# Patient Record
Sex: FEMALE | Race: WHITE | Employment: OTHER | URBAN - METROPOLITAN AREA
[De-identification: names, ages, dates, MRNs, and addresses within clinical notes are randomized per-mention and may not be internally consistent; named-entity substitution may affect disease eponyms.]

---

## 2017-01-09 ENCOUNTER — OFFICE VISIT (OUTPATIENT)
Dept: FAMILY MEDICINE | Facility: CLINIC | Age: 82
End: 2017-01-09
Payer: COMMERCIAL

## 2017-01-09 VITALS
BODY MASS INDEX: 20.32 KG/M2 | RESPIRATION RATE: 12 BRPM | SYSTOLIC BLOOD PRESSURE: 130 MMHG | DIASTOLIC BLOOD PRESSURE: 90 MMHG | TEMPERATURE: 98.2 F | HEIGHT: 60 IN | WEIGHT: 103.5 LBS | HEART RATE: 64 BPM

## 2017-01-09 DIAGNOSIS — R13.19 ESOPHAGEAL DYSPHAGIA: Primary | ICD-10-CM

## 2017-01-09 DIAGNOSIS — Z13.220 LIPID SCREENING: ICD-10-CM

## 2017-01-09 DIAGNOSIS — M77.42 METATARSALGIA OF LEFT FOOT: ICD-10-CM

## 2017-01-09 DIAGNOSIS — Z00.00 ENCOUNTER FOR MEDICARE ANNUAL WELLNESS EXAM: ICD-10-CM

## 2017-01-09 PROCEDURE — 80053 COMPREHEN METABOLIC PANEL: CPT | Performed by: FAMILY MEDICINE

## 2017-01-09 PROCEDURE — 36415 COLL VENOUS BLD VENIPUNCTURE: CPT | Performed by: FAMILY MEDICINE

## 2017-01-09 PROCEDURE — 80061 LIPID PANEL: CPT | Performed by: FAMILY MEDICINE

## 2017-01-09 PROCEDURE — 99397 PER PM REEVAL EST PAT 65+ YR: CPT | Performed by: FAMILY MEDICINE

## 2017-01-09 PROCEDURE — 84443 ASSAY THYROID STIM HORMONE: CPT | Performed by: FAMILY MEDICINE

## 2017-01-09 NOTE — PROGRESS NOTES
SUBJECTIVE:                                                            Batsheva Barkley is a 83 year old female who presents for Preventive Visit.  click delete button to remove this line now  click delete button to remove this line now  Are you in the first 12 months of your Medicare Part B coverage?  Yes,  Visual Acuity:  Right Eye:    Left Eye:   Both Eyes:     METATARSAL LEFT FOOT PAIN, HIGH ARCH, GOOD SHOES, MAY BENEFIT FROM ORTHOTICS :see Dr. Marcus     Healthy Habits:    Do you get at least three servings of calcium containing foods daily (dairy, green leafy vegetables, etc.)? yes    Amount of exercise or daily activities, outside of work: 4 day(s) per week    Problems taking medications regularly No    Medication side effects: No    Have you had an eye exam in the past two years? yes    Do you see a dentist twice per year? yes    Do you have sleep apnea, excessive snoring or daytime drowsiness?no    COGNITIVE SCREEN  1) Repeat 3 items (Banana, Sunrise, Chair)      Mini-CogTM Copyright TERRI Anderson. Licensed by the author for use in [facility]; reprinted with permission (janneth@[facility]). All rights reserved.    normal            All Histories reviewed and updated in "[employer]" as appropriate.  Social History   Substance Use Topics     Smoking status: Former Smoker     Smokeless tobacco: Never Used      Comment: only smoked for 3 years.     Alcohol Use: No       The patient does not drink >3 drinks per day nor >7 drinks per week.    Today's PHQ-2 Score:   PHQ-2 ( 1999 Pfizer) 7/1/2016 9/18/2015   Q1: Little interest or pleasure in doing things 0 0   Q2: Feeling down, depressed or hopeless 0 0   PHQ-2 Score 0 0       Do you feel safe in your environment - Yes    Do you have a Health Care Directive?: No: Advance care planning was reviewed with patient; patient declined at this time.    Current providers sharing in care for this patient include:   Patient Care Team:  Lenin Perez MD as PCP - General  "(Family Practice)      Hearing impairment: No    Ability to successfully perform activities of daily living: Yes, no assistance needed     Fall risk:  Fallen 2 or more times in the past year?: Yes  Any fall with injury in the past year?: No    Home safety:  none identified  click delete button to remove this line now    The following health maintenance items are reviewed in Epic and correct as of today:  Health Maintenance   Topic Date Due     INFLUENZA VACCINE (SYSTEM ASSIGNED)  09/01/2016     FALL RISK ASSESSMENT  09/18/2016     BMP Q6 MOS (NO INBASKET)  03/24/2017     LIPID MONITORING Q1 YEAR( NO INBASKET)  04/11/2017     ADVANCE DIRECTIVE PLANNING Q5 YRS (NO INBASKET)  05/07/2017     ALT Q1 YR (NO INBASKET)  09/24/2017     CBC Q1 YR (NO INBASKET)  09/24/2017     HF ACTION PLAN Q3 YR (NO INBASKET MSG)  07/30/2018     TETANUS IMMUNIZATION (SYSTEM ASSIGNED)  09/18/2025     DEXA SCAN SCREENING (SYSTEM ASSIGNED)  Completed     PNEUMOCOCCAL  Completed         Pneumonia Vaccine:Adults age 65+ who received Pneumovax (PPSV23) at 65 years or older: Should be given PCV13 > 1 year after their most recent PPSV23     ROS:  Constitutional, HEENT, cardiovascular, pulmonary, GI, , musculoskeletal, neuro, skin, endocrine and psych systems are negative, except as otherwise noted.    BP Readings from Last 3 Encounters:   01/09/17 130/90   10/07/16 126/82   10/06/16 140/65    Wt Readings from Last 3 Encounters:   01/09/17 103 lb 8 oz (46.947 kg)   10/07/16 98 lb (44.453 kg)   10/06/16 98 lb (44.453 kg)                  OBJECTIVE:                                                            There were no vitals taken for this visit. Estimated body mass index is 19.78 kg/(m^2) as calculated from the following:    Height as of 10/7/16: 4' 11\" (1.499 m).    Weight as of 10/7/16: 98 lb (44.453 kg).  EXAM:   GENERAL APPEARANCE: healthy, alert and no distress  EYES: Eyes grossly normal to inspection, PERRL and conjunctivae and sclerae " "normal  HENT: ear canals and TM's normal, nose and mouth without ulcers or lesions, oropharynx clear and oral mucous membranes moist  NECK: no adenopathy, no asymmetry, masses, or scars and thyroid normal to palpation  RESP: lungs clear to auscultation - no rales, rhonchi or wheezes  BREAST: normal without masses, tenderness or nipple discharge and no palpable axillary masses or adenopathy  CV: regular rate and rhythm, normal S1 S2, no S3 or S4, no murmur, click or rub, no peripheral edema and peripheral pulses strong  ABDOMEN: soft, nontender, no hepatosplenomegaly, no masses and bowel sounds normal  MS: no musculoskeletal defects are noted and gait is age appropriate without ataxia  SKIN: no suspicious lesions or rashes  NEURO: Normal strength and tone, sensory exam grossly normal, mentation intact and speech normal  PSYCH: mentation appears normal and affect normal/bright    ASSESSMENT / PLAN:                                                            1. Encounter for Medicare annual wellness exam  Doing well, swallowing worse without her PPI    2. Esophageal dysphagia    - omeprazole (PRILOSEC) 20 MG CR capsule; Take 1 capsule (20 mg) by mouth 2 times daily  Dispense: 60 capsule; Refill: 11    3. Metatarsalgia of left foot    - PODIATRY/FOOT & ANKLE SURGERY REFERRAL    4. Lipid screening    - Lipid panel reflex to direct LDL  - Comprehensive metabolic panel  - TSH with free T4 reflex    End of Life Planning:  Patient currently has an advanced directive:     COUNSELING:  Reviewed preventive health counseling, as reflected in patient instructions       Healthy diet/nutrition       Vision screening       Hearing screening        Estimated body mass index is 19.78 kg/(m^2) as calculated from the following:    Height as of 10/7/16: 4' 11\" (1.499 m).    Weight as of 10/7/16: 98 lb (44.453 kg).  Weight management plan: Discussed healthy diet and exercise guidelines and patient will follow up in 6 months in clinic to " re-evaluate.   reports that she has quit smoking. She has never used smokeless tobacco.      Appropriate preventive services were discussed with this patient, including applicable screening as appropriate for cardiovascular disease, diabetes, osteopenia/osteoporosis, and glaucoma.  As appropriate for age/gender, discussed screening for colorectal cancer, prostate cancer, breast cancer, and cervical cancer. Checklist reviewing preventive services available has been given to the patient.    Reviewed patients plan of care and provided an AVS. The Basic Care Plan (routine screening as documented in Health Maintenance) for Batsheva meets the Care Plan requirement. This Care Plan has been established and reviewed with the Patient and son.    Counseling Resources:  ATP IV Guidelines  Pooled Cohorts Equation Calculator  Breast Cancer Risk Calculator  FRAX Risk Assessment  ICSI Preventive Guidelines  Dietary Guidelines for Americans, 2010  USDA's MyPlate  ASA Prophylaxis  Lung CA Screening    Lenin Perez MD  [facility]

## 2017-01-09 NOTE — Clinical Note
[facility]  [address] [place], [address]  [phone]        January 11, 2017    Batsheva Barkley                                                                                                                                                       [address] [address]            Dear Batsheva,     Your blood looks generally good, I'd like to check you again in the spring . You won't have to fast.      Lenin Perez MD    Results for orders placed or performed in visit on 01/09/17   Lipid panel reflex to direct LDL   Result Value Ref Range    Cholesterol 178 <200 mg/dL    Triglycerides 74 <150 mg/dL    HDL Cholesterol 93 >49 mg/dL    LDL Cholesterol Calculated 70 <100 mg/dL    Non HDL Cholesterol 85 <130 mg/dL   Comprehensive metabolic panel   Result Value Ref Range    Sodium 132 (L) 133 - 144 mmol/L    Potassium 4.0 3.4 - 5.3 mmol/L    Chloride 98 94 - 109 mmol/L    Carbon Dioxide 25 20 - 32 mmol/L    Anion Gap 9 3 - 14 mmol/L    Glucose 87 70 - 99 mg/dL    Urea Nitrogen 18 7 - 30 mg/dL    Creatinine 0.72 0.52 - 1.04 mg/dL    GFR Estimate 77 >60 mL/min/1.7m2    GFR Estimate If Black >90   GFR Calc   >60 mL/min/1.7m2    Calcium 8.9 8.5 - 10.1 mg/dL    Bilirubin Total 0.5 0.2 - 1.3 mg/dL    Albumin 3.7 3.4 - 5.0 g/dL    Protein Total 6.6 (L) 6.8 - 8.8 g/dL    Alkaline Phosphatase 87 40 - 150 U/L    ALT 21 0 - 50 U/L    AST 21 0 - 45 U/L   TSH with free T4 reflex   Result Value Ref Range    TSH 1.87 0.40 - 4.00 mU/L

## 2017-01-09 NOTE — PATIENT INSTRUCTIONS

## 2017-01-09 NOTE — MR AVS SNAPSHOT
After Visit Summary   1/9/2017    Batsheva Barkley    MRN: [id number]           Patient Information     Date Of Birth          11/6/1933        Visit Information        Provider Department      1/9/2017 2:30 PM Lenin Perez MD [facility]        Today's Diagnoses     Esophageal dysphagia    -  1     Encounter for Medicare annual wellness exam         Metatarsalgia of left foot         Lipid screening           Care Instructions      Preventive Health Recommendations  Female Ages 65 +    Yearly exam:     See your health care provider every year in order to  o Review health changes.   o Discuss preventive care.    o Review your medicines if your doctor has prescribed any.      You no longer need a yearly Pap test unless you've had an abnormal Pap test in the past 10 years. If you have vaginal symptoms, such as bleeding or discharge, be sure to talk with your provider about a Pap test.      Every 1 to 2 years, have a mammogram.  If you are over 69, talk with your health care provider about whether or not you want to continue having screening mammograms.      Every 10 years, have a colonoscopy. Or, have a yearly FIT test (stool test). These exams will check for colon cancer.       Have a cholesterol test every 5 years, or more often if your doctor advises it.       Have a diabetes test (fasting glucose) every three years. If you are at risk for diabetes, you should have this test more often.       At age 65, have a bone density scan (DEXA) to check for osteoporosis (brittle bone disease).    Shots:    Get a flu shot each year.    Get a tetanus shot every 10 years.    Talk to your doctor about your pneumonia vaccines. There are now two you should receive - Pneumovax (PPSV 23) and Prevnar (PCV 13).    Talk to your doctor about the shingles vaccine.    Talk to your doctor about the hepatitis B vaccine.    Nutrition:     Eat at least 5 servings of fruits and vegetables each  day.      Eat whole-grain bread, whole-wheat pasta and brown rice instead of white grains and rice.      Talk to your provider about Calcium and Vitamin D.     Lifestyle    Exercise at least 150 minutes a week (30 minutes a day, 5 days a week). This will help you control your weight and prevent disease.      Limit alcohol to one drink per day.      No smoking.       Wear sunscreen to prevent skin cancer.       See your dentist twice a year for an exam and cleaning.      See your eye doctor every 1 to 2 years to screen for conditions such as glaucoma, macular degeneration, cataracts, etc         Follow-ups after your visit        Additional Services     PODIATRY/FOOT & ANKLE SURGERY REFERRAL       Your provider has referred you to: [facility]: [facility] [phone]   [email or web address]    Please be aware that coverage of these services is subject to the terms and limitations of your health insurance plan.  Call member services at your health plan with any benefit or coverage questions.      Please bring the following to your appointment:  >>   Any x-rays, CTs or MRIs which have been performed.  Contact the facility where they were done to arrange for  prior to your scheduled appointment.    >>   List of current medications   >>   This referral request   >>   Any documents/labs given to you for this referral                  Who to contact     If you have questions or need follow up information about today's clinic visit or your schedule please contact [facility] directly at [phone].  Normal or non-critical lab and imaging results will be communicated to you by MyChart, letter or phone within 4 business days after the clinic has received the results. If you do not hear from us within 7 days, please contact the clinic through MyChart or phone. If you have a critical or abnormal lab result, we will notify you by phone as soon as  "possible.  Submit refill requests through [employer] or call your pharmacy and they will forward the refill request to us. Please allow 3 business days for your refill to be completed.          Additional Information About Your Visit        [employer] Information     [employer] lets you send messages to your doctor, view your test results, renew your prescriptions, schedule appointments and more. To sign up, go to [email or web address] . Click on \"Log in\" on the left side of the screen, which will take you to the Welcome page. Then click on \"Sign up Now\" on the right side of the page.     You will be asked to enter the access code listed below, as well as some personal information. Please follow the directions to create your username and password.     Your access code is: [email or web address]  Expires: 2017  3:16 PM     Your access code will  in 90 days. If you need help or a new code, please call your [place] clinic or [phone].        Care EveryWhere ID     This is your Care EveryWhere ID. This could be used by other organizations to access your [place] medical records  [id number]        Your Vitals Were     Pulse Temperature Respirations Height BMI (Body Mass Index)       64 98.2  F (36.8  C) (Oral) 12 5' (1.524 m) 20.21 kg/m2        Blood Pressure from Last 3 Encounters:   17 130/90   10/07/16 126/82   10/06/16 140/65    Weight from Last 3 Encounters:   17 103 lb 8 oz (46.947 kg)   10/07/16 98 lb (44.453 kg)   10/06/16 98 lb (44.453 kg)              We Performed the Following     Comprehensive metabolic panel     Lipid panel reflex to direct LDL     PODIATRY/FOOT & ANKLE SURGERY REFERRAL     TSH with free T4 reflex          Today's Medication Changes          These changes are accurate as of: 17  3:16 PM.  If you have any questions, ask your nurse or doctor.               These medicines have changed or have updated prescriptions.        Dose/Directions    omeprazole 20 MG CR capsule "   Commonly known as:  priLOSEC   This may have changed:  when to take this   Used for:  Esophageal dysphagia   Changed by:  Lenin Perez MD        Dose:  20 mg   Take 1 capsule (20 mg) by mouth 2 times daily   Quantity:  60 capsule   Refills:  11            Where to get your medicines      These medications were sent to [facility] [id number] [place] [address]  [address] [address]     Phone:  [phone]    - omeprazole 20 MG CR capsule             Primary Care Provider Office Phone # Fax #    Lenin Perez [phone]       [facility] [address] [place] [address]        Thank you!     Thank you for choosing [facility]  for your care. Our goal is always to provide you with excellent care. Hearing back from our patients is one way we can continue to improve our services. Please take a few minutes to complete the written survey that you may receive in the mail after your visit with us. Thank you!             Your Updated Medication List - Protect others around you: Learn how to safely use, store and throw away your medicines at www.disposemymeds.org.          This list is accurate as of: 1/9/17  3:16 PM.  Always use your most recent med list.                   Brand Name Dispense Instructions for use    acetaminophen 325 MG tablet    TYLENOL    100 tablet    Take 2 tablets (650 mg) by mouth every 4 hours as needed for mild pain       aspirin 81 MG tablet     100    1 tab po QD (Once per day)       atorvastatin 40 MG tablet    LIPITOR    90 tablet    Take 1 tablet (40 mg) by mouth daily       CALCIUM 600 + D 600-200 MG-UNIT Tabs   Generic drug:  Calcium Carb-Cholecalciferol      Take 600 mg by mouth daily       CENTRUM SILVER per tablet     3 MONTHS    ONE DAILY       lisinopril 10 MG tablet    PRINIVIL/ZESTRIL    90 tablet    Take 1 tablet (10 mg) by mouth daily       metoprolol 50 MG  tablet    LOPRESSOR    180 tablet    Take 1 tablet (50 mg) by mouth 2 times daily       mirtazapine 15 MG tablet    REMERON     Take 15 mg by mouth At Bedtime       olopatadine HCl 0.2 % Soln    PATADAY    2.5 mL    Place 1 drop into both eyes daily       omeprazole 20 MG CR capsule    priLOSEC    60 capsule    Take 1 capsule (20 mg) by mouth 2 times daily       order for DME     1 Device    Equipment being ordered: lumbar brace elastic reinforced       vitamin D 1000 UNITS capsule      Take 2 capsules by mouth daily.

## 2017-01-09 NOTE — NURSING NOTE
Chief Complaint   Patient presents with     Physical       Initial /90 mmHg  Pulse 64  Temp(Src) 98.2  F (36.8  C) (Oral)  Resp 12  Ht 5' (1.524 m)  Wt 103 lb 8 oz (46.947 kg)  BMI 20.21 kg/m2 Estimated body mass index is 20.21 kg/(m^2) as calculated from the following:    Height as of this encounter: 5' (1.524 m).    Weight as of this encounter: 103 lb 8 oz (46.947 kg).  BP completed using cuff size: maryjane Ramachandran CMA

## 2017-01-10 LAB
ALBUMIN SERPL-MCNC: 3.7 G/DL (ref 3.4–5)
ALP SERPL-CCNC: 87 U/L (ref 40–150)
ALT SERPL W P-5'-P-CCNC: 21 U/L (ref 0–50)
ANION GAP SERPL CALCULATED.3IONS-SCNC: 9 MMOL/L (ref 3–14)
AST SERPL W P-5'-P-CCNC: 21 U/L (ref 0–45)
BILIRUB SERPL-MCNC: 0.5 MG/DL (ref 0.2–1.3)
BUN SERPL-MCNC: 18 MG/DL (ref 7–30)
CALCIUM SERPL-MCNC: 8.9 MG/DL (ref 8.5–10.1)
CHLORIDE SERPL-SCNC: 98 MMOL/L (ref 94–109)
CHOLEST SERPL-MCNC: 178 MG/DL
CO2 SERPL-SCNC: 25 MMOL/L (ref 20–32)
CREAT SERPL-MCNC: 0.72 MG/DL (ref 0.52–1.04)
GFR SERPL CREATININE-BSD FRML MDRD: 77 ML/MIN/1.7M2
GLUCOSE SERPL-MCNC: 87 MG/DL (ref 70–99)
HDLC SERPL-MCNC: 93 MG/DL
LDLC SERPL CALC-MCNC: 70 MG/DL
NONHDLC SERPL-MCNC: 85 MG/DL
POTASSIUM SERPL-SCNC: 4 MMOL/L (ref 3.4–5.3)
PROT SERPL-MCNC: 6.6 G/DL (ref 6.8–8.8)
SODIUM SERPL-SCNC: 132 MMOL/L (ref 133–144)
TRIGL SERPL-MCNC: 74 MG/DL
TSH SERPL DL<=0.005 MIU/L-ACNC: 1.87 MU/L (ref 0.4–4)

## 2017-02-07 ENCOUNTER — RADIANT APPOINTMENT (OUTPATIENT)
Dept: GENERAL RADIOLOGY | Facility: CLINIC | Age: 82
End: 2017-02-07
Attending: PODIATRIST
Payer: COMMERCIAL

## 2017-02-07 ENCOUNTER — OFFICE VISIT (OUTPATIENT)
Dept: PODIATRY | Facility: CLINIC | Age: 82
End: 2017-02-07
Payer: COMMERCIAL

## 2017-02-07 VITALS
HEIGHT: 60 IN | SYSTOLIC BLOOD PRESSURE: 136 MMHG | WEIGHT: 103 LBS | BODY MASS INDEX: 20.22 KG/M2 | DIASTOLIC BLOOD PRESSURE: 84 MMHG

## 2017-02-07 DIAGNOSIS — L97.522 PLANTAR ULCER OF LEFT FOOT WITH FAT LAYER EXPOSED (H): ICD-10-CM

## 2017-02-07 DIAGNOSIS — M79.672 LEFT FOOT PAIN: Primary | ICD-10-CM

## 2017-02-07 DIAGNOSIS — M79.672 LEFT FOOT PAIN: ICD-10-CM

## 2017-02-07 DIAGNOSIS — L84 PRE-ULCERATIVE CALLUSES: ICD-10-CM

## 2017-02-07 DIAGNOSIS — L90.9 FAT PAD ATROPHY OF FOOT: ICD-10-CM

## 2017-02-07 DIAGNOSIS — M20.41 HAMMER TOES OF BOTH FEET: ICD-10-CM

## 2017-02-07 DIAGNOSIS — M79.671 RIGHT FOOT PAIN: ICD-10-CM

## 2017-02-07 DIAGNOSIS — M20.42 HAMMER TOES OF BOTH FEET: ICD-10-CM

## 2017-02-07 PROCEDURE — 11042 DBRDMT SUBQ TIS 1ST 20SQCM/<: CPT | Performed by: PODIATRIST

## 2017-02-07 PROCEDURE — 99203 OFFICE O/P NEW LOW 30 MIN: CPT | Mod: 25 | Performed by: PODIATRIST

## 2017-02-07 PROCEDURE — 73630 X-RAY EXAM OF FOOT: CPT | Mod: LT

## 2017-02-07 RX ORDER — UREA 200 MG/G
CREAM TOPICAL DAILY
Qty: 85 G | Refills: 1 | Status: SHIPPED | OUTPATIENT
Start: 2017-02-07 | End: 2017-03-07

## 2017-02-07 RX ORDER — SILVER SULFADIAZINE 10 MG/G
CREAM TOPICAL DAILY
Qty: 25 G | Refills: 0 | Status: SHIPPED | OUTPATIENT
Start: 2017-02-07 | End: 2017-02-21

## 2017-02-07 NOTE — NURSING NOTE
Chief Complaint   Patient presents with     Foot Pain     left foot hurts on the ball of foot [id number], callus on ball of foot right foot        Initial /84 mmHg  Ht 5' (1.524 m)  Wt 103 lb (46.72 kg)  BMI 20.12 kg/m2 Estimated body mass index is 20.12 kg/(m^2) as calculated from the following:    Height as of this encounter: 5' (1.524 m).    Weight as of this encounter: 103 lb (46.72 kg).  Medication Reconciliation: complete   Leo Mclaughlin MA

## 2017-02-07 NOTE — PROGRESS NOTES
PATIENT HISTORY:  Batsheva Barkley is a 83 year old female who presents to clinic for pain to bottoms of both feet. Left is worse than right. Here with daughter. Notes she gets a callus on the right foot but the left foot pain is new. Pain is 8/10. Very sore to walk on. Has had for 6 months on the left side and 2 years on the right. Previous bunion surgery they note to left great toe. Would like to know what is causing the pain and what can be done for it.     Review of Systems:  Patient denies fever, chills, rash, wound, stiffness,heart burn, blood in stool, chest pain with activity, calf pain when walking, shortness of breath with activity, chronic cough, easy bleeding/bruising, excessive thirst, fatigue, depression, anxiety.  Patient admits to limping, weakness, swelling. .     PAST MEDICAL HISTORY:   Past Medical History   Diagnosis Date     Generalized osteoarthrosis      knees ;; L total kne 10/09     CAD (coronary artery disease) 6/20/05     [place], transient CHF     Osteoporosis      Mixed hyperlipidemia      Cardiomyopathy (H)      HTN (hypertension)      Mitral regurgitation      mod     Systolic CHF (H) 1/21/2015     Coronary atherosclerosis 6/20/2005     circ vessel was stented;  had a 50% LAD lesion which was not treated Problem list name updated by automated process. Provider to review        PAST SURGICAL HISTORY:   Past Surgical History   Procedure Laterality Date     L bunion + hammer toes  1/04, 4/04     Ovarian cyst surgery       Colonoscopy  3/05     L total knee replacement  10/2009      Ent surgery       Eye surgery       Heart cath, angioplasty  2005     RONI to left circ     Esophagoscopy, gastroscopy, duodenoscopy (egd), combined N/A 4/14/2016     Procedure: COMBINED ESOPHAGOSCOPY, GASTROSCOPY, DUODENOSCOPY (EGD), BIOPSY SINGLE OR MULTIPLE;  Surgeon: Jonathan Gramajo MD;  Location:  GI        MEDICATIONS:   Current outpatient prescriptions:      omeprazole (PRILOSEC) 20  MG CR capsule, Take 1 capsule (20 mg) by mouth 2 times daily, Disp: 60 capsule, Rfl: 11     metoprolol (LOPRESSOR) 50 MG tablet, Take 1 tablet (50 mg) by mouth 2 times daily, Disp: 180 tablet, Rfl: 3     olopatadine HCl (PATADAY) 0.2 % SOLN, Place 1 drop into both eyes daily, Disp: 2.5 mL, Rfl: 3     atorvastatin (LIPITOR) 40 MG tablet, Take 1 tablet (40 mg) by mouth daily, Disp: 90 tablet, Rfl: 3     lisinopril (PRINIVIL,ZESTRIL) 10 MG tablet, Take 1 tablet (10 mg) by mouth daily, Disp: 90 tablet, Rfl: 3     order for DME, Equipment being ordered: lumbar brace elastic reinforced, Disp: 1 Device, Rfl: 0     mirtazapine (REMERON) 15 MG tablet, Take 15 mg by mouth At Bedtime, Disp: , Rfl:      Calcium Carb-Cholecalciferol (CALCIUM 600 + D) 600-200 MG-UNIT TABS, Take 600 mg by mouth daily, Disp: , Rfl:      acetaminophen (TYLENOL) 325 MG tablet, Take 2 tablets (650 mg) by mouth every 4 hours as needed for mild pain, Disp: 100 tablet, Rfl: 0     Cholecalciferol (VITAMIN D) 1000 UNITS capsule, Take 2 capsules by mouth daily., Disp: , Rfl:      CENTRUM SILVER OR TABS, ONE DAILY, Disp: 3 MONTHS, Rfl: 1 YEAR     ASPIRIN 81 MG OR TABS, 1 tab po QD (Once per day), Disp: 100, Rfl: 3     ALLERGIES:    Allergies   Allergen Reactions     Codeine      nausea, HA     Lisinopril Cough        SOCIAL HISTORY:   Social History     Social History     Marital Status:      Spouse Name: Gene     Number of Children: 6     Years of Education: N/A     Occupational History     Not on file.     Social History Main Topics     Smoking status: Former Smoker     Smokeless tobacco: Never Used      Comment: only smoked for 3 years.     Alcohol Use: No     Drug Use: No     Sexual Activity:     Partners: Male     Other Topics Concern     Caffeine Concern No     decaf coffee and tea      Special Diet No     Exercise Yes     walk everyday      Social History Narrative        FAMILY HISTORY:   Family History   Problem Relation Age of Onset      CEREBROVASCULAR DISEASE Mother      Breast Cancer Sister      Respiratory Father      heart     HEART DISEASE Sister         EXAM:Vitals: /84 mmHg  Ht 5' (1.524 m)  Wt 103 lb (46.72 kg)  BMI 20.12 kg/m2  BMI= Body mass index is 20.12 kg/(m^2).    General appearance: Patient is alert and fully cooperative with history & exam.  No sign of distress is noted during the visit.     Psychiatric: Affect is pleasant & appropriate.  Patient appears motivated to improve health.     Respiratory: Breathing is regular & unlabored while sitting.     HEENT: Hearing is intact to spoken word.  Speech is clear.  No gross evidence of visual impairment that would impact ambulation.     Dermatologic: localized hyperkeratotic pre ulcerative lesions noted to plantar right 3rd metatarsal head and plantar left 1st metatarsal head. Small 0.2cm x 0.2cm x 0.2 cm ulceration noted to plantar left 1st metatarsal head. No redness, drainage or signs of infection noted. .     Vascular: DP & PT pulses are intact & regular bilaterally. Minimal edema and varicosities noted.  CFT and skin temperature is normal to both lower extremities.     Neurologic: Lower extremity sensation is diminished to feet. .     Musculoskeletal: Patient is ambulatory without assistive device or brace.  Rigid contracture toes 2-5 right foot and 3-5 left foot. No range of motion of left 1st metatarsal phalangeal joint. Pain on palpation of the right 3rd metatarsal head and left 1st metatarsal phalangeal joint.     Radiographs:  Diffuse arthritis through out foot. Contracture of toes 3-5. No fractures noted. Hardware and previous fusion left 1st metatarsal phalangeal joint     ASSESSMENT:    Left foot pain  Right foot pain  Pre-ulcerative calluses  Hammer toes of both feet  Fat pad atrophy of foot  Plantar ulcer of left foot with fat layer exposed (H)       PLAN:  Reviewed patient's chart in epic. Discussed causes of keratomas.  They are due to areas of increase  friction.  Hammertoes can create these as they put more pressure to the metatarsal head.  Discussed treatments such as using foot file, pumice stone, metatarsal pads, orthotics, and not walking barefoot.     At this time, recommend silvadene cream and lidocaine cream to left foot ulcer for next 2 weeks. Recommend slippers in the house, foam inserts for cushion and urea cream to both feet. Was given information on callus care and places that do that.     Procedure. After verbal consent, excisional debridement was performed on ulcer.  #15 blade was used to debride ulcer down to and including subcutaneous tissue. Bleeding controlled with light pressure.   No drainage noted.  No anesthesia was used due to neuropathy. Dry dressing applied to foot.  Patient tolerated procedure well.  .        Rosy Espino, ALEXANDERM, Pod

## 2017-02-07 NOTE — PATIENT INSTRUCTIONS
Dr. Espino's Clinic Schedule     [facility]  [address] [place] [address]  Ph: [phone]  Fax: [phone] [facility]  [address] [place] [address]  Ph: [phone]  Fax: [phone] [facility]  [address] [place] [address]  Ph: [phone]  Fax: [phone]   Monday PM &  AM Friday PM   Surgery Scheduling Line:  [phone] [facility] [place]  [address]  [place] [address]  Ph: [phone] [facility]  [address]  [place] [address]  Ph: [phone]  Fax: [phone]   Appointment [phone] General After Hours:  [phone] Patient [phone]         A List of Nail/Callus Care Alternatives  [facility]    [phone]    $40.00 per visit   [facility]   [phone]  [address] [place] [address]     [facility]  [phone]  Services the [facility]  $32 per visit   [facility]   Foot Care Clinic    [phone]    At your home or at [address] [address]      [facility]    [phone] [address], [place] [address] [phone] [address], [place] [address]  [facility]    [phone]    Routine foot care for older and diabetic feet by a licensed nurse in your home.        Calluses, Corns, IPKs, Porokeratosis    When there is excessive friction or pressure on the skin, the body responds by making the skin thicker to protect the deeper structures from becoming exposed. While this works well to protect the deeper structures, the thickened skin can increase pressure and pain.    Flat, diffuse thickening are simple calluses and they are usually caused by friction.  Often these are the result of rubbing on a shoe or going  barefoot.    Calluses with a central core between the toes are called corns. These result from prominent joints on adjacent toes rubbing together. Theses are a symptom of bone malalignment and will always recur unless the underlying bones are addressed surgically.    Calluses with a central core on the ball of the foot are usually IPKs (intractable plantar keratosis). These are caused by excessive pressure from the metatarsals, the bones that make up the ball of the foot. Often one of these bones is too long or too prominent.  Again, these will always recur unless the underlying bone issue is addressed. There is no cure for these. They will either go away by themselves, recur, or more could develop.    Regardless of what the diagnosis, most of these lesions can be kept comfortable with routine maintenance.   1. File them down with a pumice stone or callus file a couple times a week.   2. An electric callus removing device. [employer] Pedi Perfect Electronic Pedicure Foot File and Callus Remover can be a good option.   3. Lotion can be applied to soften the callus. A urea based cream such as Kersal or Vanicream or thicker cream with shea butter are good options.  4. Toe spacers or toe covers can be used for corns, gel pads can be used for other lesions on the bottom of the foot.   If there is a surgical pathology noted, such as a prominent bone, often this needs to be addressed surgically to minimize recurrence. However, sometimes the lesion simply migrates to another spot after surgery, so it is not a guaranteed cure.

## 2017-02-07 NOTE — MR AVS SNAPSHOT
After Visit Summary   2017    Batsheva Barkley    MRN: [id number]           Patient Information     Date Of Birth          1933        Visit Information        Provider Department      2017 9:00 AM Rosy Espino DPM, Pod [facility]        Today's Diagnoses     Left foot pain    -  1       Care Instructions            Dr. Espino's Clinic Schedule     [facility]  [address] [place] [address]  Ph: [phone]  Fax: [phone] [facility]  [address] [place] [address]  Ph: [phone]  Fax: [phone] [facility]  [address] [place] [address]  Ph: [phone]  Fax: [phone]   Monday PM &  PM   Surgery Scheduling Line:  [phone] [facility] [place]  [address]  [place] [address]  Ph: [phone] [facility]  [address]  [place] [address]  Ph: [phone]  Fax: [phone]   Appointment [phone] General After Hours:  [phone] Patient [phone]         A List of Nail/Callus Care Alternatives  [facility]    [phone]    $40.00 per visit   [facility]   [phone]  [address] [place] [address]     [facility]  [phone]  Services the [facility] area  $32 per visit   [facility]   Foot Care Clinic    [phone]    At your home or at [address] [address]      [facility]    [phone]  [address], [place] [address] [phone] [address], [place], [place] [address] [place] [address] Foot Clinic    [phone]    Routine foot care for older and diabetic feet by a licensed nurse in your home.        Calluses, Corns, IPKs, Porokeratosis    When there is excessive friction or pressure on the skin, the body  responds by making the skin thicker to protect the deeper structures from becoming exposed. While this works well to protect the deeper structures, the thickened skin can increase pressure and pain.    Flat, diffuse thickening are simple calluses and they are usually caused by friction.  Often these are the result of rubbing on a shoe or going barefoot.    Calluses with a central core between the toes are called corns. These result from prominent joints on adjacent toes rubbing together. Theses are a symptom of bone malalignment and will always recur unless the underlying bones are addressed surgically.    Calluses with a central core on the ball of the foot are usually IPKs (intractable plantar keratosis). These are caused by excessive pressure from the metatarsals, the bones that make up the ball of the foot. Often one of these bones is too long or too prominent.  Again, these will always recur unless the underlying bone issue is addressed. There is no cure for these. They will either go away by themselves, recur, or more could develop.    Regardless of what the diagnosis, most of these lesions can be kept comfortable with routine maintenance.   1. File them down with a pumice stone or callus file a couple times a week.   2. An electric callus removing device. [employer] Pedi Perfect Electronic Pedicure Foot File and Callus Remover can be a good option.   3. Lotion can be applied to soften the callus. A urea based cream such as Kersal or Vanicream or thicker cream with shea butter are good options.  4. Toe spacers or toe covers can be used for corns, gel pads can be used for other lesions on the bottom of the foot.   If there is a surgical pathology noted, such as a prominent bone, often this needs to be addressed surgically to minimize recurrence. However, sometimes the lesion simply migrates to another spot after surgery, so it is not a guaranteed cure.                 Follow-ups after your visit        Your next 10  appointments already scheduled     Mar 29, 2017 11:00 AM   NM MPI WITH LEXISCAN with RHNM1   [facility] Medicine ([facility])    [address]  [facility] [id number] [phone]           For a ONE day exam: Allow 3-4 hours for test. For a TWO day exam: Allow 2 hours PER day for test.  You may need to stop some medicines before the test. Follow your doctor s orders. - If you take a beta blocker: Follow your doctor s specific instructions on taking it prior to and on the day of your exam. - If you take Aggrenox or dipyridamole (Persantine, Permole), stop taking it 48 hours before your test. - If you take Viagra, Cialis or Levitra, stop taking it 48 hours before your test. - If you take theophylline or aminophylline, stop taking it 12 hours before your test.  For patients with diabetes: - If you take insulin, call your diabetes care team. Ask if you should take a 1/2 dose the morning of your test. - If you take diabetes medicine by mouth, don t take it on the morning of your test. Bring it with you to take after the test. (If you have questions, call your diabetes care team.)  Do not take nitrates on the day of your test. Do not wear your Nitro-Patch.  Stop all caffeine 12 hours before the test. This includes coffee, tea, soda pop, chocolate and certain medicines (such as Anacin, Excedrin and NoDoz). Also avoid decaf coffee and tea, as these contain small amounts of caffeine.  No alcohol, smoking or other tobacco for 12 hours before the test.  Stop eating 3 hours before the test. You may drink water.  Please wear a loose two-piece outfit. If you will have an exercise test, bring rubber-soled walking shoes.  When you arrive, please tell us if you: - Have diabetes - Are breastfeeding - May be pregnant - Have a pacemaker of ICD (implantable defibrillator).  Please call your Imaging Department at your exam site with any questions.            Mar 29, 2017 12:30 PM   Ekg Stress Nm  "Lexiscan with RESRM3   [facility] Electrocardiol[facility] ([facility])    [address]  [facility] [id number] [phone]            2017  9:45 AM   Return Visit with Nando Tang MD   [facility] AT [place] ([facility])    [address]  [facility] [address] [phone]              Who to contact     If you have questions or need follow up information about today's clinic visit or your schedule please contact [facility] directly at [phone].  Normal or non-critical lab and imaging results will be communicated to you by MyChart, letter or phone within 4 business days after the clinic has received the results. If you do not hear from us within 7 days, please contact the clinic through MyChart or phone. If you have a critical or abnormal lab result, we will notify you by phone as soon as possible.  Submit refill requests through [employer] or call your pharmacy and they will forward the refill request to us. Please allow 3 business days for your refill to be completed.          Additional Information About Your Visit        MyChart Information     [employer] lets you send messages to your doctor, view your test results, renew your prescriptions, schedule appointments and more. To sign up, go to [email or web address] . Click on \"Log in\" on the left side of the screen, which will take you to the Welcome page. Then click on \"Sign up Now\" on the right side of the page.     You will be asked to enter the access code listed below, as well as some personal information. Please follow the directions to create your username and password.     Your access code is: [email or web address]  Expires: 2017  3:16 PM     Your access code will  in 90 days. If you need help or a new code, please call your [facility] or [phone].        Care EveryWhere ID     This is your Care EveryWhere ID. This could be used by " other organizations to access your [facility] medical records  [id number]        Your Vitals Were     Height BMI (Body Mass Index)                5' (1.524 m) 20.12 kg/m2           Blood Pressure from Last 3 Encounters:   02/07/17 136/84   01/09/17 130/90   10/07/16 126/82    Weight from Last 3 Encounters:   02/07/17 103 lb (46.72 kg)   01/09/17 103 lb 8 oz (46.947 kg)   10/07/16 98 lb (44.453 kg)               Primary Care Provider Office Phone # Fax #    Lenin Douglas Perez [phone]       [facility] [address] [place] [address]        Thank you!     Thank you for choosing [facility]  for your care. Our goal is always to provide you with excellent care. Hearing back from our patients is one way we can continue to improve our services. Please take a few minutes to complete the written survey that you may receive in the mail after your visit with us. Thank you!             Your Updated Medication List - Protect others around you: Learn how to safely use, store and throw away your medicines at www.disposemymeds.org.          This list is accurate as of: 2/7/17  9:32 AM.  Always use your most recent med list.                   Brand Name Dispense Instructions for use    acetaminophen 325 MG tablet    TYLENOL    100 tablet    Take 2 tablets (650 mg) by mouth every 4 hours as needed for mild pain       aspirin 81 MG tablet     100    1 tab po QD (Once per day)       atorvastatin 40 MG tablet    LIPITOR    90 tablet    Take 1 tablet (40 mg) by mouth daily       CALCIUM 600 + D 600-200 MG-UNIT Tabs   Generic drug:  Calcium Carb-Cholecalciferol      Take 600 mg by mouth daily       CENTRUM SILVER per tablet     3 MONTHS    ONE DAILY       lisinopril 10 MG tablet    PRINIVIL/ZESTRIL    90 tablet    Take 1 tablet (10 mg) by mouth daily       metoprolol 50 MG tablet    LOPRESSOR    180 tablet    Take 1 tablet (50 mg) by mouth 2 times daily       mirtazapine  15 MG tablet    REMERON     Take 15 mg by mouth At Bedtime       olopatadine HCl 0.2 % Soln    PATADAY    2.5 mL    Place 1 drop into both eyes daily       omeprazole 20 MG CR capsule    priLOSEC    60 capsule    Take 1 capsule (20 mg) by mouth 2 times daily       order for DME     1 Device    Equipment being ordered: lumbar brace elastic reinforced       vitamin D 1000 UNITS capsule      Take 2 capsules by mouth daily.

## 2017-02-14 ENCOUNTER — OFFICE VISIT (OUTPATIENT)
Dept: FAMILY MEDICINE | Facility: CLINIC | Age: 82
End: 2017-02-14
Payer: COMMERCIAL

## 2017-02-14 ENCOUNTER — RADIANT APPOINTMENT (OUTPATIENT)
Dept: GENERAL RADIOLOGY | Facility: CLINIC | Age: 82
End: 2017-02-14
Attending: FAMILY MEDICINE
Payer: COMMERCIAL

## 2017-02-14 VITALS
HEIGHT: 61 IN | WEIGHT: 102 LBS | SYSTOLIC BLOOD PRESSURE: 102 MMHG | DIASTOLIC BLOOD PRESSURE: 68 MMHG | HEART RATE: 68 BPM | RESPIRATION RATE: 14 BRPM | OXYGEN SATURATION: 99 % | BODY MASS INDEX: 19.26 KG/M2 | TEMPERATURE: 98 F

## 2017-02-14 DIAGNOSIS — S29.8XXA BLUNT TRAUMA OF RIB, INITIAL ENCOUNTER: Primary | ICD-10-CM

## 2017-02-14 PROCEDURE — 99213 OFFICE O/P EST LOW 20 MIN: CPT | Performed by: FAMILY MEDICINE

## 2017-02-14 PROCEDURE — 71101 X-RAY EXAM UNILAT RIBS/CHEST: CPT | Mod: LT

## 2017-02-14 NOTE — MR AVS SNAPSHOT
After Visit Summary   2/14/2017    Batsheva Barkley    MRN: [id number]           Patient Information     Date Of Birth          11/6/1933        Visit Information        Provider Department      2/14/2017 10:30 AM Leinn Perez MD [facility]        Today's Diagnoses     Blunt trauma of rib, initial encounter    -  1       Follow-ups after your visit        Follow-up notes from your care team     Return in about 3 months (around 5/14/2017).      Your next 10 appointments already scheduled     Mar 29, 2017 11:00 AM CDT   NM MPI WITH LEXISCAN with RHNM1   [facility] ([facility])    [address] [facility] [address]   [phone]           For a ONE day exam: Allow 3-4 hours for test. For a TWO day exam: Allow 2 hours PER day for test.  You may need to stop some medicines before the test. Follow your doctor s orders. - If you take a beta blocker: Follow your doctor s specific instructions on taking it prior to and on the day of your exam. - If you take Aggrenox or dipyridamole (Persantine, Permole), stop taking it 48 hours before your test. - If you take Viagra, Cialis or Levitra, stop taking it 48 hours before your test. - If you take theophylline or aminophylline, stop taking it 12 hours before your test.  For patients with diabetes: - If you take insulin, call your diabetes care team. Ask if you should take a 1/2 dose the morning of your test. - If you take diabetes medicine by mouth, don t take it on the morning of your test. Bring it with you to take after the test. (If you have questions, call your diabetes care team.)  Do not take nitrates on the day of your test. Do not wear your Nitro-Patch.  Stop all caffeine 12 hours before the test. This includes coffee, tea, soda pop, chocolate and certain medicines (such as Anacin, Excedrin and NoDoz). Also avoid decaf coffee and tea, as these contain small amounts of caffeine.  No  "alcohol, smoking or other tobacco for 12 hours before the test.  Stop eating 3 hours before the test. You may drink water.  Please wear a loose two-piece outfit. If you will have an exercise test, bring rubber-soled walking shoes.  When you arrive, please tell us if you: - Have diabetes - Are breastfeeding - May be pregnant - Have a pacemaker of ICD (implantable defibrillator).  Please call your Imaging Department at your exam site with any questions.            Mar 29, 2017 12:30 PM CDT   Ekg Stress Nm Lexiscan with RESRM3   [facility] ([facility])    [address]  [facility] [id number] [phone]            Apr 07, 2017  9:45 AM CDT   Return Visit with Nando Tang MD   [facility] [place] ([facility])    [address]  [facility] [id number] [phone]              Who to contact     If you have questions or need follow up information about today's clinic visit or your schedule please contact [facility] directly at [phone].  Normal or non-critical lab and imaging results will be communicated to you by MyChart, letter or phone within 4 business days after the clinic has received the results. If you do not hear from us within 7 days, please contact the clinic through MyChart or phone. If you have a critical or abnormal lab result, we will notify you by phone as soon as possible.  Submit refill requests through [employer] or call your pharmacy and they will forward the refill request to us. Please allow 3 business days for your refill to be completed.          Additional Information About Your Visit        [employer] Information     [employer] lets you send messages to your doctor, view your test results, renew your prescriptions, schedule appointments and more. To sign up, go to [email or web address] . Click on \"Log in\" on the left side of the screen, which will take you to the " "Welcome page. Then click on \"Sign up Now\" on the right side of the page.     You will be asked to enter the access code listed below, as well as some personal information. Please follow the directions to create your username and password.     Your access code is: [email or web address]  Expires: 2017  3:16 PM     Your access code will  in 90 days. If you need help or a new code, please call your [place] clinic or [phone].        Care EveryWhere ID     This is your Care EveryWhere ID. This could be used by other organizations to access your [place] medical records  [id number]        Your Vitals Were     Pulse Temperature Respirations Height Pulse Oximetry BMI (Body Mass Index)    68 98  F (36.7  C) 14 5' 1\" (1.549 m) 99% 19.27 kg/m2       Blood Pressure from Last 3 Encounters:   17 102/68   17 136/84   17 130/90    Weight from Last 3 Encounters:   17 102 lb (46.3 kg)   17 103 lb (46.7 kg)   17 103 lb 8 oz (46.9 kg)              We Performed the Following     XR Ribs & Chest Left G/E 3 Views        Primary Care Provider Office Phone # Fax #    Lenin Perez [phone]       [address] [address]        Thank you!     Thank you for choosing [facility]  for your care. Our goal is always to provide you with excellent care. Hearing back from our patients is one way we can continue to improve our services. Please take a few minutes to complete the written survey that you may receive in the mail after your visit with us. Thank you!             Your Updated Medication List - Protect others around you: Learn how to safely use, store and throw away your medicines at www.disposemymeds.org.          This list is accurate as of: 17  2:45 PM.  Always use your most recent med list.                   Brand Name Dispense Instructions for use    acetaminophen 325 MG tablet    TYLENOL    100 " tablet    Take 2 tablets (650 mg) by mouth every 4 hours as needed for mild pain       aspirin 81 MG tablet     100    1 tab po QD (Once per day)       atorvastatin 40 MG tablet    LIPITOR    90 tablet    Take 1 tablet (40 mg) by mouth daily       CALCIUM 600 + D 600-200 MG-UNIT Tabs   Generic drug:  Calcium Carb-Cholecalciferol      Take 600 mg by mouth daily       CENTRUM SILVER per tablet     3 MONTHS    ONE DAILY       lidocaine 2 % topical gel    XYLOCAINE    30 mL    Apply topically as needed for moderate pain Apply to left foot ulcer as needed for pain       lisinopril 10 MG tablet    PRINIVIL/ZESTRIL    90 tablet    Take 1 tablet (10 mg) by mouth daily       metoprolol 50 MG tablet    LOPRESSOR    180 tablet    Take 1 tablet (50 mg) by mouth 2 times daily       mirtazapine 15 MG tablet    REMERON     Take 15 mg by mouth At Bedtime       olopatadine HCl 0.2 % Soln    PATADAY    2.5 mL    Place 1 drop into both eyes daily       omeprazole 20 MG CR capsule    priLOSEC    60 capsule    Take 1 capsule (20 mg) by mouth 2 times daily       order for DME     1 Device    Equipment being ordered: lumbar brace elastic reinforced       silver sulfADIAZINE 1 % cream    SILVADENE    25 g    Apply topically daily for 14 days Apply to left foot ulcer daily       Urea 20 % Crea     85 g    Externally apply topically daily Apply to both feet daily       vitamin D 1000 UNITS capsule      Take 2 capsules by mouth daily.

## 2017-02-14 NOTE — NURSING NOTE
"Chief Complaint   Patient presents with     Musculoskeletal Problem     left rib pain from cough      Initial /68 (BP Location: Right arm, Patient Position: Chair, Cuff Size: Adult Regular)  Pulse 68  Temp 98  F (36.7  C)  Resp 14  Ht 5' 1\" (1.549 m)  Wt 102 lb (46.3 kg)  SpO2 99%  BMI 19.27 kg/m2 Estimated body mass index is 19.27 kg/(m^2) as calculated from the following:    Height as of this encounter: 5' 1\" (1.549 m).    Weight as of this encounter: 102 lb (46.3 kg).  BP completed using cuff size regular Right Arm    Health Maintenance reviewed - Yes: (yes) pt had flu shot in November   Tobacco Verified: Yes  Family History Updated: Yes  MyChart Offered: Yes  Immunizations Up to Date:  Yes    Bri Fletcher MA     "

## 2017-02-25 ENCOUNTER — TELEPHONE (OUTPATIENT)
Dept: FAMILY MEDICINE | Facility: CLINIC | Age: 82
End: 2017-02-25

## 2017-02-25 NOTE — TELEPHONE ENCOUNTER
Daughter calling for patient stating th generic lipitor is going to got $90 and the patient doesn't have drug coverage.  They are wondering if there is a cheaper alternative.  Advised to check with pharmacy on pricing and let us know and we will send message to PCP to advise on.    Please advise on a change in cholesterol medication    Isaias Villarreal RN, BSN

## 2017-02-27 NOTE — TELEPHONE ENCOUNTER
When we've had a heart attack we need the atorvastatin or rosuvastatin and the atorvastatin has been the cheaper of the two.. Other cholesterol pills doen't offer the same track record in mom's situation.

## 2017-03-07 DIAGNOSIS — L97.522 PLANTAR ULCER OF LEFT FOOT WITH FAT LAYER EXPOSED (H): ICD-10-CM

## 2017-03-07 DIAGNOSIS — M20.42 HAMMER TOES OF BOTH FEET: ICD-10-CM

## 2017-03-07 DIAGNOSIS — M79.671 RIGHT FOOT PAIN: ICD-10-CM

## 2017-03-07 DIAGNOSIS — L90.9 FAT PAD ATROPHY OF FOOT: ICD-10-CM

## 2017-03-07 DIAGNOSIS — M20.41 HAMMER TOES OF BOTH FEET: ICD-10-CM

## 2017-03-07 DIAGNOSIS — L84 PRE-ULCERATIVE CALLUSES: ICD-10-CM

## 2017-03-07 DIAGNOSIS — M79.672 LEFT FOOT PAIN: ICD-10-CM

## 2017-03-07 RX ORDER — UREA 200 MG/G
CREAM TOPICAL DAILY
Qty: 85 G | Refills: 1 | Status: SHIPPED | OUTPATIENT
Start: 2017-03-07 | End: 2017-04-07

## 2017-03-07 NOTE — TELEPHONE ENCOUNTER
Lidocaine 2% topical gel  Last Written Prescription Date: 2/7/2017  Last Fill Quantity: 30ml,  # refills: 0   Last Office Visit with [facility] or  Health prescribing provider: 2/14/2017                                         Next 5 appointments (look out 90 days)     Apr 07, 2017  9:45 AM CDT   Return Visit with Nando Tang MD   [facility] ([facility])    [address]  [facility] [address]   [phone]                    Urea 20% Cream      Last Written Prescription Date: 2/7/2017  Last Fill Quantity: 85g, # refills: 1  Last Office Visit with [facility] or  Health prescribing provider: 2/14/2017  Next 5 appointments (look out 90 days)     Apr 07, 2017  9:45 AM CDT   Return Visit with Nando Tang MD   [facility] ([facility])    [address] [place] [address] [address]  [facility] [address]   [phone]                   Creatinine   Date Value Ref Range Status   01/09/2017 0.72 0.52 - 1.04 mg/dL Final     Prescription approved per [facility] Refill Protocol.    Marline Ventura, RN, BSN, PHN

## 2017-03-29 ENCOUNTER — HOSPITAL ENCOUNTER (OUTPATIENT)
Dept: NUCLEAR MEDICINE | Facility: CLINIC | Age: 82
Setting detail: NUCLEAR MEDICINE
End: 2017-03-29
Attending: INTERNAL MEDICINE
Payer: MEDICARE

## 2017-03-29 ENCOUNTER — HOSPITAL ENCOUNTER (OUTPATIENT)
Dept: CARDIOLOGY | Facility: CLINIC | Age: 82
Discharge: HOME | End: 2017-03-29
Attending: INTERNAL MEDICINE | Admitting: INTERNAL MEDICINE
Payer: MEDICARE

## 2017-03-29 DIAGNOSIS — I25.10 CORONARY ARTERY DISEASE INVOLVING NATIVE CORONARY ARTERY OF NATIVE HEART WITHOUT ANGINA PECTORIS: ICD-10-CM

## 2017-03-29 PROCEDURE — 34300033 ZZH RX 343: Performed by: INTERNAL MEDICINE

## 2017-03-29 PROCEDURE — 78452 HT MUSCLE IMAGE SPECT MULT: CPT

## 2017-03-29 PROCEDURE — 93017 CV STRESS TEST TRACING ONLY: CPT

## 2017-03-29 PROCEDURE — 93018 CV STRESS TEST I&R ONLY: CPT | Performed by: INTERNAL MEDICINE

## 2017-03-29 PROCEDURE — 25000128 H RX IP 250 OP 636

## 2017-03-29 PROCEDURE — A9502 TC99M TETROFOSMIN: HCPCS | Performed by: INTERNAL MEDICINE

## 2017-03-29 PROCEDURE — 93016 CV STRESS TEST SUPVJ ONLY: CPT | Performed by: INTERNAL MEDICINE

## 2017-03-29 PROCEDURE — 78452 HT MUSCLE IMAGE SPECT MULT: CPT | Mod: 26 | Performed by: INTERNAL MEDICINE

## 2017-03-29 RX ORDER — REGADENOSON 0.08 MG/ML
INJECTION, SOLUTION INTRAVENOUS
Status: COMPLETED
Start: 2017-03-29 | End: 2017-03-29

## 2017-03-29 RX ADMIN — Medication 10.8 MCI.: at 10:59

## 2017-03-29 RX ADMIN — Medication 30.1 MCI.: at 12:41

## 2017-03-29 RX ADMIN — REGADENOSON 0.4 MG: 0.08 INJECTION, SOLUTION INTRAVENOUS at 12:52

## 2017-03-29 NOTE — PROGRESS NOTES
Pre-procedure:    Initial vital signs: /71, HR 58, RR 16  Allergies: reviewed   Rhythm: Sinus  Medications taken within 48 hours of procedure: NA   Last Caffeine: morning  Lung sounds: CTA, no wheezing, crackles or rtx  Health History (COPD, Asthma, etc): NA    Procedure: Lexiscan  Reaction/symptoms after receiving Leona injection: Shortness of breath  Vital Signs:/62, HR 80, RR 16  Reversal agent: N/A    Post:   Resolution of symptoms?: YES  Vital signs: /68, HR 83, RR 16  Walk: NO  Return to Radiology

## 2017-03-31 ENCOUNTER — TELEPHONE (OUTPATIENT)
Dept: CARDIOLOGY | Facility: CLINIC | Age: 82
End: 2017-03-31

## 2017-03-31 ENCOUNTER — PRE VISIT (OUTPATIENT)
Dept: CARDIOLOGY | Facility: CLINIC | Age: 82
End: 2017-03-31

## 2017-03-31 NOTE — TELEPHONE ENCOUNTER
Notes Recorded by Nando Tang MD on 3/30/2017 at 10:28 PM  Stable old MI.  No significant ischemia.  LVEF stable.  Suggest continuing with current medications.  Nando Tang MD, [facility]  March 30, 2017 10:28 PM    Writer called pt with Dr. Tang above result. Pt verbalized understanding and stated that she will be at appt with Dr. Tang on 4/7/17 at 0945. Pt has no further questions or concerns at this time.

## 2017-04-07 ENCOUNTER — OFFICE VISIT (OUTPATIENT)
Dept: CARDIOLOGY | Facility: CLINIC | Age: 82
End: 2017-04-07
Attending: INTERNAL MEDICINE
Payer: COMMERCIAL

## 2017-04-07 VITALS
BODY MASS INDEX: 19.52 KG/M2 | SYSTOLIC BLOOD PRESSURE: 155 MMHG | WEIGHT: 103.4 LBS | HEART RATE: 60 BPM | DIASTOLIC BLOOD PRESSURE: 70 MMHG | HEIGHT: 61 IN

## 2017-04-07 DIAGNOSIS — I42.9 CARDIOMYOPATHY (H): ICD-10-CM

## 2017-04-07 DIAGNOSIS — I25.10 CORONARY ARTERY DISEASE INVOLVING NATIVE CORONARY ARTERY OF NATIVE HEART WITHOUT ANGINA PECTORIS: Primary | ICD-10-CM

## 2017-04-07 DIAGNOSIS — Z95.5 STENTED CORONARY ARTERY: ICD-10-CM

## 2017-04-07 DIAGNOSIS — E78.5 HYPERLIPIDEMIA LDL GOAL <100: ICD-10-CM

## 2017-04-07 DIAGNOSIS — I10 ESSENTIAL HYPERTENSION: ICD-10-CM

## 2017-04-07 PROCEDURE — 99214 OFFICE O/P EST MOD 30 MIN: CPT | Performed by: INTERNAL MEDICINE

## 2017-04-07 RX ORDER — LOSARTAN POTASSIUM 50 MG/1
50 TABLET ORAL DAILY
Qty: 30 TABLET | Refills: 3 | Status: SHIPPED | OUTPATIENT
Start: 2017-04-07 | End: 2017-06-19

## 2017-04-07 NOTE — PROGRESS NOTES
HISTORY OF PRESENT ILLNESS:  I again had the pleasure of seeing your patient, Batsheva Barkley, at [facility] for evaluation of coronary artery disease, hypertension, hyperlipidemia and cardiomyopathy.  The patient has a history of an acute inferolateral wall myocardial infarction in 06/2005 while playing golf.  She collapsed and coronary angiography demonstrated an occluded dominant circumflex artery which was then stented.  She denies recurrent angina.  Previous echocardiography has shown moderate mitral regurgitation and ejection fraction of 45%-50%.  Her nuclear stress test on 03/29/2017 demonstrated a moderately large lateral, inferolateral and inferior basal infarct extending from through the lateral mid ventricle without significant associated ischemia.  There was a small apical segment that probably represented trivial kaylin-infarction ischemia or breast artifact.  Ejection fraction was estimated at 54% at rest.  Of note, on that test, her resting blood pressure was 149/71.  Her blood pressure in 06/2016 was 160/70.  The patient's daughter notes that the patient has had significant coughing over the last year.  She denies angina or significant shortness of breath.  She continues to be troubled by low back pain.  Her weight has been reasonably stable.  She continues to have mild dysphagia but notes that this is better.  Previous upper GI has shown reflux esophagitis and a hiatal hernia.  She was seen in the emergency room last September for chest pain and cough.  Upper respiratory infection was diagnosed and EKG was stable.  The patient continues to walk on a daily basis for up to 1 hour free of symptoms.      PHYSICAL EXAMINATION:  Current blood pressure is 155/70, pulse is 60 and regular, weight is 103 pounds, BMI is 20.  Chest is clear to auscultation.  Cardiac exam:  Regular rate and rhythm, normal S1 and S2 with an S4 gallop but gallop but no S3 or murmur.  Abdomen is benign.  Extremities  demonstrate arthritic changes in her hands but no cyanosis, clubbing or peripheral edema.  The patient remains quite thin and fragile.      ASSESSMENT:   1.  Batsheva Higgins is a delightful 83-year-old female status post lateral and inferolateral myocardial infarction in 06/2005 treated with angioplasty and stenting of the dominant circumflex artery.  Her ejection fraction is stable at 54% without evidence of congestive heart failure or ongoing ischemia.  Her Lexiscan nuclear stress test was unchanged from 2012.  She appears stable at this time.   2.  Hypertension.  Her blood pressure was elevated a year ago on her nuclear stress test in March and now today.  We are going to change her lisinopril to losartan 50 mg daily.  We will ask her to return in 1 month for a blood pressure check.  We will see if her cough has improved off the lisinopril.  If her blood pressure remains elevated, I would increase her losartan to 100 mg per day and recheck.  If it is still elevated, we can consider moving to olmesartan instead.  Additionally, we will obtain a BMP at that time to assess her potassium and renal function.   3.  Hyperlipidemia, currently well controlled and followed through Dr. Perez's office.  The patient was last tested on 01/09/2017 and reviewed today.      It is my pleasure to assist in the care of Batsheva Higgins.  I will see her again in 1 year but we will carefully manage her blood pressure over the next month or two.  We will see if her cough improves off the lisinopril.  All the patient's questions were answered to her satisfaction today.  I did spend 30 minutes with her, greater than 50% of that time was counseling on the above issues.      Roseline Maki MD      cc:   Lenin Perez MD   [facility]   [address] [place] [id number]         ROSELINE MAKI MD, [facility]             D: 04/07/2017 10:45   T: 04/07/2017 17:04   MT: FAYE      Name:     BATSHEVA HIGGINS   MRN:      [id number]         Account:      [id number]   :      1933           Service Date: 2017      Document: [id number]

## 2017-04-07 NOTE — PROGRESS NOTES
HPI and Plan:   See dictation:[id number]    Orders Placed This Encounter   Procedures     Basic metabolic panel     Follow-Up with Cardiac Advanced Practice Provider     Follow-Up with Cardiologist       Orders Placed This Encounter   Medications     losartan (COZAAR) 50 MG tablet     Sig: Take 1 tablet (50 mg) by mouth daily     Dispense:  30 tablet     Refill:  3       Medications Discontinued During This Encounter   Medication Reason     Urea 20 % CREA Stopped by Patient     lidocaine (XYLOCAINE) 2 % topical gel Stopped by Patient     lisinopril (PRINIVIL,ZESTRIL) 10 MG tablet          Encounter Diagnoses   Name Primary?     Coronary artery disease involving native coronary artery of native heart without angina pectoris      Cardiomyopathy (H)      Essential hypertension        CURRENT MEDICATIONS:  Current Outpatient Prescriptions   Medication Sig Dispense Refill     losartan (COZAAR) 50 MG tablet Take 1 tablet (50 mg) by mouth daily 30 tablet 3     omeprazole (PRILOSEC) 20 MG CR capsule Take 1 capsule (20 mg) by mouth 2 times daily 60 capsule 11     metoprolol (LOPRESSOR) 50 MG tablet Take 1 tablet (50 mg) by mouth 2 times daily 180 tablet 3     olopatadine HCl (PATADAY) 0.2 % SOLN Place 1 drop into both eyes daily 2.5 mL 3     atorvastatin (LIPITOR) 40 MG tablet Take 1 tablet (40 mg) by mouth daily 90 tablet 3     order for DME Equipment being ordered: lumbar brace elastic reinforced 1 Device 0     mirtazapine (REMERON) 15 MG tablet Take 15 mg by mouth At Bedtime       Calcium Carb-Cholecalciferol (CALCIUM 600 + D) 600-200 MG-UNIT TABS Take 600 mg by mouth daily       acetaminophen (TYLENOL) 325 MG tablet Take 2 tablets (650 mg) by mouth every 4 hours as needed for mild pain 100 tablet 0     Cholecalciferol (VITAMIN D) 1000 UNITS capsule Take 2 capsules by mouth daily.       CENTRUM SILVER OR TABS ONE DAILY 3 MONTHS 1 YEAR     ASPIRIN 81 MG OR TABS 1 tab po QD (Once per day) 100 3       ALLERGIES     Allergies    Allergen Reactions     Codeine      nausea, HA     Lisinopril Cough       PAST MEDICAL HISTORY:  Past Medical History:   Diagnosis Date     CAD (coronary artery disease) 6/20/05    inf MI w arrest [place] golf [place], transient CHF     Cardiomyopathy (H)      Coronary atherosclerosis 6/20/2005    circ vessel was stented;  had a 50% LAD lesion which was not treated Problem list name updated by automated process. Provider to review     Generalized osteoarthrosis     knees ;; L total kne 10/09     HTN (hypertension)      Mitral regurgitation     mod     Mixed hyperlipidemia      Osteoporosis      Systolic CHF (H) 1/21/2015       PAST SURGICAL HISTORY:  Past Surgical History:   Procedure Laterality Date     COLONOSCOPY  3/05     ENT SURGERY       ESOPHAGOSCOPY, GASTROSCOPY, DUODENOSCOPY (EGD), COMBINED N/A 4/14/2016    Procedure: COMBINED ESOPHAGOSCOPY, GASTROSCOPY, DUODENOSCOPY (EGD), BIOPSY SINGLE OR MULTIPLE;  Surgeon: Jonathan Gramajo MD;  Location:  GI     EYE SURGERY       HEART CATH, ANGIOPLASTY  2005    RONI to left circ     L bunion + hammer toes  1/04, 4/04     L total knee replacement  10/2009      ovarian cyst surgery         FAMILY HISTORY:  Family History   Problem Relation Age of Onset     CEREBROVASCULAR DISEASE Mother      Respiratory Father      heart     Breast Cancer Sister      HEART DISEASE Sister        SOCIAL HISTORY:  Social History     Social History     Marital status:      Spouse name: Gene     Number of children: 6     Years of education: N/A     Social History Main Topics     Smoking status: Former Smoker     Smokeless tobacco: Never Used      Comment: only smoked for 3 years.     Alcohol use No     Drug use: No     Sexual activity: Yes     Partners: Male     Other Topics Concern     Caffeine Concern No     decaf coffee and tea      Special Diet No     Exercise Yes     walk everyday      Social History Narrative       Review of Systems:  Skin:  Negative       Eyes:  Positive for  "glasses    ENT:  Positive for hearing loss pain when swallowing \"once in a while\"  Respiratory:  Positive for cough     Cardiovascular:    Positive for;fatigue    Gastroenterology: Positive for heartburn seldom  Genitourinary:  Positive for urinary frequency;nocturia    Musculoskeletal:  Positive for back pain;arthritis;joint pain lower back  Neurologic:  Negative      Psychiatric:  Positive for sleep disturbances once in a while can not fall asleep   Heme/Lymph/Imm:  Negative      Endocrine:  Negative        Physical Exam:  Vitals: /70 (BP Location: Right arm, Cuff Size: Adult Large)  Pulse 60  Ht 1.549 m (5' 1\")  Wt 46.9 kg (103 lb 6.4 oz)  BMI 19.54 kg/m2    Constitutional:  cooperative;alert and oriented;in no acute distress thin;frail      Skin:  warm and dry to the touch, no apparent skin lesions or masses noted        Head:  normocephalic, no masses or lesions        Eyes:  pupils equal and round, conjunctivae and lids unremarkable, sclera white, no xanthalasma, EOMS intact, no nystagmus        ENT:  no pallor or cyanosis, dentition good        Neck:  carotid pulses are full and equal bilaterally, JVP normal, no carotid bruit, no thyromegaly        Chest:  normal breath sounds, clear to auscultation, normal A-P diameter, normal symmetry, normal respiratory excursion, no use of accessory muscles          Cardiac: regular rhythm;normal S1 and S2;apical impulse not displaced   S4 no presence of murmur            Abdomen:  abdomen soft, non-tender, BS normoactive, no mass, no HSM, no bruits        Vascular: pulses full and equal, no bruits auscultated                                        Extremities and Back:  no edema arthritic deformities of UE            Neurological:  affect appropriate, oriented to time, person and place;no gross motor deficits              CC  Nando Tang MD   [facility]  [address]  [place] [address]              "

## 2017-04-07 NOTE — MR AVS SNAPSHOT
After Visit Summary   4/7/2017    Batsheva Barkley    MRN: [id number]           Patient Information     Date Of Birth          11/6/1933        Visit Information        Provider Department      4/7/2017 9:45 AM Nando Tang MD [facility]        Today's Diagnoses     Coronary artery disease involving native coronary artery of native heart without angina pectoris        Cardiomyopathy (H)        Essential hypertension           Follow-ups after your visit        Additional Services     Follow-Up with Cardiac Advanced Practice Provider           Follow-Up with Cardiologist                 Your next 10 appointments already scheduled     May 09, 2017  1:00 PM CDT   LAB with RU LAB   [facility] ([facility])    [address]  [place] [id number] [phone]           Patient must bring picture ID.  Patient should be prepared to give a urine specimen  Please do not eat 10-12 hours before your appointment if you are coming in fasting for labs on lipids, cholesterol, or glucose (sugar).  Pregnant women should follow their Care Team instructions. Water with medications is okay. Do not drink coffee or other fluids.   If you have concerns about taking  your medications, please ask at office or if scheduling via [employer], send a message by clicking on Secure Messaging, Message Your Care Team.            May 09, 2017  1:50 PM CDT   Return Visit with NICKOLAS Peacock CNP   [facility] ([facility])    [address]  [place] [id number] [phone]              Future tests that were ordered for you today     Open Future Orders        Priority Expected Expires Ordered    Follow-Up with Cardiologist Routine 4/7/2018 8/20/2018 4/7/2017    Basic metabolic panel Routine 5/7/2017 4/7/2018 4/7/2017    Follow-Up with Cardiac Advanced Practice  "Provider Routine 2017            Who to contact     If you have questions or need follow up information about today's clinic visit or your schedule please contact [facility] AT [place] directly at [phone].  Normal or non-critical lab and imaging results will be communicated to you by MyChart, letter or phone within 4 business days after the clinic has received the results. If you do not hear from us within 7 days, please contact the clinic through MyChart or phone. If you have a critical or abnormal lab result, we will notify you by phone as soon as possible.  Submit refill requests through [employer] or call your pharmacy and they will forward the refill request to us. Please allow 3 business days for your refill to be completed.          Additional Information About Your Visit        MyChart Information     [employer] lets you send messages to your doctor, view your test results, renew your prescriptions, schedule appointments and more. To sign up, go to [email or web address] . Click on \"Log in\" on the left side of the screen, which will take you to the Welcome page. Then click on \"Sign up Now\" on the right side of the page.     You will be asked to enter the access code listed below, as well as some personal information. Please follow the directions to create your username and password.     Your access code is: [email or web address]  Expires: 2017  4:16 PM     Your access code will  in 90 days. If you need help or a new code, please call your [place] clinic or [phone].        Care EveryWhere ID     This is your Care EveryWhere ID. This could be used by other organizations to access your [place] medical records  [id number]        Your Vitals Were     Pulse Height BMI (Body Mass Index)             60 1.549 m (5' 1\") 19.54 kg/m2          Blood Pressure from Last 3 Encounters:   17 155/70   17 102/68   17 136/84    Weight from Last 3 " Encounters:   04/07/17 46.9 kg (103 lb 6.4 oz)   02/14/17 46.3 kg (102 lb)   02/07/17 46.7 kg (103 lb)              We Performed the Following     Follow-Up with Cardiologist          Today's Medication Changes          These changes are accurate as of: 4/7/17 10:38 AM.  If you have any questions, ask your nurse or doctor.               Start taking these medicines.        Dose/Directions    losartan 50 MG tablet   Commonly known as:  COZAAR   Used for:  Essential hypertension   Started by:  Nando Tang MD        Dose:  50 mg   Take 1 tablet (50 mg) by mouth daily   Quantity:  30 tablet   Refills:  3         Stop taking these medicines if you haven't already. Please contact your care team if you have questions.     lisinopril 10 MG tablet   Commonly known as:  PRINIVIL/ZESTRIL   Stopped by:  Nando Tang MD                Where to get your medicines      These medications were sent to [facility] [address] [place] [address]     Phone:  [phone]     losartan 50 MG tablet                Primary Care Provider Office Phone # Fax #    Lenin Douglas Perez [phone]       [address] [address]        Thank you!     Thank you for choosing [facility] AT [place]  for your care. Our goal is always to provide you with excellent care. Hearing back from our patients is one way we can continue to improve our services. Please take a few minutes to complete the written survey that you may receive in the mail after your visit with us. Thank you!             Your Updated Medication List - Protect others around you: Learn how to safely use, store and throw away your medicines at www.disposemymeds.org.          This list is accurate as of: 4/7/17 10:38 AM.  Always use your most recent med list.                   Brand Name Dispense Instructions for use     acetaminophen 325 MG tablet    TYLENOL    100 tablet    Take 2 tablets (650 mg) by mouth every 4 hours as needed for mild pain       aspirin 81 MG tablet     100    1 tab po QD (Once per day)       atorvastatin 40 MG tablet    LIPITOR    90 tablet    Take 1 tablet (40 mg) by mouth daily       CALCIUM 600 + D 600-200 MG-UNIT Tabs   Generic drug:  Calcium Carb-Cholecalciferol      Take 600 mg by mouth daily       CENTRUM SILVER per tablet     3 MONTHS    ONE DAILY       losartan 50 MG tablet    COZAAR    30 tablet    Take 1 tablet (50 mg) by mouth daily       metoprolol 50 MG tablet    LOPRESSOR    180 tablet    Take 1 tablet (50 mg) by mouth 2 times daily       mirtazapine 15 MG tablet    REMERON     Take 15 mg by mouth At Bedtime       olopatadine HCl 0.2 % Soln    PATADAY    2.5 mL    Place 1 drop into both eyes daily       omeprazole 20 MG CR capsule    priLOSEC    60 capsule    Take 1 capsule (20 mg) by mouth 2 times daily       order for DME     1 Device    Equipment being ordered: lumbar brace elastic reinforced       vitamin D 1000 UNITS capsule      Take 2 capsules by mouth daily.

## 2017-04-07 NOTE — LETTER
4/7/2017    Lenin Perez MD  [facility]   [address] [address] [address]    RE: Batsheva Barkley       Dear Colleague,    I again had the pleasure of seeing your patient, Batsheva Barkley, at [facility] for evaluation of coronary artery disease, hypertension, hyperlipidemia and cardiomyopathy.  The patient has a history of an acute inferolateral wall myocardial infarction in 06/2005 while playing golf.  She collapsed and coronary angiography demonstrated an occluded dominant circumflex artery which was then stented.  She denies recurrent angina.  Previous echocardiography has shown moderate mitral regurgitation and ejection fraction of 45%-50%.  Her nuclear stress test on 03/29/2017 demonstrated a moderately large lateral, inferolateral and inferior basal infarct extending from through the lateral mid ventricle without significant associated ischemia.  There was a small apical segment that probably represented trivial kaylin-infarction ischemia or breast artifact.  Ejection fraction was estimated at 54% at rest.  Of note, on that test, her resting blood pressure was 149/71.  Her blood pressure in 06/2016 was 160/70.  The patient's daughter notes that the patient has had significant coughing over the last year.  She denies angina or significant shortness of breath.  She continues to be troubled by low back pain.  Her weight has been reasonably stable.  She continues to have mild dysphagia but notes that this is better.  Previous upper GI has shown reflux esophagitis and a hiatal hernia.  She was seen in the emergency room last September for chest pain and cough.  Upper respiratory infection was diagnosed and EKG was stable.  The patient continues to walk on a daily basis for up to 1 hour free of symptoms.      PHYSICAL EXAMINATION:  Current blood pressure is 155/70, pulse is 60 and regular, weight is 103 pounds, BMI is 20.  Chest is clear to auscultation.  Cardiac exam:   Regular rate and rhythm, normal S1 and S2 with an S4 gallop but gallop but no S3 or murmur.  Abdomen is benign.  Extremities demonstrate arthritic changes in her hands but no cyanosis, clubbing or peripheral edema.  The patient remains quite thin and fragile.      Outpatient Encounter Prescriptions as of 4/7/2017   Medication Sig Dispense Refill     losartan (COZAAR) 50 MG tablet Take 1 tablet (50 mg) by mouth daily 30 tablet 3     omeprazole (PRILOSEC) 20 MG CR capsule Take 1 capsule (20 mg) by mouth 2 times daily 60 capsule 11     metoprolol (LOPRESSOR) 50 MG tablet Take 1 tablet (50 mg) by mouth 2 times daily 180 tablet 3     olopatadine HCl (PATADAY) 0.2 % SOLN Place 1 drop into both eyes daily 2.5 mL 3     atorvastatin (LIPITOR) 40 MG tablet Take 1 tablet (40 mg) by mouth daily 90 tablet 3     order for DME Equipment being ordered: lumbar brace elastic reinforced 1 Device 0     mirtazapine (REMERON) 15 MG tablet Take 15 mg by mouth At Bedtime       Calcium Carb-Cholecalciferol (CALCIUM 600 + D) 600-200 MG-UNIT TABS Take 600 mg by mouth daily       acetaminophen (TYLENOL) 325 MG tablet Take 2 tablets (650 mg) by mouth every 4 hours as needed for mild pain 100 tablet 0     Cholecalciferol (VITAMIN D) 1000 UNITS capsule Take 2 capsules by mouth daily.       CENTRUM SILVER OR TABS ONE DAILY 3 MONTHS 1 YEAR     ASPIRIN 81 MG OR TABS 1 tab po QD (Once per day) 100 3     [DISCONTINUED] Urea 20 % CREA Externally apply topically daily Apply to both feet daily 85 g 1     [DISCONTINUED] lidocaine (XYLOCAINE) 2 % topical gel Apply topically as needed for moderate pain Apply to left foot ulcer as needed for pain 30 mL 0     [DISCONTINUED] lisinopril (PRINIVIL,ZESTRIL) 10 MG tablet Take 1 tablet (10 mg) by mouth daily 90 tablet 3     No facility-administered encounter medications on file as of 4/7/2017.      ASSESSMENT:   1.  Batsheva Barkley is a delightful 83-year-old female status post lateral and inferolateral myocardial  infarction in 06/2005 treated with angioplasty and stenting of the dominant circumflex artery.  Her ejection fraction is stable at 54% without evidence of congestive heart failure or ongoing ischemia.  Her Lexiscan nuclear stress test was unchanged from 2012.  She appears stable at this time.   2.  Hypertension.  Her blood pressure was elevated a year ago on her nuclear stress test in March and now today.  We are going to change her lisinopril to losartan 50 mg daily.  We will ask her to return in 1 month for a blood pressure check.  We will see if her cough has improved off the lisinopril.  If her blood pressure remains elevated, I would increase her losartan to 100 mg per day and recheck.  If it is still elevated, we can consider moving to olmesartan instead.  Additionally, we will obtain a BMP at that time to assess her potassium and renal function.   3.  Hyperlipidemia, currently well controlled and followed through Dr. Perez's office.  The patient was last tested on 01/09/2017 and reviewed today.      It is my pleasure to assist in the care of Batsheva Barkley.  I will see her again in 1 year but we will carefully manage her blood pressure over the next month or two.  We will see if her cough improves off the lisinopril.  All the patient's questions were answered to her satisfaction today.  I did spend 30 minutes with her, greater than 50% of that time was counseling on the above issues.            Again, thank you for allowing me to participate in the care of your patient.      Sincerely,    Nando Tang MD     [facility]

## 2017-04-11 ENCOUNTER — RADIANT APPOINTMENT (OUTPATIENT)
Dept: GENERAL RADIOLOGY | Facility: CLINIC | Age: 82
End: 2017-04-11
Attending: FAMILY MEDICINE
Payer: COMMERCIAL

## 2017-04-11 ENCOUNTER — OFFICE VISIT (OUTPATIENT)
Dept: FAMILY MEDICINE | Facility: CLINIC | Age: 82
End: 2017-04-11
Payer: COMMERCIAL

## 2017-04-11 VITALS
BODY MASS INDEX: 19.75 KG/M2 | TEMPERATURE: 95 F | DIASTOLIC BLOOD PRESSURE: 61 MMHG | SYSTOLIC BLOOD PRESSURE: 138 MMHG | WEIGHT: 104.6 LBS | RESPIRATION RATE: 12 BRPM | HEART RATE: 61 BPM | HEIGHT: 61 IN | OXYGEN SATURATION: 99 %

## 2017-04-11 DIAGNOSIS — M25.561 ACUTE PAIN OF RIGHT KNEE: ICD-10-CM

## 2017-04-11 DIAGNOSIS — G89.29 CHRONIC MIDLINE THORACIC BACK PAIN: Primary | ICD-10-CM

## 2017-04-11 DIAGNOSIS — M54.6 CHRONIC MIDLINE THORACIC BACK PAIN: Primary | ICD-10-CM

## 2017-04-11 PROCEDURE — 73560 X-RAY EXAM OF KNEE 1 OR 2: CPT | Mod: RT

## 2017-04-11 PROCEDURE — 99214 OFFICE O/P EST MOD 30 MIN: CPT | Performed by: FAMILY MEDICINE

## 2017-04-11 NOTE — MR AVS SNAPSHOT
After Visit Summary   4/11/2017    Batsheva Barkley    MRN: [id number]           Patient Information     Date Of Birth          11/6/1933        Visit Information        Provider Department      4/11/2017 10:15 AM Lenin Perez MD [facility]        Today's Diagnoses     Chronic midline thoracic back pain    -  1    Acute pain of right knee           Follow-ups after your visit        Additional Services     PHYSICAL THERAPY REFERRAL       *This therapy referral will be filtered to a centralized scheduling office at [facility] and the patient will receive a call to schedule an appointment at a [place] location most convenient for them. *     [facility] provides Physical Therapy evaluation and treatment and many specialty services across the [place] system.  If requesting a specialty program, please choose from the list below.    If you have not heard from the scheduling office within 2 business days, please call [phone] for all locations, with the exception of [place], please call [phone].  Treatment: Evaluation & Treatment  Special Instructions/Modalities:   Special Programs: knee sprain and gait safety evaluation and treatment     Please be aware that coverage of these services is subject to the terms and limitations of your health insurance plan.  Call member services at your health plan with any benefit or coverage questions.      **Note to Provider:  If you are referring outside of [place] for the therapy appointment, please list the name of the location in the  special instructions  above, print the referral and give to the patient to schedule the appointment.                  Your next 10 appointments already scheduled     May 09, 2017  1:00 PM CDT   LAB with FAUSTO LAB   [facility] [place] ([facility])    [address] [address] [address] [phone]         "   Patient must bring picture ID.  Patient should be prepared to give a urine specimen  Please do not eat 10-12 hours before your appointment if you are coming in fasting for labs on lipids, cholesterol, or glucose (sugar).  Pregnant women should follow their Care Team instructions. Water with medications is okay. Do not drink coffee or other fluids.   If you have concerns about taking  your medications, please ask at office or if scheduling via [employer], send a message by clicking on Secure Messaging, Message Your Care Team.            May 09, 2017  1:50 PM CDT   Return Visit with NICKOLAS Peacock CNP   [facility] [place] ([facility])    [address]  [place] [address] [phone]              Who to contact     If you have questions or need follow up information about today's clinic visit or your schedule please contact [facility] directly at [phone].  Normal or non-critical lab and imaging results will be communicated to you by MyChart, letter or phone within 4 business days after the clinic has received the results. If you do not hear from us within 7 days, please contact the clinic through [employer] or phone. If you have a critical or abnormal lab result, we will notify you by phone as soon as possible.  Submit refill requests through [employer] or call your pharmacy and they will forward the refill request to us. Please allow 3 business days for your refill to be completed.          Additional Information About Your Visit        [employer] Information     [employer] lets you send messages to your doctor, view your test results, renew your prescriptions, schedule appointments and more. To sign up, go to [email or web address] . Click on \"Log in\" on the left side of the screen, which will take you to the Welcome page. Then click on \"Sign up Now\" on the right side of the page.     You will be asked to enter the access code listed " "below, as well as some personal information. Please follow the directions to create your username and password.     Your access code is: [email or web address]  Expires: 7/10/2017 11:32 AM     Your access code will  in 90 days. If you need help or a new code, please call your [facility] or [phone].        Care EveryWhere ID     This is your Care EveryWhere ID. This could be used by other organizations to access your [place] medical records  [id number]        Your Vitals Were     Pulse Temperature Respirations Height Pulse Oximetry BMI (Body Mass Index)    61 95  F (35  C) (Tympanic) 12 5' 1\" (1.549 m) 99% 19.76 kg/m2       Blood Pressure from Last 3 Encounters:   17 138/61   17 155/70   17 102/68    Weight from Last 3 Encounters:   17 104 lb 9.6 oz (47.4 kg)   17 103 lb 6.4 oz (46.9 kg)   17 102 lb (46.3 kg)              We Performed the Following     PHYSICAL THERAPY REFERRAL     XR Knee Right 1/2 Views        Primary Care Provider Office Phone # Fax #    Lenin Perez [phone]       [address] [address]        Thank you!     Thank you for choosing [facility]  for your care. Our goal is always to provide you with excellent care. Hearing back from our patients is one way we can continue to improve our services. Please take a few minutes to complete the written survey that you may receive in the mail after your visit with us. Thank you!             Your Updated Medication List - Protect others around you: Learn how to safely use, store and throw away your medicines at www.disposemymeds.org.          This list is accurate as of: 17 11:32 AM.  Always use your most recent med list.                   Brand Name Dispense Instructions for use    acetaminophen 325 MG tablet    TYLENOL    100 tablet    Take 2 tablets (650 mg) by mouth every 4 hours as needed for mild pain       " aspirin 81 MG tablet     100    1 tab po QD (Once per day)       atorvastatin 40 MG tablet    LIPITOR    90 tablet    Take 1 tablet (40 mg) by mouth daily       CALCIUM 600 + D 600-200 MG-UNIT Tabs   Generic drug:  Calcium Carb-Cholecalciferol      Take 600 mg by mouth daily       CENTRUM SILVER per tablet     3 MONTHS    ONE DAILY       losartan 50 MG tablet    COZAAR    30 tablet    Take 1 tablet (50 mg) by mouth daily       metoprolol 50 MG tablet    LOPRESSOR    180 tablet    Take 1 tablet (50 mg) by mouth 2 times daily       mirtazapine 15 MG tablet    REMERON     Take 15 mg by mouth At Bedtime       vitamin D 1000 UNITS capsule      Take 2 capsules by mouth daily.

## 2017-04-11 NOTE — NURSING NOTE
"Chief Complaint   Patient presents with     Fall       Initial /61 (BP Location: Left arm, Patient Position: Chair, Cuff Size: Adult Regular)  Pulse 61  Temp 95  F (35  C) (Tympanic)  Resp 12  Ht 5' 1\" (1.549 m)  Wt 104 lb 9.6 oz (47.4 kg)  SpO2 99%  BMI 19.76 kg/m2 Estimated body mass index is 19.76 kg/(m^2) as calculated from the following:    Height as of this encounter: 5' 1\" (1.549 m).    Weight as of this encounter: 104 lb 9.6 oz (47.4 kg).  Medication Reconciliation: complete   William Ramachandran CMA       "

## 2017-04-11 NOTE — PROGRESS NOTES
"  SUBJECTIVE:                                                    Batsheva Barkley is a 83 year old female who presents to clinic today for the following health issues:      Concern - Leg pain from fall     Onset: 3 days    Description:   Sharp pain in the right knee from a fall    Intensity: 10/10    Progression of Symptoms:  constant    Accompanying Signs & Symptoms:  No       Previous history of similar problem:   No    Precipitating factors:   Worsened by: Walking and turning in bed    Alleviating factors:  Improved by: No       Therapies Tried and outcome: Ice and heat did not help      Fell three days ago and banged her medial right knee with full range of motion and moderate mcl tenderness but good stability, she has hph osteopenia and djd of both knees        Reviewed and updated as needed this visit by Provider         ROS:  Constitutional, HEENT, cardiovascular, pulmonary, gi and gu systems are negative, except as otherwise noted.    OBJECTIVE:                                                    /61 (BP Location: Left arm, Patient Position: Chair, Cuff Size: Adult Regular)  Pulse 61  Temp 95  F (35  C) (Tympanic)  Resp 12  Ht 5' 1\" (1.549 m)  Wt 104 lb 9.6 oz (47.4 kg)  SpO2 99%  BMI 19.76 kg/m2  Body mass index is 19.76 kg/(m^2).  GENERAL: healthy, alert and no distress  EYES: Eyes grossly normal to inspection, PERRL and conjunctivae and sclerae normal  HENT: ear canals and TM's normal, nose and mouth without ulcers or lesions  NECK: no adenopathy, no asymmetry, masses, or scars and thyroid normal to palpation  RESP: lungs clear to auscultation - no rales, rhonchi or wheezes  CV: regular rate and rhythm, normal S1 S2, no S3 or S4, no murmur, click or rub, no peripheral edema and peripheral pulses strong  ABDOMEN: soft, nontender, no hepatosplenomegaly, no masses and bowel sounds normal  MS: no edema, peripheral pulses normal and tenderness to palpation medial tibia proximal  (M54.6,  G89.29) Chronic " midline thoracic back pain  (primary encounter diagnosis)  Comment:   Plan: PHYSICAL THERAPY REFERRAL        In the future for gait and back and as her knee progresses    (M25.561) Acute pain of right knee  Comment:   Plan: XR Knee Right 1/2 Views, PHYSICAL THERAPY         REFERRAL        Contusion: / sprain       SKIN: no suspicious lesions or rashes and no eccymosis  NEURO: Normal strength and tone, mentation intact and speech normal  PSYCH: mentation appears normal, affect normal/bright         ASSESSMENT/PLAN:                                                      Xray negative     Knee contusion, tylenol for pain, ice twice daily 30 minutes     Two week recheck auedlia Perez MD  [facility]

## 2017-04-28 ENCOUNTER — THERAPY VISIT (OUTPATIENT)
Dept: PHYSICAL THERAPY | Facility: CLINIC | Age: 82
End: 2017-04-28
Payer: COMMERCIAL

## 2017-04-28 DIAGNOSIS — M25.561 ACUTE PAIN OF RIGHT KNEE: Primary | ICD-10-CM

## 2017-04-28 PROCEDURE — 97161 PT EVAL LOW COMPLEX 20 MIN: CPT | Mod: GP | Performed by: PHYSICAL THERAPIST

## 2017-04-28 PROCEDURE — 97110 THERAPEUTIC EXERCISES: CPT | Mod: GP | Performed by: PHYSICAL THERAPIST

## 2017-04-28 ASSESSMENT — ACTIVITIES OF DAILY LIVING (ADL)
HOW_WOULD_YOU_RATE_THE_CURRENT_FUNCTION_OF_YOUR_KNEE_DURING_YOUR_USUAL_DAILY_ACTIVITIES_ON_A_SCALE_FROM_0_TO_100_WITH_100_BEING_YOUR_LEVEL_OF_KNEE_FUNCTION_PRIOR_TO_YOUR_INJURY_AND_0_BEING_THE_INABILITY_TO_PERFORM_ANY_OF_YOUR_USUAL_DAILY_ACTIVITIES?: 50
SQUAT: I AM UNABLE TO DO THE ACTIVITY
WEAKNESS: THE SYMPTOM PREVENTS ME FROM ALL DAILY ACTIVITIES
GO DOWN STAIRS: I AM UNABLE TO DO THE ACTIVITY
STAND: ACTIVITY IS VERY DIFFICULT
KNEE_ACTIVITY_OF_DAILY_LIVING_SUM: 10
RISE FROM A CHAIR: I AM UNABLE TO DO THE ACTIVITY
LIMPING: I DO NOT HAVE THE SYMPTOM
KNEE_ACTIVITY_OF_DAILY_LIVING_SCORE: 14.29
WALK: ACTIVITY IS VERY DIFFICULT
SIT WITH YOUR KNEE BENT: ACTIVITY IS FAIRLY DIFFICULT
SWELLING: THE SYMPTOM AFFECTS MY ACTIVITY SEVERELY
GIVING WAY, BUCKLING OR SHIFTING OF KNEE: THE SYMPTOM PREVENTS ME FROM ALL DAILY ACTIVITIES
HOW_WOULD_YOU_RATE_THE_OVERALL_FUNCTION_OF_YOUR_KNEE_DURING_YOUR_USUAL_DAILY_ACTIVITIES?: SEVERELY ABNORMAL
RAW_SCORE: 10
PAIN: THE SYMPTOM PREVENTS ME FROM ALL DAILY ACTIVITIES
AS_A_RESULT_OF_YOUR_KNEE_INJURY,_HOW_WOULD_YOU_RATE_YOUR_CURRENT_LEVEL_OF_DAILY_ACTIVITY?: SEVERELY ABNORMAL
KNEEL ON THE FRONT OF YOUR KNEE: I AM UNABLE TO DO THE ACTIVITY
STIFFNESS: THE SYMPTOM PREVENTS ME FROM ALL DAILY ACTIVITIES
GO UP STAIRS: I AM UNABLE TO DO THE ACTIVITY

## 2017-04-28 NOTE — MR AVS SNAPSHOT
After Visit Summary   4/28/2017    Batsheva Barklye    MRN: [id number]           Patient Information     Date Of Birth          11/6/1933        Visit Information        Provider Department      4/28/2017 2:10 PM Lenin Webster, [facility]        Today's Diagnoses     Acute pain of right knee    -  1       Follow-ups after your visit        Your next 10 appointments already scheduled     May 09, 2017  1:00 PM CDT   LAB with RU LAB   [facility] ([facility])    [address]  [facility] [id number] [phone]           Patient must bring picture ID.  Patient should be prepared to give a urine specimen  Please do not eat 10-12 hours before your appointment if you are coming in fasting for labs on lipids, cholesterol, or glucose (sugar).  Pregnant women should follow their Care Team instructions. Water with medications is okay. Do not drink coffee or other fluids.   If you have concerns about taking  your medications, please ask at office or if scheduling via [employer], send a message by clicking on Secure Messaging, Message Your Care Team.            May 09, 2017  1:50 PM CDT   Return Visit with NICKOLAS Peacock CNP   [facility] AT [place] ([facility])    [address]  [facility] [id number] [phone]            May 12, 2017 12:50 PM CDT   HARESH Extremity with Lenin Webster PT   [facility]    [address]  [facility] [id number] [phone]              Who to contact     If you have questions or need follow up information about today's clinic visit or your schedule please contact [facility]AP[facility] directly at [phone].  Normal or non-critical lab and imaging results will be  "communicated to you by MyChart, letter or phone within 4 business days after the clinic has received the results. If you do not hear from us within 7 days, please contact the clinic through WhoisEDIt or phone. If you have a critical or abnormal lab result, we will notify you by phone as soon as possible.  Submit refill requests through Bitstamp or call your pharmacy and they will forward the refill request to us. Please allow 3 business days for your refill to be completed.          Additional Information About Your Visit        Bitstamp Information     Bitstamp lets you send messages to your doctor, view your test results, renew your prescriptions, schedule appointments and more. To sign up, go to [email or web address] . Click on \"Log in\" on the left side of the screen, which will take you to the Welcome page. Then click on \"Sign up Now\" on the right side of the page.     You will be asked to enter the access code listed below, as well as some personal information. Please follow the directions to create your username and password.     Your access code is: [email or web address]  Expires: 7/10/2017 11:32 AM     Your access code will  in 90 days. If you need help or a new code, please call your [place] clinic or [phone].        Care EveryWhere ID     This is your Care EveryWhere ID. This could be used by other organizations to access your [place] medical records  [id number]         Blood Pressure from Last 3 Encounters:   17 138/61   17 155/70   17 102/68    Weight from Last 3 Encounters:   17 47.4 kg (104 lb 9.6 oz)   17 46.9 kg (103 lb 6.4 oz)   17 46.3 kg (102 lb)              We Performed the Following     HC PT EVAL, LOW COMPLEXITY     HARESH INITIAL EVAL REPORT     THERAPEUTIC EXERCISES        Primary Care Provider Office Phone # Fax #    Lenin Perez [phone]       [address] [address]        Thank " you!     Thank you for choosing [facility]AP[facility]  for your care. Our goal is always to provide you with excellent care. Hearing back from our patients is one way we can continue to improve our services. Please take a few minutes to complete the written survey that you may receive in the mail after your visit with us. Thank you!             Your Updated Medication List - Protect others around you: Learn how to safely use, store and throw away your medicines at www.disposemymeds.org.          This list is accurate as of: 4/28/17 11:59 PM.  Always use your most recent med list.                   Brand Name Dispense Instructions for use    acetaminophen 325 MG tablet    TYLENOL    100 tablet    Take 2 tablets (650 mg) by mouth every 4 hours as needed for mild pain       aspirin 81 MG tablet     100    1 tab po QD (Once per day)       atorvastatin 40 MG tablet    LIPITOR    90 tablet    Take 1 tablet (40 mg) by mouth daily       CALCIUM 600 + D 600-200 MG-UNIT Tabs   Generic drug:  Calcium Carb-Cholecalciferol      Take 600 mg by mouth daily       CENTRUM SILVER per tablet     3 MONTHS    ONE DAILY       losartan 50 MG tablet    COZAAR    30 tablet    Take 1 tablet (50 mg) by mouth daily       metoprolol 50 MG tablet    LOPRESSOR    180 tablet    Take 1 tablet (50 mg) by mouth 2 times daily       mirtazapine 15 MG tablet    REMERON     Take 15 mg by mouth At Bedtime       vitamin D 1000 UNITS capsule      Take 2 capsules by mouth daily.

## 2017-05-02 PROBLEM — M25.561 ACUTE PAIN OF RIGHT KNEE: Status: ACTIVE | Noted: 2017-05-02

## 2017-05-02 NOTE — PROGRESS NOTES
Subjective:    Patient is a 83 year old female presenting with rehab left knee hpi.   Batsheva Barkley is a 83 year old female with a right knee condition.  Condition occurred with:  A fall/slip.  Condition occurred: at home.  This is a new and chronic condition  Pt reports falling at home last week and having right knee pain since.  X- ray's negative.  She was briefly accompanied to the office by her daughter.  Daughter reported Batsheva has substantial dementia.  MD appt on 4/11/17..    Patient reports pain:  Anterior and in the joint.  Radiates to:  Knee.  Pain is described as aching and is intermittent and reported as 7/10.  Associated symptoms:  Loss of strength and loss of motion/stiffness. Pain is worse during the day.  Symptoms are exacerbated by weight bearing, standing and walking and relieved by rest and analgesics.  Since onset symptoms are unchanged.  Special tests:  X-ray.      General health as reported by patient is fair.  Pertinent medical history includes:  Osteoarthritis, high blood pressure and heart problems (dementia).  Medical allergies: yes (codeine).  Other surgeries include:  Orthopedic surgery and heart surgery (left TKA, stent in heart).  Current medications:  High blood pressure medication and meds to increase bone density.  Current occupation is Retired  .        Barriers include:  None as reported by the patient.    Red flags:  None as reported by the patient.                        Objective:      Gait:    Gait Type:  Antalgic   Assistive Devices:  None                                                        Knee Evaluation:  ROM:  AROM: normal        Pain: ERP with active ext and flexion on right knee localized to jt space    Strength:     Extension:  Left: 4/5    Pain:-      Right: 3+/5    Pain:+  Flexion:  Left: 4/5    Pain:-      Right: 3+/5    Pain:+    Quad Set Left: Fair    Pain:   Quad Set Right: Fair    Pain:  Ligament Testing:  Normal                  Palpation:      Left knee  tenderness not present at:  Medial Joint Line; Lateral Joint Line; Patellar Tendon; Popliteal; Patellar Medial; Patellar Lateral; Patellar Superior and Patellar Inferior  Right knee tenderness present at:  Medial Joint Line and Lateral Joint Line  Right knee tenderness not present at:  Patellar Tendon; Popliteal; Patellar Medial; Patellar Lateral; Patellar Superior and Patellar Inferior  Edema:  Normal    Mobility Testing:  Normal                  General     ROS    Assessment/Plan:      Patient is a 83 year old female with right side knee complaints.    Patient has the following significant findings with corresponding treatment plan.                Diagnosis 1:  R knee pain  Pain -  self management, education, directional preference exercise and home program  Decreased strength - therapeutic exercise and therapeutic activities  Impaired muscle performance - neuro re-education  Decreased function - therapeutic activities    Therapy Evaluation Codes:   1) History comprised of:   Personal factors that impact the plan of care:      Age and Cognition.    Comorbidity factors that impact the plan of care are:      Heart problems, High blood pressure, Mental illness, Osteoarthritis and Weakness.     Medications impacting care: Bone density and High blood pressure.  2) Examination of Body Systems comprised of:   Body structures and functions that impact the plan of care:      Knee.   Activity limitations that impact the plan of care are:      Bending, Dressing, Sitting and Walking.  3) Clinical presentation characteristics are:   Stable/Uncomplicated.  4) Decision-Making    Low complexity using standardized patient assessment instrument and/or measureable assessment of functional outcome.  Cumulative Therapy Evaluation is: Low complexity.    Previous and current functional limitations:  (See Goal Flow Sheet for this information)    Short term and Long term goals: (See Goal Flow Sheet for this information)     Communication  ability:  Patient appears to be able to clearly communicate and understand verbal and written communication and follow directions correctly.  Treatment Explanation - The following has been discussed with the patient:   RX ordered/plan of care  Anticipated outcomes  Possible risks and side effects  This patient would benefit from PT intervention to resume normal activities.   Rehab potential is fair.    Frequency:  1 X week, once daily  Duration:  for 6 weeks  Discharge Plan:  Achieve all LTG.  Independent in home treatment program.  Reach maximal therapeutic benefit.    Please refer to the daily flowsheet for treatment today, total treatment time and time spent performing 1:1 timed codes.

## 2017-05-19 PROBLEM — M25.561 ACUTE PAIN OF RIGHT KNEE: Status: RESOLVED | Noted: 2017-05-02 | Resolved: 2017-05-19

## 2017-06-06 DIAGNOSIS — E78.5 HYPERLIPIDEMIA LDL GOAL <100: ICD-10-CM

## 2017-06-06 NOTE — TELEPHONE ENCOUNTER
Atorvastatin 40 mg tab      Last Written Prescription Date: 05/27*/16  Last Fill Quantity: 90, # refills: 3  Last Office Visit with [facility], [facility] or [facility] prescribing provider: 04/11/17 Dr. Perez  Next 5 appointments (look out 90 days)     Jun 19, 2017  1:50 PM CDT   Return Visit with NICKOLAS Peacock CNP   [facility] [place] ([facility])    [address] [address] [address] [phone]                   Lab Results   Component Value Date    CHOL 178 01/09/2017     Lab Results   Component Value Date    HDL 93 01/09/2017     Lab Results   Component Value Date    LDL 70 01/09/2017     Lab Results   Component Value Date    TRIG 74 01/09/2017     Lab Results   Component Value Date    CHOLHDLRATIO 2.3 09/18/2015

## 2017-06-09 RX ORDER — ATORVASTATIN CALCIUM 40 MG/1
TABLET, FILM COATED ORAL
Qty: 90 TABLET | Refills: 1 | Status: SHIPPED | OUTPATIENT
Start: 2017-06-09 | End: 2017-12-29

## 2017-06-19 ENCOUNTER — OFFICE VISIT (OUTPATIENT)
Dept: CARDIOLOGY | Facility: CLINIC | Age: 82
End: 2017-06-19
Attending: INTERNAL MEDICINE
Payer: COMMERCIAL

## 2017-06-19 VITALS
DIASTOLIC BLOOD PRESSURE: 80 MMHG | BODY MASS INDEX: 19.3 KG/M2 | HEART RATE: 61 BPM | SYSTOLIC BLOOD PRESSURE: 178 MMHG | HEIGHT: 61 IN | WEIGHT: 102.2 LBS

## 2017-06-19 DIAGNOSIS — I10 ESSENTIAL HYPERTENSION: ICD-10-CM

## 2017-06-19 LAB
ANION GAP SERPL CALCULATED.3IONS-SCNC: 3 MMOL/L (ref 3–14)
BUN SERPL-MCNC: 24 MG/DL (ref 7–30)
CALCIUM SERPL-MCNC: 8.9 MG/DL (ref 8.5–10.1)
CHLORIDE SERPL-SCNC: 103 MMOL/L (ref 94–109)
CO2 SERPL-SCNC: 29 MMOL/L (ref 20–32)
CREAT SERPL-MCNC: 0.71 MG/DL (ref 0.52–1.04)
GFR SERPL CREATININE-BSD FRML MDRD: 78 ML/MIN/1.7M2
GLUCOSE SERPL-MCNC: 102 MG/DL (ref 70–99)
POTASSIUM SERPL-SCNC: 4.3 MMOL/L (ref 3.4–5.3)
SODIUM SERPL-SCNC: 135 MMOL/L (ref 133–144)

## 2017-06-19 PROCEDURE — 36415 COLL VENOUS BLD VENIPUNCTURE: CPT | Performed by: INTERNAL MEDICINE

## 2017-06-19 PROCEDURE — 80048 BASIC METABOLIC PNL TOTAL CA: CPT | Performed by: INTERNAL MEDICINE

## 2017-06-19 PROCEDURE — 99214 OFFICE O/P EST MOD 30 MIN: CPT | Performed by: NURSE PRACTITIONER

## 2017-06-19 RX ORDER — LOSARTAN POTASSIUM 50 MG/1
50 TABLET ORAL 2 TIMES DAILY
Qty: 180 TABLET | Refills: 3 | Status: SHIPPED | OUTPATIENT
Start: 2017-06-19

## 2017-06-19 NOTE — PROGRESS NOTES
HPI and Plan: #[id number]  See dictation    Orders Placed This Encounter   Procedures     Basic metabolic panel     Follow-Up with Nurse       Orders Placed This Encounter   Medications     losartan (COZAAR) 50 MG tablet     Sig: Take 1 tablet (50 mg) by mouth 2 times daily     Dispense:  180 tablet     Refill:  3       Medications Discontinued During This Encounter   Medication Reason     losartan (COZAAR) 50 MG tablet Reorder         Encounter Diagnosis   Name Primary?     Essential hypertension        CURRENT MEDICATIONS:  Current Outpatient Prescriptions   Medication Sig Dispense Refill     losartan (COZAAR) 50 MG tablet Take 1 tablet (50 mg) by mouth 2 times daily 180 tablet 3     atorvastatin (LIPITOR) 40 MG tablet TAKE ONE TABLET (40 MG) BY MOUTH ONCE DAILY 90 tablet 1     metoprolol (LOPRESSOR) 50 MG tablet Take 1 tablet (50 mg) by mouth 2 times daily 180 tablet 3     mirtazapine (REMERON) 15 MG tablet Take 15 mg by mouth At Bedtime       Calcium Carb-Cholecalciferol (CALCIUM 600 + D) 600-200 MG-UNIT TABS Take 600 mg by mouth daily       acetaminophen (TYLENOL) 325 MG tablet Take 2 tablets (650 mg) by mouth every 4 hours as needed for mild pain 100 tablet 0     Cholecalciferol (VITAMIN D) 1000 UNITS capsule Take 2 capsules by mouth daily.       CENTRUM SILVER OR TABS ONE DAILY 3 MONTHS 1 YEAR     ASPIRIN 81 MG OR TABS 1 tab po QD (Once per day) 100 3     [DISCONTINUED] losartan (COZAAR) 50 MG tablet Take 1 tablet (50 mg) by mouth daily 30 tablet 3       ALLERGIES     Allergies   Allergen Reactions     Codeine      nausea, HA     Lisinopril Cough       PAST MEDICAL HISTORY:  Past Medical History:   Diagnosis Date     CAD (coronary artery disease) 6/20/05    [place], transient CHF     Cardiomyopathy (H)      Coronary atherosclerosis 6/20/2005    circ vessel was stented;  had a 50% LAD lesion which was not treated Problem list name updated by automated process. Provider to review      Generalized osteoarthrosis     knees ;; L total kne 10/09     HTN (hypertension)      Mitral regurgitation     mod     Mixed hyperlipidemia      Osteoporosis      Systolic CHF (H) 1/21/2015       PAST SURGICAL HISTORY:  Past Surgical History:   Procedure Laterality Date     COLONOSCOPY  3/05     ENT SURGERY       ESOPHAGOSCOPY, GASTROSCOPY, DUODENOSCOPY (EGD), COMBINED N/A 4/14/2016    Procedure: COMBINED ESOPHAGOSCOPY, GASTROSCOPY, DUODENOSCOPY (EGD), BIOPSY SINGLE OR MULTIPLE;  Surgeon: Jonathan Gramajo MD;  Location:  GI     EYE SURGERY       HEART CATH, ANGIOPLASTY  2005    RONI to left circ     L bunion + hammer toes  1/04, 4/04     L total knee replacement  10/2009      ovarian cyst surgery         FAMILY HISTORY:  Family History   Problem Relation Age of Onset     CEREBROVASCULAR DISEASE Mother      Respiratory Father      heart     Breast Cancer Sister      HEART DISEASE Sister        SOCIAL HISTORY:  Social History     Social History     Marital status:      Spouse name: Gene     Number of children: 6     Years of education: N/A     Social History Main Topics     Smoking status: Former Smoker     Smokeless tobacco: Never Used      Comment: only smoked for 3 years.     Alcohol use No     Drug use: No     Sexual activity: Yes     Partners: Male     Other Topics Concern     Caffeine Concern No     decaf coffee and tea      Special Diet No     Exercise Yes     walk everyday      Social History Narrative       Review of Systems:  Skin:  Negative       Eyes:  Positive for glasses right eye watering, follows with eye doctor  ENT:  Positive for hearing loss    Respiratory:  Negative       Cardiovascular:  Negative for;palpitations;chest pain;lightheadedness;dizziness Positive for;edema;fatigue    Gastroenterology: Negative      Genitourinary:  Positive for urinary frequency;nocturia    Musculoskeletal:  Positive for back pain;arthritis;joint pain lower back  Neurologic:  Negative      Psychiatric:   "Negative      Heme/Lymph/Imm:  Negative      Endocrine:  Negative        Physical Exam:  Vitals: /80 (BP Location: Right arm, Patient Position: Chair, Cuff Size: Adult Small)  Pulse 61  Ht 1.549 m (5' 1\")  Wt 46.4 kg (102 lb 3.2 oz)  BMI 19.31 kg/m2    Constitutional:  cooperative;alert and oriented;in no acute distress thin;frail      Skin:  warm and dry to the touch, no apparent skin lesions or masses noted        Head:  normocephalic, no masses or lesions        Eyes:  pupils equal and round, conjunctivae and lids unremarkable, sclera white, no xanthalasma, EOMS intact, no nystagmus        ENT:  no pallor or cyanosis, dentition good        Neck:  carotid pulses are full and equal bilaterally, JVP normal, no carotid bruit, no thyromegaly        Chest:  normal breath sounds, clear to auscultation, normal A-P diameter, normal symmetry, normal respiratory excursion, no use of accessory muscles          Cardiac: regular rhythm;normal S1 and S2;apical impulse not displaced   S4 no presence of murmur            Abdomen:  abdomen soft, non-tender, BS normoactive, no mass, no HSM, no bruits        Vascular: pulses full and equal, no bruits auscultated                                        Extremities and Back:  no edema arthritic deformities of UE            Neurological:  affect appropriate, oriented to time, person and place;no gross motor deficits              CC  Nando Tang MD   [facility]  [address]  [place] [address]                  "

## 2017-06-19 NOTE — LETTER
6/19/2017    Lenin Perez MD  [address] [facility] [address] [facility] [address]    RE: Batsheva Barkley       Dear Colleague,    I had the pleasure of seeing Batsheva Barkley in the [facility].    This is an 83-year-old female who presents to [facility] today for a followup visit.  She is a patient of Dr. Tang seen in our clinic for coronary artery disease, hypertension, hyperlipidemia, and cardiomyopathy.      Batsheva has a history of coronary artery disease.  She suffered an inferior lateral myocardial infarction in 2005.  At that time she underwent stenting to an occluded circumflex vessel.  Over the years she has been free from any angina symptoms.  In March of this year she underwent a nuclear stress test.  This demonstrated inferolateral, lateral and inferolateral basal infarction without evidence of ischemia.  Left ejection fraction around 54%.      Echocardiograms have demonstrated left ejection fraction of 45%-50%.  There has been no recent evidence of heart failure.  However, her blood pressure recently has been elevated and Dr. Tang elected to change her medications.  Due to a cough, he changed her lisinopril to losartan.  She returns today for BMP and reassessment.      Batsheva overall tells me she is doing well.  She does suffer from back pain and uses a walker.  She has no chest pain with activity or at rest.  She denies orthopnea, peripheral edema, any sensations of palpitations, lightheadedness or dizziness.      PHYSICAL EXAMINATION:  Her blood pressure today is 178/80, heart rate of 61 beats per minute and is regular.  Her lungs are clear.  There is no peripheral edema.  Weight is stable at 104 pounds.     Outpatient Encounter Prescriptions as of 6/19/2017   Medication Sig Dispense Refill     losartan (COZAAR) 50 MG tablet Take 1 tablet (50 mg) by mouth 2 times daily 180 tablet 3     atorvastatin (LIPITOR) 40 MG tablet TAKE ONE TABLET (40  MG) BY MOUTH ONCE DAILY 90 tablet 1     metoprolol (LOPRESSOR) 50 MG tablet Take 1 tablet (50 mg) by mouth 2 times daily 180 tablet 3     mirtazapine (REMERON) 15 MG tablet Take 15 mg by mouth At Bedtime       Calcium Carb-Cholecalciferol (CALCIUM 600 + D) 600-200 MG-UNIT TABS Take 600 mg by mouth daily       acetaminophen (TYLENOL) 325 MG tablet Take 2 tablets (650 mg) by mouth every 4 hours as needed for mild pain 100 tablet 0     Cholecalciferol (VITAMIN D) 1000 UNITS capsule Take 2 capsules by mouth daily.       CENTRUM SILVER OR TABS ONE DAILY 3 MONTHS 1 YEAR     ASPIRIN 81 MG OR TABS 1 tab po QD (Once per day) 100 3     [DISCONTINUED] losartan (COZAAR) 50 MG tablet Take 1 tablet (50 mg) by mouth daily 30 tablet 3     No facility-administered encounter medications on file as of 6/19/2017.       IMPRESSION AND PLAN:   1.  Hypertension.  Blood pressure elevated today.  Her cough has resolved now on losartan.  At this time, she has agreed to increase her losartan to 50 mg twice daily when she takes her metoprolol.  I will have her undergo a BMP prior to leaving and have her return in about a couple weeks for a blood pressure check and a BMP.  We could consider changing her losartan to olmesartan if her blood pressure remains elevated.   2.  Coronary artery disease.  History of inferior lateral myocardial infarction and circumflex stenting in 2005.  She is free from any angina symptoms.   3.  Low normal left ventricular function.  There are no signs and symptoms of heart failure.   4.  Chronic back pain.      Thank you for allowing me to participate in this patient's care.     Sincerely,    NICKOLAS Nails [facility]

## 2017-06-19 NOTE — MR AVS SNAPSHOT
After Visit Summary   6/19/2017    Batsheva Barkley    MRN: [id number]           Patient Information     Date Of Birth          11/6/1933        Visit Information        Provider Department      6/19/2017 1:50 PM Iona Huang APRN CNP [facility]        Today's Diagnoses     Essential hypertension          Care Instructions    Increase your Losartan to 50mg Twice a day  Labs today before you leave  Return in 2 wks with labs and BP check with nurse          Follow-ups after your visit        Additional Services     Follow-Up with Nurse                 Your next 10 appointments already scheduled     Jul 06, 2017 11:30 AM CDT   Nurse Only with RU UMP HRT NURSE   [facility] ([facility])    [address]  [place] [facility] [address]   [phone]            Jul 06, 2017 12:00 PM CDT   LAB with RU LAB   [facility] ([facility])    [address]  [place] [facility] [address]   [phone]           Patient must bring picture ID.  Patient should be prepared to give a urine specimen  Please do not eat 10-12 hours before your appointment if you are coming in fasting for labs on lipids, cholesterol, or glucose (sugar).  Pregnant women should follow their Care Team instructions. Water with medications is okay. Do not drink coffee or other fluids.   If you have concerns about taking  your medications, please ask at office or if scheduling via MentorDOTMet, send a message by clicking on Secure Messaging, Message Your Care Team.              Future tests that were ordered for you today     Open Future Orders        Priority Expected Expires Ordered    Follow-Up with Nurse Routine 7/5/2017 6/19/2018 6/19/2017    Basic metabolic panel Routine 7/5/2017 6/19/2018 6/19/2017            Who to contact     If you have questions or need follow up information about today's  "clinic visit or your schedule please contact [facility] [place] directly at [phone].  Normal or non-critical lab and imaging results will be communicated to you by MyChart, letter or phone within 4 business days after the clinic has received the results. If you do not hear from us within 7 days, please contact the clinic through SOL ELIXIRShart or phone. If you have a critical or abnormal lab result, we will notify you by phone as soon as possible.  Submit refill requests through CashEdge or call your pharmacy and they will forward the refill request to us. Please allow 3 business days for your refill to be completed.          Additional Information About Your Visit        MyChart Information     CashEdge lets you send messages to your doctor, view your test results, renew your prescriptions, schedule appointments and more. To sign up, go to [email or web address] . Click on \"Log in\" on the left side of the screen, which will take you to the Welcome page. Then click on \"Sign up Now\" on the right side of the page.     You will be asked to enter the access code listed below, as well as some personal information. Please follow the directions to create your username and password.     Your access code is: [email or web address]  Expires: 7/10/2017 11:32 AM     Your access code will  in 90 days. If you need help or a new code, please call your [place] clinic or [phone].        Care EveryWhere ID     This is your Care EveryWhere ID. This could be used by other organizations to access your [place] medical records  [id number]        Your Vitals Were     Pulse Height BMI (Body Mass Index)             61 1.549 m (5' 1\") 19.31 kg/m2          Blood Pressure from Last 3 Encounters:   17 178/80   17 138/61   17 155/70    Weight from Last 3 Encounters:   17 46.4 kg (102 lb 3.2 oz)   17 47.4 kg (104 lb 9.6 oz)   17 46.9 kg (103 lb 6.4 oz)              We " Performed the Following     Follow-Up with Cardiac Advanced Practice Provider          Today's Medication Changes          These changes are accurate as of: 6/19/17  2:22 PM.  If you have any questions, ask your nurse or doctor.               These medicines have changed or have updated prescriptions.        Dose/Directions    losartan 50 MG tablet   Commonly known as:  COZAAR   This may have changed:  when to take this   Used for:  Essential hypertension   Changed by:  Iona Huang APRN CNP        Dose:  50 mg   Take 1 tablet (50 mg) by mouth 2 times daily   Quantity:  180 tablet   Refills:  3            Where to get your medicines      These medications were sent to [facility] [address]  [address] [place] [address]     Phone:  [phone]     losartan 50 MG tablet                Primary Care Provider Office Phone # Fax #    Lenin Perez [phone]       [facility] [address] [place] [address]        Thank you!     Thank you for choosing [facility] [place]  for your care. Our goal is always to provide you with excellent care. Hearing back from our patients is one way we can continue to improve our services. Please take a few minutes to complete the written survey that you may receive in the mail after your visit with us. Thank you!             Your Updated Medication List - Protect others around you: Learn how to safely use, store and throw away your medicines at www.disposemymeds.org.          This list is accurate as of: 6/19/17  2:22 PM.  Always use your most recent med list.                   Brand Name Dispense Instructions for use    acetaminophen 325 MG tablet    TYLENOL    100 tablet    Take 2 tablets (650 mg) by mouth every 4 hours as needed for mild pain       aspirin 81 MG tablet     100    1 tab po QD (Once per day)       atorvastatin 40 MG tablet     LIPITOR    90 tablet    TAKE ONE TABLET (40 MG) BY MOUTH ONCE DAILY       CALCIUM 600 + D 600-200 MG-UNIT Tabs   Generic drug:  Calcium Carb-Cholecalciferol      Take 600 mg by mouth daily       CENTRUM SILVER per tablet     3 MONTHS    ONE DAILY       losartan 50 MG tablet    COZAAR    180 tablet    Take 1 tablet (50 mg) by mouth 2 times daily       metoprolol 50 MG tablet    LOPRESSOR    180 tablet    Take 1 tablet (50 mg) by mouth 2 times daily       mirtazapine 15 MG tablet    REMERON     Take 15 mg by mouth At Bedtime       vitamin D 1000 UNITS capsule      Take 2 capsules by mouth daily.

## 2017-06-19 NOTE — PATIENT INSTRUCTIONS
Increase your Losartan to 50mg Twice a day  Labs today before you leave  Return in 2 wks with labs and BP check with nurse

## 2017-06-20 NOTE — PROGRESS NOTES
HISTORY OF PRESENT ILLNESS:  This is an 83-year-old female who presents to [facility] today for a followup visit.  She is a patient of Dr. Tang seen in our clinic for coronary artery disease, hypertension, hyperlipidemia, and cardiomyopathy.      Batsheva has a history of coronary artery disease.  She suffered an inferior lateral myocardial infarction in 2005.  At that time she underwent stenting to an occluded circumflex vessel.  Over the years she has been free from any angina symptoms.  In March of this year she underwent a nuclear stress test.  This demonstrated inferolateral, lateral and inferolateral basal infarction without evidence of ischemia.  Left ejection fraction around 54%.      Echocardiograms have demonstrated left ejection fraction of 45%-50%.  There has been no recent evidence of heart failure.  However, her blood pressure recently has been elevated and Dr. Tagn elected to change her medications.  Due to a cough, he changed her lisinopril to losartan.  She returns today for BMP and reassessment.      Batsheva overall tells me she is doing well.  She does suffer from back pain and uses a walker.  She has no chest pain with activity or at rest.  She denies orthopnea, peripheral edema, any sensations of palpitations, lightheadedness or dizziness.      PHYSICAL EXAMINATION:  Her blood pressure today is 178/80, heart rate of 61 beats per minute and is regular.  Her lungs are clear.  There is no peripheral edema.  Weight is stable at 104 pounds.      IMPRESSION AND PLAN:   1.  Hypertension.  Blood pressure elevated today.  Her cough has resolved now on losartan.  At this time, she has agreed to increase her losartan to 50 mg twice daily when she takes her metoprolol.  I will have her undergo a BMP prior to leaving and have her return in about a couple weeks for a blood pressure check and a BMP.  We could consider changing her losartan to olmesartan if her blood  pressure remains elevated.   2.  Coronary artery disease.  History of inferior lateral myocardial infarction and circumflex stenting in .  She is free from any angina symptoms.   3.  Low normal left ventricular function.  There are no signs and symptoms of heart failure.   4.  Chronic back pain.      Thank you for allowing me to participate in this patient's care.         NICKOLAS HALL, CNP             D: 2017 14:25   T: 2017 10:05   MT: al      Name:     RENEE HIGGINS   MRN:      [id number]        Account:      [id number]   :      1933           Service Date: 2017      Document: [id number]

## 2017-07-06 ENCOUNTER — TELEPHONE (OUTPATIENT)
Dept: CARDIOLOGY | Facility: CLINIC | Age: 82
End: 2017-07-06

## 2017-07-06 ENCOUNTER — ALLIED HEALTH/NURSE VISIT (OUTPATIENT)
Dept: CARDIOLOGY | Facility: CLINIC | Age: 82
End: 2017-07-06
Attending: NURSE PRACTITIONER
Payer: COMMERCIAL

## 2017-07-06 VITALS — SYSTOLIC BLOOD PRESSURE: 109 MMHG | DIASTOLIC BLOOD PRESSURE: 68 MMHG | HEART RATE: 60 BPM

## 2017-07-06 DIAGNOSIS — I10 ESSENTIAL HYPERTENSION: ICD-10-CM

## 2017-07-06 LAB
ANION GAP SERPL CALCULATED.3IONS-SCNC: 6 MMOL/L (ref 3–14)
BUN SERPL-MCNC: 23 MG/DL (ref 7–30)
CALCIUM SERPL-MCNC: 8.6 MG/DL (ref 8.5–10.1)
CHLORIDE SERPL-SCNC: 103 MMOL/L (ref 94–109)
CO2 SERPL-SCNC: 27 MMOL/L (ref 20–32)
CREAT SERPL-MCNC: 0.88 MG/DL (ref 0.52–1.04)
GFR SERPL CREATININE-BSD FRML MDRD: 61 ML/MIN/1.7M2
GLUCOSE SERPL-MCNC: 90 MG/DL (ref 70–99)
POTASSIUM SERPL-SCNC: 4.3 MMOL/L (ref 3.4–5.3)
SODIUM SERPL-SCNC: 136 MMOL/L (ref 133–144)

## 2017-07-06 PROCEDURE — 99207 ZZC NO CHARGE NURSE ONLY: CPT

## 2017-07-06 PROCEDURE — 36415 COLL VENOUS BLD VENIPUNCTURE: CPT | Performed by: NURSE PRACTITIONER

## 2017-07-06 PROCEDURE — 80048 BASIC METABOLIC PNL TOTAL CA: CPT | Performed by: NURSE PRACTITIONER

## 2017-07-06 NOTE — MR AVS SNAPSHOT
"              After Visit Summary   2017    Batsheva Barkley    MRN: [id number]           Patient Information     Date Of Birth          1933        Visit Information        Provider Department      2017 11:30 AM FAUSTO [facility] HRT NURSE [facility] [place]        Today's Diagnoses     Essential hypertension           Follow-ups after your visit        Who to contact     If you have questions or need follow up information about today's clinic visit or your schedule please contact [facility] directly at [phone].  Normal or non-critical lab and imaging results will be communicated to you by Ardelyxhart, letter or phone within 4 business days after the clinic has received the results. If you do not hear from us within 7 days, please contact the clinic through Ardelyxhart or phone. If you have a critical or abnormal lab result, we will notify you by phone as soon as possible.  Submit refill requests through [employer] or call your pharmacy and they will forward the refill request to us. Please allow 3 business days for your refill to be completed.          Additional Information About Your Visit        MyChart Information     [employer] lets you send messages to your doctor, view your test results, renew your prescriptions, schedule appointments and more. To sign up, go to [email or web address] . Click on \"Log in\" on the left side of the screen, which will take you to the Welcome page. Then click on \"Sign up Now\" on the right side of the page.     You will be asked to enter the access code listed below, as well as some personal information. Please follow the directions to create your username and password.     Your access code is: [email or web address]  Expires: 7/10/2017 11:32 AM     Your access code will  in 90 days. If you need help or a new code, please call your [place] clinic or [phone].        Care EveryWhere ID     This is your Care " EveryWhere ID. This could be used by other organizations to access your [place] medical records  [id number]        Your Vitals Were     Pulse                   60            Blood Pressure from Last 3 Encounters:   07/06/17 109/68   06/19/17 178/80   04/11/17 138/61    Weight from Last 3 Encounters:   06/19/17 46.4 kg (102 lb 3.2 oz)   04/11/17 47.4 kg (104 lb 9.6 oz)   04/07/17 46.9 kg (103 lb 6.4 oz)              We Performed the Following     Follow-Up with Nurse        Primary Care Provider Office Phone # Fax #    Lenin Perez [phone]       [address] [address]        Equal Access to Services     BRIDGET BROOKE : Hadii aad ku hadasho Soomaali, waaxda luqadaha, qaybta kaalmada adeegyada, jessica lawson hayjas hanna . So [facility] [phone].    ATENCIÓN: Si habla español, tiene a soria disposición servicios gratuitos de asistencia lingüística. Llame al [phone].    We comply with applicable federal civil rights laws and [place] laws. We do not discriminate on the basis of race, color, national origin, age, disability sex, sexual orientation or gender identity.            Thank you!     Thank you for choosing [facility] [place]  for your care. Our goal is always to provide you with excellent care. Hearing back from our patients is one way we can continue to improve our services. Please take a few minutes to complete the written survey that you may receive in the mail after your visit with us. Thank you!             Your Updated Medication List - Protect others around you: Learn how to safely use, store and throw away your medicines at www.disposemymeds.org.          This list is accurate as of: 7/6/17 12:03 PM.  Always use your most recent med list.                   Brand Name Dispense Instructions for use Diagnosis    acetaminophen 325 MG tablet    TYLENOL    100 tablet    Take 2 tablets  (650 mg) by mouth every 4 hours as needed for mild pain    Pelvic fracture, closed, initial encounter       aspirin 81 MG tablet     100    1 tab po QD (Once per day)    Coronary atherosclerosis of unspecified type of vessel, native or graft, Mixed hyperlipidemia       atorvastatin 40 MG tablet    LIPITOR    90 tablet    TAKE ONE TABLET (40 MG) BY MOUTH ONCE DAILY    Hyperlipidemia LDL goal <100       CALCIUM 600 + D 600-200 MG-UNIT Tabs   Generic drug:  Calcium Carb-Cholecalciferol      Take 600 mg by mouth daily        CENTRUM SILVER per tablet     3 MONTHS    ONE DAILY        losartan 50 MG tablet    COZAAR    180 tablet    Take 1 tablet (50 mg) by mouth 2 times daily    Essential hypertension       metoprolol 50 MG tablet    LOPRESSOR    180 tablet    Take 1 tablet (50 mg) by mouth 2 times daily    Hypertension goal BP (blood pressure) < 140/90       mirtazapine 15 MG tablet    REMERON     Take 15 mg by mouth At Bedtime        vitamin D 1000 UNITS capsule      Take 2 capsules by mouth daily.

## 2017-07-06 NOTE — NURSING NOTE
"Pt in clinic for nurse only BP check per Iona Huang CNP. Last OV 6/19/17 noted \"Blood pressure elevated today.  Her cough has resolved now on losartan.  At this time, she has agreed to increase her losartan to 50 mg twice daily when she takes her metoprolol.  I will have her undergo a BMP prior to leaving and have her return in about a couple weeks for a blood pressure check and a BMP.  We could consider changing her losartan to olmesartan if her blood pressure remains elevated\".     Today pt's BP is 109/68 HR 60. Pt states she has been having \"a lot of dry mouth\" since starting increased dose of losartan and wonders if it is related. Pt denies dizziness, lightheadedness, headache, chest pain, palpitations, shortness of breath, cough. Pt has been taking her medications as prescribed including losartan 50mg BID; has taken her morning medications today. Pt will be having BMP drawn after nurse visit. Will forward results to Iona Huang CNP when available.   Ordering provider: Iona Huang CNP / In-clinic provider: Dr. Ronaldo Bui RN    "

## 2017-07-13 NOTE — TELEPHONE ENCOUNTER
Patient returned my call. BMP results were reviewed with patient. I recommended she speak with her PMD regarding her dry mouth, that her Remeron might be the cause.     Patient's  got on the phone and wanted to know if the patient can take one med and take it once a day. He does not have prescription coverage. I recommended he speak to the pharmacist at [facility], Metoprolol Tartrate is under the $4 dollar program.    Patient's  stated that patient started drinking apple juice and that's when patient's cough started. His daughter told him to give patient grape juice instead and her cough went away. This was the same time Lisinopril was stopped.     I will review this with Iona, patient's  is concern about the cost of meds. I did tell him that patient's blood pressure is good now with her current medications and if she only takes one pill once a day, her blood pressures may not be controlled. He stated he would like to get on the conversation next Monday. I told him that was fine.      TGarbers RN  [facility]

## 2017-07-13 NOTE — TELEPHONE ENCOUNTER
BP and BMP reviewed. I reviewed UpToDate and Micromedex, dry mouth is not stated as a adverse reaction for Losartan. However, Remeron can cause dry mouth.  I called patient to review. Patient was not available. I left patient a message to return my call.     I will review this with Iona on Monday 07-13-17.     TGarbers RN  [facility]

## 2017-07-22 NOTE — TELEPHONE ENCOUNTER
My experience at the patients age, they do better with BID dosing.  Also cost is cheaper, However, if they would like to change to Metoprolol XR, she could give it a try  Thanks, JS

## 2017-07-24 NOTE — TELEPHONE ENCOUNTER
I called patient, patient was not available. I spoke with Gene. Patient will stay on her current meds. No changes were made.     Raquel DAVILA  [facility]

## 2017-09-05 ENCOUNTER — ALLIED HEALTH/NURSE VISIT (OUTPATIENT)
Dept: FAMILY MEDICINE | Facility: CLINIC | Age: 82
End: 2017-09-05
Payer: COMMERCIAL

## 2017-09-05 VITALS — DIASTOLIC BLOOD PRESSURE: 60 MMHG | SYSTOLIC BLOOD PRESSURE: 110 MMHG

## 2017-09-05 DIAGNOSIS — I10 HTN (HYPERTENSION): Primary | ICD-10-CM

## 2017-09-05 PROCEDURE — 99207 ZZC NO CHARGE NURSE ONLY: CPT | Performed by: FAMILY MEDICINE

## 2017-09-05 NOTE — PROGRESS NOTES
Batsheva Barkley is enrolled/participating in the retail pharmacy Blood Pressure Goals Achievement Program (BPGAP).  Batsheva Barkley was evaluated at [facility] on September 5, 2017 at which time her blood pressure was:    BP Readings from Last 3 Encounters:   09/05/17 110/60   07/06/17 109/68   06/19/17 178/80     Reviewed lifestyle modifications for blood pressure control and reduction: including making healthy food choices, managing weight, getting regular exercise, smoking cessation, reducing alcohol consumption, monitoring blood pressure regularly.     Batsheva Barkley is not experiencing symptoms.    Follow-Up: BP is at goal of < 150/90 mmHg (patient 60+ years of age without diabetes).  Recommended follow-up at pharmacy in 6 months.     Recommendation to Provider: None    Batsheva Barkley was evaluated for enrollment into the PGEN study today.    Patient eligible for enrollment:  No  Patient interested in enrollment:  Unknown    Completed by: Carri Sevilla, Pharm IV student

## 2017-09-05 NOTE — MR AVS SNAPSHOT
"              After Visit Summary   2017    Batsheva Barkley    MRN: [id number]           Patient Information     Date Of Birth          1933        Visit Information        Provider Department      2017 3:12 PM Lenin Perez MD [facility]        Today's Diagnoses     HTN (hypertension)    -  1       Follow-ups after your visit        Who to contact     If you have questions or need follow up information about today's clinic visit or your schedule please contact [facility] directly at [phone].  Normal or non-critical lab and imaging results will be communicated to you by aSmallWorldhart, letter or phone within 4 business days after the clinic has received the results. If you do not hear from us within 7 days, please contact the clinic through aSmallWorldhart or phone. If you have a critical or abnormal lab result, we will notify you by phone as soon as possible.  Submit refill requests through Coveroo or call your pharmacy and they will forward the refill request to us. Please allow 3 business days for your refill to be completed.          Additional Information About Your Visit        MyChart Information     Coveroo lets you send messages to your doctor, view your test results, renew your prescriptions, schedule appointments and more. To sign up, go to [email or web address] . Click on \"Log in\" on the left side of the screen, which will take you to the Welcome page. Then click on \"Sign up Now\" on the right side of the page.     You will be asked to enter the access code listed below, as well as some personal information. Please follow the directions to create your username and password.     Your access code is: [email or web address]  Expires: 2017  3:54 PM     Your access code will  in 90 days. If you need help or a new code, please call your [facility] or [phone].        Care EveryWhere ID     This is your Care EveryWhere ID. This could be used by other " organizations to access your [place] medical records  [id number]         Blood Pressure from Last 3 Encounters:   09/05/17 110/60   07/06/17 109/68   06/19/17 178/80    Weight from Last 3 Encounters:   06/19/17 102 lb 3.2 oz (46.4 kg)   04/11/17 104 lb 9.6 oz (47.4 kg)   04/07/17 103 lb 6.4 oz (46.9 kg)              Today, you had the following     No orders found for display       Primary Care Provider Office Phone # Fax #    Lenin Douglas Perez [phone] [address] [facility] [address]        Equal Access to Services     BRIDGET BROOKE : Hadii lizeth wellso Soemily, waaxda delqadaha, qaybta kaalmada ademichelyamichael, jessica perez. So [facility] [phone].    ATENCIÓN: Si habla español, tiene a soria disposición servicios gratuitos de asistencia lingüística. Llame al [phone].    We comply with applicable federal civil rights laws and [place] laws. We do not discriminate on the basis of race, color, national origin, age, disability sex, sexual orientation or gender identity.            Thank you!     Thank you for choosing [facility]  for your care. Our goal is always to provide you with excellent care. Hearing back from our patients is one way we can continue to improve our services. Please take a few minutes to complete the written survey that you may receive in the mail after your visit with us. Thank you!             Your Updated Medication List - Protect others around you: Learn how to safely use, store and throw away your medicines at www.disposemymeds.org.          This list is accurate as of: 9/5/17 11:59 PM.  Always use your most recent med list.                   Brand Name Dispense Instructions for use Diagnosis    acetaminophen 325 MG tablet    TYLENOL    100 tablet    Take 2 tablets (650 mg) by mouth every 4 hours as needed for mild pain    Pelvic fracture, closed, initial encounter       aspirin 81 MG tablet     100    1 tab  po QD (Once per day)    Coronary atherosclerosis of unspecified type of vessel, native or graft, Mixed hyperlipidemia       atorvastatin 40 MG tablet    LIPITOR    90 tablet    TAKE ONE TABLET (40 MG) BY MOUTH ONCE DAILY    Hyperlipidemia LDL goal <100       CALCIUM 600 + D 600-200 MG-UNIT Tabs   Generic drug:  Calcium Carb-Cholecalciferol      Take 600 mg by mouth daily        CENTRUM SILVER per tablet     3 MONTHS    ONE DAILY        losartan 50 MG tablet    COZAAR    180 tablet    Take 1 tablet (50 mg) by mouth 2 times daily    Essential hypertension       metoprolol 50 MG tablet    LOPRESSOR    180 tablet    Take 1 tablet (50 mg) by mouth 2 times daily    Hypertension goal BP (blood pressure) < 140/90       mirtazapine 15 MG tablet    REMERON     Take 15 mg by mouth At Bedtime        vitamin D 1000 UNITS capsule      Take 2 capsules by mouth daily.

## 2017-09-19 ENCOUNTER — OFFICE VISIT (OUTPATIENT)
Dept: FAMILY MEDICINE | Facility: CLINIC | Age: 82
End: 2017-09-19
Payer: COMMERCIAL

## 2017-09-19 VITALS
BODY MASS INDEX: 19.07 KG/M2 | DIASTOLIC BLOOD PRESSURE: 92 MMHG | SYSTOLIC BLOOD PRESSURE: 180 MMHG | RESPIRATION RATE: 12 BRPM | WEIGHT: 101 LBS | HEIGHT: 61 IN | HEART RATE: 68 BPM | TEMPERATURE: 97.6 F

## 2017-09-19 DIAGNOSIS — H61.23 BILATERAL IMPACTED CERUMEN: ICD-10-CM

## 2017-09-19 DIAGNOSIS — I10 HYPERTENSION GOAL BP (BLOOD PRESSURE) < 140/90: ICD-10-CM

## 2017-09-19 DIAGNOSIS — R60.0 BILATERAL LEG EDEMA: Primary | ICD-10-CM

## 2017-09-19 DIAGNOSIS — R19.7 DIARRHEA, UNSPECIFIED TYPE: ICD-10-CM

## 2017-09-19 PROCEDURE — 99214 OFFICE O/P EST MOD 30 MIN: CPT | Mod: 25 | Performed by: FAMILY MEDICINE

## 2017-09-19 PROCEDURE — 69210 REMOVE IMPACTED EAR WAX UNI: CPT | Mod: 50 | Performed by: FAMILY MEDICINE

## 2017-09-19 NOTE — MR AVS SNAPSHOT
After Visit Summary   9/19/2017    Batsheva Barkley    MRN: [id number]           Patient Information     Date Of Birth          11/6/1933        Visit Information        Provider Department      9/19/2017 9:15 AM Regla Yepez MD [facility]        Today's Diagnoses     Bilateral leg edema    -  1    Bilateral impacted cerumen        Diarrhea, unspecified type        Hypertension goal BP (blood pressure) < 140/90          Care Instructions        Follow up as needed  Leg Swelling in Both Legs    Swelling of the feet, ankles, and legs is called edema. It is caused by excess fluid that has collected in the tissues. Extra fluid in the body settles in the lowest part because of gravity. This is why the legs and feet are most affected.  Some of the causes for edema include:    Disease of the heart like congestive heart failure    Standing or sitting for long periods of time    Infection of the feet or legs    Blood pooling in the veins of your legs (venous insufficiency)    Dilated veins in your lower leg (varicose veins)    Garters or other clothing that is tight on your legs. This will cause blood to pool in your legs because the clothing limits blood flow.    Some medicines such as hormones like birth control pills, some blood pressure medicines like calcium channel blockers (amlodipine) and steroids, some antidepressants like MAO inhibitors and tricyclics    Menstrual periods that cause you to retain fluids    Many types of renal disease    Liver failure or cirrhosis    Pregnancy, some swelling is normal, but a sudden increase in leg swelling or weight gain can be a sign of a dangerous complication of pregnancy    Poor nutrition    Thyroid disease  Medical treatment will depend on what is causing the swelling in your legs. Your healthcare provider may prescribe water pills (diuretics) to get rid of the extra fluid.  Home care  Follow these guidelines when caring for yourself  at home:    Don't wear clothing like garters that is tight on your legs.    Keep your legs up while lying or sitting.    If infection, injury, or recent surgery is causing the swelling, stay off your legs as much as possible until symptoms get better.    If your healthcare provider says that your leg swelling is caused by venous insufficiency or varicose veins, don't sit or  one place for long periods of time. Take breaks and walk about every few hours. Brisk walking is a good exercise. It helps circulate the blood that has collected in your leg. Talk with your provider about using support stockings to stop daytime leg swelling.    If your provider says that heart disease is causing your leg swelling, follow a low-salt diet to stop extra fluid from staying in your body. You may also need medicine.  Follow-up care  Follow up with your healthcare provider, or as advised.  When to seek medical advice  Call your healthcare provider right away if any of these occur:    New shortness of breath or chest pain    Shortness of breath or chest pain that gets worse    Swelling in both legs or ankles that gets worse    Swelling of the abdomen    Redness, warmth, or swelling in one leg    Fever of 100.4 F (38 C) or higher, or as directed by your healthcare provider    Yellow color to your skin or eyes    Rapid, unexplained weight gain    Having to sleep upright or use an increased number of pillows  Date Last Reviewed: 3/31/2016    9039-8270 [employer]. [address] [place] [address]. All rights reserved. This information is not intended as a substitute for professional medical care. Always follow your healthcare professional's instructions.        Treating Diarrhea    Diarrhea happens when you have loose, watery, or frequent bowel movements. It is a common problem with many causes. Most cases of diarrhea clear up on their own. But certain cases may need treatment. Be sure to see your healthcare  provider if your symptoms do not improve within a few days.  Getting relief  Treatment of diarrhea depends on its cause. Diarrhea caused by bacterial or parasite infection is often treated with antibiotics. Diarrhea caused by other factors, such as a stomach virus, often improves with simple home treatment. The tips below may also help relieve your symptoms.    Drink plenty of fluids. This helps prevent too much fluid loss (dehydration). Water, clear soups, and electrolyte solutions are good choices. Avoid alcohol, coffee, tea, and milk. These can irritate your intestines and make symptoms worse.    Suck on ice chips if drinking makes you queasy.    Return to your normal diet slowly. You may want to eat bland foods at first, such as rice and toast. Also, you may need to avoid certain foods for a while, such as dairy products. These can make symptoms worse. Ask your healthcare provider if there are any other foods you should avoid.    If you were prescribed antibiotics, take them as directed.    Do not take anti-diarrhea medicines without asking your healthcare provider first.  Call your healthcare provider   Call your healthcare provider if you have any of the following:     A fever of 100.4 F (38.0 C) or higher, or as directed by your healthcare provider    Severe pain    Worsening diarrhea or diarrhea for more than 2 days    Bloody vomit or stool    Signs of dehydration (dizziness, dry mouth and tongue, rapid pulse, dark urine)  Date Last Reviewed: 7/1/2016 2000-2017 [employer]. [address] [place] [address]. All rights reserved. This information is not intended as a substitute for professional medical care. Always follow your healthcare professional's instructions.                Follow-ups after your visit        Your next 10 appointments already scheduled     Sep 20, 2017 10:00 AM CDT   Team Long with Shruti Castillo PA-C   [facility]  "[facility]    [address] [facility] [address] [phone]              Who to contact     If you have questions or need follow up information about today's clinic visit or your schedule please contact [facility] directly at [phone].  Normal or non-critical lab and imaging results will be communicated to you by MyChart, letter or phone within 4 business days after the clinic has received the results. If you do not hear from us within 7 days, please contact the clinic through MyChart or phone. If you have a critical or abnormal lab result, we will notify you by phone as soon as possible.  Submit refill requests through [employer] or call your pharmacy and they will forward the refill request to us. Please allow 3 business days for your refill to be completed.          Additional Information About Your Visit        MyChart Information     [employer] lets you send messages to your doctor, view your test results, renew your prescriptions, schedule appointments and more. To sign up, go to [email or web address] . Click on \"Log in\" on the left side of the screen, which will take you to the Welcome page. Then click on \"Sign up Now\" on the right side of the page.     You will be asked to enter the access code listed below, as well as some personal information. Please follow the directions to create your username and password.     Your access code is: [email or web address]  Expires: 2017  3:54 PM     Your access code will  in 90 days. If you need help or a new code, please call your [place] clinic or [phone].        Care EveryWhere ID     This is your Care EveryWhere ID. This could be used by other organizations to access your [place] medical records  [id number]        Your Vitals Were     Pulse Temperature Respirations Height Breastfeeding? BMI (Body Mass Index)    68 97.6  F (36.4  C) (Oral) 12 5' 1\" (1.549 m) No 19.08 kg/m2       Blood Pressure from Last 3 " Encounters:   09/19/17 (!) 180/92   09/05/17 110/60   07/06/17 109/68    Weight from Last 3 Encounters:   09/19/17 101 lb (45.8 kg)   06/19/17 102 lb 3.2 oz (46.4 kg)   04/11/17 104 lb 9.6 oz (47.4 kg)              We Performed the Following     REMOVE IMPACTED CERUMEN          Today's Medication Changes          These changes are accurate as of: 9/19/17 10:21 AM.  If you have any questions, ask your nurse or doctor.               Start taking these medicines.        Dose/Directions    order for DME   Used for:  Bilateral leg edema   Started by:  Regla Yepez MD TED stockings- below knee.   Quantity:  1 Package   Refills:  0            Where to get your medicines      Some of these will need a paper prescription and others can be bought over the counter.  Ask your nurse if you have questions.     Bring a paper prescription for each of these medications     order for DME                Primary Care Provider Office Phone # Fax #    Lenin Douglas Perez [phone] [address] [facility] [address]        Equal Access to Services     [facility]: Hadii lizeth ku hadasho Soomaali, waaxda luqadaha, qaybta kaalmada adeegyada, jessica hanan . So [facility] [phone].    ATENCIÓN: Si habla español, tiene a soria disposición servicios gratuitos de asistencia lingüística. Llame al [phone].    We comply with applicable federal civil rights laws and [place] laws. We do not discriminate on the basis of race, color, national origin, age, disability sex, sexual orientation or gender identity.            Thank you!     Thank you for choosing [facility]  for your care. Our goal is always to provide you with excellent care. Hearing back from our patients is one way we can continue to improve our services. Please take a few minutes to complete the written survey that you may receive in the mail after your visit with us. Thank you!              Your Updated Medication List - Protect others around you: Learn how to safely use, store and throw away your medicines at www.disposemymeds.org.          This list is accurate as of: 9/19/17 10:21 AM.  Always use your most recent med list.                   Brand Name Dispense Instructions for use Diagnosis    acetaminophen 325 MG tablet    TYLENOL    100 tablet    Take 2 tablets (650 mg) by mouth every 4 hours as needed for mild pain    Pelvic fracture, closed, initial encounter       aspirin 81 MG tablet     100    1 tab po QD (Once per day)    Coronary atherosclerosis of unspecified type of vessel, native or graft, Mixed hyperlipidemia       atorvastatin 40 MG tablet    LIPITOR    90 tablet    TAKE ONE TABLET (40 MG) BY MOUTH ONCE DAILY    Hyperlipidemia LDL goal <100       CALCIUM 600 + D 600-200 MG-UNIT Tabs   Generic drug:  Calcium Carb-Cholecalciferol      Take 600 mg by mouth daily        CENTRUM SILVER per tablet     3 MONTHS    ONE DAILY        losartan 50 MG tablet    COZAAR    180 tablet    Take 1 tablet (50 mg) by mouth 2 times daily    Essential hypertension       metoprolol 50 MG tablet    LOPRESSOR    180 tablet    Take 1 tablet (50 mg) by mouth 2 times daily    Hypertension goal BP (blood pressure) < 140/90       mirtazapine 15 MG tablet    REMERON     Take 15 mg by mouth At Bedtime        order for DME     1 Package    ANTOINE stockings- below knee.    Bilateral leg edema       vitamin D 1000 UNITS capsule      Take 2 capsules by mouth daily.

## 2017-09-19 NOTE — PATIENT INSTRUCTIONS
Follow up as needed  Leg Swelling in Both Legs    Swelling of the feet, ankles, and legs is called edema. It is caused by excess fluid that has collected in the tissues. Extra fluid in the body settles in the lowest part because of gravity. This is why the legs and feet are most affected.  Some of the causes for edema include:    Disease of the heart like congestive heart failure    Standing or sitting for long periods of time    Infection of the feet or legs    Blood pooling in the veins of your legs (venous insufficiency)    Dilated veins in your lower leg (varicose veins)    Garters or other clothing that is tight on your legs. This will cause blood to pool in your legs because the clothing limits blood flow.    Some medicines such as hormones like birth control pills, some blood pressure medicines like calcium channel blockers (amlodipine) and steroids, some antidepressants like MAO inhibitors and tricyclics    Menstrual periods that cause you to retain fluids    Many types of renal disease    Liver failure or cirrhosis    Pregnancy, some swelling is normal, but a sudden increase in leg swelling or weight gain can be a sign of a dangerous complication of pregnancy    Poor nutrition    Thyroid disease  Medical treatment will depend on what is causing the swelling in your legs. Your healthcare provider may prescribe water pills (diuretics) to get rid of the extra fluid.  Home care  Follow these guidelines when caring for yourself at home:    Don't wear clothing like garters that is tight on your legs.    Keep your legs up while lying or sitting.    If infection, injury, or recent surgery is causing the swelling, stay off your legs as much as possible until symptoms get better.    If your healthcare provider says that your leg swelling is caused by venous insufficiency or varicose veins, don't sit or  one place for long periods of time. Take breaks and walk about every few hours. Brisk walking is a good  exercise. It helps circulate the blood that has collected in your leg. Talk with your provider about using support stockings to stop daytime leg swelling.    If your provider says that heart disease is causing your leg swelling, follow a low-salt diet to stop extra fluid from staying in your body. You may also need medicine.  Follow-up care  Follow up with your healthcare provider, or as advised.  When to seek medical advice  Call your healthcare provider right away if any of these occur:    New shortness of breath or chest pain    Shortness of breath or chest pain that gets worse    Swelling in both legs or ankles that gets worse    Swelling of the abdomen    Redness, warmth, or swelling in one leg    Fever of 100.4 F (38 C) or higher, or as directed by your healthcare provider    Yellow color to your skin or eyes    Rapid, unexplained weight gain    Having to sleep upright or use an increased number of pillows  Date Last Reviewed: 3/31/2016    3694-5048 [employer]. [address] [place] [address]. All rights reserved. This information is not intended as a substitute for professional medical care. Always follow your healthcare professional's instructions.        Treating Diarrhea    Diarrhea happens when you have loose, watery, or frequent bowel movements. It is a common problem with many causes. Most cases of diarrhea clear up on their own. But certain cases may need treatment. Be sure to see your healthcare provider if your symptoms do not improve within a few days.  Getting relief  Treatment of diarrhea depends on its cause. Diarrhea caused by bacterial or parasite infection is often treated with antibiotics. Diarrhea caused by other factors, such as a stomach virus, often improves with simple home treatment. The tips below may also help relieve your symptoms.    Drink plenty of fluids. This helps prevent too much fluid loss (dehydration). Water, clear soups, and electrolyte solutions  are good choices. Avoid alcohol, coffee, tea, and milk. These can irritate your intestines and make symptoms worse.    Suck on ice chips if drinking makes you queasy.    Return to your normal diet slowly. You may want to eat bland foods at first, such as rice and toast. Also, you may need to avoid certain foods for a while, such as dairy products. These can make symptoms worse. Ask your healthcare provider if there are any other foods you should avoid.    If you were prescribed antibiotics, take them as directed.    Do not take anti-diarrhea medicines without asking your healthcare provider first.  Call your healthcare provider   Call your healthcare provider if you have any of the following:     A fever of 100.4 F (38.0 C) or higher, or as directed by your healthcare provider    Severe pain    Worsening diarrhea or diarrhea for more than 2 days    Bloody vomit or stool    Signs of dehydration (dizziness, dry mouth and tongue, rapid pulse, dark urine)  Date Last Reviewed: 7/1/2016 2000-2017 [employer]`. [address] [place] [address], [place] [address]. All rights reserved. This information is not intended as a substitute for professional medical care. Always follow your healthcare professional's instructions.

## 2017-09-19 NOTE — PROGRESS NOTES
SUBJECTIVE:   Batsheva Barkley is a 83 year old female who presents to clinic today for the following health issues:    Leg swelling x 2 weeks   States drinks a lot of water a day and one cup of tea daily.  Daughter who is in office today is so worried she needs she needs to be in the ER.   BP elevated today- did not take losartan yet. 141/69  Was seen by cardiology a few months ago and found to be stable at that time.   She denies orthopenea, peripheral edema, palpitations, dizziness, or ligthheadness.       Diarrhea-   2 weeks  States it happened after having flu shot.   Mostly at night - about 2 episodes and always blood.   No blood in stool.       Problem list and histories reviewed & adjusted, as indicated.  Additional history: as documented    Patient Active Problem List   Diagnosis     Hallux rigidus     Coronary atherosclerosis     Osteoporosis     HYPERLIPIDEMIA LDL GOAL <100     Advanced directives, counseling/discussion     Concussion     Impacted cerumen     Pelvic fracture: Left( 1/17/15)     Fracture of rib of left side     Pain in joint of left pelvic region or thigh     Fall from slipping on ice     Constipation     Physical deconditioning     Systolic CHF (H)     Hypertension goal BP (blood pressure) < 140/90     Cough     Edema     Cardiomyopathy (H)     CAD (coronary artery disease)     Stented coronary artery     Other dysphagia     HTN (hypertension)     Past Surgical History:   Procedure Laterality Date     COLONOSCOPY  3/05     ENT SURGERY       ESOPHAGOSCOPY, GASTROSCOPY, DUODENOSCOPY (EGD), COMBINED N/A 4/14/2016    Procedure: COMBINED ESOPHAGOSCOPY, GASTROSCOPY, DUODENOSCOPY (EGD), BIOPSY SINGLE OR MULTIPLE;  Surgeon: Jonathan Gramajo MD;  Location:  GI     EYE SURGERY       HEART CATH, ANGIOPLASTY  2005    RONI to left circ     L bunion + hammer toes  1/04, 4/04     L total knee replacement  10/2009      ovarian cyst surgery         Social History   Substance Use Topics     Smoking status:  "Former Smoker     Smokeless tobacco: Never Used      Comment: only smoked for 3 years.     Alcohol use No     Family History   Problem Relation Age of Onset     CEREBROVASCULAR DISEASE Mother      Respiratory Father      heart     Breast Cancer Sister      HEART DISEASE Sister              Reviewed and updated as needed this visit by clinical staff       Reviewed and updated as needed this visit by Provider         ROS:  Constitutional, HEENT, cardiovascular, pulmonary, gi, msk  systems are negative, except as otherwise noted.      OBJECTIVE:   BP (!) 180/92 (BP Location: Right arm, Patient Position: Chair, Cuff Size: Adult Small)  Pulse 68  Temp 97.6  F (36.4  C) (Oral)  Resp 12  Ht 5' 1\" (1.549 m)  Wt 101 lb (45.8 kg)  Breastfeeding? No  BMI 19.08 kg/m2  Body mass index is 19.08 kg/(m^2).  GENERAL: healthy, alert and no distress  RESP: lungs clear to auscultation - no rales, rhonchi or wheezes  CV: regular rate and rhythm, normal S1 S2, no S3 or S4, no murmur, click or rub, no peripheral edema and peripheral pulses strong  ABDOMEN: soft, nontender, no hepatosplenomegaly, no masses and bowel sounds normal  MS: trace B/L edema     Diagnostic Test Results:  none     ASSESSMENT/PLAN:         1. Bilateral leg edema  - no significant edema at this time that warrants ER visit or diuresing. Could be due to multiple reasons including possible CHF as per problem list- recent stress test has shown stable EF.   Daughter is very scared about CHF despite reassurance.   - order for DME; ANTOINE stockings- below knee.  Dispense: 1 Package; Refill: 0    2. Bilateral impacted cerumen  -    Bilateral impacted cerumen.  Verbal consent obtained. Cerumen disimpacted using a combination of forced water irrigation, bionix lighted curette. Otoscopic Examination at conclusion of procedure confirms clean ear Canal and normal tympanic membrane.  - REMOVE IMPACTED CERUMEN    3. Diarrhea, unspecified type  - improving. Supportive care " discussed    4. Hypertension goal BP (blood pressure) < 140/90  - BP improved after recheck but still elevated. Has yet to start on losartan. Encouraged to start as soon as possible.       See Patient Instructions    Regla Yepez MD  [facility]

## 2017-09-19 NOTE — NURSING NOTE
"Chief Complaint   Patient presents with     Leg Swelling     x 2 weeks       Initial BP (!) 180/92 (BP Location: Right arm, Patient Position: Chair, Cuff Size: Adult Small)  Pulse 68  Temp 97.6  F (36.4  C) (Oral)  Resp 12  Ht 5' 1\" (1.549 m)  Wt 101 lb (45.8 kg)  Breastfeeding? No  BMI 19.08 kg/m2 Estimated body mass index is 19.08 kg/(m^2) as calculated from the following:    Height as of this encounter: 5' 1\" (1.549 m).    Weight as of this encounter: 101 lb (45.8 kg).  Medication Reconciliation: complete rt arm Yvette Mirza MA      "

## 2017-09-20 ENCOUNTER — TELEPHONE (OUTPATIENT)
Dept: CARDIOLOGY | Facility: CLINIC | Age: 82
End: 2017-09-20

## 2017-09-20 NOTE — TELEPHONE ENCOUNTER
Patients daughter called with concerns of ankle edema. She stated the patient saw her PMD yesterday and was told it could be signs on heart failure.  Patient and patient's daughter are requesting to be seen and evaluated ASAP.  Patient scheduled to see Herbert in [place] tomorrow. Kimmy DAVILA notified.

## 2017-09-22 ENCOUNTER — OFFICE VISIT (OUTPATIENT)
Dept: FAMILY MEDICINE | Facility: CLINIC | Age: 82
End: 2017-09-22
Payer: COMMERCIAL

## 2017-09-22 VITALS
OXYGEN SATURATION: 99 % | HEART RATE: 59 BPM | SYSTOLIC BLOOD PRESSURE: 155 MMHG | HEIGHT: 61 IN | BODY MASS INDEX: 19.07 KG/M2 | WEIGHT: 101 LBS | RESPIRATION RATE: 12 BRPM | DIASTOLIC BLOOD PRESSURE: 76 MMHG | TEMPERATURE: 98.2 F

## 2017-09-22 DIAGNOSIS — R19.7 DIARRHEA OF PRESUMED INFECTIOUS ORIGIN: Primary | ICD-10-CM

## 2017-09-22 DIAGNOSIS — R27.9 LACK OF COORDINATION: ICD-10-CM

## 2017-09-22 DIAGNOSIS — R29.898 WEAKNESS OF BOTH LOWER EXTREMITIES: ICD-10-CM

## 2017-09-22 LAB
BASOPHILS # BLD AUTO: 0 10E9/L (ref 0–0.2)
BASOPHILS NFR BLD AUTO: 0.4 %
DIFFERENTIAL METHOD BLD: NORMAL
EOSINOPHIL # BLD AUTO: 0 10E9/L (ref 0–0.7)
EOSINOPHIL NFR BLD AUTO: 0.8 %
ERYTHROCYTE [DISTWIDTH] IN BLOOD BY AUTOMATED COUNT: 13.6 % (ref 10–15)
HCT VFR BLD AUTO: 38.6 % (ref 35–47)
HGB BLD-MCNC: 12.6 G/DL (ref 11.7–15.7)
LYMPHOCYTES # BLD AUTO: 1.3 10E9/L (ref 0.8–5.3)
LYMPHOCYTES NFR BLD AUTO: 25.3 %
MCH RBC QN AUTO: 29.9 PG (ref 26.5–33)
MCHC RBC AUTO-ENTMCNC: 32.6 G/DL (ref 31.5–36.5)
MCV RBC AUTO: 92 FL (ref 78–100)
MONOCYTES # BLD AUTO: 1 10E9/L (ref 0–1.3)
MONOCYTES NFR BLD AUTO: 19.4 %
NEUTROPHILS # BLD AUTO: 2.7 10E9/L (ref 1.6–8.3)
NEUTROPHILS NFR BLD AUTO: 54.1 %
PLATELET # BLD AUTO: 246 10E9/L (ref 150–450)
RBC # BLD AUTO: 4.22 10E12/L (ref 3.8–5.2)
WBC # BLD AUTO: 5.1 10E9/L (ref 4–11)

## 2017-09-22 PROCEDURE — 36415 COLL VENOUS BLD VENIPUNCTURE: CPT | Performed by: FAMILY MEDICINE

## 2017-09-22 PROCEDURE — 87506 IADNA-DNA/RNA PROBE TQ 6-11: CPT | Performed by: FAMILY MEDICINE

## 2017-09-22 PROCEDURE — 85025 COMPLETE CBC W/AUTO DIFF WBC: CPT | Performed by: FAMILY MEDICINE

## 2017-09-22 PROCEDURE — 80048 BASIC METABOLIC PNL TOTAL CA: CPT | Performed by: FAMILY MEDICINE

## 2017-09-22 PROCEDURE — 99214 OFFICE O/P EST MOD 30 MIN: CPT | Performed by: FAMILY MEDICINE

## 2017-09-22 NOTE — LETTER
September 25, 2017      Batsheva Barkley  [address] [address]        Dear ,    We are writing to inform you of your test results.    Eating a banana a day helps get extra potassium into our diet. (not green, well ripened)     Resulted Orders   CBC with platelets and differential   Result Value Ref Range    WBC 5.1 4.0 - 11.0 10e9/L    RBC Count 4.22 3.8 - 5.2 10e12/L    Hemoglobin 12.6 11.7 - 15.7 g/dL    Hematocrit 38.6 35.0 - 47.0 %    MCV 92 78 - 100 fl    MCH 29.9 26.5 - 33.0 pg    MCHC 32.6 31.5 - 36.5 g/dL    RDW 13.6 10.0 - 15.0 %    Platelet Count 246 150 - 450 10e9/L    Diff Method Automated Method     % Neutrophils 54.1 %    % Lymphocytes 25.3 %    % Monocytes 19.4 %    % Eosinophils 0.8 %    % Basophils 0.4 %    Absolute Neutrophil 2.7 1.6 - 8.3 10e9/L    Absolute Lymphocytes 1.3 0.8 - 5.3 10e9/L    Absolute Monocytes 1.0 0.0 - 1.3 10e9/L    Absolute Eosinophils 0.0 0.0 - 0.7 10e9/L    Absolute Basophils 0.0 0.0 - 0.2 10e9/L   Basic metabolic panel  (Ca, Cl, CO2, Creat, Gluc, K, Na, BUN)   Result Value Ref Range    Sodium 142 133 - 144 mmol/L    Potassium 3.2 (L) 3.4 - 5.3 mmol/L    Chloride 109 94 - 109 mmol/L    Carbon Dioxide 27 20 - 32 mmol/L    Anion Gap 6 3 - 14 mmol/L    Glucose 104 (H) 70 - 99 mg/dL    Urea Nitrogen 18 7 - 30 mg/dL    Creatinine 0.99 0.52 - 1.04 mg/dL    GFR Estimate 53 (L) >60 mL/min/1.7m2      Comment:      Non  GFR Calc    GFR Estimate If Black 64 >60 mL/min/1.7m2      Comment:       GFR Calc    Calcium 8.9 8.5 - 10.1 mg/dL       If you have any questions or concerns, please call the clinic at the number listed above.       Sincerely,        Lenin Perez MD

## 2017-09-22 NOTE — PROGRESS NOTES
SUBJECTIVE:   Batsheva Barkley is a 83 year old female who presents to clinic today for the following health issues:    Grand daughter attends with her     Diarrhea      Duration: 3 weeks    Description:       Consistency of stool: watery       Blood in stool: no        Number of loose stools past 24 hours: 3    Intensity:  mild    Accompanying signs and symptoms:       Fever: no        Nausea/vomitting: no        Abdominal pain: YES- cramps       Weight loss: no     History (recent antibiotics or travel/ill contacts/med changes/testing done): none    Precipitating or alleviating factors: None    Therapies tried and outcome: Imodium AD    Past Medical History:   Diagnosis Date     CAD (coronary artery disease) 6/20/05    [place], transient CHF     Cardiomyopathy (H)      Coronary atherosclerosis 6/20/2005    circ vessel was stented;  had a 50% LAD lesion which was not treated Problem list name updated by automated process. Provider to review     Generalized osteoarthrosis     knees ;; L total kne 10/09     HTN (hypertension)      Mitral regurgitation     mod     Mixed hyperlipidemia      Osteoporosis      Systolic CHF (H) 1/21/2015       Past Surgical History:   Procedure Laterality Date     COLONOSCOPY  3/05     ENT SURGERY       ESOPHAGOSCOPY, GASTROSCOPY, DUODENOSCOPY (EGD), COMBINED N/A 4/14/2016    Procedure: COMBINED ESOPHAGOSCOPY, GASTROSCOPY, DUODENOSCOPY (EGD), BIOPSY SINGLE OR MULTIPLE;  Surgeon: Jonathan Gramajo MD;  Location:  GI     EYE SURGERY       HEART CATH, ANGIOPLASTY  2005    RONI to left circ     L bunion + hammer toes  1/04, 4/04     L total knee replacement  10/2009      ovarian cyst surgery         Family History   Problem Relation Age of Onset     CEREBROVASCULAR DISEASE Mother      Respiratory Father      heart     Breast Cancer Sister      HEART DISEASE Sister        Social History   Substance Use Topics     Smoking status: Former Smoker     Smokeless tobacco: Never  Used      Comment: only smoked for 3 years.     Alcohol use No        REVIEW OF SYSTEMS    Generally has been noticing loose bm on and off for two weeks, feeling well until this episode. No problems with vision, hearing, dental or neck pain.Has hph airborne or ingestion allergy  No chest pain, palpitations, dyspnea, change in bowel habits, blood  in stool or dyspepsia.  No rashes, changing moles, weakness, lassitude or back problems.  No chronic issues . No dysuria  Patient not  a smoker. No problems with significant headaches.  On exam the vital signs are stable  Weight is Body mass index is 19.08 kg/(m^2).   Eyes show loyd   No neck masses or thyromegaly.Ear nose and throat shows normal   No bruits, murmers, rubs or extrasounds. No cardiomegaly or chest wall tenderness. Lungs clear, no abdominal masses or organomegaly. No CVA tenderness.  Skin eval no rash  No hernias, good range of motion neck, back and extremities. No abnormal skin lesions. Normal genitalia. Good peripheral pulses. No adenopathy.  Normal gait and stance. Neck is supple.  Back exam shows dorsal kyphosis    (A09) Diarrhea of presumed infectious origin  (primary encounter diagnosis)  Comment:   Plan: CBC with platelets and differential, Basic         metabolic panel  (Ca, Cl, CO2, Creat, Gluc, K,         Na, BUN), Enteric Bacteria and Virus Panel by         MATTI Stool, order for DME            (R27.9) Lack of coordination  Comment:   Plan: order for DME        She has chronic ataxia, needs gait precautions     (R29.898) Weakness of both lower extremities  Comment:   Plan: order for DME        Lightweight wheelchair-she can self propel, she has orientation sufficient to operate it., her  is likely to push her

## 2017-09-22 NOTE — NURSING NOTE
"Chief Complaint   Patient presents with     Diarrhea       Initial /76 (BP Location: Left arm, Patient Position: Chair, Cuff Size: Adult Small)  Pulse 59  Temp 98.2  F (36.8  C) (Oral)  Resp 12  Ht 5' 1\" (1.549 m)  Wt 101 lb (45.8 kg)  SpO2 99%  BMI 19.08 kg/m2 Estimated body mass index is 19.08 kg/(m^2) as calculated from the following:    Height as of this encounter: 5' 1\" (1.549 m).    Weight as of this encounter: 101 lb (45.8 kg).  Medication Reconciliation: complete   William Ramachandran CMA       "

## 2017-09-22 NOTE — MR AVS SNAPSHOT
"              After Visit Summary   9/22/2017    Batsheva Barkley    MRN: [id number]           Patient Information     Date Of Birth          11/6/1933        Visit Information        Provider Department      9/22/2017 1:30 PM Lenin Perez MD [facility]        Today's Diagnoses     Diarrhea of presumed infectious origin    -  1    Lack of coordination        Weakness of both lower extremities           Follow-ups after your visit        Future tests that were ordered for you today     Open Future Orders        Priority Expected Expires Ordered    Enteric Bacteria and Virus Panel by MATTI Stool Routine  9/22/2018 9/22/2017            Who to contact     If you have questions or need follow up information about today's clinic visit or your schedule please contact [facility] directly at [phone].  Normal or non-critical lab and imaging results will be communicated to you by MyChart, letter or phone within 4 business days after the clinic has received the results. If you do not hear from us within 7 days, please contact the clinic through MyChart or phone. If you have a critical or abnormal lab result, we will notify you by phone as soon as possible.  Submit refill requests through [email or web address] or call your pharmacy and they will forward the refill request to us. Please allow 3 business days for your refill to be completed.          Additional Information About Your Visit        MyChart Information     [email or web address] lets you send messages to your doctor, view your test results, renew your prescriptions, schedule appointments and more. To sign up, go to [email or web address] . Click on \"Log in\" on the left side of the screen, which will take you to the Welcome page. Then click on \"Sign up Now\" on the right side of the page.     You will be asked to enter the access code listed below, as well as some personal information. Please follow the directions to create your username and " "password.     Your access code is: [email or web address]  Expires: 2017  3:54 PM     Your access code will  in 90 days. If you need help or a new code, please call your [facility] or [phone].        Care EveryWhere ID     This is your Care EveryWhere ID. This could be used by other organizations to access your [facility] medical records  [id number]        Your Vitals Were     Pulse Temperature Respirations Height Pulse Oximetry BMI (Body Mass Index)    59 98.2  F (36.8  C) (Oral) 12 5' 1\" (1.549 m) 99% 19.08 kg/m2       Blood Pressure from Last 3 Encounters:   17 155/76   17 (!) 180/92   17 110/60    Weight from Last 3 Encounters:   17 101 lb (45.8 kg)   17 101 lb (45.8 kg)   17 102 lb 3.2 oz (46.4 kg)              We Performed the Following     Basic metabolic panel  (Ca, Cl, CO2, Creat, Gluc, K, Na, BUN)     CBC with platelets and differential          Today's Medication Changes          These changes are accurate as of: 17  2:18 PM.  If you have any questions, ask your nurse or doctor.               These medicines have changed or have updated prescriptions.        Dose/Directions    * order for DME   This may have changed:  Another medication with the same name was added. Make sure you understand how and when to take each.   Used for:  Bilateral leg edema   Changed by:  Regla Yepez MD        ANTOINE stockings- below knee.   Quantity:  1 Package   Refills:  0       * order for DME   This may have changed:  You were already taking a medication with the same name, and this prescription was added. Make sure you understand how and when to take each.   Used for:  Diarrhea of presumed infectious origin, Lack of coordination, Weakness of both lower extremities   Changed by:  Lenin Perez MD        Light weight wheelchair Body mass index is 19.08 kg/(m^2).   Quantity:  1 Device   Refills:  0       * Notice:  This list has 2 medication(s) that are " the same as other medications prescribed for you. Read the directions carefully, and ask your doctor or other care provider to review them with you.         Where to get your medicines      Some of these will need a paper prescription and others can be bought over the counter.  Ask your nurse if you have questions.     Bring a paper prescription for each of these medications     order for DME                Primary Care Provider Office Phone # Fax #    Lenin Douglas Perez [phone] [address] [facility] [address]        Equal Access to Services     [place] : Hadii aad ku hadasho Soomaali, waaxda luqadaha, qaybta kaalmada adeegyada, waxay idiin hayjas hanna . So [facility] [phone].    ATENCIÓN: Si habla español, tiene a soria disposición servicios gratuitos de asistencia lingüística. [facility] [phone].    We comply with applicable federal civil rights laws and [place] laws. We do not discriminate on the basis of race, color, national origin, age, disability sex, sexual orientation or gender identity.            Thank you!     Thank you for choosing [facility]  for your care. Our goal is always to provide you with excellent care. Hearing back from our patients is one way we can continue to improve our services. Please take a few minutes to complete the written survey that you may receive in the mail after your visit with us. Thank you!             Your Updated Medication List - Protect others around you: Learn how to safely use, store and throw away your medicines at www.disposemymeds.org.          This list is accurate as of: 9/22/17  2:18 PM.  Always use your most recent med list.                   Brand Name Dispense Instructions for use Diagnosis    acetaminophen 325 MG tablet    TYLENOL    100 tablet    Take 2 tablets (650 mg) by mouth every 4 hours as needed for mild pain    Pelvic fracture, closed, initial encounter       aspirin 81 MG  tablet     100    1 tab po QD (Once per day)    Coronary atherosclerosis of unspecified type of vessel, native or graft, Mixed hyperlipidemia       atorvastatin 40 MG tablet    LIPITOR    90 tablet    TAKE ONE TABLET (40 MG) BY MOUTH ONCE DAILY    Hyperlipidemia LDL goal <100       CALCIUM 600 + D 600-200 MG-UNIT Tabs   Generic drug:  Calcium Carb-Cholecalciferol      Take 600 mg by mouth daily        CENTRUM SILVER per tablet     3 MONTHS    ONE DAILY        losartan 50 MG tablet    COZAAR    180 tablet    Take 1 tablet (50 mg) by mouth 2 times daily    Essential hypertension       metoprolol 50 MG tablet    LOPRESSOR    180 tablet    Take 1 tablet (50 mg) by mouth 2 times daily    Hypertension goal BP (blood pressure) < 140/90       mirtazapine 15 MG tablet    REMERON     Take 15 mg by mouth At Bedtime        * order for DME     1 Package    ANTOINE stockings- below knee.    Bilateral leg edema       * order for DME     1 Device    Light weight wheelchair Body mass index is 19.08 kg/(m^2).    Diarrhea of presumed infectious origin, Lack of coordination, Weakness of both lower extremities       vitamin D 1000 UNITS capsule      Take 2 capsules by mouth daily.        * Notice:  This list has 2 medication(s) that are the same as other medications prescribed for you. Read the directions carefully, and ask your doctor or other care provider to review them with you.

## 2017-09-23 DIAGNOSIS — R19.7 DIARRHEA OF PRESUMED INFECTIOUS ORIGIN: ICD-10-CM

## 2017-09-23 LAB
ANION GAP SERPL CALCULATED.3IONS-SCNC: 6 MMOL/L (ref 3–14)
BUN SERPL-MCNC: 18 MG/DL (ref 7–30)
C COLI+JEJUNI+LARI FUSA STL QL NAA+PROBE: NOT DETECTED
CALCIUM SERPL-MCNC: 8.9 MG/DL (ref 8.5–10.1)
CHLORIDE SERPL-SCNC: 109 MMOL/L (ref 94–109)
CO2 SERPL-SCNC: 27 MMOL/L (ref 20–32)
CREAT SERPL-MCNC: 0.99 MG/DL (ref 0.52–1.04)
EC STX1 GENE STL QL NAA+PROBE: NOT DETECTED
EC STX2 GENE STL QL NAA+PROBE: NOT DETECTED
ENTERIC PATHOGEN COMMENT: NORMAL
GFR SERPL CREATININE-BSD FRML MDRD: 53 ML/MIN/1.7M2
GLUCOSE SERPL-MCNC: 104 MG/DL (ref 70–99)
NOROV GI+II ORF1-ORF2 JNC STL QL NAA+PR: NOT DETECTED
POTASSIUM SERPL-SCNC: 3.2 MMOL/L (ref 3.4–5.3)
RVA NSP5 STL QL NAA+PROBE: NOT DETECTED
SALMONELLA SP RPOD STL QL NAA+PROBE: NOT DETECTED
SHIGELLA SP+EIEC IPAH STL QL NAA+PROBE: NOT DETECTED
SODIUM SERPL-SCNC: 142 MMOL/L (ref 133–144)
V CHOL+PARA RFBL+TRKH+TNAA STL QL NAA+PR: NOT DETECTED
Y ENTERO RECN STL QL NAA+PROBE: NOT DETECTED

## 2017-09-26 ENCOUNTER — TELEPHONE (OUTPATIENT)
Dept: FAMILY MEDICINE | Facility: CLINIC | Age: 82
End: 2017-09-26

## 2017-09-26 NOTE — TELEPHONE ENCOUNTER
Pt and family obtaining wheelchair from [facility], need order and notes sent to them  Fax # [phone]    CB to daughter [phone] Linda daughter    Kait Lowery RN, BS  Clinical Nurse Triage.

## 2017-10-06 ENCOUNTER — OFFICE VISIT (OUTPATIENT)
Dept: FAMILY MEDICINE | Facility: CLINIC | Age: 82
End: 2017-10-06
Payer: COMMERCIAL

## 2017-10-06 VITALS
BODY MASS INDEX: 19.24 KG/M2 | SYSTOLIC BLOOD PRESSURE: 155 MMHG | OXYGEN SATURATION: 98 % | RESPIRATION RATE: 10 BRPM | WEIGHT: 101.9 LBS | DIASTOLIC BLOOD PRESSURE: 78 MMHG | HEIGHT: 61 IN | HEART RATE: 68 BPM | TEMPERATURE: 97.9 F

## 2017-10-06 DIAGNOSIS — I10 HYPERTENSION GOAL BP (BLOOD PRESSURE) < 140/90: ICD-10-CM

## 2017-10-06 DIAGNOSIS — M51.369 DDD (DEGENERATIVE DISC DISEASE), LUMBAR: Primary | ICD-10-CM

## 2017-10-06 DIAGNOSIS — B33.24 VIRAL CARDIOMYOPATHY (H): ICD-10-CM

## 2017-10-06 DIAGNOSIS — Z95.5 STENTED CORONARY ARTERY: ICD-10-CM

## 2017-10-06 DIAGNOSIS — R53.81 PHYSICAL DECONDITIONING: ICD-10-CM

## 2017-10-06 DIAGNOSIS — E78.5 HYPERLIPIDEMIA LDL GOAL <100: ICD-10-CM

## 2017-10-06 DIAGNOSIS — R60.0 LOCALIZED EDEMA: ICD-10-CM

## 2017-10-06 DIAGNOSIS — I50.21 ACUTE SYSTOLIC HEART FAILURE (H): ICD-10-CM

## 2017-10-06 PROCEDURE — G0439 PPPS, SUBSEQ VISIT: HCPCS | Performed by: FAMILY MEDICINE

## 2017-10-06 PROCEDURE — 80061 LIPID PANEL: CPT | Performed by: FAMILY MEDICINE

## 2017-10-06 PROCEDURE — 36415 COLL VENOUS BLD VENIPUNCTURE: CPT | Performed by: FAMILY MEDICINE

## 2017-10-06 PROCEDURE — 80053 COMPREHEN METABOLIC PANEL: CPT | Performed by: FAMILY MEDICINE

## 2017-10-06 PROCEDURE — 84443 ASSAY THYROID STIM HORMONE: CPT | Performed by: FAMILY MEDICINE

## 2017-10-06 NOTE — LETTER
"[facility]  [address] [place], [address]  [phone]    October 9, 2017       Batsheva SUHA Barkley                                                                                                                                          [address]    [facility] [address]      Dear Batsheva:    Blood looks pretty good. Nice to see you.  The results of your latest lipid tests as attached.    LDL is the \"bad\" cholesterol linked to heart disease and stroke.   HDL is the \"good\" cholesterol and when it is high, it decreases the risk for above problems.    Results for orders placed or performed in visit on 10/06/17   Lipid Profile (Chol, Trig, HDL, LDL calc)   Result Value Ref Range    Cholesterol 171 <200 mg/dL    Triglycerides 81 <150 mg/dL    HDL Cholesterol 80 >49 mg/dL    LDL Cholesterol Calculated 75 <100 mg/dL    Non HDL Cholesterol 91 <130 mg/dL   Comprehensive metabolic panel   Result Value Ref Range    Sodium 138 133 - 144 mmol/L    Potassium 3.6 3.4 - 5.3 mmol/L    Chloride 101 94 - 109 mmol/L    Carbon Dioxide 27 20 - 32 mmol/L    Anion Gap 10 3 - 14 mmol/L    Glucose 83 70 - 99 mg/dL    Urea Nitrogen 17 7 - 30 mg/dL    Creatinine 0.80 0.52 - 1.04 mg/dL    GFR Estimate 68 >60 mL/min/1.7m2    GFR Estimate If Black 83 >60 mL/min/1.7m2    Calcium 8.6 8.5 - 10.1 mg/dL    Bilirubin Total 0.4 0.2 - 1.3 mg/dL    Albumin 3.2 (L) 3.4 - 5.0 g/dL    Protein Total 6.5 (L) 6.8 - 8.8 g/dL    Alkaline Phosphatase 72 40 - 150 U/L    ALT 23 0 - 50 U/L    AST 27 0 - 45 U/L   TSH with free T4 reflex   Result Value Ref Range    TSH 1.48 0.40 - 4.00 mU/L         Follow a low-fat, low-cholesterol diet and get regular exercise.  Please feel free to call the clinic at [phone] if you have any questions.    Sincerely,    Lenin Perez MD    "

## 2017-10-06 NOTE — NURSING NOTE
"Chief Complaint   Patient presents with     Physical       Initial /88 (BP Location: Left arm, Patient Position: Chair, Cuff Size: Adult Small)  Pulse 68  Temp 97.9  F (36.6  C) (Oral)  Resp 10  Ht 5' 1\" (1.549 m)  Wt 101 lb 14.4 oz (46.2 kg)  SpO2 98%  BMI 19.25 kg/m2 Estimated body mass index is 19.25 kg/(m^2) as calculated from the following:    Height as of this encounter: 5' 1\" (1.549 m).    Weight as of this encounter: 101 lb 14.4 oz (46.2 kg).  Medication Reconciliation: complete   William Ramachandran CMA       "

## 2017-10-06 NOTE — MR AVS SNAPSHOT
After Visit Summary   10/6/2017    Batsheva Barkley    MRN: [id number]           Patient Information     Date Of Birth          11/6/1933        Visit Information        Provider Department      10/6/2017 11:15 AM Lenin Perez MD [facility]        Today's Diagnoses     DDD (degenerative disc disease), lumbar    -  1    Physical deconditioning        Hyperlipidemia LDL goal <100        Hypertension goal BP (blood pressure) < 140/90        Localized edema        Stented coronary artery        Viral cardiomyopathy (H)        Acute systolic heart failure (H)           Care Instructions      Preventive Health Recommendations  Female Ages 65 +    Yearly exam:     See your health care provider every year in order to  o Review health changes.   o Discuss preventive care.    o Review your medicines if your doctor has prescribed any.      You no longer need a yearly Pap test unless you've had an abnormal Pap test in the past 10 years. If you have vaginal symptoms, such as bleeding or discharge, be sure to talk with your provider about a Pap test.      Every 1 to 2 years, have a mammogram.  If you are over 69, talk with your health care provider about whether or not you want to continue having screening mammograms.      Every 10 years, have a colonoscopy. Or, have a yearly FIT test (stool test). These exams will check for colon cancer.       Have a cholesterol test every 5 years, or more often if your doctor advises it.       Have a diabetes test (fasting glucose) every three years. If you are at risk for diabetes, you should have this test more often.       At age 65, have a bone density scan (DEXA) to check for osteoporosis (brittle bone disease).    Shots:    Get a flu shot each year.    Get a tetanus shot every 10 years.    Talk to your doctor about your pneumonia vaccines. There are now two you should receive - Pneumovax (PPSV 23) and Prevnar (PCV 13).    Talk to your doctor about  "the shingles vaccine.    Talk to your doctor about the hepatitis B vaccine.    Nutrition:     Eat at least 5 servings of fruits and vegetables each day.      Eat whole-grain bread, whole-wheat pasta and brown rice instead of white grains and rice.      Talk to your provider about Calcium and Vitamin D.     Lifestyle    Exercise at least 150 minutes a week (30 minutes a day, 5 days a week). This will help you control your weight and prevent disease.      Limit alcohol to one drink per day.      No smoking.       Wear sunscreen to prevent skin cancer.       See your dentist twice a year for an exam and cleaning.      See your eye doctor every 1 to 2 years to screen for conditions such as glaucoma, macular degeneration, cataracts, etc           Follow-ups after your visit        Who to contact     If you have questions or need follow up information about today's clinic visit or your schedule please contact [facility] directly at [phone].  Normal or non-critical lab and imaging results will be communicated to you by MyChart, letter or phone within 4 business days after the clinic has received the results. If you do not hear from us within 7 days, please contact the clinic through Anglehart or phone. If you have a critical or abnormal lab result, we will notify you by phone as soon as possible.  Submit refill requests through QuoVadis or call your pharmacy and they will forward the refill request to us. Please allow 3 business days for your refill to be completed.          Additional Information About Your Visit        QuoVadis Information     QuoVadis lets you send messages to your doctor, view your test results, renew your prescriptions, schedule appointments and more. To sign up, go to [email or web address] . Click on \"Log in\" on the left side of the screen, which will take you to the Welcome page. Then click on \"Sign up Now\" on the right side of the page.     You will be asked to enter the " "access code listed below, as well as some personal information. Please follow the directions to create your username and password.     Your access code is: [email or web address]  Expires: 3/19/2018 12:19 PM     Your access code will  in 90 days. If you need help or a new code, please call your [place] clinic or [phone].        Care EveryWhere ID     This is your Care EveryWhere ID. This could be used by other organizations to access your [facility] medical records  [id number]        Your Vitals Were     Pulse Temperature Respirations Height Pulse Oximetry BMI (Body Mass Index)    68 97.9  F (36.6  C) (Oral) 10 5' 1\" (1.549 m) 98% 19.25 kg/m2       Blood Pressure from Last 3 Encounters:   17 120/78   10/26/17 138/68   10/06/17 155/78    Weight from Last 3 Encounters:   17 100 lb (45.4 kg)   10/26/17 104 lb 4.8 oz (47.3 kg)   10/06/17 101 lb 14.4 oz (46.2 kg)              We Performed the Following     Comprehensive metabolic panel     Lipid Profile (Chol, Trig, HDL, LDL calc)     TSH with free T4 reflex        Primary Care Provider Office Phone # Fax #    Lenin Douglas Perez [phone] [address] [facility] [address]        Equal Access to Services     [place] [facility] AH: Hadii aad ku hadasho Socatrinaali, waaxda luqadaha, qaybta kaalmada adeegyada, jessica lawson hayjas hanna . So [facility] [phone].    ATENCIÓN: Si habla español, tiene a soria disposición servicios gratuitos de asistencia lingüística. Llame al [phone].    We comply with applicable federal civil rights laws and [place] laws. We do not discriminate on the basis of race, color, national origin, age, disability, sex, sexual orientation, or gender identity.            Thank you!     Thank you for choosing [facility]  for your care. Our goal is always to provide you with excellent care. Hearing back from our patients is one way we can continue to improve our services. Please take a " few minutes to complete the written survey that you may receive in the mail after your visit with us. Thank you!             Your Updated Medication List - Protect others around you: Learn how to safely use, store and throw away your medicines at www.disposemymeds.org.          This list is accurate as of: 10/6/17 11:59 PM.  Always use your most recent med list.                   Brand Name Dispense Instructions for use Diagnosis    acetaminophen 325 MG tablet    TYLENOL    100 tablet    Take 2 tablets (650 mg) by mouth every 4 hours as needed for mild pain    Pelvic fracture, closed, initial encounter       aspirin 81 MG tablet     100    1 tab po QD (Once per day)    Coronary atherosclerosis of unspecified type of vessel, native or graft, Mixed hyperlipidemia       atorvastatin 40 MG tablet    LIPITOR    90 tablet    TAKE ONE TABLET (40 MG) BY MOUTH ONCE DAILY    Hyperlipidemia LDL goal <100       CALCIUM 600 + D 600-200 MG-UNIT Tabs   Generic drug:  Calcium Carb-Cholecalciferol      Take 600 mg by mouth daily        CENTRUM SILVER per tablet     3 MONTHS    ONE DAILY        losartan 50 MG tablet    COZAAR    180 tablet    Take 1 tablet (50 mg) by mouth 2 times daily    Essential hypertension       mirtazapine 15 MG tablet    REMERON     Take 15 mg by mouth At Bedtime        * order for DME     1 Package    ANTOINE stockings- below knee.    Bilateral leg edema       * order for DME     1 Device    Light weight wheelchair Body mass index is 19.08 kg/(m^2).    Diarrhea of presumed infectious origin, Lack of coordination, Weakness of both lower extremities       vitamin D 1000 UNITS capsule      Take 2 capsules by mouth daily.        * Notice:  This list has 2 medication(s) that are the same as other medications prescribed for you. Read the directions carefully, and ask your doctor or other care provider to review them with you.

## 2017-10-06 NOTE — PROGRESS NOTES
SUBJECTIVE:   Batsheva Barkley is a 83 year old female who presents for follow up of multiple problems  Pain in back, legs,   Current Outpatient Prescriptions   Medication     order for DME     order for DME     losartan (COZAAR) 50 MG tablet     atorvastatin (LIPITOR) 40 MG tablet     metoprolol (LOPRESSOR) 50 MG tablet     mirtazapine (REMERON) 15 MG tablet     Calcium Carb-Cholecalciferol (CALCIUM 600 + D) 600-200 MG-UNIT TABS     acetaminophen (TYLENOL) 325 MG tablet     Cholecalciferol (VITAMIN D) 1000 UNITS capsule     CENTRUM SILVER OR TABS     ASPIRIN 81 MG OR TABS     furosemide (LASIX) 20 MG tablet     potassium chloride SA (K-DUR/KLOR-CON M) 10 MEQ CR tablet     No current facility-administered medications for this visit.               Past Medical History:   Diagnosis Date     CAD (coronary artery disease) 6/20/05    [place], transient CHF     Cardiomyopathy (H)      Coronary atherosclerosis 6/20/2005    circ vessel was stented;  had a 50% LAD lesion which was not treated Problem list name updated by automated process. Provider to review     Generalized osteoarthrosis     knees ;; L total kne 10/09     HTN (hypertension)      Mitral regurgitation     mod     Mixed hyperlipidemia      Osteoporosis      Systolic CHF (H) 1/21/2015       Past Surgical History:   Procedure Laterality Date     COLONOSCOPY  3/05     ENT SURGERY       ESOPHAGOSCOPY, GASTROSCOPY, DUODENOSCOPY (EGD), COMBINED N/A 4/14/2016    Procedure: COMBINED ESOPHAGOSCOPY, GASTROSCOPY, DUODENOSCOPY (EGD), BIOPSY SINGLE OR MULTIPLE;  Surgeon: Jonathan Gramajo MD;  Location:  GI     EYE SURGERY       HEART CATH, ANGIOPLASTY  2005    RONI to left circ     L bunion + hammer toes  1/04, 4/04     L total knee replacement  10/2009      ovarian cyst surgery         Family History   Problem Relation Age of Onset     CEREBROVASCULAR DISEASE Mother      Respiratory Father      heart     Breast Cancer Sister      HEART DISEASE  Sister        Social History   Substance Use Topics     Smoking status: Former Smoker     Smokeless tobacco: Never Used      Comment: only smoked for 3 years.     Alcohol use No           Social History   Substance Use Topics     Smoking status: Former Smoker     Smokeless tobacco: Never Used      Comment: only smoked for 3 years.     Alcohol use No       no alcohol    Today's PHQ-2 Score:   PHQ-2 ( 1999 [employer]) 9/22/2017 9/19/2017   Q1: Little interest or pleasure in doing things 0 0   Q2: Feeling down, depressed or hopeless 0 0   PHQ-2 Score 0 0       Do you feel safe in your environment - Yes    Do you have a Health Care Directive?: No: Advance care planning was reviewed with patient; patient declined at this time.      Current providers sharing in care for this patient include: Patient Care Team:  Lenin Perez MD as PCP - General (Family Practice)      Hearing impairment: Yes, Feel that people are mumbling or not speaking clearly.    Ability to successfully perform activities of daily living: Yes, no assistance needed     Fall risk:  Uses her walker faithfully,     Home safety:  none identified      The following health maintenance items are reviewed in Epic and correct as of today:  Health Maintenance   Topic Date Due     ADVANCE DIRECTIVE PLANNING Q5 YRS  05/07/2017     ALT Q1 YR  01/09/2018     FALL RISK ASSESSMENT  01/09/2018     LIPID MONITORING Q1 YEAR  01/09/2018     BMP Q6 MOS  03/22/2018     HF ACTION PLAN Q3 YR  07/30/2018     CBC Q1 YR  09/22/2018     TETANUS IMMUNIZATION (SYSTEM ASSIGNED)  09/18/2025     INFLUENZA VACCINE (SYSTEM ASSIGNED)  Completed     DEXA SCAN SCREENING (SYSTEM ASSIGNED)  Completed     PNEUMOCOCCAL  Completed     BP Readings from Last 3 Encounters:   10/06/17 195/88   09/22/17 155/76   09/19/17 (!) 180/92    Wt Readings from Last 3 Encounters:   10/06/17 101 lb 14.4 oz (46.2 kg)   09/22/17 101 lb (45.8 kg)   09/19/17 101 lb (45.8 kg)               Pneumonia  "Vaccine:Adults age 65+ who received Pneumovax (PPSV23) at 65 years or older: Should be given PCV13 > 1 year after their most recent PPSV23    ROS:  Constitutional, HEENT, cardiovascular, pulmonary, GI, , musculoskeletal, neuro, skin, endocrine and psych systems are negative, except as otherwise noted.      OBJECTIVE:   There were no vitals taken for this visit. Estimated body mass index is 19.08 kg/(m^2) as calculated from the following:    Height as of 9/22/17: 5' 1\" (1.549 m).    Weight as of 9/22/17: 101 lb (45.8 kg).  EXAM:   GENERAL: healthy, alert and no distress  EYES: Eyes grossly normal to inspection, PERRL and conjunctivae and sclerae normal  HENT: ear canals and TM's normal, nose and mouth without ulcers or lesions  NECK: no adenopathy, no asymmetry, masses, or scars and thyroid normal to palpation  RESP: lungs clear to auscultation - no rales, rhonchi or wheezes  BREAST: normal without masses, tenderness or nipple discharge and no palpable axillary masses or adenopathy  CV: regular rate and rhythm, normal S1 S2, no S3 or S4, no murmur, click or rub, no peripheral edema and peripheral pulses strong  ABDOMEN: soft, nontender, no hepatosplenomegaly, no masses and bowel sounds normal  MS: no gross musculoskeletal defects noted, no edema  SKIN: no suspicious lesions or rashes  NEURO: Normal strength and tone, mentation intact and speech normal  PSYCH: mentation appears normal, affect normal/bright    ASSESSMENT / PLAN:   Extreme difficulty transferring and ambulating at home: discussed getting home evaluation for aids like lift chair, overhead lift for bed, bedrails, adjustable bed height , her need for incontinence pads due to total incontinence,       She's unable to get up out of a chair independently, she is able to ambulate with her can and walker mobility aids.       (Z00.00) Encounter for wellness examination  (primary encounter diagnosis)  Comment:   Plan:     (M51.36) DDD (degenerative disc " "disease), lumbar  Comment: low back pain and leg weakness preclude independently getting up from a chair,  Plan: seat lift     (R53.81) Physical deconditioning  Comment: Pt, home care, strength building   Plan: able to ambulate once she's up     (E78.5) Hyperlipidemia LDL goal <100  Comment:   Plan:     (I10) Hypertension goal BP (blood pressure) < 140/90  Comment:   Plan: Lipid Profile (Chol, Trig, HDL, LDL calc),         Comprehensive metabolic panel            (R60.0) Localized edema  Comment:   Plan: ankles,     (Z95.5) Stented coronary artery  Comment:   Plan:     (B33.24) Viral cardiomyopathy (H)  Comment:   Plan: TSH with free T4 reflex            (I50.21) Acute systolic heart failure (H)   Comment:   Plan: TSH with free T4 reflex        Stabilized, limits her endurance but allows her to walk once up from a chair.or bed         End of Life Planning:  Patient currently has an advanced directive: Yes.  Practitioner is supportive of decision.    COUNSELING:  Reviewed preventive health counseling, as reflected in patient instructions       Regular exercise       Healthy diet/nutrition       Vision screening        Estimated body mass index is 19.08 kg/(m^2) as calculated from the following:    Height as of 9/22/17: 5' 1\" (1.549 m).    Weight as of 9/22/17: 101 lb (45.8 kg).     reports that she has quit smoking. She has never used smokeless tobacco.    ibs symptoms are much better, emphasize pace and fall prevention  Appropriate preventive services were discussed with this patient, including applicable screening as appropriate for cardiovascular disease, diabetes, osteopenia/osteoporosis, and glaucoma.  As appropriate for age/gender, discussed screening for colorectal cancer, prostate cancer, breast cancer, and cervical cancer. Checklist reviewing preventive services available has been given to the patient.    Reviewed patients plan of care and provided an AVS. The Basic Care Plan (routine screening as documented " in Health Maintenance) for Batsheva meets the Care Plan requirement. This Care Plan has been established and reviewed with the Patient.    Counseling Resources:  ATP IV Guidelines  Pooled Cohorts Equation Calculator  Breast Cancer Risk Calculator  FRAX Risk Assessment  ICSI Preventive Guidelines  Dietary Guidelines for Americans, 2010  USDA's MyPlate  ASA Prophylaxis  Lung CA Screening    Lenin Perez MD  [facility]

## 2017-10-07 LAB
ALBUMIN SERPL-MCNC: 3.2 G/DL (ref 3.4–5)
ALP SERPL-CCNC: 72 U/L (ref 40–150)
ALT SERPL W P-5'-P-CCNC: 23 U/L (ref 0–50)
ANION GAP SERPL CALCULATED.3IONS-SCNC: 10 MMOL/L (ref 3–14)
AST SERPL W P-5'-P-CCNC: 27 U/L (ref 0–45)
BILIRUB SERPL-MCNC: 0.4 MG/DL (ref 0.2–1.3)
BUN SERPL-MCNC: 17 MG/DL (ref 7–30)
CALCIUM SERPL-MCNC: 8.6 MG/DL (ref 8.5–10.1)
CHLORIDE SERPL-SCNC: 101 MMOL/L (ref 94–109)
CHOLEST SERPL-MCNC: 171 MG/DL
CO2 SERPL-SCNC: 27 MMOL/L (ref 20–32)
CREAT SERPL-MCNC: 0.8 MG/DL (ref 0.52–1.04)
GFR SERPL CREATININE-BSD FRML MDRD: 68 ML/MIN/1.7M2
GLUCOSE SERPL-MCNC: 83 MG/DL (ref 70–99)
HDLC SERPL-MCNC: 80 MG/DL
LDLC SERPL CALC-MCNC: 75 MG/DL
NONHDLC SERPL-MCNC: 91 MG/DL
POTASSIUM SERPL-SCNC: 3.6 MMOL/L (ref 3.4–5.3)
PROT SERPL-MCNC: 6.5 G/DL (ref 6.8–8.8)
SODIUM SERPL-SCNC: 138 MMOL/L (ref 133–144)
TRIGL SERPL-MCNC: 81 MG/DL
TSH SERPL DL<=0.005 MIU/L-ACNC: 1.48 MU/L (ref 0.4–4)

## 2017-10-23 ENCOUNTER — CARE COORDINATION (OUTPATIENT)
Dept: CARDIOLOGY | Facility: CLINIC | Age: 82
End: 2017-10-23

## 2017-10-26 ENCOUNTER — OFFICE VISIT (OUTPATIENT)
Dept: CARDIOLOGY | Facility: CLINIC | Age: 82
End: 2017-10-26
Payer: COMMERCIAL

## 2017-10-26 ENCOUNTER — TELEPHONE (OUTPATIENT)
Dept: CARDIOLOGY | Facility: CLINIC | Age: 82
End: 2017-10-26

## 2017-10-26 VITALS
OXYGEN SATURATION: 99 % | SYSTOLIC BLOOD PRESSURE: 138 MMHG | HEIGHT: 61 IN | WEIGHT: 104.3 LBS | HEART RATE: 72 BPM | BODY MASS INDEX: 19.69 KG/M2 | DIASTOLIC BLOOD PRESSURE: 68 MMHG

## 2017-10-26 DIAGNOSIS — I50.22 CHRONIC SYSTOLIC CONGESTIVE HEART FAILURE (H): ICD-10-CM

## 2017-10-26 DIAGNOSIS — R60.0 LOWER EXTREMITY EDEMA: Primary | ICD-10-CM

## 2017-10-26 PROCEDURE — 99214 OFFICE O/P EST MOD 30 MIN: CPT | Performed by: PHYSICIAN ASSISTANT

## 2017-10-26 RX ORDER — FUROSEMIDE 20 MG
10 TABLET ORAL DAILY
Qty: 30 TABLET | Refills: 0 | Status: SHIPPED | OUTPATIENT
Start: 2017-10-26 | End: 2017-11-09

## 2017-10-26 RX ORDER — POTASSIUM CHLORIDE 750 MG/1
10 TABLET, EXTENDED RELEASE ORAL DAILY
Qty: 30 TABLET | Refills: 0 | Status: SHIPPED | OUTPATIENT
Start: 2017-10-26 | End: 2018-04-04

## 2017-10-26 NOTE — PATIENT INSTRUCTIONS
1.  Due to the swelling in the feet, we will trial 3 days of a diuretic. Your potassium level tends to run lower, so we will have you take potassium when you take the Lasix.     2.  We will get an echocardiogram in the next week.     3.  Continue wear the compression socks, elevate the legs when you can. Also work on a low sodium diet - aim for 2,000mg of sodium in a day.     4.  Follow up with myself or Iona in 2 weeks with a BMP.

## 2017-10-26 NOTE — MR AVS SNAPSHOT
After Visit Summary   10/26/2017    Batsheva Barkley    MRN: [id number]           Patient Information     Date Of Birth          11/6/1933        Visit Information        Provider Department      10/26/2017 3:00 PM Herbert Benitez PA-C [facility] [place]        Today's Diagnoses     Lower extremity edema    -  1    Chronic systolic congestive heart failure (H)          Care Instructions    1.  Due to the swelling in the feet, we will trial 3 days of a diuretic. Your potassium level tends to run lower, so we will have you take potassium when you take the Lasix.     2.  We will get an echocardiogram in the next week.     3.  Continue wear the compression socks, elevate the legs when you can. Also work on a low sodium diet - aim for 2,000mg of sodium in a day.     4.  Follow up with myself or Iona in 2 weeks with a BMP.           Follow-ups after your visit        Additional Services     Follow-Up with Cardiac Advanced Practice Provider                 Your next 10 appointments already scheduled     Oct 27, 2017  1:00 PM CDT   Ech Complete with [address] ([facility])    [address]  [facility] [id number] [phone]           1. Please bring or wear a comfortable two-piece outfit. 2. You may eat, drink and take your normal medicines. 3. For any questions that cannot be answered, please contact the ordering physician ***Please check-in at the [place] Registration Office located in [address] in the [facility] building. When you are finished registering, please go to [address] and have a seat. The technician will call your name for the test.            Nov 09, 2017  1:30 AM CST   LAB with RU LAB   [facility] [place] ([facility])    [address]  [facility] [id number] [phone]           Patient must bring picture ID.  Patient should be prepared to give a urine specimen  Please do not eat 10-12 hours before your appointment if you are coming in fasting for labs on lipids, cholesterol, or glucose (sugar). Pregnant women should follow their Care Team instructions. Water with medications is okay. Do not drink coffee or other fluids. If you have concerns about taking  your medications, please ask at office or if scheduling via You.i, send a message by clicking on Secure Messaging, Message Your Care Team.            Nov 09, 2017  2:30 PM CST   Return Visit with Herbert Benitez PA-C   [facility] [place] ([facility])    [address]  [facility] [address] [phone]              Future tests that were ordered for you today     Open Future Orders        Priority Expected Expires Ordered    Follow-Up with Cardiac Advanced Practice Provider Routine 11/8/2017 10/26/2018 10/26/2017    Basic metabolic panel Routine 11/8/2017 10/26/2018 10/26/2017    Echocardiogram Complete Routine 10/26/2017 10/26/2018 10/26/2017            Who to contact     If you have questions or need follow up information about today's clinic visit or your schedule please contact [facility] [place] directly at [phone].  Normal or non-critical lab and imaging results will be communicated to you by [employer], letter or phone within 4 business days after the clinic has received the results. If you do not hear from us within 7 days, please contact the clinic through [employer] or phone. If you have a critical or abnormal lab result, we will notify you by phone as soon as possible.  Submit refill requests through You.i or call your pharmacy and they will forward the refill request to us. Please allow 3 business days for your refill to be completed.          Additional Information About Your Visit        You.i Information     You.i lets you send messages to your doctor,  "view your test results, renew your prescriptions, schedule appointments and more. To sign up, go to [email or web address] . Click on \"Log in\" on the left side of the screen, which will take you to the Welcome page. Then click on \"Sign up Now\" on the right side of the page.     You will be asked to enter the access code listed below, as well as some personal information. Please follow the directions to create your username and password.     Your access code is: [email or web address]  Expires: 2017  3:54 PM     Your access code will  in 90 days. If you need help or a new code, please call your [place] clinic or [phone].        Care EveryWhere ID     This is your Care EveryWhere ID. This could be used by other organizations to access your [place] medical records  [id number]        Your Vitals Were     Pulse Height Pulse Oximetry BMI (Body Mass Index)          72 1.549 m (5' 1\") 99% 19.71 kg/m2         Blood Pressure from Last 3 Encounters:   10/26/17 138/68   10/06/17 155/78   17 155/76    Weight from Last 3 Encounters:   10/26/17 47.3 kg (104 lb 4.8 oz)   10/06/17 46.2 kg (101 lb 14.4 oz)   17 45.8 kg (101 lb)                 Today's Medication Changes          These changes are accurate as of: 10/26/17  4:03 PM.  If you have any questions, ask your nurse or doctor.               Start taking these medicines.        Dose/Directions    furosemide 20 MG tablet   Commonly known as:  LASIX   Used for:  Lower extremity edema   Started by:  Herbert Benitez PA-C        Dose:  10 mg   Take 0.5 tablets (10 mg) by mouth daily Take for 3 days   Quantity:  30 tablet   Refills:  0       potassium chloride SA 10 MEQ CR tablet   Commonly known as:  K-DUR/KLOR-CON M   Used for:  Lower extremity edema   Started by:  Herbert Benitez PA-C        Dose:  10 mEq   Take 1 tablet (10 mEq) by mouth daily Take for 3 days   Quantity:  30 tablet   Refills:  0            Where to get your medicines      These " medications were sent to [facility] [address] [place] [address] [address]  [address] [facility] [address]     Phone:  [phone]     furosemide 20 MG tablet    potassium chloride SA 10 MEQ CR tablet                Primary Care Provider Office Phone # Fax #    Lenin Perez [phone] [address]  [facility] [address]        Equal Access to Services     BRIDGET BROOKE : Hadii aad ku hadasho Soomaali, waaxda luqadaha, qaybta kaalmada adeegyada, waxay idiin hayaan adeeg kharash la'jas . So [facility] [phone].    ATENCIÓN: Si krysta espestiven, tiene a soria disposición servicios gratuitos de asistencia lingüística. [facility] [phone].    We comply with applicable federal civil rights laws and [place] laws. We do not discriminate on the basis of race, color, national origin, age, disability, sex, sexual orientation, or gender identity.            Thank you!     Thank you for choosing [facility] [place]  for your care. Our goal is always to provide you with excellent care. Hearing back from our patients is one way we can continue to improve our services. Please take a few minutes to complete the written survey that you may receive in the mail after your visit with us. Thank you!             Your Updated Medication List - Protect others around you: Learn how to safely use, store and throw away your medicines at www.disposemymeds.org.          This list is accurate as of: 10/26/17  4:03 PM.  Always use your most recent med list.                   Brand Name Dispense Instructions for use Diagnosis    acetaminophen 325 MG tablet    TYLENOL    100 tablet    Take 2 tablets (650 mg) by mouth every 4 hours as needed for mild pain    Pelvic fracture, closed, initial encounter       aspirin 81 MG tablet     100    1 tab po QD (Once per day)    Coronary atherosclerosis of unspecified type of vessel, native or graft, Mixed hyperlipidemia        atorvastatin 40 MG tablet    LIPITOR    90 tablet    TAKE ONE TABLET (40 MG) BY MOUTH ONCE DAILY    Hyperlipidemia LDL goal <100       CALCIUM 600 + D 600-200 MG-UNIT Tabs   Generic drug:  Calcium Carb-Cholecalciferol      Take 600 mg by mouth daily        CENTRUM SILVER per tablet     3 MONTHS    ONE DAILY        furosemide 20 MG tablet    LASIX    30 tablet    Take 0.5 tablets (10 mg) by mouth daily Take for 3 days    Lower extremity edema       losartan 50 MG tablet    COZAAR    180 tablet    Take 1 tablet (50 mg) by mouth 2 times daily    Essential hypertension       metoprolol 50 MG tablet    LOPRESSOR    180 tablet    Take 1 tablet (50 mg) by mouth 2 times daily    Hypertension goal BP (blood pressure) < 140/90       mirtazapine 15 MG tablet    REMERON     Take 15 mg by mouth At Bedtime        * order for DME     1 Package    ANTOINE stockings- below knee.    Bilateral leg edema       * order for DME     1 Device    Light weight wheelchair Body mass index is 19.08 kg/(m^2).    Diarrhea of presumed infectious origin, Lack of coordination, Weakness of both lower extremities       potassium chloride SA 10 MEQ CR tablet    K-DUR/KLOR-CON M    30 tablet    Take 1 tablet (10 mEq) by mouth daily Take for 3 days    Lower extremity edema       vitamin D 1000 UNITS capsule      Take 2 capsules by mouth daily.        * Notice:  This list has 2 medication(s) that are the same as other medications prescribed for you. Read the directions carefully, and ask your doctor or other care provider to review them with you.

## 2017-10-26 NOTE — LETTER
10/26/2017    Lenin Perez MD  [id number] [place] [address]    RE: Batsheva Barkley       Dear Colleague,    I had the pleasure of seeing Batsheva Barkley in the [facility].    CARDIOLOGY CLINIC PROGRESS NOTE    DOS: 10/26/2017    Batsheva Barkley  : 1933, 83 year old  MRN: [id number]      History:   I had the pleasure of meeting Batsheva Barkley along with her daughter, Melody, today in the cardiology clinic. Batsheva is a long-time patient of Dr. Tang.     Batsheva is an 83-year-old woman with history of coronary artery disease, hypertension, hyperlipidemia, and cardiomyopathy. She suffered an inferior lateral myocardial infarction in .  At that time she underwent stenting to an occluded circumflex vessel.  Over the years she has been free from any angina symptoms. Echocardiogram in  showed LVEF 35-40%.  Nuclear assessment of LVEF over the years have demonstrated left ejection fraction of 45%-50%.  There has been no recent evidence of heart failure.     In 2017 she underwent a nuclear stress test.  This demonstrated inferolateral, lateral and inferolateral basal infarction without evidence of ischemia.  L V ejection fraction around 54%. This was stable. Also, more recently, her blood pressure was elevated and Dr. Tang elected to change her medications.  Due to a cough, he changed her lisinopril to losartan.      She saw her PCP 17 due to some noted lower extremity edema. Per notes, it was pretty mld, and it was recommended she wear compressions socks. They called our clinic requesting to be seen. She has been wearing compressions socks daily since then and she reports it has improved. But she continues to have some edema. It does not completely resolve by morning. She has no chest pain with activity or at rest.  She denies orthopnea, PND, or really any SOB. No lightheadedness or dizziness.         ROS:  Skin:  Positive for     Eyes:  Positive for  glasses  ENT:  Positive for hearing loss  Respiratory:  Negative    Cardiovascular:    Positive for;fatigue  Gastroenterology: Negative    Genitourinary:  Positive for urinary frequency;nocturia  Musculoskeletal:  Positive for back pain;arthritis;joint pain  Neurologic:  Negative    Psychiatric:  Negative    Heme/Lymph/Imm:  Negative    Endocrine:  Negative      PAST MEDICAL HISTORY:  Past Medical History:   Diagnosis Date     CAD (coronary artery disease) 6/20/05    [place], transient CHF     Cardiomyopathy (H)      Coronary atherosclerosis 6/20/2005    circ vessel was stented;  had a 50% LAD lesion which was not treated Problem list name updated by automated process. Provider to review     Generalized osteoarthrosis     knees ;; L total kne 10/09     HTN (hypertension)      Mitral regurgitation     mod     Mixed hyperlipidemia      Osteoporosis      Systolic CHF (H) 1/21/2015       PAST SURGICAL HISTORY:  Past Surgical History:   Procedure Laterality Date     COLONOSCOPY  3/05     ENT SURGERY       ESOPHAGOSCOPY, GASTROSCOPY, DUODENOSCOPY (EGD), COMBINED N/A 4/14/2016    Procedure: COMBINED ESOPHAGOSCOPY, GASTROSCOPY, DUODENOSCOPY (EGD), BIOPSY SINGLE OR MULTIPLE;  Surgeon: Jonathan Gramajo MD;  Location:  GI     EYE SURGERY       HEART CATH, ANGIOPLASTY  2005    RONI to left circ     L bunion + hammer toes  1/04, 4/04     L total knee replacement  10/2009      ovarian cyst surgery         SOCIAL HISTORY:  Social History     Social History     Marital status:      Spouse name: Gene     Number of children: 6     Years of education: N/A     Social History Main Topics     Smoking status: Former Smoker     Smokeless tobacco: Never Used      Comment: only smoked for 3 years.     Alcohol use No     Drug use: No     Sexual activity: Yes     Partners: Male     Other Topics Concern     Caffeine Concern No     decaf coffee and tea      Special Diet No     Exercise Yes     walk everyday   "    Social History Narrative       FAMILY HISTORY:  Family History   Problem Relation Age of Onset     CEREBROVASCULAR DISEASE Mother      Respiratory Father      heart     Breast Cancer Sister      HEART DISEASE Sister        MEDS:   Current Outpatient Prescriptions on File Prior to Visit:  order for DME Light weight wheelchair Body mass index is 19.08 kg/(m^2).   order for DME ANTOINE stockings- below knee.   losartan (COZAAR) 50 MG tablet Take 1 tablet (50 mg) by mouth 2 times daily   atorvastatin (LIPITOR) 40 MG tablet TAKE ONE TABLET (40 MG) BY MOUTH ONCE DAILY   metoprolol (LOPRESSOR) 50 MG tablet Take 1 tablet (50 mg) by mouth 2 times daily   mirtazapine (REMERON) 15 MG tablet Take 15 mg by mouth At Bedtime   Calcium Carb-Cholecalciferol (CALCIUM 600 + D) 600-200 MG-UNIT TABS Take 600 mg by mouth daily   acetaminophen (TYLENOL) 325 MG tablet Take 2 tablets (650 mg) by mouth every 4 hours as needed for mild pain   Cholecalciferol (VITAMIN D) 1000 UNITS capsule Take 2 capsules by mouth daily.   CENTRUM SILVER OR TABS ONE DAILY   ASPIRIN 81 MG OR TABS 1 tab po QD (Once per day)     No current facility-administered medications on file prior to visit.     ALLERGIES:   Allergies   Allergen Reactions     Codeine      nausea, HA     Lisinopril Cough       PHYSICAL EXAM:  Vitals: /68 (BP Location: Right arm, Patient Position: Chair, Cuff Size: Adult Regular)  Pulse 72  Ht 1.549 m (5' 1\")  Wt 47.3 kg (104 lb 4.8 oz)  SpO2 99%  BMI 19.71 kg/m2  Constitutional:  cooperative;alert and oriented;in no acute distress thin;frail      Skin:  warm and dry to the touch, no apparent skin lesions or masses noted        Head:  normocephalic, no masses or lesions        Eyes:  pupils equal and round;conjunctivae and lids unremarkable;sclera white;no xanthalasma        ENT:  no pallor or cyanosis, dentition good        Neck:  carotid pulses are full and equal bilaterally, JVP normal, no carotid bruit, no thyromegaly    "     Chest:  normal breath sounds, clear to auscultation, normal A-P diameter, normal symmetry, normal respiratory excursion, no use of accessory muscles        Cardiac: regular rhythm;normal S1 and S2;apical impulse not displaced   S4 no presence of murmur            Abdomen:  abdomen soft;BS normoactive;non-tender        Vascular:                                   radial pulses 2+    Extremities and Back:    arthritic deformities of UE      Neurological:  affect appropriate, oriented to time, person and place;no gross motor deficits          LABS/DATA:  I reviewed the following:  Component      Latest Ref Rng & Units 10/6/2017   Sodium      133 - 144 mmol/L 138   Potassium      3.4 - 5.3 mmol/L 3.6   Chloride      94 - 109 mmol/L 101   Carbon Dioxide      20 - 32 mmol/L 27   Anion Gap      3 - 14 mmol/L 10   Glucose      70 - 99 mg/dL 83   Urea Nitrogen      7 - 30 mg/dL 17   Creatinine      0.52 - 1.04 mg/dL 0.80   GFR Estimate      >60 mL/min/1.7m2 68   GFR Estimate If Black      >60 mL/min/1.7m2 83   Calcium      8.5 - 10.1 mg/dL 8.6   Bilirubin Total      0.2 - 1.3 mg/dL 0.4   Albumin      3.4 - 5.0 g/dL 3.2 (L)   Protein Total      6.8 - 8.8 g/dL 6.5 (L)   Alkaline Phosphatase      40 - 150 U/L 72   ALT      0 - 50 U/L 23   AST      0 - 45 U/L 27   Cholesterol      <200 mg/dL 171   Triglycerides      <150 mg/dL 81   HDL Cholesterol      >49 mg/dL 80   LDL Cholesterol Calculated      <100 mg/dL 75   Non HDL Cholesterol      <130 mg/dL 91   TSH      0.40 - 4.00 mU/L 1.48         ASSESSMENT/PLAN:  Lower extremity edema with history of mild cardiomyopathy.   - No SOB or CP.   - LE edema did improve, but did not resolve with compression socks.   - Will trial 3 days low dose Lasix 10mg once daily. Her K tends to run low normal, so will give her K along with the Lasix  - Will get a complete echo.  - Follow up in 2 weeks with BMP. She may require more diuresis pending her response and the echo findings.   - Also  discussed that she should continue compression socks, elevate the legs when she can, and limit sodium in her diet.     Hypertension.    - Blood pressure just mildly elevated today on losartan 50mg BID and metoprolol 50mg BID.  - Adding low dose Lasix 10mg x 3 days.     Coronary artery disease.    - History of inferior lateral myocardial infarction and circumflex stenting in 2005.    - She is free from any angina symptoms. Nuc 3/2017 negative for ischemia.   - Continue ASA and statin.     Thank you for allowing me to participate in the care of your patient.    Sincerely,     Herbert Benitez PA-C     [facility]

## 2017-10-26 NOTE — TELEPHONE ENCOUNTER
"Pharmacy called because they didn't understand the directions from JONATHAN Perry.  Told them to give patient 30 tablets of both furosemide and potassium chloride   (For furosemide it says:    Take 0.5 tablets (10 mg) by mouth daily Take for 3 days     For KCL, it says:     Take 1 tablet (10 mEq) by mouth daily Take for 3 days     She ordered 30 tablets, so I told pharmacy staff to give them 30 tablets.  On her note to patient, she wrote:  \"Due to the swelling in the feet, we will trial 3 days of a diuretic. Your potassium level tends to run lower, so we will have you take potassium when you take the Lasix. \"  "

## 2017-10-26 NOTE — PROGRESS NOTES
CARDIOLOGY CLINIC PROGRESS NOTE    DOS: 10/26/2017    Batsheva Barkley  : 1933, 83 year old  MRN: [id number]      History:   I had the pleasure of meeting Batsheva Barkley along with her daughter, Melody, today in the cardiology clinic. Batsheva is a long-time patient of Dr. Tang.     Batsheva is an 83-year-old woman with history of coronary artery disease, hypertension, hyperlipidemia, and cardiomyopathy. She suffered an inferior lateral myocardial infarction in .  At that time she underwent stenting to an occluded circumflex vessel.  Over the years she has been free from any angina symptoms. Echocardiogram in  showed LVEF 35-40%.  Nuclear assessment of LVEF over the years have demonstrated left ejection fraction of 45%-50%.  There has been no recent evidence of heart failure.     In 2017 she underwent a nuclear stress test.  This demonstrated inferolateral, lateral and inferolateral basal infarction without evidence of ischemia.  LV ejection fraction around 54%. This was stable. Also, more recently, her blood pressure was elevated and Dr. Tang elected to change her medications.  Due to a cough, he changed her lisinopril to losartan.      She saw her PCP 17 due to some noted lower extremity edema. Per notes, it was pretty mld, and it was recommended she wear compressions socks. They called our clinic requesting to be seen. She has been wearing compressions socks daily since then and she reports it has improved. But she continues to have some edema. It does not completely resolve by morning. She has no chest pain with activity or at rest.  She denies orthopnea, PND, or really any SOB. No lightheadedness or dizziness.         ROS:  Skin:  Positive for     Eyes:  Positive for glasses  ENT:  Positive for hearing loss  Respiratory:  Negative    Cardiovascular:    Positive for;fatigue  Gastroenterology: Negative    Genitourinary:  Positive for urinary frequency;nocturia  Musculoskeletal:  Positive  for back pain;arthritis;joint pain  Neurologic:  Negative    Psychiatric:  Negative    Heme/Lymph/Imm:  Negative    Endocrine:  Negative      PAST MEDICAL HISTORY:  Past Medical History:   Diagnosis Date     CAD (coronary artery disease) 6/20/05    [place], transient CHF     Cardiomyopathy (H)      Coronary atherosclerosis 6/20/2005    circ vessel was stented;  had a 50% LAD lesion which was not treated Problem list name updated by automated process. Provider to review     Generalized osteoarthrosis     knees ;; L total kne 10/09     HTN (hypertension)      Mitral regurgitation     mod     Mixed hyperlipidemia      Osteoporosis      Systolic CHF (H) 1/21/2015       PAST SURGICAL HISTORY:  Past Surgical History:   Procedure Laterality Date     COLONOSCOPY  3/05     ENT SURGERY       ESOPHAGOSCOPY, GASTROSCOPY, DUODENOSCOPY (EGD), COMBINED N/A 4/14/2016    Procedure: COMBINED ESOPHAGOSCOPY, GASTROSCOPY, DUODENOSCOPY (EGD), BIOPSY SINGLE OR MULTIPLE;  Surgeon: Jonathan Gramajo MD;  Location:  GI     EYE SURGERY       HEART CATH, ANGIOPLASTY  2005    RONI to left circ     L bunion + hammer toes  1/04, 4/04     L total knee replacement  10/2009      ovarian cyst surgery         SOCIAL HISTORY:  Social History     Social History     Marital status:      Spouse name: Gene     Number of children: 6     Years of education: N/A     Social History Main Topics     Smoking status: Former Smoker     Smokeless tobacco: Never Used      Comment: only smoked for 3 years.     Alcohol use No     Drug use: No     Sexual activity: Yes     Partners: Male     Other Topics Concern     Caffeine Concern No     decaf coffee and tea      Special Diet No     Exercise Yes     walk everyday      Social History Narrative       FAMILY HISTORY:  Family History   Problem Relation Age of Onset     CEREBROVASCULAR DISEASE Mother      Respiratory Father      heart     Breast Cancer Sister      HEART DISEASE Sister   "      MEDS:   Current Outpatient Prescriptions on File Prior to Visit:  order for DME Light weight wheelchair Body mass index is 19.08 kg/(m^2).   order for DME ANTOINE stockings- below knee.   losartan (COZAAR) 50 MG tablet Take 1 tablet (50 mg) by mouth 2 times daily   atorvastatin (LIPITOR) 40 MG tablet TAKE ONE TABLET (40 MG) BY MOUTH ONCE DAILY   metoprolol (LOPRESSOR) 50 MG tablet Take 1 tablet (50 mg) by mouth 2 times daily   mirtazapine (REMERON) 15 MG tablet Take 15 mg by mouth At Bedtime   Calcium Carb-Cholecalciferol (CALCIUM 600 + D) 600-200 MG-UNIT TABS Take 600 mg by mouth daily   acetaminophen (TYLENOL) 325 MG tablet Take 2 tablets (650 mg) by mouth every 4 hours as needed for mild pain   Cholecalciferol (VITAMIN D) 1000 UNITS capsule Take 2 capsules by mouth daily.   CENTRUM SILVER OR TABS ONE DAILY   ASPIRIN 81 MG OR TABS 1 tab po QD (Once per day)     No current facility-administered medications on file prior to visit.     ALLERGIES:   Allergies   Allergen Reactions     Codeine      nausea, HA     Lisinopril Cough       PHYSICAL EXAM:  Vitals: /68 (BP Location: Right arm, Patient Position: Chair, Cuff Size: Adult Regular)  Pulse 72  Ht 1.549 m (5' 1\")  Wt 47.3 kg (104 lb 4.8 oz)  SpO2 99%  BMI 19.71 kg/m2  Constitutional:  cooperative;alert and oriented;in no acute distress thin;frail      Skin:  warm and dry to the touch, no apparent skin lesions or masses noted        Head:  normocephalic, no masses or lesions        Eyes:  pupils equal and round;conjunctivae and lids unremarkable;sclera white;no xanthalasma        ENT:  no pallor or cyanosis, dentition good        Neck:  carotid pulses are full and equal bilaterally, JVP normal, no carotid bruit, no thyromegaly        Chest:  normal breath sounds, clear to auscultation, normal A-P diameter, normal symmetry, normal respiratory excursion, no use of accessory muscles        Cardiac: regular rhythm;normal S1 and S2;apical impulse not " displaced   S4 no presence of murmur            Abdomen:  abdomen soft;BS normoactive;non-tender        Vascular:                                   radial pulses 2+    Extremities and Back:    arthritic deformities of UE      Neurological:  affect appropriate, oriented to time, person and place;no gross motor deficits          LABS/DATA:  I reviewed the following:  Component      Latest Ref Rng & Units 10/6/2017   Sodium      133 - 144 mmol/L 138   Potassium      3.4 - 5.3 mmol/L 3.6   Chloride      94 - 109 mmol/L 101   Carbon Dioxide      20 - 32 mmol/L 27   Anion Gap      3 - 14 mmol/L 10   Glucose      70 - 99 mg/dL 83   Urea Nitrogen      7 - 30 mg/dL 17   Creatinine      0.52 - 1.04 mg/dL 0.80   GFR Estimate      >60 mL/min/1.7m2 68   GFR Estimate If Black      >60 mL/min/1.7m2 83   Calcium      8.5 - 10.1 mg/dL 8.6   Bilirubin Total      0.2 - 1.3 mg/dL 0.4   Albumin      3.4 - 5.0 g/dL 3.2 (L)   Protein Total      6.8 - 8.8 g/dL 6.5 (L)   Alkaline Phosphatase      40 - 150 U/L 72   ALT      0 - 50 U/L 23   AST      0 - 45 U/L 27   Cholesterol      <200 mg/dL 171   Triglycerides      <150 mg/dL 81   HDL Cholesterol      >49 mg/dL 80   LDL Cholesterol Calculated      <100 mg/dL 75   Non HDL Cholesterol      <130 mg/dL 91   TSH      0.40 - 4.00 mU/L 1.48         ASSESSMENT/PLAN:  Lower extremity edema with history of mild cardiomyopathy.   - No SOB or CP.   - LE edema did improve, but did not resolve with compression socks.   - Will trial 3 days low dose Lasix 10mg once daily. Her K tends to run low normal, so will give her K along with the Lasix  - Will get a complete echo.  - Follow up in 2 weeks with BMP. She may require more diuresis pending her response and the echo findings.   - Also discussed that she should continue compression socks, elevate the legs when she can, and limit sodium in her diet.     Hypertension.    - Blood pressure just mildly elevated today on losartan 50mg BID and metoprolol 50mg  BID.  - Adding low dose Lasix 10mg x 3 days.     Coronary artery disease.    - History of inferior lateral myocardial infarction and circumflex stenting in 2005.    - She is free from any angina symptoms. Nuc 3/2017 negative for ischemia.   - Continue ASA and statin.       JAM FergusonC

## 2017-11-02 ENCOUNTER — HOSPITAL ENCOUNTER (OUTPATIENT)
Dept: CARDIOLOGY | Facility: CLINIC | Age: 82
Discharge: HOME | End: 2017-11-02
Attending: PHYSICIAN ASSISTANT | Admitting: PHYSICIAN ASSISTANT
Payer: MEDICARE

## 2017-11-02 DIAGNOSIS — I50.22 CHRONIC SYSTOLIC CONGESTIVE HEART FAILURE (H): ICD-10-CM

## 2017-11-02 PROCEDURE — 93306 TTE W/DOPPLER COMPLETE: CPT | Mod: 26 | Performed by: INTERNAL MEDICINE

## 2017-11-02 PROCEDURE — 93306 TTE W/DOPPLER COMPLETE: CPT

## 2017-11-07 DIAGNOSIS — I10 HYPERTENSION GOAL BP (BLOOD PRESSURE) < 140/90: ICD-10-CM

## 2017-11-07 NOTE — TELEPHONE ENCOUNTER
Last Office Visit with FMSANTANA, BYRON or [facility] prescribing provider: 10/6/2017    Next 5 appointments (look out 90 days)     Nov 09, 2017  2:30 PM CST   Return Visit with Herbert Benitez PA-C   [facility] ([facility])    [id number] [place] [id number] [phone]

## 2017-11-09 ENCOUNTER — OFFICE VISIT (OUTPATIENT)
Dept: CARDIOLOGY | Facility: CLINIC | Age: 82
End: 2017-11-09
Attending: PHYSICIAN ASSISTANT
Payer: COMMERCIAL

## 2017-11-09 VITALS
OXYGEN SATURATION: 97 % | WEIGHT: 100 LBS | SYSTOLIC BLOOD PRESSURE: 120 MMHG | HEART RATE: 59 BPM | DIASTOLIC BLOOD PRESSURE: 78 MMHG | BODY MASS INDEX: 18.88 KG/M2 | HEIGHT: 61 IN

## 2017-11-09 DIAGNOSIS — I50.22 CHRONIC SYSTOLIC CONGESTIVE HEART FAILURE (H): ICD-10-CM

## 2017-11-09 DIAGNOSIS — R60.0 LOWER EXTREMITY EDEMA: ICD-10-CM

## 2017-11-09 LAB
ANION GAP SERPL CALCULATED.3IONS-SCNC: 6 MMOL/L (ref 3–14)
BUN SERPL-MCNC: 28 MG/DL (ref 7–30)
CALCIUM SERPL-MCNC: 8.3 MG/DL (ref 8.5–10.1)
CHLORIDE SERPL-SCNC: 105 MMOL/L (ref 94–109)
CO2 SERPL-SCNC: 28 MMOL/L (ref 20–32)
CREAT SERPL-MCNC: 0.89 MG/DL (ref 0.52–1.04)
GFR SERPL CREATININE-BSD FRML MDRD: 60 ML/MIN/1.7M2
GLUCOSE SERPL-MCNC: 94 MG/DL (ref 70–99)
POTASSIUM SERPL-SCNC: 4.2 MMOL/L (ref 3.4–5.3)
SODIUM SERPL-SCNC: 139 MMOL/L (ref 133–144)

## 2017-11-09 PROCEDURE — 99214 OFFICE O/P EST MOD 30 MIN: CPT | Performed by: PHYSICIAN ASSISTANT

## 2017-11-09 PROCEDURE — 36415 COLL VENOUS BLD VENIPUNCTURE: CPT | Performed by: PHYSICIAN ASSISTANT

## 2017-11-09 PROCEDURE — 80048 BASIC METABOLIC PNL TOTAL CA: CPT | Performed by: PHYSICIAN ASSISTANT

## 2017-11-09 RX ORDER — METOPROLOL TARTRATE 50 MG
TABLET ORAL
Qty: 180 TABLET | Refills: 3 | Status: SHIPPED | OUTPATIENT
Start: 2017-11-09

## 2017-11-09 NOTE — LETTER
2017    Lenin Perez MD  [id number] [facility] [address]    RE: Batsheva Barkley       Dear Colleague,    I had the pleasure of seeing Batsheva Barkley in the [facility].    CARDIOLOGY CLINIC PROGRESS NOTE    DOS: 2017    Batsheva Barkley  : 1933, 84 year old  MRN: [id number]      History:  I had the pleasure of meeting Batsheva Barkley along with her daughter, Melody, today in the cardiology clinic. Batsheva is a long-time patient of Dr. Tang.      Batsheva is an 84-year-old woman with history of coronary artery disease, hypertension, hyperlipidemia, and cardiomyopathy. She suffered an inferior lateral myocardial infarction in .  At that time she underwent stenting to an occluded circumflex vessel.  Over the years she has been free from any angina symptoms. Echocardiogram in  showed LVEF 35-40%.  Nuclear assessment of LVEF over the years have demonstrated left ejection fraction of 45%-50%.  There has been no recent evidence of heart failure.      In 2017 she underwent a nuclear stress test.  This demonstrated inferolateral, lateral and inferolateral basal infarction without evidence of ischemia.  LV ejection fraction around 54%. This was stable. Also, more recently, her blood pressure was elevated and Dr. Tang elected to change her medications.  Due to a cough, he changed her lisinopril to losartan.       She saw her PCP 17 due to some noted lower extremity edema. Per notes, it was pretty mild, and it was recommended she wear compressions socks. They called our clinic requesting to be seen.   When I saw Batsheva, it was much improved but still would not go away overnight.    We elected to trial Lasix 10mg x 3 days along with KCl. An echocardiogram was ordered to assess her LVEF.     The echo was done 17. It showed LVEF 40%, known WMA, mild-moderate MR, normal IVC.      She presents today for follow up. With the Lasix, her edema is resolved. She  is down 4 lbs. She has no chest pain with activity or at rest.  She denies orthopnea, PND, or really any SOB. No lightheadedness or dizziness. The BMP shows stable renal function and electrolytes.     ROS:  Skin:  Positive for     Eyes:  Positive for glasses  ENT:  Positive for hearing loss  Respiratory:  Negative    Cardiovascular:    edema;Positive for  Gastroenterology: Negative    Genitourinary:  Negative    Musculoskeletal:  Positive for arthritis;back pain  Neurologic:  Negative    Psychiatric:  Negative    Heme/Lymph/Imm:  Positive for easy bruising  Endocrine:  Negative      PAST MEDICAL HISTORY:  Past Medical History:   Diagnosis Date     CAD (coronary artery disease) 6/20/05    [place], transient CHF     Cardiomyopathy (H)      Coronary atherosclerosis 6/20/2005    circ vessel was stented;  had a 50% LAD lesion which was not treated Problem list name updated by automated process. Provider to review     Generalized osteoarthrosis     knees ;; L total kne 10/09     HTN (hypertension)      Mitral regurgitation     mod     Mixed hyperlipidemia      Osteoporosis      Systolic CHF (H) 1/21/2015       PAST SURGICAL HISTORY:  Past Surgical History:   Procedure Laterality Date     COLONOSCOPY  3/05     ENT SURGERY       ESOPHAGOSCOPY, GASTROSCOPY, DUODENOSCOPY (EGD), COMBINED N/A 4/14/2016    Procedure: COMBINED ESOPHAGOSCOPY, GASTROSCOPY, DUODENOSCOPY (EGD), BIOPSY SINGLE OR MULTIPLE;  Surgeon: Jonathan Gramajo MD;  Location:  GI     EYE SURGERY       HEART CATH, ANGIOPLASTY  2005    RONI to left circ     L bunion + hammer toes  1/04, 4/04     L total knee replacement  10/2009      ovarian cyst surgery         SOCIAL HISTORY:  Social History     Social History     Marital status:      Spouse name: Gene     Number of children: 6     Years of education: N/A     Social History Main Topics     Smoking status: Former Smoker     Smokeless tobacco: Never Used      Comment: only smoked for  "3 years.     Alcohol use No     Drug use: No     Sexual activity: Yes     Partners: Male     Other Topics Concern     Caffeine Concern No     decaf coffee and tea      Special Diet No     Exercise Yes     walk everyday      Social History Narrative       FAMILY HISTORY:  Family History   Problem Relation Age of Onset     CEREBROVASCULAR DISEASE Mother      Respiratory Father      heart     Breast Cancer Sister      HEART DISEASE Sister        MEDS:   Current Outpatient Prescriptions on File Prior to Visit:  metoprolol (LOPRESSOR) 50 MG tablet TAKE ONE TABLET BY MOUTH TWICE DAILY   potassium chloride SA (K-DUR/KLOR-CON M) 10 MEQ CR tablet Take 1 tablet (10 mEq) by mouth daily Take for 3 days   order for DME Light weight wheelchair Body mass index is 19.08 kg/(m^2).   order for DME ANTOINE stockings- below knee.   losartan (COZAAR) 50 MG tablet Take 1 tablet (50 mg) by mouth 2 times daily   atorvastatin (LIPITOR) 40 MG tablet TAKE ONE TABLET (40 MG) BY MOUTH ONCE DAILY   mirtazapine (REMERON) 15 MG tablet Take 15 mg by mouth At Bedtime   acetaminophen (TYLENOL) 325 MG tablet Take 2 tablets (650 mg) by mouth every 4 hours as needed for mild pain   Cholecalciferol (VITAMIN D) 1000 UNITS capsule Take 2 capsules by mouth daily.   CENTRUM SILVER OR TABS ONE DAILY   ASPIRIN 81 MG OR TABS 1 tab po QD (Once per day)   Calcium Carb-Cholecalciferol (CALCIUM 600 + D) 600-200 MG-UNIT TABS Take 600 mg by mouth daily     No current facility-administered medications on file prior to visit.     ALLERGIES:   Allergies   Allergen Reactions     Codeine      nausea, HA     Lisinopril Cough       PHYSICAL EXAM:  Vitals: /78 (BP Location: Right arm, Patient Position: Sitting, Cuff Size: Adult Regular)  Pulse 59  Ht 1.549 m (5' 1\")  Wt 45.4 kg (100 lb)  SpO2 97%  BMI 18.89 kg/m2  Constitutional:    thin;frail      Skin:  warm and dry to the touch, no apparent skin lesions or masses noted        Head:  normocephalic, no masses or " lesions        Eyes:  pupils equal and round;conjunctivae and lids unremarkable;sclera white;no xanthalasma        ENT:  no pallor or cyanosis, dentition good        Neck:  JVP normal        Chest:  normal breath sounds, clear to auscultation, normal A-P diameter, normal symmetry, normal respiratory excursion, no use of accessory muscles        Cardiac: regular rhythm;normal S1 and S2;apical impulse not displaced     no presence of murmur            Abdomen:  abdomen soft;BS normoactive;non-tender        Vascular:                                   radial pulses 2+    Extremities and Back:  no edema;no deformities, clubbing, cyanosis, erythema observed arthritic deformities of UE      Neurological:  no gross motor deficits;affect appropriate          LABS/DATA:  I reviewed the following:  Component      Latest Ref Rng & Units 11/9/2017   Sodium      133 - 144 mmol/L 139   Potassium      3.4 - 5.3 mmol/L 4.2   Chloride      94 - 109 mmol/L 105   Carbon Dioxide      20 - 32 mmol/L 28   Anion Gap      3 - 14 mmol/L 6   Glucose      70 - 99 mg/dL 94   Urea Nitrogen      7 - 30 mg/dL 28   Creatinine      0.52 - 1.04 mg/dL 0.89   GFR Estimate      >60 mL/min/1.7m2 60 (L)   GFR Estimate If Black      >60 mL/min/1.7m2 73   Calcium      8.5 - 10.1 mg/dL 8.3 (L)       Echo 11/2/17:  Interpretation Summary     Severe inferolateral hypokinesis  Mild inferior and inferoseptal hypokinesis.  Left ventricular systolic function is moderately reduced.  The visual ejection fraction is estimated at 40%.  There is mild to moderate (1-2+) mitral regurgitation.  There is mild (1+) aortic regurgitation.  There is no comparison study available.    Echo 6/20/2005:  FINDINGS   1. There is no significant pericardial effusion.   2.  The lateral wall appears severely hypokinetic.    3.  Inferior wall of left ventricle appears close to akinetic.    4.  Overall left ventricular systolic function is moderately   depressed with visually estimated  LVEF of 35-40%.    5.  Right ventricle appears normal in size and function.    6.  The left atrium appears mildly enlarged.   7.  Right atrial size is probably normal.    8.  Mitral valve appears mildly thickened. Mobility of the posterior   mitral valve leaflets appears reduced.    9.  There is at least moderate mitral regurgitation in the form of   turbulent jet directed posteriorly.   10. The aortic and pulmonic valves were not imaged in this study.          ASSESSMENT:  Lower extremity edema with mild cardiomyopathy   - Echo 2005: LVEF 35-40% with WMAs as noted above   - More recent nuc assessment of EF have been higher, 3/2017 LVEF by nuc 54%  - Echo 11/2/17: LVEF 40% with known RWMAs  - No SOB or CP.   - LE edema resolved with few days low dose Lasix 10 mg  - I discussed with her and her daughter, using Lasix prn weight gain > 2-3 lbs in a day and/or edema. She would take KCl 10mEq along with the Lasix 10mg. The daughter understands  - Continue compression socks, elevate the legs when she can, and limit sodium in her diet.      Hypertension.    - Blood pressure controlled on losartan 50mg BID and metoprolol 50mg BID.     Coronary artery disease.    - History of inferior lateral myocardial infarction and circumflex stenting in 2005.    - She is free from any angina symptoms. Nuc 3/2017 negative for ischemia.   - Echo 11/2/17 LVEF 40% with WMA (no new)  - Continue BP control  - Continue ASA and statin     She will be due to see Dr. Tang in April 2018 for routine follow up    Thank you for allowing me to participate in the care of your patient.    Sincerely,     Herbert Benitez PA-C     [facility]

## 2017-11-09 NOTE — PATIENT INSTRUCTIONS
Weigh daily, the same time every morning.  If your weight is up > 2-3 lbs in 1 day, and you have swelling in your feet/ankles, you can take Lasix 10 mg (1/2 tab) and potassium 10 meq (1 tab) for one dose.     If the weight and swelling do now come down the next day, please call us [phone]    Low sodium diet. Aim for < 2,000 mg per day.     See Dr. Tang in April 2018.

## 2017-11-09 NOTE — TELEPHONE ENCOUNTER
Pt has appt today at 2:30 with cardiology.      Please advise on refill    Isaias Villarreal RN, BSN

## 2017-11-09 NOTE — MR AVS SNAPSHOT
After Visit Summary   11/9/2017    Batsheva Barkley    MRN: [id number]           Patient Information     Date Of Birth          11/6/1933        Visit Information        Provider Department      11/9/2017 2:30 PM Herbert Benitez PA-C [facility]        Today's Diagnoses     Lower extremity edema        Chronic systolic congestive heart failure (H)          Care Instructions    Weigh daily, the same time every morning.  If your weight is up > 2-3 lbs in 1 day, and you have swelling in your feet/ankles, you can take Lasix 10 mg (1/2 tab) and potassium 10 meq (1 tab) for one dose.     If the weight and swelling do now come down the next day, please call us [phone]    Low sodium diet. Aim for < 2,000 mg per day.     See Dr. Tang in April 2018.             Follow-ups after your visit        Future tests that were ordered for you today     Open Future Orders        Priority Expected Expires Ordered    Basic metabolic panel Routine 4/7/2018 11/9/2018 11/9/2017            Who to contact     If you have questions or need follow up information about today's clinic visit or your schedule please contact [facility] directly at [phone].  Normal or non-critical lab and imaging results will be communicated to you by MyChart, letter or phone within 4 business days after the clinic has received the results. If you do not hear from us within 7 days, please contact the clinic through [facility] or phone. If you have a critical or abnormal lab result, we will notify you by phone as soon as possible.  Submit refill requests through [employer] or call your pharmacy and they will forward the refill request to us. Please allow 3 business days for your refill to be completed.          Additional Information About Your Visit        [facility] Information     [employer] lets you send messages to your doctor, view your test results, renew your  "prescriptions, schedule appointments and more. To sign up, go to [email or web address] . Click on \"Log in\" on the left side of the screen, which will take you to the Welcome page. Then click on \"Sign up Now\" on the right side of the page.     You will be asked to enter the access code listed below, as well as some personal information. Please follow the directions to create your username and password.     Your access code is: [email or web address]  Expires: 2017  2:54 PM     Your access code will  in 90 days. If you need help or a new code, please call your [facility] clinic or [phone].        Care EveryWhere ID     This is your Care EveryWhere ID. This could be used by other organizations to access your [facility] medical records  [id number]        Your Vitals Were     Pulse Height Pulse Oximetry BMI (Body Mass Index)          59 1.549 m (5' 1\") 97% 18.89 kg/m2         Blood Pressure from Last 3 Encounters:   17 120/78   10/26/17 138/68   10/06/17 155/78    Weight from Last 3 Encounters:   17 45.4 kg (100 lb)   10/26/17 47.3 kg (104 lb 4.8 oz)   10/06/17 46.2 kg (101 lb 14.4 oz)              We Performed the Following     Follow-Up with Cardiac Advanced Practice Provider        Primary Care Provider Office Phone # Fax #    Lenin Douglas Perez [phone] [address] [facility] [address]        Equal Access to Services     [facility]: Hadii aad ku hadasho Soomaali, waaxda luqadaha, qaybta kaalmada adeaugusta, jessica hanna . So [facility] [phone].    ATENCIÓN: Si habla español, tiene a soria disposición servicios gratuitos de asistencia lingüística. Llame al [phone].    We comply with applicable federal civil rights laws and [place] laws. We do not discriminate on the basis of race, color, national origin, age, disability, sex, sexual orientation, or gender identity.            Thank you!     Thank you for choosing [facility]  for your care. Our goal is always to provide you with excellent care. Hearing back from our patients is one way we can continue to improve our services. Please take a few minutes to complete the written survey that you may receive in the mail after your visit with us. Thank you!             Your Updated Medication List - Protect others around you: Learn how to safely use, store and throw away your medicines at www.disposemymeds.org.          This list is accurate as of: 11/9/17  4:42 PM.  Always use your most recent med list.                   Brand Name Dispense Instructions for use Diagnosis    acetaminophen 325 MG tablet    TYLENOL    100 tablet    Take 2 tablets (650 mg) by mouth every 4 hours as needed for mild pain    Pelvic fracture, closed, initial encounter       aspirin 81 MG tablet     100    1 tab po QD (Once per day)    Coronary atherosclerosis of unspecified type of vessel, native or graft, Mixed hyperlipidemia       atorvastatin 40 MG tablet    LIPITOR    90 tablet    TAKE ONE TABLET (40 MG) BY MOUTH ONCE DAILY    Hyperlipidemia LDL goal <100       CALCIUM 600 + D 600-200 MG-UNIT Tabs   Generic drug:  Calcium Carb-Cholecalciferol      Take 600 mg by mouth daily        CENTRUM SILVER per tablet     3 MONTHS    ONE DAILY        losartan 50 MG tablet    COZAAR    180 tablet    Take 1 tablet (50 mg) by mouth 2 times daily    Essential hypertension       metoprolol 50 MG tablet    LOPRESSOR    180 tablet    TAKE ONE TABLET BY MOUTH TWICE DAILY    Hypertension goal BP (blood pressure) < 140/90       mirtazapine 15 MG tablet    REMERON     Take 15 mg by mouth At Bedtime        * order for DME     1 Package    ANTOINE stockings- below knee.    Bilateral leg edema       * order for DME     1 Device    Light weight wheelchair Body mass index is 19.08 kg/(m^2).    Diarrhea of presumed infectious origin, Lack of coordination, Weakness of both lower extremities       potassium  chloride SA 10 MEQ CR tablet    K-DUR/KLOR-CON M    30 tablet    Take 1 tablet (10 mEq) by mouth daily Take for 3 days    Lower extremity edema       vitamin D 1000 UNITS capsule      Take 2 capsules by mouth daily.        * Notice:  This list has 2 medication(s) that are the same as other medications prescribed for you. Read the directions carefully, and ask your doctor or other care provider to review them with you.

## 2017-11-09 NOTE — PROGRESS NOTES
CARDIOLOGY CLINIC PROGRESS NOTE    DOS: 2017    Batsheva Barkley  : 1933, 84 year old  MRN: [id number]      History:  I had the pleasure of meeting Batsheva Barkley along with her daughter, Melody, today in the cardiology clinic. Batsheva is a long-time patient of Dr. Tang.      Batsheva is an 84-year-old woman with history of coronary artery disease, hypertension, hyperlipidemia, and cardiomyopathy. She suffered an inferior lateral myocardial infarction in .  At that time she underwent stenting to an occluded circumflex vessel.  Over the years she has been free from any angina symptoms. Echocardiogram in  showed LVEF 35-40%.  Nuclear assessment of LVEF over the years have demonstrated left ejection fraction of 45%-50%.  There has been no recent evidence of heart failure.      In 2017 she underwent a nuclear stress test.  This demonstrated inferolateral, lateral and inferolateral basal infarction without evidence of ischemia.  LV ejection fraction around 54%. This was stable. Also, more recently, her blood pressure was elevated and Dr. Tang elected to change her medications.  Due to a cough, he changed her lisinopril to losartan.       She saw her PCP 17 due to some noted lower extremity edema. Per notes, it was pretty mild, and it was recommended she wear compressions socks. They called our clinic requesting to be seen.   When I saw Batsheva, it was much improved but still would not go away overnight.    We elected to trial Lasix 10mg x 3 days along with KCl. An echocardiogram was ordered to assess her LVEF.     The echo was done 17. It showed LVEF 40%, known WMA, mild-moderate MR, normal IVC.      She presents today for follow up. With the Lasix, her edema is resolved. She is down 4 lbs. She has no chest pain with activity or at rest.  She denies orthopnea, PND, or really any SOB. No lightheadedness or dizziness. The BMP shows stable renal function and electrolytes.     ROS:  Skin:   Positive for     Eyes:  Positive for glasses  ENT:  Positive for hearing loss  Respiratory:  Negative    Cardiovascular:    edema;Positive for  Gastroenterology: Negative    Genitourinary:  Negative    Musculoskeletal:  Positive for arthritis;back pain  Neurologic:  Negative    Psychiatric:  Negative    Heme/Lymph/Imm:  Positive for easy bruising  Endocrine:  Negative      PAST MEDICAL HISTORY:  Past Medical History:   Diagnosis Date     CAD (coronary artery disease) 6/20/05    inf [place], transient CHF     Cardiomyopathy (H)      Coronary atherosclerosis 6/20/2005    circ vessel was stented;  had a 50% LAD lesion which was not treated Problem list name updated by automated process. Provider to review     Generalized osteoarthrosis     knees ;; L total kne 10/09     HTN (hypertension)      Mitral regurgitation     mod     Mixed hyperlipidemia      Osteoporosis      Systolic CHF (H) 1/21/2015       PAST SURGICAL HISTORY:  Past Surgical History:   Procedure Laterality Date     COLONOSCOPY  3/05     ENT SURGERY       ESOPHAGOSCOPY, GASTROSCOPY, DUODENOSCOPY (EGD), COMBINED N/A 4/14/2016    Procedure: COMBINED ESOPHAGOSCOPY, GASTROSCOPY, DUODENOSCOPY (EGD), BIOPSY SINGLE OR MULTIPLE;  Surgeon: Jonathan Gramajo MD;  Location:  GI     EYE SURGERY       HEART CATH, ANGIOPLASTY  2005    RONI to left circ     L bunion + hammer toes  1/04, 4/04     L total knee replacement  10/2009      ovarian cyst surgery         SOCIAL HISTORY:  Social History     Social History     Marital status:      Spouse name: Gene     Number of children: 6     Years of education: N/A     Social History Main Topics     Smoking status: Former Smoker     Smokeless tobacco: Never Used      Comment: only smoked for 3 years.     Alcohol use No     Drug use: No     Sexual activity: Yes     Partners: Male     Other Topics Concern     Caffeine Concern No     decaf coffee and tea      Special Diet No     Exercise Yes     walk  "everyday      Social History Narrative       FAMILY HISTORY:  Family History   Problem Relation Age of Onset     CEREBROVASCULAR DISEASE Mother      Respiratory Father      heart     Breast Cancer Sister      HEART DISEASE Sister        MEDS:   Current Outpatient Prescriptions on File Prior to Visit:  metoprolol (LOPRESSOR) 50 MG tablet TAKE ONE TABLET BY MOUTH TWICE DAILY   potassium chloride SA (K-DUR/KLOR-CON M) 10 MEQ CR tablet Take 1 tablet (10 mEq) by mouth daily Take for 3 days   order for DME Light weight wheelchair Body mass index is 19.08 kg/(m^2).   order for DME ANTOINE stockings- below knee.   losartan (COZAAR) 50 MG tablet Take 1 tablet (50 mg) by mouth 2 times daily   atorvastatin (LIPITOR) 40 MG tablet TAKE ONE TABLET (40 MG) BY MOUTH ONCE DAILY   mirtazapine (REMERON) 15 MG tablet Take 15 mg by mouth At Bedtime   acetaminophen (TYLENOL) 325 MG tablet Take 2 tablets (650 mg) by mouth every 4 hours as needed for mild pain   Cholecalciferol (VITAMIN D) 1000 UNITS capsule Take 2 capsules by mouth daily.   CENTRUM SILVER OR TABS ONE DAILY   ASPIRIN 81 MG OR TABS 1 tab po QD (Once per day)   Calcium Carb-Cholecalciferol (CALCIUM 600 + D) 600-200 MG-UNIT TABS Take 600 mg by mouth daily     No current facility-administered medications on file prior to visit.     ALLERGIES:   Allergies   Allergen Reactions     Codeine      nausea, HA     Lisinopril Cough       PHYSICAL EXAM:  Vitals: /78 (BP Location: Right arm, Patient Position: Sitting, Cuff Size: Adult Regular)  Pulse 59  Ht 1.549 m (5' 1\")  Wt 45.4 kg (100 lb)  SpO2 97%  BMI 18.89 kg/m2  Constitutional:    thin;frail      Skin:  warm and dry to the touch, no apparent skin lesions or masses noted        Head:  normocephalic, no masses or lesions        Eyes:  pupils equal and round;conjunctivae and lids unremarkable;sclera white;no xanthalasma        ENT:  no pallor or cyanosis, dentition good        Neck:  JVP normal        Chest:  normal breath " sounds, clear to auscultation, normal A-P diameter, normal symmetry, normal respiratory excursion, no use of accessory muscles        Cardiac: regular rhythm;normal S1 and S2;apical impulse not displaced     no presence of murmur            Abdomen:  abdomen soft;BS normoactive;non-tender        Vascular:                                   radial pulses 2+    Extremities and Back:  no edema;no deformities, clubbing, cyanosis, erythema observed arthritic deformities of UE      Neurological:  no gross motor deficits;affect appropriate          LABS/DATA:  I reviewed the following:  Component      Latest Ref Rng & Units 11/9/2017   Sodium      133 - 144 mmol/L 139   Potassium      3.4 - 5.3 mmol/L 4.2   Chloride      94 - 109 mmol/L 105   Carbon Dioxide      20 - 32 mmol/L 28   Anion Gap      3 - 14 mmol/L 6   Glucose      70 - 99 mg/dL 94   Urea Nitrogen      7 - 30 mg/dL 28   Creatinine      0.52 - 1.04 mg/dL 0.89   GFR Estimate      >60 mL/min/1.7m2 60 (L)   GFR Estimate If Black      >60 mL/min/1.7m2 73   Calcium      8.5 - 10.1 mg/dL 8.3 (L)       Echo 11/2/17:  Interpretation Summary     Severe inferolateral hypokinesis  Mild inferior and inferoseptal hypokinesis.  Left ventricular systolic function is moderately reduced.  The visual ejection fraction is estimated at 40%.  There is mild to moderate (1-2+) mitral regurgitation.  There is mild (1+) aortic regurgitation.  There is no comparison study available.    Echo 6/20/2005:  FINDINGS   1. There is no significant pericardial effusion.   2.  The lateral wall appears severely hypokinetic.    3.  Inferior wall of left ventricle appears close to akinetic.    4.  Overall left ventricular systolic function is moderately   depressed with visually estimated LVEF of 35-40%.    5.  Right ventricle appears normal in size and function.    6.  The left atrium appears mildly enlarged.   7.  Right atrial size is probably normal.    8.  Mitral valve appears mildly thickened.  Mobility of the posterior   mitral valve leaflets appears reduced.    9.  There is at least moderate mitral regurgitation in the form of   turbulent jet directed posteriorly.   10. The aortic and pulmonic valves were not imaged in this study.          ASSESSMENT:  Lower extremity edema with mild cardiomyopathy   - Echo 2005: LVEF 35-40% with WMAs as noted above   - More recent nuc assessment of EF have been higher, 3/2017 LVEF by nuc 54%  - Echo 11/2/17: LVEF 40% with known RWMAs  - No SOB or CP.   - LE edema resolved with few days low dose Lasix 10 mg  - I discussed with her and her daughter, using Lasix prn weight gain > 2-3 lbs in a day and/or edema. She would take KCl 10mEq along with the Lasix 10mg. The daughter understands  - Continue compression socks, elevate the legs when she can, and limit sodium in her diet.      Hypertension.    - Blood pressure controlled on losartan 50mg BID and metoprolol 50mg BID.     Coronary artery disease.    - History of inferior lateral myocardial infarction and circumflex stenting in 2005.    - She is free from any angina symptoms. Nuc 3/2017 negative for ischemia.   - Echo 11/2/17 LVEF 40% with WMA (no new)  - Continue BP control  - Continue ASA and statin     She will be due to see Dr. Tang in April 2018 for routine follow up    Herbert Benitez PA-C

## 2017-12-13 ENCOUNTER — TELEPHONE (OUTPATIENT)
Dept: FAMILY MEDICINE | Facility: CLINIC | Age: 82
End: 2017-12-13

## 2017-12-13 NOTE — TELEPHONE ENCOUNTER
Ileana Winn SW with [facility] calling  Pt requesting supplies to assist her at home  Requesting lift chair, bath bench, incontinence products  SW researching vendors to supply products    Will be getting requests from a variety of suppliers for these products  Will you need office visit in order to approve these requests?    Kait Lowery RN, BS  Clinical Nurse Triage.

## 2017-12-18 ENCOUNTER — TELEPHONE (OUTPATIENT)
Dept: FAMILY MEDICINE | Facility: CLINIC | Age: 82
End: 2017-12-18

## 2017-12-27 ENCOUNTER — TELEPHONE (OUTPATIENT)
Dept: FAMILY MEDICINE | Facility: CLINIC | Age: 82
End: 2017-12-27

## 2017-12-27 PROBLEM — N39.498 TOTAL URINARY INCONTINENCE: Status: ACTIVE | Noted: 2017-12-27

## 2017-12-27 NOTE — TELEPHONE ENCOUNTER
2 page fax received from [employer] for Dr Perez's signature.  Forms are at [facility] for review/signature.  Please fax forms back to [phone].    Jeri Valencia/

## 2017-12-29 DIAGNOSIS — E78.5 HYPERLIPIDEMIA LDL GOAL <100: ICD-10-CM

## 2017-12-30 NOTE — TELEPHONE ENCOUNTER
Requested Prescriptions   Pending Prescriptions Disp Refills     atorvastatin (LIPITOR) 40 MG tablet [Pharmacy Med Name: ATORVASTATIN 40MG   TAB]    Last Written Prescription Date:  6/9/17  Last Fill Quantity: 90,  # refills: 1   Last Office Visit with [facility], [facility] or [facility] prescribing provider: Ana 10/6/17   Future Office Visit:    90 tablet 1     Sig: TAKE ONE TABLET BY MOUTH ONCE DAILY    Statins Protocol Passed    12/29/2017  9:42 AM       Passed - LDL on file in past 12 months    Recent Labs   Lab Test  10/06/17   1157   LDL  75            Passed - No abnormal creatine kinase in past 12 months    No lab results found.         Passed - Recent or future visit with authorizing provider    Patient had office visit in the last year or has a visit in the next 30 days with authorizing provider.  See chart review.              Passed - Patient is age 18 or older       Passed - No active pregnancy on record       Passed - No positive pregnancy test in past 12 months

## 2018-01-03 RX ORDER — ATORVASTATIN CALCIUM 40 MG/1
TABLET, FILM COATED ORAL
Qty: 90 TABLET | Refills: 2 | Status: SHIPPED | OUTPATIENT
Start: 2018-01-03 | End: 2018-05-23 | Stop reason: DRUGHIGH

## 2018-01-03 NOTE — TELEPHONE ENCOUNTER
Prescription approved per [facility] Refill Protocol.    Romy Parkinson RN -- [facility]

## 2018-01-18 ENCOUNTER — OFFICE VISIT (OUTPATIENT)
Dept: FAMILY MEDICINE | Facility: CLINIC | Age: 83
End: 2018-01-18
Payer: COMMERCIAL

## 2018-01-18 VITALS
HEIGHT: 61 IN | HEART RATE: 58 BPM | BODY MASS INDEX: 18.48 KG/M2 | RESPIRATION RATE: 14 BRPM | SYSTOLIC BLOOD PRESSURE: 128 MMHG | WEIGHT: 97.9 LBS | TEMPERATURE: 97.3 F | DIASTOLIC BLOOD PRESSURE: 32 MMHG | OXYGEN SATURATION: 100 %

## 2018-01-18 DIAGNOSIS — R19.7 DIARRHEA IN ADULT PATIENT: Primary | ICD-10-CM

## 2018-01-18 LAB
BASOPHILS # BLD AUTO: 0 10E9/L (ref 0–0.2)
BASOPHILS NFR BLD AUTO: 0.4 %
DIFFERENTIAL METHOD BLD: NORMAL
EOSINOPHIL # BLD AUTO: 0 10E9/L (ref 0–0.7)
EOSINOPHIL NFR BLD AUTO: 0.6 %
ERYTHROCYTE [DISTWIDTH] IN BLOOD BY AUTOMATED COUNT: 13.5 % (ref 10–15)
HCT VFR BLD AUTO: 40.1 % (ref 35–47)
HGB BLD-MCNC: 13.2 G/DL (ref 11.7–15.7)
LYMPHOCYTES # BLD AUTO: 1.1 10E9/L (ref 0.8–5.3)
LYMPHOCYTES NFR BLD AUTO: 23.3 %
MCH RBC QN AUTO: 30.3 PG (ref 26.5–33)
MCHC RBC AUTO-ENTMCNC: 32.9 G/DL (ref 31.5–36.5)
MCV RBC AUTO: 92 FL (ref 78–100)
MONOCYTES # BLD AUTO: 0.7 10E9/L (ref 0–1.3)
MONOCYTES NFR BLD AUTO: 15.1 %
NEUTROPHILS # BLD AUTO: 3 10E9/L (ref 1.6–8.3)
NEUTROPHILS NFR BLD AUTO: 60.6 %
PLATELET # BLD AUTO: 220 10E9/L (ref 150–450)
RBC # BLD AUTO: 4.35 10E12/L (ref 3.8–5.2)
WBC # BLD AUTO: 4.9 10E9/L (ref 4–11)

## 2018-01-18 PROCEDURE — 85025 COMPLETE CBC W/AUTO DIFF WBC: CPT | Performed by: FAMILY MEDICINE

## 2018-01-18 PROCEDURE — 80053 COMPREHEN METABOLIC PANEL: CPT | Performed by: FAMILY MEDICINE

## 2018-01-18 PROCEDURE — 99214 OFFICE O/P EST MOD 30 MIN: CPT | Performed by: FAMILY MEDICINE

## 2018-01-18 PROCEDURE — 87506 IADNA-DNA/RNA PROBE TQ 6-11: CPT | Performed by: FAMILY MEDICINE

## 2018-01-18 PROCEDURE — 87493 C DIFF AMPLIFIED PROBE: CPT | Mod: 59 | Performed by: FAMILY MEDICINE

## 2018-01-18 PROCEDURE — 36415 COLL VENOUS BLD VENIPUNCTURE: CPT | Performed by: FAMILY MEDICINE

## 2018-01-18 NOTE — PROGRESS NOTES
SUBJECTIVE:   Batsheva Barkley is a 84 year old female who presents to clinic today for the following health issues:      Diarrhea  AND  DME CONSULT RE: DIFFICULT TRANSFERS AND AMBULATION    Duration: 2 weeks with a break in between    Description:       Consistency of stool: watery       Blood in stool: no        Number of loose stools past 24 hours: 3    Intensity:  none    Accompanying signs and symptoms:       Fever: no        Nausea/vomitting: no        Abdominal pain: YES- cramp       Weight loss: YES- a little    History (recent antibiotics or travel/ill contacts/med changes/testing done): none    Precipitating or alleviating factors: food    Therapies tried and outcome: none    Past Medical History:   Diagnosis Date     CAD (coronary artery disease) 6/20/05    [place], transient CHF     Cardiomyopathy (H)      Coronary atherosclerosis 6/20/2005    circ vessel was stented;  had a 50% LAD lesion which was not treated Problem list name updated by automated process. Provider to review     Generalized osteoarthrosis     knees ;; L total kne 10/09     HTN (hypertension)      Mitral regurgitation     mod     Mixed hyperlipidemia      Osteoporosis      Systolic CHF (H) 1/21/2015       Past Surgical History:   Procedure Laterality Date     COLONOSCOPY  3/05     ENT SURGERY       ESOPHAGOSCOPY, GASTROSCOPY, DUODENOSCOPY (EGD), COMBINED N/A 4/14/2016    Procedure: COMBINED ESOPHAGOSCOPY, GASTROSCOPY, DUODENOSCOPY (EGD), BIOPSY SINGLE OR MULTIPLE;  Surgeon: Jonathan Gramajo MD;  Location:  GI     EYE SURGERY       HEART CATH, ANGIOPLASTY  2005    RONI to left circ     L bunion + hammer toes  1/04, 4/04     L total knee replacement  10/2009      ovarian cyst surgery         Family History   Problem Relation Age of Onset     CEREBROVASCULAR DISEASE Mother      Respiratory Father      heart     Breast Cancer Sister      HEART DISEASE Sister    Face to face encounter for DME    Social History    Substance Use Topics     Smoking status: Former Smoker     Smokeless tobacco: Never Used      Comment: only smoked for 3 years.     Alcohol use No   on and off for months, prior viral screen was negative, she may need colorectal eval        REVIEW OF SYSTEMS  Issues with leg weakness preventing transfers and getting up out of a chair.  Total incontinence makes quick position changes necessary with one assist   Generally has been otherwise  feeling well until this episode. Seems that every week or so she goes backward.  She eats high fruit and fiver that may not help . No meds No problems with vision, hearing, dental or neck pain.Has hph airborne or ingestion allergy  No chest pain, palpitations, dyspnea, change in bowel habits, blood  in stool or dyspepsia.  No rashes, changing moles, weakness, lassitude or back problems.  No chronic issues . No dysuria  Patient not  a smoker. No problems with significant headaches.    On exam the vital signs are stable  Weight is Body mass index is 18.5 kg/(m^2).   Eyes show loyd   No neck masses or thyromegaly.Ear nose and throat shows normal   No bruits, murmers, rubs or extrasounds. No cardiomegaly or chest wall tenderness. Lungs clear, no abdominal masses or organomegaly. No CVA tenderness.  Skin eval no rash   No hernias, good range of motion neck, back and extremities. No abnormal skin lesions. Normal genitalia. Good peripheral pulses. No adenopathy.  Mildly ataxic  gait and stance. Neck is supple.  Back exam shows kyphosis    (R19.7) Diarrhea in adult patient  (primary encounter diagnosis)  Comment: Plan: Clostridium difficile Toxin B PCR, Enteric         Bacteria and Virus Panel by MATTI Stool, CBC with        platelets and differential, Comprehensive         metabolic panel        Consider consultation and diet restrictions if no pathogen    See dme order

## 2018-01-18 NOTE — MR AVS SNAPSHOT
"              After Visit Summary   1/18/2018    Batsheva Barkley    MRN: [id number]           Patient Information     Date Of Birth          11/6/1933        Visit Information        Provider Department      1/18/2018 11:00 AM Lenin Perez MD [facility]        Today's Diagnoses     Diarrhea in adult patient    -  1      Care Instructions    immodium ad or generic 2 mg pills are ok to take           Follow-ups after your visit        Future tests that were ordered for you today     Open Future Orders        Priority Expected Expires Ordered    Clostridium difficile Toxin B PCR Routine  2/17/2018 1/18/2018    Enteric Bacteria and Virus Panel by MATTI Stool Routine  1/18/2019 1/18/2018            Who to contact     If you have questions or need follow up information about today's clinic visit or your schedule please contact [facility] directly at [phone].  Normal or non-critical lab and imaging results will be communicated to you by [employer], letter or phone within 4 business days after the clinic has received the results. If you do not hear from us within 7 days, please contact the clinic through [employer] or phone. If you have a critical or abnormal lab result, we will notify you by phone as soon as possible.  Submit refill requests through [employer] or call your pharmacy and they will forward the refill request to us. Please allow 3 business days for your refill to be completed.          Additional Information About Your Visit        MyChart Information     [employer] lets you send messages to your doctor, view your test results, renew your prescriptions, schedule appointments and more. To sign up, go to [email or web address] . Click on \"Log in\" on the left side of the screen, which will take you to the Welcome page. Then click on \"Sign up Now\" on the right side of the page.     You will be asked to enter the access code listed below, as well as some personal information. " "Please follow the directions to create your username and password.     Your access code is: [email or web address]  Expires: 3/19/2018 12:19 PM     Your access code will  in 90 days. If you need help or a new code, please call your [facility] clinic or [phone].        Care EveryWhere ID     This is your Care EveryWhere ID. This could be used by other organizations to access your [facility] medical records  [id number]        Your Vitals Were     Pulse Temperature Respirations Height Pulse Oximetry BMI (Body Mass Index)    58 97.3  F (36.3  C) (Oral) 14 5' 1\" (1.549 m) 100% 18.5 kg/m2       Blood Pressure from Last 3 Encounters:   18 (!) 128/32   17 120/78   10/26/17 138/68    Weight from Last 3 Encounters:   18 97 lb 14.4 oz (44.4 kg)   17 100 lb (45.4 kg)   10/26/17 104 lb 4.8 oz (47.3 kg)              We Performed the Following     CBC with platelets and differential     Comprehensive metabolic panel        Primary Care Provider Office Phone # Fax #    Lenin Douglas Perez [phone] [facility] [id number]        Equal Access to Services     BRIDGET BROOKE : Hadii aad ku hadasho Soomaali, waaxda luqadaha, qaybta kaalmada adeegyada, waxay idiin hayaan vic hanna . So [facility] [phone].    ATENCIÓN: Si habla español, tiene a soria disposición servicios gratuitos de asistencia lingüística. Llame al [phone].    We comply with applicable federal civil rights laws and [place] laws. We do not discriminate on the basis of race, color, national origin, age, disability, sex, sexual orientation, or gender identity.            Thank you!     Thank you for choosing [facility]  for your care. Our goal is always to provide you with excellent care. Hearing back from our patients is one way we can continue to improve our services. Please take a few minutes to complete the written survey that you may receive in the mail after your visit " with us. Thank you!             Your Updated Medication List - Protect others around you: Learn how to safely use, store and throw away your medicines at www.disposemymeds.org.          This list is accurate as of: 1/18/18 11:31 AM.  Always use your most recent med list.                   Brand Name Dispense Instructions for use Diagnosis    acetaminophen 325 MG tablet    TYLENOL    100 tablet    Take 2 tablets (650 mg) by mouth every 4 hours as needed for mild pain    Pelvic fracture, closed, initial encounter       aspirin 81 MG tablet     100    1 tab po QD (Once per day)    Coronary atherosclerosis of unspecified type of vessel, native or graft, Mixed hyperlipidemia       atorvastatin 40 MG tablet    LIPITOR    90 tablet    TAKE ONE TABLET BY MOUTH ONCE DAILY    Hyperlipidemia LDL goal <100       CALCIUM 600 + D 600-200 MG-UNIT Tabs   Generic drug:  Calcium Carb-Cholecalciferol      Take 600 mg by mouth daily        CENTRUM SILVER per tablet     3 MONTHS    ONE DAILY        losartan 50 MG tablet    COZAAR    180 tablet    Take 1 tablet (50 mg) by mouth 2 times daily    Essential hypertension       metoprolol tartrate 50 MG tablet    LOPRESSOR    180 tablet    TAKE ONE TABLET BY MOUTH TWICE DAILY    Hypertension goal BP (blood pressure) < 140/90       mirtazapine 15 MG tablet    REMERON     Take 15 mg by mouth At Bedtime        * order for DME     1 Package    ANTOINE stockings- below knee.    Bilateral leg edema       * order for DME     1 Device    Light weight wheelchair Body mass index is 19.08 kg/(m^2).    Diarrhea of presumed infectious origin, Lack of coordination, Weakness of both lower extremities       potassium chloride SA 10 MEQ CR tablet    K-DUR/KLOR-CON M    30 tablet    Take 1 tablet (10 mEq) by mouth daily Take for 3 days    Lower extremity edema       vitamin D 1000 UNITS capsule      Take 2 capsules by mouth daily.        * Notice:  This list has 2 medication(s) that are the same as other  medications prescribed for you. Read the directions carefully, and ask your doctor or other care provider to review them with you.

## 2018-01-19 LAB
ALBUMIN SERPL-MCNC: 3.8 G/DL (ref 3.4–5)
ALP SERPL-CCNC: 74 U/L (ref 40–150)
ALT SERPL W P-5'-P-CCNC: 19 U/L (ref 0–50)
ANION GAP SERPL CALCULATED.3IONS-SCNC: 7 MMOL/L (ref 3–14)
AST SERPL W P-5'-P-CCNC: 24 U/L (ref 0–45)
BILIRUB SERPL-MCNC: 0.5 MG/DL (ref 0.2–1.3)
BUN SERPL-MCNC: 19 MG/DL (ref 7–30)
C DIFF TOX B STL QL: NEGATIVE
CALCIUM SERPL-MCNC: 8.8 MG/DL (ref 8.5–10.1)
CHLORIDE SERPL-SCNC: 105 MMOL/L (ref 94–109)
CO2 SERPL-SCNC: 25 MMOL/L (ref 20–32)
CREAT SERPL-MCNC: 0.75 MG/DL (ref 0.52–1.04)
GFR SERPL CREATININE-BSD FRML MDRD: 74 ML/MIN/1.7M2
GLUCOSE SERPL-MCNC: 83 MG/DL (ref 70–99)
POTASSIUM SERPL-SCNC: 3.6 MMOL/L (ref 3.4–5.3)
PROT SERPL-MCNC: 6.8 G/DL (ref 6.8–8.8)
SODIUM SERPL-SCNC: 137 MMOL/L (ref 133–144)
SPECIMEN SOURCE: NORMAL

## 2018-01-25 ENCOUNTER — TELEPHONE (OUTPATIENT)
Dept: CARDIOLOGY | Facility: CLINIC | Age: 83
End: 2018-01-25

## 2018-01-25 ENCOUNTER — TELEPHONE (OUTPATIENT)
Dept: FAMILY MEDICINE | Facility: CLINIC | Age: 83
End: 2018-01-25

## 2018-01-25 DIAGNOSIS — H90.3 SENSORINEURAL HEARING LOSS, ASYMMETRICAL: Primary | ICD-10-CM

## 2018-01-25 DIAGNOSIS — H90.3 ASYMMETRICAL SENSORINEURAL HEARING LOSS: Primary | ICD-10-CM

## 2018-01-25 PROBLEM — I50.22: Status: ACTIVE | Noted: 2018-01-25

## 2018-01-25 NOTE — TELEPHONE ENCOUNTER
Caller daughter Lokesh Windy Robles said audiology referral is not sufficient.  We called Windy Robles [phone] - no answer and no option to hold so left message for someone to call us back.   What does the order need to specify that is not on today's order?  See Audiology Adult Referral.    Mitchel Holland, RN

## 2018-01-25 NOTE — TELEPHONE ENCOUNTER
Pt called wondering if Dr. Tang was ok with her traveling to [place] to visit her daughter.       OV 11/9/17 with Herbert CASTILLO.   ASSESSMENT:  Lower extremity edema with mild cardiomyopathy   - Echo 2005: LVEF 35-40% with WMAs as noted above   - More recent nuc assessment of EF have been higher, 3/2017 LVEF by nuc 54%  - Echo 11/2/17: LVEF 40% with known RWMAs  - No SOB or CP.   - LE edema resolved with few days low dose Lasix 10 mg  - I discussed with her and her daughter, using Lasix prn weight gain > 2-3 lbs in a day and/or edema. She would take KCl 10mEq along with the Lasix 10mg. The daughter understands  - Continue compression socks, elevate the legs when she can, and limit sodium in her diet.       Hypertension.    - Blood pressure controlled on losartan 50mg BID and metoprolol 50mg BID.      Coronary artery disease.    - History of inferior lateral myocardial infarction and circumflex stenting in 2005.    - She is free from any angina symptoms. Nuc 3/2017 negative for ischemia.   - Echo 11/2/17 LVEF 40% with WMA (no new)  - Continue BP control  - Continue ASA and statin    Will route to Dr. Tang to review.

## 2018-01-25 NOTE — TELEPHONE ENCOUNTER
Windy Robles called back. Insurance will reject covering as ordered. Suggested order:  Hearing Evaluation.   Fax order to [phone]   Mitchel Holland RN

## 2018-01-25 NOTE — TELEPHONE ENCOUNTER
Pt calling and requesting a referral to [facility].    Fax# [phone]    Jodee-Referrals

## 2018-01-25 NOTE — TELEPHONE ENCOUNTER
Corrected audiology referral at [facility] for MD signature.   Staff, please call daughter when order is faxed.  Thanks.   Mitchel Holland RN

## 2018-01-25 NOTE — TELEPHONE ENCOUNTER
Pt has been informed and referral has been faxed to [facility] [phone].    Jodee-Referrals

## 2018-01-26 NOTE — TELEPHONE ENCOUNTER
OK to travel.  Avoid salt in diet and keep legs elevated when possible.  Nando Tang MD, FACC  January 25, 2018 11:22 PM

## 2018-01-26 NOTE — TELEPHONE ENCOUNTER
Called patient with Dr. Tang's response. She did not answer. Left a message to call team 4 with the direct number.

## 2018-02-13 ENCOUNTER — TELEPHONE (OUTPATIENT)
Dept: FAMILY MEDICINE | Facility: CLINIC | Age: 83
End: 2018-02-13

## 2018-02-13 PROBLEM — R29.898 LEG WEAKNESS, BILATERAL: Status: ACTIVE | Noted: 2018-02-13

## 2018-02-27 ENCOUNTER — TELEPHONE (OUTPATIENT)
Dept: FAMILY MEDICINE | Facility: CLINIC | Age: 83
End: 2018-02-27

## 2018-02-27 DIAGNOSIS — M19.041 PRIMARY OSTEOARTHRITIS OF BOTH HANDS: Primary | ICD-10-CM

## 2018-02-27 DIAGNOSIS — M19.042 PRIMARY OSTEOARTHRITIS OF BOTH HANDS: Primary | ICD-10-CM

## 2018-02-27 NOTE — TELEPHONE ENCOUNTER
Vasiliy calling from  Hand [facility].  States patient there now for hand therapy.  Self referred.  Wondering if you would enter orders?  Don't see you saw for hand issue?  Arthritis of hands is complaint.  She is there now.  Please advise.  Call [phone] opt [phone].  Estelita Bojorquez RN

## 2018-02-27 NOTE — TELEPHONE ENCOUNTER
Called # below, informed, Lenin Perez MD ordered referral  Brittny Faulkner RN, BSN  Message handled by Nurse Triage.

## 2018-03-07 ENCOUNTER — TELEPHONE (OUTPATIENT)
Dept: FAMILY MEDICINE | Facility: CLINIC | Age: 83
End: 2018-03-07

## 2018-03-07 DIAGNOSIS — M47.817 LUMBOSACRAL SPONDYLOSIS WITHOUT MYELOPATHY: Primary | ICD-10-CM

## 2018-03-07 DIAGNOSIS — R53.81 PHYSICAL DECONDITIONING: ICD-10-CM
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
## 2018-03-07 DIAGNOSIS — R29.898 LEG WEAKNESS, BILATERAL: ICD-10-CM

## 2018-03-07 NOTE — TELEPHONE ENCOUNTER
" calling and wants PT referral for \"whole body\".  Has appointment set-up for hand therapy and wanting additional therapy.  Talked to Batsheva and states it is for her back.  Wants for same location as referral for hand therapy.  Very hard to get information from them.  T'd up.  Please advise.  Estelita Bojorquez RN        "

## 2018-03-26 ENCOUNTER — OFFICE VISIT (OUTPATIENT)
Dept: FAMILY MEDICINE | Facility: CLINIC | Age: 83
End: 2018-03-26
Payer: COMMERCIAL

## 2018-03-26 VITALS
WEIGHT: 99 LBS | HEIGHT: 61 IN | DIASTOLIC BLOOD PRESSURE: 72 MMHG | BODY MASS INDEX: 18.69 KG/M2 | RESPIRATION RATE: 14 BRPM | HEART RATE: 68 BPM | SYSTOLIC BLOOD PRESSURE: 102 MMHG | OXYGEN SATURATION: 98 % | TEMPERATURE: 97.8 F

## 2018-03-26 DIAGNOSIS — I25.10 ATHEROSCLEROSIS OF NATIVE CORONARY ARTERY OF NATIVE HEART, ANGINA PRESENCE UNSPECIFIED: Primary | ICD-10-CM

## 2018-03-26 DIAGNOSIS — N39.498 TOTAL URINARY INCONTINENCE: ICD-10-CM

## 2018-03-26 DIAGNOSIS — R29.898 LEG WEAKNESS, BILATERAL: ICD-10-CM

## 2018-03-26 DIAGNOSIS — M80.00XA AGE-RELATED OSTEOPOROSIS WITH CURRENT PATHOLOGICAL FRACTURE, INITIAL ENCOUNTER: ICD-10-CM

## 2018-03-26 DIAGNOSIS — I10 HYPERTENSION GOAL BP (BLOOD PRESSURE) < 140/90: ICD-10-CM

## 2018-03-26 PROCEDURE — 99214 OFFICE O/P EST MOD 30 MIN: CPT | Performed by: FAMILY MEDICINE

## 2018-03-26 NOTE — MR AVS SNAPSHOT
After Visit Summary   3/26/2018    Batsheva Barkley    MRN: [id number]           Patient Information     Date Of Birth          11/6/1933        Visit Information        Provider Department      3/26/2018 3:30 PM Lenin Perez MD [facility]        Today's Diagnoses     Atherosclerosis of native coronary artery of native heart, angina presence unspecified    -  1    Age-related osteoporosis with current pathological fracture, initial encounter        Hypertension goal BP (blood pressure) < 140/90        Total urinary incontinence        Leg weakness, bilateral           Follow-ups after your visit        Your next 10 appointments already scheduled     Mar 29, 2018  9:15 AM CDT   LAB with RU LAB   [facility] [place] ([facility])    [address]  [place] [address] [phone]           Please do not eat 10-12 hours before your appointment if you are coming in fasting for labs on lipids, cholesterol, or glucose (sugar). This does not apply to pregnant women. Water, hot tea and black coffee (with nothing added) are okay. Do not drink other fluids, diet soda or chew gum.            Mar 29, 2018 10:15 AM CDT   Return Visit with Nando Tang MD   [facility] ([facility])    [address]  [place] [facility] [address] [phone]              Who to contact     If you have questions or need follow up information about today's clinic visit or your schedule please contact [facility] directly at [phone].  Normal or non-critical lab and imaging results will be communicated to you by MyChart, letter or phone within 4 business days after the clinic has received the results. If you do not hear from us within 7 days, please contact the clinic through MyChart or phone. If you have a critical or abnormal lab result, we will notify you by  "phone as soon as possible.  Submit refill requests through [employer] or call your pharmacy and they will forward the refill request to us. Please allow 3 business days for your refill to be completed.          Additional Information About Your Visit        [facility] Information     [facility] lets you send messages to your doctor, view your test results, renew your prescriptions, schedule appointments and more. To sign up, go to [email or web address] . Click on \"Log in\" on the left side of the screen, which will take you to the Welcome page. Then click on \"Sign up Now\" on the right side of the page.     You will be asked to enter the access code listed below, as well as some personal information. Please follow the directions to create your username and password.     Your access code is: [email or web address]  Expires: 2018 12:36 PM     Your access code will  in 90 days. If you need help or a new code, please call your [place] clinic or [phone].        Care EveryWhere ID     This is your Care EveryWhere ID. This could be used by other organizations to access your [place] medical records  [id number]        Your Vitals Were     Pulse Temperature Respirations Height Pulse Oximetry BMI (Body Mass Index)    68 97.8  F (36.6  C) (Oral) 14 5' 1\" (1.549 m) 98% 18.71 kg/m2       Blood Pressure from Last 3 Encounters:   18 102/72   18 (!) 128/32   17 120/78    Weight from Last 3 Encounters:   18 99 lb (44.9 kg)   18 97 lb 14.4 oz (44.4 kg)   17 100 lb (45.4 kg)              Today, you had the following     No orders found for display       Primary Care Provider Office Phone # Fax #    Lenin Perez [phone] [address] [address] [address]        Equal Access to Services     BRIDGET BROOKE AH: Jayda Edmondson, lalit moss, jessica heard'aan ah. [facility] [phone].    ATENCIÓN: Si " krysta dubose, tiene a soria disposición servicios gratuitos de asistencia lingüística. Satish hannon [phone].    We comply with applicable federal civil rights laws and [place] laws. We do not discriminate on the basis of race, color, national origin, age, disability, sex, sexual orientation, or gender identity.            Thank you!     Thank you for choosing [facility]  for your care. Our goal is always to provide you with excellent care. Hearing back from our patients is one way we can continue to improve our services. Please take a few minutes to complete the written survey that you may receive in the mail after your visit with us. Thank you!             Your Updated Medication List - Protect others around you: Learn how to safely use, store and throw away your medicines at www.disposemymeds.org.          This list is accurate as of 3/26/18  7:07 PM.  Always use your most recent med list.                   Brand Name Dispense Instructions for use Diagnosis    acetaminophen 325 MG tablet    TYLENOL    100 tablet    Take 2 tablets (650 mg) by mouth every 4 hours as needed for mild pain    Pelvic fracture, closed, initial encounter       aspirin 81 MG tablet     100    1 tab po QD (Once per day)    Coronary atherosclerosis of unspecified type of vessel, native or graft, Mixed hyperlipidemia       atorvastatin 40 MG tablet    LIPITOR    90 tablet    TAKE ONE TABLET BY MOUTH ONCE DAILY    Hyperlipidemia LDL goal <100       CALCIUM 600 + D 600-200 MG-UNIT Tabs   Generic drug:  Calcium Carb-Cholecalciferol      Take 600 mg by mouth daily        CENTRUM SILVER per tablet     3 MONTHS    ONE DAILY        losartan 50 MG tablet    COZAAR    180 tablet    Take 1 tablet (50 mg) by mouth 2 times daily    Essential hypertension       metoprolol tartrate 50 MG tablet    LOPRESSOR    180 tablet    TAKE ONE TABLET BY MOUTH TWICE DAILY    Hypertension goal BP (blood pressure) < 140/90       mirtazapine 15 MG  tablet    REMERON     Take 15 mg by mouth At Bedtime        * order for DME     1 Package    ANTOINE stockings- below knee.    Bilateral leg edema       * order for DME     1 Device    Light weight wheelchair Body mass index is 19.08 kg/(m^2).    Diarrhea of presumed infectious origin, Lack of coordination, Weakness of both lower extremities       potassium chloride SA 10 MEQ CR tablet    K-DUR/KLOR-CON M    30 tablet    Take 1 tablet (10 mEq) by mouth daily Take for 3 days    Lower extremity edema       vitamin D 1000 UNITS capsule      Take 2 capsules by mouth daily.        * Notice:  This list has 2 medication(s) that are the same as other medications prescribed for you. Read the directions carefully, and ask your doctor or other care provider to review them with you.

## 2018-03-26 NOTE — PROGRESS NOTES
SUBJECTIVE:    CC: Batsheva Barkley is a 84 year old female who presents for follow up of weakness and ambulation issues     HPI: here with daughter as she readies to move to [facility]        PROBLEM LIST:                   Patient Active Problem List   Diagnosis     Hallux rigidus     Coronary atherosclerosis     Osteoporosis     HYPERLIPIDEMIA LDL GOAL <100     Advanced directives, counseling/discussion     Concussion     Impacted cerumen     Pelvic fracture: Left( 1/17/15)     Fracture of rib of left side     Pain in joint of left pelvic region or thigh     Fall from slipping on ice     Constipation     Physical deconditioning     Systolic CHF (H)     Hypertension goal BP (blood pressure) < 140/90     Cough     Edema     Cardiomyopathy (H)     CAD (coronary artery disease)     Stented coronary artery     Other dysphagia     HTN (hypertension)     Total urinary incontinence     CHF NYHA class III, chronic, systolic (H)     Leg weakness, bilateral       PAST MEDICAL HISTORY:                  Past Medical History:   Diagnosis Date     CAD (coronary artery disease) 6/20/05    inf [place], transient CHF     Cardiomyopathy (H)      Coronary atherosclerosis 6/20/2005    circ vessel was stented;  had a 50% LAD lesion which was not treated Problem list name updated by automated process. Provider to review     Generalized osteoarthrosis     knees ;; L total kne 10/09     HTN (hypertension)      Mitral regurgitation     mod     Mixed hyperlipidemia      Osteoporosis      Systolic CHF (H) 1/21/2015       PAST SURGICAL HISTORY:                  Past Surgical History:   Procedure Laterality Date     COLONOSCOPY  3/05     ENT SURGERY       ESOPHAGOSCOPY, GASTROSCOPY, DUODENOSCOPY (EGD), COMBINED N/A 4/14/2016    Procedure: COMBINED ESOPHAGOSCOPY, GASTROSCOPY, DUODENOSCOPY (EGD), BIOPSY SINGLE OR MULTIPLE;  Surgeon: Jonathan Gramajo MD;  Location:  GI     EYE SURGERY       HEART CATH, ANGIOPLASTY  2005    RNOI  to left circ     L bunion + hammer toes  1/04, 4/04     L total knee replacement  10/2009      ovarian cyst surgery         CURRENT MEDICATIONS:                  Current Outpatient Prescriptions   Medication Sig Dispense Refill     atorvastatin (LIPITOR) 40 MG tablet TAKE ONE TABLET BY MOUTH ONCE DAILY 90 tablet 2     metoprolol (LOPRESSOR) 50 MG tablet TAKE ONE TABLET BY MOUTH TWICE DAILY 180 tablet 3     potassium chloride SA (K-DUR/KLOR-CON M) 10 MEQ CR tablet Take 1 tablet (10 mEq) by mouth daily Take for 3 days 30 tablet 0     order for DME Light weight wheelchair Body mass index is 19.08 kg/(m^2). 1 Device 0     order for DME ANTOINE stockings- below knee. 1 Package 0     losartan (COZAAR) 50 MG tablet Take 1 tablet (50 mg) by mouth 2 times daily 180 tablet 3     mirtazapine (REMERON) 15 MG tablet Take 15 mg by mouth At Bedtime       Calcium Carb-Cholecalciferol (CALCIUM 600 + D) 600-200 MG-UNIT TABS Take 600 mg by mouth daily       acetaminophen (TYLENOL) 325 MG tablet Take 2 tablets (650 mg) by mouth every 4 hours as needed for mild pain 100 tablet 0     Cholecalciferol (VITAMIN D) 1000 UNITS capsule Take 2 capsules by mouth daily.       CENTRUM SILVER OR TABS ONE DAILY 3 MONTHS 1 YEAR     ASPIRIN 81 MG OR TABS 1 tab po QD (Once per day) 100 3             FAMILY HISTORY:                   Family History   Problem Relation Age of Onset     CEREBROVASCULAR DISEASE Mother      Respiratory Father      heart     Breast Cancer Sister      HEART DISEASE Sister        HEALTH MAINTENANCE:                    REVIEW OF OUTSIDE RECORDS: NO    REVIEW OF SYSTEMS:     REVIEW OF SYSTEMS    Generally has been ibs challenged, not  feeling well until this episode. No problems with vision, hearing, dental or neck pain.Has hph airborne or ingestion allergy  No chest pain, palpitations, dyspnea, change in bowel habits, blood  in stool or dyspepsia.  No rashes, changing moles, weakness, lassitude or back problems.  No chronic issues .  "No dysuria  Patient not  a smoker. No problems with significant headaches.    NVS:no headache , has some  balance issues and ataxia  INTEG:no moles or new rashes  LYMPH:no nodes or night sweats    EXAM:    /72 (BP Location: Right arm, Patient Position: Chair, Cuff Size: Adult Regular)  Pulse 68  Temp 97.8  F (36.6  C) (Oral)  Resp 14  Ht 5' 1\" (1.549 m)  Wt 99 lb (44.9 kg)  SpO2 98%  BMI 18.71 kg/m2  GENERAL APPEARANCE: healthy, alert and no distress   EXAM:  GENERAL APPEARANCE: low body weight   EYES: EOMI,  PERRL  HENT: ear canals and TM's normal and nose and mouth without ulcers or lesions  NECK: no adenopathy, no asymmetry, masses, or scars and thyroid normal to palpation  RESP: lungs clear to auscultation - no rales, rhonchi or wheezes  CV: regular rates and rhythm, normal S1 S2, no S3 or S4 and no murmur, click or rub -  NEURO: gait abnormal uses walker  BACK:no tenderness or pain on straight let raise           ASSESSMENT/PLAN  (I25.10) Atherosclerosis of native coronary artery of native heart, angina presence unspecified  (primary encounter diagnosis)  Comment:   Plan: no current chest apin    (M80.00XA) Age-related osteoporosis with current pathological fracture, initial encounter  Comment:   Plan: has kyphosis    (I10) Hypertension goal BP (blood pressure) < 140/90  Comment:   Plan: /72 (BP Location: Right arm, Patient Position: Chair, Cuff Size: Adult Regular)  Pulse 68  Temp 97.8  F (36.6  C) (Oral)  Resp 14  Ht 5' 1\" (1.549 m)  Wt 99 lb (44.9 kg)  SpO2 98%  BMI 18.71 kg/m2       (N39.498) Total urinary incontinence  Comment: meds and uti challenges  Plan:     (R29.898) Leg weakness, bilateral  Comment:   Plan: needs mobility aides, face to face encounter   [facility]                           I have discussed with patient the risks, benefits, medications, treatment options and modalities.   I have instructed the patient to call or schedule a follow-up " appointment if any problems or failure to improve.

## 2018-03-26 NOTE — NURSING NOTE
"Chief Complaint   Patient presents with     Forms     for assistant living        Initial /72 (BP Location: Right arm, Patient Position: Chair, Cuff Size: Adult Regular)  Pulse 68  Temp 97.8  F (36.6  C) (Oral)  Resp 16  Ht 5' 1\" (1.549 m)  Wt 99 lb (44.9 kg)  SpO2 98%  BMI 18.71 kg/m2 Estimated body mass index is 18.71 kg/(m^2) as calculated from the following:    Height as of this encounter: 5' 1\" (1.549 m).    Weight as of this encounter: 99 lb (44.9 kg).  Medication Reconciliation: complete   "

## 2018-03-29 ENCOUNTER — OFFICE VISIT (OUTPATIENT)
Dept: CARDIOLOGY | Facility: CLINIC | Age: 83
End: 2018-03-29
Attending: INTERNAL MEDICINE
Payer: COMMERCIAL

## 2018-03-29 VITALS
DIASTOLIC BLOOD PRESSURE: 64 MMHG | BODY MASS INDEX: 18.69 KG/M2 | HEART RATE: 60 BPM | SYSTOLIC BLOOD PRESSURE: 132 MMHG | WEIGHT: 99 LBS | HEIGHT: 61 IN

## 2018-03-29 DIAGNOSIS — I50.22 CHRONIC SYSTOLIC CONGESTIVE HEART FAILURE (H): ICD-10-CM

## 2018-03-29 DIAGNOSIS — R60.0 LOWER EXTREMITY EDEMA: ICD-10-CM

## 2018-03-29 DIAGNOSIS — I25.10 CORONARY ARTERY DISEASE INVOLVING NATIVE CORONARY ARTERY OF NATIVE HEART WITHOUT ANGINA PECTORIS: Primary | ICD-10-CM

## 2018-03-29 DIAGNOSIS — Z95.5 STENTED CORONARY ARTERY: ICD-10-CM

## 2018-03-29 DIAGNOSIS — I10 ESSENTIAL HYPERTENSION: ICD-10-CM

## 2018-03-29 DIAGNOSIS — I25.5 ISCHEMIC CARDIOMYOPATHY: ICD-10-CM

## 2018-03-29 LAB
ANION GAP SERPL CALCULATED.3IONS-SCNC: 4 MMOL/L (ref 3–14)
BUN SERPL-MCNC: 23 MG/DL (ref 7–30)
CALCIUM SERPL-MCNC: 8.7 MG/DL (ref 8.5–10.1)
CHLORIDE SERPL-SCNC: 105 MMOL/L (ref 94–109)
CO2 SERPL-SCNC: 31 MMOL/L (ref 20–32)
CREAT SERPL-MCNC: 0.84 MG/DL (ref 0.52–1.04)
GFR SERPL CREATININE-BSD FRML MDRD: 64 ML/MIN/1.7M2
GLUCOSE SERPL-MCNC: 93 MG/DL (ref 70–99)
POTASSIUM SERPL-SCNC: 4.4 MMOL/L (ref 3.4–5.3)
SODIUM SERPL-SCNC: 140 MMOL/L (ref 133–144)

## 2018-03-29 PROCEDURE — 80048 BASIC METABOLIC PNL TOTAL CA: CPT | Performed by: PHYSICIAN ASSISTANT

## 2018-03-29 PROCEDURE — 99214 OFFICE O/P EST MOD 30 MIN: CPT | Performed by: INTERNAL MEDICINE

## 2018-03-29 PROCEDURE — 36415 COLL VENOUS BLD VENIPUNCTURE: CPT | Performed by: PHYSICIAN ASSISTANT

## 2018-03-29 NOTE — PROGRESS NOTES
Service Date: 03/29/2018      PRIMARY CARE PHYSICIAN:  Dr. Lenin Perez.      HISTORY OF PRESENT ILLNESS:  I again had the pleasure of seeing your patient, Batsheva Barkley, at [facility] for evaluation of coronary artery disease, ischemic cardiomyopathy, hypertension and hyperlipidemia.  The patient has a history of an acute inferolateral wall myocardial infarction in 06/2005 while playing golf.  She collapsed and coronary angiography demonstrated an occluded dominant circumflex artery which was then stented.  She denies recurrent angina.  Echocardiography has shown moderate mitral regurgitation and an ejection fraction of 45%-50%.  Because of peripheral edema an echocardiogram was performed on 11/02/2017 indicating a severe inferolateral hypokinesis and mild inferior and inferoseptal hypokinesis.  Ejection fraction was 40%.  There was mild to moderate mitral regurgitation, mild aortic insufficiency.  Her last nuclear stress test from 03/29/2017 had shown a moderately large lateral, inferolateral and inferobasal infarct without significant associated ischemia.  Her ejection fraction at rest was 54%.  The patient was treated with 3 days of Lasix in November with marked improvement.  She is now wearing compression stockings.  She had been doing well but has developed mild edema today.  She denies shortness of breath or cough.  She is troubled by both low back pain and peripheral arthritis.  Previous upper GI has shown reflux esophagitis and hiatal hernia.  She is now also having some diarrhea for which she is taking Imodium.  She tries to walk each day.  She is on a low-salt diet.      PHYSICAL EXAMINATION:  As listed below.  Note her blood pressure is markedly improved at 132/64.      ASSESSMENT:   1.  Batsheva Barkley is a delightful 84-year-old female status post lateral and inferolateral myocardial infarction in 06/2005 treated with angioplasty and stenting of the dominant circumflex artery.  She is  left with an ischemic cardiomyopathy with ejection fraction of probably 40%.  There is no evidence of congestive heart failure.  Her Lexiscan nuclear stress test from 2017 demonstrated no ongoing ischemia.  We will continue to treat medically with losartan and metoprolol.   2.  Hypertension currently better controlled on her current medications.  Her BMP today includes sodium 140, potassium 4.4, BUN 23, creatinine 0.84 and glucose of 93.   3.  Hyperlipidemia.  This is followed through Dr. Perez's office and is well controlled as of 10/06/2017.      It is my pleasure to assist in the care of Batsheva Higgins.  I will see her again in 1 year or earlier on a p.r.n. basis.  All the patient's questions were answered to her satisfaction.  Her daughter, Linda, was in attendance today.      Roseline Maki MD       cc:   [place] [address]         ROSELINE MAKI MD, Merged with [facility]             D: 2018   T: 2018   MT: [id number]      Name:     BATSHEVA HIGGINS   MRN:      [id number]        Account:      [id number]   :      1933           Service Date: 2018      Document: [id number]

## 2018-03-29 NOTE — PROGRESS NOTES
HPI and Plan:   See dictation:[id number]    Orders Placed This Encounter   Procedures     Follow-Up with Cardiologist       Orders Placed This Encounter   Medications     Loperamide HCl (IMODIUM A-D PO)     Sig: Take by mouth 2 times daily       There are no discontinued medications.      Encounter Diagnoses   Name Primary?     Coronary artery disease involving native coronary artery of native heart without angina pectoris      Ischemic cardiomyopathy      Essential hypertension        CURRENT MEDICATIONS:  Current Outpatient Prescriptions   Medication Sig Dispense Refill     Loperamide HCl (IMODIUM A-D PO) Take by mouth 2 times daily       atorvastatin (LIPITOR) 40 MG tablet TAKE ONE TABLET BY MOUTH ONCE DAILY 90 tablet 2     metoprolol (LOPRESSOR) 50 MG tablet TAKE ONE TABLET BY MOUTH TWICE DAILY 180 tablet 3     potassium chloride SA (K-DUR/KLOR-CON M) 10 MEQ CR tablet Take 1 tablet (10 mEq) by mouth daily Take for 3 days 30 tablet 0     order for DME Light weight wheelchair Body mass index is 19.08 kg/(m^2). 1 Device 0     order for DME ANTOINE stockings- below knee. 1 Package 0     losartan (COZAAR) 50 MG tablet Take 1 tablet (50 mg) by mouth 2 times daily 180 tablet 3     mirtazapine (REMERON) 15 MG tablet Take 15 mg by mouth At Bedtime       Calcium Carb-Cholecalciferol (CALCIUM 600 + D) 600-200 MG-UNIT TABS Take 600 mg by mouth daily       acetaminophen (TYLENOL) 325 MG tablet Take 2 tablets (650 mg) by mouth every 4 hours as needed for mild pain 100 tablet 0     Cholecalciferol (VITAMIN D) 1000 UNITS capsule Take 2 capsules by mouth daily.       CENTRUM SILVER OR TABS ONE DAILY 3 MONTHS 1 YEAR     ASPIRIN 81 MG OR TABS 1 tab po QD (Once per day) 100 3       ALLERGIES     Allergies   Allergen Reactions     Codeine      nausea, HA     Lisinopril Cough       PAST MEDICAL HISTORY:  Past Medical History:   Diagnosis Date     CAD (coronary artery disease) 6/20/05    inf MI w arrest [place], transient CHF      Cardiomyopathy (H)      CHF NYHA class III, chronic, systolic (H) 1/25/2018     Coronary atherosclerosis 6/20/2005    circ vessel was stented;  had a 50% LAD lesion which was not treated Problem list name updated by automated process. Provider to review     Edema 1/26/2015     Generalized osteoarthrosis     knees ;; L total kne 10/09     Hypertension goal BP (blood pressure) < 140/90 1/21/2015     Leg weakness, bilateral 2/13/2018     Mitral regurgitation     mod     Mixed hyperlipidemia      Osteoporosis      Physical deconditioning 1/21/2015     Total urinary incontinence 12/27/2017       PAST SURGICAL HISTORY:  Past Surgical History:   Procedure Laterality Date     COLONOSCOPY  3/05     ENT SURGERY       ESOPHAGOSCOPY, GASTROSCOPY, DUODENOSCOPY (EGD), COMBINED N/A 4/14/2016    Procedure: COMBINED ESOPHAGOSCOPY, GASTROSCOPY, DUODENOSCOPY (EGD), BIOPSY SINGLE OR MULTIPLE;  Surgeon: Jonathan Gramajo MD;  Location:  GI     EYE SURGERY       HEART CATH, ANGIOPLASTY  2005    RONI to left circ     L bunion + hammer toes  1/04, 4/04     L total knee replacement  10/2009      ovarian cyst surgery         FAMILY HISTORY:  Family History   Problem Relation Age of Onset     CEREBROVASCULAR DISEASE Mother      Respiratory Father      heart     Breast Cancer Sister      HEART DISEASE Sister      Breast Cancer Sister        SOCIAL HISTORY:  Social History     Social History     Marital status:      Spouse name: Gene     Number of children: 6     Years of education: N/A     Social History Main Topics     Smoking status: Former Smoker     Smokeless tobacco: Never Used      Comment: only smoked for 3 years.     Alcohol use No     Drug use: No     Sexual activity: Yes     Partners: Male     Other Topics Concern     Caffeine Concern No     decaf coffee and tea      Special Diet No     Exercise Yes     walk everyday      Social History Narrative       Review of Systems:  Skin:  Positive for   hx basal cell  - no issues   "  Eyes:         ENT:  Positive for hearing loss  has new hearing aids  - not in today   Respiratory:  Negative       Cardiovascular:    edema;Positive for;fatigue has been tired - fatigued   Gastroenterology: Positive for diarrhea none for 5 days -  taking Imodium   Genitourinary:  Negative      Musculoskeletal:  Positive for arthritis;back pain;nocturnal cramping;joint pain;joint stiffness lower back ,  RA in hands ,  occas cassius horse at pm's ,  right knee pain   Neurologic:  Negative      Psychiatric:  Negative      Heme/Lymph/Imm:  Positive for easy bruising;allergies RX allergies   Endocrine:  Negative        Physical Exam:  Vitals: /64 (BP Location: Right arm, Patient Position: Sitting, Cuff Size: Adult Regular)  Pulse 60  Ht 1.549 m (5' 1\")  Wt 44.9 kg (99 lb)  BMI 18.71 kg/m2    Constitutional:    thin;frail      Skin:  warm and dry to the touch, no apparent skin lesions or masses noted          Head:  normocephalic, no masses or lesions        Eyes:  pupils equal and round;conjunctivae and lids unremarkable;sclera white;no xanthalasma        Lymph:      ENT:  no pallor or cyanosis, dentition good        Neck:  carotid pulses are full and equal bilaterally, JVP normal, no carotid bruit        Respiratory:  normal breath sounds, clear to auscultation, normal A-P diameter, normal symmetry, normal respiratory excursion, no use of accessory muscles         Cardiac: regular rhythm;normal S1 and S2;apical impulse not displaced   S4 no presence of murmur          pulses full and equal, no bruits auscultated                                        GI:  abdomen soft, non-tender, BS normoactive, no mass, no HSM, no bruits        Extremities and Muscular Skeletal:    arthritic deformities of UE trace;bilateral LE edema;pitting     bilateral knee high compression stockings    Neurological:  no gross motor deficits;affect appropriate        Psych:  Alert and Oriented x 3        CC  Nando Tang MD  [id number]  [place] [id number]

## 2018-03-29 NOTE — LETTER
3/29/2018    Lenin Perez MD  [address]  [facility] [address]    RE: Batsheva Barkley       Dear Colleague,    I had the pleasure of seeing Batsheva Barkley in the [facility].    HPI and Plan:   See dictation:[id number]    Orders Placed This Encounter   Procedures     Follow-Up with Cardiologist       Orders Placed This Encounter   Medications     Loperamide HCl (IMODIUM A-D PO)     Sig: Take by mouth 2 times daily       There are no discontinued medications.      Encounter Diagnoses   Name Primary?     Coronary artery disease involving native coronary artery of native heart without angina pectoris      Ischemic cardiomyopathy      Essential hypertension        CURRENT MEDICATIONS:  Current Outpatient Prescriptions   Medication Sig Dispense Refill     Loperamide HCl (IMODIUM A-D PO) Take by mouth 2 times daily       atorvastatin (LIPITOR) 40 MG tablet TAKE ONE TABLET BY MOUTH ONCE DAILY 90 tablet 2     metoprolol (LOPRESSOR) 50 MG tablet TAKE ONE TABLET BY MOUTH TWICE DAILY 180 tablet 3     potassium chloride SA (K-DUR/KLOR-CON M) 10 MEQ CR tablet Take 1 tablet (10 mEq) by mouth daily Take for 3 days 30 tablet 0     order for DME Light weight wheelchair Body mass index is 19.08 kg/(m^2). 1 Device 0     order for DME ANTOINE stockings- below knee. 1 Package 0     losartan (COZAAR) 50 MG tablet Take 1 tablet (50 mg) by mouth 2 times daily 180 tablet 3     mirtazapine (REMERON) 15 MG tablet Take 15 mg by mouth At Bedtime       Calcium Carb-Cholecalciferol (CALCIUM 600 + D) 600-200 MG-UNIT TABS Take 600 mg by mouth daily       acetaminophen (TYLENOL) 325 MG tablet Take 2 tablets (650 mg) by mouth every 4 hours as needed for mild pain 100 tablet 0     Cholecalciferol (VITAMIN D) 1000 UNITS capsule Take 2 capsules by mouth daily.       CENTRUM SILVER OR TABS ONE DAILY 3 MONTHS 1 YEAR     ASPIRIN 81 MG OR TABS 1 tab po QD (Once per day) 100 3       ALLERGIES     Allergies   Allergen Reactions      Codeine      nausea, HA     Lisinopril Cough       PAST MEDICAL HISTORY:  Past Medical History:   Diagnosis Date     CAD (coronary artery disease) 6/20/05    inf MI [place], transient CHF     Cardiomyopathy (H)      CHF NYHA class III, chronic, systolic (H) 1/25/2018     Coronary atherosclerosis 6/20/2005    circ vessel was stented;  had a 50% LAD lesion which was not treated Problem list name updated by automated process. Provider to review     Edema 1/26/2015     Generalized osteoarthrosis     knees ;; L total kne 10/09     Hypertension goal BP (blood pressure) < 140/90 1/21/2015     Leg weakness, bilateral 2/13/2018     Mitral regurgitation     mod     Mixed hyperlipidemia      Osteoporosis      Physical deconditioning 1/21/2015     Total urinary incontinence 12/27/2017       PAST SURGICAL HISTORY:  Past Surgical History:   Procedure Laterality Date     COLONOSCOPY  3/05     ENT SURGERY       ESOPHAGOSCOPY, GASTROSCOPY, DUODENOSCOPY (EGD), COMBINED N/A 4/14/2016    Procedure: COMBINED ESOPHAGOSCOPY, GASTROSCOPY, DUODENOSCOPY (EGD), BIOPSY SINGLE OR MULTIPLE;  Surgeon: Jonathan Gramajo MD;  Location:  GI     EYE SURGERY       HEART CATH, ANGIOPLASTY  2005    RONI to left circ     L bunion + hammer toes  1/04, 4/04     L total knee replacement  10/2009      ovarian cyst surgery         FAMILY HISTORY:  Family History   Problem Relation Age of Onset     CEREBROVASCULAR DISEASE Mother      Respiratory Father      heart     Breast Cancer Sister      HEART DISEASE Sister      Breast Cancer Sister        SOCIAL HISTORY:  Social History     Social History     Marital status:      Spouse name: Gene     Number of children: 6     Years of education: N/A     Social History Main Topics     Smoking status: Former Smoker     Smokeless tobacco: Never Used      Comment: only smoked for 3 years.     Alcohol use No     Drug use: No     Sexual activity: Yes     Partners: Male     Other Topics Concern  "    Caffeine Concern No     decaf coffee and tea      Special Diet No     Exercise Yes     walk everyday      Social History Narrative       Review of Systems:  Skin:  Positive for   hx basal cell  - no issues    Eyes:         ENT:  Positive for hearing loss  has new hearing aids  - not in today   Respiratory:  Negative       Cardiovascular:    edema;Positive for;fatigue has been tired - fatigued   Gastroenterology: Positive for diarrhea none for 5 days -  taking Imodium   Genitourinary:  Negative      Musculoskeletal:  Positive for arthritis;back pain;nocturnal cramping;joint pain;joint stiffness lower back ,  RA in hands ,  occas cassius horse at pm's ,  right knee pain   Neurologic:  Negative      Psychiatric:  Negative      Heme/Lymph/Imm:  Positive for easy bruising;allergies RX allergies   Endocrine:  Negative        Physical Exam:  Vitals: /64 (BP Location: Right arm, Patient Position: Sitting, Cuff Size: Adult Regular)  Pulse 60  Ht 1.549 m (5' 1\")  Wt 44.9 kg (99 lb)  BMI 18.71 kg/m2    Constitutional:    thin;frail      Skin:  warm and dry to the touch, no apparent skin lesions or masses noted          Head:  normocephalic, no masses or lesions        Eyes:  pupils equal and round;conjunctivae and lids unremarkable;sclera white;no xanthalasma        Lymph:      ENT:  no pallor or cyanosis, dentition good        Neck:  carotid pulses are full and equal bilaterally, JVP normal, no carotid bruit        Respiratory:  normal breath sounds, clear to auscultation, normal A-P diameter, normal symmetry, normal respiratory excursion, no use of accessory muscles         Cardiac: regular rhythm;normal S1 and S2;apical impulse not displaced   S4 no presence of murmur          pulses full and equal, no bruits auscultated                                        GI:  abdomen soft, non-tender, BS normoactive, no mass, no HSM, no bruits        Extremities and Muscular Skeletal:    arthritic deformities of UE " trace;bilateral LE edema;pitting     bilateral knee high compression stockings    Neurological:  no gross motor deficits;affect appropriate        Psych:  Alert and Oriented x 3        CC  Nando Tang MD  [address]  [place] [id number]                Thank you for allowing me to participate in the care of your patient.      Sincerely,     Nando Tang MD     [facility]    cc:   Nando Tang MD  [address]  [place] [phone]

## 2018-03-29 NOTE — MR AVS SNAPSHOT
"              After Visit Summary   3/29/2018    Batsheva Barkley    MRN: [id number]           Patient Information     Date Of Birth          11/6/1933        Visit Information        Provider Department      3/29/2018 10:15 AM Nando Tang MD [facility]        Today's Diagnoses     Coronary artery disease involving native coronary artery of native heart without angina pectoris        Ischemic cardiomyopathy        Essential hypertension           Follow-ups after your visit        Additional Services     Follow-Up with Cardiologist                 Future tests that were ordered for you today     Open Future Orders        Priority Expected Expires Ordered    Follow-Up with Cardiologist Routine 3/29/2019 3/30/2019 3/29/2018            Who to contact     If you have questions or need follow up information about today's clinic visit or your schedule please contact [facility] directly at [phone].  Normal or non-critical lab and imaging results will be communicated to you by [employer] ([email or web address])hart, letter or phone within 4 business days after the clinic has received the results. If you do not hear from us within 7 days, please contact the clinic through [employer] ([email or web address] or phone. If you have a critical or abnormal lab result, we will notify you by phone as soon as possible.  Submit refill requests through [employer] or call your pharmacy and they will forward the refill request to us. Please allow 3 business days for your refill to be completed.          Additional Information About Your Visit        RollCall ([email or web address])hart Information     [employer] lets you send messages to your doctor, view your test results, renew your prescriptions, schedule appointments and more. To sign up, go to [email or web address] . Click on \"Log in\" on the left side of the screen, which will take you to the Welcome page. Then click on \"Sign up Now\" on the right side of the page.     You will " "be asked to enter the access code listed below, as well as some personal information. Please follow the directions to create your username and password.     Your access code is: [email or web address]  Expires: 2018 12:36 PM     Your access code will  in 90 days. If you need help or a new code, please call your [place] clinic or [phone].        Care EveryWhere ID     This is your Care EveryWhere ID. This could be used by other organizations to access your [place] medical records  [id number]        Your Vitals Were     Pulse Height BMI (Body Mass Index)             60 1.549 m (5' 1\") 18.71 kg/m2          Blood Pressure from Last 3 Encounters:   18 132/64   18 102/72   18 (!) 128/32    Weight from Last 3 Encounters:   18 44.9 kg (99 lb)   18 44.9 kg (99 lb)   18 44.4 kg (97 lb 14.4 oz)              We Performed the Following     Follow-Up with Cardiologist        Primary Care Provider Office Phone # Fax #    Lnein Perez [phone] [address] [phone]        Equal Access to Services     BRIDGET BROOKE : Hadii lizeth ku hadasho Soomaali, waaxda luqadaha, qaybta kaalmada adeegyada, waxay renny hayjas perez. So [facility] [phone].    ATENCIÓN: Si habla español, tiene a soria disposición servicios gratuitos de asistencia lingüística. Llame al [phone].    We comply with applicable federal civil rights laws and [place] laws. We do not discriminate on the basis of race, color, national origin, age, disability, sex, sexual orientation, or gender identity.            Thank you!     Thank you for choosing [facility]  for your care. Our goal is always to provide you with excellent care. Hearing back from our patients is one way we can continue to improve our services. Please take a few minutes to complete the written survey that you may receive in the mail after your visit " with us. Thank you!             Your Updated Medication List - Protect others around you: Learn how to safely use, store and throw away your medicines at www.disposemymeds.org.          This list is accurate as of 3/29/18 10:49 AM.  Always use your most recent med list.                   Brand Name Dispense Instructions for use Diagnosis    acetaminophen 325 MG tablet    TYLENOL    100 tablet    Take 2 tablets (650 mg) by mouth every 4 hours as needed for mild pain    Pelvic fracture, closed, initial encounter       aspirin 81 MG tablet     100    1 tab po QD (Once per day)    Coronary atherosclerosis of unspecified type of vessel, native or graft, Mixed hyperlipidemia       atorvastatin 40 MG tablet    LIPITOR    90 tablet    TAKE ONE TABLET BY MOUTH ONCE DAILY    Hyperlipidemia LDL goal <100       CALCIUM 600 + D 600-200 MG-UNIT Tabs   Generic drug:  Calcium Carb-Cholecalciferol      Take 600 mg by mouth daily        CENTRUM SILVER per tablet     3 MONTHS    ONE DAILY        IMODIUM A-D PO      Take by mouth 2 times daily        losartan 50 MG tablet    COZAAR    180 tablet    Take 1 tablet (50 mg) by mouth 2 times daily    Essential hypertension       metoprolol tartrate 50 MG tablet    LOPRESSOR    180 tablet    TAKE ONE TABLET BY MOUTH TWICE DAILY    Hypertension goal BP (blood pressure) < 140/90       mirtazapine 15 MG tablet    REMERON     Take 15 mg by mouth At Bedtime        * order for DME     1 Package    ANTOINE stockings- below knee.    Bilateral leg edema       * order for DME     1 Device    Light weight wheelchair Body mass index is 19.08 kg/(m^2).    Diarrhea of presumed infectious origin, Lack of coordination, Weakness of both lower extremities       potassium chloride SA 10 MEQ CR tablet    K-DUR/KLOR-CON M    30 tablet    Take 1 tablet (10 mEq) by mouth daily Take for 3 days    Lower extremity edema       vitamin D 1000 UNITS capsule      Take 2 capsules by mouth daily.        * Notice:  This list  has 2 medication(s) that are the same as other medications prescribed for you. Read the directions carefully, and ask your doctor or other care provider to review them with you.

## 2018-03-29 NOTE — LETTER
3/29/2018      Lenin Perez MD  [address] [facility] [address]      RE: Batsheva Barkley       Dear Colleague,    I had the pleasure of seeing Batsheva Barkley in the [facility].    Service Date: 03/29/2018      PRIMARY CARE PHYSICIAN:  Dr. Lenin Perez.      HISTORY OF PRESENT ILLNESS:  I again had the pleasure of seeing your patient, Batsheva Barkley, at [facility] for evaluation of coronary artery disease, ischemic cardiomyopathy, hypertension and hyperlipidemia.  The patient has a history of an acute inferolateral wall myocardial infarction in 06/2005 while playing golf.  She collapsed and coronary angiography demonstrated an occluded dominant circumflex artery which was then stented.  She denies recurrent angina.  Echocardiography has shown moderate mitral regurgitation and an ejection fraction of 45%-50%.  Because of peripheral edema an echocardiogram was performed on 11/02/2017 indicating a severe inferolateral hypokinesis and mild inferior and inferoseptal hypokinesis.  Ejection fraction was 40%.  There was mild to moderate mitral regurgitation, mild aortic insufficiency.  Her last nuclear stress test from 03/29/2017 had shown a moderately large lateral, inferolateral and inferobasal infarct without significant associated ischemia.  Her ejection fraction at rest was 54%.  The patient was treated with 3 days of Lasix in November with marked improvement.  She is now wearing compression stockings.  She had been doing well but has developed mild edema today.  She denies shortness of breath or cough.  She is troubled by both low back pain and peripheral arthritis.  Previous upper GI has shown reflux esophagitis and hiatal hernia.  She is now also having some diarrhea for which she is taking Imodium.  She tries to walk each day.  She is on a low-salt diet.      PHYSICAL EXAMINATION:  As listed below.  Note her blood pressure is markedly improved at 132/64.       ASSESSMENT:   1.  Batsheva Higgins is a delightful 84-year-old female status post lateral and inferolateral myocardial infarction in 2005 treated with angioplasty and stenting of the dominant circumflex artery.  She is left with an ischemic cardiomyopathy with ejection fraction of probably 40%.  There is no evidence of congestive heart failure.  Her Lexiscan nuclear stress test from  demonstrated no ongoing ischemia.  We will continue to treat medically with losartan and metoprolol.   2.  Hypertension currently better controlled on her current medications.  Her BMP today includes sodium 140, potassium 4.4, BUN 23, creatinine 0.84 and glucose of 93.   3.  Hyperlipidemia.  This is followed through Dr. Perez's office and is well controlled as of 10/06/2017.      It is my pleasure to assist in the care of Batsheva Higgins.  I will see her again in 1 year or earlier on a p.r.n. basis.  All the patient's questions were answered to her satisfaction.  Her daughter, Linda, was in attendance today.      Roseline Maki MD       cc:   [place] [address]         ROSELINE MAKI MD, [facility]             D: 2018   T: 2018   MT: [id number]      Name:     BATSHEVA HIGGINS   MRN:      [id number]        Account:      [id number]   :      1933           Service Date: 2018      Document: [id number]         Outpatient Encounter Prescriptions as of 3/29/2018   Medication Sig Dispense Refill     Loperamide HCl (IMODIUM A-D PO) Take by mouth 2 times daily       atorvastatin (LIPITOR) 40 MG tablet TAKE ONE TABLET BY MOUTH ONCE DAILY 90 tablet 2     metoprolol (LOPRESSOR) 50 MG tablet TAKE ONE TABLET BY MOUTH TWICE DAILY 180 tablet 3     potassium chloride SA (K-DUR/KLOR-CON M) 10 MEQ CR tablet Take 1 tablet (10 mEq) by mouth daily Take for 3 days 30 tablet 0     order for DME Light weight wheelchair Body mass index is 19.08 kg/(m^2). 1 Device 0      order for DME ANTOINE stockings- below knee. 1 Package 0     losartan (COZAAR) 50 MG tablet Take 1 tablet (50 mg) by mouth 2 times daily 180 tablet 3     mirtazapine (REMERON) 15 MG tablet Take 15 mg by mouth At Bedtime       Calcium Carb-Cholecalciferol (CALCIUM 600 + D) 600-200 MG-UNIT TABS Take 600 mg by mouth daily       acetaminophen (TYLENOL) 325 MG tablet Take 2 tablets (650 mg) by mouth every 4 hours as needed for mild pain 100 tablet 0     Cholecalciferol (VITAMIN D) 1000 UNITS capsule Take 2 capsules by mouth daily.       CENTRUM SILVER OR TABS ONE DAILY 3 MONTHS 1 YEAR     ASPIRIN 81 MG OR TABS 1 tab po QD (Once per day) 100 3     No facility-administered encounter medications on file as of 3/29/2018.        Again, thank you for allowing me to participate in the care of your patient.      Sincerely,    Nando Tang MD     [facility]

## 2018-04-02 ENCOUNTER — TELEPHONE (OUTPATIENT)
Dept: FAMILY MEDICINE | Facility: CLINIC | Age: 83
End: 2018-04-02

## 2018-04-02 NOTE — TELEPHONE ENCOUNTER
Routed to referrals, see below, ok for pt to use [facility]?,  Brittny Faulkner RN, BSN  Message handled by Nurse Triage.

## 2018-04-02 NOTE — TELEPHONE ENCOUNTER
With pt's insurance pt can go out of [facility].  Pt should check with insurance to make sure this will be covered.    Jodee-Referrals

## 2018-04-02 NOTE — TELEPHONE ENCOUNTER
LM for pt to call, see referrals note below  Brittny Faulkner RN, BSN  Message handled by Nurse Triage.

## 2018-04-02 NOTE — TELEPHONE ENCOUNTER
On March 03/07/18 we put in a referral to [facility], patient is going to [facility] instead and would like the order fax to them at [phone].  Patient's daughter Linda can be reach [phone], she states this would be easier for patient to go [facility] since she see other providers there.

## 2018-04-04 ENCOUNTER — APPOINTMENT (OUTPATIENT)
Dept: GENERAL RADIOLOGY | Facility: CLINIC | Age: 83
End: 2018-04-04
Attending: PHYSICIAN ASSISTANT
Payer: COMMERCIAL

## 2018-04-04 ENCOUNTER — HOSPITAL ENCOUNTER (OUTPATIENT)
Facility: CLINIC | Age: 83
Setting detail: OBSERVATION
Discharge: NURSING, REHAB OR LONG-TERM CARE | End: 2018-04-05
Attending: EMERGENCY MEDICINE | Admitting: HOSPITALIST
Payer: COMMERCIAL

## 2018-04-04 DIAGNOSIS — T14.8XXA HEMATOMA: ICD-10-CM

## 2018-04-04 DIAGNOSIS — S80.12XA HEMATOMA OF LEFT LOWER EXTREMITY, INITIAL ENCOUNTER: Primary | ICD-10-CM

## 2018-04-04 LAB
ANION GAP SERPL CALCULATED.3IONS-SCNC: 7 MMOL/L (ref 3–14)
BASOPHILS # BLD AUTO: 0 10E9/L (ref 0–0.2)
BASOPHILS NFR BLD AUTO: 0.5 %
BUN SERPL-MCNC: 25 MG/DL (ref 7–30)
CALCIUM SERPL-MCNC: 8.3 MG/DL (ref 8.5–10.1)
CHLORIDE SERPL-SCNC: 106 MMOL/L (ref 94–109)
CK SERPL-CCNC: 78 U/L (ref 30–225)
CO2 SERPL-SCNC: 26 MMOL/L (ref 20–32)
CREAT SERPL-MCNC: 0.88 MG/DL (ref 0.52–1.04)
DIFFERENTIAL METHOD BLD: ABNORMAL
EOSINOPHIL # BLD AUTO: 0.1 10E9/L (ref 0–0.7)
EOSINOPHIL NFR BLD AUTO: 1.3 %
ERYTHROCYTE [DISTWIDTH] IN BLOOD BY AUTOMATED COUNT: 14 % (ref 10–15)
GFR SERPL CREATININE-BSD FRML MDRD: 61 ML/MIN/1.7M2
GLUCOSE SERPL-MCNC: 88 MG/DL (ref 70–99)
HCT VFR BLD AUTO: 35 % (ref 35–47)
HGB BLD-MCNC: 10.8 G/DL (ref 11.7–15.7)
HGB BLD-MCNC: 9.9 G/DL (ref 11.7–15.7)
IMM GRANULOCYTES # BLD: 0 10E9/L (ref 0–0.4)
IMM GRANULOCYTES NFR BLD: 0.2 %
INR PPP: 1.07 (ref 0.86–1.14)
LYMPHOCYTES # BLD AUTO: 1.3 10E9/L (ref 0.8–5.3)
LYMPHOCYTES NFR BLD AUTO: 23.9 %
MCH RBC QN AUTO: 28.6 PG (ref 26.5–33)
MCHC RBC AUTO-ENTMCNC: 30.9 G/DL (ref 31.5–36.5)
MCV RBC AUTO: 93 FL (ref 78–100)
MONOCYTES # BLD AUTO: 0.7 10E9/L (ref 0–1.3)
MONOCYTES NFR BLD AUTO: 13.3 %
NEUTROPHILS # BLD AUTO: 3.3 10E9/L (ref 1.6–8.3)
NEUTROPHILS NFR BLD AUTO: 60.8 %
NRBC # BLD AUTO: 0 10*3/UL
NRBC BLD AUTO-RTO: 0 /100
PLATELET # BLD AUTO: 246 10E9/L (ref 150–450)
POTASSIUM SERPL-SCNC: 4.2 MMOL/L (ref 3.4–5.3)
RBC # BLD AUTO: 3.77 10E12/L (ref 3.8–5.2)
SODIUM SERPL-SCNC: 139 MMOL/L (ref 133–144)
WBC # BLD AUTO: 5.5 10E9/L (ref 4–11)

## 2018-04-04 PROCEDURE — 73590 X-RAY EXAM OF LOWER LEG: CPT | Mod: LT

## 2018-04-04 PROCEDURE — 85610 PROTHROMBIN TIME: CPT | Performed by: EMERGENCY MEDICINE

## 2018-04-04 PROCEDURE — 99285 EMERGENCY DEPT VISIT HI MDM: CPT | Mod: 25

## 2018-04-04 PROCEDURE — G0378 HOSPITAL OBSERVATION PER HR: HCPCS

## 2018-04-04 PROCEDURE — 25000132 ZZH RX MED GY IP 250 OP 250 PS 637: Performed by: PHYSICIAN ASSISTANT

## 2018-04-04 PROCEDURE — 25000128 H RX IP 250 OP 636: Performed by: EMERGENCY MEDICINE

## 2018-04-04 PROCEDURE — 85018 HEMOGLOBIN: CPT | Mod: 91 | Performed by: PHYSICIAN ASSISTANT

## 2018-04-04 PROCEDURE — 96361 HYDRATE IV INFUSION ADD-ON: CPT

## 2018-04-04 PROCEDURE — 99220 ZZC INITIAL OBSERVATION CARE,LEVL III: CPT | Performed by: PHYSICIAN ASSISTANT

## 2018-04-04 PROCEDURE — 96376 TX/PRO/DX INJ SAME DRUG ADON: CPT

## 2018-04-04 PROCEDURE — 25000128 H RX IP 250 OP 636: Performed by: PHYSICIAN ASSISTANT

## 2018-04-04 PROCEDURE — 82550 ASSAY OF CK (CPK): CPT | Performed by: EMERGENCY MEDICINE

## 2018-04-04 PROCEDURE — 96374 THER/PROPH/DIAG INJ IV PUSH: CPT

## 2018-04-04 PROCEDURE — 36415 COLL VENOUS BLD VENIPUNCTURE: CPT | Performed by: PHYSICIAN ASSISTANT

## 2018-04-04 PROCEDURE — 85025 COMPLETE CBC W/AUTO DIFF WBC: CPT | Performed by: EMERGENCY MEDICINE

## 2018-04-04 PROCEDURE — 80048 BASIC METABOLIC PNL TOTAL CA: CPT | Performed by: EMERGENCY MEDICINE

## 2018-04-04 RX ORDER — SODIUM CHLORIDE 9 MG/ML
INJECTION, SOLUTION INTRAVENOUS CONTINUOUS
Status: DISCONTINUED | OUTPATIENT
Start: 2018-04-04 | End: 2018-04-05

## 2018-04-04 RX ORDER — HYDROXYZINE HYDROCHLORIDE 25 MG/1
25 TABLET, FILM COATED ORAL EVERY 6 HOURS PRN
Status: DISCONTINUED | OUTPATIENT
Start: 2018-04-04 | End: 2018-04-05

## 2018-04-04 RX ORDER — PROCHLORPERAZINE MALEATE 5 MG
5 TABLET ORAL EVERY 6 HOURS PRN
Status: DISCONTINUED | OUTPATIENT
Start: 2018-04-04 | End: 2018-04-05 | Stop reason: HOSPADM

## 2018-04-04 RX ORDER — CYCLOBENZAPRINE HCL 10 MG
10 TABLET ORAL 3 TIMES DAILY PRN
Status: DISCONTINUED | OUTPATIENT
Start: 2018-04-04 | End: 2018-04-05

## 2018-04-04 RX ORDER — NALOXONE HYDROCHLORIDE 0.4 MG/ML
.1-.4 INJECTION, SOLUTION INTRAMUSCULAR; INTRAVENOUS; SUBCUTANEOUS
Status: DISCONTINUED | OUTPATIENT
Start: 2018-04-04 | End: 2018-04-05 | Stop reason: HOSPADM

## 2018-04-04 RX ORDER — HYDROMORPHONE HCL/0.9% NACL/PF 0.2MG/0.2
0.2 SYRINGE (ML) INTRAVENOUS
Status: DISCONTINUED | OUTPATIENT
Start: 2018-04-04 | End: 2018-04-05

## 2018-04-04 RX ORDER — PROCHLORPERAZINE 25 MG
12.5 SUPPOSITORY, RECTAL RECTAL EVERY 12 HOURS PRN
Status: DISCONTINUED | OUTPATIENT
Start: 2018-04-04 | End: 2018-04-05 | Stop reason: HOSPADM

## 2018-04-04 RX ORDER — LIDOCAINE HYDROCHLORIDE 10 MG/ML
INJECTION, SOLUTION INFILTRATION; PERINEURAL
Status: DISCONTINUED
Start: 2018-04-04 | End: 2018-04-04 | Stop reason: HOSPADM

## 2018-04-04 RX ORDER — POLYETHYLENE GLYCOL 3350 17 G/17G
17 POWDER, FOR SOLUTION ORAL DAILY PRN
Status: DISCONTINUED | OUTPATIENT
Start: 2018-04-04 | End: 2018-04-05 | Stop reason: HOSPADM

## 2018-04-04 RX ORDER — ACETAMINOPHEN 500 MG
1000 TABLET ORAL EVERY 6 HOURS SCHEDULED
Status: DISCONTINUED | OUTPATIENT
Start: 2018-04-04 | End: 2018-04-05

## 2018-04-04 RX ORDER — ONDANSETRON 2 MG/ML
4 INJECTION INTRAMUSCULAR; INTRAVENOUS EVERY 6 HOURS PRN
Status: DISCONTINUED | OUTPATIENT
Start: 2018-04-04 | End: 2018-04-05 | Stop reason: HOSPADM

## 2018-04-04 RX ORDER — HYDROMORPHONE HYDROCHLORIDE 1 MG/ML
0.2 INJECTION, SOLUTION INTRAMUSCULAR; INTRAVENOUS; SUBCUTANEOUS
Status: DISCONTINUED | OUTPATIENT
Start: 2018-04-04 | End: 2018-04-05

## 2018-04-04 RX ORDER — AMOXICILLIN 250 MG
1 CAPSULE ORAL 2 TIMES DAILY PRN
Status: DISCONTINUED | OUTPATIENT
Start: 2018-04-04 | End: 2018-04-05 | Stop reason: HOSPADM

## 2018-04-04 RX ORDER — AMOXICILLIN 250 MG
2 CAPSULE ORAL 2 TIMES DAILY PRN
Status: DISCONTINUED | OUTPATIENT
Start: 2018-04-04 | End: 2018-04-05 | Stop reason: HOSPADM

## 2018-04-04 RX ORDER — ONDANSETRON 4 MG/1
4 TABLET, ORALLY DISINTEGRATING ORAL EVERY 6 HOURS PRN
Status: DISCONTINUED | OUTPATIENT
Start: 2018-04-04 | End: 2018-04-05 | Stop reason: HOSPADM

## 2018-04-04 RX ORDER — OXYCODONE HYDROCHLORIDE 5 MG/1
5-10 TABLET ORAL
Status: DISCONTINUED | OUTPATIENT
Start: 2018-04-04 | End: 2018-04-05

## 2018-04-04 RX ORDER — ACETAMINOPHEN 650 MG/1
650 SUPPOSITORY RECTAL EVERY 4 HOURS PRN
Status: DISCONTINUED | OUTPATIENT
Start: 2018-04-04 | End: 2018-04-04

## 2018-04-04 RX ADMIN — HYDROMORPHONE HYDROCHLORIDE 0.2 MG: 1 INJECTION, SOLUTION INTRAMUSCULAR; INTRAVENOUS; SUBCUTANEOUS at 20:49

## 2018-04-04 RX ADMIN — SODIUM CHLORIDE: 9 INJECTION, SOLUTION INTRAVENOUS at 17:26

## 2018-04-04 RX ADMIN — ACETAMINOPHEN 1000 MG: 500 TABLET, FILM COATED ORAL at 18:21

## 2018-04-04 RX ADMIN — HYDROXYZINE HYDROCHLORIDE 25 MG: 25 TABLET ORAL at 17:00

## 2018-04-04 RX ADMIN — OXYCODONE HYDROCHLORIDE 10 MG: 5 TABLET ORAL at 22:28

## 2018-04-04 RX ADMIN — HYDROMORPHONE HYDROCHLORIDE 0.2 MG: 1 INJECTION, SOLUTION INTRAMUSCULAR; INTRAVENOUS; SUBCUTANEOUS at 16:59

## 2018-04-04 RX ADMIN — Medication 0.2 MG: at 13:31

## 2018-04-04 ASSESSMENT — ENCOUNTER SYMPTOMS: MYALGIAS: 1

## 2018-04-04 NOTE — CONSULTS
Consult Date:  04/04/2018      DATE OF SERVICE: 04/04/2018        CHIEF COMPLAINT:  Left leg pain.      REFERRING PHYSICIAN:  Dr. Kaylyn Myrick from [facility] Emergency Department.      HISTORY OF PRESENT ILLNESS:  The patient is an 84-year-old female who was walking with a walker and had a fall today.  She fell to the ground in her walker and hit the medial aspect of her left calf region.  She then presented to urgent care first and then the Emergency Department with increasing pain.  I was asked to see the patient urgently for evaluation for compartment syndrome.  I saw the patient earlier today.  She states that all the pain is localized directly over the hematoma.  She is not on any current blood thinners.  She has not had any other trauma to that area.  From the urgent care notes, there is apparently no fracture identified involving her left tibia.  I do not have access to those x-rays currently.  We will get those reviewed.      PAST MEDICAL HISTORY:   1.  CAD.   2.  Cardiomyopathy.   3.  Congestive heart failure.   4.  Edema.   5.  Osteoarthritis.   6.  Hyperlipidemia.   7.  Hypertension.      PAST SURGICAL HISTORY:   1.  ENT surgery.   2.  EGD.   3.  Angioplasty.   4.  Left total knee.      MEDICATIONS:   1.  Loperamide.   2.  Atorvastatin.   3.  Metoprolol.   4.  Potassium.   5.  Losartan.   6.  Mirtazapine.   7.  Calcium.   8.  Tylenol.   9.  Vitamin D.   10.  Aspirin.      SOCIAL AND FAMILY HISTORY:  The patient is .  Does not smoke.      ALLERGIES:  CODEINE, LISINOPRIL.      REVIEW OF SYSTEMS:  A 10-point review of systems was reviewed and is negative except for musculoskeletal.  Please see HPI.      PHYSICAL EXAMINATION:  Examination shows an 84-year-old female in mild distress.    She is awake, alert and oriented x3.  Focused examination of her left lower extremity shows a large hematoma involving the medial aspect of her calf.  This is a very focal area of swelling in this region  consistent with a hematoma.  The remaining portion of her posterior compartment is soft.  Her anterior and lateral compartments are soft as well.  She has exquisite tenderness right directly over the hematoma.  She is able to actively dorsiflex and plantarflex her ankle.  She has no pain with passive stretch of her toes.  She has good capillary refill.  She does have a very difficult, but palpable pulse, but ultrasound shows good pulses.      IMPRESSION:   Left calf hematoma.      RECOMMENDATIONS:  At this point, I would watch this closely for development of compartment syndrome.  She does not currently have signs of an acute compartment syndrome.  I discussed this with the family that we will monitor this closely with serial checks.  I will have my team check her in approximately 2 hours and then again 2 hours later for any development of compartment syndrome.  The nursing staff will contact us immediately if her exam changes or if the pain significantly worsens.  She will ice and elevate her left lower extremity.  She will be admitted for pain control and observation.  We will continue to monitor her closely for any development of a compartment syndrome.  We will also track down the x-rays for review as well.         KIMBERLEY VIGIL MD             D: 2018   T: 2018   MT: [id number]      Name:     RENEE HIGGINS   MRN:      [id number]        Account:       [id number]   :      1933           Consult Date:  2018      Document: [id number]       cc: Lenin Perez MD

## 2018-04-04 NOTE — ED NOTES
[facility]  ED Nurse Handoff Report    Batsheva Barkley is a 84 year old female   ED Chief complaint: No chief complaint on file.  . ED Diagnosis:   Final diagnoses:   None     Allergies:   Allergies   Allergen Reactions     Codeine      nausea, HA     Lisinopril Cough       Code Status: Full Code  Activity level - Baseline/Home:  Independent. Activity Level - Current:   Total Care. Lift room needed: No. Bariatric: No   Needed: No   Isolation: No. Infection: Not Applicable.     Vital Signs:   Vitals:    04/04/18 1445 04/04/18 1500 04/04/18 1515 04/04/18 1533   BP:    159/90   Pulse:       Resp:       Temp:       TempSrc:       SpO2: 98% 98% 100% 100%       Cardiac Rhythm:  ,      Pain level:    Patient confused: No. Patient Falls Risk: Yes.   Elimination Status: Has voided   Patient Report - Initial Complaint: left leg pain/injury. Focused Assessment:  Musculoskeletal - Side: Left Pain Body Location: calf (swelling, bruising and severe pain.) CMS Intact: Other (Comment) (unable to palpate)  Tests Performed: lab. Abnormal Results:   Labs Ordered and Resulted from Time of ED Arrival Up to the Time of Departure from the ED   CBC WITH PLATELETS DIFFERENTIAL - Abnormal; Notable for the following:        Result Value    RBC Count 3.77 (*)     Hemoglobin 10.8 (*)     MCHC 30.9 (*)     All other components within normal limits   BASIC METABOLIC PANEL - Abnormal; Notable for the following:     Calcium 8.3 (*)     All other components within normal limits   INR   CK TOTAL          Treatments provided: see MAR, ice and elevation  Family Comments: present and supportive.  OBS brochure/video discussed/provided to patient:  Yes  ED Medications:   Medications   HYDROmorphone (DILAUDID) injection 0.2 mg (0.2 mg Intravenous Given 4/4/18 7371)   lidocaine 1 % injection (not administered)     Drips infusing:  No  For the majority of the shift, the patient's behavior Green. Interventions performed were .     Severe  Sepsis OR Septic Shock Diagnosis Present: No      ED Nurse Name/Phone Number: Nicky Avalos,   3:36 PM

## 2018-04-04 NOTE — PHARMACY-ADMISSION MEDICATION HISTORY
Admission medication history interview status for this patient is complete. See [facility] admission navigator for allergy information, prior to admission medications and immunization status.     Medication history interview source(s):Patient  Medication history resources (including written lists, pill bottles, clinic record):None  Primary pharmacy: [facility]    Changes made to PTA medication list:  Added:  None  Deleted:  Mirtazapine, Vit.D, Loperamide & Potassium  Changed:  None    Actions taken by pharmacist (provider contacted, etc): [facility] pharmacy was contacted, meds verified.    Additional medication history information:None    Medication reconciliation/reorder completed by provider prior to medication history? No    Do you take OTC medications (eg tylenol, ibuprofen, fish oil, eye/ear drops, etc)? Yes, as listed.    For patients on insulin therapy:  No     Prior to Admission medications    Medication Sig Last Dose Taking? Auth Provider   atorvastatin (LIPITOR) 40 MG tablet TAKE ONE TABLET BY MOUTH ONCE DAILY 4/4/2018 at Unknown time Yes Lenin Perez MD   metoprolol (LOPRESSOR) 50 MG tablet TAKE ONE TABLET BY MOUTH TWICE DAILY 4/4/2018 at Unknown time Yes Lenin Perez MD   losartan (COZAAR) 50 MG tablet Take 1 tablet (50 mg) by mouth 2 times daily 4/4/2018 at Unknown time Yes Iona Huang APRN CNP   Calcium Carb-Cholecalciferol (CALCIUM 600 + D) 600-200 MG-UNIT TABS Take 600 mg by mouth daily 4/4/2018 at Unknown time Yes Reported, Patient   acetaminophen (TYLENOL) 325 MG tablet Take 2 tablets (650 mg) by mouth every 4 hours as needed for mild pain 4/4/2018 at am Yes Karolina Reis MD   CENTRUM SILVER OR TABS ONE DAILY 4/4/2018 at Unknown time Yes Christ Carver MD   ASPIRIN 81 MG OR TABS 1 tab po QD (Once per day) 4/4/2018 at Unknown time Yes Christ Carver MD   order for DME Light weight wheelchair Body mass index is 19.08 kg/(m^2).   Lenin Perez  MD   order for REBECCA SCHULTZ stockings- below knee.   Regla Yepez MD

## 2018-04-04 NOTE — ED NOTES
[facility]  ED Nurse Handoff Report    Batsheva Barkley is a 84 year old female   ED Chief complaint: No chief complaint on file.  . ED Diagnosis:   Final diagnoses:   None     Allergies:   Allergies   Allergen Reactions     Codeine      nausea, HA     Lisinopril Cough       Code Status: Full Code  Activity level - Baseline/Home:  Independent. Activity Level - Current:   Stand with Assist. Lift room needed: No. Bariatric: No   Needed: No   Isolation: none. Infection: Not Applicable.     Vital Signs:   Vitals:    04/04/18 1445 04/04/18 1500 04/04/18 1515 04/04/18 1533   BP:    159/90   Pulse:       Resp:       Temp:       TempSrc:       SpO2: 98% 98% 100% 100%       Cardiac Rhythm:  ,      Pain level:    Patient confused: No. Patient Falls Risk: Yes.   Elimination Status: Has voided   Patient Report - Initial Complaint: leg pain. Focused Assessment:  84 year old female with history of bilateral leg weakness and congestive heart failure who presents to the emergency department today for evaluation of leg pain. The patient reports she was being helped out of a car and being handed her walker when she suddenly fell to the ground. From the fall the patient injured her left leg. Prior to arrival the patient was seen at urgent care at which time x-rays were obtained that reportedly did not show any fractures, however she was referred to the emergency department for further evaluation. At urgent care she was not given any medication for her pain. She now endorses significant pain to her lower left leg. The patient and her son who was with her when she fell deny any head injury or loss of consciousness from the patient. Her son state the patient fell slowly to the ground, and notes her walker fell onto her left leg during the fall also.she denies any other injury from the fall. Patient denies any current blood thinner use.      Allergies:  Codeine  Lisinopril       Medications:    Loperamide HCl (IMODIUM  A-D PO)  atorvastatin (LIPITOR) 40 MG tablet  metoprolol (LOPRESSOR) 50 MG tablet  potassium chloride SA (K-DUR/KLOR-CON M) 10 MEQ CR tablet  losartan (COZAAR) 50 MG tablet  mirtazapine (REMERON) 15 MG tablet  Calcium Carb-Cholecalciferol (CALCIUM 600 + D) 600-200 MG-UNIT TABS  acetaminophen (TYLENOL) 325 MG tablet  Cholecalciferol (VITAMIN D) 1000 UNITS capsule  CENTRUM SILVER OR TABS  ASPIRIN 81 MG OR TABS     Past Medical History:    CAD  Cardiomyopathy   CHF class three systolic   Edema   Osteoarthritis   Hyperlipidemia   Hypertension       Past Surgical History:    Colonoscopy   ENT surgery   EGD combined   Angioplasty   Left total knee replacement   Ovarian cyst removal      Family History:    History reviewed. No pertinent family history.       Social History:  The patient was accompanied to the ED by her son and .  Smoking Status: Former smoker  Smokeless Tobacco: No  Alcohol Use: No    Marital Status:         Review of Systems   Musculoskeletal: Positive for myalgias (left lower leg).   Neurological: Negative for syncope.   All other systems reviewed and are negative.  Tests Performed: labs. Abnormal Results:   Labs Ordered and Resulted from Time of ED Arrival Up to the Time of Departure from the ED   CBC WITH PLATELETS DIFFERENTIAL - Abnormal; Notable for the following:        Result Value    RBC Count 3.77 (*)     Hemoglobin 10.8 (*)     MCHC 30.9 (*)     All other components within normal limits   BASIC METABOLIC PANEL - Abnormal; Notable for the following:     Calcium 8.3 (*)     All other components within normal limits   INR   CK TOTAL     No orders to display     .   Treatments provided: left leg elevated  Family Comments: at bedside  OBS brochure/video discussed/provided to patient:  Yes  ED Medications:   Medications   HYDROmorphone (DILAUDID) injection 0.2 mg (0.2 mg Intravenous Given 4/4/18 1947)   lidocaine 1 % injection (not administered)     Drips infusing:  No  For the majority  of the shift, the patient's behavior Green. Interventions performed were na.     Severe Sepsis OR Septic Shock Diagnosis Present: No      ED Nurse Name/Phone Number: Gemini Wirt,   3:39 PM    RECEIVING UNIT ED HANDOFF REVIEW    Above ED Nurse Handoff Report was reviewed: Yes  Reviewed by: Tiffani Acevedo on April 4, 2018 at 3:58 PM

## 2018-04-04 NOTE — PROGRESS NOTES
Daughter would like patient to go to [facility] for rehab if necessary. Family does not feel that her  will be able to take of her.

## 2018-04-04 NOTE — ED PROVIDER NOTES
History     Chief Complaint:  Leg Pain     HPI   Batsheva Barkley is a 84 year old female with history of bilateral leg weakness and congestive heart failure who presents to the emergency department today for evaluation of leg pain. The patient reports she was being helped out of a car and being handed her walker when she suddenly fell to the ground. From the fall the patient injured her left leg. Prior to arrival the patient was seen at urgent care at which time x-rays were obtained that reportedly did not show any fractures, however she was referred to the emergency department for further evaluation. At urgent care she was not given any medication for her pain. She now endorses significant pain to her lower left leg. The patient and her son who was with her when she fell deny any head injury or loss of consciousness from the patient. Her son state the patient fell slowly to the ground, and notes her walker fell onto her left leg during the fall also.she denies any other injury from the fall. Patient denies any current blood thinner use.     Allergies:  Codeine  Lisinopril      Medications:    Loperamide HCl (IMODIUM A-D PO)  atorvastatin (LIPITOR) 40 MG tablet  metoprolol (LOPRESSOR) 50 MG tablet  potassium chloride SA (K-DUR/KLOR-CON M) 10 MEQ CR tablet  losartan (COZAAR) 50 MG tablet  mirtazapine (REMERON) 15 MG tablet  Calcium Carb-Cholecalciferol (CALCIUM 600 + D) 600-200 MG-UNIT TABS  acetaminophen (TYLENOL) 325 MG tablet  Cholecalciferol (VITAMIN D) 1000 UNITS capsule  CENTRUM SILVER OR TABS  ASPIRIN 81 MG OR TABS    Past Medical History:    CAD  Cardiomyopathy   CHF class three systolic   Edema   Osteoarthritis   Hyperlipidemia   Hypertension      Past Surgical History:    Colonoscopy   ENT surgery   EGD combined   Angioplasty   Left total knee replacement   Ovarian cyst removal     Family History:    History reviewed. No pertinent family history.      Social History:  The patient was accompanied to the ED by  her son and .  Smoking Status: Former smoker  Smokeless Tobacco: No  Alcohol Use: No    Marital Status:        Review of Systems   Musculoskeletal: Positive for myalgias (left lower leg).   Neurological: Negative for syncope.   All other systems reviewed and are negative.    Physical Exam     Patient Vitals for the past 24 hrs:   BP Temp Temp src Pulse Heart Rate Resp SpO2   04/04/18 1400 - - - - - - 98 %   04/04/18 1345 - - - - - - 98 %   04/04/18 1330 - - - - - - 100 %   04/04/18 1315 - - - - - - 100 %   04/04/18 1300 - - - - - - 100 %   04/04/18 1249 156/87 98  F (36.7  C) Temporal 61 61 14 100 %      Physical Exam  General: Patient is alert and interactive when I enter the room  Head:  The scalp, face, and head appear normal  Eyes:  Conjunctivae are normal  ENT:    The nose is normal    Pinnae are normal    External acoustic canals are normal  Neck:  Trachea midline  CV:  Pulses are normal  Resp:  No respiratory distress   Abdomen:      Soft, non-distended  Musc:  Normal muscular tone    No major joint effusions    Left leg hematoma with tense medial compartment, mild tenderness with flexion of leg, unable to palpate pulses but able to doppler on US, sensation intact distally   Skin:  No rash or lesions noted  Neuro:  Speech is normal and fluent. Face is symmetric.     Moving all extremities well.   Psych: Awake. Alert.  Normal affect.  Appropriate interactions.    Emergency Department Course     Laboratory:  Laboratory findings were communicated with the patient who voiced understanding of the findings.    CBC: WBC 5.5, HGB 10.8 (L),   BMP: Calcium 8.3 (L), o/w WNL (Creatinine 0.88)   INR: 1.07     Interventions:  1331 Dilaudid 0.2mg IV      Emergency Department Course:  Nursing notes and vitals reviewed.  1251 I performed an exam of the patient as documented above.   1254 I performed bedside ultrasound on the patient's left lower extremity to evaluate for a pulse.   IV was inserted and  blood was drawn for laboratory testing, results above.   1305 I spoke with Sanchez Acevedo NP of the Orthopedics service regarding patient's presentation, findings, and plan of care.   1345 I spoke with Sanchez Acevedo NP of the Orthopedics service after I evaluated the patient's lower left extremity with doppler ultrasound.   1354 I spoke with Dr. Kuo of the Orthopedics service regarding patient's presentation, findings, and plan of care.   1420 I spoke with Dr. Kuo after he evaluated the patient and he states the patient should be admitted for observation.  1441 I spoke with Elli Gong Pa-C of the Hospitalist service regarding patient's presentation, findings, and plan of care.   I discussed the treatment plan with the patient. They expressed understanding of this plan and consented to admission. I discussed the patient with Elli Gong PA-C, who will admit the patient to a monitored bed for further evaluation and treatment. I personally reviewed the laboratory results with the Patient and answered all related questions prior to admission.   Impression & Plan      Medical Decision Making:  Batsheva Barkley is a 84 year old female with history of heart failure who presents with left leg pain. The patient was sent here from urgent care for worsening pain despite no fracture seen on x-ray. Exam here revealed a very large medial left hematoma concerning for compartment syndrome. On exam she was quite tender to the touch. It was difficult to palpate pulses in her distal extremities however after ultrasound we were able to do this. The patient was given dilaudid with improvement of her pain. I did touch base with ortho and fortunately the orthopedic surgeon Dr. Kuo was able to come and evaluate her. He thought she was more consistent with a very large painful hematoma but not necessarily a compartment like syndrome. He would recommend admission to hospital for reevaluation at the least 6-8 hours and to keep her  NPO. Patient admitted to the hospitalist for observation. The rest of her blood work was unremarkable. Her pain was controlled with IV pain medications.     Diagnosis:    ICD-10-CM    1. Hematoma T14.8XXA        Disposition:  Admitted under the supervision of Elli Gong Pa-C      Scribe Disclosure:  Fede SANON, am serving as a scribe at 12:37 PM on 4/4/2018 to document services personally performed by Suzie Myrick MD based on my observations and the provider's statements to me.    4/4/2018   [facility] EMERGENCY DEPARTMENT       Suzie Myrick MD  04/04/18 4410

## 2018-04-04 NOTE — IP AVS SNAPSHOT
Batsheva Higgins #[id number] (CSN: [id number])  (84 year old F)  (Adm: 18)     [id number]               [facility] OBSERVATION DEPARTMENT: [phone]            Patient Demographics     Patient Name Sex          Age SSN Address Phone    Batsheva Higgins Female 1933 (84 year old) [id number] [facility]   [facility] [address] [phone] (Home) *Preferred*  [phone] (Mobile)      Emergency Contact(s)     Name Relation Home Work Mobile    EZEQUIEL MENDEZ Daughter [phone]    Shakila Higgins Daughter [phone] none [phone]    ANDER HIGGINS Spouse [phone]        Admission Information     Attending Provider Admitting Provider Admission Type Admission Date/Time    Mitchel Montenegro MD Young, Mitchel Britt MD Emergency 18  1235    Discharge Date Hospital Service Auth/Cert Status Service Area     Observation Incomplete [facility]    Unit Room/Bed Admission Status        OBSERVATION DEPT  Admission (Confirmed)       Admission     Complaint    Hematoma of left lower extremity      Hospital Account     Name Acct ID Class Status Primary Coverage    Batsheva Higgins [id number] Observation Open MEDICA - [employer] PARTNERS            Guarantor Account (for Hospital Account #[id number])     Name Relation to Pt Service Area Active? Acct Type    MahendraBatsheva briceño  FCS Yes Personal/Family    Address Phone          [address] [address] [phone](H)              Coverage Information (for Hospital Account #[id number])     F/O Payor/Plan Precert #    MEDICA/[employer] PARTNERS     Subscriber Subscriber #    Batsheva Higgins [id number]    Address Phone    [address] [address]  [place] [address] [phone]                                                INTERAGENCY TRANSFER FORM - PHYSICIAN ORDERS   2018                       [facility] OBSERVATION  "DEPARTMENT: [phone]            Attending Provider: Mitchel Montenegro MD     Allergies:  Codeine, Lisinopril    Infection:  None   Service:  Observation    Ht:  1.549 m (5' 1\")   Wt:  46.3 kg (102 lb 1.6 oz)   Admission Wt:  46.3 kg (102 lb 1.6 oz)    BMI:  19.29 kg/m 2   BSA:  1.41 m 2            ED Clinical Impression     Diagnosis Description Comment Added By Time Added    Hematoma [T14.8XXA] Hematoma [T14.8XXA]  Suzie Myrick MD 4/4/2018  6:49 PM      Hospital Problems as of 4/5/2018              Priority Class Noted POA    Hematoma of left lower extremity Medium  4/4/2018 Yes      Non-Hospital Problems as of 4/5/2018              Priority Class Noted    Hallux rigidus Medium  2/11/2005    Coronary atherosclerosis Medium  6/20/2005    Osteoporosis Medium  3/6/2007    HYPERLIPIDEMIA LDL GOAL <100 Medium  10/31/2010    Advanced directives, counseling/discussion Medium  5/7/2012    Concussion Medium  2/27/2014    Impacted cerumen Medium  10/6/2014    Pelvic fracture: Left( 1/17/15) Medium  1/18/2015    Fracture of rib of left side Medium  1/21/2015    Pain in joint of left pelvic region or thigh Medium  1/21/2015    Fall from slipping on ice Medium  1/21/2015    Constipation Medium  1/21/2015    Physical deconditioning Medium  1/21/2015    Systolic CHF (H) Medium  1/21/2015    Hypertension goal BP (blood pressure) < 140/90 Medium  1/21/2015    Cough Medium  1/26/2015    Edema Medium  1/26/2015    Cardiomyopathy (H) Medium  Unknown    CAD (coronary artery disease) Medium  6/11/2015    Stented coronary artery Medium  9/18/2015    Other dysphagia Medium  1/16/2016    HTN (hypertension) Medium  10/6/2016    Total urinary incontinence Medium  12/27/2017    CHF NYHA class III, chronic, systolic (H) Medium  1/25/2018    Leg weakness, bilateral Medium  2/13/2018    Lower extremity edema Medium  3/29/2018      Code Status History     Date Active Date Inactive Code Status Order ID Comments User Context    4/5/2018 " 12:33 PM  Full Code [id number]  Naye Amaya PA Outpatient    4/4/2018  4:31 PM 4/5/2018 12:33 PM Full Code [id number]  Radha Metcalf PA-C Inpatient    1/20/2015  7:32 AM 4/4/2018  4:31 PM Full Code [id number]  Karolina Reis MD Outpatient    1/18/2015 12:14 AM 1/20/2015  7:32 AM Full Code [id number]  Mahendra Mtz MD Inpatient      Current Code Status     Date Active Code Status Order ID Comments User Context       Prior      Summary of Visit     Reason for your hospital stay       Admitted to observation unit after fall resulting in left leg hematoma.                Medication Review      START taking        Dose / Directions Comments    oxyCODONE IR 5 MG tablet   Commonly known as:  ROXICODONE        Dose:  2.5-5 mg   Take 0.5-1 tablets (2.5-5 mg) by mouth every 6 hours as needed for moderate to severe pain   Quantity:  12 tablet   Refills:  0        polyethylene glycol Packet   Commonly known as:  MIRALAX/GLYCOLAX        Dose:  17 g   Take 17 g by mouth daily as needed for constipation   Quantity:  7 packet   Refills:  0        senna-docusate 8.6-50 MG per tablet   Commonly known as:  SENOKOT-S;PERICOLACE        Dose:  1 tablet   Take 1 tablet by mouth 2 times daily as needed for constipation   Quantity:  100 tablet   Refills:  0          CONTINUE these medications which may have CHANGED, or have new prescriptions. If we are uncertain of the size of tablets/capsules you have at home, strength may be listed as something that might have changed.        Dose / Directions Comments    acetaminophen 500 MG tablet   Commonly known as:  TYLENOL   This may have changed:    - medication strength  - how much to take  - when to take this  - reasons to take this  - additional instructions        Dose:  1000 mg   Take 2 tablets (1,000 mg) by mouth 3 times daily Change to PRN when no longer needed for pain related to leg hematoma   Refills:  0          CONTINUE these medications which have NOT CHANGED         Dose / Directions Comments    aspirin 81 MG tablet   Used for:  Coronary atherosclerosis of unspecified type of vessel, native or graft, Mixed hyperlipidemia        1 tab po QD (Once per day)   Quantity:  100   Refills:  3        atorvastatin 40 MG tablet   Commonly known as:  LIPITOR   Used for:  Hyperlipidemia LDL goal <100        TAKE ONE TABLET BY MOUTH ONCE DAILY   Quantity:  90 tablet   Refills:  2        CALCIUM 600 + D 600-200 MG-UNIT Tabs   Generic drug:  Calcium Carb-Cholecalciferol        Dose:  600 mg   Take 600 mg by mouth daily   Refills:  0        CENTRUM SILVER per tablet        ONE DAILY   Quantity:  3 MONTHS   Refills:  1 YEAR        losartan 50 MG tablet   Commonly known as:  COZAAR   Used for:  Essential hypertension        Dose:  50 mg   Take 1 tablet (50 mg) by mouth 2 times daily   Quantity:  180 tablet   Refills:  3        metoprolol tartrate 50 MG tablet   Commonly known as:  LOPRESSOR   Used for:  Hypertension goal BP (blood pressure) < 140/90        TAKE ONE TABLET BY MOUTH TWICE DAILY   Quantity:  180 tablet   Refills:  3        * order for DME   Used for:  Bilateral leg edema        ANTOINE stockings- below knee.   Quantity:  1 Package   Refills:  0        * order for DME   Used for:  Diarrhea of presumed infectious origin, Lack of coordination, Weakness of both lower extremities        Light weight wheelchair Body mass index is 19.08 kg/(m^2).   Quantity:  1 Device   Refills:  0        * Notice:  This list has 2 medication(s) that are the same as other medications prescribed for you. Read the directions carefully, and ask your doctor or other care provider to review them with you.            After Care     Activity - Up with nursing assistance           Additional Discharge Instructions       Ice and elevate left lower extremity. Can apply gentle ACE wrap compression. Monitor pulse, skin, pain, sensation, and ability to dorsi/plantar flex the ankle every shift. Gentle ankle and calf  "ROM.       Advance Diet as Tolerated       Follow this diet upon discharge: Regular       General info for SNF       Length of Stay Estimate: Short Term Care: Estimated # of Days <30  Condition at Discharge: Stable  Level of care:skilled   Rehabilitation Potential: Good  Admission H&P remains valid and up-to-date: Yes  Recent Chemotherapy: N/A  Use Nursing Home Standing Orders: Yes       Mantoux instructions       Give two-step Mantoux (PPD) Per Facility Policy Yes       Weight bearing status       As tolerated             Referrals     Occupational Therapy Adult Consult       Evaluate and treat as clinically indicated.    Reason:  Fall, LLE hematoma       Physical Therapy Adult Consult       Evaluate and treat as clinically indicated.    Reason:  Fall, LLE hematoma             Follow-Up Appointment Instructions     Follow Up and recommended labs and tests       See PCP or NH physician next 5-7 days for recheck of hematoma. Should have repeat hemoglobin next 1-2 days.                                                 INTERAGENCY TRANSFER FORM - NURSING   4/4/2018                       [facility] OBSERVATION DEPARTMENT: [phone]            Attending Provider: Mitchel Montenegro MD     Allergies:  Codeine, Lisinopril    Infection:  None   Service:  Observation    Ht:  1.549 m (5' 1\")   Wt:  46.3 kg (102 lb 1.6 oz)   Admission Wt:  46.3 kg (102 lb 1.6 oz)    BMI:  19.29 kg/m 2   BSA:  1.41 m 2            Advance Directives        Scanned docmt in ACP Activity?           Yes, scanned ACP docmt        Immunizations     Name Date      Influenza (High Dose) 3 valent vaccine 09/05/17     Influenza (High Dose) 3 valent vaccine 11/01/16     Influenza (High Dose) 3 valent vaccine 09/18/15     Influenza (High Dose) 3 valent vaccine 09/24/14     Influenza (High Dose) 3 valent vaccine 10/01/13     Influenza (IIV3) PF 09/24/14     Influenza (IIV3) PF 10/21/08     Influenza (IIV3) PF 11/01/06     Influenza (IIV3) PF " "10/11/05     Influenza (IIV3) PF 11/01/04     Pneumo Conj 13-V (2010&after) 09/18/15     Pneumococcal 23 valent 01/01/03     TD (ADULT, 7+) 12/19/05     TDAP Vaccine (Adacel) 09/18/15       ASSESSMENT     Discharge Profile Flowsheet     DISCHARGE NEEDS ASSESSMENT     PAS Number  [id number] 04/05/18 1331    Equipment Currently Used at Home  walker, rolling 04/05/18 1204   SKIN      Transportation Available  agency transportation 04/05/18 1204   Inspection of bony prominences  Full 04/05/18 0815    Equipment Used at Home  cane, straight 01/18/15 1509   Full except areas not inspected   -- 04/04/18 2244    GASTROINTESTINAL (ADULT,PEDIATRIC,OB)     Skin WDL  ex;characteristics 04/05/18 0815    GI WDL  WDL 04/05/18 0813   Skin Color/Characteristics  bruised (ecchymotic) 04/05/18 0815    Last Bowel Movement  04/02/18 04/05/18 0815   Skin Temperature  warm 04/05/18 0815    Passing flatus  no 04/04/18 2044   Skin Integrity  bruise(s) 04/05/18 0815    COMMUNICATION ASSESSMENT     SAFETY      Patient's communication style  spoken language (English or Bilingual) 04/04/18 2040   Safety WDL  WDL 04/05/18 1313    FINAL RESOURCES     All Alarms  alarm(s) activated and audible 04/05/18 1313                 Assessment WDL (Within Defined Limits) Definitions           Safety WDL     Effective: 09/28/15    Row Information: <b>WDL Definition:</b> Bed in low position, wheels locked; call light in reach; upper side rails up x 2; ID band on<br> <font color=\"gray\"><i>Item=AS safety wdl>>List=AS safety wdl>>Version=F14</i></font>      Skin WDL     Effective: 09/28/15    Row Information: <b>WDL Definition:</b> Warm; dry; intact; elastic; without discoloration; pressure points without redness<br> <font color=\"gray\"><i>Item=AS skin wdl>>List=AS skin wdl>>Version=F14</i></font>      Vitals     Vital Signs Flowsheet     VITAL SIGNS     Pain Intervention(s)  Medication (See eMAR) 04/05/18 1331    Temp  98.2  F (36.8  C) 04/05/18 1313   Response to " "Interventions  Decrease in pain 04/05/18 1331    Temp src  Oral 04/05/18 1313   ANALGESIA SIDE EFFECTS MONITORING      Resp  18 04/05/18 1313   Side Effects Monitoring: Respiratory Quality  R 04/05/18 1331    Pulse  61 04/04/18 1249   Side Effects Monitoring: Respiratory Depth  N 04/05/18 1331    Heart Rate  83 04/05/18 1313   Side Effects Monitoring: Sedation Level  1 04/05/18 1331    Pulse/Heart Rate Source  Monitor 04/05/18 1313   AMADOR COMA SCALE      BP  105/63 04/05/18 1313   Best Eye Response  4-->(E4) spontaneous 04/04/18 2101    BP Location  Left arm 04/05/18 1313   Best Motor Response  6-->(M6) obeys commands 04/04/18 2101    POSITIONING     Best Verbal Response  5-->(V5) oriented 04/04/18 2101    Body Position  supine, head elevated 04/05/18 1313   Amador Coma Scale Score  15 04/04/18 2101    Head of Bed (HOB)  HOB at 30 degrees 04/05/18 1313   HEIGHT AND WEIGHT      Positioning/Transfer Devices  pillows 04/05/18 1315   Height  1.549 m (5' 1\") 04/04/18 1635    OXYGEN THERAPY     Height Method  Stated 04/04/18 1635    SpO2  98 % 04/05/18 1313   Weight  46.3 kg (102 lb 1.6 oz) 04/04/18 1635    O2 Device  None (Room air) 04/05/18 1313   Weight Method  Bed scale 04/04/18 1635    PAIN/COMFORT     BSA (Calculated - sq m)  1.41 04/04/18 1635    Patient Currently in Pain  yes 04/05/18 1331   BMI (Calculated)  19.33 04/04/18 1635    Preferred Pain Scale  number (Numeric Rating Pain Scale) 04/05/18 1331   DAILY CARE      Patient's Stated Pain Goal  3 04/05/18 0856   Activity Management  activity adjusted per tolerance 04/05/18 1313    0-10 Pain Scale  8 04/05/18 1331   Activity Assistance Provided  assistance, 2 people 04/05/18 1313    Pain Location  Leg 04/05/18 1331   Assistive Device Utilized  gait belt;walker 04/05/18 1313    Pain Orientation  Left;Lower 04/05/18 1331   Additional Documentation  Activity Device Assistance (Row) 04/04/18 2215    Pain Descriptors  Aching;Sore 04/05/18 1331   Symptoms Noted " During/After Activity  dizziness 04/05/18 0705            Patient Lines/Drains/Airways Status    Active LINES/DRAINS/AIRWAYS     Name: Placement date: Placement time: Site: Days: Last dressing change:    Peripheral IV 04/04/18 Right 04/04/18   1332      1     Peripheral IV 04/05/18 Left Lower forearm 04/05/18   0214   Lower forearm   less than 1             Patient Lines/Drains/Airways Status    Active PICC/CVC     None            Intake/Output Detail Report     Date Intake     Output Net    Shift P.O. I.V. IV Piggyback Total Urine Total       Noc 04/03/18 2300 - 04/04/18 0659 -- -- -- -- -- -- 0    Day 04/04/18 0700 - 04/04/18 1459 -- -- -- -- -- -- 0    Sharri 04/04/18 1500 - 04/04/18 2259 -- -- -- -- -- -- 0    Noc 04/04/18 2300 - 04/05/18 0659 -- -- -- -- 350 350 -350    Day 04/05/18 0700 - 04/05/18 4910 716 7659 -- 1405 -- -- 1405      Last Void/BM       Most Recent Value    Urine Occurrence 1 at 04/05/2018 1313    Stool Occurrence       Case Management/Discharge Planning     Case Management/Discharge Planning Flowsheet     REFERRAL INFORMATION     Marital Status   04/05/18 1151    Did the Initial Social Work Assessment result in a Social Work Case?  Yes 04/05/18 1151   Who is your support system?  ;Children 04/05/18 1151    Admission Type  observation 04/05/18 1151   Description of Support System  Supportive;Involved 04/05/18 1151    Arrived From  home or self-care 04/05/18 1151   Support Assessment  Adequate family and caregiver support;Adequate social supports;Lacks necessary supervision and assistance 04/05/18 1151    Referral Source  physician 04/05/18 1151   Quality of Family Relationships  supportive;involved;evident 04/05/18 1151    New Steerage to  Clinics?  No 04/05/18 1151   COPING/STRESS      Reason For Consult  discharge planning 04/05/18 1151   Major Change/Loss/Stressor  death (lost two sisters summer of 2014) 01/18/15 0055    Record Reviewed  clinical discipline  documentation;history and physical;medical record 04/05/18 1151   DISCHARGE PLANNING      CTS Assigned to Case  Raisa 04/05/18 1151   Transportation Available  agency transportation 04/05/18 1204    [facility] Name  [facility] 04/05/18 1151   Equipment Used at Home  cane, straight 01/18/15 1509    Primary Care MD Name  Sammy Perez 04/05/18 1151   FINAL RESOURCES      LIVING ENVIRONMENT     Equipment Currently Used at Home  walker, rolling 04/05/18 1204    Lives With  spouse 04/05/18 1151   PAS Number  [id number] 04/05/18 1331    Living Arrangements  apartment 04/05/18 1151   ABUSE RISK SCREEN      Provides Primary Care For  no one, unable/limited ability to care for self 04/05/18 1151   QUESTION TO PATIENT:  Has a member of your family or a partner(now or in the past) intimidated, hurt, manipulated, or controlled you in any way?  no 04/04/18 1241    Primary Care Provided By  spouse/significant other;child(raquel) (specify) 04/05/18 1151   QUESTION TO PATIENT: Do you feel safe going back to the place where you are living?  yes 04/04/18 1241    Quality Of Family Relationships  supportive;helpful;involved 04/05/18 1151   OBSERVATION: Is there reason to believe there has been maltreatment of a vulnerable adult (ie. Physical/Sexual/Emotional abuse, self neglect, lack of adequate food, shelter, medical care, or financial exploitation)?  no 04/04/18 2040    Able to Return to Prior Living Arrangements  no 04/05/18 1151   OTHER      HOME SAFETY     Are you depressed or being treated for depression?  No 04/04/18 2032    Patient Feels Safe Living in Home?  yes 04/05/18 1151   HOMICIDE RISK      ASSESSMENT OF FAMILY/SOCIAL SUPPORT     Feels Like Hurting Others  no 04/04/18 1241                  [facility] OBSERVATION DEPARTMENT: [phone]            Medication Administration Report for Batsheva Barkley as of 04/05/18 1343   Legend:    Given Hold Not Given Due Canceled Entry Other Actions    Time Time (Time) Time   Time-Action       Inactive    Active    Linked        Medications 03/30/18 03/31/18 04/01/18 04/02/18 04/03/18 04/04/18 04/05/18    acetaminophen (TYLENOL) tablet 1,000 mg  Dose: 1,000 mg  Freq: 3 TIMES DAILY Route: PO  Start: 04/05/18 0800   Admin Instructions: Alternate ibuprofen (if ordered) with acetaminophen.  Maximum acetaminophen dose from all sources = 75 mg/kg/day not to exceed 4 gram           0855 (1,000 mg)-Given       1330 (1,000 mg)-Given       [ ] 2000           atorvastatin (LIPITOR) tablet 40 mg  Dose: 40 mg  Freq: DAILY Route: PO  Start: 04/05/18 0800          0854 (40 mg)-Given           melatonin tablet 1 mg  Dose: 1 mg  Freq: AT BEDTIME PRN Route: PO  PRN Reason: sleep  Start: 04/04/18 1631   Admin Instructions: Do not give unless at least 6 hours of uninterrupted sleep is expected.               metoprolol tartrate (LOPRESSOR) tablet 50 mg  Dose: 50 mg  Freq: 2 TIMES DAILY Route: PO  Start: 04/05/18 0800          0854 (50 mg)-Given       [ ] 2000           naloxone (NARCAN) injection 0.1-0.4 mg  Dose: 0.1-0.4 mg  Freq: EVERY 2 MIN PRN Route: IV  PRN Reason: opioid reversal  Start: 04/04/18 1631   Admin Instructions: For respiratory rate LESS than or EQUAL to 8.  Partial reversal dose:  0.1 mg titrated q 2 minutes for Analgesia Side Effects Monitoring Sedation Level of 3 (frequently drowsy, arousable, drifts to sleep during conversation).Full reversal dose:  0.4 mg bolus for Analgesia Side Effects Monitoring Sedation Level of 4 (somnolent, minimal or no response to stimulation).  For ordered doses up to 2mg give IVP. Give each 0.4mg over 15 seconds in emergency situations. For non-emergent situations further dilute in 9mL of NS to facilitate titration of response.               ondansetron (ZOFRAN-ODT) ODT tab 4 mg  Dose: 4 mg  Freq: EVERY 6 HOURS PRN Route: PO  PRN Reasons: nausea,vomiting  Start: 04/04/18 1631   Admin Instructions: This is Step 1 of nausea and vomiting management.  If nausea  not resolved in 15 minutes, go to Step 2 prochlorperazine (COMPAZINE). Do not push through foil backing. Peel back foil and gently remove. Place on tongue immediately. Administration with liquid unnecessary  With dry hands, peel back foil backing and gently remove tablet; do not push oral disintegrating tablet through foil backing; administer immediately on tongue and oral disintegrating tablet dissolves in seconds; then swallow with saliva; liquid not required.           0021 (4 mg)-Given          Or  ondansetron (ZOFRAN) injection 4 mg  Dose: 4 mg  Freq: EVERY 6 HOURS PRN Route: IV  PRN Reasons: nausea,vomiting  Start: 04/04/18 1631   Admin Instructions: This is Step 1 of nausea and vomiting management.  If nausea not resolved in 15 minutes, go to Step 2 prochlorperazine (COMPAZINE).  Irritant. For ordered doses up to 4 mg, give IV Push undiluted over 2-5 minutes.                      oxyCODONE IR (ROXICODONE) tablet 5 mg  Dose: 5 mg  Freq: EVERY 4 HOURS PRN Route: PO  PRN Reason: other  PRN Comment: pain control or improvement in physical function.  Start: 04/05/18 0842   Admin Instructions: Hold dose for analgesic side effects.  Notify provider to assess for uncontrolled pain or analgesic side effects.           1335 (5 mg)-Given           polyethylene glycol (MIRALAX/GLYCOLAX) Packet 17 g  Dose: 17 g  Freq: DAILY PRN Route: PO  PRN Reason: constipation  Start: 04/04/18 1631   Admin Instructions: Give in 8oz of  water, juice, or soda. Hold for loose stools.  This is the second step of a three step constipation treatment.  1 Packet = 17 grams. Mixed prescribed dose in 8 ounces of water. Follow with 8 oz. of water.               prochlorperazine (COMPAZINE) injection 5 mg  Dose: 5 mg  Freq: EVERY 6 HOURS PRN Route: IV  PRN Reasons: nausea,vomiting  Start: 04/04/18 1631   Admin Instructions: This is Step 2 of nausea and vomiting management. Give if nausea not resolved 15 minutes after giving ondansetron (ZOFRAN).  If nausea not resolved in 15 minutes, go to Step 3 metoclopramide (REGLAN), if ordered.  For ordered doses up to 10 mg, give IV Push undiluted. Each 5mg over 1 minute.              Or  prochlorperazine (COMPAZINE) tablet 5 mg  Dose: 5 mg  Freq: EVERY 6 HOURS PRN Route: PO  PRN Reason: vomiting  Start: 04/04/18 1631   Admin Instructions: This is Step 2 of nausea and vomiting management. Give if nausea not resolved 15 minutes after giving ondansetron (ZOFRAN). If nausea not resolved in 15 minutes, go to Step 3 metoclopramide (REGLAN), if ordered.              Or  prochlorperazine (COMPAZINE) Suppository 12.5 mg  Dose: 12.5 mg  Freq: EVERY 12 HOURS PRN Route: RE  PRN Reasons: nausea,vomiting  Start: 04/04/18 1631   Admin Instructions: This is Step 2 of nausea and vomiting management. Give if nausea not resolved 15 minutes after giving ondansetron (ZOFRAN). If nausea not resolved in 15 minutes, go to Step 3 metoclopramide (REGLAN), if ordered.               senna-docusate (SENOKOT-S;PERICOLACE) 8.6-50 MG per tablet 1 tablet  Dose: 1 tablet  Freq: 2 TIMES DAILY PRN Route: PO  PRN Reason: constipation  Start: 04/04/18 1631   Admin Instructions: If no bowel movement in 24 hours, increase to 2 tablets PO.  Hold for loose stools.  This is the first step of a three step constipation treatment.              Or  senna-docusate (SENOKOT-S;PERICOLACE) 8.6-50 MG per tablet 2 tablet  Dose: 2 tablet  Freq: 2 TIMES DAILY PRN Route: PO  PRN Reason: constipation  Start: 04/04/18 1631   Admin Instructions: Hold for loose stools.  This is the first step of a three step constipation treatment.              Future Medications  Medications 03/30/18 03/31/18 04/01/18 04/02/18 04/03/18 04/04/18 04/05/18       aspirin chewable tablet 81 mg  Dose: 81 mg  Freq: DAILY Route: PO  Start: 04/06/18 0800              losartan (COZAAR) tablet 50 mg  Dose: 50 mg  Freq: 2 TIMES DAILY Route: PO  Start: 04/05/18 2000   Admin Instructions: Hold for SBP <  110           [ ] 2000          Discontinued Medications  Medications 03/30/18 03/31/18 04/01/18 04/02/18 04/03/18 04/04/18 04/05/18         Dose: 650 mg  Freq: EVERY 4 HOURS PRN Route: RE  PRN Reason: mild pain  Start: 04/04/18 1631   End: 04/04/18 1633   Admin Instructions: Alternate ibuprofen (if ordered) with acetaminophen.  Maximum acetaminophen dose from all sources = 75 mg/kg/day not to exceed 4 grams/day.          1633-Med Discontinued          Dose: 1,000 mg  Freq: EVERY 6 HOURS SCHEDULED Route: PO  Start: 04/04/18 1800   End: 04/05/18 0746   Admin Instructions: Alternate ibuprofen (if ordered) with acetaminophen.  Maximum acetaminophen dose from all sources = 75 mg/kg/day not to exceed 4 gram          1821 (1,000 mg)-Given        0021 (1,000 mg)-Given              0746-Med Discontinued         Dose: 10 mg  Freq: 3 TIMES DAILY PRN Route: PO  PRN Reason: muscle spasms  Start: 04/04/18 1631   End: 04/05/18 0745          0745-Med Discontinued         Dose: 0.2 mg  Freq: EVERY 15 MIN PRN Route: IV  PRN Reasons: moderate to severe pain,severe pain  Start: 04/04/18 1257   End: 04/05/18 0745         1331 (0.2 mg)-Given        0745-Med Discontinued         Dose: 0.2 mg  Freq: EVERY 2 HOURS PRN Route: IV  PRN Reason: moderate to severe pain  PRN Comment: IF unable to take PO. Pain control or improvement in physical function.  Start: 04/04/18 1631   End: 04/05/18 0747   Admin Instructions: Hold dose for analgesic side effects.  If needing IV pain meds for 2 or more doses call provider to have oral medications adjusted to get pain controlled on oral regimen prior to discharge.  For ordered doses up to 4 mg give IV Push undiluted. Administer each 2mg over 2-5 minutes.          1659 (0.2 mg)-Given       2049 (0.2 mg)-Given        0747-Med Discontinued         Dose: 25 mg  Freq: EVERY 6 HOURS PRN Route: PO  PRN Reason: other  PRN Comment: adjuvant pain  Start: 04/04/18 1631   End: 04/05/18 0842         1700 (25  mg)-Given        0842-Med Discontinued         Start: 04/04/18 1411   End: 04/04/18 1605   Admin Instructions: Suzie Myrick : cabinet override                 1605-Med Discontinued          Dose: 5-10 mg  Freq: EVERY 4 HOURS PRN Route: PO  PRN Reason: other  PRN Comment: pain control or improvement in physical function.  Start: 04/05/18 0745   End: 04/05/18 0842   Admin Instructions: Start with the lowest dose.  May adjust dose by 5 mg every 3 hours as needed for pain control or improvement in physical function.  Hold dose for analgesic side effects.  Notify provider to assess for uncontrolled pain or analgesic side effects.           0842-Med Discontinued         Dose: 5-10 mg  Freq: EVERY 3 HOURS PRN Route: PO  PRN Reason: other  PRN Comment: pain control or improvement in physical function.  Start: 04/04/18 1631   End: 04/05/18 0745   Admin Instructions: Start with the lowest dose.  May adjust dose by 5 mg every 3 hours as needed for pain control or improvement in physical function.  Hold dose for analgesic side effects.  Notify provider to assess for uncontrolled pain or analgesic side effects.          2228 (10 mg)-Given        0745-Med Discontinued         Rate: 100 mL/hr   Freq: CONTINUOUS Route: IV  Last Dose: Stopped (04/05/18 0801)  Start: 04/04/18 1702   End: 04/05/18 0747         1726 ( )-New Bag        0549 ( )-New Bag       0747-Med Discontinued  0801-Stopped        Medications 03/30/18 03/31/18 04/01/18 04/02/18 04/03/18 04/04/18 04/05/18               INTERAGENCY TRANSFER FORM - NOTES (H&P, Discharge Summary, Consults, Procedures, Therapies)   4/4/2018                       [facility] OBSERVATION DEPARTMENT: [phone]               History & Physicals      H&P by Radha Metcalf PA-C at 4/4/2018  2:48 PM     Author:  Radha Metcalf PA-C Service:  Hospitalist Author Type:  Physician Assistant - VARSHA    Filed:  4/4/2018  5:58 PM Date of Service:  4/4/2018  2:48 PM Creation  Time:  4/4/2018  2:43 PM    Status:  Cosign Needed :  Radha Metcalf PA-C (Physician Assistant - C)    Cosign Required:  Yes             [facility]    Observation Unit   Hospitalist History and Physical    Name: Batsheva Barkley    MRN: [id number]  YOB: 1933    Age: 84 year old  Date of Admission:  4/4/2018  Date of Service (when I saw the patient):[KB1.1] 04/04/18[KB1.2]    Assessment & Plan[KB1.3]   Batsheva Barkley is a 84 year old nonanticoagulated female with PMH significant for CAD, ischemic cardiomyopathy, HTN, mixed HLD, moderate mitral regurgitation, urinary incontinence, and bilateral LE weakness who presented to the ED for evaluation of leg pain after a fall earlier today. Initially seen at Urgent Care where x-ray of ankle showed no acute abnormality and x-ray of tibia/fibula showed total knee arthroplasty and soft tissue swelling medially in the mid calf without sign of fracture. ED workup reveals VSS, BMP unremarkable, Hgb of 10.8, and INR of 1.07.[KB1.1] Patient was evaluated by Dr. Kuo of orthopedic surgery who recommended continuing monitoring for compartment syndrome.[KB1.4]    1. Left calf hematoma[KB1.1] with concern for compartment syndrome[KB1.4]:[KB1.1] injury sustained to left calf when patient was getting out a car earlier today and slipped which resulted in her walker falling onto her left leg vs hitting the side of the car. Not on anticoagulation and INR WNL. Hgb stable at 10.8. Left medial calf with hematoma present over a third of her calf that is significantly tender to touch[KB1.4] but compartments soft[KB1.5]. Neurovascularly intact on exam and able to dorsi and plantar flex her ankle but with increased pain. Orthopedics is aware of the patient since she was in the ED and will plan to continue serial exams to monitor for compartment syndrome.[KB1.4]  - Pain control with[KB1.1] scheduled Tylenol, PRN Flexeril, low dose Atarax, Oxycodone, and IV Dilaudid  available for severe pain[KB1.4]   - Elevate left leg and PRN ice to help with swelling  - CMS checks every shift   - Serial hemoglobins, recheck this evening and again in AM  - Orthopedics consulted and following, plan to recheck patient this afternoon and again this evening for compartment syndrome  - NPO for now in case of[KB1.1] surgical[KB1.4] intervention needed[KB1.1]  - PT consult in AM for mobility assessment  - SW consult for assistance with discharge planning[KB1.4]     2. S/p fall:[KB1.1] sound mechanical with patient denying any prodromal symptoms. Patient did not hit her head or have LOC. Initial x-rays of left ankle and tibia/fibular were negative for fracture but did show soft tissue swelling over the medial calf, refer to above. No other areas of pain and no additional imaging needed at this time.[KB1.4]      3. 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   - Continue PTA Losartan and Metoprolol with parameters   - Resume Atorvastatin  - Hold ASA     4.[KB1.1] Bilateral LE weakness: appears to be 2/2 deconditioning and patient uses a walker at baseline.[KB1.4]     Pain Assessment: # Pain Assessment:[KB1.1]  Current Pain Score 4/4/2018   Patient currently in pain? yes   Pain score (0-10) 9   Pain location Leg   Pain descriptors Aching;Constant[KB1.3]   - Batsheva is experiencing pain due to left calf hematoma. Pain management was discussed with Batsheva and her daughters and the plan was created in a collaborative fashion.  Batsheva's response to the current recommendations: engaged  - Please see the plan for pain management as documented above[KB1.4]  DVT Prophylaxis:[KB1.1] Ambulate every shift[KB1.4]  Code Status:[KB1.1] Full Code, discussed with patient and family at bedside[KB1.4]   Disposition: Expected discharge within 24-48 hours[KB1.1]     Primary Care Physician   Lenin Perez    Chief Complaint[KB1.3]    Left leg pain     History obtained from discussion with ED provider, chart review, and interview with patient[KB1.1] and daughter at bedside[KB1.4]      History of Present Illness[KB1.3]   Batsheva Barkley is a 84 year old female who presents[KB1.1] for evaluation of leg pain after falling earlier today. The patient states that around 11:00 AM today she was being assisted out of the car by her son to go back into her apartment. The patient's walker was in front of her and she is unsure the exact sequence of events that resulted in her falling. Per the patient's daughter who talked with the patient's son, the patient likely slipped on some ice and the patient's son grabbed the top of the patient's coat in order to help lower the patient to the ground. The patient's walker fell onto her left leg and there is question if the patient's left calf also hit part of the car during the fall. The patient only hit her leg in the fall and denies hitting her arm, head, back, or hip. Denies any associated lightheadedness, dizziness, chest pain, shortness of breath, or palpitations prior to the fall. The patient denies LOC. Denies dysuria, urinary urgency or frequency, or recent illness. Her pain since the fall is located over her left calf and is extremely tender to touch. She describes her pain as constant, throbbing, and rates it as a 10. Denies numbness or tingling in her left leg. The patient is not on anticoagulation except for a 81 mg aspirin daily. The patient reports issues with acid reflux of recent but none today. The patient reports recent diarrhea that she was seen for by her PCP but none currently and states that she has actually been constipated the last few days.[KB1.4]      Past Medical History    Past Medical History:   Diagnosis Date     CAD (coronary artery disease) 6/20/05    [place], transient CHF     Cardiomyopathy (H)      CHF NYHA class III, chronic, systolic (H) 1/25/2018     Coronary  atherosclerosis 2005    circ vessel was stented;  had a 50% LAD lesion which was not treated Problem list name updated by automated process. Provider to review     Edema 2015     Generalized osteoarthrosis     knees ;; L total kne 10/09     Hypertension goal BP (blood pressure) < 140/90 2015     Leg weakness, bilateral 2018     Mitral regurgitation     mod     Mixed hyperlipidemia      Osteoporosis      Physical deconditioning 2015     Total urinary incontinence 2017     Past Surgical History   Past Surgical History:   Procedure Laterality Date     COLONOSCOPY  3/05     ENT SURGERY       ESOPHAGOSCOPY, GASTROSCOPY, DUODENOSCOPY (EGD), COMBINED N/A 2016    Procedure: COMBINED ESOPHAGOSCOPY, GASTROSCOPY, DUODENOSCOPY (EGD), BIOPSY SINGLE OR MULTIPLE;  Surgeon: Jonathan Gramajo MD;  Location:  GI     EYE SURGERY       HEART CATH, ANGIOPLASTY      RONI to left circ     L bunion + hammer toes  ,      L total knee replacement  10/2009      ovarian cyst surgery       Prior to Admission Medications   Prior to Admission Medications   Prescriptions Last Dose Informant Patient Reported? Taking?   ASPIRIN 81 MG OR TABS 2018 at Unknown time  Yes Yes   Si tab po QD (Once per day)   CENTRUM SILVER OR TABS 2018 at Unknown time  Yes Yes   Sig: ONE DAILY   Calcium Carb-Cholecalciferol (CALCIUM 600 + D) 600-200 MG-UNIT TABS 2018 at Unknown time  Yes Yes   Sig: Take 600 mg by mouth daily   acetaminophen (TYLENOL) 325 MG tablet 2018 at am  No Yes   Sig: Take 2 tablets (650 mg) by mouth every 4 hours as needed for mild pain   atorvastatin (LIPITOR) 40 MG tablet 2018 at Unknown time  No Yes   Sig: TAKE ONE TABLET BY MOUTH ONCE DAILY   losartan (COZAAR) 50 MG tablet 2018 at Unknown time  No Yes   Sig: Take 1 tablet (50 mg) by mouth 2 times daily   metoprolol (LOPRESSOR) 50 MG tablet 2018 at Unknown time  No Yes   Sig: TAKE ONE TABLET BY MOUTH TWICE DAILY    order for DME   No No   Sig: ANTOINE stockings- below knee.   order for DME   No No   Sig: Light weight wheelchair Body mass index is 19.08 kg/(m^2).      Facility-Administered Medications: None     Allergies   Allergies   Allergen Reactions     Codeine      nausea, HA     Lisinopril Cough     Social History   Social History   Substance Use Topics     Smoking status: Former Smoker     Smokeless tobacco: Never Used      Comment: only smoked for 3 years.     Alcohol use No     Social History     Social History Narrative     Family History[KB1.3]   Family history reviewed with patient and is noncontributory.[KB1.4]    Review of Systems[KB1.3]   A Comprehensive greater than 10 system review of systems was carried out.  Pertinent positives and negatives are noted above.  Otherwise negative for contributory information.[KB1.1]    Physical Exam   Temp: 99.2  F (37.3  C) Temp src: Oral BP: 162/64 Pulse: 61 Heart Rate: 58 Resp: 22 SpO2: 99 % O2 Device: None (Room air)[KB1.3]    Vital Signs with Ranges[KB1.1]  Temp:  [98  F (36.7  C)-99.2  F (37.3  C)] 99.2  F (37.3  C)  Pulse:  [61] 61  Heart Rate:  [58-61] 58  Resp:  [14-22] 22  BP: (142-162)/(64-90) 162/64  SpO2:  [98 %-100 %] 99 %  102 lbs 1.6 oz[KB1.3]    GEN:  Alert, oriented x 3, appears comfortable, no overt distress  HEENT:  Normocephalic/atraumatic, no scleral icterus, no nasal discharge, mouth moist.  CV:  Regular rate and rhythm, no murmur or JVD.  S1 + S2 noted, no S3 or S4.  LUNGS:  Clear to auscultation bilaterally without rales/rhonchi/wheezing/retractions. Symmetric chest rise on inhalation noted.  ABD:  Active bowel sounds, soft, non-tender/non-distended.  No rebound/guarding/rigidity.  EXT:[KB1.1] severe ecchymosis over medial distal third of left calf with associated swelling and hematoma present, bilateral calves are soft but tender to touch over hematoma, NVI in bilateral LE, able to dorsi and plantar flex at bilateral ankles, 1-2+ dorsalis pedis pulses  bilaterally.[KB1.4]    SKIN:  Dry to touch, no exanthems noted in the visualized areas.  NEURO:  Symmetric muscle strength, sensation to touch grossly intact. Coordination symmetric on general exam[KB1.1].[KB1.4] No new focal deficits appreciated.[KB1.1]    Data[KB1.3]   Data reviewed today:  I personally reviewed all labs and imaging from Urgent Care and this visit.[KB1.1]     Results for orders placed or performed during the hospital encounter of 04/04/18   CBC with platelets differential   Result Value Ref Range    WBC 5.5 4.0 - 11.0 10e9/L    RBC Count 3.77 (L) 3.8 - 5.2 10e12/L    Hemoglobin 10.8 (L) 11.7 - 15.7 g/dL    Hematocrit 35.0 35.0 - 47.0 %    MCV 93 78 - 100 fl    MCH 28.6 26.5 - 33.0 pg    MCHC 30.9 (L) 31.5 - 36.5 g/dL    RDW 14.0 10.0 - 15.0 %    Platelet Count 246 150 - 450 10e9/L    Diff Method Automated Method     % Neutrophils 60.8 %    % Lymphocytes 23.9 %    % Monocytes 13.3 %    % Eosinophils 1.3 %    % Basophils 0.5 %    % Immature Granulocytes 0.2 %    Nucleated RBCs 0 0 /100    Absolute Neutrophil 3.3 1.6 - 8.3 10e9/L    Absolute Lymphocytes 1.3 0.8 - 5.3 10e9/L    Absolute Monocytes 0.7 0.0 - 1.3 10e9/L    Absolute Eosinophils 0.1 0.0 - 0.7 10e9/L    Absolute Basophils 0.0 0.0 - 0.2 10e9/L    Abs Immature Granulocytes 0.0 0 - 0.4 10e9/L    Absolute Nucleated RBC 0.0    Basic metabolic panel   Result Value Ref Range    Sodium 139 133 - 144 mmol/L    Potassium 4.2 3.4 - 5.3 mmol/L    Chloride 106 94 - 109 mmol/L    Carbon Dioxide 26 20 - 32 mmol/L    Anion Gap 7 3 - 14 mmol/L    Glucose 88 70 - 99 mg/dL    Urea Nitrogen 25 7 - 30 mg/dL    Creatinine 0.88 0.52 - 1.04 mg/dL    GFR Estimate 61 >60 mL/min/1.7m2    GFR Estimate If Black 74 >60 mL/min/1.7m2    Calcium 8.3 (L) 8.5 - 10.1 mg/dL   INR   Result Value Ref Range    INR 1.07 0.86 - 1.14   CK total   Result Value Ref Range    CK Total 78 30 - 225 U/L[KB1.3]     Radha Metcalf PA-C[KB1.1]       Revision History        User Key  Date/Time User Provider Type Action    > KB1.5 4/4/2018  5:58 PM Radha Metcalf PA-C Physician Assistant - C Sign     KB1.3 4/4/2018  5:56 PM Radha Metcalf PA-C Physician Assistant - C Sign     KB1.4 4/4/2018  5:28 PM Radha Metcalf PA-C Physician Assistant - C      KB1.2 4/4/2018  2:49 PM Radha Metcalf PA-C Physician Assistant - C      KB1.1 4/4/2018  2:43 PM Radha Metcalf PA-C Physician Assistant - C                   Discharge Summaries     No notes of this type exist for this encounter.         Consult Notes      Consults by Raisa Akers at 4/5/2018 12:02 PM     Author:  Raisa Akers Service:  Social Work Author Type:      Filed:  4/5/2018 12:02 PM Date of Service:  4/5/2018 12:02 PM Creation Time:  4/5/2018 11:57 AM    Status:  Signed :  Raisa Akers ()     Consult Orders:    1. Social Work IP Consult [[id number]] ordered by Radha Metcalf PA-C at 04/04/18 1611                Care Transition Initial Assessment -   Reason For Consult: discharge planning  Met with: patient, 2 dtrs, and dtr Raisa via phone  Active Problems:    Hematoma of left lower extremity       DATA  Lives With: spouse  Living Arrangements: apartment  Description of Support System: Supportive, Involved  Who is your support system?: , Children           Quality Of Family Relationships: supportive, helpful, involved       ASSESSMENT  Support Assessment: Adequate family and caregiver support, Adequate social supports, Lacks necessary supervision and assistance.  Identified issues/concerns regarding health management: Pt lives in apt with spouse.  Her daughters are her primary support.  PT is recommending TCU and family is in agreement.  Pt and spouse are moving into [facility] at the end of the month and family would like pt to go to [facility] which is connected.  Additionally, pt's granddtr works at [facility].  Referral made.  Pt's [facility] [place] which [place]  cover [place] stay.  Cognitive Status: unable to assess     PLAN  Financial costs for the patient includes none.  [facility]  Patient given options and choices for discharge family requests referral to  [facility].  Patient/family is agreeable to the plan? Yes  [facility][AJ1.1[facility]             Revision History        User Key Date/Time User Provider Type Action    > AJ1.1 4/5/2018 12:02 PM Raisa Akers  Sign            Consults signed by Ramo Kuo MD at 4/4/2018  6:04 PM      Author:  Ramo Kuo MD Service:  Orthopedics Author Type:  Physician    Filed:  4/4/2018  6:04 PM Date of Service:  4/4/2018  5:30 PM Creation Time:  4/4/2018  5:47 PM    Status:  Signed :  Ramo Kuo MD (Physician)     Consult Orders:    1. Orthopedic Surgery IP Consult: Patient to be seen: Routine - within 24 hours; Left calf hematoma, monitoring for compartment syndrome; Consultant may enter orders: Yes [[id number]] ordered by Radha Metcalf PA-C at 04/04/18 1611                Consult Date:  04/04/2018      DATE OF SERVICE: 04/04/2018        CHIEF COMPLAINT:  Left leg pain.      REFERRING PHYSICIAN:  Dr. Kaylyn Myrick from [facility] Emergency Department.      HISTORY OF PRESENT ILLNESS:  The patient is an 84-year-old female who was walking with a walker and had a fall today.  She fell to the ground in her walker and hit the medial aspect of her left calf region.  She then presented to urgent care first and then the Emergency Department with increasing pain.  I was asked to see the patient urgently for evaluation for compartment syndrome.  I saw the patient earlier today.  She states that all the pain is localized directly over the hematoma.  She is not on any current blood thinners.  She has not had any other trauma to that area.  From the urgent care notes, there is apparently no fracture identified involving her left tibia.  I do not have access to those  x-rays currently.  We will get those reviewed.      PAST MEDICAL HISTORY:   1.  CAD.   2.  Cardiomyopathy.   3.  Congestive heart failure.   4.  Edema.   5.  Osteoarthritis.   6.  Hyperlipidemia.   7.  Hypertension.      PAST SURGICAL HISTORY:   1.  ENT surgery.   2.  EGD.   3.  Angioplasty.   4.  Left total knee.      MEDICATIONS:   1.  Loperamide.   2.  Atorvastatin.   3.  Metoprolol.   4.  Potassium.   5.  Losartan.   6.  Mirtazapine.   7.  Calcium.   8.  Tylenol.   9.  Vitamin D.   10.  Aspirin.      SOCIAL AND FAMILY HISTORY:  The patient is .  Does not smoke.      ALLERGIES:  CODEINE, LISINOPRIL.      REVIEW OF SYSTEMS:  A 10-point review of systems was reviewed and is negative except for musculoskeletal.  Please see HPI.      PHYSICAL EXAMINATION:  Examination shows an 84-year-old female in mild distress.    She is awake, alert and oriented x3.  Focused examination of her left lower extremity shows a large hematoma involving the medial aspect of her calf.  This is a very focal area of swelling in this region consistent with a hematoma.  The remaining portion of her posterior compartment is soft.  Her anterior and lateral compartments are soft as well.  She has exquisite tenderness right directly over the hematoma.  She is able to actively dorsiflex and plantarflex her ankle.  She has no pain with passive stretch of her toes.  She has good capillary refill.  She does have a very difficult, but palpable pulse, but ultrasound shows good pulses.      IMPRESSION:   Left calf hematoma.      RECOMMENDATIONS:  At this point, I would watch this closely for development of compartment syndrome.  She does not currently have signs of an acute compartment syndrome.  I discussed this with the family that we will monitor this closely with serial checks.  I will have my team check her in approximately 2 hours and then again 2 hours later for any development of compartment syndrome.  The nursing staff will contact us  immediately if her exam changes or if the pain significantly worsens.  She will ice and elevate her left lower extremity.  She will be admitted for pain control and observation.  We will continue to monitor her closely for any development of a compartment syndrome.  We will also track down the x-rays for review as well.         KIMBERLEY VIGIL MD             D: 2018   T: 2018   MT: LP      Name:     BATSHEVA BARKLEY   MRN:      [id number]        Account:       [id number]   :      1933           Consult Date:  2018      Document: [id number]       cc: Lenin Perez MD[BB1.1]        Revision History        User Key Date/Time User Provider Type Action    > BB1.1 2018  6:04 PM Kimberley Vigil MD Physician Sign                     Progress Notes - Physician (Notes for yesterday and today)      Progress Notes by Naye Amaya PA at 2018 11:27 AM     Author:  Naye Amaya PA Service:  Hospitalist Author Type:  Physician Assistant - C    Filed:  2018 11:47 AM Date of Service:  2018 11:27 AM Creation Time:  2018 11:27 AM    Status:  Signed :  Naye Amaya PA (Physician Assistant - C)         [facility]   Observation Unit   Progress Note    Assessment & Plan   Batsheva Barkley is a 84 year old woman with a history of CAD s/p stenting, ICM, HTN, and HLD who is registered to observation after a mechanical fall resulting in LLE hematoma.     #Acute LLE Hematoma. Large hematoma left medial calf w/o evidence of compartment syndrome. Not on anticoagulation. Pain improved overnight. Hgb 10.8 on admit from  previously --> 9 today.   - Ortho following w/o indication for surgery. Continue to ice/elevate LLE. Can place gentle ACE wrap. Continue to monitor pulse, skin, pain, sensation, and ability to dorsi/plantar flex the ankle today. Is stable for d/c from their standpoint later today.   - Pain control w/ scheduled Tylenol 1 g TID. Also has PRN  "oxycodone but use conservatively given risk for mental status changes (changed to low dose and stopped PRN IV Dilaudid, Atarax, Flexeril).   - Trend hgb if still here in AM vs. check at TCU next 1-2 days.   - PT following.     #HTN. BPs 120s-130s/60s.   - Continue home metoprolol and losartan.     #CAD. S/p stent in 2005.   - Continue home statin.   - Resume home ASA 81 mg in AM.     Diet: regular   Fluids: PO  DVT Prophylaxis: ambulate as able - mechanical contraindicated on the leg as well as pharmacologic given acute hematoma   Code Status: full   Disposition: PT saw - needs TCU and is medically stable for d/c when bed available; SW following     Naye Amaya PA-C  Hospitalist Service    Interval History   Pain is much better today. Still painful with touch and is fearful to move and tear the skin. Required straight cath overnight as she couldn't void on her own. Denies urinary complaints prior to this. Has not been out of bed yet.     Physical Exam[WG1.1]   /64 (BP Location: Left arm)  Pulse 61  Temp 97.4  F (36.3  C) (Oral)  Resp 18  Ht 1.549 m (5' 1\")  Wt 46.3 kg (102 lb 1.6 oz)  SpO2 100%  BMI 19.29 kg/m2[WG1.2]     GENERAL: Sitting up in bed, alert, appears comfortable. Oriented.   HEENT: Anicteric sclera. Mucous membranes moist.   CV: RRR. S1, S2. No murmurs appreciated.   RESPIRATORY: Effort normal on room air. Lungs CTAB with no wheezing, rales, rhonchi.   GI: Abdomen soft and non distended, bowel sounds present. No tenderness, rebound, guarding.   NEUROLOGICAL: No focal deficits. Moves all extremities.    EXTREMITIES: Warm and well perfused. Left medial calf hematoma. Area tender to palpation but compartments soft.   SKIN: No jaundice. No rashes.     Data reviewed today: I reviewed all medications, new labs and imaging results over the last 24 hours.[WG1.1]                                       Revision History        User Key Date/Time User Provider Type Action    > WG1.2 4/5/2018 11:47 " "AM Naye Amaya PA Physician Assistant - C Sign     WG1.1 4/5/2018 11:27 AM Naye Amaya PA Physician Assistant - C             Progress Notes by Ramo Kuo MD at 4/5/2018  8:36 AM     Author:  Ramo Kuo MD Service:  Orthopedics Author Type:  Physician    Filed:  4/5/2018 11:04 AM Date of Service:  4/5/2018  8:36 AM Creation Time:  4/5/2018  8:36 AM    Status:  Addendum :  Ramo Kuo MD (Physician)         Orthopedic Surgery Progress Note  4/5/2018  Ortho following for Left calf hematoma with concern for compartment syndrome     S: Reports some improvement in left calf pain but still quite tender to palpation. Denies numbness/tingling in LLE, wiggles toes, able to plantar/dorisflex left foot with minimal pain.     O: Blood pressure 139/64, pulse 61, temperature 97.4  F (36.3  C), temperature source Oral, resp. rate 18, height 1.549 m (5' 1\"), weight 46.3 kg (102 lb 1.6 oz), SpO2 100 %, not currently breastfeeding.  Lab Results   Component Value Date    HGB 9.0 04/05/2018     Lab Results   Component Value Date    INR 1.07 04/04/2018     I/O last 3 completed shifts:  In: -   Out: 350 [Urine:350]     Large hematoma- left medial calf.   No significant swelling in posterior, anterior or lateral compartments. Compartments soft, tender to palpation but pain improving  Distal extremity CMSI bilaterally.  +PF/DF/EHL   1+ DP pulses  Wiggles toes, toes warm, cap refill < 3 sec     A: Left calf hematoma- improving, no current signs of compartment syndrome     P:   - No surgery planned   - Continue to ice and elevate LLE to decrease swelling  - Continue to monitor pulse, skin, pain, sensation, and ability to dorsi/plantar flex the ankle today.   - If continues to improve, ok to d/c home later today from Ortho standpoint. Patient states she may need TCU.       Please page me with any Ortho related questions/concerns on this patient between 7a-5pm today.     Sanchez Acevedo, MSN, APRN, " NP-C   Nurse Practitioner, [facility]  [facility]   Office Phone: [phone]  Pager: [phone][EJ1.1]      Addendum:    I also saw the patient this am.  Hematoma of left calf noted.  Pain improved.  No signs of compartment syndrome.  Patient can discharge from ortho standpoint.  OK to WBAT but will likely not tolerate well due to pain.  Would recommend gentle ankle ROM and gentle calf stretching.  FU with her primary care physician within 1-2 weeks.    Ramo Kuo MD[BB1.1]     Revision History        User Key Date/Time User Provider Type Action    > BB1.1 4/5/2018 11:04 AM Ramo Kuo MD Physician Addend     EJ1.1 4/5/2018  8:51 AM Sanchez Acevedo APRN CNP Nurse Practitioner Sign            Progress Notes by Ramo Kuo MD at 4/4/2018 10:51 PM     Author:  Ramo Kuo MD Service:  Orthopedics Author Type:  Physician    Filed:  4/4/2018 10:53 PM Date of Service:  4/4/2018 10:51 PM Creation Time:  4/4/2018 10:51 PM    Status:  Signed :  Ramo Kuo MD (Physician)         Ortho    Patient seen tonight for serial exam of left calf.  Patient states that the pain is much improved.  Sleeping comfortably when I arrived,     Left calf hematoma more focal with no significant swelling involving the posterior, anterior, or lateral compartments.  Gentle ROM of ankle and toes is pain free.  No signs of compartment syndrome.    A:  Left calf hematoma    Recs:  - ok to eat and drink, no surgery planned  - ice and elevate LLE  - will see again in the morning but likely sign off.    - discharge home when pain controlled, likely tomorrow,    Ramo Kuo MD[BB1.1]     Revision History        User Key Date/Time User Provider Type Action    > BB1.1 4/4/2018 10:53 PM Ramo Kuo MD Physician Sign            ED Provider Notes by Suzie Myrcik MD at 4/4/2018 12:32 PM     Author:  Suzie Myrick MD Service:  Emergency Medicine Author Type:  Physician    Filed:   4/4/2018  6:51 PM Date of Service:  4/4/2018 12:32 PM Creation Time:  4/4/2018 12:37 PM    Status:  Signed :  Suzie Myrick MD (Physician)           History     Chief Complaint:  Leg Pain     HPI   Batsheva Barkley is a 84 year old female with history of bilateral leg weakness and congestive heart failure who presents to the emergency department today for evaluation of leg pain.[SM1.1] The patient reports she was being helped out of a car and being handed her walker when she suddenly fell to the ground. From the fall the patient injured her left leg. Prior to arrival the patient was seen at urgent care at which time x-rays were obtained that reportedly did not show any fractures, however she was referred to the emergency department for further evaluation. At urgent care she was not given any medication for her pain. She now endorses significant pain to her lower left leg. The patient and her son who was with her when she fell deny any head injury or loss of consciousness from the patient. Her son state the patient fell slowly to the ground, and notes her walker fell onto her left leg during the fall also.she denies any other injury from the fall. Patient denies any current blood thinner use.[SM1.2]     Allergies:[SM1.1]  Codeine  Lisinopril[SM1.3]      Medications:    Loperamide HCl (IMODIUM A-D PO)  atorvastatin (LIPITOR) 40 MG tablet  metoprolol (LOPRESSOR) 50 MG tablet  potassium chloride SA (K-DUR/KLOR-CON M) 10 MEQ CR tablet  losartan (COZAAR) 50 MG tablet  mirtazapine (REMERON) 15 MG tablet  Calcium Carb-Cholecalciferol (CALCIUM 600 + D) 600-200 MG-UNIT TABS  acetaminophen (TYLENOL) 325 MG tablet  Cholecalciferol (VITAMIN D) 1000 UNITS capsule  CENTRUM SILVER OR TABS  ASPIRIN 81 MG OR TABS    Past Medical History:    CAD  Cardiomyopathy   CHF class three systolic   Edema   Osteoarthritis   Hyperlipidemia   Hypertension      Past Surgical History:    Colonoscopy   ENT surgery   EGD combined   Angioplasty    Left total knee replacement   Ovarian cyst removal     Family History:    History reviewed. No pertinent family history.      Social History:  The patient was accompanied to the ED by[SM1.1] her son and [SM1.2].  Smoking Status: Former smoker  Smokeless Tobacco: No  Alcohol Use: No    Marital Status:        Review of Systems   Musculoskeletal: Positive for[SM1.1] myalgias (left lower leg)[SM1.2].   Neurological: Negative for[SM1.1] syncope[SM1.2].   All other systems reviewed and are negative.    Physical Exam[SM1.1]     Patient Vitals for the past 24 hrs:   BP Temp Temp src Pulse Heart Rate Resp SpO2   04/04/18 1400 - - - - - - 98 %   04/04/18 1345 - - - - - - 98 %   04/04/18 1330 - - - - - - 100 %   04/04/18 1315 - - - - - - 100 %   04/04/18 1300 - - - - - - 100 %   04/04/18 1249 156/87 98  F (36.7  C) Temporal 61 61 14 100 %[SM1.4]      Physical Exam[SM1.1]  General: Patient is alert and interactive when I enter the room  Head:  The scalp, face, and head appear normal  Eyes:  Conjunctivae are normal  ENT:    The nose is normal    Pinnae are normal    External acoustic canals are normal  Neck:  Trachea midline  CV:  Pulses are normal  Resp:  No respiratory distress   Abdomen:      Soft, non-distended  Musc:  Normal muscular tone    No major joint effusions    Left leg hematoma with tense medial compartment, mild tenderness with flexion of leg, unable to palpate pulses but able to doppler on US, sensation intact distally   Skin:  No rash or lesions noted  Neuro:  Speech is normal and fluent. Face is symmetric.     Moving all extremities well.   Psych: Awake. Alert.  Normal affect.  Appropriate interactions.[HR1.1]    Emergency Department Course     Laboratory:  Laboratory findings were communicated with the patient who voiced understanding of the findings.[SM1.1]    CBC: WBC[SM1.2] 5.5[SM1.5], HGB[SM1.2] 10.8 (L)[SM1.5], PLT[SM1.2] 246[SM1.5]  BMP:[SM1.2] Calcium 8.3 (L),[SM1.5] o/w WNL  (Creatinine[SM1.2] 0.88[SM1.5])   INR:[SM1.2] 1.07[SM1.5]     Interventions:[SM1.1]  1331 Dilaudid 0.2mg IV[SM1.6]      Emergency Department Course:  Nursing notes and vitals reviewed.[SM1.1]  1251[SM1.2] I performed an exam of the patient as documented above.[SM1.1]   1254 I performed bedside ultrasound on the patient's left lower extremity to evaluate for a pulse.   IV was inserted and blood was drawn for laboratory testing, results above.[SM1.2]   1305 I spoke with Sanchez Acevedo NP of the Orthopedics service regarding patient's presentation, findings, and plan of care.[SM1.7]   1345[SM1.6] I spoke with Sanchez Acevedo NP of the Orthopedics service[SM1.7] after I evaluated the patient's lower left extremity with doppler ultrasound.   1354 I spoke with Dr. Kuo of the Orthopedics service regarding patient's presentation, findings, and plan of care.[SM1.8]   1420 I spoke with Dr. Kuo after he evaluated the patient and he states the patient should be admitted for observation.[SM1.5]  1441 I spoke with Elli Gong Pa-C of the Hospitalist service regarding patient's presentation, findings, and plan of care.[SM1.4]   I discussed the treatment plan with the patient. They expressed understanding of this plan and consented to admission. I discussed the patient with[SM1.5] Elli Gong PA-C[SM1.4], who will admit the patient to a monitored bed for further evaluation and treatment.[SM1.5] I personally reviewed the laboratory results with the Patient and answered all related questions prior to[SM1.1] admission.[SM1.5]   Impression & Plan      Medical Decision Making:[SM1.1]  Batsheva Barkley is a 84 year old female with history of heart failure who presents with left leg pain. The patient was sent here from urgent care for worsening pain despite no fracture seen on x-ray. Exam here revealed a very large medial left hematoma concerning for compartment syndrome. On exam she was quite tender to the touch. It was difficult to  palpate pulses in her distal extremities however after ultrasound we were able to do this. The patient was given dilaudid with improvement of her pain. I did touch base with ortho and fortunately the orthopedic surgeon Dr. Kuo was able to come and evaluate her. He thought she was more consistent with a very large painful hematoma but not necessarily a compartment like syndrome. He would recommend admission to hospital for reevaluation at the least 6-8 hours and to keep her NPO. Patient admitted to the hospitalist for observation. The rest of her blood work was unremarkable. Her pain was controlled with IV pain medications.[SM1.9]     Diagnosis:[SM1.1]    ICD-10-CM    1. Hematoma T14.8XXA[HR1.2]        Disposition:[SM1.1]  Admitted under the supervision of[SM1.5] Elli Gong Pa-C[SM1.4]      Scribe Disclosure:  Fede SANON, am serving as a scribe at 12:37 PM on 4/4/2018 to document services personally performed by Suzie Myrick MD based on my observations and the provider's statements to me.    4/4/2018   [facility] EMERGENCY DEPARTMENT[SM1.1]       Suzie Myrick MD  04/04/18 1851  [HR1.3]     Revision History        User Key Date/Time User Provider Type Action    > HR1.3 4/4/2018  6:51 PM Suzie Myrick MD Physician Sign     HR1.2 4/4/2018  6:49 PM Suzie Myrick MD Physician      HR1.1 4/4/2018  6:48 PM Suzie Myrick MD Physician      SM1.9 4/4/2018  4:16 PM Fede Chávez Scribe Share     SM1.4 4/4/2018  2:41 PM Fede Chávez Scribe Share     SM1.5 4/4/2018  2:23 PM Fede Chávez Scribe Share     SM1.8 4/4/2018  1:55 PM Fede Chávez Scribe Share     SM1.6 4/4/2018  1:49 PM Fede Chávez Scribe Share     SM1.7 4/4/2018  1:05 PM Fede Chávez Scribe Share     SM1.3 4/4/2018 12:58 PM Fede Chávez     SM1.2 4/4/2018 12:48 PM Fede Chávez      SM1.1 4/4/2018 12:43 PM Fede Chávez            Progress Notes by Criselda Fountain,  GEMA at 4/4/2018  4:58 PM     Author:  Criselda Fountain PA-C Service:  Orthopedics Author Type:  Physician Assistant - VARSHA    Filed:  4/4/2018  5:14 PM Date of Service:  4/4/2018  4:58 PM Creation Time:  4/4/2018  4:55 PM    Status:  Addendum :  Criselda Fountain PA-C (Physician Assistant - VARSHA)         Concern for left LE compartment syndrome    Mrs. Barkley states that she was getting into a car when she slipped with her walker falling backwards hitting her medial calf on either her walker or the car. She had immediate pain and swelling. She is not currently on any anticoagulation.    Severe ecchymosis over medial distal 1/3 of left calf  Severe swelling/hematoma located over the medial distal 1/3 of the left calf  Bilateral posterior calves are soft, non-tender. Area surrounding the hematoma is not painful and is soft.  Bilateral lower extremity is NVI.  Sensation intact bilateral lower extremities  Able to dorsi and plantar flex although does have some pain  +DP pulses        Assessment:   Left medial calf hematoma      Plan:   Continue to monitor pulse, skin, pain, sensation, and ability to dorsi/plantar flex the ankle   Elevate the left leg  Pain medication as needed[EC1.1]  Keep NPO until further notified[EC1.2]  Call orthopedics with any concerns     Formal consult note to follow    Criselda Fountain PAC  [phone][EC1.1]     Revision History        User Key Date/Time User Provider Type Action    > [N/A] 4/4/2018  5:14 PM Criselda Fountain PA-C Physician Assistant [place] MADDOX Addend     EC1.2 4/4/2018  5:03 PM Criselda Fountain PA-C Physician Assistant - C Addend     EC1.1 4/4/2018  5:02 PM Criselda Fountain PA-C Physician Assistant [place] MADDOX Sign                  Procedure Notes     No notes of this type exist for this encounter.         Progress Notes - Therapies (Notes from 04/02/18 through 04/05/18)      Progress Notes by Cici Holloway PT at 4/5/2018 12:46 PM     Author:  Cici Holloway PT  Service:  (none) Author Type:  Physical Therapist    Filed:  4/5/2018 12:46 PM Date of Service:  4/5/2018 12:46 PM Creation Time:  4/5/2018 12:46 PM    Status:  Signed :  Cici Holloway PT (Physical Therapist)          04/05/18 1103   Quick Adds   Type of Visit Initial PT Evaluation   Living Environment   Lives With spouse   Living Arrangements apartment   Home Accessibility bed and bath on same level;tub/shower is not walk in   Number of Stairs to Enter Home 0   Number of Stairs Within Home 0   Transportation Available agency transportation   Living Environment Comment metro mobility to [employer] and friends each week. family drives too   Self-Care   Usual Activity Tolerance moderate   Current Activity Tolerance fair   Regular Exercise no   Equipment Currently Used at Home walker, rolling   Activity/Exercise/Self-Care Comment assist with bathing and dressing by ,who has vision impairments.  does cooking. family helps with shopping.   Functional Level Prior   Ambulation 1-->assistive equipment   Transferring 1-->assistive equipment   Toileting 1-->assistive equipment   Bathing 2-->assistive person   Dressing 2-->assistive person   Eating 0-->independent   Communication 0-->understands/communicates without difficulty   Swallowing 0-->swallows foods/liquids without difficulty   Cognition 1 - attention or memory deficits   Fall history within last six months yes   Number of times patient has fallen within last six months 2   Which of the above functional risks had a recent onset or change? fall history   General Information   Onset of Illness/Injury or Date of Surgery - Date 04/04/18   Referring Physician Dr. Montenegro   Patient/Family Goals Statement Pt knows that she needs rehab and is OK   Pertinent History of Current Problem (include personal factors and/or comorbidities that impact the POC) Pt is an 84 year old female admitted with left lower LE hematoma after a fall. Hx of CAD, ischemic  cardiomyopathy, HTN   Precautions/Limitations fall precautions  (left hand finger flexion contractures)   Weight-Bearing Status - LLE full weight-bearing   Weight-Bearing Status - RLE weight-bearing as tolerated   Cognitive Status Examination   Orientation orientation to person, place and time   Level of Consciousness alert   Follows Commands and Answers Questions 100% of the time   Personal Safety and Judgment intact   Memory impaired   Pain Assessment   Patient Currently in Pain Yes, see Vital Sign flowsheet   Integumentary/Edema   Integumentary/Edema Comments left LE wrapped   Range of Motion (ROM)   ROM Comment 3-5th metatarsal flexion contractures, right LE WFL, left WFL but limited by pain in left lower leg   Strength   Strength Comments WFL right, not tested left   Bed Mobility   Bed Mobility Comments supine to sit: modA   Transfer Skills   Transfer Comments sit to stand: Chavez,    Gait   Gait Comments Pt ambulates a couple of steps with walker and modA. See flowsheet   Balance   Balance Comments requires support   Sensory Examination   Sensory Perception no deficits were identified   Coordination   Coordination Comments decreased left UE finger to nose   General Therapy Interventions   Planned Therapy Interventions bed mobility training;gait training;strengthening;transfer training;progressive activity/exercise   Clinical Impression   Criteria for Skilled Therapeutic Intervention yes, treatment indicated   PT Diagnosis difficulty with gait   Influenced by the following impairments pain, decreased strength, decreased cognition, decreased balance   Functional limitations due to impairments increased assist with all functional mobility compared to baseline   Clinical Presentation Evolving/Changing   Clinical Presentation Rationale clinical judgement   Clinical Decision Making (Complexity) Low complexity   Therapy Frequency` 5 times/week   Predicted Duration of Therapy Intervention (days/wks) 1-3 days  "  Anticipated Equipment Needs at Discharge other (see comments)  (may need platform walker)   Anticipated Discharge Disposition Transitional Care Facility   Risk & Benefits of therapy have been explained Yes   Patient, Family & other staff in agreement with plan of care Yes   [facility] TM \"6 Clicks\"   2016, Trustees of [facility], under license to [employer].  All rights reserved.   6 Clicks Short Forms Basic Mobility Inpatient Short Form   [facility] AM-PAC  \"6 Clicks\" V.2 Basic Mobility Inpatient Short Form   1. Turning from your back to your side while in a flat bed without using bedrails? 2 - A Lot   2. Moving from lying on your back to sitting on the side of a flat bed without using bedrails? 2 - A Lot   3. Moving to and from a bed to a chair (including a wheelchair)? 2 - A Lot   4. Standing up from a chair using your arms (e.g., wheelchair, or bedside chair)? 3 - A Little   5. To walk in hospital room? 2 - A Lot   6. Climbing 3-5 steps with a railing? 2 - A Lot   Basic Mobility Raw Score (Score out of 24.Lower scores equate to lower levels of function) 13   Total Evaluation Time   Total Evaluation Time (Minutes) 20[KC1.1]        Revision History        User Key Date/Time User Provider Type Action    > KC1.1 4/5/2018 12:46 PM Cici Holloway, PT Physical Therapist Sign                                                      INTERAGENCY TRANSFER FORM - LAB / IMAGING / EKG / EMG RESULTS   4/4/2018                       [facility] OBSERVATION DEPARTMENT: [phone]            Unresulted Labs (24h ago through future)    Start       Ordered    04/06/18 0600  Hemoglobin  AM DRAW,   Routine     Comments:  Last Lab Result: Hemoglobin (g/dL)       Date                     Value                 04/05/2018               9.0 (L)          ----------    04/05/18 1140         Lab Results - 3 Days      Basic metabolic panel [[id number]] (Abnormal)  Resulted: 04/05/18 0711, Result " status: Final result    Ordering provider: Radha Metcalf PA-C  04/05/18 0001 Resulting lab: [facility]    Specimen Information    Type Source Collected On   Blood  04/05/18 0640          Components       Value Reference Range Flag Lab   Sodium 141 133 - 144 mmol/L  FrRdHs   Potassium 4.3 3.4 - 5.3 mmol/L  FrRdHs   Chloride 111 94 - 109 mmol/L H FrRdHs   Carbon Dioxide 26 20 - 32 mmol/L  FrRdHs   Anion Gap 4 3 - 14 mmol/L  FrRdHs   Glucose 86 70 - 99 mg/dL  FrRdHs   Urea Nitrogen 29 7 - 30 mg/dL  FrRdHs   Creatinine 0.79 0.52 - 1.04 mg/dL  FrRdHs   GFR Estimate 69 >60 mL/min/1.7m2  FrRdHs   Comment:  Non  GFR Calc   GFR Estimate If Black 84 >60 mL/min/1.7m2  FrRdHs   Comment:  African American GFR Calc   Calcium 7.9 8.5 - 10.1 mg/dL L FrRdHs            CBC with platelets differential [[id number]] (Abnormal)  Resulted: 04/05/18 0657, Result status: Final result    Ordering provider: Radha Metcalf PA-C  04/05/18 0001 Resulting lab: [facility]    Specimen Information    Type Source Collected On   Blood  04/05/18 0640          Components       Value Reference Range Flag Lab   WBC 4.7 4.0 - 11.0 10e9/L  FrRdHs   RBC Count 3.07 3.8 - 5.2 10e12/L L FrRdHs   Hemoglobin 9.0 11.7 - 15.7 g/dL L FrRdHs   Hematocrit 28.6 35.0 - 47.0 % L FrRdHs   MCV 93 78 - 100 fl  FrRdHs   MCH 29.3 26.5 - 33.0 pg  FrRdHs   MCHC 31.5 31.5 - 36.5 g/dL  FrRdHs   RDW 14.2 10.0 - 15.0 %  FrRdHs   Platelet Count 183 150 - 450 10e9/L  FrRdHs   Diff Method Automated Method   FrRdHs   % Neutrophils 56.9 %  FrRdHs   % Lymphocytes 29.4 %  FrRdHs   % Monocytes 12.0 %  FrRdHs   % Eosinophils 0.9 %  FrRdHs   % Basophils 0.6 %  FrRdHs   % Immature Granulocytes 0.2 %  FrRdHs   Nucleated RBCs 0 0 /100  FrRdHs   Absolute Neutrophil 2.7 1.6 - 8.3 10e9/L  FrRdHs   Absolute Lymphocytes 1.4 0.8 - 5.3 10e9/L  FrRdHs   Absolute Monocytes 0.6 0.0 - 1.3 10e9/L  FrRdHs   Absolute Eosinophils 0.0 0.0 - 0.7 10e9/L   FrRdHs   Absolute Basophils 0.0 0.0 - 0.2 10e9/L  FrRdHs   Abs Immature Granulocytes 0.0 0 - 0.4 10e9/L  FrRdHs   Absolute Nucleated RBC 0.0   FrRdHs            Hemoglobin [[id number]] (Abnormal)  Resulted: 04/04/18 2025, Result status: Final result    Ordering provider: Radha Metcalf PA-C  04/04/18 1631 Resulting lab: [facility]    Specimen Information    Type Source Collected On   Blood  04/04/18 2016          Components       Value Reference Range Flag Lab   Hemoglobin 9.9 11.7 - 15.7 g/dL L FrRdHs            CK total [[id number]]  Resulted: 04/04/18 1511, Result status: Final result    Ordering provider: Suzie Myrick MD  04/04/18 1330 Resulting lab: [facility]    Specimen Information    Type Source Collected On     04/04/18 1330          Components       Value Reference Range Flag Lab   CK Total 78 30 - 225 U/L  FrRdHs            Basic metabolic panel [[id number]] (Abnormal)  Resulted: 04/04/18 1410, Result status: Final result    Ordering provider: Suzie Myrick MD  04/04/18 1257 Resulting lab: [facility]    Specimen Information    Type Source Collected On   Blood  04/04/18 1330          Components       Value Reference Range Flag Lab   Sodium 139 133 - 144 mmol/L  FrRdHs   Potassium 4.2 3.4 - 5.3 mmol/L  FrRdHs   Chloride 106 94 - 109 mmol/L  FrRdHs   Carbon Dioxide 26 20 - 32 mmol/L  FrRdHs   Anion Gap 7 3 - 14 mmol/L  FrRdHs   Glucose 88 70 - 99 mg/dL  FrRdHs   Urea Nitrogen 25 7 - 30 mg/dL  FrRdHs   Creatinine 0.88 0.52 - 1.04 mg/dL  FrRdHs   GFR Estimate 61 >60 mL/min/1.7m2  FrRdHs   Comment:  Non  GFR Calc   GFR Estimate If Black 74 >60 mL/min/1.7m2  FrRdHs   Comment:  African American GFR Calc   Calcium 8.3 8.5 - 10.1 mg/dL L FrRdHs            INR [[id number]]  Resulted: 04/04/18 1358, Result status: Final result    Ordering provider: Suzie Myrick MD  04/04/18 1257 Resulting lab: [facility]    Specimen Information     Type Source Collected On   Blood  04/04/18 1330          Components       Value Reference Range Flag Lab   INR 1.07 0.86 - 1.14  FrRdHs            CBC with platelets differential [[id number]] (Abnormal)  Resulted: 04/04/18 1349, Result status: Final result    Ordering provider: Suzie Myrick MD  04/04/18 1257 Resulting lab: [facility]    Specimen Information    Type Source Collected On   Blood  04/04/18 1330          Components       Value Reference Range Flag Lab   WBC 5.5 4.0 - 11.0 10e9/L  FrRdHs   RBC Count 3.77 3.8 - 5.2 10e12/L L FrRdHs   Hemoglobin 10.8 11.7 - 15.7 g/dL L FrRdHs   Hematocrit 35.0 35.0 - 47.0 %  FrRdHs   MCV 93 78 - 100 fl  FrRdHs   MCH 28.6 26.5 - 33.0 pg  FrRdHs   MCHC 30.9 31.5 - 36.5 g/dL L FrRdHs   RDW 14.0 10.0 - 15.0 %  FrRdHs   Platelet Count 246 150 - 450 10e9/L  FrRdHs   Diff Method Automated Method   FrRdHs   % Neutrophils 60.8 %  FrRdHs   % Lymphocytes 23.9 %  FrRdHs   % Monocytes 13.3 %  FrRdHs   % Eosinophils 1.3 %  FrRdHs   % Basophils 0.5 %  FrRdHs   % Immature Granulocytes 0.2 %  FrRdHs   Nucleated RBCs 0 0 /100  FrRdHs   Absolute Neutrophil 3.3 1.6 - 8.3 10e9/L  FrRdHs   Absolute Lymphocytes 1.3 0.8 - 5.3 10e9/L  FrRdHs   Absolute Monocytes 0.7 0.0 - 1.3 10e9/L  FrRdHs   Absolute Eosinophils 0.1 0.0 - 0.7 10e9/L  FrRdHs   Absolute Basophils 0.0 0.0 - 0.2 10e9/L  FrRdHs   Abs Immature Granulocytes 0.0 0 - 0.4 10e9/L  FrRdHs   Absolute Nucleated RBC 0.0   FrRdHs            Testing Performed By     Lab - Abbreviation Name Director Address Valid Date Range    12 - FrRdHs [facility] Unknown [address] [facility] [id number] 05/08/15 1057 - Present               Imaging Results - 3 Days      XR Tibia & Fibula Port Left 2 Views [[id number]]  Resulted: 04/04/18 2157, Result status: Final result    Ordering provider: Criselda Fountain PA-C  04/04/18 1732 Resulted by: Chon Alonso MD    Performed: 04/04/18 1744 - 04/04/18 1800 Resulting lab:  RADIOLOGY RESULTS    Narrative:       TIBIA AND FIBULA PORTABLE LEFT TWO VIEWS    4/4/2018 6:00 PM     HISTORY: Left medial calf hematoma.    COMPARISON: None.      Impression:       IMPRESSION: Total knee arthroplasty seen in the upper edge of the  images without evidence of complication. No evidence of acute fracture  or subluxation. Vascular calcifications. Soft tissue fullness medial  mid calf is likely the site of the hematoma.    SANDRO JACKSON MD      Testing Performed By     Lab - Abbreviation Name Director Address Valid Date Range    104 - Rad Rslts RADIOLOGY RESULTS Unknown Unknown 02/16/05 1553 - Present            Encounter-Level Documents:     There are no encounter-level documents.      Order-Level Documents:     There are no order-level documents.

## 2018-04-04 NOTE — H&P
[facility]    Observation Unit   Hospitalist History and Physical    Name: Batsheva Barkley    MRN: [id number]  YOB: 1933    Age: 84 year old  Date of Admission:  4/4/2018  Date of Service (when I saw the patient): 04/04/18    Assessment & Plan   Batsheva Barkley is a 84 year old nonanticoagulated female with PMH significant for CAD, ischemic cardiomyopathy, HTN, mixed HLD, moderate mitral regurgitation, urinary incontinence, and bilateral LE weakness who presented to the ED for evaluation of leg pain after a fall earlier today. Initially seen at Urgent Care where x-ray of ankle showed no acute abnormality and x-ray of tibia/fibula showed total knee arthroplasty and soft tissue swelling medially in the mid calf without sign of fracture. ED workup reveals VSS, BMP unremarkable, Hgb of 10.8, and INR of 1.07. Patient was evaluated by Dr. Kuo of orthopedic surgery who recommended continuing monitoring for compartment syndrome.    1. Left calf hematoma with concern for compartment syndrome: injury sustained to left calf when patient was getting out a car earlier today and slipped which resulted in her walker falling onto her left leg vs hitting the side of the car. Not on anticoagulation and INR WNL. Hgb stable at 10.8. Left medial calf with hematoma present over a third of her calf that is significantly tender to touch but compartments soft. Neurovascularly intact on exam and able to dorsi and plantar flex her ankle but with increased pain. Orthopedics is aware of the patient since she was in the ED and will plan to continue serial exams to monitor for compartment syndrome.  - Pain control with scheduled Tylenol, PRN Flexeril, low dose Atarax, Oxycodone, and IV Dilaudid available for severe pain   - Elevate left leg and PRN ice to help with swelling  - CMS checks every shift   - Serial hemoglobins, recheck this evening and again in AM  - Orthopedics consulted and following, plan to recheck patient  this afternoon and again this evening for compartment syndrome  - NPO for now in case of surgical intervention needed  - PT consult in AM for mobility assessment  - SW consult for assistance with discharge planning     2. S/p fall: sound mechanical with patient denying any prodromal symptoms. Patient did not hit her head or have LOC. Initial x-rays of left ankle and tibia/fibular were negative for fracture but did show soft tissue swelling over the medial calf, refer to above. No other areas of pain and no additional imaging needed at this time.      3. 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   - Continue PTA Losartan and Metoprolol with parameters   - Resume Atorvastatin  - Hold ASA     4. Bilateral LE weakness: appears to be 2/2 deconditioning and patient uses a walker at baseline.     Pain Assessment: # Pain Assessment:  Current Pain Score 4/4/2018   Patient currently in pain? yes   Pain score (0-10) 9   Pain location Leg   Pain descriptors Aching;Constant   - Batsheva is experiencing pain due to left calf hematoma. Pain management was discussed with Batsheva and her daughters and the plan was created in a collaborative fashion.  Batsheva's response to the current recommendations: engaged  - Please see the plan for pain management as documented above  DVT Prophylaxis: Ambulate every shift  Code Status: Full Code, discussed with patient and family at bedside   Disposition: Expected discharge within 24-48 hours     Primary Care Physician   Lenin Perez    Chief Complaint   Left leg pain     History obtained from discussion with ED provider, chart review, and interview with patient and daughter at bedside      History of Present Illness   Batsheva Barkley is a 84 year old female who presents for evaluation of leg pain after falling earlier today. The patient states that around 11:00 AM today she was being assisted out of the car  by her son to go back into her apartment. The patient's walker was in front of her and she is unsure the exact sequence of events that resulted in her falling. Per the patient's daughter who talked with the patient's son, the patient likely slipped on some ice and the patient's son grabbed the top of the patient's coat in order to help lower the patient to the ground. The patient's walker fell onto her left leg and there is question if the patient's left calf also hit part of the car during the fall. The patient only hit her leg in the fall and denies hitting her arm, head, back, or hip. Denies any associated lightheadedness, dizziness, chest pain, shortness of breath, or palpitations prior to the fall. The patient denies LOC. Denies dysuria, urinary urgency or frequency, or recent illness. Her pain since the fall is located over her left calf and is extremely tender to touch. She describes her pain as constant, throbbing, and rates it as a 10. Denies numbness or tingling in her left leg. The patient is not on anticoagulation except for a 81 mg aspirin daily. The patient reports issues with acid reflux of recent but none today. The patient reports recent diarrhea that she was seen for by her PCP but none currently and states that she has actually been constipated the last few days.      Past Medical History    Past Medical History:   Diagnosis Date     CAD (coronary artery disease) 6/20/05    [place], transient CHF     Cardiomyopathy (H)      CHF NYHA class III, chronic, systolic (H) 1/25/2018     Coronary atherosclerosis 6/20/2005    circ vessel was stented;  had a 50% LAD lesion which was not treated Problem list name updated by automated process. Provider to review     Edema 1/26/2015     Generalized osteoarthrosis     knees ;; L total kne 10/09     Hypertension goal BP (blood pressure) < 140/90 1/21/2015     Leg weakness, bilateral 2/13/2018     Mitral regurgitation     mod     Mixed  hyperlipidemia      Osteoporosis      Physical deconditioning 2015     Total urinary incontinence 2017     Past Surgical History   Past Surgical History:   Procedure Laterality Date     COLONOSCOPY  3/05     ENT SURGERY       ESOPHAGOSCOPY, GASTROSCOPY, DUODENOSCOPY (EGD), COMBINED N/A 2016    Procedure: COMBINED ESOPHAGOSCOPY, GASTROSCOPY, DUODENOSCOPY (EGD), BIOPSY SINGLE OR MULTIPLE;  Surgeon: Jonathan Gramajo MD;  Location:  GI     EYE SURGERY       HEART CATH, ANGIOPLASTY  2005    RONI to left circ     L bunion + hammer toes  ,      L total knee replacement  10/2009      ovarian cyst surgery       Prior to Admission Medications   Prior to Admission Medications   Prescriptions Last Dose Informant Patient Reported? Taking?   ASPIRIN 81 MG OR TABS 2018 at Unknown time  Yes Yes   Si tab po QD (Once per day)   CENTRUM SILVER OR TABS 2018 at Unknown time  Yes Yes   Sig: ONE DAILY   Calcium Carb-Cholecalciferol (CALCIUM 600 + D) 600-200 MG-UNIT TABS 2018 at Unknown time  Yes Yes   Sig: Take 600 mg by mouth daily   acetaminophen (TYLENOL) 325 MG tablet 2018 at am  No Yes   Sig: Take 2 tablets (650 mg) by mouth every 4 hours as needed for mild pain   atorvastatin (LIPITOR) 40 MG tablet 2018 at Unknown time  No Yes   Sig: TAKE ONE TABLET BY MOUTH ONCE DAILY   losartan (COZAAR) 50 MG tablet 2018 at Unknown time  No Yes   Sig: Take 1 tablet (50 mg) by mouth 2 times daily   metoprolol (LOPRESSOR) 50 MG tablet 2018 at Unknown time  No Yes   Sig: TAKE ONE TABLET BY MOUTH TWICE DAILY   order for DME   No No   Sig: ANTOINE stockings- below knee.   order for DME   No No   Sig: Light weight wheelchair Body mass index is 19.08 kg/(m^2).      Facility-Administered Medications: None     Allergies   Allergies   Allergen Reactions     Codeine      nausea, HA     Lisinopril Cough     Social History   Social History   Substance Use Topics     Smoking status: Former Smoker      Smokeless tobacco: Never Used      Comment: only smoked for 3 years.     Alcohol use No     Social History     Social History Narrative     Family History   Family history reviewed with patient and is noncontributory.    Review of Systems   A Comprehensive greater than 10 system review of systems was carried out.  Pertinent positives and negatives are noted above.  Otherwise negative for contributory information.    Physical Exam   Temp: 99.2  F (37.3  C) Temp src: Oral BP: 162/64 Pulse: 61 Heart Rate: 58 Resp: 22 SpO2: 99 % O2 Device: None (Room air)    Vital Signs with Ranges  Temp:  [98  F (36.7  C)-99.2  F (37.3  C)] 99.2  F (37.3  C)  Pulse:  [61] 61  Heart Rate:  [58-61] 58  Resp:  [14-22] 22  BP: (142-162)/(64-90) 162/64  SpO2:  [98 %-100 %] 99 %  102 lbs 1.6 oz    GEN:  Alert, oriented x 3, appears comfortable, no overt distress  HEENT:  Normocephalic/atraumatic, no scleral icterus, no nasal discharge, mouth moist.  CV:  Regular rate and rhythm, no murmur or JVD.  S1 + S2 noted, no S3 or S4.  LUNGS:  Clear to auscultation bilaterally without rales/rhonchi/wheezing/retractions. Symmetric chest rise on inhalation noted.  ABD:  Active bowel sounds, soft, non-tender/non-distended.  No rebound/guarding/rigidity.  EXT: severe ecchymosis over medial distal third of left calf with associated swelling and hematoma present, bilateral calves are soft but tender to touch over hematoma, NVI in bilateral LE, able to dorsi and plantar flex at bilateral ankles, 1-2+ dorsalis pedis pulses bilaterally.    SKIN:  Dry to touch, no exanthems noted in the visualized areas.  NEURO:  Symmetric muscle strength, sensation to touch grossly intact. Coordination symmetric on general exam. No new focal deficits appreciated.    Data   Data reviewed today:  I personally reviewed all labs and imaging from Urgent Care and this visit.     Results for orders placed or performed during the hospital encounter of 04/04/18   CBC with platelets  differential   Result Value Ref Range    WBC 5.5 4.0 - 11.0 10e9/L    RBC Count 3.77 (L) 3.8 - 5.2 10e12/L    Hemoglobin 10.8 (L) 11.7 - 15.7 g/dL    Hematocrit 35.0 35.0 - 47.0 %    MCV 93 78 - 100 fl    MCH 28.6 26.5 - 33.0 pg    MCHC 30.9 (L) 31.5 - 36.5 g/dL    RDW 14.0 10.0 - 15.0 %    Platelet Count 246 150 - 450 10e9/L    Diff Method Automated Method     % Neutrophils 60.8 %    % Lymphocytes 23.9 %    % Monocytes 13.3 %    % Eosinophils 1.3 %    % Basophils 0.5 %    % Immature Granulocytes 0.2 %    Nucleated RBCs 0 0 /100    Absolute Neutrophil 3.3 1.6 - 8.3 10e9/L    Absolute Lymphocytes 1.3 0.8 - 5.3 10e9/L    Absolute Monocytes 0.7 0.0 - 1.3 10e9/L    Absolute Eosinophils 0.1 0.0 - 0.7 10e9/L    Absolute Basophils 0.0 0.0 - 0.2 10e9/L    Abs Immature Granulocytes 0.0 0 - 0.4 10e9/L    Absolute Nucleated RBC 0.0    Basic metabolic panel   Result Value Ref Range    Sodium 139 133 - 144 mmol/L    Potassium 4.2 3.4 - 5.3 mmol/L    Chloride 106 94 - 109 mmol/L    Carbon Dioxide 26 20 - 32 mmol/L    Anion Gap 7 3 - 14 mmol/L    Glucose 88 70 - 99 mg/dL    Urea Nitrogen 25 7 - 30 mg/dL    Creatinine 0.88 0.52 - 1.04 mg/dL    GFR Estimate 61 >60 mL/min/1.7m2    GFR Estimate If Black 74 >60 mL/min/1.7m2    Calcium 8.3 (L) 8.5 - 10.1 mg/dL   INR   Result Value Ref Range    INR 1.07 0.86 - 1.14   CK total   Result Value Ref Range    CK Total 78 30 - 225 U/L     Radha Metcalf PA-C

## 2018-04-04 NOTE — ED NOTES
Pt was getting out car with with walker and help of son, then pt just went down on left side. No ice noted. Did not hit head. Pt unable to bear weight on left leg following fall. Pt c/o pain in right leg, left leg/calf bruise and swelling. Pt took 2 tylenol at home.

## 2018-04-04 NOTE — IP AVS SNAPSHOT
MRN:[id number]                      After Visit Summary   4/4/2018    Batsheva Barkley    MRN: [id number]           Thank you!     Thank you for choosing [facility] for your care. Our goal is always to provide you with excellent care. Hearing back from our patients is one way we can continue to improve our services. Please take a few minutes to complete the written survey that you may receive in the mail after you visit. If you would like to speak to someone directly about your visit please contact Patient Relations at [phone]. Thank you!          Patient Information     Date Of Birth          11/6/1933        About your hospital stay     You were admitted on:  April 4, 2018 You last received care in the:  [facility] Observation Department    You were discharged on:  April 5, 2018        Reason for your hospital stay       Admitted to observation unit after fall resulting in left leg hematoma.                  Who to Call     For medical emergencies, please call 911.  For non-urgent questions about your medical care, please call your primary care provider or clinic, [phone]          Attending Provider     Provider Specialty    Suzie Myrick MD Emergency Medicine    Mitchel Montenegro MD Internal Medicine       Primary Care Provider Office Phone # Fax #    Lenin Perez [phone]      After Care Instructions     Activity - Up with nursing assistance           Additional Discharge Instructions       Ice and elevate left lower extremity. Can apply gentle ACE wrap compression. Monitor pulse, skin, pain, sensation, and ability to dorsi/plantar flex the ankle every shift. Gentle ankle and calf ROM.            Advance Diet as Tolerated       Follow this diet upon discharge: Regular            General info for SNF       Length of Stay Estimate: Short Term Care: Estimated # of Days <30  Condition at Discharge: Stable  Level of care:skilled  "  Rehabilitation Potential: Good  Admission H&P remains valid and up-to-date: Yes  Recent Chemotherapy: N/A  Use Nursing Home Standing Orders: Yes            Mantoux instructions       Give two-step Mantoux (PPD) Per Facility Policy Yes            Weight bearing status       As tolerated                  Follow-up Appointments     Follow Up and recommended labs and tests       See PCP or NH physician next 5-7 days for recheck of hematoma. Should have repeat hemoglobin next 1-2 days.                  Additional Services     Occupational Therapy Adult Consult       Evaluate and treat as clinically indicated.    Reason:  Fall, LLE hematoma            Physical Therapy Adult Consult       Evaluate and treat as clinically indicated.    Reason:  Fall, LLE hematoma                             Pending Results     No orders found for last 3 day(s).            Statement of Approval     Ordered          04/05/18 3407  I have reviewed and agree with all the recommendations and orders detailed in this document.  EFFECTIVE NOW     Approved and electronically signed by:  Naye Amaya PA             Admission Information     Date & Time Provider Department Dept. Phone    4/4/2018 Mitchel Montenegro MD [facility] Observation Department [phone]      Your Vitals Were     Blood Pressure Pulse Temperature Respirations Height Weight    105/63 (BP Location: Left arm) 61 98.2  F (36.8  C) (Oral) 18 1.549 m (5' 1\") 46.3 kg (102 lb 1.6 oz)    Pulse Oximetry BMI (Body Mass Index)                98% 19.29 kg/m2          [employer] Information     [employer] lets you send messages to your doctor, view your test results, renew your prescriptions, schedule appointments and more. To sign up, go to [email or web address] . Click on \"Log in\" on the left side of the screen, which will take you to the Welcome page. Then click on \"Sign up Now\" on the right side of the page.     You will be asked to enter the access code listed " below, as well as some personal information. Please follow the directions to create your username and password.     Your access code is: [email or web address]  Expires: 2018 12:36 PM     Your access code will  in 90 days. If you need help or a new code, please call your [place] clinic or [phone].        Care EveryWhere ID     This is your Care EveryWhere ID. This could be used by other organizations to access your [place] medical records  [id number]        Equal Access to Services     [facility] : Hadii lizeth pereira hadasho Soomaali, waaxda luqadaha, qaybta kaalmada ademichelyamichael, jessica hanna . So [facility] [phone].    ATENCIÓN: Si habla español, tiene a soria disposición servicios gratuitos de asistencia lingüística. Llame al [phone].    We comply with applicable federal civil rights laws and [place] laws. We do not discriminate on the basis of race, color, national origin, age, disability, sex, sexual orientation, or gender identity.               Review of your medicines      START taking        Dose / Directions    oxyCODONE IR 5 MG tablet   Commonly known as:  ROXICODONE        Dose:  2.5-5 mg   Take 0.5-1 tablets (2.5-5 mg) by mouth every 6 hours as needed for moderate to severe pain   Quantity:  12 tablet   Refills:  0       polyethylene glycol Packet   Commonly known as:  MIRALAX/GLYCOLAX        Dose:  17 g   Take 17 g by mouth daily as needed for constipation   Quantity:  7 packet   Refills:  0       senna-docusate 8.6-50 MG per tablet   Commonly known as:  SENOKOT-S;PERICOLACE        Dose:  1 tablet   Take 1 tablet by mouth 2 times daily as needed for constipation   Quantity:  100 tablet   Refills:  0         CONTINUE these medicines which may have CHANGED, or have new prescriptions. If we are uncertain of the size of tablets/capsules you have at home, strength may be listed as something that might have changed.        Dose / Directions    acetaminophen 500 MG tablet    Commonly known as:  TYLENOL   This may have changed:    - medication strength  - how much to take  - when to take this  - reasons to take this  - additional instructions        Dose:  1000 mg   Take 2 tablets (1,000 mg) by mouth 3 times daily Change to PRN when no longer needed for pain related to leg hematoma   Refills:  0         CONTINUE these medicines which have NOT CHANGED        Dose / Directions    aspirin 81 MG tablet   Used for:  Coronary atherosclerosis of unspecified type of vessel, native or graft, Mixed hyperlipidemia        1 tab po QD (Once per day)   Quantity:  100   Refills:  3       atorvastatin 40 MG tablet   Commonly known as:  LIPITOR   Used for:  Hyperlipidemia LDL goal <100        TAKE ONE TABLET BY MOUTH ONCE DAILY   Quantity:  90 tablet   Refills:  2       CALCIUM 600 + D 600-200 MG-UNIT Tabs   Generic drug:  Calcium Carb-Cholecalciferol        Dose:  600 mg   Take 600 mg by mouth daily   Refills:  0       CENTRUM SILVER per tablet        ONE DAILY   Quantity:  3 MONTHS   Refills:  1 YEAR       losartan 50 MG tablet   Commonly known as:  COZAAR   Used for:  Essential hypertension        Dose:  50 mg   Take 1 tablet (50 mg) by mouth 2 times daily   Quantity:  180 tablet   Refills:  3       metoprolol tartrate 50 MG tablet   Commonly known as:  LOPRESSOR   Used for:  Hypertension goal BP (blood pressure) < 140/90        TAKE ONE TABLET BY MOUTH TWICE DAILY   Quantity:  180 tablet   Refills:  3       * order for DME   Used for:  Bilateral leg edema        ANTOINE stockings- below knee.   Quantity:  1 Package   Refills:  0       * order for DME   Used for:  Diarrhea of presumed infectious origin, Lack of coordination, Weakness of both lower extremities        Light weight wheelchair Body mass index is 19.08 kg/(m^2).   Quantity:  1 Device   Refills:  0       * Notice:  This list has 2 medication(s) that are the same as other medications prescribed for you. Read the directions carefully, and ask  your doctor or other care provider to review them with you.         Where to get your medicines      Some of these will need a paper prescription and others can be bought over the counter. Ask your nurse if you have questions.     Bring a paper prescription for each of these medications     oxyCODONE IR 5 MG tablet       You don't need a prescription for these medications     acetaminophen 500 MG tablet    polyethylene glycol Packet    senna-docusate 8.6-50 MG per tablet                Protect others around you: Learn how to safely use, store and throw away your medicines at www.disposemymeds.org.        Information about OPIOIDS     PRESCRIPTION OPIOIDS: WHAT YOU NEED TO KNOW    Prescription opioids can be used to help relieve moderate to severe pain and are often prescribed following a surgery or injury, or for certain health conditions. These medications can be an important part of treatment but also come with serious risks. It is important to work with your health care provider to make sure you are getting the safest, most effective care.    WHAT ARE THE RISKS AND SIDE EFFECTS OF OPIOID USE?  Prescription opioids carry serious risks of addiction and overdose, especially with prolonged use. An opioid overdose, often marked by slowed breathing can cause sudden death. The use of prescription opioids can have a number of side effects as well, even when taken as directed:      Tolerance - meaning you might need to take more of a medication for the same pain relief    Physical dependence - meaning you have symptoms of withdrawal when a medication is stopped    Increased sensitivity to pain    Constipation    Nausea, vomiting, and dry mouth    Sleepiness and dizziness    Confusion    Depression    Low levels of testosterone that can result in lower sex drive, energy, and strength    Itching and sweating    RISKS ARE GREATER WITH:    History of drug misuse, substance use disorder, or overdose    Mental health conditions  (such as depression or anxiety)    Sleep apnea    Older age (65 years or older)    Pregnancy    Avoid alcohol while taking prescription opioids.   Also, unless specifically advised by your health care provider, medications to avoid include:    Benzodiazepines (such as Xanax or Valium)    Muscle relaxants (such as Soma or Flexeril)    Hypnotics (such as Ambien or Lunesta)    Other prescription opioids    KNOW YOUR OPTIONS:  Talk to your health care provider about ways to manage your pain that do not involve prescription opioids. Some of these options may actually work better and have fewer risks and side effects:    Pain relievers such as acetaminophen, ibuprofen, and naproxen    Some medications that are also used for depression or seizures    Physical therapy and exercise    Cognitive behavioral therapy, a psychological, goal-directed approach, in which patients learn how to modify physical, behavioral, and emotional triggers of pain and stress    IF YOU ARE PRESCRIBED OPIOIDS FOR PAIN:    Never take opioids in greater amounts or more often than prescribed    Follow up with your primary health care provider and work together to create a plan on how to manage your pain.    Talk about ways to help manage your pain that do not involve prescription opioids    Talk about all concerns and side effects    Help prevent misuse and abuse    Never sell or share prescription opioids    Never use another person's prescription opioids    Store prescription opioids in a secure place and out of reach of others (this may include visitors, children, friends, and family)    Visit www.cdc.gov/drugoverdose to learn about risks of opioid abuse and overdose    If you believe you may be struggling with addiction, tell your health care provider and ask for guidance or call [facility]'s National Helpline at 6-777-303-HELP    LEARN MORE / www.cdc.gov/drugoverdose/prescribing/guideline.html    Safely dispose of unused prescription opioids: Find  your local drug take-back programs and more information about the importance of safe disposal at www.doseofreality.[place].gov             Medication List: This is a list of all your medications and when to take them. Check marks below indicate your daily home schedule. Keep this list as a reference.      Medications           Morning Afternoon Evening Bedtime As Needed    acetaminophen 500 MG tablet   Commonly known as:  TYLENOL   Take 2 tablets (1,000 mg) by mouth 3 times daily Change to PRN when no longer needed for pain related to leg hematoma   Last time this was given:  1,000 mg on 4/5/2018  1:30 PM                                aspirin 81 MG tablet   1 tab po QD (Once per day)                                atorvastatin 40 MG tablet   Commonly known as:  LIPITOR   TAKE ONE TABLET BY MOUTH ONCE DAILY   Last time this was given:  40 mg on 4/5/2018  8:54 AM                                CALCIUM 600 + D 600-200 MG-UNIT Tabs   Take 600 mg by mouth daily   Generic drug:  Calcium Carb-Cholecalciferol                                CENTRUM SILVER per tablet   ONE DAILY                                losartan 50 MG tablet   Commonly known as:  COZAAR   Take 1 tablet (50 mg) by mouth 2 times daily                                metoprolol tartrate 50 MG tablet   Commonly known as:  LOPRESSOR   TAKE ONE TABLET BY MOUTH TWICE DAILY   Last time this was given:  50 mg on 4/5/2018  8:54 AM                                * order for DME   ANTOINE stockings- below knee.                                * order for DME   Light weight wheelchair Body mass index is 19.08 kg/(m^2).                                oxyCODONE IR 5 MG tablet   Commonly known as:  ROXICODONE   Take 0.5-1 tablets (2.5-5 mg) by mouth every 6 hours as needed for moderate to severe pain   Last time this was given:  5 mg on 4/5/2018  1:35 PM                                polyethylene glycol Packet   Commonly known as:  MIRALAX/GLYCOLAX   Take 17 g by mouth daily as  needed for constipation                                senna-docusate 8.6-50 MG per tablet   Commonly known as:  SENOKOT-S;PERICOLACE   Take 1 tablet by mouth 2 times daily as needed for constipation                                * Notice:  This list has 2 medication(s) that are the same as other medications prescribed for you. Read the directions carefully, and ask your doctor or other care provider to review them with you.

## 2018-04-04 NOTE — PROGRESS NOTES
- Patient re-assessed for compartment syndrome in ED at 1610 prior to transfer to floor. No increased pressure noted both proximal and distal to left calf hematoma on exam, wiggles toes, cap refill < 3 sec. Will continue to monitor closely.

## 2018-04-04 NOTE — IP AVS SNAPSHOT
[facility] Observation Department    [address]    [facility] [address]    Phone:  [phone]                                       After Visit Summary   4/4/2018    Batsheva Barkley    MRN: [id number]           After Visit Summary Signature Page     I have received my discharge instructions, and my questions have been answered. I have discussed any challenges I see with this plan with the nurse or doctor.    ..........................................................................................................................................  Patient/Patient Representative Signature      ..........................................................................................................................................  Patient Representative Print Name and Relationship to Patient    ..................................................               ................................................  Date                                            Time    ..........................................................................................................................................  Reviewed by Signature/Title    ...................................................              ..............................................  Date                                                            Time

## 2018-04-04 NOTE — PLAN OF CARE
Problem: Patient Care Overview  Goal: Plan of Care/Patient Progress Review  Outcome: No Change  PRIMARY DIAGNOSIS: ACUTE PAIN  OUTPATIENT/OBSERVATION GOALS TO BE MET BEFORE DISCHARGE:  1. Pain Status: Improved but still requiring IV narcotics.    2. Return to near baseline physical activity: No    3. Cleared for discharge by consultants (if involved): No    Discharge Planner Nurse   Safe discharge environment identified: No, family worried that  cannot take care of patient at home while recovering.  Barriers to discharge: Yes, home care, pain       Entered by: Tiffani Acevedo 04/04/2018 4:47 PM     Please review provider order for any additional goals.   Nurse to notify provider when observation goals have been met and patient is ready for discharge.    Daughter would like patient to go to  [facility] as her granddaughter works there.

## 2018-04-04 NOTE — PROGRESS NOTES
Concern for left LE compartment syndrome    Mrs. Barkley states that she was getting into a car when she slipped with her walker falling backwards hitting her medial calf on either her walker or the car. She had immediate pain and swelling. She is not currently on any anticoagulation.    Severe ecchymosis over medial distal 1/3 of left calf  Severe swelling/hematoma located over the medial distal 1/3 of the left calf  Bilateral posterior calves are soft, non-tender. Area surrounding the hematoma is not painful and is soft.  Bilateral lower extremity is NVI.  Sensation intact bilateral lower extremities  Able to dorsi and plantar flex although does have some pain  +DP pulses        Assessment:   Left medial calf hematoma      Plan:   Continue to monitor pulse, skin, pain, sensation, and ability to dorsi/plantar flex the ankle   Elevate the left leg  Pain medication as needed  Keep NPO until further notified  Call orthopedics with any concerns     Formal consult note to follow    Criselda Fountain PAC  [phone]

## 2018-04-05 ENCOUNTER — APPOINTMENT (OUTPATIENT)
Dept: PHYSICAL THERAPY | Facility: CLINIC | Age: 83
End: 2018-04-05
Attending: PHYSICIAN ASSISTANT
Payer: COMMERCIAL

## 2018-04-05 ENCOUNTER — RECORDS - HEALTHEAST (OUTPATIENT)
Dept: LAB | Facility: CLINIC | Age: 83
End: 2018-04-05

## 2018-04-05 VITALS
HEIGHT: 61 IN | DIASTOLIC BLOOD PRESSURE: 63 MMHG | HEART RATE: 61 BPM | SYSTOLIC BLOOD PRESSURE: 105 MMHG | BODY MASS INDEX: 19.28 KG/M2 | RESPIRATION RATE: 18 BRPM | WEIGHT: 102.1 LBS | TEMPERATURE: 98.2 F | OXYGEN SATURATION: 98 %

## 2018-04-05 LAB
ANION GAP SERPL CALCULATED.3IONS-SCNC: 4 MMOL/L (ref 3–14)
BASOPHILS # BLD AUTO: 0 10E9/L (ref 0–0.2)
BASOPHILS NFR BLD AUTO: 0.6 %
BUN SERPL-MCNC: 29 MG/DL (ref 7–30)
CALCIUM SERPL-MCNC: 7.9 MG/DL (ref 8.5–10.1)
CHLORIDE SERPL-SCNC: 111 MMOL/L (ref 94–109)
CO2 SERPL-SCNC: 26 MMOL/L (ref 20–32)
CREAT SERPL-MCNC: 0.79 MG/DL (ref 0.52–1.04)
DIFFERENTIAL METHOD BLD: ABNORMAL
EOSINOPHIL # BLD AUTO: 0 10E9/L (ref 0–0.7)
EOSINOPHIL NFR BLD AUTO: 0.9 %
ERYTHROCYTE [DISTWIDTH] IN BLOOD BY AUTOMATED COUNT: 14.2 % (ref 10–15)
GFR SERPL CREATININE-BSD FRML MDRD: 69 ML/MIN/1.7M2
GLUCOSE SERPL-MCNC: 86 MG/DL (ref 70–99)
HCT VFR BLD AUTO: 28.6 % (ref 35–47)
HGB BLD-MCNC: 9 G/DL (ref 11.7–15.7)
IMM GRANULOCYTES # BLD: 0 10E9/L (ref 0–0.4)
IMM GRANULOCYTES NFR BLD: 0.2 %
LYMPHOCYTES # BLD AUTO: 1.4 10E9/L (ref 0.8–5.3)
LYMPHOCYTES NFR BLD AUTO: 29.4 %
MCH RBC QN AUTO: 29.3 PG (ref 26.5–33)
MCHC RBC AUTO-ENTMCNC: 31.5 G/DL (ref 31.5–36.5)
MCV RBC AUTO: 93 FL (ref 78–100)
MONOCYTES # BLD AUTO: 0.6 10E9/L (ref 0–1.3)
MONOCYTES NFR BLD AUTO: 12 %
NEUTROPHILS # BLD AUTO: 2.7 10E9/L (ref 1.6–8.3)
NEUTROPHILS NFR BLD AUTO: 56.9 %
NRBC # BLD AUTO: 0 10*3/UL
NRBC BLD AUTO-RTO: 0 /100
PLATELET # BLD AUTO: 183 10E9/L (ref 150–450)
POTASSIUM SERPL-SCNC: 4.3 MMOL/L (ref 3.4–5.3)
RBC # BLD AUTO: 3.07 10E12/L (ref 3.8–5.2)
SODIUM SERPL-SCNC: 141 MMOL/L (ref 133–144)
WBC # BLD AUTO: 4.7 10E9/L (ref 4–11)

## 2018-04-05 PROCEDURE — 97530 THERAPEUTIC ACTIVITIES: CPT | Mod: GP | Performed by: PHYSICAL THERAPIST

## 2018-04-05 PROCEDURE — 25000132 ZZH RX MED GY IP 250 OP 250 PS 637: Performed by: PHYSICIAN ASSISTANT

## 2018-04-05 PROCEDURE — 25000128 H RX IP 250 OP 636: Performed by: PHYSICIAN ASSISTANT

## 2018-04-05 PROCEDURE — 99217 ZZC OBSERVATION CARE DISCHARGE: CPT | Performed by: PHYSICIAN ASSISTANT

## 2018-04-05 PROCEDURE — 85025 COMPLETE CBC W/AUTO DIFF WBC: CPT | Performed by: PHYSICIAN ASSISTANT

## 2018-04-05 PROCEDURE — G0378 HOSPITAL OBSERVATION PER HR: HCPCS

## 2018-04-05 PROCEDURE — 36415 COLL VENOUS BLD VENIPUNCTURE: CPT | Performed by: PHYSICIAN ASSISTANT

## 2018-04-05 PROCEDURE — 97162 PT EVAL MOD COMPLEX 30 MIN: CPT | Mod: GP | Performed by: PHYSICAL THERAPIST

## 2018-04-05 PROCEDURE — 40000193 ZZH STATISTIC PT WARD VISIT: Performed by: PHYSICAL THERAPIST

## 2018-04-05 PROCEDURE — 25000125 ZZHC RX 250: Performed by: PHYSICIAN ASSISTANT

## 2018-04-05 PROCEDURE — 96361 HYDRATE IV INFUSION ADD-ON: CPT

## 2018-04-05 PROCEDURE — 80048 BASIC METABOLIC PNL TOTAL CA: CPT | Performed by: PHYSICIAN ASSISTANT

## 2018-04-05 RX ORDER — LOSARTAN POTASSIUM 50 MG/1
50 TABLET ORAL 2 TIMES DAILY
Status: DISCONTINUED | OUTPATIENT
Start: 2018-04-05 | End: 2018-04-05 | Stop reason: HOSPADM

## 2018-04-05 RX ORDER — ATORVASTATIN CALCIUM 40 MG/1
40 TABLET, FILM COATED ORAL DAILY
Status: DISCONTINUED | OUTPATIENT
Start: 2018-04-05 | End: 2018-04-05 | Stop reason: HOSPADM

## 2018-04-05 RX ORDER — OXYCODONE HYDROCHLORIDE 5 MG/1
2.5-5 TABLET ORAL EVERY 6 HOURS PRN
Qty: 12 TABLET | Refills: 0 | Status: SHIPPED | OUTPATIENT
Start: 2018-04-05 | End: 2018-04-11

## 2018-04-05 RX ORDER — OXYCODONE HYDROCHLORIDE 5 MG/1
5-10 TABLET ORAL EVERY 4 HOURS PRN
Status: DISCONTINUED | OUTPATIENT
Start: 2018-04-05 | End: 2018-04-05

## 2018-04-05 RX ORDER — POLYETHYLENE GLYCOL 3350 17 G/17G
17 POWDER, FOR SOLUTION ORAL DAILY PRN
Qty: 7 PACKET | DISCHARGE
Start: 2018-04-05 | End: 2020-08-07

## 2018-04-05 RX ORDER — ASPIRIN 81 MG/1
81 TABLET, CHEWABLE ORAL DAILY
Status: DISCONTINUED | OUTPATIENT
Start: 2018-04-06 | End: 2018-04-05 | Stop reason: HOSPADM

## 2018-04-05 RX ORDER — ACETAMINOPHEN 500 MG
1000 TABLET ORAL 3 TIMES DAILY
Status: DISCONTINUED | OUTPATIENT
Start: 2018-04-05 | End: 2018-04-05 | Stop reason: HOSPADM

## 2018-04-05 RX ORDER — OXYCODONE HYDROCHLORIDE 5 MG/1
5 TABLET ORAL EVERY 4 HOURS PRN
Status: DISCONTINUED | OUTPATIENT
Start: 2018-04-05 | End: 2018-04-05 | Stop reason: HOSPADM

## 2018-04-05 RX ORDER — ACETAMINOPHEN 500 MG
1000 TABLET ORAL 3 TIMES DAILY
DISCHARGE
Start: 2018-04-05 | End: 2018-04-10

## 2018-04-05 RX ORDER — AMOXICILLIN 250 MG
1 CAPSULE ORAL 2 TIMES DAILY PRN
Qty: 100 TABLET | DISCHARGE
Start: 2018-04-05 | End: 2018-04-08 | Stop reason: DRUGHIGH

## 2018-04-05 RX ORDER — METOPROLOL TARTRATE 50 MG
50 TABLET ORAL 2 TIMES DAILY
Status: DISCONTINUED | OUTPATIENT
Start: 2018-04-05 | End: 2018-04-05 | Stop reason: HOSPADM

## 2018-04-05 RX ADMIN — OXYCODONE HYDROCHLORIDE 5 MG: 5 TABLET ORAL at 13:35

## 2018-04-05 RX ADMIN — SODIUM CHLORIDE: 9 INJECTION, SOLUTION INTRAVENOUS at 05:49

## 2018-04-05 RX ADMIN — ONDANSETRON 4 MG: 4 TABLET, ORALLY DISINTEGRATING ORAL at 00:21

## 2018-04-05 RX ADMIN — ATORVASTATIN CALCIUM 40 MG: 40 TABLET, FILM COATED ORAL at 08:54

## 2018-04-05 RX ADMIN — ACETAMINOPHEN 1000 MG: 500 TABLET, FILM COATED ORAL at 13:30

## 2018-04-05 RX ADMIN — ACETAMINOPHEN 1000 MG: 500 TABLET, FILM COATED ORAL at 08:55

## 2018-04-05 RX ADMIN — ACETAMINOPHEN 1000 MG: 500 TABLET, FILM COATED ORAL at 00:21

## 2018-04-05 RX ADMIN — METOPROLOL TARTRATE 50 MG: 50 TABLET, FILM COATED ORAL at 08:54

## 2018-04-05 NOTE — TELEPHONE ENCOUNTER
Pt admitted, will have f/u hospital visit and can discuss than, SERGIO DRUMMOND MD FYI, see PT request from pt below  Brittny Faulkner RN, BSN  Message handled by Nurse Triage.

## 2018-04-05 NOTE — PROGRESS NOTES
04/05/18 1103   Quick Adds   Type of Visit Initial PT Evaluation   Living Environment   Lives With spouse   Living Arrangements apartment   Home Accessibility bed and bath on same level;tub/shower is not walk in   Number of Stairs to Enter Home 0   Number of Stairs Within Home 0   Transportation Available agency transportation   Living Environment Comment metro mobility to [place] and friends each week. family drives too   Self-Care   Usual Activity Tolerance moderate   Current Activity Tolerance fair   Regular Exercise no   Equipment Currently Used at Home walker, rolling   Activity/Exercise/Self-Care Comment assist with bathing and dressing by ,who has vision impairments.  does cooking. family helps with shopping.   Functional Level Prior   Ambulation 1-->assistive equipment   Transferring 1-->assistive equipment   Toileting 1-->assistive equipment   Bathing 2-->assistive person   Dressing 2-->assistive person   Eating 0-->independent   Communication 0-->understands/communicates without difficulty   Swallowing 0-->swallows foods/liquids without difficulty   Cognition 1 - attention or memory deficits   Fall history within last six months yes   Number of times patient has fallen within last six months 2   Which of the above functional risks had a recent onset or change? fall history   General Information   Onset of Illness/Injury or Date of Surgery - Date 04/04/18   Referring Physician Dr. Montenegro   Patient/Family Goals Statement Pt knows that she needs rehab and is OK   Pertinent History of Current Problem (include personal factors and/or comorbidities that impact the POC) Pt is an 84 year old female admitted with left lower LE hematoma after a fall. Hx of CAD, ischemic cardiomyopathy, HTN   Precautions/Limitations fall precautions  (left hand finger flexion contractures)   Weight-Bearing Status - LLE full weight-bearing   Weight-Bearing Status - RLE weight-bearing as tolerated   Cognitive Status  Examination   Orientation orientation to person, place and time   Level of Consciousness alert   Follows Commands and Answers Questions 100% of the time   Personal Safety and Judgment intact   Memory impaired   Pain Assessment   Patient Currently in Pain Yes, see Vital Sign flowsheet   Integumentary/Edema   Integumentary/Edema Comments left LE wrapped   Range of Motion (ROM)   ROM Comment 3-5th metatarsal flexion contractures, right LE WFL, left WFL but limited by pain in left lower leg   Strength   Strength Comments WFL right, not tested left   Bed Mobility   Bed Mobility Comments supine to sit: modA   Transfer Skills   Transfer Comments sit to stand: [place],    Gait   Gait Comments Pt ambulates a couple of steps with walker and modA. See flowsheet   Balance   Balance Comments requires support   Sensory Examination   Sensory Perception no deficits were identified   Coordination   Coordination Comments decreased left UE finger to nose   General Therapy Interventions   Planned Therapy Interventions bed mobility training;gait training;strengthening;transfer training;progressive activity/exercise   Clinical Impression   Criteria for Skilled Therapeutic Intervention yes, treatment indicated   PT Diagnosis difficulty with gait   Influenced by the following impairments pain, decreased strength, decreased cognition, decreased balance   Functional limitations due to impairments increased assist with all functional mobility compared to baseline   Clinical Presentation Evolving/Changing   Clinical Presentation Rationale clinical judgement   Clinical Decision Making (Complexity) Low complexity   Therapy Frequency` 5 times/week   Predicted Duration of Therapy Intervention (days/wks) 1-3 days   Anticipated Equipment Needs at Discharge other (see comments)  (may need platform walker)   Anticipated Discharge Disposition Transitional Care Facility   Risk & Benefits of therapy have been explained Yes   Patient, Family & other staff in  "agreement with plan of care Yes   [facility] TM \"6 Clicks\"   2016, Trustees of [facility], under license to [employer].  All rights reserved.   6 Clicks Short Forms Basic Mobility Inpatient Short Form   [facility]  \"6 Clicks\" V.2 Basic Mobility Inpatient Short Form   1. Turning from your back to your side while in a flat bed without using bedrails? 2 - A Lot   2. Moving from lying on your back to sitting on the side of a flat bed without using bedrails? 2 - A Lot   3. Moving to and from a bed to a chair (including a wheelchair)? 2 - A Lot   4. Standing up from a chair using your arms (e.g., wheelchair, or bedside chair)? 3 - A Little   5. To walk in hospital room? 2 - A Lot   6. Climbing 3-5 steps with a railing? 2 - A Lot   Basic Mobility Raw Score (Score out of 24.Lower scores equate to lower levels of function) 13   Total Evaluation Time   Total Evaluation Time (Minutes) 20     "

## 2018-04-05 NOTE — PLAN OF CARE
Problem: Patient Care Overview  Goal: Plan of Care/Patient Progress Review  Problem: Patient Care Overview  Goal: Plan of Care/Patient Progress Review  PRIMARY DIAGNOSIS: ACUTE PAIN  OUTPATIENT/OBSERVATION GOALS TO BE MET BEFORE DISCHARGE:  1. Pain Status: Improved-controlled with oral pain medications.     2. Return to near baseline physical activity: No     3. Cleared for discharge by consultants (if involved): No     Pt A&Ox4, VSS. Pt c/o 8-9/10 LLE pain, scheduled tylenol given. Large bruise on L calf. Pedal pulse present via doppler. Pt up with Ax2. Bladder scanned for 75 this AM. Pt rested well overnight, reports resolved nausea. Ortho following. PT and SW consulted. Will continue to monitor.     Discharge Planner Nurse   Safe discharge environment identified: Yes  Barriers to discharge: Yes       Entered by: Iliana Acharya 04/05/2018 1:30 AM  Please review provider order for any additional goals.   Nurse to notify provider when observation goals have been met and patient is ready for discharge.

## 2018-04-05 NOTE — PLAN OF CARE
Problem: Patient Care Overview  Goal: Plan of Care/Patient Progress Review  Outcome: Improving  PRIMARY DIAGNOSIS: ACUTE PAIN LLE/hematoma  OUTPATIENT/OBSERVATION GOALS TO BE MET BEFORE DISCHARGE:  1. Pain Status: Improved-controlled with oral pain medications.    2. Return to near baseline physical activity: No, Ax2    3. Cleared for discharge by consultants (if involved): Yes    Discharge Planner Nurse   Safe discharge environment identified: Yes  Barriers to discharge: No       Entered by: Aj Rose 04/05/2018 2:43 PM     Vital signs stable. ACE wrap applied to LLE for comfort. CMS intact to LLE. Scheduled tylenol and PRN oxycodone given. A/o with some forgetfulness and confusion about situation, specifically D/C details (when she is leaving and how she is going to be transferred to the TCU). Up with Ax2 with walker and gait belt. Increase pain with exertion. Will D/C to [facility] TCU at 1630.     Please review provider order for any additional goals.   Nurse to notify provider when observation goals have been met and patient is ready for discharge.

## 2018-04-05 NOTE — PROGRESS NOTES
"[facility]   Observation Unit   Progress Note    Assessment & Plan   Batsheva Barkley is a 84 year old woman with a history of CAD s/p stenting, ICM, HTN, and HLD who is registered to observation after a mechanical fall resulting in LLE hematoma.     #Acute LLE Hematoma. Large hematoma left medial calf w/o evidence of compartment syndrome. Not on anticoagulation. Pain improved overnight. Hgb 10.8 on admit from 12-13 previously --> 9 today.   - Ortho following w/o indication for surgery. Continue to ice/elevate LLE. Can place gentle ACE wrap. Continue to monitor pulse, skin, pain, sensation, and ability to dorsi/plantar flex the ankle today. Is stable for d/c from their standpoint later today.   - Pain control w/ scheduled Tylenol 1 g TID. Also has PRN oxycodone but use conservatively given risk for mental status changes (changed to low dose and stopped PRN IV Dilaudid, Atarax, Flexeril).   - Trend hgb if still here in AM vs. check at TCU next 1-2 days.   - PT following.     #HTN. BPs 120s-130s/60s.   - Continue home metoprolol and losartan.     #CAD. S/p stent in 2005.   - Continue home statin.   - Resume home ASA 81 mg in AM.     Diet: regular   Fluids: PO  DVT Prophylaxis: ambulate as able - mechanical contraindicated on the leg as well as pharmacologic given acute hematoma   Code Status: full   Disposition: PT saw - needs TCU and is medically stable for d/c when bed available; SW following     Naye Amaya PA-C  Hospitalist Service    Interval History   Pain is much better today. Still painful with touch and is fearful to move and tear the skin. Required straight cath overnight as she couldn't void on her own. Denies urinary complaints prior to this. Has not been out of bed yet.     Physical Exam   /64 (BP Location: Left arm)  Pulse 61  Temp 97.4  F (36.3  C) (Oral)  Resp 18  Ht 1.549 m (5' 1\")  Wt 46.3 kg (102 lb 1.6 oz)  SpO2 100%  BMI 19.29 kg/m2     GENERAL: Sitting up in bed, alert, " appears comfortable. Oriented.   HEENT: Anicteric sclera. Mucous membranes moist.   CV: RRR. S1, S2. No murmurs appreciated.   RESPIRATORY: Effort normal on room air. Lungs CTAB with no wheezing, rales, rhonchi.   GI: Abdomen soft and non distended, bowel sounds present. No tenderness, rebound, guarding.   NEUROLOGICAL: No focal deficits. Moves all extremities.    EXTREMITIES: Warm and well perfused. Left medial calf hematoma. Area tender to palpation but compartments soft.   SKIN: No jaundice. No rashes.     Data reviewed today: I reviewed all medications, new labs and imaging results over the last 24 hours.

## 2018-04-05 NOTE — PLAN OF CARE
Problem: Patient Care Overview  Goal: Plan of Care/Patient Progress Review  ROOM # 222    Living Situation (if not independent, order SW consult): Ind with    Facility name:  : Daughter- Linda [phone], [phone]    Activity level at baseline: Ind with walker  Activity level on admit: x2      Patient registered to observation; given Patient Bill of Rights; given the opportunity to ask questions about observation status and their plan of care.  Patient has been oriented to the observation room, bathroom and call light is in place.    Discussed discharge goals and expectations with patient/family.

## 2018-04-05 NOTE — PROGRESS NOTES
Your information has been submitted on April 05th, 2018 at 01:31:00 PM CDT. The confirmation number is [id number]

## 2018-04-05 NOTE — PROGRESS NOTES
Ortho    Patient seen tonight for serial exam of left calf.  Patient states that the pain is much improved.  Sleeping comfortably when I arrived,     Left calf hematoma more focal with no significant swelling involving the posterior, anterior, or lateral compartments.  Gentle ROM of ankle and toes is pain free.  No signs of compartment syndrome.    A:  Left calf hematoma    Recs:  - ok to eat and drink, no surgery planned  - ice and elevate LLE  - will see again in the morning but likely sign off.    - discharge home when pain controlled, likely tomorrow,    Ramo Kuo MD

## 2018-04-05 NOTE — PLAN OF CARE
Problem: PT General Care Plan  Goal: PT target date for goal attainment  OBSERVATION patient END time: 6320

## 2018-04-05 NOTE — PLAN OF CARE
Problem: Patient Care Overview  Goal: Plan of Care/Patient Progress Review  PT: Order received. Chart reviewed. Evaluation completed and treatment initiated. Pt is a 84 year old female admitted for left LE hematoma after a fall. Pt lives with spouse who assists with dressing and bathing. Pt uses walker at baseline or uses grab bars around apartment for gait and has a history of 2 falls.   Discharge Planner PT   Patient plan for discharge: TCU  Current status: Pt requires modA for bed mobility. Pt requries Chavez for standing and modA for a few steps of gait with a walker. Pt has 3 fingers on left in flexion contracture.   Barriers to return to prior living situation: Pt cannot be alone at baseline when independent with mobility with walker. Pt is well below baseline.  Recommendations for discharge: TCU  Rationale for recommendations: Pt needs TCU to increase mobility and pt will need OT assessment in TCU to address decreased left finger ROM which affects ability to hold walker.        Entered by: Cici Holloway 04/05/2018 12:53 PM

## 2018-04-05 NOTE — PLAN OF CARE
Problem: Patient Care Overview  Goal: Plan of Care/Patient Progress Review  Outcome: Adequate for Discharge Date Met: 04/05/18  Patient's After Visit Summary was reviewed with patient.   Patient verbalized understanding of After Visit Summary, recommended follow up and was given an opportunity to ask questions.   Discharge medications sent home with patient/family: No, written prescriptions for oxycodone will be sent with pt to be filled at TCU.   [facility]

## 2018-04-05 NOTE — CONSULTS
Care Transition Initial Assessment -   Reason For Consult: discharge planning  Met with: patient, 2 dtrs, and dtr Raisa via phone  Active Problems:    Hematoma of left lower extremity       DATA  Lives With: spouse  Living Arrangements: apartment  Description of Support System: Supportive, Involved  Who is your support system?: , Children           Quality Of Family Relationships: supportive, helpful, involved       ASSESSMENT  Support Assessment: Adequate family and caregiver support, Adequate social supports, Lacks necessary supervision and assistance.  Identified issues/concerns regarding health management: Pt lives in apt with spouse.  Her daughters are her primary support.  PT is recommending TCU and family is in agreement.  Pt and spouse are moving into [facility] at the end of the month and family would like pt to go to [facility] which is connected.  Additionally, pt's granddtr works at [facility].  Referral made.  Pt's insurance is [employer] which will cover TCU stay.  Cognitive Status: unable to assess     PLAN  Financial costs for the patient includes none.  MSHO should coveer .  Patient given options and choices for discharge family requests referral to  [facility].  Patient/family is agreeable to the plan? Yes  Patient anticipates discharging to: [facility]

## 2018-04-05 NOTE — PLAN OF CARE
Problem: Patient Care Overview  Goal: Plan of Care/Patient Progress Review  PRIMARY DIAGNOSIS: ACUTE PAIN  OUTPATIENT/OBSERVATION GOALS TO BE MET BEFORE DISCHARGE:  1. Pain Status: Improved-controlled with oral pain medications.    2. Return to near baseline physical activity: No    3. Cleared for discharge by consultants (if involved): No    Pt A&Ox4, VSS. Pt c/o 8-9/10 LLE pain, scheduled tylenol given. Large bruise on L calf. Pedal pulse present via doppler. Pt up with Ax2. Pt assisted to bedside commode, unable to void. Bladder scanned for >557. Straight cathed at 0115 for 350 mL. Reported dizziness and feeling sick to stomach when oob, PRN zofran given. Ortho following. PT and SW consulted. Will continue to monitor.    Discharge Planner Nurse   Safe discharge environment identified: Yes  Barriers to discharge: Yes       Entered by: Iliana Acharya 04/05/2018 1:30 AM     Please review provider order for any additional goals.   Nurse to notify provider when observation goals have been met and patient is ready for discharge.

## 2018-04-05 NOTE — PROGRESS NOTES
"Orthopedic Surgery Progress Note  4/5/2018  Ortho following for Left calf hematoma with concern for compartment syndrome     S: Reports some improvement in left calf pain but still quite tender to palpation. Denies numbness/tingling in LLE, wiggles toes, able to plantar/dorisflex left foot with minimal pain.     O: Blood pressure 139/64, pulse 61, temperature 97.4  F (36.3  C), temperature source Oral, resp. rate 18, height 1.549 m (5' 1\"), weight 46.3 kg (102 lb 1.6 oz), SpO2 100 %, not currently breastfeeding.  Lab Results   Component Value Date    HGB 9.0 04/05/2018     Lab Results   Component Value Date    INR 1.07 04/04/2018     I/O last 3 completed shifts:  In: -   Out: 350 [Urine:350]     Large hematoma- left medial calf.   No significant swelling in posterior, anterior or lateral compartments. Compartments soft, tender to palpation but pain improving  Distal extremity CMSI bilaterally.  +PF/DF/EHL   1+ DP pulses  Wiggles toes, toes warm, cap refill < 3 sec     A: Left calf hematoma- improving, no current signs of compartment syndrome     P:   - No surgery planned   - Continue to ice and elevate LLE to decrease swelling  - Continue to monitor pulse, skin, pain, sensation, and ability to dorsi/plantar flex the ankle today.   - If continues to improve, ok to d/c home later today from Ortho standpoint. Patient states she may need TCU.       Please page me with any Ortho related questions/concerns on this patient between 7a-5pm today.     Sanchez Acevedo, MSN, APRN, NP-C   Nurse Practitioner, [facility]  [facility]   Office Phone: [phone]  Pager: [phone]      Addendum:    I also saw the patient this am.  Hematoma of left calf noted.  Pain improved.  No signs of compartment syndrome.  Patient can discharge from ortho standpoint.  OK to WBAT but will likely not tolerate well due to pain.  Would recommend gentle ankle ROM and gentle calf stretching.  FU with her primary care " physician within 1-2 weeks.    Ramo Kuo MD

## 2018-04-05 NOTE — PLAN OF CARE
Problem: Patient Care Overview  Goal: Plan of Care/Patient Progress Review  Outcome: Improving  PRIMARY DIAGNOSIS: ACUTE PAIN LLE/hematoma  OUTPATIENT/OBSERVATION GOALS TO BE MET BEFORE DISCHARGE:  1. Pain Status: Improved-controlled with oral pain medications.    2. Return to near baseline physical activity: No; Ax2    3. Cleared for discharge by consultants (if involved): Yes, cleared by ortho. Awaiting PT and SW consult    Vital signs stable. Pain in LLE relieved with scheduled tylenol. CMS intact to the LLE, pulses weak bilaterally. Due to void, required straight cath overnight. Good appetite this AM. Per ortho- pt able to D/C once pain controlled and placement found, no need for surgery.    Discharge Planner Nurse   Safe discharge environment identified: No  Barriers to discharge: Yes       Entered by: Aj Rose 04/05/2018 8:18 AM     Please review provider order for any additional goals.   Nurse to notify provider when observation goals have been met and patient is ready for discharge.       None

## 2018-04-05 NOTE — PLAN OF CARE
Problem: Patient Care Overview  Goal: Plan of Care/Patient Progress Review  Doctor from [facility] saw patient. Verbal order taken that patient can eat.

## 2018-04-05 NOTE — PLAN OF CARE
"Problem: Patient Care Overview  Goal: Plan of Care/Patient Progress Review  PRIMARY DIAGNOSIS: ACUTE PAIN  OUTPATIENT/OBSERVATION GOALS TO BE MET BEFORE DISCHARGE:  1. Pain Status: Improved but still requiring IV narcotics.    2. Return to near baseline physical activity: No    3. Cleared for discharge by consultants (if involved): No- PT/SW to see    Discharge Planner Nurse   Safe discharge environment identified: Yes  Barriers to discharge: Yes- Pain control, PT/SW       Entered by: Marybeth Acosta 04/04/2018 9:33 PM     Please review provider order for any additional goals.   Nurse to notify provider when observation goals have been met and patient is ready for discharge.    Patient is 9/10 left hematoma pain- \"I just want to sleep\". Ice applied. Dilaudid given 2049 IV 0.2 since patient is NPO except for meds. Patient is receiving scheduled tylenol. Atarax last given at 1700. Dilaudid last given at 1700 also. Ortho will come back  to see hematoma. Pulses weak bilaterally. CMS intact, no numbness and tingling. Very dark purple hematoma and swelled up. NS running at 100. 2 daughters present. Foot elevated at heart level.       "

## 2018-04-05 NOTE — DISCHARGE SUMMARY
[facility]   Observation Unit  Discharge Summary     Date of Admission: 4/4/2018  Date of Discharge: 4/5/2018  Discharging Provider: Naye Amaya PA-C    Follow-ups Needed After Discharge   PCP or NH physician next 5-7 days for recheck of hematoma  F/u hemoglobin next 1-2 days     Discharge Diagnoses   Acute Left Lower Extremity Hematoma 2/2 Trauma  Mechanical Fall  HTN  CAD  ICM  HLD    Hospital Course   From H&P:   Batsheva Barkley is a 84 year old female who presents for evaluation of leg pain after falling earlier today. The patient states that around 11:00 AM today she was being assisted out of the car by her son to go back into her apartment. The patient's walker was in front of her and she is unsure the exact sequence of events that resulted in her falling. Per the patient's daughter who talked with the patient's son, the patient likely slipped on some ice and the patient's son grabbed the top of the patient's coat in order to help lower the patient to the ground. The patient's walker fell onto her left leg and there is question if the patient's left calf also hit part of the car during the fall. The patient only hit her leg in the fall and denies hitting her arm, head, back, or hip. Denies any associated lightheadedness, dizziness, chest pain, shortness of breath, or palpitations prior to the fall. The patient denies LOC. Denies dysuria, urinary urgency or frequency, or recent illness. Her pain since the fall is located over her left calf and is extremely tender to touch. She describes her pain as constant, throbbing, and rates it as a 10. Denies numbness or tingling in her left leg. The patient is not on anticoagulation except for a 81 mg aspirin daily. The patient reports issues with acid reflux of recent but none today. The patient reports recent diarrhea that she was seen for by her PCP but none currently and states that she has actually been constipated the last few days.      The following  "problems were addressed during his hospitalization:    Batsheva Barkley is a 84 year old woman with a history of CAD s/p stenting, ICM, HTN, and HLD who is registered to observation after a mechanical fall resulting in LLE hematoma.      #Acute LLE Hematoma. Large hematoma left medial calf w/o evidence of compartment syndrome. Not on anticoagulation. Pain improved overnight. Hgb 10.8 on admit from 12-13 previously --> 9 today. Ortho saw here without indication for surgery. Continue to ice/elevate LLE. Can place gentle ACE wrap. Continue to monitor pulse, skin, pain, sensation, and ability to dorsi/plantar flex the ankle. Is stable for d/c from ortho standpoint today. PT saw and recommended TCU. Pain control w/ scheduled Tylenol 1 g TID. Also has PRN oxycodone but use conservatively given risk for mental status changes. Discussed bowel regimen if using. Should have f/u hgb next couple of days at TCU, as well as f/u exam by PCP or NH physician in coming days.      #HTN. BPs 120s-130s/60s. Continue home metoprolol and losartan.      #CAD. S/p stent in 2005. Continue home statin. Ok to resume home ASA 81 mg.      DVT Prophylaxis: ambulate as able - mechanical contraindicated on the leg as well as pharmacologic given acute hematoma     Consultations This Hospital Stay   Orthopedics, PT, SW    Physical Exam   Blood pressure 105/63, pulse 61, temperature 98.2  F (36.8  C), temperature source Oral, resp. rate 18, height 1.549 m (5' 1\"), weight 46.3 kg (102 lb 1.6 oz), SpO2 98 %, not currently breastfeeding.  GENERAL: Sitting up in bed, alert, appears comfortable. Oriented.   HEENT: Anicteric sclera. Mucous membranes moist.   CV: RRR. S1, S2. No murmurs appreciated.   RESPIRATORY: Effort normal on room air. Lungs CTAB with no wheezing, rales, rhonchi.   GI: Abdomen soft and non distended, bowel sounds present. No tenderness, rebound, guarding.   NEUROLOGICAL: No focal deficits. Moves all extremities.    EXTREMITIES: Warm and well " perfused. Left medial calf hematoma. Area tender to palpation but compartments soft.   SKIN: No jaundice. No rashes.     Significant Results and Procedures   None    Pending Results   None    Primary Care Physician   Lenin Perez    Discharge Disposition   Discharged to rehabilitation facility  Condition at discharge: Stable    Code Status   Full    Discharge Procedure Orders  General info for SNF   Order Comments: Length of Stay Estimate: Short Term Care: Estimated # of Days <30  Condition at Discharge: Stable  Level of care:skilled   Rehabilitation Potential: Good  Admission H&P remains valid and up-to-date: Yes  Recent Chemotherapy: N/A  Use Nursing Home Standing Orders: Yes     Mantoux instructions   Order Comments: Give two-step Mantoux (PPD) Per Facility Policy Yes     Reason for your hospital stay   Order Comments: Admitted to observation unit after fall resulting in left leg hematoma.     Additional Discharge Instructions   Order Comments: Ice and elevate left lower extremity. Can apply gentle ACE wrap compression. Monitor pulse, skin, pain, sensation, and ability to dorsi/plantar flex the ankle every shift. Gentle ankle and calf ROM.     Follow Up and recommended labs and tests   Order Comments: See PCP or NH physician next 5-7 days for recheck of hematoma. Should have repeat hemoglobin next 1-2 days.     Activity - Up with nursing assistance   Order Specific Question Answer Comments   Is discharge order? Yes      Weight bearing status   Order Comments: As tolerated     Full Code     Physical Therapy Adult Consult   Order Comments: Evaluate and treat as clinically indicated.    Reason:  Fall, LLE hematoma     Occupational Therapy Adult Consult   Order Comments: Evaluate and treat as clinically indicated.    Reason:  Fall, LLE hematoma     Advance Diet as Tolerated   Order Comments: Follow this diet upon discharge: Regular   Order Specific Question Answer Comments   Is discharge order? Yes            Discharge Medications   Current Discharge Medication List      START taking these medications    Details   oxyCODONE IR (ROXICODONE) 5 MG tablet Take 0.5-1 tablets (2.5-5 mg) by mouth every 6 hours as needed for moderate to severe pain  Qty: 12 tablet, Refills: 0    Associated Diagnoses: Hematoma of left lower extremity, initial encounter      senna-docusate (SENOKOT-S;PERICOLACE) 8.6-50 MG per tablet Take 1 tablet by mouth 2 times daily as needed for constipation  Qty: 100 tablet    Associated Diagnoses: Hematoma of left lower extremity, initial encounter      polyethylene glycol (MIRALAX/GLYCOLAX) Packet Take 17 g by mouth daily as needed for constipation  Qty: 7 packet    Associated Diagnoses: Hematoma of left lower extremity, initial encounter         CONTINUE these medications which have CHANGED    Details   acetaminophen (TYLENOL) 500 MG tablet Take 2 tablets (1,000 mg) by mouth 3 times daily Change to PRN when no longer needed for pain related to leg hematoma    Associated Diagnoses: Hematoma of left lower extremity, initial encounter         CONTINUE these medications which have NOT CHANGED    Details   atorvastatin (LIPITOR) 40 MG tablet TAKE ONE TABLET BY MOUTH ONCE DAILY  Qty: 90 tablet, Refills: 2    Associated Diagnoses: Hyperlipidemia LDL goal <100      metoprolol (LOPRESSOR) 50 MG tablet TAKE ONE TABLET BY MOUTH TWICE DAILY  Qty: 180 tablet, Refills: 3    Associated Diagnoses: Hypertension goal BP (blood pressure) < 140/90      losartan (COZAAR) 50 MG tablet Take 1 tablet (50 mg) by mouth 2 times daily  Qty: 180 tablet, Refills: 3    Associated Diagnoses: Essential hypertension      Calcium Carb-Cholecalciferol (CALCIUM 600 + D) 600-200 MG-UNIT TABS Take 600 mg by mouth daily      CENTRUM SILVER OR TABS ONE DAILY  Qty: 3 MONTHS, Refills: 1 YEAR      ASPIRIN 81 MG OR TABS 1 tab po QD (Once per day)  Qty: 100, Refills: 3    Associated Diagnoses: Coronary atherosclerosis of unspecified type of  vessel, native or graft; Mixed hyperlipidemia      !! order for DME Light weight wheelchair Body mass index is 19.08 kg/(m^2).  Qty: 1 Device, Refills: 0    Associated Diagnoses: Diarrhea of presumed infectious origin; Lack of coordination; Weakness of both lower extremities      !! order for DME ANTOINE stockings- below knee.  Qty: 1 Package, Refills: 0    Associated Diagnoses: Bilateral leg edema       !! - Potential duplicate medications found. Please discuss with provider.           Naye Amaya PA-C  Hospitalist Service

## 2018-04-06 ENCOUNTER — RECORDS - HEALTHEAST (OUTPATIENT)
Dept: LAB | Facility: CLINIC | Age: 83
End: 2018-04-06

## 2018-04-06 ENCOUNTER — TRANSFERRED RECORDS (OUTPATIENT)
Dept: HEALTH INFORMATION MANAGEMENT | Facility: CLINIC | Age: 83
End: 2018-04-06

## 2018-04-06 ENCOUNTER — NURSING HOME VISIT (OUTPATIENT)
Dept: GERIATRICS | Facility: CLINIC | Age: 83
End: 2018-04-06
Payer: COMMERCIAL

## 2018-04-06 VITALS
TEMPERATURE: 98.8 F | HEART RATE: 66 BPM | OXYGEN SATURATION: 97 % | HEIGHT: 61 IN | WEIGHT: 102 LBS | BODY MASS INDEX: 19.26 KG/M2 | SYSTOLIC BLOOD PRESSURE: 140 MMHG | DIASTOLIC BLOOD PRESSURE: 66 MMHG | RESPIRATION RATE: 18 BRPM

## 2018-04-06 DIAGNOSIS — K59.00 CONSTIPATION, UNSPECIFIED CONSTIPATION TYPE: ICD-10-CM

## 2018-04-06 DIAGNOSIS — I10 ESSENTIAL HYPERTENSION: ICD-10-CM

## 2018-04-06 DIAGNOSIS — R53.81 PHYSICAL DECONDITIONING: ICD-10-CM

## 2018-04-06 DIAGNOSIS — I25.10 CORONARY ARTERY DISEASE INVOLVING NATIVE CORONARY ARTERY OF NATIVE HEART WITHOUT ANGINA PECTORIS: ICD-10-CM

## 2018-04-06 DIAGNOSIS — S80.12XD HEMATOMA OF LEFT LOWER EXTREMITY, SUBSEQUENT ENCOUNTER: Primary | ICD-10-CM

## 2018-04-06 LAB
HEMOGLOBIN: 9.2 G/DL (ref 12–16)
HGB BLD-MCNC: 9.2 G/DL (ref 12–16)

## 2018-04-06 PROCEDURE — 99310 SBSQ NF CARE HIGH MDM 45: CPT | Performed by: NURSE PRACTITIONER

## 2018-04-06 NOTE — PLAN OF CARE
Problem: Patient Care Overview  Goal: Plan of Care/Patient Progress Review  Physical Therapy Discharge Summary    Reason for therapy discharge:    Discharged to transitional care facility.    Progress towards therapy goal(s). See goals on Care Plan in [facility] electronic health record for goal details.  Goals not met.  Barriers to achieving goals:   discharge on same date as initial evaluation.    Therapy recommendation(s):    Continued therapy is recommended.  Rationale/Recommendations:  Pt has to continue to have rehab to address mobility deficts and to maximize function.

## 2018-04-06 NOTE — PROGRESS NOTES
[facility]  PRIMARY CARE PROVIDER AND CLINIC:  Lenin Perez [id number] [facility] [address]  Chief Complaint   Patient presents with     Hospital F/U       HPI:    Batsheva Barkley is a 84 year old  (11/6/1933),admitted to the [facility] from [facility]  [facility].  Hospital stay 4/4/18 through 4/5/18.  Admitted to this facility for  rehab, medical management and nursing care.  HPI information obtained from: facility chart records, facility staff, patient report and [facility] chart review.     Discharge Summary in italics:  Hospital Course   From H&P:   Batsheva Barkley is a 84 year old female who presents for evaluation of leg pain after falling earlier today. The patient states that around 11:00 AM today she was being assisted out of the car by her son to go back into her apartment. The patient's walker was in front of her and she is unsure the exact sequence of events that resulted in her falling. Per the patient's daughter who talked with the patient's son, the patient likely slipped on some ice and the patient's son grabbed the top of the patient's coat in order to help lower the patient to the ground. The patient's walker fell onto her left leg and there is question if the patient's left calf also hit part of the car during the fall. The patient only hit her leg in the fall and denies hitting her arm, head, back, or hip. Denies any associated lightheadedness, dizziness, chest pain, shortness of breath, or palpitations prior to the fall. The patient denies LOC. Denies dysuria, urinary urgency or frequency, or recent illness. Her pain since the fall is located over her left calf and is extremely tender to touch. She describes her pain as constant, throbbing, and rates it as a 10. Denies numbness or tingling in her left leg. The patient is not on anticoagulation except for a 81 mg aspirin daily. The patient reports issues with acid  reflux of recent but none today. The patient reports recent diarrhea that she was seen for by her PCP but none currently and states that she has actually been constipated the last few days.       The following problems were addressed during his hospitalization:     Batsheva Barkley is a 84 year old woman with a history of CAD s/p stenting, ICM, HTN, and HLD who is registered to observation after a mechanical fall resulting in LLE hematoma.       #Acute LLE Hematoma. Large hematoma left medial calf w/o evidence of compartment syndrome. Not on anticoagulation. Pain improved overnight. Hgb 10.8 on admit from 12-13 previously --> 9 today. Ortho saw here without indication for surgery. Continue to ice/elevate LLE. Can place gentle ACE wrap. Continue to monitor pulse, skin, pain, sensation, and ability to dorsi/plantar flex the ankle. Is stable for d/c from ortho standpoint today. PT saw and recommended TCU. Pain control w/ scheduled Tylenol 1 g TID. Also has PRN oxycodone but use conservatively given risk for mental status changes. Discussed bowel regimen if using. Should have f/u hgb next couple of days at TCU, as well as f/u exam by PCP or NH physician in coming days.       #HTN. BPs 120s-130s/60s. Continue home metoprolol and losartan.       #CAD. S/p stent in 2005. Continue home statin. Ok to resume home ASA 81 mg.       DVT Prophylaxis: ambulate as able - mechanical contraindicated on the leg as well as pharmacologic given acute hematoma       Current issues are:      Hematoma of left lower extremity, subsequent encounter  Physical deconditioning  Ms. Barkley was brought to ER by family after she fell trying to transfer from walker to car. Xrays negative for fracture. Has large hematoma to left medial calf. Were concerned about compartment syndrome and ortho followed, but it did not develop. Baseline hemoglobin 12-13. On admission was 10.8. At discharge was 9. Today is 9.2. Reports pain that is throbbing on exam today that  comes and goes, as currently an 8/10 but she did just return from therapy. She feels she is able to participate with therapy with current pain level. On tylenol TID and oxycodone PRN. On d/c there was some concern about risk for mental status changes with oxycodone use- but was unable to find incident during recent hospitalization. She states she had a catheter during hospitalization but denies any problems urinating on exam today- no dysuria, hematuria, retention. Continue to ice and elevate. Wrap with ACE bandage lightly. She reports that she lives with her  in an apartment and family lives locally. She uses a walker for ambulation.    Constipation, unspecified constipation type  Reports no BM in 4 days. Has received narcotics in the hospital. On miralax, senna s PRN. Is agreeable to suppository today.    Essential hypertension  On losartan, metoprolol. Bp controlled. Denies chest pain, palpitations, dizziness/ lightheadedness.    BP: 125-140/66-68 mmHg  P: 62-85 bpm      Coronary artery disease involving native coronary artery of native heart without angina pectoris  Stent placed in 2005. Echo from 11/2017 with EF 40%. On statin and baby ASA.       CODE STATUS/ADVANCE DIRECTIVES DISCUSSION:   CPR/Full code   Patient's living condition: lives with spouse in an apartment in [place]    ALLERGIES:Codeine and Lisinopril  PAST MEDICAL HISTORY:  has a past medical history of CAD (coronary artery disease) (6/20/05); Cardiomyopathy (H); CHF NYHA class III, chronic, systolic (H) (1/25/2018); Coronary atherosclerosis (6/20/2005); Edema (1/26/2015); Generalized osteoarthrosis; Hypertension goal BP (blood pressure) < 140/90 (1/21/2015); Leg weakness, bilateral (2/13/2018); Mitral regurgitation; Mixed hyperlipidemia; Osteoporosis; Physical deconditioning (1/21/2015); and Total urinary incontinence (12/27/2017).  PAST SURGICAL HISTORY:  has a past surgical history that includes L bunion + hammer toes (1/04, 4/04);  ovarian cyst surgery; Colonoscopy (3/05); L total knee replacement (10/2009 ); ENT surgery; Eye surgery; Heart Cath, Angioplasty (2005); and Esophagoscopy, gastroscopy, duodenoscopy (EGD), combined (N/A, 4/14/2016).  FAMILY HISTORY: family history includes Breast Cancer in her sister and sister; CEREBROVASCULAR DISEASE in her mother; HEART DISEASE in her sister; Respiratory in her father.  SOCIAL HISTORY:  reports that she has quit smoking. She has never used smokeless tobacco. She reports that she does not drink alcohol or use illicit drugs.    Post Discharge Medication Reconciliation Status: discharge medications reconciled and changed, per note/orders (see AVS).  Current Outpatient Prescriptions   Medication Sig Dispense Refill     acetaminophen (TYLENOL) 500 MG tablet Take 2 tablets (1,000 mg) by mouth 3 times daily Change to PRN when no longer needed for pain related to leg hematoma       oxyCODONE IR (ROXICODONE) 5 MG tablet Take 0.5-1 tablets (2.5-5 mg) by mouth every 6 hours as needed for moderate to severe pain 12 tablet 0     senna-docusate (SENOKOT-S;PERICOLACE) 8.6-50 MG per tablet Take 1 tablet by mouth 2 times daily as needed for constipation 100 tablet      polyethylene glycol (MIRALAX/GLYCOLAX) Packet Take 17 g by mouth daily as needed for constipation 7 packet      atorvastatin (LIPITOR) 40 MG tablet TAKE ONE TABLET BY MOUTH ONCE DAILY 90 tablet 2     metoprolol (LOPRESSOR) 50 MG tablet TAKE ONE TABLET BY MOUTH TWICE DAILY 180 tablet 3     order for DME Light weight wheelchair Body mass index is 19.08 kg/(m^2). 1 Device 0     order for DME ANTOINE stockings- below knee. 1 Package 0     losartan (COZAAR) 50 MG tablet Take 1 tablet (50 mg) by mouth 2 times daily 180 tablet 3     Calcium Carb-Cholecalciferol (CALCIUM 600 + D) 600-200 MG-UNIT TABS Take 600 mg by mouth daily       ASPIRIN 81 MG OR TABS 1 tab po QD (Once per day) 100 3     CENTRUM SILVER OR TABS ONE DAILY 3 MONTHS 1 YEAR       ROS:  10 point  "ROS of systems including Constitutional, Eyes, Respiratory, Cardiovascular, Gastroenterology, Genitourinary, Integumentary, Muscularskeletal, Psychiatric were all negative except for pertinent positives noted in my HPI.    Exam:  /66  Pulse 66  Temp 98.8  F (37.1  C)  Resp 18  Ht 5' 1\" (1.549 m)  Wt 102 lb (46.3 kg)  SpO2 97%  BMI 19.27 kg/m2   GENERAL APPEARANCE:  Alert, oriented x 3  EYES:  EOM, lids, pupils and irises normal, sclera clear and conjunctiva normal, no discharge or mattering on lids or lashes noted  ENT:  Mouth normal, moist mucous membranes, nose without drainage or crusting, external ears without lesions, hearing acuity : [place]  RESP:  respiratory effort and palpation of chest normal, no chest wall tenderness, no respiratory distress, Lung sounds clear, patient is on room air   CV:  Palpation and auscultation of heart done, rate and rhythm regular. + edema L >R, +1 pedal pulses bilaterally  ABDOMEN:  normal bowel sounds, soft, nontender.  M/S:   Gait and station abnormal: uses walker for ambulation. Able to plantar and dorsiflex bilateral ankles with some pain. Arthritic changes to hands, feet  SKIN:  Inspection and palpation of skin and subcutaneous tissue: skin warm, dry without rashes, significant ecchymosis to LLE- calf and surrounding areas soft, but tender with palpation. Sensation intact  NEURO: cranial nerves 2-12 grossly intact, no facial asymmetry, no speech deficits and able to follow directions, moves all extremities symmetrically  PSYCH:  insight and judgement intact, memory intact, affect and mood normal      Lab/Diagnostic data:     CBC RESULTS:   Recent Labs   Lab Test  04/05/18   0640  04/04/18 2016 04/04/18   1330   WBC  4.7   --   5.5   RBC  3.07*   --   3.77*   HGB  9.0*  9.9*  10.8*   HCT  28.6*   --   35.0   MCV  93   --   93   MCH  29.3   --   28.6   MCHC  31.5   --   30.9*   RDW  14.2   --   14.0   PLT  183   --   246       Last Basic Metabolic Panel:  Recent " Labs   Lab Test  04/05/18   0640  04/04/18   1330   NA  141  139   POTASSIUM  4.3  4.2   CHLORIDE  111*  106   REILLY  7.9*  8.3*   CO2  26  26   BUN  29  25   CR  0.79  0.88   GLC  86  88       Liver Function Studies -   Recent Labs   Lab Test  01/18/18   1135  10/06/17   1157   PROTTOTAL  6.8  6.5*   ALBUMIN  3.8  3.2*   BILITOTAL  0.5  0.4   ALKPHOS  74  72   AST  24  27   ALT  19  23       ASSESSMENT/PLAN:  (S80.12XD) Hematoma of left lower extremity, subsequent encounter  (primary encounter diagnosis)  (R53.81) Physical deconditioning  Comment: Acute, is having pain currently but is able to work with therapy- no signs of compartment syndrome  Plan: Continue scheduled tylenol TID and oxycodone PRN. Wrote order for nursing to offer patient oxycodone, as hesitant to schedule due to possible mental status changes with medication and not sure if she has a history of this, however was unable to find incident during recent hospitalization. Hemoglobin 4/9. Monitor for symptoms of urinary retention. Continue to ice and elevate. Wrap with ACE bandage lightly. Nursing to continue to monitor pulse, skin, pain, sensation, and ability to dorsi/plantar flex the ankle. Encourage participation in physical therapy/occupational therapy for strengthening and deconditioning. Discharge planning per their recommendation. Social work to assist with d/c planning.    (K59.00) Constipation, unspecified constipation type  Comment: Acute, last BM 4 days ago- suspect due to narcotic use in hospital  Plan: Glycerin suppository today. Start Senna-S daily. Narcotics now PRN, so suspect it should improve. Monitor BM closely    (I10) Essential hypertension  Comment: Chronic, stable  Plan: Continue medications as above. Monitor VS  Per TCU policy    (I25.10) Coronary artery disease involving native coronary artery of native heart without angina pectoris  Comment: Chronic, stable- no signs of cheat pain  Plan: Continue statin, baby ASA.     Total  time spent with patient visit at the skilled nursing facility was 41 minutes including patient visit and review of past records. Greater than 50% of total time spent with counseling and coordinating care due to review of history, status, and plan of care regarding the above medical issues      Electronically signed by:  NICKOLAS Ronquillo CNP

## 2018-04-07 ENCOUNTER — TELEPHONE (OUTPATIENT)
Dept: GERIATRICS | Facility: CLINIC | Age: 83
End: 2018-04-07

## 2018-04-07 ENCOUNTER — RECORDS - HEALTHEAST (OUTPATIENT)
Dept: LAB | Facility: CLINIC | Age: 83
End: 2018-04-07

## 2018-04-07 LAB
HEMOGLOBIN: 9.5 G/DL (ref 12–16)
HGB BLD-MCNC: 9.5 G/DL (ref 12–16)

## 2018-04-07 NOTE — TELEPHONE ENCOUNTER
Nursing called today out of concern for Batsheva via family asking for a sleeping medication since she is having trouble sleeping.    Asking for Melatonin    ORDER:  Melatonin 3mg at HS    Electronically signed by Yanet Ferro RN, CNP

## 2018-04-08 ENCOUNTER — RECORDS - HEALTHEAST (OUTPATIENT)
Dept: LAB | Facility: CLINIC | Age: 83
End: 2018-04-08

## 2018-04-08 VITALS
RESPIRATION RATE: 18 BRPM | HEART RATE: 60 BPM | WEIGHT: 102 LBS | SYSTOLIC BLOOD PRESSURE: 129 MMHG | DIASTOLIC BLOOD PRESSURE: 62 MMHG | OXYGEN SATURATION: 98 % | HEIGHT: 60 IN | BODY MASS INDEX: 20.03 KG/M2 | TEMPERATURE: 96.7 F

## 2018-04-08 LAB
C DIFF TOX B STL QL: NEGATIVE
RIBOTYPE 027/NAP1/BI: NORMAL

## 2018-04-08 RX ORDER — AMOXICILLIN 250 MG
1 CAPSULE ORAL DAILY
COMMUNITY
End: 2018-04-10

## 2018-04-09 ENCOUNTER — NURSING HOME VISIT (OUTPATIENT)
Dept: GERIATRICS | Facility: CLINIC | Age: 83
End: 2018-04-09
Payer: COMMERCIAL

## 2018-04-09 ENCOUNTER — TRANSFERRED RECORDS (OUTPATIENT)
Dept: HEALTH INFORMATION MANAGEMENT | Facility: CLINIC | Age: 83
End: 2018-04-09

## 2018-04-09 DIAGNOSIS — R53.81 PHYSICAL DECONDITIONING: ICD-10-CM

## 2018-04-09 DIAGNOSIS — E78.5 HYPERLIPIDEMIA LDL GOAL <100: ICD-10-CM

## 2018-04-09 DIAGNOSIS — I25.5 ISCHEMIC CARDIOMYOPATHY: ICD-10-CM

## 2018-04-09 DIAGNOSIS — S80.12XD HEMATOMA OF LEFT LOWER EXTREMITY, SUBSEQUENT ENCOUNTER: Primary | ICD-10-CM

## 2018-04-09 LAB
ANION GAP SERPL CALCULATED.3IONS-SCNC: 9 MMOL/L (ref 5–18)
ANION GAP SERPL CALCULATED.3IONS-SCNC: 9 MMOL/L (ref 5–18)
BUN SERPL-MCNC: 17 MG/DL (ref 8–20)
BUN SERPL-MCNC: 17 MG/DL (ref 8–28)
CALCIUM SERPL-MCNC: 9 MG/DL (ref 8.5–10.5)
CALCIUM SERPL-MCNC: 9 MG/DL (ref 8.5–10.5)
CHLORIDE BLD-SCNC: 109 MMOL/L (ref 98–107)
CHLORIDE SERPLBLD-SCNC: 109 MMOL/L (ref 98–107)
CO2 SERPL-SCNC: 22 MMOL/L (ref 22–31)
CO2 SERPL-SCNC: 22 MMOL/L (ref 22–31)
CREAT SERPL-MCNC: 0.69 MG/DL (ref 0.6–1.1)
CREAT SERPL-MCNC: 0.69 MG/DL (ref 0.6–1.1)
GFR SERPL CREATININE-BSD FRML MDRD: >60 ML/MIN/1.73M2
GFR SERPL CREATININE-BSD FRML MDRD: >60 ML/MIN/1.73M2
GLUCOSE BLD-MCNC: 82 MG/DL (ref 70–125)
GLUCOSE SERPL-MCNC: 82 MG/DL (ref 70–125)
HEMOGLOBIN: 10.8 G/DL (ref 12–16)
HGB BLD-MCNC: 10.8 G/DL (ref 12–16)
POTASSIUM BLD-SCNC: 4 MMOL/L (ref 3.5–5)
POTASSIUM SERPL-SCNC: 4 MMOL/L (ref 3.5–5)
SODIUM SERPL-SCNC: 140 MMOL/L (ref 136–145)
SODIUM SERPL-SCNC: 140 MMOL/L (ref 136–145)

## 2018-04-09 PROCEDURE — 99305 1ST NF CARE MODERATE MDM 35: CPT | Performed by: INTERNAL MEDICINE

## 2018-04-09 NOTE — MR AVS SNAPSHOT
"              After Visit Summary   2018    Batsheva Barkley    MRN: [id number]           Patient Information     Date Of Birth          1933        Visit Information        Provider Department      2018 7:30 AM Mj Kunz MD [facility]        Today's Diagnoses     Hematoma of left lower extremity, subsequent encounter    -  1    Hyperlipidemia LDL goal <100        Ischemic cardiomyopathy        Physical deconditioning           Follow-ups after your visit        Who to contact     If you have questions or need follow up information about today's clinic visit or your schedule please contact [facility] directly at [phone].  Normal or non-critical lab and imaging results will be communicated to you by [facility], letter or phone within 4 business days after the clinic has received the results. If you do not hear from us within 7 days, please contact the clinic through [facility] or phone. If you have a critical or abnormal lab result, we will notify you by phone as soon as possible.  Submit refill requests through [facility] or call your pharmacy and they will forward the refill request to us. Please allow 3 business days for your refill to be completed.          Additional Information About Your Visit        MyChart Information     [facility] lets you send messages to your doctor, view your test results, renew your prescriptions, schedule appointments and more. To sign up, go to [email or web address] . Click on \"Log in\" on the left side of the screen, which will take you to the Welcome page. Then click on \"Sign up Now\" on the right side of the page.     You will be asked to enter the access code listed below, as well as some personal information. Please follow the directions to create your username and password.     Your access code is: [email or web address]  Expires: 2018 12:36 PM     Your access code will  in 90 days. If you need help or a new code, please call your " [facility] or [phone].        Care EveryWhere ID     This is your Care EveryWhere ID. This could be used by other organizations to access your [facility] medical records  [id number]        Your Vitals Were     Pulse Temperature Respirations Height Pulse Oximetry BMI (Body Mass Index)    60 96.7  F (35.9  C) 18 5' (1.524 m) 98% 19.92 kg/m2       Blood Pressure from Last 3 Encounters:   04/12/18 139/65   04/11/18 165/68   04/10/18 138/72    Weight from Last 3 Encounters:   04/12/18 102 lb (46.3 kg)   04/10/18 98 lb (44.5 kg)   04/10/18 102 lb (46.3 kg)              Today, you had the following     No orders found for display         Today's Medication Changes          These changes are accurate as of 4/9/18 11:59 PM.  If you have any questions, ask your nurse or doctor.               Stop taking these medicines if you haven't already. Please contact your care team if you have questions.     senna-docusate 8.6-50 MG per tablet   Commonly known as:  SENOKOT-S;PERICOLACE   Stopped by:  Mj Kunz MD                    Primary Care Provider Office Phone # Fax #    Lenin Dougals Perez [phone] [address] [facility] [address]        Equal Access to Services     [facility] : Hadsuman winters Soemily, waaxda luqadaha, qaybta kaaljessica lee. So [facility] [phone].    ATENCIÓN: Si habla español, tiene a soria disposición servicios gratuitos de asistencia lingüística. Satish al [phone].    We comply with applicable federal civil rights laws and [place] laws. We do not discriminate on the basis of race, color, national origin, age, disability, sex, sexual orientation, or gender identity.            Thank you!     Thank you for choosing [facility]  for your care. Our goal is always to provide you with excellent care. Hearing back from our patients is one way we can continue to improve our services. Please  take a few minutes to complete the written survey that you may receive in the mail after your visit with us. Thank you!             Your Updated Medication List - Protect others around you: Learn how to safely use, store and throw away your medicines at www.disposemymeds.org.          This list is accurate as of 4/9/18 11:59 PM.  Always use your most recent med list.                   Brand Name Dispense Instructions for use Diagnosis    aspirin 81 MG tablet     100    1 tab po QD (Once per day)    Coronary atherosclerosis of unspecified type of vessel, native or graft, Mixed hyperlipidemia       atorvastatin 40 MG tablet    LIPITOR    90 tablet    TAKE ONE TABLET BY MOUTH ONCE DAILY    Hyperlipidemia LDL goal <100       losartan 50 MG tablet    COZAAR    180 tablet    Take 1 tablet (50 mg) by mouth 2 times daily    Essential hypertension       MELATONIN PO      Take 3 mg by mouth nightly as needed        metoprolol tartrate 50 MG tablet    LOPRESSOR    180 tablet    TAKE ONE TABLET BY MOUTH TWICE DAILY    Hypertension goal BP (blood pressure) < 140/90       * order for DME     1 Package    ANTOINE stockings- below knee.    Bilateral leg edema       * order for DME     1 Device    Light weight wheelchair Body mass index is 19.08 kg/(m^2).    Diarrhea of presumed infectious origin, Lack of coordination, Weakness of both lower extremities       oxyCODONE IR 5 MG tablet    ROXICODONE    12 tablet    Take 0.5-1 tablets (2.5-5 mg) by mouth every 6 hours as needed for moderate to severe pain    Hematoma of left lower extremity, initial encounter       polyethylene glycol Packet    MIRALAX/GLYCOLAX    7 packet    Take 17 g by mouth daily as needed for constipation    Hematoma of left lower extremity, initial encounter       * Notice:  This list has 2 medication(s) that are the same as other medications prescribed for you. Read the directions carefully, and ask your doctor or other care provider to review them with you.

## 2018-04-09 NOTE — PROGRESS NOTES
PRIMARY CARE PROVIDER AND CLINIC RESPONSIBLE:  Lenin Perez, [id number] [facility] / [facility] [address]        ADMISSION HISTORY AND PHYSICAL EXAMINATION     Chief Complaint   Patient presents with     Hospital F/U         HISTORY OF PRESENT ILLNESS:  84 year old female, (11/6/1933), [facility] continuation of medical care and rehab.    Pt admitted [facility] 4/4 to 4/5 for pain LLE s/p mechanical fall.    Pt denies any fevers/chills/chest pain/SOB. Little pain in LLE and swelling is down.    Patient is seen and examined by me with Graciela Monroe CNP. Please see Grcaiela Monroe 's admit noted dated 4/6 for details of admission, past medical history, family history, allergies, medication list, social history and other details pertinent with this admission. Hospital admission and dc summary reviewed.      Past Medical History:   Diagnosis Date     CAD (coronary artery disease) 6/20/05    [place], transient CHF     Cardiomyopathy (H)      CHF NYHA class III, chronic, systolic (H) 1/25/2018     Coronary atherosclerosis 6/20/2005    circ vessel was stented;  had a 50% LAD lesion which was not treated Problem list name updated by automated process. Provider to review     Edema 1/26/2015     Generalized osteoarthrosis     knees ;; L total kne 10/09     Hypertension goal BP (blood pressure) < 140/90 1/21/2015     Leg weakness, bilateral 2/13/2018     Mitral regurgitation     mod     Mixed hyperlipidemia      Osteoporosis      Physical deconditioning 1/21/2015     Total urinary incontinence 12/27/2017       Past Surgical History:   Procedure Laterality Date     COLONOSCOPY  3/05     ENT SURGERY       ESOPHAGOSCOPY, GASTROSCOPY, DUODENOSCOPY (EGD), COMBINED N/A 4/14/2016    Procedure: COMBINED ESOPHAGOSCOPY, GASTROSCOPY, DUODENOSCOPY (EGD), BIOPSY SINGLE OR MULTIPLE;  Surgeon: Jonathan Gramajo MD;  Location:  GI     EYE SURGERY       HEART CATH, ANGIOPLASTY  2005    RONI to left  circ     L bunion + hammer toes  1/04, 4/04     L total knee replacement  10/2009      ovarian cyst surgery         Current Outpatient Prescriptions   Medication Sig     MELATONIN PO Take 3 mg by mouth At Bedtime     senna-docusate (SENOKOT-S;PERICOLACE) 8.6-50 MG per tablet Take 1 tablet by mouth daily AND BID PRN     acetaminophen (TYLENOL) 500 MG tablet Take 2 tablets (1,000 mg) by mouth 3 times daily Change to PRN when no longer needed for pain related to leg hematoma     oxyCODONE IR (ROXICODONE) 5 MG tablet Take 0.5-1 tablets (2.5-5 mg) by mouth every 6 hours as needed for moderate to severe pain     polyethylene glycol (MIRALAX/GLYCOLAX) Packet Take 17 g by mouth daily as needed for constipation     atorvastatin (LIPITOR) 40 MG tablet TAKE ONE TABLET BY MOUTH ONCE DAILY     metoprolol (LOPRESSOR) 50 MG tablet TAKE ONE TABLET BY MOUTH TWICE DAILY     order for DME Light weight wheelchair Body mass index is 19.08 kg/(m^2).     order for DME ANTOINE stockings- below knee.     losartan (COZAAR) 50 MG tablet Take 1 tablet (50 mg) by mouth 2 times daily     Calcium Carb-Cholecalciferol (CALCIUM 600 + D) 600-200 MG-UNIT TABS Take 600 mg by mouth daily     ASPIRIN 81 MG OR TABS 1 tab po QD (Once per day)     CENTRUM SILVER OR TABS ONE DAILY     No current facility-administered medications for this visit.        Allergies   Allergen Reactions     Codeine      nausea, HA     Lisinopril Cough       Social History     Social History     Marital status:      Spouse name: Gene     Number of children: 6     Years of education: N/A     Occupational History     Not on file.     Social History Main Topics     Smoking status: Former Smoker     Smokeless tobacco: Never Used      Comment: only smoked for 3 years.     Alcohol use No     Drug use: No     Sexual activity: Yes     Partners: Male     Other Topics Concern     Caffeine Concern No     decaf coffee and tea      Special Diet No     Exercise Yes     walk everyday       Social History Narrative          Information reviewed:  Medications, vital signs, orders, nursing notes, problem list, hospital information.     ROS: All 10 point review of system completed, those pertinent positive, please see H&P, the remaining ROS is negative.    /62  Pulse 60  Temp 96.7  F (35.9  C)  Resp 18  Ht 5' (1.524 m)  Wt 102 lb (46.3 kg)  SpO2 98%  BMI 19.92 kg/m2    PHYSICAL EXAMINATION:   GENERAL:  No acute distress. Sitting on a WC.  SKIN:  Dry and warm.  There is no rash, lesions, ulcers or juandice at area of skin examined.  HEENT:  Head without trauma.  Pupils round, reactive. Exam of conjunctiva and lids are normal. Sclera without icterus. There is no oral thrush.  NECK:  Supple.  There is no cervical adenopathy, no thyromegaly. No jugular venous distension.  CHEST: No reproducible chest tenderness.   LUNGS:  Normal respiratory effort. Lungs are Clear on ascultation.  HEART:  Regular rate and rhythm.  No murmur, gallops or rubs auscultated.  ABDOMEN:  Soft, bowel sounds positive.  There is no tenderness or guarding.   EXTREMITIES: 2+ edema LLE with ecchymosis.  NEUROLOGIC:  Alert and oriented x3.      Lab/Diagnostic data:  Reviewed    Lab Results   Component Value Date    WBC 5.2 04/11/2018     Lab Results   Component Value Date    RBC 3.18 04/11/2018     Lab Results   Component Value Date    HGB 10.5 04/12/2018     Lab Results   Component Value Date    HCT 29.2 04/11/2018     Lab Results   Component Value Date    MCV 92 04/11/2018     Lab Results   Component Value Date    MCH 28.9 04/11/2018     Lab Results   Component Value Date    MCHC 31.5 04/11/2018     Lab Results   Component Value Date    RDW 14.4 04/11/2018     Lab Results   Component Value Date     04/11/2018       Last Basic Metabolic Panel:  Lab Results   Component Value Date     04/11/2018      Lab Results   Component Value Date    POTASSIUM 4.1 04/11/2018     Lab Results   Component Value Date     CHLORIDE 108 04/11/2018     Lab Results   Component Value Date    REILLY 8.2 04/11/2018     Lab Results   Component Value Date    CO2 27 04/11/2018     Lab Results   Component Value Date    BUN 18 04/11/2018     Lab Results   Component Value Date    CR 0.79 04/11/2018     Lab Results   Component Value Date    GLC 88 04/11/2018         ASSESSMENT / PLAN:     Hematoma of left lower extremity, subsequent encounter  - due to mechanical fall.  - Pain control and bowel regimen.  - Keep LLE elevated.  - ACE wraps.    Hyperlipidemia LDL goal <70  - On lipitor.    Ischemic cardiomyopathy  - TTE 11/2017 showed EF 40%.  - Low salt diet.  - On losartan, ASA and metoprolol.    Physical deconditioning  -Plan: PT/OT, fall precautions. Care conference with patient and family for the progress of rehab and disposition issues will be discussed as planned. Rehab evaluation and other evaluations including CPT are at rehab logs, to be reviewed separately.  Fall risk assessment as well as cognitive evaluation will be formed during rehab stay if indicated.      Other problems with same care. Primary care doctor and other specialists to address those chronic problems in next clinic appointment to be scheduled upon discharge from the TCU.    Total time spent with patient visit was 25 min including patient visit, review of past records, 1/2 time on patients counseling and coordinating care.

## 2018-04-10 ENCOUNTER — NURSING HOME VISIT (OUTPATIENT)
Dept: GERIATRICS | Facility: CLINIC | Age: 83
End: 2018-04-10
Payer: COMMERCIAL

## 2018-04-10 ENCOUNTER — HOSPITAL ENCOUNTER (OUTPATIENT)
Facility: CLINIC | Age: 83
Setting detail: OBSERVATION
Discharge: NURSING, REHAB OR LONG-TERM CARE | End: 2018-04-11
Attending: EMERGENCY MEDICINE | Admitting: INTERNAL MEDICINE
Payer: COMMERCIAL

## 2018-04-10 ENCOUNTER — APPOINTMENT (OUTPATIENT)
Dept: GENERAL RADIOLOGY | Facility: CLINIC | Age: 83
End: 2018-04-10
Attending: EMERGENCY MEDICINE
Payer: COMMERCIAL

## 2018-04-10 VITALS
TEMPERATURE: 98.2 F | SYSTOLIC BLOOD PRESSURE: 138 MMHG | BODY MASS INDEX: 20.03 KG/M2 | HEART RATE: 60 BPM | HEIGHT: 60 IN | DIASTOLIC BLOOD PRESSURE: 72 MMHG | RESPIRATION RATE: 16 BRPM | WEIGHT: 102 LBS | OXYGEN SATURATION: 97 %

## 2018-04-10 DIAGNOSIS — R53.81 PHYSICAL DECONDITIONING: ICD-10-CM

## 2018-04-10 DIAGNOSIS — S80.12XD HEMATOMA OF LEFT LOWER EXTREMITY, SUBSEQUENT ENCOUNTER: Primary | ICD-10-CM

## 2018-04-10 DIAGNOSIS — R11.2 NAUSEA AND VOMITING, INTRACTABILITY OF VOMITING NOT SPECIFIED, UNSPECIFIED VOMITING TYPE: ICD-10-CM

## 2018-04-10 DIAGNOSIS — S80.12XA LEG HEMATOMA, LEFT, INITIAL ENCOUNTER: ICD-10-CM

## 2018-04-10 DIAGNOSIS — R11.0 NAUSEA: ICD-10-CM

## 2018-04-10 DIAGNOSIS — S80.12XA HEMATOMA OF LEFT LOWER EXTREMITY, INITIAL ENCOUNTER: ICD-10-CM

## 2018-04-10 PROBLEM — M79.89 LEFT LEG SWELLING: Status: ACTIVE | Noted: 2018-04-10

## 2018-04-10 LAB
ALBUMIN SERPL-MCNC: 2.7 G/DL (ref 3.4–5)
ALBUMIN UR-MCNC: NEGATIVE MG/DL
ALP SERPL-CCNC: 74 U/L (ref 40–150)
ALT SERPL W P-5'-P-CCNC: 39 U/L (ref 0–50)
ANION GAP SERPL CALCULATED.3IONS-SCNC: 7 MMOL/L (ref 3–14)
ANISOCYTOSIS BLD QL SMEAR: SLIGHT
APPEARANCE UR: CLEAR
AST SERPL W P-5'-P-CCNC: 44 U/L (ref 0–45)
BASOPHILS # BLD AUTO: 0 10E9/L (ref 0–0.2)
BASOPHILS NFR BLD AUTO: 0.5 %
BILIRUB DIRECT SERPL-MCNC: <0.1 MG/DL (ref 0–0.2)
BILIRUB SERPL-MCNC: 0.3 MG/DL (ref 0.2–1.3)
BILIRUB UR QL STRIP: NEGATIVE
BUN SERPL-MCNC: 27 MG/DL (ref 7–30)
BURR CELLS BLD QL SMEAR: ABNORMAL
CALCIUM SERPL-MCNC: 8.1 MG/DL (ref 8.5–10.1)
CHLORIDE SERPL-SCNC: 106 MMOL/L (ref 94–109)
CK SERPL-CCNC: 149 U/L (ref 30–225)
CO2 SERPL-SCNC: 21 MMOL/L (ref 20–32)
COLOR UR AUTO: ABNORMAL
CREAT SERPL-MCNC: 0.72 MG/DL (ref 0.52–1.04)
DIFFERENTIAL METHOD BLD: ABNORMAL
EOSINOPHIL # BLD AUTO: 0.1 10E9/L (ref 0–0.7)
EOSINOPHIL NFR BLD AUTO: 1.1 %
ERYTHROCYTE [DISTWIDTH] IN BLOOD BY AUTOMATED COUNT: 14.3 % (ref 10–15)
GFR SERPL CREATININE-BSD FRML MDRD: 78 ML/MIN/1.7M2
GLUCOSE SERPL-MCNC: 97 MG/DL (ref 70–99)
GLUCOSE UR STRIP-MCNC: NEGATIVE MG/DL
HCT VFR BLD AUTO: 31 % (ref 35–47)
HGB BLD-MCNC: 9.7 G/DL (ref 11.7–15.7)
HGB UR QL STRIP: NEGATIVE
IMM GRANULOCYTES # BLD: 0 10E9/L (ref 0–0.4)
IMM GRANULOCYTES NFR BLD: 0.5 %
KETONES UR STRIP-MCNC: NEGATIVE MG/DL
LEUKOCYTE ESTERASE UR QL STRIP: NEGATIVE
LIPASE SERPL-CCNC: 879 U/L (ref 73–393)
LYMPHOCYTES # BLD AUTO: 1.3 10E9/L (ref 0.8–5.3)
LYMPHOCYTES NFR BLD AUTO: 20.8 %
MCH RBC QN AUTO: 29.4 PG (ref 26.5–33)
MCHC RBC AUTO-ENTMCNC: 31.3 G/DL (ref 31.5–36.5)
MCV RBC AUTO: 94 FL (ref 78–100)
MONOCYTES # BLD AUTO: 0.8 10E9/L (ref 0–1.3)
MONOCYTES NFR BLD AUTO: 12.8 %
MUCOUS THREADS #/AREA URNS LPF: PRESENT /LPF
NEUTROPHILS # BLD AUTO: 3.9 10E9/L (ref 1.6–8.3)
NEUTROPHILS NFR BLD AUTO: 64.3 %
NITRATE UR QL: NEGATIVE
NRBC # BLD AUTO: 0 10*3/UL
NRBC BLD AUTO-RTO: 0 /100
PH UR STRIP: 5 PH (ref 5–7)
PLATELET # BLD AUTO: 175 10E9/L (ref 150–450)
PLATELET # BLD EST: ABNORMAL 10*3/UL
POTASSIUM SERPL-SCNC: 4.4 MMOL/L (ref 3.4–5.3)
PROT SERPL-MCNC: 6 G/DL (ref 6.8–8.8)
RBC # BLD AUTO: 3.3 10E12/L (ref 3.8–5.2)
RBC #/AREA URNS AUTO: <1 /HPF (ref 0–2)
SODIUM SERPL-SCNC: 134 MMOL/L (ref 133–144)
SOURCE: ABNORMAL
SP GR UR STRIP: 1.01 (ref 1–1.03)
TROPONIN I SERPL-MCNC: <0.015 UG/L (ref 0–0.04)
UROBILINOGEN UR STRIP-MCNC: 0 MG/DL (ref 0–2)
WBC # BLD AUTO: 6.1 10E9/L (ref 4–11)
WBC #/AREA URNS AUTO: 3 /HPF (ref 0–5)

## 2018-04-10 PROCEDURE — G0378 HOSPITAL OBSERVATION PER HR: HCPCS

## 2018-04-10 PROCEDURE — 99310 SBSQ NF CARE HIGH MDM 45: CPT | Performed by: NURSE PRACTITIONER

## 2018-04-10 PROCEDURE — 82550 ASSAY OF CK (CPK): CPT | Performed by: EMERGENCY MEDICINE

## 2018-04-10 PROCEDURE — 25000125 ZZHC RX 250: Performed by: EMERGENCY MEDICINE

## 2018-04-10 PROCEDURE — 96376 TX/PRO/DX INJ SAME DRUG ADON: CPT

## 2018-04-10 PROCEDURE — 73630 X-RAY EXAM OF FOOT: CPT | Mod: LT

## 2018-04-10 PROCEDURE — 80076 HEPATIC FUNCTION PANEL: CPT | Performed by: EMERGENCY MEDICINE

## 2018-04-10 PROCEDURE — 84484 ASSAY OF TROPONIN QUANT: CPT | Performed by: EMERGENCY MEDICINE

## 2018-04-10 PROCEDURE — 25000132 ZZH RX MED GY IP 250 OP 250 PS 637: Performed by: PHYSICIAN ASSISTANT

## 2018-04-10 PROCEDURE — 99220 ZZC INITIAL OBSERVATION CARE,LEVL III: CPT | Performed by: PHYSICIAN ASSISTANT

## 2018-04-10 PROCEDURE — 83690 ASSAY OF LIPASE: CPT | Performed by: EMERGENCY MEDICINE

## 2018-04-10 PROCEDURE — 99285 EMERGENCY DEPT VISIT HI MDM: CPT | Mod: 25

## 2018-04-10 PROCEDURE — 25000128 H RX IP 250 OP 636: Performed by: EMERGENCY MEDICINE

## 2018-04-10 PROCEDURE — 81001 URINALYSIS AUTO W/SCOPE: CPT | Performed by: EMERGENCY MEDICINE

## 2018-04-10 PROCEDURE — 36415 COLL VENOUS BLD VENIPUNCTURE: CPT | Performed by: EMERGENCY MEDICINE

## 2018-04-10 PROCEDURE — 93005 ELECTROCARDIOGRAM TRACING: CPT

## 2018-04-10 PROCEDURE — 96374 THER/PROPH/DIAG INJ IV PUSH: CPT

## 2018-04-10 PROCEDURE — 96375 TX/PRO/DX INJ NEW DRUG ADDON: CPT

## 2018-04-10 PROCEDURE — 85025 COMPLETE CBC W/AUTO DIFF WBC: CPT | Performed by: EMERGENCY MEDICINE

## 2018-04-10 PROCEDURE — 80048 BASIC METABOLIC PNL TOTAL CA: CPT | Performed by: EMERGENCY MEDICINE

## 2018-04-10 RX ORDER — METOPROLOL TARTRATE 50 MG
50 TABLET ORAL 2 TIMES DAILY
Status: DISCONTINUED | OUTPATIENT
Start: 2018-04-10 | End: 2018-04-11 | Stop reason: HOSPADM

## 2018-04-10 RX ORDER — POLYETHYLENE GLYCOL 3350 17 G/17G
17 POWDER, FOR SOLUTION ORAL DAILY PRN
Status: DISCONTINUED | OUTPATIENT
Start: 2018-04-10 | End: 2018-04-11 | Stop reason: HOSPADM

## 2018-04-10 RX ORDER — ATORVASTATIN CALCIUM 40 MG/1
40 TABLET, FILM COATED ORAL DAILY
Status: DISCONTINUED | OUTPATIENT
Start: 2018-04-11 | End: 2018-04-11 | Stop reason: HOSPADM

## 2018-04-10 RX ORDER — LANOLIN ALCOHOL/MO/W.PET/CERES
3 CREAM (GRAM) TOPICAL
Status: DISCONTINUED | OUTPATIENT
Start: 2018-04-10 | End: 2018-04-11 | Stop reason: HOSPADM

## 2018-04-10 RX ORDER — LOSARTAN POTASSIUM 25 MG/1
50 TABLET ORAL 2 TIMES DAILY
Status: DISCONTINUED | OUTPATIENT
Start: 2018-04-10 | End: 2018-04-11 | Stop reason: HOSPADM

## 2018-04-10 RX ORDER — MORPHINE SULFATE 2 MG/ML
2 INJECTION, SOLUTION INTRAMUSCULAR; INTRAVENOUS ONCE
Status: COMPLETED | OUTPATIENT
Start: 2018-04-10 | End: 2018-04-10

## 2018-04-10 RX ORDER — ASPIRIN 81 MG/1
81 TABLET ORAL DAILY
Status: DISCONTINUED | OUTPATIENT
Start: 2018-04-11 | End: 2018-04-11 | Stop reason: HOSPADM

## 2018-04-10 RX ORDER — ACETAMINOPHEN 650 MG/1
650 SUPPOSITORY RECTAL EVERY 4 HOURS PRN
Status: DISCONTINUED | OUTPATIENT
Start: 2018-04-10 | End: 2018-04-11 | Stop reason: HOSPADM

## 2018-04-10 RX ORDER — ACETAMINOPHEN 500 MG
1000 TABLET ORAL 3 TIMES DAILY PRN
Status: ON HOLD | COMMUNITY
End: 2018-04-11

## 2018-04-10 RX ORDER — ACETAMINOPHEN 325 MG/1
650 TABLET ORAL EVERY 4 HOURS PRN
Status: DISCONTINUED | OUTPATIENT
Start: 2018-04-10 | End: 2018-04-11 | Stop reason: HOSPADM

## 2018-04-10 RX ORDER — ACETAMINOPHEN 500 MG
1000 TABLET ORAL 3 TIMES DAILY PRN
Status: DISCONTINUED | OUTPATIENT
Start: 2018-04-10 | End: 2018-04-10 | Stop reason: DRUGHIGH

## 2018-04-10 RX ORDER — SODIUM CHLORIDE 9 MG/ML
INJECTION, SOLUTION INTRAVENOUS CONTINUOUS
Status: DISCONTINUED | OUTPATIENT
Start: 2018-04-10 | End: 2018-04-11

## 2018-04-10 RX ORDER — ONDANSETRON 2 MG/ML
4 INJECTION INTRAMUSCULAR; INTRAVENOUS ONCE
Status: COMPLETED | OUTPATIENT
Start: 2018-04-10 | End: 2018-04-10

## 2018-04-10 RX ORDER — NALOXONE HYDROCHLORIDE 0.4 MG/ML
.1-.4 INJECTION, SOLUTION INTRAMUSCULAR; INTRAVENOUS; SUBCUTANEOUS
Status: DISCONTINUED | OUTPATIENT
Start: 2018-04-10 | End: 2018-04-11 | Stop reason: HOSPADM

## 2018-04-10 RX ORDER — LIDOCAINE 40 MG/G
CREAM TOPICAL
Status: DISCONTINUED | OUTPATIENT
Start: 2018-04-10 | End: 2018-04-11 | Stop reason: HOSPADM

## 2018-04-10 RX ORDER — LOPERAMIDE HCL 2 MG
CAPSULE ORAL PRN
COMMUNITY
End: 2018-04-12

## 2018-04-10 RX ORDER — ONDANSETRON 4 MG/1
4 TABLET, ORALLY DISINTEGRATING ORAL EVERY 8 HOURS PRN
Status: DISCONTINUED | OUTPATIENT
Start: 2018-04-10 | End: 2018-04-11 | Stop reason: HOSPADM

## 2018-04-10 RX ADMIN — ONDANSETRON 4 MG: 2 INJECTION INTRAMUSCULAR; INTRAVENOUS at 18:19

## 2018-04-10 RX ADMIN — LOSARTAN POTASSIUM 50 MG: 25 TABLET ORAL at 23:37

## 2018-04-10 RX ADMIN — METOPROLOL TARTRATE 50 MG: 50 TABLET, FILM COATED ORAL at 23:37

## 2018-04-10 RX ADMIN — FAMOTIDINE 20 MG: 10 INJECTION, SOLUTION INTRAVENOUS at 20:53

## 2018-04-10 RX ADMIN — ONDANSETRON 4 MG: 2 INJECTION INTRAMUSCULAR; INTRAVENOUS at 20:24

## 2018-04-10 RX ADMIN — OXYCODONE HYDROCHLORIDE 5 MG: 5 TABLET ORAL at 23:45

## 2018-04-10 RX ADMIN — MORPHINE SULFATE 2 MG: 2 INJECTION, SOLUTION INTRAMUSCULAR; INTRAVENOUS at 18:19

## 2018-04-10 ASSESSMENT — ENCOUNTER SYMPTOMS
WOUND: 0
NUMBNESS: 0
MYALGIAS: 1
COLOR CHANGE: 1
WEAKNESS: 1

## 2018-04-10 NOTE — ED NOTES
Pt presents via EMS for evaluation of increasing pain, swelling and ecchymosis to E. Pt had a fall last Wednesday, where her walker hit her leg. Was seen here, admitted and transferred to TCU for rehab. DP pulse is present and palpable , but weak +1.Today, pt had PT, then felt like she had a headache and lightheaded, so was laid down. Pt then had an episode of emesis. Pt was given 5 mg oxycodone 20 min PTA.

## 2018-04-10 NOTE — IP AVS SNAPSHOT
Batsheva Higgins #[id number] (CSN: [id number])  (84 year old F)  (Adm: 04/10/18)     [id number]               [facility] OBSERVATION DEPARTMENT: [phone]            Patient Demographics     Patient Name Sex          Age SSN Address Phone    Batsheva Higgins Female 1933 (84 year old) [id number]    [facility] [address] [phone] (Home) *Preferred*  [phone] (Mobile)      Emergency Contact(s)     Name Relation Home Work Mobile    EZEQUIEL MENDEZ Daughter [phone]    Shakila Higgins Daughter [phone] none [phone]    ANDER HIGGINS Spouse [phone]        Admission Information     Attending Provider Admitting Provider Admission Type Admission Date/Time    Niya Moody, Niya Zuluaga,  Emergency 04/10/18  1723    Discharge Date Hospital Service Auth/Cert Status Service Area     Observation Incomplete [facility]    Unit Room/Bed Admission Status        OBSERVATION DEPT  Admission (Confirmed)       Admission     Complaint    Left leg swelling      Hospital Account     Name Acct ID Class Status Primary Coverage    Batsheva Higgins [id number] Observation Open MEDICA - MEDICA DUAL SOL[employer]IONS [facility]            Guarantor Account (for Hospital Account #[id number])     Name Relation to Pt Service Area Active? Acct Type    Batsheva Higgins  FCS Yes Personal/Family    Address Phone          [address] [facility] [address] [place] [address] [phone](H)              Coverage Information (for Hospital Account #[id number])     F/O Payor/Plan Precert #    MEDICA/MEDICA DUAL SOLUTIONS [employer] PARTNERS     Subscriber Subscriber #    Batsheva Higgins [id number]    Address Phone    [address] [address]  [place] [address] [phone]                                                INTERAGENCY TRANSFER FORM - PHYSICIAN ORDERS   4/10/2018                       [facility] OBSERVATION  DEPARTMENT: [phone]            Attending Provider: Niya Moody DO     Allergies:  Codeine, Lisinopril    Infection:  None   Service:  Observation    Ht:  1.524 m (5')   Wt:  44.5 kg (98 lb)   Admission Wt:  44.5 kg (98 lb)    BMI:  19.14 kg/m 2   BSA:  1.37 m 2            ED Clinical Impression     Diagnosis Description Comment Added By Time Added    Leg hematoma, left, initial encounter [S80.12XA] Leg hematoma, left, initial encounter [S80.12XA]  Roberto Shaver MD 4/10/2018  8:54 PM    Nausea [R11.0] Nausea [R11.0]  Roberto Shaver MD 4/10/2018  8:54 PM      Hospital Problems as of 4/11/2018              Priority Class Noted POA    Left leg swelling Medium  4/10/2018 Yes      Non-Hospital Problems as of 4/11/2018              Priority Class Noted    Hallux rigidus Medium  2/11/2005    Coronary atherosclerosis Medium  6/20/2005    Osteoporosis Medium  3/6/2007    HYPERLIPIDEMIA LDL GOAL <100 Medium  10/31/2010    Advanced directives, counseling/discussion Medium  5/7/2012    Concussion Medium  2/27/2014    Impacted cerumen Medium  10/6/2014    Pelvic fracture: Left( 1/17/15) Medium  1/18/2015    Fracture of rib of left side Medium  1/21/2015    Pain in joint of left pelvic region or thigh Medium  1/21/2015    Fall from slipping on ice Medium  1/21/2015    Constipation Medium  1/21/2015    Physical deconditioning Medium  1/21/2015    Systolic CHF (H) Medium  1/21/2015    Hypertension goal BP (blood pressure) < 140/90 Medium  1/21/2015    Cough Medium  1/26/2015    Edema Medium  1/26/2015    Cardiomyopathy (H) Medium  Unknown    CAD (coronary artery disease) Medium  6/11/2015    Stented coronary artery Medium  9/18/2015    Other dysphagia Medium  1/16/2016    HTN (hypertension) Medium  10/6/2016    Total urinary incontinence Medium  12/27/2017    CHF NYHA class III, chronic, systolic (H) Medium  1/25/2018    Leg weakness, bilateral Medium  2/13/2018    Lower extremity edema Medium   3/29/2018    Hematoma of left lower extremity Medium  4/4/2018      Code Status History     Date Active Date Inactive Code Status Order ID Comments User Context    4/5/2018 12:33 PM 4/10/2018 10:54 PM Full Code [id number]  Naye Amaya PA Outpatient    4/4/2018  4:31 PM 4/5/2018 12:33 PM Full Code [id number]  Radha Metcalf PA-C Inpatient    1/20/2015  7:32 AM 4/4/2018  4:31 PM Full Code [id number]  Karolina Reis MD Outpatient    1/18/2015 12:14 AM 1/20/2015  7:32 AM Full Code [id number]  Mahendra Mtz MD Inpatient      Current Code Status     Date Active Code Status Order ID Comments User Context       4/10/2018 10:54 PM Full Code [id number]  Korina Acevedo PA-C Inpatient       Summary of Visit     Reason for your hospital stay       You were evaluated in the hospital for increased pain and swelling in your left lower extremity. XR of the left ankle area showed no evidence of fracture. Left lower extremity ultrasound showed no evidence of a deep vein thrombosis (NO evidence of blood clot). We recommend that you have scheduled tylenol 1 gram three times a day (every 8 hours scheduled) and as-needed oxycodone for very severe breakthrough pain. Try to use the oxycodone only if really needed for severe pain in order to avoid nausea. Continue to elevate your leg, use ice for comfort, and continue your physical therapy and occupational therapy at your facility. Do not have the ACE bandage wrapped too tightly.                Medication Review      CONTINUE these medications which may have CHANGED, or have new prescriptions. If we are uncertain of the size of tablets/capsules you have at home, strength may be listed as something that might have changed.        Dose / Directions Comments    acetaminophen 500 MG tablet   Commonly known as:  TYLENOL   This may have changed:    - when to take this  - reasons to take this   Used for:  Leg hematoma, left, initial encounter        Dose:  1000 mg    Take 2 tablets (1,000 mg) by mouth every 8 hours   Refills:  0          CONTINUE these medications which have NOT CHANGED        Dose / Directions Comments    aspirin 81 MG tablet   Used for:  Coronary atherosclerosis of unspecified type of vessel, native or graft, Mixed hyperlipidemia        1 tab po QD (Once per day)   Quantity:  100   Refills:  3        atorvastatin 40 MG tablet   Commonly known as:  LIPITOR   Used for:  Hyperlipidemia LDL goal <100        TAKE ONE TABLET BY MOUTH ONCE DAILY   Quantity:  90 tablet   Refills:  2        loperamide 2 MG capsule   Commonly known as:  IMODIUM        Take by mouth as needed for diarrhea 4mg for 1st loose stool, 2mg for each loose stool afterward. Max of 16mg in 24hrs   Refills:  0        losartan 50 MG tablet   Commonly known as:  COZAAR   Used for:  Essential hypertension        Dose:  50 mg   Take 1 tablet (50 mg) by mouth 2 times daily   Quantity:  180 tablet   Refills:  3        MELATONIN PO        Dose:  3 mg   Take 3 mg by mouth nightly as needed   Refills:  0        metoprolol tartrate 50 MG tablet   Commonly known as:  LOPRESSOR   Used for:  Hypertension goal BP (blood pressure) < 140/90        TAKE ONE TABLET BY MOUTH TWICE DAILY   Quantity:  180 tablet   Refills:  3        * order for DME   Used for:  Bilateral leg edema        ANTOINE stockings- below knee.   Quantity:  1 Package   Refills:  0        * order for DME   Used for:  Diarrhea of presumed infectious origin, Lack of coordination, Weakness of both lower extremities        Light weight wheelchair Body mass index is 19.08 kg/(m^2).   Quantity:  1 Device   Refills:  0        oxyCODONE IR 5 MG tablet   Commonly known as:  ROXICODONE   Used for:  Hematoma of left lower extremity, initial encounter        Dose:  2.5-5 mg   Take 0.5-1 tablets (2.5-5 mg) by mouth every 6 hours as needed for moderate to severe pain   Quantity:  12 tablet   Refills:  0        polyethylene glycol Packet   Commonly known as:   MIRALAX/GLYCOLAX   Used for:  Hematoma of left lower extremity, initial encounter        Dose:  17 g   Take 17 g by mouth daily as needed for constipation   Quantity:  7 packet   Refills:  0        ZOFRAN ODT PO        Dose:  4 mg   Take 4 mg by mouth every 8 hours as needed for nausea   Refills:  0        * Notice:  This list has 2 medication(s) that are the same as other medications prescribed for you. Read the directions carefully, and ask your doctor or other care provider to review them with you.            After Care     Activity - Up with nursing assistance           Advance Diet as Tolerated       Follow this diet upon discharge: Orders Placed This Encounter      Regular Diet Adult       Elevate       Left lower extremity       Fall precautions           General info for SNF       Length of Stay Estimate: Short Term Care: Estimated # of Days <30  Condition at Discharge: Stable  Level of care:skilled   Rehabilitation Potential: Good  Admission H&P remains valid and up-to-date: Yes  Recent Chemotherapy: N/A  Use Nursing Home Standing Orders: Yes       Mantoux instructions       Give two-step Mantoux (PPD) Per Facility Policy Yes       Wound care       Site:   Left lower extremity  Instructions:  Keep in gentle ACE wrap, but do not wrap tightly.             Supplies     ICE PACK       Prn for leg pain             Referrals     Occupational Therapy Adult Consult       Evaluate and treat as clinically indicated.    Reason:  Left lower extremity hematoma, limited mobility due to pain       Physical Therapy Adult Consult       Evaluate and treat as clinically indicated.    Reason:  Left lower extremity hematoma, limited mobility due to pain             Follow-Up Appointment Instructions     Follow Up and recommended labs and tests       Follow up with Transitional Care Unit physician.  No follow up labs or test are needed.             Statement of Approval     Ordered          04/11/18 1355  I have reviewed and  agree with all the recommendations and orders detailed in this document.  EFFECTIVE NOW     Approved and electronically signed by:  Khushi Faust PA-C                                                 INTERAGENCY TRANSFER FORM - NURSING   4/10/2018                       [facility] OBSERVATION DEPARTMENT: [phone]            Attending Provider: Niya Moody DO     Allergies:  Codeine, Lisinopril    Infection:  None   Service:  Observation    Ht:  1.524 m (5')   Wt:  44.5 kg (98 lb)   Admission Wt:  44.5 kg (98 lb)    BMI:  19.14 kg/m 2   BSA:  1.37 m 2            Advance Directives        Scanned docmt in ACP Activity?           Yes, scanned ACP docmt        Immunizations     Name Date      Influenza (High Dose) 3 valent vaccine 09/05/17     Influenza (High Dose) 3 valent vaccine 11/01/16     Influenza (High Dose) 3 valent vaccine 09/18/15     Influenza (High Dose) 3 valent vaccine 09/24/14     Influenza (High Dose) 3 valent vaccine 10/01/13     Influenza (IIV3) PF 09/24/14     Influenza (IIV3) PF 10/21/08     Influenza (IIV3) PF 11/01/06     Influenza (IIV3) PF 10/11/05     Influenza (IIV3) PF 11/01/04     Pneumo Conj 13-V (2010&after) 09/18/15     Pneumococcal 23 valent 01/01/03     TD (ADULT, 7+) 12/19/05     TDAP Vaccine (Adacel) 09/18/15       ASSESSMENT     Discharge Profile Flowsheet     EXPECTED DISCHARGE     FINAL RESOURCES      Expected Discharge Date  -- (/[facility]CU) 04/11/18 0746   PAS Number  [id number] 04/05/18 1331    DISCHARGE NEEDS ASSESSMENT     SKIN      Equipment Currently Used at Home  walker, rolling 04/05/18 1204   Inspection of bony prominences  Full 04/11/18 1114    Equipment Used at Home  cane, straight 01/18/15 1509   Inspection under devices  Full 04/11/18 1114    GASTROINTESTINAL (ADULT,PEDIATRIC,OB)     Skin WDL  ex;color;characteristics 04/11/18 1114    GI WDL  WDL 04/11/18 1114   Skin Color/Characteristics  bruised (ecchymotic) 04/11/18 1114     "Last Bowel Movement  04/09/18 04/11/18 1114   Skin Temperature  warm 04/11/18 1114    GI Signs/Symptoms  nausea;vomiting 04/10/18 1740   Skin Moisture  dry 04/11/18 1114    Passing flatus  yes 04/11/18 1114   Skin Elasticity  quick return to original state 04/11/18 1114    COMMUNICATION ASSESSMENT     Skin Integrity  intact;other (see comments) (hematoma) 04/11/18 1114    Patient's communication style  spoken language (English or Bilingual) 04/10/18 1731                      Assessment WDL (Within Defined Limits) Definitions           Skin WDL     Effective: 09/28/15    Row Information: <b>WDL Definition:</b> Warm; dry; intact; elastic; without discoloration; pressure points without redness<br> <font color=\"gray\"><i>Item=AS skin wdl>>List=AS skin wdl>>Version=F14</i></font>      Vitals     Vital Signs Flowsheet     VITAL SIGNS     Pain Management Interventions  analgesia administered;cold applied 04/11/18 1129    Temp  (P)  97.3  F (36.3  C) 04/11/18 1303   Pain Intervention(s)  Medication (See eMAR);Repositioned;Cold applied 04/11/18 1129    Temp src  (P)  Oral 04/11/18 1303   ANALGESIA SIDE EFFECTS MONITORING      Resp  (P)  18 04/11/18 1303   Side Effects Monitoring: Respiratory Quality  R 04/11/18 1129    Pulse  (P)  79 04/11/18 1303   Side Effects Monitoring: Respiratory Depth  N 04/11/18 1129    Pulse/Heart Rate Source  (P)  Monitor 04/11/18 1303   Side Effects Monitoring: Sedation Level  1 04/11/18 1129    BP  (P)  119/50 04/11/18 1303   HEIGHT AND WEIGHT      BP Location  Right arm 04/11/18 0841   Height  1.524 m (5') 04/10/18 1733    OXYGEN THERAPY     Height Method  Stated 04/10/18 1733    SpO2  (P)  94 % 04/11/18 1303   Weight  44.5 kg (98 lb) 04/10/18 1733    O2 Device  (P)  None (Room air) 04/11/18 1303   BSA (Calculated - sq m)  1.37 04/10/18 1733    PAIN/COMFORT     BMI (Calculated)  19.18 04/10/18 1733    Patient Currently in Pain  (P)  yes 04/11/18 1303   AMADOR COMA SCALE      Preferred Pain Scale "  (P)  number (Numeric Rating Pain Scale) 04/11/18 1303   Best Eye Response  4-->(E4) spontaneous 04/11/18 1114    Patient's Stated Pain Goal  3 04/11/18 1129   Best Motor Response  6-->(M6) obeys commands 04/11/18 1114    0-10 Pain Scale  (P)  9 04/11/18 1303   Best Verbal Response  5-->(V5) oriented 04/11/18 1114    Pain Location  Leg 04/11/18 1129   Jose Alberto Coma Scale Score  15 04/11/18 1114    Pain Orientation  Left 04/11/18 1129   DAILY CARE      Pain Descriptors  Sharp 04/11/18 1129   Activity Management  activity adjusted per tolerance 04/11/18 0841            Patient Lines/Drains/Airways Status    Active LINES/DRAINS/AIRWAYS     Name: Placement date: Placement time: Site: Days: Last dressing change:    Peripheral IV 04/10/18 Right Hand 04/10/18   1817   Hand   less than 1             Patient Lines/Drains/Airways Status    Active PICC/CVC     None            Intake/Output Detail Report     Date Intake   Output Net    Shift I.V. IV Piggyback Total Urine Total       Noc 04/09/18 2300 - 04/10/18 0659 -- -- -- -- -- 0    Day 04/10/18 0700 - 04/10/18 1459 -- -- -- -- -- 0    Sharri 04/10/18 1500 - 04/10/18 2259 -- -- -- 325 325 -325    Noc 04/10/18 2300 - 04/11/18 0659 -- -- -- -- -- 0    Day 04/11/18 0700 - 04/11/18 1459 169 -- 169 -- -- 169      Last Void/BM       Most Recent Value    Urine Occurrence 1 at 04/11/2018 1129    Stool Occurrence       Case Management/Discharge Planning     Case Management/Discharge Planning Flowsheet     REFERRAL INFORMATION     Equipment Used at Home  cane, straight 01/18/15 1509    Arrived From  home or self-care 04/05/18 1151   FINAL RESOURCES      Primary Care Clinic Name   04/05/18 1151   Equipment Currently Used at Home  walker, rolling 04/05/18 1204    Primary Care MD Name  Sammy Perez 04/05/18 1151   PAS Number  [id number] 04/05/18 1331    LIVING ENVIRONMENT     ABUSE RISK SCREEN      Lives With  spouse 04/05/18 1151   QUESTION TO PATIENT:  Has a member of your family or a  partner(now or in the past) intimidated, hurt, manipulated, or controlled you in any way?  no 04/10/18 1734    Living Arrangements  apartment 04/05/18 1151   QUESTION TO PATIENT: Do you feel safe going back to the place where you are living?  yes 04/10/18 1734    COPING/STRESS     OBSERVATION: Is there reason to believe there has been maltreatment of a vulnerable adult (ie. Physical/Sexual/Emotional abuse, self neglect, lack of adequate food, shelter, medical care, or financial exploitation)?  no 04/10/18 1734    Major Change/Loss/Stressor  death (lost two sisters summer of 2014) 01/18/15 0055   OTHER      EXPECTED DISCHARGE     Are you depressed or being treated for depression?  No 04/10/18 2332    Expected Discharge Date  -- (/[facility]) 04/11/18 0746   HOMICIDE RISK      DISCHARGE PLANNING     Feels Like Hurting Others  no 04/10/18 1734                  [facility] OBSERVATION DEPARTMENT: [phone]            Medication Administration Report for Batsheva Barkley as of 04/11/18 1403   Legend:    Given Hold Not Given Due Canceled Entry Other Actions    Time Time (Time) Time  Time-Action       Inactive    Active    Linked        Medications 04/05/18 04/06/18 04/07/18 04/08/18 04/09/18 04/10/18 04/11/18    acetaminophen (TYLENOL) Suppository 650 mg  Dose: 650 mg  Freq: EVERY 4 HOURS PRN Route: RE  PRN Reason: mild pain  Start: 04/10/18 2254   Admin Instructions: Alternate ibuprofen (if ordered) with acetaminophen.  Maximum acetaminophen dose from all sources = 75 mg/kg/day not to exceed 4 grams/day.    Admin. Amount: 1 suppository (1 × 650 mg suppository)  Dispense Loc: [facility] ADS MS2A OBS               acetaminophen (TYLENOL) tablet 650 mg  Dose: 650 mg  Freq: EVERY 4 HOURS PRN Route: PO  PRN Reason: mild pain  Start: 04/10/18 2254   Admin Instructions: Alternate ibuprofen (if ordered) with acetaminophen.  Maximum acetaminophen dose from all sources = 75 mg/kg/day not to exceed 4 grams/day.    Admin.  "Amount: 2 tablet (2 × 325 mg tablet)  Last Admin: 04/11/18 1127  Dispense Loc: [facility] ADS MS2A OBS           0414 (650 mg)-Given       1127 (650 mg)-Given           aspirin EC EC tablet 81 mg  Dose: 81 mg  Freq: DAILY Route: PO  Start: 04/11/18 0800   Admin. Amount: 1 tablet (1 × 81 mg tablet)  Last Admin: 04/11/18 0844  Dispense Loc: [facility] ADS MS2A OBS           0844 (81 mg)-Given           atorvastatin (LIPITOR) tablet 40 mg  Dose: 40 mg  Freq: DAILY Route: PO  Start: 04/11/18 0800   Admin. Amount: 1 tablet (1 × 40 mg tablet)  Last Admin: 04/11/18 0844  Dispense Loc: [facility] ADS MS2A OBS           0844 (40 mg)-Given           lidocaine (LMX4) kit  Freq: EVERY 1 HOUR PRN Route: Top  PRN Reason: pain  PRN Comment: with VAD insertion or accessing implanted port.  Start: 04/10/18 2254   Admin Instructions: Do NOT give if patient has a history of allergy to any local anesthetic or any \"keyur\" product.  Apply 30 minutes prior to VAD insertion or port access. MAX Dose: 2.5 g (  of 5 g tube).    Dispense Loc: [facility] ADS MS2A OBS               lidocaine 1 % 1 mL  Dose: 1 mL  Freq: EVERY 1 HOUR PRN Route: OTHER  PRN Comment: mild pain with VAD insertion or accessing implanted port  Start: 04/10/18 2254   Admin Instructions: Do NOT give if patient has a history of allergy to any local anesthetic or any \"keyur\" product. MAX dose 1 mL subcutaneous OR intradermal in divided doses.    Admin. Amount: 1 mL  Dispense Loc: [facility] Floor Stock  Volume: 2 mL               losartan (COZAAR) tablet 50 mg  Dose: 50 mg  Freq: 2 TIMES DAILY Route: PO  Start: 04/10/18 2255   Admin. Amount: 2 tablet (2 × 25 mg tablet)  Last Admin: 04/11/18 0844  Dispense Loc: [facility] ADS MS2A OBS          2337 (50 mg)-Given        0844 (50 mg)-Given       [ ] 2000           melatonin tablet 3 mg  Dose: 3 mg  Freq: AT BEDTIME PRN Route: PO  PRN Comment: sleep  Start: 04/10/18 2254   Admin. Amount: 1 tablet (1 × 3 mg tablet)  Dispense Loc: [facility] ADS MS2A OBS               metoprolol " tartrate (LOPRESSOR) tablet 50 mg  Dose: 50 mg  Freq: 2 TIMES DAILY Route: PO  Start: 04/10/18 2255   Admin. Amount: 1 tablet (1 × 50 mg tablet)  Last Admin: 04/11/18 0844  Dispense Loc:  ADS MS2A OBS          2337 (50 mg)-Given        0844 (50 mg)-Given       [ ] 2000           naloxone (NARCAN) injection 0.1-0.4 mg  Dose: 0.1-0.4 mg  Freq: EVERY 2 MIN PRN Route: IV  PRN Reason: opioid reversal  Start: 04/10/18 2254   Admin Instructions: For respiratory rate LESS than or EQUAL to 8.  Partial reversal dose:  0.1 mg titrated q 2 minutes for Analgesia Side Effects Monitoring Sedation Level of 3 (frequently drowsy, arousable, drifts to sleep during conversation).Full reversal dose:  0.4 mg bolus for Analgesia Side Effects Monitoring Sedation Level of 4 (somnolent, minimal or no response to stimulation).  For ordered doses up to 2mg give IVP. Give each 0.4mg over 15 seconds in emergency situations. For non-emergent situations further dilute in 9mL of NS to facilitate titration of response.    Admin. Amount: 0.1-0.4 mg = 0.25-1 mL Conc: 0.4 mg/mL  Dispense Loc:  Main Pharmacy  Volume: 1 mL               ondansetron (ZOFRAN-ODT) ODT tab 4 mg  Dose: 4 mg  Freq: EVERY 8 HOURS PRN Route: PO  PRN Reason: nausea  Start: 04/10/18 2254   Admin Instructions: With dry hands, peel back foil backing and gently remove tablet; do not push oral disintegrating tablet through foil backing; administer immediately on tongue and oral disintegrating tablet dissolves in seconds; then swallow with saliva; liquid not required.    Admin. Amount: 1 tablet (1 × 4 mg tablet)  Dispense Loc:  ADS MS2A OBS               oxyCODONE IR (ROXICODONE) half-tab 2.5-5 mg  Dose: 2.5-5 mg  Freq: EVERY 6 HOURS PRN Route: PO  PRN Reason: moderate to severe pain  Start: 04/10/18 2254   Admin. Amount: 1-2 half-tab (1-2 × 2.5 mg half-tab)  Last Admin: 04/11/18 1127  Dispense Loc:  ADS MS2A OBS          2345 (5 mg)-Given        1127 (2.5 mg)-Given            polyethylene glycol (MIRALAX/GLYCOLAX) Packet 17 g  Dose: 17 g  Freq: DAILY PRN Route: PO  PRN Reason: constipation  Start: 04/10/18 2254   Admin Instructions: 1 Packet = 17 grams. Mixed prescribed dose in 8 ounces of water. Follow with 8 oz. of water.    Admin. Amount: 17 g  Dispense Loc: [facility] ADS MS2A OBS               sodium chloride (PF) 0.9% PF flush 3 mL  Dose: 3 mL  Freq: EVERY 8 HOURS Route: IK  Start: 04/10/18 2255   Admin Instructions: And Q1H PRN, to lock peripheral IV dormant line.    Admin. Amount: 3 mL  Last Admin: 18  Dispense Loc: [facility] Floor Stock  Volume: 4 mL   Current Line: Peripheral IV 04/10/18 Right Hand         [ ]         0844 (3 mL)-Given       [ ] 1455       [ ] 2255           sodium chloride (PF) 0.9% PF flush 3 mL  Dose: 3 mL  Freq: EVERY 1 HOUR PRN Route: IK  PRN Reason: line flush  Start: 04/10/18 2254   Admin Instructions: for peripheral IV flush post IV meds    Admin. Amount: 3 mL  Dispense Loc: [facility] Floor Stock  Volume: 4 mL              Completed Medications  Medications 04/05/18 04/06/18 04/07/18 04/08/18 04/09/18 04/10/18 04/11/18         Dose: 20 mg  Freq: ONCE Route: IV  Start: 04/10/18 2025   End: 04/10/18 2055   Admin Instructions: For ordered doses up to 20 mg, give IV Push diluted with 5-10ml NS over a minimum of 2 minutes.    Admin. Amount: 20 mg = 2 mL Conc: 10 mg/mL  Last Admin: 04/10/18 2053  Dispense Loc: [facility] ADS ERA  Infused Over: 2 Minutes  Administrations Remainin  Volume: 2 mL   Current Line: Peripheral IV 04/10/18 Right Hand          (20 mg)-Given              Dose: 2 mg  Freq: ONCE Route: IV  Start: 04/10/18 1749   End: 04/10/18 1823   Admin Instructions: For ordered doses up to 15 mg give IV Push undiluted over 4-5 minutes.    Admin. Amount: 2 mg  Last Admin: 04/10/18 1819  Dispense Loc: [facility] ADS ERA  Infused Over: 4-5 Minutes  Administrations Remainin   Current Line: Peripheral IV 04/10/18 Right Hand         1819 (2 mg)-Given               Dose: 4 mg  Freq: ONCE Route: IV  Start: 04/10/18 2016   End: 04/10/18 2026   Admin Instructions: Irritant. For ordered doses up to 4 mg, give IV Push undiluted over 2-5 minutes.    Admin. Amount: 4 mg = 2 mL Conc: 4 mg/2 mL  Last Admin: 04/10/18 2024  Dispense Loc: [facility] ADS ERA  Infused Over: 2-5 Minutes  Administrations Remainin  Volume: 2 mL   Current Line: Peripheral IV 04/10/18 Right Hand          (4 mg)-Given              Dose: 4 mg  Freq: ONCE Route: IV  Start: 04/10/18 1749   End: 04/10/18 1821   Admin Instructions: Irritant. For ordered doses up to 4 mg, give IV Push undiluted over 2-5 minutes.    Admin. Amount: 4 mg = 2 mL Conc: 4 mg/2 mL  Last Admin: 04/10/18 1819  Dispense Loc: [facility] ADS ERA  Infused Over: 2-5 Minutes  Administrations Remainin  Volume: 2 mL   Current Line: Peripheral IV 04/10/18 Right Hand          (4 mg)-Given           Discontinued Medications  Medications 04/05/18 04/06/18 04/07/18 04/08/18 04/09/18 04/10/18 04/11/18         Dose: 1,000 mg  Freq: 3 TIMES DAILY PRN Route: PO  PRN Reason: mild pain  Start: 04/10/18 2254   End: 04/10/18 2318   Admin Instructions: Maximum acetaminophen dose from all sources = 75 mg/kg/day not to exceed 4 gram    Admin. Amount: 2 tablet (2 × 500 mg tablet)          8-Med Discontinued          Dose: 1 mg  Freq: AT BEDTIME PRN Route: PO  PRN Reason: sleep  Start: 04/10/18 2254   End: 04/10/18 2316   Admin Instructions: Do not give unless at least 6 hours of uninterrupted sleep is expected.    Admin. Amount: 1 tablet (1 × 1 mg tablet)  Dispense Loc: [facility] ADS MS2A OBS          2316-Med Discontinued          Rate: 75 mL/hr   Freq: CONTINUOUS Route: IV  Last Dose: Stopped (18 1129)  Start: 04/10/18 2255   End: 18 1117   Last Admin: 18 0844  Dispense Loc: [facility] Floor Stock  Volume: 1,000 mL   Current Line: Peripheral IV 04/10/18 Right Hand          0844 ( )-New Bag       1117-Med Discontinued  1129-Stopped                     INTERAGENCY TRANSFER FORM - NOTES (H&P, Discharge Summary, Consults, Procedures, Therapies)   4/10/2018                       [facility] OBSERVATION DEPARTMENT: [phone]               History & Physicals      H&P by Korina Acevedo PA-C at 4/10/2018  9:49 PM     Author:  Korina Acevedo PA-C Service:  Hospitalist Author Type:  Physician Assistant - C    Filed:  4/11/2018 12:53 PM Date of Service:  4/10/2018  9:49 PM Creation Time:  4/10/2018  9:48 PM    Status:  Signed :  Korina Acevedo PA-C (Physician Assistant - C)    Cosigner:  Niya Moody DO at 4/11/2018  1:57 PM             History and Physical     Batsheva Barkley MRN# [id number]   YOB: 1933 Age: 84 year old      Date of Admission:  4/10/2018    Primary care provider: Lenin Perez          Assessment and Plan:   Batsheva Barkley is a 84 year old female with a PMH significant for recent left lower extremity hematoma due to fall, CAD, ischemic cardiomyopathy, HTN, HLD, moderate mitral regurgitation, urinary incontinence and bilateral lower extremity weakness who presents with increased left lower leg swelling and vomiting.    Patient was discussed with Dr. Shaver, who was provider in ED. Chart review of ED work up was reviewed as well as chart review of Care Everywhere, previous visits and admissions.     1. Increased left lower extremity swelling s/p hematoma due to fall on 4/4/18   On 4/5/18 the patient presented to our ER after she fell onto her left leg and sustained a left calf hematoma.  She was admitted for pain control and concern for compartment syndrome.  Orthopedics was consulted who recommended frequent icing as well as elevation of the left leg.  During her admission compartment syndrome did not develop and her hemoglobin dropped from 10.8-9.  Physical therapy assessed and recommended TCU placement as well as ice, elevation, and gentle Ace wrapping.  Pain was  "controlled with Tylenol and as needed oxycodone.  Patient presented today because when family went to see patient and unwrapped her left leg it appeared much more swollen than when she discharged from the hospital.  After unwrapping the leg the swelling has decreased and likely the Ace wrap was on too tight.  There is no sign of compartment syndrome and her hemoglobin is stable.  X-ray of the left foot was obtained and shows no acute fracture. also her CK level is normal.  Likely her symptoms are also related to a difficult session of physical therapy and occupational therapy.  Family preferred she stayed the night as she had some nausea with vomiting and did not feel comfortable returning to the TCU tonight.  -Elevate leg and ice  -Continue Tylenol and use oxycodone sparingly  -Recheck hemoglobin in the morning  -Recommend instructing TCU not to wrap the lower leg to tightly  -We will consult social work to facilitate returning to TCU    2. Nausea with vomiting  Likely related to recent PT session and patient states she \"overdid it\" as well narcotic usage.  She states her nausea is gone now.  -We will do gentle fluids overnight  -Instruct TCU at discharge to use narcotics sparingly    3. CAD  Patient has no complaints of chest pain and will plan to continue metoprolol and Aspirin.    4. HTN  Continue losartan    5. HLD  Continue atorvastatin        Social: Normally lived independently but presents from TCU  Code: Discussed with patient and they have chosen Full code  VTE prophylaxis: Ambulation  Disposition: Observation                    Chief Complaint:   Left lower leg swelling and nausea         History of Present Illness:   Batsheva Barkley is a 84 year old female who presents with increased left lower leg swelling.  Patient sustained a left calf hematoma approximately 1 week ago after falling.  She was admitted to the hospital briefly to observe for compartment syndrome which did not develop.  Her hemoglobins " dropped minimally.  She was evaluated by orthopedics who cleared her to discharge.  She was seen by physical therapy and recommended for TCU as she was very weak and had difficulty ambulating.  She discharged to TCU on April 5.  She and her family report today that she had a heavy session of physical therapy that caused her a fair amount of pain.  After the therapy session she had a headache, felt nauseated and threw up.  Her left lower leg was Ace wrapped at the time and when the Ace wrap was removed it appeared that her foot and upper calf were very swollen with increased ecchymosis.  Family was concerned that the bleeding could have recurred or that something was wrong so they brought her to the ED.             Past Medical History:[NJ1.1]     Past Medical History:   Diagnosis Date     CAD (coronary artery disease) 6/20/05    [place], transient CHF     Cardiomyopathy (H)      CHF NYHA class III, chronic, systolic (H) 1/25/2018     Coronary atherosclerosis 6/20/2005    circ vessel was stented;  had a 50% LAD lesion which was not treated Problem list name updated by automated process. Provider to review     Edema 1/26/2015     Generalized osteoarthrosis     knees ;; L total kne 10/09     Hypertension goal BP (blood pressure) < 140/90 1/21/2015     Leg weakness, bilateral 2/13/2018     Mitral regurgitation     mod     Mixed hyperlipidemia      Osteoporosis      Physical deconditioning 1/21/2015     Total urinary incontinence 12/27/2017[NJ1.2]               Past Surgical History:[NJ1.1]     Past Surgical History:   Procedure Laterality Date     COLONOSCOPY  3/05     ENT SURGERY       ESOPHAGOSCOPY, GASTROSCOPY, DUODENOSCOPY (EGD), COMBINED N/A 4/14/2016    Procedure: COMBINED ESOPHAGOSCOPY, GASTROSCOPY, DUODENOSCOPY (EGD), BIOPSY SINGLE OR MULTIPLE;  Surgeon: Jonathan Gramajo MD;  Location:  GI     EYE SURGERY       HEART CATH, ANGIOPLASTY  2005    RONI to left circ     L shira + hammer  toes  1/04, 4/04     L total knee replacement  10/2009      ovarian cyst surgery[NJ1.2]                 Social History:[NJ1.1]     Social History     Social History     Marital status:      Spouse name: Gene     Number of children: 6     Years of education: N/A     Occupational History     Not on file.     Social History Main Topics     Smoking status: Former Smoker     Smokeless tobacco: Never Used      Comment: only smoked for 3 years.     Alcohol use No     Drug use: No     Sexual activity: Yes     Partners: Male     Other Topics Concern     Caffeine Concern No     decaf coffee and tea      Special Diet No     Exercise Yes     walk everyday      Social History Narrative[NJ1.2]               Family History:[NJ1.1]     Family History   Problem Relation Age of Onset     CEREBROVASCULAR DISEASE Mother      Respiratory Father      heart     Breast Cancer Sister      HEART DISEASE Sister      Breast Cancer Sister[NJ1.2]               Allergies:[NJ1.1]      Allergies   Allergen Reactions     Codeine      nausea, HA     Lisinopril Cough[NJ1.2]               Medications:     Prior to Admission medications    Medication Sig Last Dose Taking? Auth Provider   loperamide (IMODIUM) 2 MG capsule Take by mouth as needed for diarrhea 4mg for 1st loose stool, 2mg for each loose stool afterward. Max of 16mg in 24hrs  Yes Reported, Patient   Ondansetron (ZOFRAN ODT PO) Take 4 mg by mouth every 8 hours as needed for nausea  Yes Reported, Patient   acetaminophen (TYLENOL) 500 MG tablet Take 1,000 mg by mouth 3 times daily as needed for mild pain  Yes Unknown, Entered By History   MELATONIN PO Take 3 mg by mouth nightly as needed   Yes Reported, Patient   oxyCODONE IR (ROXICODONE) 5 MG tablet Take 0.5-1 tablets (2.5-5 mg) by mouth every 6 hours as needed for moderate to severe pain 4/10/2018 at Unknown time Yes Naye Amaya PA   polyethylene glycol (MIRALAX/GLYCOLAX) Packet Take 17 g by mouth daily as needed for  constipation  Yes Naye Amaya PA   atorvastatin (LIPITOR) 40 MG tablet TAKE ONE TABLET BY MOUTH ONCE DAILY 4/10/2018 at Unknown time Yes Lenin Perez MD   metoprolol (LOPRESSOR) 50 MG tablet TAKE ONE TABLET BY MOUTH TWICE DAILY 4/10/2018 at x1 Yes Lenin Perez MD   losartan (COZAAR) 50 MG tablet Take 1 tablet (50 mg) by mouth 2 times daily 4/10/2018 at x1 Yes Iona Huang APRN CNP   ASPIRIN 81 MG OR TABS 1 tab po QD (Once per day) 4/10/2018 at Unknown time Yes Christ Carver MD   order for DME Light weight wheelchair Body mass index is 19.08 kg/(m^2).   Lenin Perez MD   order for DME ANTOINE stockings- below knee.  at    [facility], Regla Lombardi MD              Review of Systems:   A Comprehensive greater than 10 system review of systems was carried out.  Pertinent positives and negatives are noted above.  Otherwise negative for contributory information.            Physical Exam:[NJ1.1]   Blood pressure 144/65, pulse 61, temperature 97.7  F (36.5  C), temperature source Oral, resp. rate 20, height 1.524 m (5'), weight 44.5 kg (98 lb), SpO2 95 %, not currently breastfeeding.[NJ1.2]  Exam:  GENERAL:  Comfortable.  PSYCH: pleasant, oriented, No acute distress.  HEENT:  PERRLA. Normal conjunctiva, normal hearing, nasal mucosa and Oropharynx are normal.  NECK:  Supple, no neck vein distention, adenopathy or bruits, normal thyroid.  HEART:  Normal S1, S2 with no murmur, no pericardial rub, gallops or S3 or S4.  LUNGS:  Clear to auscultation, normal Respiratory effort. No wheezing, rales or ronchi.  ABDOMEN:  Soft, no hepatosplenomegaly, normal bowel sounds. Non-tender, non distended.   EXTREMITIES:  Left calf ecchymosis present, left calf appears swollen worse in the medial portion, +2 pulses bilateral and equal. Feet are equally warm.  SKIN:  Dry to touch, No rash, wound or ulcerations.  NEUROLOGIC:  CN 2-12 grossly intact, sensation is intact with no focal deficits.                Data:[NJ1.1]       Recent Labs  Lab 04/10/18  1855 04/09/18 04/07/18 04/05/18  0640  04/04/18  1330   WBC 6.1  --   --   --  4.7  --  5.5   HGB 9.7* 10.8* 9.5*  < > 9.0*  < > 10.8*   HCT 31.0*  --   --   --  28.6*  --  35.0   MCV 94  --   --   --  93  --  93     --   --   --  183  --  246   < > = values in this interval not displayed.    Recent Labs  Lab 04/10/18  1855 04/09/18 04/05/18  0640    140 141   POTASSIUM 4.4 4.0 4.3   CHLORIDE 106 109* 111*   CO2 21 22 26   ANIONGAP 7 9 4   GLC 97 82 86   BUN 27 17 29   CR 0.72 0.69 0.79   GFRESTIMATED 78 >60 69   GFRESTBLACK >90 >60 84   REILLY 8.1* 9.0 7.9*   PROTTOTAL 6.0*  --   --    ALBUMIN 2.7*  --   --    BILITOTAL 0.3  --   --    ALKPHOS 74  --   --    AST 44  --   --    ALT 39  --   --          Recent Results (from the past 24 hour(s))   Foot XR, G/E 3 views, left    Narrative    LEFT FOOT THREE VIEWS   4/10/2018 7:41 PM     HISTORY: Trauma. Foot pain.    COMPARISON: 2/7/2017      Impression    IMPRESSION:   1. No convincing acute fracture of the left foot.  2. No significant interval change since 2/7/2017.  3. Surgical fusion of the left first metatarsophalangeal joint.  4. Chronic-appearing deformity of the left second and third toes.[NJ1.2]         Korina Acevedo PA-C[NJ1.1]         Revision History        User Key Date/Time User Provider Type Action    > NJ1.2 4/11/2018 12:53 PM Korina Acevedo PA-C Physician Assistant - VARSHA Sign     NJ1.1 4/10/2018  9:48 PM Korina Acevedo PA-C Physician Assistant - C                   Discharge Summaries     No notes of this type exist for this encounter.         Consult Notes      Consults by Gambucci, Gerladine, RN at 4/11/2018  9:41 AM     Author:  Gambucci, Gerladine, RN Service:  (none) Author Type:      Filed:  4/11/2018  1:43 PM Date of Service:  4/11/2018  9:41 AM Creation Time:  4/11/2018  9:39 AM    Status:  Addendum :  Gambucci, Gerladine, RN (Case  Manager)     Consult Orders:    1. Social Work IP Consult [[id number]] ordered by Korina Acevedo PA-C at 04/10/18 2210                CM: spoke with Carri  at [facility]. Pt is Observation here so pt can return when she is ready. Carri did state her plan is to stay at [facility] TCU until May1st when she is moving to [facility].     Will fax dc orders to [phone][GG1.1]    Family will provide transportation.[GG1.2]    Ansley Alcaraz RN, BSN CTS  Care Coordinator  [phone][GG1.1]    Update: Family would like to use [facility] transport.[GG1.3]     Revision History        User Key Date/Time User Provider Type Action    > GG1.3 4/11/2018  1:43 PM Gambucci, Gerladine, RN Case Manager Addend     GG1.2 4/11/2018  9:49 AM Gambucci, Gerladine, RN Case Manager Addend     GG1.1 4/11/2018  9:41 AM Gambucci, Gerladine, RN Case Manager Sign                     Progress Notes - Physician (Notes for yesterday and today)      ED Provider Notes by Roberto Shaver MD at 4/10/2018  5:23 PM     Author:  Roberto Shaver MD Service:  Emergency Medicine Author Type:  Physician    Filed:  4/10/2018 10:48 PM Date of Service:  4/10/2018  5:23 PM Creation Time:  4/10/2018  5:31 PM    Status:  Signed :  Roberto Shaver MD (Physician)           History     Chief Complaint:  Leg swelling    HPI   Batsheva Barkley is a 84 year old female who presents with leg swelling.[AF1.1] The patient reportedly suffered a[AF1.2] mechanical fall[AF1.1] five days ago[AF1.2] while getting out of a vehicle, and began experiencing swelling to the left lower extremity with a large amount of bruising.[AF1.1] She was admitted to the hospital for observation at that time[AF1.2] with concern for[AF1.3] possible compartment syndrome. Ortho observed the patient and recommended discharge to a TCU. At that time she had an x ray performed that was negative for any fracture, and a large hematoma was seen but no evidence of compartment syndrome.      She now presents to the ED this afternoon due to[AF1.2] continued[AF1.3] swelling and bruising in this area.[AF1.2] The patient went to both physical therapy and occupational therapy[AF1.1] this morning[AF1.2]. After that time she noted to her daughter that she had a headache and did not feel well. After being helped into bed[AF1.1] at her TCU[AF1.2] her daughter saw that her left calf was more discolored/bruised and had increased swelling[AF1.1] compared to the previous couple of days[AF1.2]. Staff came and wrapped the area and stated they would watch this. When the patient's son arrived at her TCU this afternoon and unwrapped her leg he saw that her swelling was increased and decided to bring her here to the ED for evaluation. While here the patient states that her leg continues to be very painful, but not necessarily more painful than[AF1.1] it has been[AF1.2]. She states the leg may be slightly weaker today, but there is no new numbness.[AF1.1] Her son[AF1.2] reports[AF1.3] that she has been given Oxycodone for pain management, though notes that this is hard on her stomach.[AF1.2]    Allergies:  Codeine  Lisinopril     Medications:    Imodium  Zofran  Melatonin  Senokot-S  Roxicodone  Metoprolol  Miralax  Lipitor  Cozaar  Aspirin    Past Medical History:    CAD  Cardiomyopathy  CHF  Coronary atherosclerosis  Generalized osteoarthritis  Hypertension  Leg weakness  Mitral regurgitation  Osteoporosis  Physical deconditioning  Total urinary[AF1.1] incontinence[AF1.3]    Past Surgical History:    Colonoscopy  Ent surgery  Esophagoscopy, gastroscopy, duodenoscopy, combined  Eye surgery  Heart cath, angioplasty  Left bunion[AF1.1],[AF1.4] hammer toe surgery[AF1.1]  Total knee placement, left  Ovarian cyst surgery[AF1.4]    Family History:[AF1.1]   Cerebrovascular disease  Cancer  Heart disease[AF1.4]    Social History:  The patient was accompanied to the ED by her  and children.  Smoking Status:[AF1.1]  Former[AF1.4]  Smokeless Tobacco:[AF1.1] No[AF1.4]  Alcohol Use:[AF1.1] No[AF1.4]  Marital Status:        Review of Systems   Cardiovascular: Positive for[AF1.1] leg swelling[AF1.2].   Musculoskeletal: Positive for[AF1.1] myalgias[AF1.2].   Skin: Positive for[AF1.1] color change[AF1.2]. Negative for[AF1.1] wound[AF1.2].   Neurological: Positive for[AF1.1] weakness[AF1.2]. Negative for[AF1.1] numbness[AF1.2].[AF1.1]   All other systems reviewed and are negative[AF1.2].    Physical Exam[AF1.1]   Vitals:[AF1.4]  Patient Vitals for the past 24 hrs:   BP Temp Temp src Pulse Resp SpO2 Height Weight   04/10/18 2130 126/63 - - - - 93 % - -   04/10/18 2100 144/65 - - - - 95 % - -   04/10/18 2030 142/64 - - - - 97 % - -   04/10/18 2024 139/63 - - - - 97 % - -   04/10/18 1855 156/75 - - - - 96 % - -   04/10/18 1842 - - - - - 99 % - -   04/10/18 1731 143/65 97.7  F (36.5  C) Oral 61 20 100 % 1.524 m (5') 44.5 kg (98 lb)[AF1.5]     Physical Exam[AF1.1]    General:   Pleasant, age appropriate, elderly  female.      Resting comfortably in the bed.  Eyes:    Conjunctiva normal, PERRL  Neck:    Supple, no meningismus.     CV:     Regular rate and rhythm.      No murmurs, rubs or gallops.        1+ DP and posterior tibial pulses bilateral  PULM:    Clear to auscultation bilateral.       No respiratory distress.      Good air exchange.  ABD:    Soft, non-tender, non-distended.       No pulsatile masses.       No rebound, guarding or rigidity.  MSK:     Left lower extremity:      4 cm hematoma to the medial aspect of the calf       Area is moderately tender to touch       No warmth or overlying erythema      Posterior compartment adjacent to hematoma is soft and compressible      Lateral and anterior compartment soft and compressible      No crepitus      Diffuse ecchymosis to the leg below the knee      Mild diffuse bony tenderness including the foot; no focal area of tenderness      Full passive ROM of the ankle with  mild discomfort  LYMPH:   No cervical lymphadenopathy.  NEURO:   Alert and oriented x 3.      Strength and sensation intact to the lower extremities.  Skin:    Warm, dry and intact.       Left foot: warm, capillary refill < 2 seconds  Psych:    Mood is good and affect is appropriate.[JM1.1]      Emergency Department Course[AF1.1]     ECG:  ECG taken at 2125, ECG read at 2130  Sinus rhythm with premature atrial complexes in a pattern of bigeminy  Minimal voltage criteria for LVH, may be normal varian  Inferior infarct, age undetermined  Abnormal ECG  Rate 60 bpm. TN interval 160. QRS duration 98. QT/QTc 446/446. P-R-T axes 42 -10 77.[AF1.6]    Imaging:  Radiology findings were communicated with the[AF1.1] patient[AF1.4] who voiced understanding of the findings.[AF1.1]  Foot XR, G/E 3 views, left:[AF1.7]  1. No convincing acute fracture of the left foot.  2. No significant interval change since 2/7/2017.  3. Surgical fusion of the left first metatarsophalangeal joint.  4. Chronic-appearing deformity of the left second and third toes.[AF1.3]  Per Radiologist.[AF1.7]     Laboratory:  Laboratory findings were communicated with the[AF1.1] patient[AF1.4] who voiced understanding of the findings.[AF1.1]  BMP:[AF1.4] Calcium: 8.1 (L) o/w[AF1.7] WNL (Creatinine[AF1.4] 0.72[AF1.7])  CBC:[AF1.4] HGB: 9.7 (L)[AF1.7] o/w WNL. (WBC[AF1.4] 6.1[AF1.7], PLT[AF1.4] 175[AF1.7])   CK Total:[AF1.4] 149[AF1.7]  Hepatic panel:[AF1.3] Albumin: 2.7 (L), Protein total: 60 (L) o/w WNL[AF1.8]  Lipase:[AF1.3] 879 (H)[AF1.9]  Troponin (Collected[AF1.3] 1855[AF1.9]):[AF1.3] <0.015  UA:[AF1.9] pending[JM1.2]    Interventions:[AF1.1]  1819 Morphine, 2 mg, IV  1819 Zofran, 4 mg, IV[AF1.4]  2024 Zofran, 4 mg, IV  2053 Pepcid, 20 mg, IV[AF1.3]     Emergency Department Course:  Nursing notes and vitals reviewed.  1735[AF1.1] I had my initial encounter with the patient.[AF1.4]  I performed an exam of the patient as documented above.[AF1.1]   IV was  inserted and blood was drawn for laboratory testing, results above.[AF1.4]  The patient was sent for a XR while in the emergency department, results above.[AF1.7]   2002 I rechecked the patient's condition.[AF1.10]  2104 I spoke with [AF1.11] Heidy[AF1.3] regarding this patient.[AF1.11]  EKG obtained in the ED, see results above.[AF1.6]   The patient provided a urine sample here in the emergency department. This was sent for laboratory testing, findings above.[AF1.9]    2112[AF1.3] I discussed the treatment plan with the patient. They expressed understanding of this plan and consented to admission. I discussed the patient with[AF1.11] Dr. Moody[AF1.3], who will admit the patient to a monitored bed for further evaluation and treatment.[AF1.11]    Impression & Plan      Medical Decision Making:[AF1.1]  84-year-old female presents the emergency department with persistent left leg discomfort after recent admission for left leg hematoma.  On examination the patient has worsening ecchymosis which is consistent with the patient's history.  I felt the main  for presentation today was extension of the ecchymosis and swelling to the foot which was not previously present.  I explained this to family members this is consistent with her traumatic injury without signs of clinical worsening.  They were also additionally concerned that the hematoma may had enlarged to some degree.  Patient reports no significant increase in pain.  She continues to have a focally tender hematoma without convincing signs of compartment syndrome.  CK remains within normal limits despite 5 days of pain thus likelihood of true compartment syndrome is very low.  She was given analgesics but the family members were uncomfortable with her going back to TCU for which the patient will be transferred to medical bed.    Later in the ED course, she did develop nausea which is likely related to the morphine.  She was given antiemetics.   EKG shows no ischemic changes and done for new onset nausea.  Additional laboratory studies were sent including troponin, lipase and hepatic panel to ensure there is no other cause of her nausea.  Patient does not feel well and unfit to be discharged home. She will be transferred to medical bed again for further evaluation.[JM1.1]    Diagnosis:[AF1.1]    ICD-10-CM    1. Leg hematoma, left, initial encounter S80.12XA UA with Microscopic     Hepatic panel     Hepatic panel     Lipase     Lipase     Troponin I     Troponin I     CANCELED: Hepatic panel     CANCELED: Lipase     CANCELED: Troponin I   2. Nausea R11.0[AF1.5]      Disposition:[AF1.1]   Admission[AF1.3]    Scribe Disclosure:  I, Vasiliy Lawrence, am serving as a scribe at 5:31 PM on 4/10/2018 to document services personally performed by Roberto Shaver MD, based on my observations and the provider's statements to me.    4/10/2018   [facility] EMERGENCY DEPARTMENT[AF1.1]       Roberto Shaver MD  04/10/18 2248  [JM1.2]     Revision History        User Key Date/Time User Provider Type Action    > JM1.2 4/10/2018 10:48 PM Roberto Shaver MD Physician Sign     AF1.5 4/10/2018 10:13 PM Vasiliy Lawrenceibe Share     JM1.1 4/10/2018 10:12 PM Roberto Shaver MD Physician Share     AF1.9 4/10/2018 10:03 PM Vasiliy Lawrenceibe Share     AF1.8 4/10/2018 10:00 PM Vasiliy Lawrence Scribe Share     AF1.6 4/10/2018  9:32 PM Vasiliy Lawrence Scribe Share     AF1.3 4/10/2018  9:19 PM Vasiliy Lawrence Scribe Share     AF1.11 4/10/2018  9:05 PM Vasiliy Lawrence Scribe Share     AF1.7 4/10/2018  8:10 PM Vasiliy Lawrence Scribe Share     AF1.10 4/10/2018  8:00 PM Vasiliy Lawrence Scribe Share     AF1.4 4/10/2018  6:23 PM Vasiliy Lawrence     AF1.2 4/10/2018  5:49 PM Vasiliy Lawrence     AF1.1 4/10/2018  5:43 PM Vasiliy Lawrence                  Procedure Notes     No notes of this  type exist for this encounter.      Progress Notes - Therapies (Notes from 04/08/18 through 04/11/18)     No notes of this type exist for this encounter.                                          INTERAGENCY TRANSFER FORM - LAB / IMAGING / EKG / EMG RESULTS   4/10/2018                       [facility] OBSERVATION DEPARTMENT: [phone]            Unresulted Labs     None         Lab Results - 3 Days      Basic metabolic panel [[id number]] (Abnormal)  Resulted: 04/11/18 0708, Result status: Final result    Ordering provider: Korina Acevedo PA-C  04/11/18 0001 Resulting lab: [facility]    Specimen Information    Type Source Collected On   Blood  04/11/18 0620          Components       Value Reference Range Flag Lab   Sodium 139 133 - 144 mmol/L  FrRdHs   Potassium 4.1 3.4 - 5.3 mmol/L  FrRdHs   Chloride 108 94 - 109 mmol/L  FrRdHs   Carbon Dioxide 27 20 - 32 mmol/L  FrRdHs   Anion Gap 4 3 - 14 mmol/L  FrRdHs   Glucose 88 70 - 99 mg/dL  FrRdHs   Urea Nitrogen 18 7 - 30 mg/dL  FrRdHs   Creatinine 0.79 0.52 - 1.04 mg/dL  FrRdHs   GFR Estimate 69 >60 mL/min/1.7m2  FrRdHs   Comment:  Non  GFR Calc   GFR Estimate If Black 83 >60 mL/min/1.7m2  FrRdHs   Comment:  African American GFR Calc   Calcium 8.2 8.5 - 10.1 mg/dL L FrRdHs            CBC with platelets [[id number]] (Abnormal)  Resulted: 04/11/18 0644, Result status: Final result    Ordering provider: Korina Acevedo PA-C  04/11/18 0001 Resulting lab: [facility]    Specimen Information    Type Source Collected On   Blood  04/11/18 0620          Components       Value Reference Range Flag Lab   WBC 5.2 4.0 - 11.0 10e9/L  FrRdHs   RBC Count 3.18 3.8 - 5.2 10e12/L L FrRdHs   Hemoglobin 9.2 11.7 - 15.7 g/dL L FrRdHs   Hematocrit 29.2 35.0 - 47.0 % L FrRdHs   MCV 92 78 - 100 fl  FrRdHs   MCH 28.9 26.5 - 33.0 pg  FrRdHs   MCHC 31.5 31.5 - 36.5 g/dL  FrRdHs   RDW 14.4 10.0 - 15.0 %  FrRdHs   Platelet  Count 211 150 - 450 10e9/L  FrRdHs            UA with Microscopic [[id number]] (Abnormal)  Resulted: 04/10/18 2232, Result status: Final result    Ordering provider: Roberto Shaver MD  04/10/18 2054 Resulting lab: [facility]    Specimen Information    Type Source Collected On   Midstream Urine Urine clean catch 04/10/18 2155          Components       Value Reference Range Flag Lab   Color Urine Straw   FrRdHs   Appearance Urine Clear   FrRdHs   Glucose Urine Negative NEG^Negative mg/dL  FrRdHs   Bilirubin Urine Negative NEG^Negative  FrRdHs   Ketones Urine Negative NEG^Negative mg/dL  FrRdHs   Specific Gravity Urine 1.009 1.003 - 1.035  FrRdHs   Blood Urine Negative NEG^Negative  FrRdHs   pH Urine 5.0 5.0 - 7.0 pH  FrRdHs   Protein Albumin Urine Negative NEG^Negative mg/dL  FrRdHs   Urobilinogen mg/dL 0.0 0.0 - 2.0 mg/dL  FrRdHs   Nitrite Urine Negative NEG^Negative  FrRdHs   Leukocyte Esterase Urine Negative NEG^Negative  FrRdHs   Source Midstream Urine   FrRdHs   WBC Urine 3 0 - 5 /HPF  FrRdHs   RBC Urine <1 0 - 2 /HPF  FrRdHs   Mucous Urine Present NEG^Negative /LPF A FrRdHs            Hepatic panel [[id number]] (Abnormal)  Resulted: 04/10/18 2143, Result status: Final result    Ordering provider: Roberto Shaver MD  04/10/18 1855 Resulting lab: [facility]    Specimen Information    Type Source Collected On     04/10/18 1855          Components       Value Reference Range Flag Lab   Bilirubin Direct <0.1 0.0 - 0.2 mg/dL  FrRdHs   Bilirubin Total 0.3 0.2 - 1.3 mg/dL  FrRdHs   Albumin 2.7 3.4 - 5.0 g/dL L FrRdHs   Protein Total 6.0 6.8 - 8.8 g/dL L FrRdHs   Alkaline Phosphatase 74 40 - 150 U/L  FrRdHs   ALT 39 0 - 50 U/L  FrRdHs   AST 44 0 - 45 U/L  FrRdHs   Comment:         Specimen is hemolyzed which can falsely elevate AST. Analysis of a   non-hemolyzed specimen may result in a lower value.              Lipase [[id number]] (Abnormal)  Resulted: 04/10/18 2142, Result status:  Final result    Ordering provider: Roberto Shaver MD  04/10/18 1855 Resulting lab: [facility]    Specimen Information    Type Source Collected On     04/10/18 1855          Components       Value Reference Range Flag Lab   Lipase 879 73 - 393 U/L H FrRd            Troponin I [[id number]]  Resulted: 04/10/18 2142, Result status: Final result    Ordering provider: Roberto Shaver MD  04/10/18 1855 Resulting lab: [facility]    Specimen Information    Type Source Collected On     04/10/18 1855          Components       Value Reference Range Flag Lab   Troponin I ES <0.015 0.000 - 0.045 ug/L  FrRd   Comment:         The 99th percentile for upper reference range is 0.045 ug/L.  Troponin values   in the range of 0.045 - 0.120 ug/L may be associated with risks of adverse   clinical events.              Basic metabolic panel (BMP) [[id number]] (Abnormal)  Resulted: 04/10/18 1953, Result status: Final result    Ordering provider: Roberto Shaver MD  04/10/18 1817 Resulting lab: [facility]    Specimen Information    Type Source Collected On   Blood  04/10/18 1855          Components       Value Reference Range Flag Lab   Sodium 134 133 - 144 mmol/L  FrRdHs   Potassium 4.4 3.4 - 5.3 mmol/L  Rd   Comment:  Specimen slightly hemolyzed, potassium may be falsely elevated   Chloride 106 94 - 109 mmol/L  FrRdHs   Carbon Dioxide 21 20 - 32 mmol/L  FrRdHs   Anion Gap 7 3 - 14 mmol/L  FrRdHs   Glucose 97 70 - 99 mg/dL  FrRdHs   Urea Nitrogen 27 7 - 30 mg/dL  FrRdHs   Creatinine 0.72 0.52 - 1.04 mg/dL  FrRdHs   GFR Estimate 78 >60 mL/min/1.7m2  FrRd   Comment:  Non  GFR Calc   GFR Estimate If Black >90 >60 mL/min/1.7m2  FrRd   Comment:  African American GFR Calc   Calcium 8.1 8.5 - 10.1 mg/dL L FrRdHs            CK total [[id number]]  Resulted: 04/10/18 1953, Result status: Final result    Ordering provider: Roberto Shaver MD  04/10/18 1817  Resulting lab: [facility]    Specimen Information    Type Source Collected On   Blood  04/10/18 1855          Components       Value Reference Range Flag Lab   CK Total 149 30 - 225 U/L  FrRdHs            CBC + differential [[id number]] (Abnormal)  Resulted: 04/10/18 1931, Result status: Final result    Ordering provider: Roberto Shaver MD  04/10/18 1817 Resulting lab: [facility]    Specimen Information    Type Source Collected On   Blood  04/10/18 1855          Components       Value Reference Range Flag Lab   WBC 6.1 4.0 - 11.0 10e9/L  FrRdHs   RBC Count 3.30 3.8 - 5.2 10e12/L L FrRdHs   Hemoglobin 9.7 11.7 - 15.7 g/dL L FrRdHs   Hematocrit 31.0 35.0 - 47.0 % L FrRdHs   MCV 94 78 - 100 fl  FrRdHs   MCH 29.4 26.5 - 33.0 pg  FrRdHs   MCHC 31.3 31.5 - 36.5 g/dL L FrRdHs   RDW 14.3 10.0 - 15.0 %  FrRdHs   Platelet Count 175 150 - 450 10e9/L  FrRdHs   Diff Method Automated Method   FrRdHs   % Neutrophils 64.3 %  FrRdHs   % Lymphocytes 20.8 %  FrRdHs   % Monocytes 12.8 %  FrRdHs   % Eosinophils 1.1 %  FrRdHs   % Basophils 0.5 %  FrRdHs   % Immature Granulocytes 0.5 %  FrRdHs   Nucleated RBCs 0 0 /100  FrRdHs   Absolute Neutrophil 3.9 1.6 - 8.3 10e9/L  FrRdHs   Absolute Lymphocytes 1.3 0.8 - 5.3 10e9/L  FrRdHs   Absolute Monocytes 0.8 0.0 - 1.3 10e9/L  FrRdHs   Absolute Eosinophils 0.1 0.0 - 0.7 10e9/L  FrRdHs   Absolute Basophils 0.0 0.0 - 0.2 10e9/L  FrRdHs   Abs Immature Granulocytes 0.0 0 - 0.4 10e9/L  FrRdHs   Absolute Nucleated RBC 0.0   FrRdHs   Anisocytosis Slight   FrRdHs   Jose Juan Cells Moderate   FrRdHs   Platelet Estimate Automated count confirmed.  Platelet morphology is normal.   FrRdHs            Testing Performed By     Lab - Abbreviation Name Director Address Valid Date Range    12 - FrRdHs [facility] Unknown [address]  [facility] [address] 05/08/15 1057 - Present               Imaging Results - 3 Days      US Lower Extremity Venous Duplex Left  [[id number]]  Resulted: 04/11/18 1353, Result status: Final result    Ordering provider: Khushi Faust PA-C  04/11/18 1223 Resulted by: Jody Post MD    Performed: 04/11/18 1257 - 04/11/18 1322 Resulting lab: RADIOLOGY RESULTS    Narrative:       ULTRASOUND VENOUS LOWER EXTREMITY UNILATERAL LEFT April 11, 2018 1:22  PM     HISTORY: Rule out deep vein thrombosis, increased pain and swelling  from previous hematoma status post fall one week ago.     COMPARISON: None.    TECHNIQUE: Ultrasound gray scale, Color Doppler flow, and spectral  Doppler waveform analysis performed.    FINDINGS: The left common femoral, superficial femoral, popliteal and  posterior tibial veins are patent and fully compressible and  demonstrate normal venous Doppler flow. The visualized greater  saphenous vein is negative for thrombus.       Impression:       IMPRESSION: No deep vein thrombosis demonstrated.    JODY POST MD      Foot XR, G/E 3 views, left [[id number]]  Resulted: 04/10/18 2308, Result status: Final result    Ordering provider: Roberto Shaver MD  04/10/18 1844 Resulted by: Michael Riggs MD    Performed: 04/10/18 1936 - 04/10/18 1941 Resulting lab: RADIOLOGY RESULTS    Narrative:       LEFT FOOT THREE VIEWS   4/10/2018 7:41 PM     HISTORY: Trauma. Foot pain.    COMPARISON: 2/7/2017      Impression:       IMPRESSION:   1. No significant interval change since 2/7/2017.  2. No convincing acute fracture of the left foot.  3. Surgical fusion of the left first metatarsophalangeal joint.  4. Chronic-appearing deformity of the left second and third toes.    MICHAEL RIGGS MD      Testing Performed By     Lab - Abbreviation Name Director Address Valid Date Range    104 - Rad Rslts RADIOLOGY RESULTS Unknown Unknown 02/16/05 1553 - Present            Encounter-Level Documents:     There are no encounter-level documents.      Order-Level Documents:     There are no order-level documents.

## 2018-04-10 NOTE — PROGRESS NOTES
[facility]    Chief Complaint   Patient presents with     Nursing Home Acute       HPI:    Batsheva Barkley is a 84 year old  (11/6/1933), who is being seen today for an episodic care visit at [facility]  HPI information obtained from: facility chart records, facility staff, patient report and [facility] chart review.    Nursing requested this writer to see Ms. Barkley due to concerns from family that her hematoma looked worse. Ms. Barkley was seen today for evaluation with her son present.   Current issues are:      Hematoma of left lower extremity, subsequent encounter  Nausea/vomiting  Physical deconditioning  Ms. Barkley was initially brought to ER by family after she fell trying to transfer from walker to car. Xrays negative for fracture. Has large hematoma to left medial calf. Were concerned about compartment syndrome and ortho followed during hospitalization, but it did not develop. On exam today she reports increased edema to left leg and foot, increased pain, chills and N/V. She states she worked hard with physical therapy today. She did recently take pain medication however she states she still has significant pain. She currently is using 3 warm blankets and is not able to get warm. Nausea/vomiting started today. Discussed with family that due to time of day (1630) currently would not be able to order xray or labs and receive them in timely manner if patient stays at TCU. Family would like to take patient to ER for further evaluation.    BP: 114-152/61-79 mmHg  P: 59-85 bpm  Admission weight: 102 lbs    ALLERGIES: Codeine and Lisinopril  Past Medical, Surgical, Family and Social History reviewed and updated in [facility].    Current Outpatient Prescriptions   Medication Sig Dispense Refill     MELATONIN PO Take 3 mg by mouth nightly as needed        polyethylene glycol (MIRALAX/GLYCOLAX) Packet Take 17 g by mouth daily as needed for constipation 7 packet      atorvastatin  (LIPITOR) 40 MG tablet TAKE ONE TABLET BY MOUTH ONCE DAILY 90 tablet 2     metoprolol (LOPRESSOR) 50 MG tablet TAKE ONE TABLET BY MOUTH TWICE DAILY 180 tablet 3     order for DME Light weight wheelchair Body mass index is 19.08 kg/(m^2). 1 Device 0     order for DME ANTOINE stockings- below knee. 1 Package 0     losartan (COZAAR) 50 MG tablet Take 1 tablet (50 mg) by mouth 2 times daily 180 tablet 3     ASPIRIN 81 MG OR TABS 1 tab po QD (Once per day) 100 3     Psyllium (METAMUCIL FIBER PO) Take 10 g by mouth daily       FAMOTIDINE PO Take 10 mg by mouth daily as needed for heartburn       acetaminophen (TYLENOL) 500 MG tablet Take 2 tablets (1,000 mg) by mouth every 8 hours       oxyCODONE IR (ROXICODONE) 5 MG tablet Take 0.5-1 tablets (2.5-5 mg) by mouth every 6 hours as needed for moderate to severe pain 12 tablet 0     Ondansetron (ZOFRAN ODT PO) Take 4 mg by mouth every 8 hours as needed for nausea       Medications reviewed:  Medications reconciled to facility chart and changes were made to reflect current medications as identified as above med list.     REVIEW OF SYSTEMS:  5 point ROS including Consitutional, Respiratory, CV, GI and Musculoskeletal, Neurological other than that noted in the HPI,  is negative    Physical Exam:  /72  Pulse 60  Temp 98.2  F (36.8  C)  Resp 16  Ht 5' (1.524 m)  Wt 102 lb (46.3 kg)  SpO2 97%  BMI 19.92 kg/m2   GENERAL APPEARANCE:  Alert, oriented x 3  EYES:  EOM, lids, pupils and irises normal, sclera clear and conjunctiva normal, no discharge or mattering on lids or lashes noted  ENT:  Mouth normal, moist mucous membranes, nose without drainage or crusting, external ears without lesions, hearing acuity : [place]  RESP:  respiratory effort and palpation of chest normal, no chest wall tenderness, no respiratory distress, Lung sounds clear, patient is on room air   CV:  Palpation and auscultation of heart done, rate and rhythm regular. + edema L, +1 pedal pulses  bilaterally  ABDOMEN:  normal bowel sounds, soft, nontender.  M/S:   Gait and station abnormal: uses walker for ambulation. Able to plantar and dorsiflex bilateral ankles with some pain. Arthritic changes to hands, feet  SKIN:  Inspection and palpation of skin and subcutaneous tissue: skin warm, dry without rashes, significant ecchymosis to LLE- calf and surrounding areas soft, but tender with palpation. Sensation intact. L foot increased edema throughout  NEURO: cranial nerves 2-12 grossly intact, no facial asymmetry, no speech deficits and able to follow directions, moves all extremities symmetrically  PSYCH:  insight and judgement intact, memory intact, affect and mood normal      Recent Labs:     CBC RESULTS:   Recent Labs   Lab Test 04/09/18 04/07/18 04/05/18   0640   04/04/18   1330   WBC   --    --    --   4.7   --   5.5   RBC   --    --    --   3.07*   --   3.77*   HGB  10.8*  9.5*   < >  9.0*   < >  10.8*   HCT   --    --    --   28.6*   --   35.0   MCV   --    --    --   93   --   93   MCH   --    --    --   29.3   --   28.6   MCHC   --    --    --   31.5   --   30.9*   RDW   --    --    --   14.2   --   14.0   PLT   --    --    --   183   --   246    < > = values in this interval not displayed.       Last Basic Metabolic Panel:  Recent Labs   Lab Test 04/09/18 04/05/18   0640   NA  140  141   POTASSIUM  4.0  4.3   CHLORIDE  109*  111*   REILLY  9.0  7.9*   CO2  22  26   BUN  17  29   CR  0.69  0.79   GLC  82  86       Liver Function Studies -   Recent Labs   Lab Test  01/18/18   1135  10/06/17   1157   PROTTOTAL  6.8  6.5*   ALBUMIN  3.8  3.2*   BILITOTAL  0.5  0.4   ALKPHOS  74  72   AST  24  27   ALT  19  23     Assessment/Plan:  (S80.12XD) Hematoma of left lower extremity, subsequent encounter  (primary encounter diagnosis)  (R11.2) Nausea and vomiting, intractability of vomiting not specified, unspecified vomiting type  (R53.81) Physical deconditioning  Comment: Acute, with increased pain, edema.  Unable to complete any testing including xrays or labs  at TCU due to time of day. Family would like to take patient to ER for further evaluation. Am concerned about increased edema, increased pain, patient chills.  Plan: Zofran ODT 4 mg po q6h PRN for nausea. Called ER to let them know that patient is on her way. Family in agreement with plan      Total time spent with patient visit at the [facility] nursing [facility] was 42 minutes including patient visit and review of past records, call to ER to coordinate patient care. Greater than 50% of total time spent with counseling and coordinating care due to review of history, status, and plan of care regarding the above medical issues      Electronically signed by  NICKOLAS Ronquillo CNP

## 2018-04-10 NOTE — IP AVS SNAPSHOT
` ` Patient Information     Patient Name Sex Batsheva Munguia ([id number]) Female 1933       Room Bed    [facility] 0212-02      Patient Demographics     Address Phone    [address] [address] [phone] (Home) *Preferred*  [phone] (Mobile)      Patient Ethnicity & Race     Ethnic Group Patient Race    American White      Emergency Contact(s)     Name Relation Home Work Mobile    EZEQUIEL MENDEZ Daughter [phone]    Shakila Higgins Daughter [phone] none [phone]    ANDER HIGGINS Spouse [phone]        Documents on File        Status Date Received Description       Documents for the Patient    Privacy Notice - [place] Received () 04     Face Sheet Received () 08     Insurance Card  () 04     Insurance Card  ()      Insurance Card  () 10/12/07     External Medication Information Consent  ()      External Medication Information Consent Accepted () 10/29/09     Face Sheet Received () 01/08/10     Insurance Card  () 01/08/10     Insurance Card  () 06/03/10     Patient ID  () 10/04/10     Consent for Services - Hospital/Clinic Received () 10/21/10     Consent for Services - Hospital/Clinic Received () 12/21/10     External Medication Information Consent Accepted () 12/21/10     Privacy Notice - [place] Received 12/21/10     Consent for Services - Hospital/Clinic Received () 10/19/11     External Medication Information Consent Accepted () 10/25/11     Insurance Card Received () 12     Consent for Services - Hospital/Clinic Received () 10/29/12     External Medication Information Consent Accepted 10/29/12     Insurance Card Received () 13 [employer]    Consent for EHR Access  13 Copied from existing Consent for services - C/HOD collected on 10/29/2012    Insurance Card Received () 06/11/15  [employer]    Consent for Services - Hospital/Clinic Received () 04/15/13     HIM EDNA Authorization  14     HIM EDNA Authorization  14     HIM EDNA Authorization  14     HIM EDNA Authorization  14 [facility]    Consent for Services - Hospital/Clinic Received () 04/15/14     Consent to Communicate Received 13 PHI AUTH    Consent for Services - Geriatrics Received 02/09/15     Advance Directives and Living Will Received 04/20/15 POLST 4-2-15    Consent for Services - Hospital/Clinic Received () 06/11/15     Consent for Services/Privacy Notice - Hospital/Clinic Received () 16     Patient ID Received () 17 MN DL Expires 17    Insurance Card Received 17 [employer]    [facility] Specified Other       Consent for Services/Privacy Notice - Hospital/Clinic Received () 17     Care Everywhere Prospective Auth Received 10/26/17     Insurance Card Received 18 [employer]    Patient ID Received 18 MN DL 2017    Consent to Communicate  18 AUTHORIZATION TO DISCUSS PROTECTED  HEALTH INFORMATION 3/26/18    Consent for Services/Privacy Notice - Hospital/Clinic Received 04/10/18     CMS IM for Patient Signature  (Deleted)      [facility] Specified Other  (Deleted)      [facility] Specified Other  (Deleted)      [facility] Specified Other  (Deleted)      Patient Photo   Photo of Patient    Insurance Card Received (Deleted) 18 waiver       Documents for the Encounter    CMS IM for Patient Signature       Observation Notice Received 18       Admission Information     Attending Provider Admitting Provider Admission Type Admission Date/Time    Niya Moody, Niya Zuluaga,  Emergency 04/10/18  1723    Discharge Date Hospital Service Auth/Cert Status Service Area     Observation Incomplete [facility]    Unit Room/Bed Admission Status        OBSERVATION DEPT 0212/0212-02 Admission (Confirmed)       Admission      Complaint    Left leg swelling      Hospital Account     Name Acct ID Class Status Primary Coverage    Batsheva Barkley [id number] Observation Open MEDICA - [employer] [facility]            Guarantor Account (for Hospital Account #[id number])     Name Relation to Pt Service Area Active? Acct Type    Batsheva Barkley  FCS Yes Personal/Family    Address Phone          [address] [address] [phone](H)              Coverage Information (for Hospital Account #[id number])     F/O Payor/Plan Precert #    MEDICA/MEDICA DUAL SOLUTIONS [facility]/ PARTNERS     Subscriber Subscriber #    Batsheva Barkley [id number]    Address Phone    [address] [address]  [place] [address] [phone]

## 2018-04-10 NOTE — ED PROVIDER NOTES
History     Chief Complaint:  Leg swelling    HPI   Batsheva Barkley is a 84 year old female who presents with leg swelling. The patient reportedly suffered a mechanical fall five days ago while getting out of a vehicle, and began experiencing swelling to the left lower extremity with a large amount of bruising. She was admitted to the hospital for observation at that time with concern for possible compartment syndrome. Ortho observed the patient and recommended discharge to a TCU. At that time she had an x ray performed that was negative for any fracture, and a large hematoma was seen but no evidence of compartment syndrome.     She now presents to the ED this afternoon due to continued swelling and bruising in this area. The patient went to both physical therapy and occupational therapy this morning. After that time she noted to her daughter that she had a headache and did not feel well. After being helped into bed at her TCU her daughter saw that her left calf was more discolored/bruised and had increased swelling compared to the previous couple of days. Staff came and wrapped the area and stated they would watch this. When the patient's son arrived at her TCU this afternoon and unwrapped her leg he saw that her swelling was increased and decided to bring her here to the ED for evaluation. While here the patient states that her leg continues to be very painful, but not necessarily more painful than it has been. She states the leg may be slightly weaker today, but there is no new numbness. Her son reports that she has been given Oxycodone for pain management, though notes that this is hard on her stomach.    Allergies:  Codeine  Lisinopril     Medications:    Imodium  Zofran  Melatonin  Senokot-S  Roxicodone  Metoprolol  Miralax  Lipitor  Cozaar  Aspirin    Past Medical History:    CAD  Cardiomyopathy  CHF  Coronary atherosclerosis  Generalized osteoarthritis  Hypertension  Leg weakness  Mitral  regurgitation  Osteoporosis  Physical deconditioning  Total urinary incontinence    Past Surgical History:    Colonoscopy  Ent surgery  Esophagoscopy, gastroscopy, duodenoscopy, combined  Eye surgery  Heart cath, angioplasty  Left bunion, hammer toe surgery  Total knee placement, left  Ovarian cyst surgery    Family History:   Cerebrovascular disease  Cancer  Heart disease    Social History:  The patient was accompanied to the ED by her  and children.  Smoking Status: Former  Smokeless Tobacco: No  Alcohol Use: No  Marital Status:        Review of Systems   Cardiovascular: Positive for leg swelling.   Musculoskeletal: Positive for myalgias.   Skin: Positive for color change. Negative for wound.   Neurological: Positive for weakness. Negative for numbness.   All other systems reviewed and are negative.    Physical Exam   Vitals:  Patient Vitals for the past 24 hrs:   BP Temp Temp src Pulse Resp SpO2 Height Weight   04/10/18 2130 126/63 - - - - 93 % - -   04/10/18 2100 144/65 - - - - 95 % - -   04/10/18 2030 142/64 - - - - 97 % - -   04/10/18 2024 139/63 - - - - 97 % - -   04/10/18 1855 156/75 - - - - 96 % - -   04/10/18 1842 - - - - - 99 % - -   04/10/18 1731 143/65 97.7  F (36.5  C) Oral 61 20 100 % 1.524 m (5') 44.5 kg (98 lb)     Physical Exam    General:   Pleasant, age appropriate, elderly  female.      Resting comfortably in the bed.  Eyes:    Conjunctiva normal, PERRL  Neck:    Supple, no meningismus.     CV:     Regular rate and rhythm.      No murmurs, rubs or gallops.        1+ DP and posterior tibial pulses bilateral  PULM:    Clear to auscultation bilateral.       No respiratory distress.      Good air exchange.  ABD:    Soft, non-tender, non-distended.       No pulsatile masses.       No rebound, guarding or rigidity.  MSK:     Left lower extremity:      4 cm hematoma to the medial aspect of the calf       Area is moderately tender to touch       No warmth or overlying  erythema      Posterior compartment adjacent to hematoma is soft and compressible      Lateral and anterior compartment soft and compressible      No crepitus      Diffuse ecchymosis to the leg below the knee      Mild diffuse bony tenderness including the foot; no focal area of tenderness      Full passive ROM of the ankle with mild discomfort  LYMPH:   No cervical lymphadenopathy.  NEURO:   Alert and oriented x 3.      Strength and sensation intact to the lower extremities.  Skin:    Warm, dry and intact.       Left foot: warm, capillary refill < 2 seconds  Psych:    Mood is good and affect is appropriate.      Emergency Department Course     ECG:  ECG taken at 2125, ECG read at 2130  Sinus rhythm with premature atrial complexes in a pattern of bigeminy  Minimal voltage criteria for LVH, may be normal varian  Inferior infarct, age undetermined  Abnormal ECG  Rate 60 bpm. ND interval 160. QRS duration 98. QT/QTc 446/446. P-R-T axes 42 -10 77.    Imaging:  Radiology findings were communicated with the patient who voiced understanding of the findings.  Foot XR, G/E 3 views, left:  1. No convincing acute fracture of the left foot.  2. No significant interval change since 2/7/2017.  3. Surgical fusion of the left first metatarsophalangeal joint.  4. Chronic-appearing deformity of the left second and third toes.  Per Radiologist.     Laboratory:  Laboratory findings were communicated with the patient who voiced understanding of the findings.  BMP: Calcium: 8.1 (L) o/w WNL (Creatinine 0.72)  CBC: HGB: 9.7 (L) o/w WNL. (WBC 6.1, )   CK Total: 149  Hepatic panel: Albumin: 2.7 (L), Protein total: 60 (L) o/w WNL  Lipase: 879 (H)  Troponin (Collected 1855): <0.015  UA: pending    Interventions:  1819 Morphine, 2 mg, IV  1819 Zofran, 4 mg, IV  2024 Zofran, 4 mg, IV  2053 Pepcid, 20 mg, IV     Emergency Department Course:  Nursing notes and vitals reviewed.  1735 I had my initial encounter with the patient.  I performed  an exam of the patient as documented above.   IV was inserted and blood was drawn for laboratory testing, results above.  The patient was sent for a XR while in the emergency department, results above.    I rechecked the patient's condition.   I spoke with Dr. Moody regarding this patient.  EKG obtained in the ED, see results above.   The patient provided a urine sample here in the emergency department. This was sent for laboratory testing, findings above.     I discussed the treatment plan with the patient. They expressed understanding of this plan and consented to admission. I discussed the patient with Dr. Moody, who will admit the patient to a monitored bed for further evaluation and treatment.    Impression & Plan      Medical Decision Makin-year-old female presents the emergency department with persistent left leg discomfort after recent admission for left leg hematoma.  On examination the patient has worsening ecchymosis which is consistent with the patient's history.  I felt the main  for presentation today was extension of the ecchymosis and swelling to the foot which was not previously present.  I explained this to family members this is consistent with her traumatic injury without signs of clinical worsening.  They were also additionally concerned that the hematoma may had enlarged to some degree.  Patient reports no significant increase in pain.  She continues to have a focally tender hematoma without convincing signs of compartment syndrome.  CK remains within normal limits despite 5 days of pain thus likelihood of true compartment syndrome is very low.  She was given analgesics but the family members were uncomfortable with her going back to TCU for which the patient will be transferred to medical bed.    Later in the ED course, she did develop nausea which is likely related to the morphine.  She was given antiemetics.  EKG shows no ischemic changes and done for  new onset nausea.  Additional laboratory studies were sent including troponin, lipase and hepatic panel to ensure there is no other cause of her nausea.  Patient does not feel well and unfit to be discharged home. She will be transferred to medical bed again for further evaluation.    Diagnosis:    ICD-10-CM    1. Leg hematoma, left, initial encounter S80.12XA UA with Microscopic     Hepatic panel     Hepatic panel     Lipase     Lipase     Troponin I     Troponin I     CANCELED: Hepatic panel     CANCELED: Lipase     CANCELED: Troponin I   2. Nausea R11.0      Disposition:   Admission    Scribe Disclosure:  Vasiliy SANON, am serving as a scribe at 5:31 PM on 4/10/2018 to document services personally performed by Roberto Shaver MD, based on my observations and the provider's statements to me.    4/10/2018   [facility] EMERGENCY DEPARTMENT       Roberto Shaver MD  04/10/18 [id number]

## 2018-04-11 ENCOUNTER — RECORDS - HEALTHEAST (OUTPATIENT)
Dept: LAB | Facility: CLINIC | Age: 83
End: 2018-04-11

## 2018-04-11 ENCOUNTER — APPOINTMENT (OUTPATIENT)
Dept: ULTRASOUND IMAGING | Facility: CLINIC | Age: 83
End: 2018-04-11
Attending: PHYSICIAN ASSISTANT
Payer: COMMERCIAL

## 2018-04-11 VITALS
TEMPERATURE: 97 F | HEIGHT: 60 IN | RESPIRATION RATE: 16 BRPM | WEIGHT: 98 LBS | SYSTOLIC BLOOD PRESSURE: 165 MMHG | OXYGEN SATURATION: 94 % | BODY MASS INDEX: 19.24 KG/M2 | DIASTOLIC BLOOD PRESSURE: 68 MMHG | HEART RATE: 59 BPM

## 2018-04-11 LAB
ANION GAP SERPL CALCULATED.3IONS-SCNC: 4 MMOL/L (ref 3–14)
BUN SERPL-MCNC: 18 MG/DL (ref 7–30)
CALCIUM SERPL-MCNC: 8.2 MG/DL (ref 8.5–10.1)
CHLORIDE SERPL-SCNC: 108 MMOL/L (ref 94–109)
CO2 SERPL-SCNC: 27 MMOL/L (ref 20–32)
CREAT SERPL-MCNC: 0.79 MG/DL (ref 0.52–1.04)
ERYTHROCYTE [DISTWIDTH] IN BLOOD BY AUTOMATED COUNT: 14.4 % (ref 10–15)
GFR SERPL CREATININE-BSD FRML MDRD: 69 ML/MIN/1.7M2
GLUCOSE SERPL-MCNC: 88 MG/DL (ref 70–99)
HCT VFR BLD AUTO: 29.2 % (ref 35–47)
HGB BLD-MCNC: 9.2 G/DL (ref 11.7–15.7)
INTERPRETATION ECG - MUSE: NORMAL
MCH RBC QN AUTO: 28.9 PG (ref 26.5–33)
MCHC RBC AUTO-ENTMCNC: 31.5 G/DL (ref 31.5–36.5)
MCV RBC AUTO: 92 FL (ref 78–100)
PLATELET # BLD AUTO: 211 10E9/L (ref 150–450)
POTASSIUM SERPL-SCNC: 4.1 MMOL/L (ref 3.4–5.3)
RBC # BLD AUTO: 3.18 10E12/L (ref 3.8–5.2)
SODIUM SERPL-SCNC: 139 MMOL/L (ref 133–144)
WBC # BLD AUTO: 5.2 10E9/L (ref 4–11)

## 2018-04-11 PROCEDURE — 85027 COMPLETE CBC AUTOMATED: CPT | Performed by: PHYSICIAN ASSISTANT

## 2018-04-11 PROCEDURE — 93971 EXTREMITY STUDY: CPT | Mod: LT

## 2018-04-11 PROCEDURE — 80048 BASIC METABOLIC PNL TOTAL CA: CPT | Performed by: PHYSICIAN ASSISTANT

## 2018-04-11 PROCEDURE — 25000132 ZZH RX MED GY IP 250 OP 250 PS 637: Performed by: PHYSICIAN ASSISTANT

## 2018-04-11 PROCEDURE — G0378 HOSPITAL OBSERVATION PER HR: HCPCS

## 2018-04-11 PROCEDURE — 36415 COLL VENOUS BLD VENIPUNCTURE: CPT | Performed by: PHYSICIAN ASSISTANT

## 2018-04-11 PROCEDURE — 96361 HYDRATE IV INFUSION ADD-ON: CPT

## 2018-04-11 PROCEDURE — 25000128 H RX IP 250 OP 636: Performed by: PHYSICIAN ASSISTANT

## 2018-04-11 RX ORDER — OXYCODONE HYDROCHLORIDE 5 MG/1
2.5-5 TABLET ORAL EVERY 6 HOURS PRN
Qty: 12 TABLET | Refills: 0 | Status: SHIPPED | OUTPATIENT
Start: 2018-04-11 | End: 2018-04-23

## 2018-04-11 RX ORDER — ACETAMINOPHEN 500 MG
1000 TABLET ORAL EVERY 8 HOURS
Start: 2018-04-11 | End: 2018-05-23 | Stop reason: DRUGHIGH

## 2018-04-11 RX ADMIN — ASPIRIN 81 MG: 81 TABLET, COATED ORAL at 08:44

## 2018-04-11 RX ADMIN — METOPROLOL TARTRATE 50 MG: 50 TABLET, FILM COATED ORAL at 08:44

## 2018-04-11 RX ADMIN — LOSARTAN POTASSIUM 50 MG: 25 TABLET ORAL at 08:44

## 2018-04-11 RX ADMIN — ATORVASTATIN CALCIUM 40 MG: 40 TABLET, FILM COATED ORAL at 08:44

## 2018-04-11 RX ADMIN — ACETAMINOPHEN 650 MG: 325 TABLET, FILM COATED ORAL at 11:27

## 2018-04-11 RX ADMIN — SODIUM CHLORIDE: 9 INJECTION, SOLUTION INTRAVENOUS at 08:44

## 2018-04-11 RX ADMIN — ACETAMINOPHEN 650 MG: 325 TABLET, FILM COATED ORAL at 04:14

## 2018-04-11 RX ADMIN — OXYCODONE HYDROCHLORIDE 2.5 MG: 5 TABLET ORAL at 11:27

## 2018-04-11 NOTE — H&P
History and Physical     Batsheva Barkley MRN# [id number]   YOB: 1933 Age: 84 year old      Date of Admission:  4/10/2018    Primary care provider: Lenin Perez          Assessment and Plan:   Batsheva Barkley is a 84 year old female with a PMH significant for recent left lower extremity hematoma due to fall, CAD, ischemic cardiomyopathy, HTN, HLD, moderate mitral regurgitation, urinary incontinence and bilateral lower extremity weakness who presents with increased left lower leg swelling and vomiting.    Patient was discussed with Dr. Shaver, who was provider in ED. Chart review of ED work up was reviewed as well as chart review of Care Everywhere, previous visits and admissions.     1. Increased left lower extremity swelling s/p hematoma due to fall on 4/4/18   On 4/5/18 the patient presented to our ER after she fell onto her left leg and sustained a left calf hematoma.  She was admitted for pain control and concern for compartment syndrome.  Orthopedics was consulted who recommended frequent icing as well as elevation of the left leg.  During her admission compartment syndrome did not develop and her hemoglobin dropped from 10.8-9.  Physical therapy assessed and recommended TCU placement as well as ice, elevation, and gentle Ace wrapping.  Pain was controlled with Tylenol and as needed oxycodone.  Patient presented today because when family went to see patient and unwrapped her left leg it appeared much more swollen than when she discharged from the hospital.  After unwrapping the leg the swelling has decreased and likely the Ace wrap was on too tight.  There is no sign of compartment syndrome and her hemoglobin is stable.  X-ray of the left foot was obtained and shows no acute fracture. also her CK level is normal.  Likely her symptoms are also related to a difficult session of physical therapy and occupational therapy.  Family preferred she stayed the night as she had some nausea with vomiting  "and did not feel comfortable returning to the TCU tonight.  -Elevate leg and ice  -Continue Tylenol and use oxycodone sparingly  -Recheck hemoglobin in the morning  -Recommend instructing TCU not to wrap the lower leg to tightly  -We will consult social work to facilitate returning to TCU    2. Nausea with vomiting  Likely related to recent PT session and patient states she \"overdid it\" as well narcotic usage.  She states her nausea is gone now.  -We will do gentle fluids overnight  -Instruct TCU at discharge to use narcotics sparingly    3. CAD  Patient has no complaints of chest pain and will plan to continue metoprolol and Aspirin.    4. HTN  Continue losartan    5. HLD  Continue atorvastatin        Social: Normally lived independently but presents from TCU  Code: Discussed with patient and they have chosen Full code  VTE prophylaxis: Ambulation  Disposition: Observation                    Chief Complaint:   Left lower leg swelling and nausea         History of Present Illness:   Batsheva Barkley is a 84 year old female who presents with increased left lower leg swelling.  Patient sustained a left calf hematoma approximately 1 week ago after falling.  She was admitted to the hospital briefly to observe for compartment syndrome which did not develop.  Her hemoglobins dropped minimally.  She was evaluated by orthopedics who cleared her to discharge.  She was seen by physical therapy and recommended for TCU as she was very weak and had difficulty ambulating.  She discharged to TCU on April 5.  She and her family report today that she had a heavy session of physical therapy that caused her a fair amount of pain.  After the therapy session she had a headache, felt nauseated and threw up.  Her left lower leg was Ace wrapped at the time and when the Ace wrap was removed it appeared that her foot and upper calf were very swollen with increased ecchymosis.  Family was concerned that the bleeding could have recurred or that " something was wrong so they brought her to the ED.             Past Medical History:     Past Medical History:   Diagnosis Date     CAD (coronary artery disease) 6/20/05    [place], transient CHF     Cardiomyopathy (H)      CHF NYHA class III, chronic, systolic (H) 1/25/2018     Coronary atherosclerosis 6/20/2005    circ vessel was stented;  had a 50% LAD lesion which was not treated Problem list name updated by automated process. Provider to review     Edema 1/26/2015     Generalized osteoarthrosis     knees ;; L total kne 10/09     Hypertension goal BP (blood pressure) < 140/90 1/21/2015     Leg weakness, bilateral 2/13/2018     Mitral regurgitation     mod     Mixed hyperlipidemia      Osteoporosis      Physical deconditioning 1/21/2015     Total urinary incontinence 12/27/2017               Past Surgical History:     Past Surgical History:   Procedure Laterality Date     COLONOSCOPY  3/05     ENT SURGERY       ESOPHAGOSCOPY, GASTROSCOPY, DUODENOSCOPY (EGD), COMBINED N/A 4/14/2016    Procedure: COMBINED ESOPHAGOSCOPY, GASTROSCOPY, DUODENOSCOPY (EGD), BIOPSY SINGLE OR MULTIPLE;  Surgeon: Jonathan Gramajo MD;  Location:  GI     EYE SURGERY       HEART CATH, ANGIOPLASTY  2005    RONI to left circ     L bunion + hammer toes  1/04, 4/04     L total knee replacement  10/2009      ovarian cyst surgery                 Social History:     Social History     Social History     Marital status:      Spouse name: Gene     Number of children: 6     Years of education: N/A     Occupational History     Not on file.     Social History Main Topics     Smoking status: Former Smoker     Smokeless tobacco: Never Used      Comment: only smoked for 3 years.     Alcohol use No     Drug use: No     Sexual activity: Yes     Partners: Male     Other Topics Concern     Caffeine Concern No     decaf coffee and tea      Special Diet No     Exercise Yes     walk everyday      Social History Narrative                Family History:     Family History   Problem Relation Age of Onset     CEREBROVASCULAR DISEASE Mother      Respiratory Father      heart     Breast Cancer Sister      HEART DISEASE Sister      Breast Cancer Sister               Allergies:      Allergies   Allergen Reactions     Codeine      nausea, HA     Lisinopril Cough               Medications:     Prior to Admission medications    Medication Sig Last Dose Taking? Auth Provider   loperamide (IMODIUM) 2 MG capsule Take by mouth as needed for diarrhea 4mg for 1st loose stool, 2mg for each loose stool afterward. Max of 16mg in 24hrs  Yes Reported, Patient   Ondansetron (ZOFRAN ODT PO) Take 4 mg by mouth every 8 hours as needed for nausea  Yes Reported, Patient   acetaminophen (TYLENOL) 500 MG tablet Take 1,000 mg by mouth 3 times daily as needed for mild pain  Yes Unknown, Entered By History   MELATONIN PO Take 3 mg by mouth nightly as needed   Yes Reported, Patient   oxyCODONE IR (ROXICODONE) 5 MG tablet Take 0.5-1 tablets (2.5-5 mg) by mouth every 6 hours as needed for moderate to severe pain 4/10/2018 at Unknown time Yes Naye Amaya PA   polyethylene glycol (MIRALAX/GLYCOLAX) Packet Take 17 g by mouth daily as needed for constipation  Yes Naye Amaya PA   atorvastatin (LIPITOR) 40 MG tablet TAKE ONE TABLET BY MOUTH ONCE DAILY 4/10/2018 at Unknown time Yes Lenin Perez MD   metoprolol (LOPRESSOR) 50 MG tablet TAKE ONE TABLET BY MOUTH TWICE DAILY 4/10/2018 at x1 Yes Lenin Perez MD   losartan (COZAAR) 50 MG tablet Take 1 tablet (50 mg) by mouth 2 times daily 4/10/2018 at x1 Yes Iona Huang APRN CNP   ASPIRIN 81 MG OR TABS 1 tab po QD (Once per day) 4/10/2018 at Unknown time Yes Christ Carver MD   order for DME Light weight wheelchair Body mass index is 19.08 kg/(m^2).   Lenin Perez MD   order for DME ANTOINE stockings- below knee.  at    [facility], Regla Lombardi MD              Review of Systems:    A Comprehensive greater than 10 system review of systems was carried out.  Pertinent positives and negatives are noted above.  Otherwise negative for contributory information.            Physical Exam:   Blood pressure 144/65, pulse 61, temperature 97.7  F (36.5  C), temperature source Oral, resp. rate 20, height 1.524 m (5'), weight 44.5 kg (98 lb), SpO2 95 %, not currently breastfeeding.  Exam:  GENERAL:  Comfortable.  PSYCH: pleasant, oriented, No acute distress.  HEENT:  PERRLA. Normal conjunctiva, normal hearing, nasal mucosa and Oropharynx are normal.  NECK:  Supple, no neck vein distention, adenopathy or bruits, normal thyroid.  HEART:  Normal S1, S2 with no murmur, no pericardial rub, gallops or S3 or S4.  LUNGS:  Clear to auscultation, normal Respiratory effort. No wheezing, rales or ronchi.  ABDOMEN:  Soft, no hepatosplenomegaly, normal bowel sounds. Non-tender, non distended.   EXTREMITIES:  Left calf ecchymosis present, left calf appears swollen worse in the medial portion, +2 pulses bilateral and equal. Feet are equally warm.  SKIN:  Dry to touch, No rash, wound or ulcerations.  NEUROLOGIC:  CN 2-12 grossly intact, sensation is intact with no focal deficits.               Data:       Recent Labs  Lab 04/10/18  1855 04/09/18 04/07/18  04/05/18  0640  04/04/18  1330   WBC 6.1  --   --   --  4.7  --  5.5   HGB 9.7* 10.8* 9.5*  < > 9.0*  < > 10.8*   HCT 31.0*  --   --   --  28.6*  --  35.0   MCV 94  --   --   --  93  --  93     --   --   --  183  --  246   < > = values in this interval not displayed.    Recent Labs  Lab 04/10/18  1855 04/09/18 04/05/18  0640    140 141   POTASSIUM 4.4 4.0 4.3   CHLORIDE 106 109* 111*   CO2 21 22 26   ANIONGAP 7 9 4   GLC 97 82 86   BUN 27 17 29   CR 0.72 0.69 0.79   GFRESTIMATED 78 >60 69   GFRESTBLACK >90 >60 84   REILLY 8.1* 9.0 7.9*   PROTTOTAL 6.0*  --   --    ALBUMIN 2.7*  --   --    BILITOTAL 0.3  --   --    ALKPHOS 74  --   --    AST 44  --   --    ALT 39   --   --          Recent Results (from the past 24 hour(s))   Foot XR, G/E 3 views, left    Narrative    LEFT FOOT THREE VIEWS   4/10/2018 7:41 PM     HISTORY: Trauma. Foot pain.    COMPARISON: 2/7/2017      Impression    IMPRESSION:   1. No convincing acute fracture of the left foot.  2. No significant interval change since 2/7/2017.  3. Surgical fusion of the left first metatarsophalangeal joint.  4. Chronic-appearing deformity of the left second and third toes.         Korina Acevedo PA-C

## 2018-04-11 NOTE — PLAN OF CARE
Problem: Patient Care Overview  Goal: Discharge Needs Assessment  PRIMARY DIAGNOSIS: Leg swelling  OUTPATIENT/OBSERVATION GOALS TO BE MET BEFORE DISCHARGE:  1. ADLs back to baseline: No    2. Activity and level of assistance: Up with maximum assistance. Consider SW and/or PT evaluation.     3. Pain status: No improvement noted. Consider adjustment in pain regimen.    4. Return to near baseline physical activity: No     Discharge Planner Nurse   Safe discharge environment identified: Yes  Barriers to discharge: No     Please review provider order for any additional goals.   Nurse to notify provider when observation goals have been met and patient is ready for discharge.

## 2018-04-11 NOTE — CONSULTS
CM: spoke with Carri,  at [facility]. Pt is Observation here so pt can return when she is ready. Carri did state her plan is to stay at [facility] TCU until May1st when she is moving to [facility].     Will fax dc orders to [phone]    Family will provide transportation.    Ansley Alcaraz RN, BSN CTS  Care Coordinator  [phone]    Update: Family would like to use [facility] transport.

## 2018-04-11 NOTE — PHARMACY-ADMISSION MEDICATION HISTORY
Admission medication history interview status for this patient is complete. See [facility] admission navigator for allergy information, prior to admission medications and immunization status.     Medication history interview source(s):Patient and Family  Medication history resources (including written lists, pill bottles, clinic record):None  Primary pharmacy:[facility]    Changes made to PTA medication list:  Added: -  Deleted: Ca vit d, senna s  Changed: tylenol, melatonin to prn,     Actions taken by pharmacist (provider contacted, etc):None     Additional medication history information:None    Medication reconciliation/reorder completed by provider prior to medication history? No    Do you take OTC medications (eg tylenol, ibuprofen, fish oil, eye/ear drops, etc)? yes(Y/N)    For patients on insulin therapy: no (Y/N)  Lantus/levemir/NPH/Mix 70/30 dose:   (Y/N) (see Med list for doses)   Sliding scale Novolog Y/N  If Yes, do you have a baseline novolog pre-meal dose:  units with meals  Patients eat three meals a day:   Y/N    How many episodes of hypoglycemia do you have per week: _______  How many missed doses do you have per week: ______  How many times do you check your blood glucose per day: _______   Any Barriers to therapy - Be specific :  cost of medications, comfortable with giving injections (if applicable), comfortable and confident with current diabetes regimen: Y/N ______________      Prior to Admission medications    Medication Sig Last Dose Taking? Auth Provider   loperamide (IMODIUM) 2 MG capsule Take by mouth as needed for diarrhea 4mg for 1st loose stool, 2mg for each loose stool afterward. Max of 16mg in 24hrs  Yes Reported, Patient   Ondansetron (ZOFRAN ODT PO) Take 4 mg by mouth every 8 hours as needed for nausea  Yes Reported, Patient   acetaminophen (TYLENOL) 500 MG tablet Take 1,000 mg by mouth 3 times daily as needed for mild pain  Yes Unknown, Entered By History   MELATONIN PO Take 3 mg by  mouth nightly as needed   Yes Reported, Patient   oxyCODONE IR (ROXICODONE) 5 MG tablet Take 0.5-1 tablets (2.5-5 mg) by mouth every 6 hours as needed for moderate to severe pain 4/10/2018 at Unknown time Yes Naye Amaya PA   polyethylene glycol (MIRALAX/GLYCOLAX) Packet Take 17 g by mouth daily as needed for constipation  Yes Naye Amaya PA   atorvastatin (LIPITOR) 40 MG tablet TAKE ONE TABLET BY MOUTH ONCE DAILY 4/10/2018 at Unknown time Yes Lenin Perez MD   metoprolol (LOPRESSOR) 50 MG tablet TAKE ONE TABLET BY MOUTH TWICE DAILY 4/10/2018 at x1 Yes Lenin Perez MD   losartan (COZAAR) 50 MG tablet Take 1 tablet (50 mg) by mouth 2 times daily 4/10/2018 at x1 Yes Iona Huang APRN CNP   ASPIRIN 81 MG OR TABS 1 tab po QD (Once per day) 4/10/2018 at Unknown time Yes Christ Carver MD   order for DME Light weight wheelchair Body mass index is 19.08 kg/(m^2).   Lenin Perez MD   order for DME ANTOINE stockings- below knee.  at    [facility], Regla Lombardi MD

## 2018-04-11 NOTE — PROGRESS NOTES
HE transport 1645 per family request back to [facility] T[facility]. Family informed of potential cost.    Ansley Alcaraz RN, BSN CTS  Care Coordinator  [phone]

## 2018-04-11 NOTE — ED NOTES
Observation Brochure and Video   Patient and family informed of observation status based on provider's order. Observation Brochure was given and video watched.  Jayla Zamora RN

## 2018-04-11 NOTE — ED NOTES
[facility]  ED Nurse Handoff Report    Batsheva Barkley is a 84 year old female   ED Chief complaint: Fall and Leg Swelling  . ED Diagnosis:   Final diagnoses:   Leg hematoma, left, initial encounter   Nausea     Allergies:   Allergies   Allergen Reactions     Codeine      nausea, HA     Lisinopril Cough       Code Status:   4/5/2018 12:33 PM  Full Code [id number]  Naye Amaya PA Outpatient      4/4/2018  4:31 PM 4/5/2018 12:33 PM Full Code [id number]  Radha Metcalf PA-C Inpatient     1/20/2015  7:32 AM 4/4/2018  4:31 PM Full Code [id number]  Karolina Reis MD Outpatient     1/18/2015 12:14 AM 1/20/2015  7:32 AM Full Code [id number]  Mahendra Mtz MD Inpatient       Activity level - Baseline/Home:  Stand with Assist of 2. Activity Level - Current:   Stand with Assist of 2. Lift room needed: No. Bariatric: No   Needed: No   Isolation: No. Infection: Not Applicable.     Vital Signs:   Vitals:    04/10/18 1731 04/10/18 1842 04/10/18 1855 04/10/18 2024   BP: 143/65  156/75 139/63   Pulse: 61      Resp: 20      Temp: 97.7  F (36.5  C)      TempSrc: Oral      SpO2: 100% 99% 96% 97%   Weight: 44.5 kg (98 lb)      Height: 1.524 m (5')          Cardiac Rhythm:  ,      Pain level: 0-10 Pain Scale: 6  Patient confused: No. Patient Falls Risk: Yes.   Elimination Status: Has not voided in ED   Patient Report - Initial Complaint: Fall on 4/4, LLE swelling and increased pain. Focused Assessment: Pt presents via EMS for evaluation of increasing pain, swelling and ecchymosis to [facility]. Pt had a fall last Wednesday, where her walker hit her leg. Was seen here, admitted and transferred to TCU for rehab. DP pulse is present and palpable , but weak +1.Today, pt had PT, then felt like she had a headache and lightheaded, so was laid down. Pt then had an episode of emesis. Pt was given 5 mg oxycodone 20 min PTA.   Tests Performed: Doppler US, labs, x-ray, EKG. Abnormal Results:   Labs Ordered and  Resulted from Time of ED Arrival Up to the Time of Departure from the ED   BASIC METABOLIC PANEL - Abnormal; Notable for the following:        Result Value    Calcium 8.1 (*)     All other components within normal limits   CBC WITH PLATELETS DIFFERENTIAL - Abnormal; Notable for the following:     RBC Count 3.30 (*)     Hemoglobin 9.7 (*)     Hematocrit 31.0 (*)     MCHC 31.3 (*)     All other components within normal limits   CK TOTAL   HEPATIC PANEL   LIPASE   TROPONIN I   ROUTINE UA WITH MICROSCOPIC   PERIPHERAL IV CATHETER   .   Treatments provided: Morphine 2mg, zofran 4mg, pepcid 20mg  Family Comments: Family present  OBS brochure/video discussed/provided to patient:  No  ED Medications:   Medications   morphine (PF) injection 2 mg (2 mg Intravenous Given 4/10/18 1819)   ondansetron (ZOFRAN) injection 4 mg (4 mg Intravenous Given 4/10/18 1819)   ondansetron (ZOFRAN) injection 4 mg (4 mg Intravenous Given 4/10/18 2024)   famotidine (PEPCID) injection 20 mg (20 mg Intravenous Given 4/10/18 2053)     Drips infusing:  No  For the majority of the shift, the patient's behavior Green. Interventions performed were NA.     Severe Sepsis OR Septic Shock Diagnosis Present: No      ED Nurse Name/Phone Number: Alrickey Marco,   9:29 PM    RECEIVING UNIT ED HANDOFF REVIEW    Above ED Nurse Handoff Report was reviewed: Yes  Reviewed by: Cindi Man on April 10, 2018 at 10:23 PM

## 2018-04-11 NOTE — PLAN OF CARE
Problem: Patient Care Overview  Goal: Plan of Care/Patient Progress Review  Outcome: Adequate for Discharge Date Met: 04/11/18  PRIMARY DIAGNOSIS: LLE swelling  OUTPATIENT/OBSERVATION GOALS TO BE MET BEFORE DISCHARGE:  1. Vitals sign stable or return to baseline: Yes    2. Tolerating oral antibiotics or has home infusion set up if applicable: Yes    3. Pain status: Improved-controlled with oral pain medications.    4. Return to near baseline physical activity: No    Discharge Planner Nurse   Safe discharge environment identified: Yes  Barriers to discharge: No       Entered by: Shalini Carroll 04/11/2018 1:48 PM      Vitals: B/P: 128/52, T: 97.1, P: 63, R: 18  Neuro: Oriented to person, place, situation - forgetful  Cardiac: Denies chest pain  Lungs: Denies SOB, lungs clear  GI: Denies abd pain, bowel sounds active, denies nausea  : WNL - uses BSC  Skin: LLE - bruised, large hematoma; pedal pulses present and strong with doppler, leg wrapped loosely with ace wrap  Pain: c/o pain in LLE  IV: NS at 75 infusing  Meds: tylenol and oxycodone given at 1127  Labs/tests: US of LLE - no DVT seen  Diet: tolerating regular diet  Activity: asst x2 to BSC  Plan: Pain control, check LLE pulses, d/c to TCU today via [facility]    Please review provider order for any additional goals.     Nurse to notify provider when observation goals have been met and patient is ready for discharge.

## 2018-04-11 NOTE — PROGRESS NOTES
ROOM # [id number]    Living Situation (if not independent, order SW consult): Apt with   Facility name:  : children    Activity level at baseline: Sba with walker  Activity level on admit: sba with walker      Patient registered to observation; given Patient Bill of Rights; given the opportunity to ask questions about observation status and their plan of care.  Patient has been oriented to the observation room, bathroom and call light is in place.    Discussed discharge goals and expectations with patient/family.

## 2018-04-11 NOTE — PLAN OF CARE
Problem: Patient Care Overview  Goal: Plan of Care/Patient Progress Review  Outcome: No Change  PRIMARY DIAGNOSIS: SOFT TISSUE INFECTIONS  OUTPATIENT/OBSERVATION GOALS TO BE MET BEFORE DISCHARGE:  1. Vitals sign stable or return to baseline: Yes    2. Tolerating oral antibiotics or has home infusion set up if applicable: Yes    3. Pain status: Improved-controlled with oral pain medications.    4. Return to near baseline physical activity: No    Discharge Planner Nurse   Safe discharge environment identified: No  Barriers to discharge: Yes       Entered by: Shalini Carroll 04/11/2018 10:54 AM      Vitals: B/P: 128/52, T: 97.1, P: 63, R: 18  Neuro: Oriented to person, place, situation - forgetful  Cardiac: Denies chest pain  Lungs: Denies SOB, lungs clear  GI: Denies abd pain, bowel sounds active, denies nausea  : WNL - uses BSC  Skin: LLE - bruised, large hematoma; pedal pulses present and strong with doppler, leg wrapped loosely with ace wrap  Pain: c/o pain in LLE  IV: NS at 75 infusing  Meds: tylenol PRN  Labs/tests:   Diet: tolerating regular diet  Activity: asst x2 to BSC  Plan: Pain control, check LLE pulses, possible d/c to TCU today    Please review provider order for any additional goals.     Nurse to notify provider when observation goals have been met and patient is ready for discharge.

## 2018-04-11 NOTE — PLAN OF CARE
Problem: Patient Care Overview  Goal: Discharge Needs Assessment  PRIMARY DIAGNOSIS: Leg swelling  OUTPATIENT/OBSERVATION GOALS TO BE MET BEFORE DISCHARGE:  1. ADLs back to baseline: No     2. Activity and level of assistance: Up with maximum assistance. Consider SW and/or PT evaluation.      3. Pain status: No improvement noted. Consider adjustment in pain regimen.     4. Return to near baseline physical activity: No      Discharge Planner Nurse   Safe discharge environment identified: Yes  Barriers to discharge: No  Please review provider order for any additional goals.   Nurse to notify provider when observation goals have been met and patient is ready for discharge.     Pt comes from TCU. CMS intact. LLE Pedal and Post tib pulses not palpable but found on doppler. Left foot warm. Pt able to wiggle toes, no N/T. Left leg wrapped with ace, elevated, iced. Pt heavy assist. Very difficult for her to bear weight.

## 2018-04-11 NOTE — PLAN OF CARE
Problem: Patient Care Overview  Goal: Plan of Care/Patient Progress Review  Outcome: Adequate for Discharge Date Met: 04/11/18  Patient's After Visit Summary/orders were faxed to [facility] TCU by NST.  Discharged with transport [facility] wheelchair transport.    OBSERVATION patient END time: 4:51 PM

## 2018-04-12 ENCOUNTER — TRANSFERRED RECORDS (OUTPATIENT)
Dept: HEALTH INFORMATION MANAGEMENT | Facility: CLINIC | Age: 83
End: 2018-04-12

## 2018-04-12 ENCOUNTER — NURSING HOME VISIT (OUTPATIENT)
Dept: GERIATRICS | Facility: CLINIC | Age: 83
End: 2018-04-12
Payer: COMMERCIAL

## 2018-04-12 VITALS
RESPIRATION RATE: 18 BRPM | TEMPERATURE: 98.1 F | SYSTOLIC BLOOD PRESSURE: 139 MMHG | HEIGHT: 60 IN | DIASTOLIC BLOOD PRESSURE: 65 MMHG | WEIGHT: 102 LBS | HEART RATE: 60 BPM | OXYGEN SATURATION: 99 % | BODY MASS INDEX: 20.03 KG/M2

## 2018-04-12 DIAGNOSIS — S80.12XD HEMATOMA OF LEFT LOWER EXTREMITY, SUBSEQUENT ENCOUNTER: Primary | ICD-10-CM

## 2018-04-12 DIAGNOSIS — R11.0 NAUSEA: ICD-10-CM

## 2018-04-12 DIAGNOSIS — K21.9 GASTROESOPHAGEAL REFLUX DISEASE, ESOPHAGITIS PRESENCE NOT SPECIFIED: ICD-10-CM

## 2018-04-12 DIAGNOSIS — R19.5 LOOSE STOOLS: ICD-10-CM

## 2018-04-12 DIAGNOSIS — R53.81 PHYSICAL DECONDITIONING: ICD-10-CM

## 2018-04-12 DIAGNOSIS — I25.10 CORONARY ARTERY DISEASE INVOLVING NATIVE CORONARY ARTERY OF NATIVE HEART WITHOUT ANGINA PECTORIS: ICD-10-CM

## 2018-04-12 DIAGNOSIS — I10 ESSENTIAL HYPERTENSION: ICD-10-CM

## 2018-04-12 LAB
HEMOGLOBIN: 10.5 G/DL (ref 12–16)
HGB BLD-MCNC: 10.5 G/DL (ref 12–16)

## 2018-04-12 PROCEDURE — 99310 SBSQ NF CARE HIGH MDM 45: CPT | Performed by: NURSE PRACTITIONER

## 2018-04-12 NOTE — PROGRESS NOTES
[facility]  PRIMARY CARE PROVIDER AND CLINIC:  Lenin Perez [id number] [facility] / [facility] [address]  Chief Complaint   Patient presents with     Hospital F/U       HPI:    Batsheva Barkley is a 84 year old  (11/6/1933),admitted to the [facility]  [place] from [facility].  Hospital stay 4/10/18 through 4/11/18.  Admitted to this facility for  rehab, medical management and nursing care.  HPI information obtained from: facility chart records, facility staff, patient report and [facility] chart review.      From recent hospitalization H&P (no discharge summary and patient was kept overnight):   Hospital Assessment and Plan:   Batsheva Barkley is a 84 year old female with a PMH significant for recent left lower extremity hematoma due to fall, CAD, ischemic cardiomyopathy, HTN, HLD, moderate mitral regurgitation, urinary incontinence and bilateral lower extremity weakness who presents with increased left lower leg swelling and vomiting.     Patient was discussed with Dr. Shaver, who was provider in ED. Chart review of ED work up was reviewed as well as chart review of Care Everywhere, previous visits and admissions.      1. Increased left lower extremity swelling s/p hematoma due to fall on 4/4/18   On 4/5/18 the patient presented to our ER after she fell onto her left leg and sustained a left calf hematoma.  She was admitted for pain control and concern for compartment syndrome.  Orthopedics was consulted who recommended frequent icing as well as elevation of the left leg.  During her admission compartment syndrome did not develop and her hemoglobin dropped from 10.8-9.  Physical therapy assessed and recommended TCU placement as well as ice, elevation, and gentle Ace wrapping.  Pain was controlled with Tylenol and as needed oxycodone.  Patient presented today because when family went to see patient and unwrapped her left leg it appeared much more  "swollen than when she discharged from the hospital.  After unwrapping the leg the swelling has decreased and likely the Ace wrap was on too tight.  There is no sign of compartment syndrome and her hemoglobin is stable.  X-ray of the left foot was obtained and shows no acute fracture. also her CK level is normal.  Likely her symptoms are also related to a difficult session of physical therapy and occupational therapy.  Family preferred she stayed the night as she had some nausea with vomiting and did not feel comfortable returning to the TCU tonight.  -Elevate leg and ice  -Continue Tylenol and use oxycodone sparingly  -Recheck hemoglobin in the morning  -Recommend instructing TCU not to wrap the lower leg to tightly  -We will consult social work to facilitate returning to TCU     2. Nausea with vomiting  Likely related to recent PT session and patient states she \"overdid it\" as well narcotic usage.  She states her nausea is gone now.  -We will do gentle fluids overnight  -Instruct TCU at discharge to use narcotics sparingly     3. CAD  Patient has no complaints of chest pain and will plan to continue metoprolol and Aspirin.     4. HTN  Continue losartan     5. HLD  Continue atorvastatin       Ms. Barkley was seen today for evaluation with her daughter present.   Current issues are:      Hematoma of left lower extremity, subsequent encounter  Physical deconditioning  Ms. Barkley was initially brought to ER by family after she fell trying to transfer from walker to car. Xrays negative for fracture. Has large hematoma to left medial calf. Were concerned about compartment syndrome and ortho followed, but it did not develop. She was then transferred to ER earlier this week after increased pain, edema of left leg and foot, chills, and N/V. After evaluation in ER it was determined that the increased pain/ swelling was likely due to difficult session with physical therapy/OT. However family preferred that she stay overnight due to " nausea/vomiting she was experiencing. Baseline hemoglobin 12-13. Hemoglobin since initial hospital discharge was 9 and it remains stable. Today on exam she reports pain that shoots up her shin that comes and goes. On tylenol TID and oxycodone PRN. She states she had a catheter during hospitalization but denies any problems urinating on exam today- no dysuria, hematuria, retention. Continue to ice and elevate. Wrap with ACE bandage lightly. She reports that she lives with her  in an apartment and family lives locally. She uses a walker for ambulation. She plans to work with therapy today, but is not going to push herself as hard as she did the other day.      Nausea  Gastroesophageal reflux disease, esophagitis presence not specified  Ms. Barkley's daughter reports that the oxycodone makes her nauseous. Has PRN Zofran ordered. Additionally Ms. Barkley states that she occasionally gets acid and that is what is making her nauseous.     Loose stools  Was tested for c.diff earlier this week after developing loose stools. Came back negative. Imodium PRN available. Currently reports bowels are moving well and no loose stools. Spoke to patient about trying fiber as it seems she is moving between constipation and diarrhea and the medications she is taking for both are likely tipping her in the opposite direction. Spoke to geriatric pharmacist who recommended metamucil as fiber source.      Essential hypertension  On losartan, metoprolol. Bp controlled. Denies chest pain, palpitations, dizziness/ lightheadedness.     BP: 127-187/65-83 mmHg  P: 60-63 bpm     Coronary artery disease involving native coronary artery of native heart without angina pectoris  Stent placed in 2005. Echo from 11/2017 with EF 40%. On statin and baby ASA. Denies chest pain, palpitations, SOB, dizziness/lightheadedness.        CODE STATUS/ADVANCE DIRECTIVES DISCUSSION:   CPR/Full code   Patient's living condition: lives with spouse in an apartment in  [place]    ALLERGIES:Codeine and Lisinopril  PAST MEDICAL HISTORY:  has a past medical history of CAD (coronary artery disease) (6/20/05); Cardiomyopathy (H); CHF NYHA class III, chronic, systolic (H) (1/25/2018); Coronary atherosclerosis (6/20/2005); Edema (1/26/2015); Generalized osteoarthrosis; Hypertension goal BP (blood pressure) < 140/90 (1/21/2015); Leg weakness, bilateral (2/13/2018); Mitral regurgitation; Mixed hyperlipidemia; Osteoporosis; Physical deconditioning (1/21/2015); and Total urinary incontinence (12/27/2017).  PAST SURGICAL HISTORY:  has a past surgical history that includes L bunion + hammer toes (1/04, 4/04); ovarian cyst surgery; Colonoscopy (3/05); L total knee replacement (10/2009 ); ENT surgery; Eye surgery; Heart Cath, Angioplasty (2005); and Esophagoscopy, gastroscopy, duodenoscopy (EGD), combined (N/A, 4/14/2016).  FAMILY HISTORY: family history includes Breast Cancer in her sister and sister; CEREBROVASCULAR DISEASE in her mother; HEART DISEASE in her sister; Respiratory in her father.  SOCIAL HISTORY:  reports that she has quit smoking. She has never used smokeless tobacco. She reports that she does not drink alcohol or use illicit drugs.    Post Discharge Medication Reconciliation Status: discharge medications reconciled and changed, per note/orders (see AVS).  Current Outpatient Prescriptions   Medication Sig Dispense Refill     Psyllium (METAMUCIL FIBER PO) Take 10 g by mouth daily       FAMOTIDINE PO Take 10 mg by mouth daily as needed for heartburn       acetaminophen (TYLENOL) 500 MG tablet Take 2 tablets (1,000 mg) by mouth every 8 hours       oxyCODONE IR (ROXICODONE) 5 MG tablet Take 0.5-1 tablets (2.5-5 mg) by mouth every 6 hours as needed for moderate to severe pain 12 tablet 0     Ondansetron (ZOFRAN ODT PO) Take 4 mg by mouth every 8 hours as needed for nausea       MELATONIN PO Take 3 mg by mouth nightly as needed        polyethylene glycol (MIRALAX/GLYCOLAX) Packet  Take 17 g by mouth daily as needed for constipation 7 packet      atorvastatin (LIPITOR) 40 MG tablet TAKE ONE TABLET BY MOUTH ONCE DAILY 90 tablet 2     metoprolol (LOPRESSOR) 50 MG tablet TAKE ONE TABLET BY MOUTH TWICE DAILY 180 tablet 3     order for DME Light weight wheelchair Body mass index is 19.08 kg/(m^2). 1 Device 0     order for DME ANTOINE stockings- below knee. 1 Package 0     losartan (COZAAR) 50 MG tablet Take 1 tablet (50 mg) by mouth 2 times daily 180 tablet 3     ASPIRIN 81 MG OR TABS 1 tab po QD (Once per day) 100 3       ROS:  10 point ROS of systems including Constitutional, Eyes, Respiratory, Cardiovascular, Gastroenterology, Genitourinary, Integumentary, Muscularskeletal, Psychiatric were all negative except for pertinent positives noted in my HPI.    Exam:  /65  Pulse 60  Temp 98.1  F (36.7  C)  Resp 18  Ht 5' (1.524 m)  Wt 102 lb (46.3 kg)  SpO2 99%  BMI 19.92 kg/m2  GENERAL APPEARANCE:  Alert, oriented x 3  EYES:  EOM, lids, pupils and irises normal, sclera clear and conjunctiva normal, no discharge or mattering on lids or lashes noted  ENT:  Mouth normal, moist mucous membranes, nose without drainage or crusting, external ears without lesions, hearing acuity : [place]  RESP:  respiratory effort and palpation of chest normal, no chest wall tenderness, no respiratory distress, Lung sounds clear, patient is on room air   CV:  Palpation and auscultation of heart done, rate and rhythm regular. + edema L, +1 pedal pulses bilaterally  ABDOMEN:  normal bowel sounds, soft, nontender.  M/S:   Gait and station abnormal: uses walker for ambulation. Able to plantar and dorsiflex bilateral ankles with some pain. Arthritic changes to hands, feet  SKIN:  Inspection and palpation of skin and subcutaneous tissue: skin warm, dry without rashes, significant ecchymosis to LLE- calf and surrounding areas soft, but tender with palpation. Sensation intact  NEURO: cranial nerves 2-12 grossly intact, no facial  asymmetry, no speech deficits and able to follow directions, moves all extremities symmetrically  PSYCH:  insight and judgement intact, memory intact, affect and mood normal    Lab/Diagnostic data:     CBC RESULTS:   Recent Labs   Lab Test  04/11/18   0620  04/10/18   1855   WBC  5.2  6.1   RBC  3.18*  3.30*   HGB  9.2*  9.7*   HCT  29.2*  31.0*   MCV  92  94   MCH  28.9  29.4   MCHC  31.5  31.3*   RDW  14.4  14.3   PLT  211  175       Last Basic Metabolic Panel:  Recent Labs   Lab Test  04/11/18   0620  04/10/18   1855   NA  139  134   POTASSIUM  4.1  4.4   CHLORIDE  108  106   REILLY  8.2*  8.1*   CO2  27  21   BUN  18  27   CR  0.79  0.72   GLC  88  97       Liver Function Studies -   Recent Labs   Lab Test  04/10/18   1855 01/18/18   1135   PROTTOTAL  6.0*  6.8   ALBUMIN  2.7*  3.8   BILITOTAL  0.3  0.5   ALKPHOS  74  74   AST  44  24   ALT  39  19       ASSESSMENT/PLAN:  (S80.12XD) Hematoma of left lower extremity, subsequent encounter  (primary encounter diagnosis)  (R53.81) Physical deconditioning  Comment: Acute,reports pain control currently managing pain -no signs of compartment syndrome  Plan: Continue scheduled tylenol TID and oxycodone PRN. Nursing has order to offer patient oxycodone. As noted below please offer zofran with oxycodone. Hemoglobin 4/16. Monitor for symptoms of urinary retention. Continue to ice and elevate. Wrap with ACE bandage lightly. Nursing to continue to monitor pulse, skin, pain, sensation, and ability to dorsi/plantar flex the ankle. Encourage participation in physical therapy/occupational therapy for strengthening and deconditioning. Discharge planning per their recommendation. Social work to assist with d/c planning.     (R11.0) Nausea  (K21.9) Gastroesophageal reflux disease, esophagitis presence not specified  Comment: Acute, nausea likely related to oxycodone per family report, acid also contributing per patient  Plan: continue zofran PRN. Placed order that nursing offer  zofran when patient takes oxycodone as long as it has not been less than 8 hours since last dose of zofran. Start famotidine PRN for acid.    (R19.5) Loose stools  Comment: Acute on chronic issue- suspect it is due to medications used after patient had been constipated after initial arrival from hospital.   Plan: Start metamucil 10 grams daily, per geriatric pharmacist recommendation. Encouraged Ms. Barkley to drink lots of water which she was agreeable to. D/c imodium to prevent any constipation with starting fiber.     (I10) Essential hypertension  Comment: Chronic, stable  Plan: Continue medications as above. Monitor VS  Per TCU policy     (I25.10) Coronary artery disease involving native coronary artery of native heart without angina pectoris  Comment: Chronic, stable- no signs of cheat pain  Plan: Continue statin, baby ASA.       Total time spent with patient visit at the skilled nursing facility was 41 including patient visit, review of past records and discussion with geriatric pharmacist. Greater than 50% of total time spent with counseling and coordinating care due to review of history, status, and plan of care regarding the above medical issues      Electronically signed by:  NICKOLAS Ronquillo CNP

## 2018-04-13 ENCOUNTER — RECORDS - HEALTHEAST (OUTPATIENT)
Dept: LAB | Facility: CLINIC | Age: 83
End: 2018-04-13

## 2018-04-13 NOTE — DISCHARGE SUMMARY
[facility]  Outpatient/Observation Unit  Discharge Summary        Patient ID:  Batsheva Barkley  [id number]  84 year old  11/6/1933    Admit date: 4/10/2018    Discharge date and time: 4/13/2018     Admitting Provider: Niya Moody DO    Discharge Provider: Khushi Faust PA-C    Admission Diagnoses:  Nausea [R11.0]  Leg hematoma, left, initial encounter [S80.12XA]    Discharge Diagnoses: Left leg hematoma    Admission Condition: fair    Discharged Condition: improved, stable    Hospital Course: Batsheva Barkley is a 84 year old female with a PMH significant for recent left lower extremity hematoma due to fall, CAD, ischemic cardiomyopathy, HTN, HLD, moderate mitral regurgitation, urinary incontinence and bilateral lower extremity weakness who presented from TCU with increased left lower leg swelling and vomiting. On 4/5/18 the patient presented to [facility] ER after she fell onto her left leg and sustained a left calf hematoma.  She was admitted for pain control and concern for compartment syndrome.  Orthopedics was consulted who recommended frequent icing as well as elevation of the left leg.  During her admission compartment syndrome did not develop and her hemoglobin dropped from 10.8-9.  Physical therapy assessed and recommended TCU placement as well as ice, elevation, and gentle Ace wrapping.  Pain was controlled with Tylenol and as needed oxycodone.  Patient presented 4/10/18 because when family went to see the patient and unwrapped her left leg it appeared much more swollen than when she discharged from the hospital.  After unwrapping the leg the swelling has decreased. Likely the Ace wrap was on too tight. There was no sign of compartment syndrome and her hemoglobin was stable.  X-ray of the left foot was obtained and showed no acute fracture. Her CK level was normal.  Likely her nausea and increased pain were related to a difficult session of physical therapy and occupational therapy, possibly  exacerbated by prn narcotic use.  Family preferred she stayed the night at the hospital as she had some nausea with vomiting and did not feel comfortable returning to the TCU that night.     The patient continued to have her leg elevated and prn ice. She was continued on tylenol and instructions were to use oxycodone sparingly, only if needed for severe breakthrough pain when tylenol did not adequately manage. Hemoglobin remained stable overnight, drifted from 9.7 to 9.2 with gentle IV fluids overnight. Recommendations were given to not wrap the lower leg too tightly. Her nausea resolved after IVF hydration. We did complete a doppler ultrasound of her left lower extremity to rule out DVT given her pain and swelling. No DVT was demonstrated. Social work assisted with discharge planning to TCU. She returned to TCU with assistance of her family. She will require ongoing therapies there for improvement in mobility.    # Pain Assessment:  Current Pain Score 4/11/2018   Patient currently in pain? yes   Pain score (0-10) 9   Pain location -   Pain descriptors -   - Batsheva is experiencing pain due to left lower extremity hematoma. Pain management was discussed and the plan was created in a collaborative fashion.  Batsheva's response to the current recommendations: engaged and compliant  - Please see the plan for pain management as documented above         Reason for your hospital stay       You were evaluated in the hospital for increased pain and swelling in   your left lower extremity. XR of the left ankle area showed no evidence of   fracture. Left lower extremity ultrasound showed no evidence of a deep   vein thrombosis (NO evidence of blood clot). We recommend that you have   scheduled tylenol 1 gram three times a day (every 8 hours scheduled) and   as-needed oxycodone for very severe breakthrough pain. Try to use the   oxycodone only if really needed for severe pain in order to avoid nausea.   Continue to elevate your  leg, use ice for comfort, and continue your   physical therapy and occupational therapy at your facility. Do not have   the ACE bandage wrapped too tightly.                  Consults: none    Significant Diagnostic Studies:   Results for orders placed or performed during the hospital encounter of 04/10/18   Foot XR, G/E 3 views, left    Narrative    LEFT FOOT THREE VIEWS   4/10/2018 7:41 PM     HISTORY: Trauma. Foot pain.    COMPARISON: 2/7/2017      Impression    IMPRESSION:   1. No significant interval change since 2/7/2017.  2. No convincing acute fracture of the left foot.  3. Surgical fusion of the left first metatarsophalangeal joint.  4. Chronic-appearing deformity of the left second and third toes.    MICHAEL ZARATE MD   US Lower Extremity Venous Duplex Left    Narrative    ULTRASOUND VENOUS LOWER EXTREMITY UNILATERAL LEFT April 11, 2018 1:22  PM     HISTORY: Rule out deep vein thrombosis, increased pain and swelling  from previous hematoma status post fall one week ago.     COMPARISON: None.    TECHNIQUE: Ultrasound gray scale, Color Doppler flow, and spectral  Doppler waveform analysis performed.    FINDINGS: The left common femoral, superficial femoral, popliteal and  posterior tibial veins are patent and fully compressible and  demonstrate normal venous Doppler flow. The visualized greater  saphenous vein is negative for thrombus.       Impression    IMPRESSION: No deep vein thrombosis demonstrated.    JODY POST MD   Basic metabolic panel (BMP)   Result Value Ref Range    Sodium 134 133 - 144 mmol/L    Potassium 4.4 3.4 - 5.3 mmol/L    Chloride 106 94 - 109 mmol/L    Carbon Dioxide 21 20 - 32 mmol/L    Anion Gap 7 3 - 14 mmol/L    Glucose 97 70 - 99 mg/dL    Urea Nitrogen 27 7 - 30 mg/dL    Creatinine 0.72 0.52 - 1.04 mg/dL    GFR Estimate 78 >60 mL/min/1.7m2    GFR Estimate If Black >90 >60 mL/min/1.7m2    Calcium 8.1 (L) 8.5 - 10.1 mg/dL   CBC + differential   Result Value Ref Range    WBC 6.1  4.0 - 11.0 10e9/L    RBC Count 3.30 (L) 3.8 - 5.2 10e12/L    Hemoglobin 9.7 (L) 11.7 - 15.7 g/dL    Hematocrit 31.0 (L) 35.0 - 47.0 %    MCV 94 78 - 100 fl    MCH 29.4 26.5 - 33.0 pg    MCHC 31.3 (L) 31.5 - 36.5 g/dL    RDW 14.3 10.0 - 15.0 %    Platelet Count 175 150 - 450 10e9/L    Diff Method Automated Method     % Neutrophils 64.3 %    % Lymphocytes 20.8 %    % Monocytes 12.8 %    % Eosinophils 1.1 %    % Basophils 0.5 %    % Immature Granulocytes 0.5 %    Nucleated RBCs 0 0 /100    Absolute Neutrophil 3.9 1.6 - 8.3 10e9/L    Absolute Lymphocytes 1.3 0.8 - 5.3 10e9/L    Absolute Monocytes 0.8 0.0 - 1.3 10e9/L    Absolute Eosinophils 0.1 0.0 - 0.7 10e9/L    Absolute Basophils 0.0 0.0 - 0.2 10e9/L    Abs Immature Granulocytes 0.0 0 - 0.4 10e9/L    Absolute Nucleated RBC 0.0     Anisocytosis Slight     Jose Juan Cells Moderate     Platelet Estimate       Automated count confirmed.  Platelet morphology is normal.   CK total   Result Value Ref Range    CK Total 149 30 - 225 U/L   UA with Microscopic   Result Value Ref Range    Color Urine Straw     Appearance Urine Clear     Glucose Urine Negative NEG^Negative mg/dL    Bilirubin Urine Negative NEG^Negative    Ketones Urine Negative NEG^Negative mg/dL    Specific Gravity Urine 1.009 1.003 - 1.035    Blood Urine Negative NEG^Negative    pH Urine 5.0 5.0 - 7.0 pH    Protein Albumin Urine Negative NEG^Negative mg/dL    Urobilinogen mg/dL 0.0 0.0 - 2.0 mg/dL    Nitrite Urine Negative NEG^Negative    Leukocyte Esterase Urine Negative NEG^Negative    Source Midstream Urine     WBC Urine 3 0 - 5 /HPF    RBC Urine <1 0 - 2 /HPF    Mucous Urine Present (A) NEG^Negative /LPF   Hepatic panel   Result Value Ref Range    Bilirubin Direct <0.1 0.0 - 0.2 mg/dL    Bilirubin Total 0.3 0.2 - 1.3 mg/dL    Albumin 2.7 (L) 3.4 - 5.0 g/dL    Protein Total 6.0 (L) 6.8 - 8.8 g/dL    Alkaline Phosphatase 74 40 - 150 U/L    ALT 39 0 - 50 U/L    AST 44 0 - 45 U/L   Lipase   Result Value Ref Range     Lipase 879 (H) 73 - 393 U/L   Troponin I   Result Value Ref Range    Troponin I ES <0.015 0.000 - 0.045 ug/L   Basic metabolic panel   Result Value Ref Range    Sodium 139 133 - 144 mmol/L    Potassium 4.1 3.4 - 5.3 mmol/L    Chloride 108 94 - 109 mmol/L    Carbon Dioxide 27 20 - 32 mmol/L    Anion Gap 4 3 - 14 mmol/L    Glucose 88 70 - 99 mg/dL    Urea Nitrogen 18 7 - 30 mg/dL    Creatinine 0.79 0.52 - 1.04 mg/dL    GFR Estimate 69 >60 mL/min/1.7m2    GFR Estimate If Black 83 >60 mL/min/1.7m2    Calcium 8.2 (L) 8.5 - 10.1 mg/dL   CBC with platelets   Result Value Ref Range    WBC 5.2 4.0 - 11.0 10e9/L    RBC Count 3.18 (L) 3.8 - 5.2 10e12/L    Hemoglobin 9.2 (L) 11.7 - 15.7 g/dL    Hematocrit 29.2 (L) 35.0 - 47.0 %    MCV 92 78 - 100 fl    MCH 28.9 26.5 - 33.0 pg    MCHC 31.5 31.5 - 36.5 g/dL    RDW 14.4 10.0 - 15.0 %    Platelet Count 211 150 - 450 10e9/L   EKG 12 lead   Result Value Ref Range    Interpretation ECG Click View Image link to view waveform and result    Social Work IP Consult    Narrative    Gambucci, Gerladine, RN     4/11/2018  1:43 PM  CM: spoke with Carri  at [facility]. Pt is Observation   here so pt can return when she is ready. Carri did state her   plan is to stay at [facility] TCU until May1st when she is moving to   [facility].     Will fax dc orders to [phone]    Family will provide transportation.    Ansley Alcaraz RN, BSN CTS  Care Coordinator  [phone]    Update: Family would like to use [facility] transport.       Treatments: IV hydration and analgesia: acetaminophen    Discharge Exam:    B/P: 165/68, T: 97, P: 59, R: 16    GENERAL:  Comfortable.  PSYCH: pleasant, oriented, No acute distress.  HEENT:  Atraumatic, normocephalic. PERRLA. Normal conjunctiva, normal hearing, and oropharynx is normal.  NECK:  Supple, no neck vein distention, adenopathy or bruits, normal thyroid.  HEART:  Normal S1, S2 with no murmur, no pericardial rub, gallops or S3 or S4.  LUNGS:  Clear to  auscultation, normal respiratory effort. No wheezing, rales or ronchi.  GI:  Soft, no hepatosplenomegaly, normal bowel sounds. Non-tender, non distended.   EXTREMITIES:  Left calf ecchymosis and swelling in medial portion.  +2 pulses bilateral and equal. Feet equally warm. Left calf very tender to touch.  SKIN:  Dry to touch, No rash, wound or ulcerations.  NEUROLOGIC:  CN 2-12 intact, BL 5/5 symmetric upper and lower extremity strength, sensation is intact with no focal deficits.     Pending Studies:    Unresulted Labs Ordered in the Past 30 Days of this Admission     No orders found from 2/9/2018 to 4/11/2018.         Disposition: TCU    Patient Instructions:        Review of your medicines      CONTINUE these medicines which may have CHANGED, or have new prescriptions. If we are uncertain of the size of tablets/capsules you have at home, strength may be listed as something that might have changed.       Dose / Directions    acetaminophen 500 MG tablet   Commonly known as:  TYLENOL   This may have changed:    - when to take this  - reasons to take this   Used for:  Leg hematoma, left, initial encounter        Dose:  1000 mg   Take 2 tablets (1,000 mg) by mouth every 8 hours   Refills:  0         CONTINUE these medicines which have NOT CHANGED       Dose / Directions    aspirin 81 MG tablet   Used for:  Coronary atherosclerosis of unspecified type of vessel, native or graft, Mixed hyperlipidemia        1 tab po QD (Once per day)   Quantity:  100   Refills:  3       atorvastatin 40 MG tablet   Commonly known as:  LIPITOR   Used for:  Hyperlipidemia LDL goal <100        TAKE ONE TABLET BY MOUTH ONCE DAILY   Quantity:  90 tablet   Refills:  2       losartan 50 MG tablet   Commonly known as:  COZAAR   Used for:  Essential hypertension        Dose:  50 mg   Take 1 tablet (50 mg) by mouth 2 times daily   Quantity:  180 tablet   Refills:  3       MELATONIN PO        Dose:  3 mg   Take 3 mg by mouth nightly as needed    Refills:  0       metoprolol tartrate 50 MG tablet   Commonly known as:  LOPRESSOR   Used for:  Hypertension goal BP (blood pressure) < 140/90        TAKE ONE TABLET BY MOUTH TWICE DAILY   Quantity:  180 tablet   Refills:  3       * order for DME   Used for:  Bilateral leg edema        ANTOINE stockings- below knee.   Quantity:  1 Package   Refills:  0       * order for DME   Used for:  Diarrhea of presumed infectious origin, Lack of coordination, Weakness of both lower extremities        Light weight wheelchair Body mass index is 19.08 kg/(m^2).   Quantity:  1 Device   Refills:  0       oxyCODONE IR 5 MG tablet   Commonly known as:  ROXICODONE   Used for:  Hematoma of left lower extremity, initial encounter        Dose:  2.5-5 mg   Take 0.5-1 tablets (2.5-5 mg) by mouth every 6 hours as needed for moderate to severe pain   Quantity:  12 tablet   Refills:  0       polyethylene glycol Packet   Commonly known as:  MIRALAX/GLYCOLAX   Used for:  Hematoma of left lower extremity, initial encounter        Dose:  17 g   Take 17 g by mouth daily as needed for constipation   Quantity:  7 packet   Refills:  0       ZOFRAN ODT PO        Dose:  4 mg   Take 4 mg by mouth every 8 hours as needed for nausea   Refills:  0       * Notice:  This list has 2 medication(s) that are the same as other medications prescribed for you. Read the directions carefully, and ask your doctor or other care provider to review them with you.      STOP taking          loperamide 2 MG capsule   Commonly known as:  IMODIUM                Where to get your medicines      Some of these will need a paper prescription and others can be bought over the counter. Ask your nurse if you have questions.     Bring a paper prescription for each of these medications      oxyCODONE IR 5 MG tablet       You don't need a prescription for these medications      acetaminophen 500 MG tablet           Activity: activity as tolerated    Active Diet Order      Advance Diet as  Tolerated  Wound Care: none needed    Follow-up with PCP in 1 week.    Signed:  Khushi Faust PA-C

## 2018-04-16 ENCOUNTER — TRANSFERRED RECORDS (OUTPATIENT)
Dept: HEALTH INFORMATION MANAGEMENT | Facility: CLINIC | Age: 83
End: 2018-04-16

## 2018-04-16 LAB
HEMOGLOBIN: 10.1 G/DL (ref 12–16)
HGB BLD-MCNC: 10.1 G/DL (ref 12–16)

## 2018-04-17 ENCOUNTER — NURSING HOME VISIT (OUTPATIENT)
Dept: GERIATRICS | Facility: CLINIC | Age: 83
End: 2018-04-17
Payer: COMMERCIAL

## 2018-04-17 VITALS
RESPIRATION RATE: 18 BRPM | SYSTOLIC BLOOD PRESSURE: 111 MMHG | OXYGEN SATURATION: 94 % | BODY MASS INDEX: 20.03 KG/M2 | WEIGHT: 102 LBS | HEART RATE: 71 BPM | HEIGHT: 60 IN | DIASTOLIC BLOOD PRESSURE: 64 MMHG | TEMPERATURE: 97.8 F

## 2018-04-17 DIAGNOSIS — R53.81 PHYSICAL DECONDITIONING: ICD-10-CM

## 2018-04-17 DIAGNOSIS — S80.12XD HEMATOMA OF LEFT LOWER EXTREMITY, SUBSEQUENT ENCOUNTER: Primary | ICD-10-CM

## 2018-04-17 DIAGNOSIS — I25.10 CORONARY ARTERY DISEASE INVOLVING NATIVE CORONARY ARTERY OF NATIVE HEART WITHOUT ANGINA PECTORIS: ICD-10-CM

## 2018-04-17 DIAGNOSIS — R11.0 NAUSEA: ICD-10-CM

## 2018-04-17 DIAGNOSIS — I10 ESSENTIAL HYPERTENSION: ICD-10-CM

## 2018-04-17 DIAGNOSIS — K21.9 GASTROESOPHAGEAL REFLUX DISEASE, ESOPHAGITIS PRESENCE NOT SPECIFIED: ICD-10-CM

## 2018-04-17 PROCEDURE — 99310 SBSQ NF CARE HIGH MDM 45: CPT | Performed by: NURSE PRACTITIONER

## 2018-04-17 NOTE — PROGRESS NOTES
[facility]    Chief Complaint   Patient presents with     Nursing Home Acute       HPI:    Batsheva Barkley is a 84 year old  (11/6/1933), who is being seen today for an episodic care visit at [facility].  HPI information obtained from: facility chart records, facility staff, patient report and [facility] chart review.    Today's concern is:  Hematoma of left lower extremity, subsequent encounter  Physical deconditioning  Ms. Barkley was initially brought to ER by family after she fell trying to transfer from walker to car. Xrays negative for fracture. Has large hematoma to left medial calf. Were concerned about compartment syndrome and ortho followed, but it did not develop. She was then transferred to ER again last week after experiencing increased pain, edema of left leg and foot, chills, and N/V. After evaluation in ER it was determined that the increased pain/ swelling was likely due to difficult session with physical therapy/OT. However family preferred that she stay overnight due to nausea/vomiting she was experiencing. Baseline hemoglobin 12-13. Hemoglobin since initial hospital discharge was 9 and it is beginning to improve. Today on exam she continues to report pain. On tylenol TID and oxycodone PRN. Denies any problems urinating. She reports that she lives with her  in an apartment and family lives locally. She uses a walker for ambulation. Is working with therapy. Spoke with her daughter, Raisa today. She reported that some of her mom's pain may be related to arthritis that she has had. She also had some concerns with the ACE bandage being wrapped too tightly. On exam today I found Batsheva with the ACE wrapped tightly and a compression stocking to her left leg as well. It is clearly ordered to be wrapped loosely and compression to be applied only to right leg. Spoke to nursing this morning about these concerns and she stated the orders for them are clear.  Continue to ice and elevate.       Nausea  Gastroesophageal reflux disease, esophagitis presence not specified  Ms. Barkley's daughter reports that the oxycodone makes her nauseous. Has PRN Zofran ordered. Additionally Ms. Barkley states that she occasionally gets acid and that is what is making her nauseous. On exam today she reports occasional nausea and GERD. Denies any emesis.      Essential hypertension  On losartan, metoprolol. Bp controlled. Denies chest pain, palpitations, dizziness/ lightheadedness.      BP: 111-187/59-87 mmHg  P: 56-98 bpm      Coronary artery disease involving native coronary artery of native heart without angina pectoris  Stent placed in 2005. Echo from 11/2017 with EF 40%. On statin and baby ASA. Denies chest pain, palpitations, SOB, dizziness/lightheadedness.           ALLERGIES: Codeine and Lisinopril  Past Medical, Surgical, Family and Social History reviewed and updated in [facility].    Current Outpatient Prescriptions   Medication Sig Dispense Refill     Psyllium (METAMUCIL FIBER PO) Take 10 g by mouth daily       FAMOTIDINE PO Take 10 mg by mouth daily as needed for heartburn       acetaminophen (TYLENOL) 500 MG tablet Take 2 tablets (1,000 mg) by mouth every 8 hours       oxyCODONE IR (ROXICODONE) 5 MG tablet Take 0.5-1 tablets (2.5-5 mg) by mouth every 6 hours as needed for moderate to severe pain 12 tablet 0     Ondansetron (ZOFRAN ODT PO) Take 4 mg by mouth every 8 hours as needed for nausea       MELATONIN PO Take 3 mg by mouth nightly as needed        polyethylene glycol (MIRALAX/GLYCOLAX) Packet Take 17 g by mouth daily as needed for constipation 7 packet      atorvastatin (LIPITOR) 40 MG tablet TAKE ONE TABLET BY MOUTH ONCE DAILY 90 tablet 2     metoprolol (LOPRESSOR) 50 MG tablet TAKE ONE TABLET BY MOUTH TWICE DAILY 180 tablet 3     order for DME Light weight wheelchair Body mass index is 19.08 kg/(m^2). 1 Device 0     order for DME ANTOINE stockings- below knee. 1 Package 0     losartan  (COZAAR) 50 MG tablet Take 1 tablet (50 mg) by mouth 2 times daily 180 tablet 3     ASPIRIN 81 MG OR TABS 1 tab po QD (Once per day) 100 3     Medications reviewed:  Medications reconciled to facility chart and changes were made to reflect current medications as identified as above med list.     REVIEW OF SYSTEMS:  9 point ROS of systems including Constitutional, Neurological, Respiratory, Cardiovascular, Gastroenterology, Genitourinary, Integumentary, Muscularskeletal, Psychiatric were all negative except for pertinent positives noted in my HPI.    Physical Exam:  /64  Pulse 71  Temp 97.8  F (36.6  C)  Resp 18  Ht 5' (1.524 m)  Wt 102 lb (46.3 kg)  SpO2 94%  BMI 19.92 kg/m2   GENERAL APPEARANCE:  Alert, oriented x 3  EYES:  EOM, lids, pupils and irises normal, sclera clear and conjunctiva normal, no discharge or mattering on lids or lashes noted  ENT:  Mouth normal, moist mucous membranes, nose without drainage or crusting, external ears without lesions, hearing acuity : [place]  RESP:  respiratory effort and palpation of chest normal, no chest wall tenderness, no respiratory distress, Lung sounds clear, patient is on room air   CV:  Palpation and auscultation of heart done, rate and rhythm regular. + edema L, +1 left pedal pulse. Right leg with ANTOINE stocking in place.  ABDOMEN:  normal bowel sounds, soft, nontender.  M/S:   Gait and station abnormal: uses walker for ambulation. Able to plantar and dorsiflex bilateral ankles with some pain. Arthritic changes to hands, feet  SKIN:  Inspection and palpation of skin and subcutaneous tissue: skin warm, dry without rashes, significant ecchymosis to LLE- calf and surrounding areas soft, but tender with palpation. Sensation intact  NEURO: cranial nerves 2-12 grossly intact, no facial asymmetry, no speech deficits and able to follow directions, moves all extremities symmetrically  PSYCH:  insight and judgement intact, memory intact, affect and mood normal    Recent  Labs:     CBC RESULTS:   Recent Labs   Lab Test 04/16/18 04/12/18 04/11/18   0620  04/10/18   1855   WBC   --    --   5.2  6.1   RBC   --    --   3.18*  3.30*   HGB  10.1*  10.5*  9.2*  9.7*   HCT   --    --   29.2*  31.0*   MCV   --    --   92  94   MCH   --    --   28.9  29.4   MCHC   --    --   31.5  31.3*   RDW   --    --   14.4  14.3   PLT   --    --   211  175       Last Basic Metabolic Panel:  Recent Labs   Lab Test  04/11/18   0620  04/10/18   1855   NA  139  134   POTASSIUM  4.1  4.4   CHLORIDE  108  106   REILLY  8.2*  8.1*   CO2  27  21   BUN  18  27   CR  0.79  0.72   GLC  88  97       Liver Function Studies -   Recent Labs   Lab Test  04/10/18   1855  01/18/18   1135   PROTTOTAL  6.0*  6.8   ALBUMIN  2.7*  3.8   BILITOTAL  0.3  0.5   ALKPHOS  74  74   AST  44  24   ALT  39  19       Assessment/Plan:  (S80.12XD) Hematoma of left lower extremity, subsequent encounter  (primary encounter diagnosis)  (R53.81) Physical deconditioning  Comment: Acute,reports pain control currently managing pain -no signs of compartment syndrome  Plan: Continue scheduled tylenol TID and oxycodone PRN. Nursing has order to offer patient oxycodone. Please continue to offer zofran with oxycodone. Hemoglobin 4/23. Monitor for symptoms of urinary retention. Continue to ice and elevate. Wrap with ACE bandage lightly. Wrote additional order for nursing to recheck wrap 1 hour after placing. Nursing to continue to monitor pulse, skin, pain, sensation, and ability to dorsi/plantar flex the ankle. Encourage participation in physical therapy/occupational therapy for strengthening and deconditioning. Discharge planning per their recommendation. Social work to assist with d/c planning.      (R11.0) Nausea  (K21.9) Gastroesophageal reflux disease, esophagitis presence not specified  Comment: Acute, nausea likely related to oxycodone per family report, acid also contributing per patient  Plan: Continue zofran PRN and famotidine PRN for  acid.      (I10) Essential hypertension   Comment: Chronic, stable  Plan: Continue medications as above. Monitor VS  Per TCU policy      (I25.10) Coronary artery disease involving native coronary artery of native heart without angina pectoris  Comment: Chronic, stable- no signs of cheat pain  Plan: Continue statin, baby ASA.       Total time spent with patient visit at the [facility] nursing [facility] was 36 minutes including patient visit, review of past records and phone call to patient contact. Greater than 50% of total time spent with counseling and coordinating care due to review of history, status, and plan of care regarding the above medical issues      Electronically signed by  NICKOLAS Ronquillo CNP

## 2018-04-20 ENCOUNTER — RECORDS - HEALTHEAST (OUTPATIENT)
Dept: LAB | Facility: CLINIC | Age: 83
End: 2018-04-20

## 2018-04-21 ENCOUNTER — TELEPHONE (OUTPATIENT)
Dept: GERIATRICS | Facility: CLINIC | Age: 83
End: 2018-04-21

## 2018-04-21 ENCOUNTER — TRANSFERRED RECORDS (OUTPATIENT)
Dept: HEALTH INFORMATION MANAGEMENT | Facility: CLINIC | Age: 83
End: 2018-04-21

## 2018-04-21 ENCOUNTER — HOSPITAL ENCOUNTER (EMERGENCY)
Facility: CLINIC | Age: 83
Discharge: HOME | End: 2018-04-21
Attending: EMERGENCY MEDICINE | Admitting: EMERGENCY MEDICINE
Payer: COMMERCIAL

## 2018-04-21 ENCOUNTER — APPOINTMENT (OUTPATIENT)
Dept: GENERAL RADIOLOGY | Facility: CLINIC | Age: 83
End: 2018-04-21
Attending: EMERGENCY MEDICINE
Payer: COMMERCIAL

## 2018-04-21 VITALS
TEMPERATURE: 97.4 F | OXYGEN SATURATION: 99 % | DIASTOLIC BLOOD PRESSURE: 84 MMHG | HEART RATE: 61 BPM | RESPIRATION RATE: 18 BRPM | SYSTOLIC BLOOD PRESSURE: 165 MMHG

## 2018-04-21 DIAGNOSIS — S80.12XA HEMATOMA OF LEFT LOWER EXTREMITY, INITIAL ENCOUNTER: ICD-10-CM

## 2018-04-21 LAB
ANION GAP SERPL CALCULATED.3IONS-SCNC: 8 MMOL/L (ref 3–14)
BASOPHILS # BLD AUTO: 0.1 10E9/L (ref 0–0.2)
BASOPHILS NFR BLD AUTO: 0.6 %
BUN SERPL-MCNC: 31 MG/DL (ref 7–30)
CALCIUM SERPL-MCNC: 8.7 MG/DL (ref 8.5–10.1)
CHLORIDE SERPL-SCNC: 100 MMOL/L (ref 94–109)
CO2 SERPL-SCNC: 25 MMOL/L (ref 20–32)
CREAT SERPL-MCNC: 0.74 MG/DL (ref 0.52–1.04)
DIFFERENTIAL METHOD BLD: ABNORMAL
EOSINOPHIL # BLD AUTO: 0.1 10E9/L (ref 0–0.7)
EOSINOPHIL NFR BLD AUTO: 1.8 %
ERYTHROCYTE [DISTWIDTH] IN BLOOD BY AUTOMATED COUNT: 14.5 % (ref 10–15)
GFR SERPL CREATININE-BSD FRML MDRD: 75 ML/MIN/1.7M2
GLUCOSE SERPL-MCNC: 93 MG/DL (ref 70–99)
HCT VFR BLD AUTO: 33.3 % (ref 35–47)
HGB BLD-MCNC: 10.8 G/DL (ref 11.7–15.7)
IMM GRANULOCYTES # BLD: 0 10E9/L (ref 0–0.4)
IMM GRANULOCYTES NFR BLD: 0.3 %
LYMPHOCYTES # BLD AUTO: 1.3 10E9/L (ref 0.8–5.3)
LYMPHOCYTES NFR BLD AUTO: 16.6 %
MCH RBC QN AUTO: 29.5 PG (ref 26.5–33)
MCHC RBC AUTO-ENTMCNC: 32.4 G/DL (ref 31.5–36.5)
MCV RBC AUTO: 91 FL (ref 78–100)
MONOCYTES # BLD AUTO: 0.9 10E9/L (ref 0–1.3)
MONOCYTES NFR BLD AUTO: 12 %
NEUTROPHILS # BLD AUTO: 5.4 10E9/L (ref 1.6–8.3)
NEUTROPHILS NFR BLD AUTO: 68.7 %
NRBC # BLD AUTO: 0 10*3/UL
NRBC BLD AUTO-RTO: 0 /100
PLATELET # BLD AUTO: 258 10E9/L (ref 150–450)
POTASSIUM SERPL-SCNC: 4.6 MMOL/L (ref 3.4–5.3)
RBC # BLD AUTO: 3.66 10E12/L (ref 3.8–5.2)
SODIUM SERPL-SCNC: 133 MMOL/L (ref 133–144)
WBC # BLD AUTO: 7.8 10E9/L (ref 4–11)

## 2018-04-21 PROCEDURE — 85025 COMPLETE CBC W/AUTO DIFF WBC: CPT | Performed by: EMERGENCY MEDICINE

## 2018-04-21 PROCEDURE — 80048 BASIC METABOLIC PNL TOTAL CA: CPT | Performed by: EMERGENCY MEDICINE

## 2018-04-21 PROCEDURE — 99284 EMERGENCY DEPT VISIT MOD MDM: CPT

## 2018-04-21 PROCEDURE — 73590 X-RAY EXAM OF LOWER LEG: CPT | Mod: LT

## 2018-04-21 PROCEDURE — 25000132 ZZH RX MED GY IP 250 OP 250 PS 637: Performed by: EMERGENCY MEDICINE

## 2018-04-21 RX ORDER — LIDOCAINE 50 MG/G
1 PATCH TOPICAL EVERY 24 HOURS
Qty: 10 PATCH | Refills: 0 | Status: SHIPPED | OUTPATIENT
Start: 2018-04-21 | End: 2018-05-01

## 2018-04-21 RX ORDER — HYDROXYZINE HYDROCHLORIDE 10 MG/1
10 TABLET, FILM COATED ORAL EVERY 6 HOURS PRN
Qty: 30 TABLET | Refills: 0 | Status: SHIPPED | OUTPATIENT
Start: 2018-04-21 | End: 2018-05-11

## 2018-04-21 RX ORDER — OXYCODONE HYDROCHLORIDE 5 MG/1
5 TABLET ORAL ONCE
Status: COMPLETED | OUTPATIENT
Start: 2018-04-21 | End: 2018-04-21

## 2018-04-21 RX ADMIN — OXYCODONE HYDROCHLORIDE 5 MG: 5 TABLET ORAL at 21:18

## 2018-04-21 ASSESSMENT — ENCOUNTER SYMPTOMS: WOUND: 1

## 2018-04-21 NOTE — ED AVS SNAPSHOT
[facility]    [address]    [facility] [address]    Phone:  [phone]    Fax:  [phone]                                       Batsheva Barkley   MRN: [id number]    Department:  [facility]   Date of Visit:  4/21/2018           Patient Information     Date Of Birth          11/6/1933        Your diagnoses for this visit were:     Hematoma of left lower extremity, initial encounter        You were seen by Carri Aviles MD and Suzie Myrick MD.      Follow-up Information     Follow up with Lenin Perez MD In 3 days.    Specialty:  Family Practice    Contact information:    [id number] [facility] [id number] [phone]          Discharge Instructions         Lower Extremity Contusion  You have a contusion (bruise) of a lower extremity (leg, knee, ankle, foot, or toe). Symptoms include pain, swelling, and skin discoloration. No bones are broken. This injury may take from a few days to a few weeks to heal.  During that time, the bruise may change from reddish in color, to purple-blue, to green-yellow, to yellow-brown.  Home care    Unless another medicine was prescribed, you can take acetaminophen, ibuprofen, or naproxen to control pain. (If you have chronic liver or kidney disease or ever had a stomach ulcer or gastrointestinal bleeding, talk with your doctor before using these medicines.)    Elevate the injured area to reduce pain and swelling. As much as possible, sit or lie down with the injured area raised about the level of your heart. This is especially important during the first 48 hours.    Ice the injured area to help reduce pain and swelling. Wrap a cold source (ice pack or ice cubes in a plastic bag) in a thin towel. Apply to the bruised area for 20 minutes every 1 to 2 hours the first day. Continue this 3 to 4 times a day until the pain and swelling goes away.    If crutches have been advised, do not  bear full weight on the injured leg until you can do so without pain. You may return to sports when you are able to put full weight and impact on the injured leg without pain.  Follow up  Follow up with your healthcare provider or our staff as advised. Call if you are not improving within the next 1 to 2 weeks.  When to seek medical advice   Call your healthcare provider right away if any of these occur:    Increased pain or swelling    Foot or toes become cold, blue, numb or tingly    Signs of infection: Warmth, drainage, or increased redness or pain around the injury    Inability to move the injured area     Frequent bruising for unknown reasons  Date Last Reviewed: 2/1/2017 2000-2017 [employer]. [address] [place] [address], [place] [address]. All rights reserved. This information is not intended as a substitute for professional medical care. Always follow your healthcare professional's instructions.          Soft Tissue Contusion  You have a contusion. This is also called a bruise. There is swelling and some bleeding under the skin. This injury generally takes a few days to a few weeks to heal.  During that time, the bruise will typically change in color from reddish, to purple-blue, to greenish-yellow, then to yellow-brown.  Home care    Elevate the injured area to reduce pain and swelling. As much as possible, sit or lie down with the injured area raised about the level of your heart. This is especially important during the first 48 hours.    Ice the injured area to help reduce pain and swelling. Wrap a cold source (ice pack or ice cubes in a plastic bag) in a thin towel. Apply to the bruised area for 20 minutes every 1 to 2 hours the first day. Continue this 3 to 4 times a day until the pain and swelling goes away.    Unless another medicine was prescribed, you can take acetaminophen, ibuprofen, or naproxen to control pain. (If you have chronic liver or kidney disease or ever had a stomach ulcer  or gastrointestinal bleeding, talk with your doctor before using these medicines.)  Follow-up care  Follow up with your healthcare provider or our staff as advised. Call if you are not better in 1 to 2 weeks.  When to seek medical advice   Call your healthcare provider right away if you have any of the following:    Increased pain or swelling    Bruise is on an arm or leg and arm or leg becomes cold, blue, numb or tingly    Signs of infection: Warmth, drainage, or increased redness or pain around the contusion    Inability to move the injured area or body part     Bruise is near your eye and you have problems with your eyesight or eye     Frequent bruising for unknown reasons  Date Last Reviewed: 5/1/2017 2000-2017 [employer]. [address] [place] [address], [place] [id number]. All rights reserved. This information is not intended as a substitute for professional medical care. Always follow your healthcare professional's instructions.          24 Hour Appointment Hotline       To make an appointment at any [facility], call [phone] ([phone]). If you don't have a family doctor or clinic, we will help you find one. [facility] clinics are conveniently located to serve the needs of you and your family.             Review of your medicines      START taking        Dose / Directions Last dose taken    diclofenac 1 % Gel topical gel   Commonly known as:  VOLTAREN   Dose:  4 g   Quantity:  1 Tube        Place 4 g onto the skin 4 times daily   Refills:  0        hydrOXYzine 10 MG tablet   Commonly known as:  ATARAX   Dose:  10 mg   Quantity:  30 tablet        Take 1 tablet (10 mg) by mouth every 6 hours as needed for itching   Refills:  0        lidocaine 5 % Patch   Commonly known as:  LIDODERM   Dose:  1 patch   Quantity:  10 patch        Place 1 patch onto the skin every 24 hours for 10 days   Refills:  0          Our records show that you are taking the medicines listed below. If these are  incorrect, please call your family doctor or clinic.        Dose / Directions Last dose taken    acetaminophen 500 MG tablet   Commonly known as:  TYLENOL   Dose:  1000 mg        Take 2 tablets (1,000 mg) by mouth every 8 hours   Refills:  0        aspirin 81 MG tablet   Quantity:  100        1 tab po QD (Once per day)   Refills:  3        atorvastatin 40 MG tablet   Commonly known as:  LIPITOR   Quantity:  90 tablet        TAKE ONE TABLET BY MOUTH ONCE DAILY   Refills:  2        FAMOTIDINE PO   Dose:  10 mg        Take 10 mg by mouth daily as needed for heartburn   Refills:  0        losartan 50 MG tablet   Commonly known as:  COZAAR   Dose:  50 mg   Quantity:  180 tablet        Take 1 tablet (50 mg) by mouth 2 times daily   Refills:  3        MELATONIN PO   Dose:  3 mg        Take 3 mg by mouth nightly as needed   Refills:  0        METAMUCIL FIBER PO   Dose:  10 g        Take 10 g by mouth daily   Refills:  0        metoprolol tartrate 50 MG tablet   Commonly known as:  LOPRESSOR   Quantity:  180 tablet        TAKE ONE TABLET BY MOUTH TWICE DAILY   Refills:  3        * order for DME   Quantity:  1 Package        ANTOINE stockings- below knee.   Refills:  0        * order for DME   Quantity:  1 Device        Light weight wheelchair Body mass index is 19.08 kg/(m^2).   Refills:  0        oxyCODONE IR 5 MG tablet   Commonly known as:  ROXICODONE   Dose:  2.5-5 mg   Quantity:  12 tablet        Take 0.5-1 tablets (2.5-5 mg) by mouth every 6 hours as needed for moderate to severe pain   Refills:  0        polyethylene glycol Packet   Commonly known as:  MIRALAX/GLYCOLAX   Dose:  17 g   Quantity:  7 packet        Take 17 g by mouth daily as needed for constipation   Refills:  0        ZOFRAN ODT PO   Dose:  4 mg        Take 4 mg by mouth every 8 hours as needed for nausea   Refills:  0        * Notice:  This list has 2 medication(s) that are the same as other medications prescribed for you. Read the directions carefully,  and ask your doctor or other care provider to review them with you.            Prescriptions were sent or printed at these locations (3 Prescriptions)                   Other Prescriptions                Printed at Department/Unit printer (3 of 3)         diclofenac (VOLTAREN) 1 % GEL topical gel               hydrOXYzine (ATARAX) 10 MG tablet               lidocaine (LIDODERM) 5 % Patch                Procedures and tests performed during your visit     Basic metabolic panel (BMP)    CBC + differential    XR Tibia & Fibula Left 2 Views      Orders Needing Specimen Collection     None      Pending Results     No orders found for last 3 day(s).            Pending Culture Results     No orders found for last 3 day(s).            Pending Results Instructions     If you had any lab results that were not finalized at the time of your Discharge, you can call the ED Lab Result RN at [phone]. You will be contacted by this team for any positive Lab results or changes in treatment. The nurses are available 7 days a week from 10A to 6:30P.  You can leave a message 24 hours per day and they will return your call.        Test Results From Your Hospital Stay        4/21/2018  8:47 PM      Component Results     Component Value Ref Range & Units Status    WBC 7.8 4.0 - 11.0 10e9/L Final    RBC Count 3.66 (L) 3.8 - 5.2 10e12/L Final    Hemoglobin 10.8 (L) 11.7 - 15.7 g/dL Final    Hematocrit 33.3 (L) 35.0 - 47.0 % Final    MCV 91 78 - 100 fl Final    MCH 29.5 26.5 - 33.0 pg Final    MCHC 32.4 31.5 - 36.5 g/dL Final    RDW 14.5 10.0 - 15.0 % Final    Platelet Count 258 150 - 450 10e9/L Final    Diff Method Automated Method  Final    % Neutrophils 68.7 % Final    % Lymphocytes 16.6 % Final    % Monocytes 12.0 % Final    % Eosinophils 1.8 % Final    % Basophils 0.6 % Final    % Immature Granulocytes 0.3 % Final    Nucleated RBCs 0 0 /100 Final    Absolute Neutrophil 5.4 1.6 - 8.3 10e9/L Final    Absolute Lymphocytes 1.3 0.8 - 5.3  10e9/L Final    Absolute Monocytes 0.9 0.0 - 1.3 10e9/L Final    Absolute Eosinophils 0.1 0.0 - 0.7 10e9/L Final    Absolute Basophils 0.1 0.0 - 0.2 10e9/L Final    Abs Immature Granulocytes 0.0 0 - 0.4 10e9/L Final    Absolute Nucleated RBC 0.0  Final         4/21/2018  9:00 PM      Component Results     Component Value Ref Range & Units Status    Sodium 133 133 - 144 mmol/L Final    Potassium 4.6 3.4 - 5.3 mmol/L Final    Chloride 100 94 - 109 mmol/L Final    Carbon Dioxide 25 20 - 32 mmol/L Final    Anion Gap 8 3 - 14 mmol/L Final    Glucose 93 70 - 99 mg/dL Final    Urea Nitrogen 31 (H) 7 - 30 mg/dL Final    Creatinine 0.74 0.52 - 1.04 mg/dL Final    GFR Estimate 75 >60 mL/min/1.7m2 Final    Non  GFR Calc    GFR Estimate If Black >90 >60 mL/min/1.7m2 Final    African American GFR Calc    Calcium 8.7 8.5 - 10.1 mg/dL Final         4/21/2018  9:11 PM      Narrative     TIBIA AND FIBULA LEFT TWO VIEW   4/21/2018 8:55 PM     HISTORY: Pain, trauma with hematoma recently.     COMPARISON: 4/4/2018    FINDINGS: Left total knee arthroplasty. Components appear well seated.  No acute appearing abnormality in the tibia or fibula. Vascular  calcification.        Impression     IMPRESSION: Nothing acute. No interval change.    JACKLYN LICONA MD                Clinical Quality Measure: Blood Pressure Screening     Your blood pressure was checked while you were in the emergency department today. The last reading we obtained was  BP: 165/84 . Please read the guidelines below about what these numbers mean and what you should do about them.  If your systolic blood pressure (the top number) is less than 120 and your diastolic blood pressure (the bottom number) is less than 80, then your blood pressure is normal. There is nothing more that you need to do about it.  If your systolic blood pressure (the top number) is 120-139 or your diastolic blood pressure (the bottom number) is 80-89, your blood pressure may be  "higher than it should be. You should have your blood pressure rechecked within a year by a primary care provider.  If your systolic blood pressure (the top number) is 140 or greater or your diastolic blood pressure (the bottom number) is 90 or greater, you may have high blood pressure. High blood pressure is treatable, but if left untreated over time it can put you at risk for heart attack, stroke, or kidney failure. You should have your blood pressure rechecked by a primary care provider within the next 4 weeks.  If your provider in the emergency department today gave you specific instructions to follow-up with your doctor or provider even sooner than that, you should follow that instruction and not wait for up to 4 weeks for your follow-up visit.        Thank you for choosing [facility]       Thank you for choosing [facility] for your care. Our goal is always to provide you with excellent care. Hearing back from our patients is one way we can continue to improve our services. Please take a few minutes to complete the written survey that you may receive in the mail after you visit with us. Thank you!        [email or web address] Information     [employer] lets you send messages to your doctor, view your test results, renew your prescriptions, schedule appointments and more. To sign up, go to [email or web address] . Click on \"Log in\" on the left side of the screen, which will take you to the Welcome page. Then click on \"Sign up Now\" on the right side of the page.     You will be asked to enter the access code listed below, as well as some personal information. Please follow the directions to create your username and password.     Your access code is: [email or web address]  Expires: 2018 12:36 PM     Your access code will  in 90 days. If you need help or a new code, please call your [facility] clinic or [phone].        Care EveryWhere ID     This is your Care EveryWhere ID. This could be used by other organizations to access your " [place] medical records  [id number]        Equal Access to Services     BRIDGET BROOKE : Jayda Edmondson, lalit moss, abhay garcia, jessica perez. So [facility] [phone].    ATENCIÓN: Si habla español, tiene a soria disposición servicios gratuitos de asistencia lingüística. Llame al [phone].    We comply with applicable federal civil rights laws and [place] laws. We do not discriminate on the basis of race, color, national origin, age, disability, sex, sexual orientation, or gender identity.            After Visit Summary       This is your record. Keep this with you and show to your community pharmacist(s) and doctor(s) at your next visit.

## 2018-04-21 NOTE — TELEPHONE ENCOUNTER
[facility] TELEPHONE ENCOUNTER    Chief Complaint   Patient presents with     Pain     Hematoma       Batsheva Barkley is a 84 year old  (11/6/1933),Nurse called today to report: Leg pain    ASSESSMENT/PLAN  Patient with L thigh hematoma. Pain increased from baseline even after receiving Tylenol and Oxycodone.    US of L thigh to r/o thrombus    NICKOLAS Brito CNP

## 2018-04-21 NOTE — ED AVS SNAPSHOT
[facility]    [address]    [facility] [address]    Phone:  [phone]    Fax:  [phone]                                       Batsheva Barkley   MRN: [id number]    Department:  [facility]   Date of Visit:  4/21/2018           After Visit Summary Signature Page     I have received my discharge instructions, and my questions have been answered. I have discussed any challenges I see with this plan with the nurse or doctor.    ..........................................................................................................................................  Patient/Patient Representative Signature      ..........................................................................................................................................  Patient Representative Print Name and Relationship to Patient    ..................................................               ................................................  Date                                            Time    ..........................................................................................................................................  Reviewed by Signature/Title    ...................................................              ..............................................  Date                                                            Time

## 2018-04-21 NOTE — Clinical Note
Admitting Physician: AMRITA WEBBER [[id number]]   [id number]   Bed Type: Observation Unit [54]   Special needs: Fall Risk [8]   Bed request comments: admit to Obs Unit / bed request time 2202

## 2018-04-22 NOTE — ED NOTES
Received sign-out from Dr. Aviles. Plan for obs admission for pain control. Dr. Patrick, the accepting physician, came down to evaluate the patient. After a long discussion, Dr. Patrick and patient with family agreed for pain control at home. Given that the only concern for was pain control, I felt it was reasonable for discharge. He had some further suggestions for pain control. This was ordered for home. Patient has close follow-up and transportation arranged for patient. Patient discharge.      Suzie Myrick MD  05/01/18 1107

## 2018-04-22 NOTE — ED NOTES
Bed: ED16  Expected date: 4/21/18  Expected time: 7:50 PM  Means of arrival: Ambulance  Comments:  BV3

## 2018-04-22 NOTE — ED TRIAGE NOTES
Pt had a fall 2 weeks ago, had neck x rays of left lower ext.  Has had a large hematoma on left lower ext. + pedal pulses.  Today c/o increased pain.

## 2018-04-22 NOTE — PROGRESS NOTES
Hospitalist note:    Visited with patient and her multiple family members at the bedside.  She suffered a left shin hematoma following a fall on the ice 2-1/2 weeks ago.  This is her third visit to the emergency department since that time.  Her hemoglobin has remained stable despite continuing on her 81 mg aspirin and she has not required surgical intervention.  She has no fevers to suggest an infected hematoma.  Vascular circulation appears intact without any evidence of compartment syndrome.    Main reason for her return visit to the emergency department today is inadequate pain control.  She appears fairly comfortable at the bedside.  It sounds as though most of her current regimen has consisted of scheduled acetaminophen with as needed oxycodone.  I have suggested to the family that she begin topical products with Lidoderm patches, Voltaren gel, icy hot patches, and continued application of ice to the affected area.  She should continue to schedule acetaminophen and use oxycodone as needed.  She could also use Vistaril as needed for pain adjunct.  I would not start scheduled long-acting narcotics in an elderly patient who otherwise appears reasonably comfortable.  The patient and family do note that the hematoma size has decreased considerably.      It sounds as though much of her pain is related to hypersensitivity of the skin and not necessarily deep involvement.  I do not think she would benefit from observation in the hospital as these of the interventions that would take place whether she was inpatient or outpatient.  Extensive discussion with the family and patient and we are all in agreement with plan to discharge back to transitional care to attempt better basal coverage of her pain with ongoing adjustments as needed.  I have discussed the case with Dr. Suzie Myrick who graciously will be discharging the patient back to her transitional care facility.    Vitaly Patrick  April 21, 2018

## 2018-04-22 NOTE — ED PROVIDER NOTES
History     Chief Complaint:  Leg Pain      HPI   Batsheva Barkley is an 84 year old female who presents to the emergency department today via EMS for evaluation of leg pain. The patient is here for pain in her left lower leg in a hematoma that has been present since a fall. She was seen in the emergency department for evaluation and she had negative x-rays for fractures. She is at a TCU for rehab after the fall and has been doing walking with a walker. She reports that she alternates Tylenol and oxycodone for the pain with her last dose being 5 mg of oxycodone at 5:30 pm per her records. She also ices the leg and does not walk when she is not doing rehab. Her son reports that her leg swelling had improved but is now more swollen. Per chart review, the patient was admitted on 4/10 for one night and has been seen in the emergency department three times for this pain. Of note, the patient had an ultrasound done at her TCU today and her results are below.     US Duplex Left Lower Extremity Veins  Deep veins: unremarkable. No DVT in the visualized common femoral, femoral, proximal deep femoral or popliteal veins. The veins demonstrate normal color flow, are normally compressible with normal phasic flow and/or augmentation response.  Superficial veins: unremarkable. No thrombus in the visualized great saphenous vein  Soft tissues: complex fluid collection is noted over the palpable amount within the left calf. This measures 6.0x5.43.0 cm. This may represent a hematoma or abscess. No popliteal cyst.    Allergies:  Codeine  Lisinopril      Medications:    ASPIRIN 81 MG OR TABS  atorvastatin (LIPITOR) 40 MG tablet  FAMOTIDINE PO  losartan (COZAAR) 50 MG tablet  MELATONIN PO  metoprolol (LOPRESSOR) 50 MG tablet  Ondansetron (ZOFRAN ODT PO)  oxyCODONE IR (ROXICODONE) 5 MG tablet  polyethylene glycol (MIRALAX/GLYCOLAX) Packet  Psyllium (METAMUCIL FIBER PO)    Past Medical History:    CAD (coronary artery disease)   Cardiomyopathy  (H)   CHF NYHA class III, chronic, systolic (H)   Coronary atherosclerosis   Edema   Generalized osteoarthrosis   Hypertension goal BP (blood pressure) < 140/90   Leg weakness, bilateral   Mitral regurgitation   Mixed hyperlipidemia   Osteoporosis   Physical deconditioning   Total urinary incontinence     Past Surgical History:    Colonoscopy  ENT surgery  EGD  Eye surgery  Heart cath  Left bunion and hammer toes  Left total knee replacement  Ovarian cyst surgery    Family History:    Cerebrovascular disease  Respiratory   Breast cancer  Heart disease    Social History:  The patient was accompanied to the ED by her children.  Smoking Status: Former Smoker  Smokeless Tobacco:  Never Used  Alcohol Use: No   Marital Status:        Review of Systems   Cardiovascular: Positive for leg swelling.   Skin: Positive for wound (left lower leg).   All other systems reviewed and are negative.    Physical Exam   First Vitals:  BP: 138/78  Pulse: 61  Heart Rate: 61  Temp: 97.4  F (36.3  C)  Resp: 18  SpO2: 99 %    Physical Exam  Constitutional: The patient is oriented to person, place, and time. Alert and cooperative.  HENT:   Right Ear: External ear normal.   Left Ear: External ear normal.   Nose: Nose normal.   Mouth/Throat: Moist mucous membranes.   Eyes: Conjunctivae, EOM and lids are normal.   Neck: Trachea normal. Normal range of motion. Neck supple.   Cardiovascular: Normal rate, regular rhythm, normal heart sounds, and intact distal pulses.    Pulmonary/Chest: Effort normal and breath sounds equal bilaterally. No crackles or wheezing.   Abdominal: Soft. No tenderness. No rebound and no guarding.   Musculoskeletal: LLE: hematoma present to medial aspect of lower leg, scattered ecchymosis present to lower leg distal to knee, skin intact.  Compartments are soft.  Diffuse tenderness to palpation over the hematoma. Non-tender to palpation over the greater troch, femur, knee, ankle, and foot. Normal ROM at the hip, knee,  ankle, and foot. Sensation intact to light touch throughout. 2+ DP and PT pulse.  Neurological: Alert and Oriented. Strength 5/5 in upper and lower extremities bilaterally. Sensation intact to light touch throughout.  Skin: Skin is dry. No rash noted.        Emergency Department Course     Imaging:  Radiology findings were communicated with the patient who voiced understanding of the findings.    XR Tibia&Fibula Left 2 Views  IMPRESSION: Nothing acute. No interval change.  Report per radiology      Laboratory:  Laboratory findings were communicated with the patient who voiced understanding of the findings.  CBC: HGB 10.8 (L) o/w WNL. (WBC 7.8, )   BMP: BUN 31 (H) o/w WNL (Creatinine 0.74)     Interventions:  2118: Oxycodone 5 mg PO     Emergency Department Course:  Nursing notes and vitals reviewed.  The patient was sent for a XR Tibia&Fibula Left 2 Views while in the emergency department, results above.    IV was inserted and blood was drawn for laboratory testing, results above.   2012: I performed an exam of the patient as documented above.   2102: Patient rechecked and updated.   2128:Patient rechecked and updated.    I spoke with Dr. Patrick of the hospitalist service regarding patient's presentation, findings, and plan of care.     Findings and plan explained to the Patient and family who consents to admission. Discussed the patient with Dr. Patrick, who will admit the patient to an Obs bed for further monitoring, evaluation, and treatment.   I personally reviewed the laboratory and imaging results with the Patient and answered all related questions prior to admit.    Impression & Plan      Medical Decision Making:  Batsheva Barkley is an 84 year old female who presents to the emergency department today via EMS for evaluation of leg pain.  Upon presentation in the ED, the patient is nontoxic-appearing.  Vitals are within normal limits and stable.  On exam, she is well-appearing.  She is alert, oriented, and  neurologic exam is nonfocal.  Cardiopulmonary exam is unremarkable.  Abdomen is soft and nontender throughout.  On exam of the left lower extremity, the patient does have evidence of a hematoma over the medial aspect of the lower leg.  There is scattered ecchymoses present to the lower leg distal to the knee.  She does endorse diffuse tenderness with palpation over the lower leg.  She is good range of motion of the left lower extremity without significant pain.  She is neurovascularly intact.  The rest of her exam is as mentioned above.    Of note, per review of records, the patient did sustain a fall on 4/4 at which time she developed a hematoma to the left lower leg.  Initially following the fall, the patient was evaluated in the emergency department and was ultimately admitted to the hospital for observation.  She was then discharged home.  She had increasing pain and another visit to the emergency department since that time.  She reports today that she is having increasing pain at home, therefore decided to present to the emergency department.  Ultrasound of the left lower extremity was obtained at the TCU and the results were reviewed.  Per the radiology report, there is no evidence of DVT.  She does have evidence of a complex fluid collection within the medial aspect of the left calf, consistent with hematoma versus abscess.  Labs were obtained and are as mentioned above.  Notably, she does not have a leukocytosis.  X-ray of the tib-fib demonstrates no acute abnormality.  Given the patient's history and presentation, I do feel her symptoms are likely secondary to her traumatic hematoma.  She is afebrile, without a leukocytosis, and without concerning findings to suggest an infectious process such as cellulitis or abscess.  She has no evidence to suggest a compartment syndrome.  The patient's family notes that they are concerned regarding the ability of her pain to be controlled at the TCU, therefore they do  request admission at this time.  The patient was discussed with the hospitalist and he agreed to accept this patient for an observation admission.  She was stable/improved at time of admission.      Diagnosis:     ICD-10-CM    1. Hematoma of left lower extremity, initial encounter S80.12XA        Disposition:  Admitted to Obs   Scribe Disclosure:  Romy SANON, am serving as a scribe at 8:02 PM on 4/21/2018 to document services personally performed by Carri Aviles MD based on my observations and the provider's statements to me.    4/21/2018   [facility] EMERGENCY DEPARTMENT       Carri Aviles MD  04/21/18 [id number]

## 2018-04-22 NOTE — ED NOTES
[facility]  ED Nurse Handoff Report    Batsheva Barkley is a 84 year old female   ED Chief complaint: No chief complaint on file.  . ED Diagnosis:   Final diagnoses:   Hematoma of left lower extremity, initial encounter     Allergies:   Allergies   Allergen Reactions     Codeine      nausea, HA     Lisinopril Cough       Code Status: Full Code  Activity level - Baseline/Home:  Independent. Activity Level - Current:   Independent. Lift room needed: No. Bariatric: No   Needed: No   Isolation: No. Infection: Not Applicable.     Vital Signs:   Vitals:    04/21/18 2006   BP: 138/78   Pulse: 61   Resp: 18   Temp: 97.4  F (36.3  C)   TempSrc: Oral   SpO2: 99%       Cardiac Rhythm:  ,      Pain level: 0-10 Pain Scale: 3  Patient confused: No. Patient Falls Risk: Yes.   Elimination Status: Has voided   Patient Report - Initial Complaint: hematoma left lower leg. Focused Assessment: large hematoma left lower leg   Tests Performed: x ray. Abnormal Results:   Labs Ordered and Resulted from Time of ED Arrival Up to the Time of Departure from the ED   CBC WITH PLATELETS DIFFERENTIAL - Abnormal; Notable for the following:        Result Value    RBC Count 3.66 (*)     Hemoglobin 10.8 (*)     Hematocrit 33.3 (*)     All other components within normal limits   BASIC METABOLIC PANEL - Abnormal; Notable for the following:     Urea Nitrogen 31 (*)     All other components within normal limits     .   Treatments provided: pain meds  Family Comments: present  OBS brochure/video discussed/provided to patient:  Yes  ED Medications:   Medications   oxyCODONE IR (ROXICODONE) tablet 5 mg (5 mg Oral Given 4/21/18 2118)     Drips infusing:  No  For the majority of the shift, the patient's behavior Green. Interventions performed were pain meds, x ray.     Severe Sepsis OR Septic Shock Diagnosis Present: No      ED Nurse Name/Phone Number: Cristian Garland,   10:10 PM    RECEIVING UNIT ED HANDOFF REVIEW    Above ED Nurse  Handoff Report was reviewed: Yes  Reviewed by: Charlene Love on April 21, 2018 at 10:15 PM

## 2018-04-22 NOTE — DISCHARGE INSTRUCTIONS
Lower Extremity Contusion  You have a contusion (bruise) of a lower extremity (leg, knee, ankle, foot, or toe). Symptoms include pain, swelling, and skin discoloration. No bones are broken. This injury may take from a few days to a few weeks to heal.  During that time, the bruise may change from reddish in color, to purple-blue, to green-yellow, to yellow-brown.  Home care    Unless another medicine was prescribed, you can take acetaminophen, ibuprofen, or naproxen to control pain. (If you have chronic liver or kidney disease or ever had a stomach ulcer or gastrointestinal bleeding, talk with your doctor before using these medicines.)    Elevate the injured area to reduce pain and swelling. As much as possible, sit or lie down with the injured area raised about the level of your heart. This is especially important during the first 48 hours.    Ice the injured area to help reduce pain and swelling. Wrap a cold source (ice pack or ice cubes in a plastic bag) in a thin towel. Apply to the bruised area for 20 minutes every 1 to 2 hours the first day. Continue this 3 to 4 times a day until the pain and swelling goes away.    If crutches have been advised, do not bear full weight on the injured leg until you can do so without pain. You may return to sports when you are able to put full weight and impact on the injured leg without pain.  Follow up  Follow up with your healthcare provider or our staff as advised. Call if you are not improving within the next 1 to 2 weeks.  When to seek medical advice   Call your healthcare provider right away if any of these occur:    Increased pain or swelling    Foot or toes become cold, blue, numb or tingly    Signs of infection: Warmth, drainage, or increased redness or pain around the injury    Inability to move the injured area     Frequent bruising for unknown reasons  Date Last Reviewed: 2/1/2017 2000-2017 [employer]. [address] [place] [address], [place] [address].  All rights reserved. This information is not intended as a substitute for professional medical care. Always follow your healthcare professional's instructions.          Soft Tissue Contusion  You have a contusion. This is also called a bruise. There is swelling and some bleeding under the skin. This injury generally takes a few days to a few weeks to heal.  During that time, the bruise will typically change in color from reddish, to purple-blue, to greenish-yellow, then to yellow-brown.  Home care    Elevate the injured area to reduce pain and swelling. As much as possible, sit or lie down with the injured area raised about the level of your heart. This is especially important during the first 48 hours.    Ice the injured area to help reduce pain and swelling. Wrap a cold source (ice pack or ice cubes in a plastic bag) in a thin towel. Apply to the bruised area for 20 minutes every 1 to 2 hours the first day. Continue this 3 to 4 times a day until the pain and swelling goes away.    Unless another medicine was prescribed, you can take acetaminophen, ibuprofen, or naproxen to control pain. (If you have chronic liver or kidney disease or ever had a stomach ulcer or gastrointestinal bleeding, talk with your doctor before using these medicines.)  Follow-up care  Follow up with your healthcare provider or our staff as advised. Call if you are not better in 1 to 2 weeks.  When to seek medical advice   Call your healthcare provider right away if you have any of the following:    Increased pain or swelling    Bruise is on an arm or leg and arm or leg becomes cold, blue, numb or tingly    Signs of infection: Warmth, drainage, or increased redness or pain around the contusion    Inability to move the injured area or body part     Bruise is near your eye and you have problems with your eyesight or eye     Frequent bruising for unknown reasons  Date Last Reviewed: 5/1/2017 2000-2017 [employer]. [address] [place] [address]  [place] [address]. All rights reserved. This information is not intended as a substitute for professional medical care. Always follow your healthcare professional's instructions.

## 2018-04-23 ENCOUNTER — TRANSFERRED RECORDS (OUTPATIENT)
Dept: HEALTH INFORMATION MANAGEMENT | Facility: CLINIC | Age: 83
End: 2018-04-23

## 2018-04-23 ENCOUNTER — NURSING HOME VISIT (OUTPATIENT)
Dept: GERIATRICS | Facility: CLINIC | Age: 83
End: 2018-04-23
Payer: COMMERCIAL

## 2018-04-23 VITALS
WEIGHT: 94.6 LBS | RESPIRATION RATE: 18 BRPM | HEIGHT: 60 IN | SYSTOLIC BLOOD PRESSURE: 114 MMHG | OXYGEN SATURATION: 97 % | DIASTOLIC BLOOD PRESSURE: 66 MMHG | TEMPERATURE: 98.2 F | HEART RATE: 77 BPM | BODY MASS INDEX: 18.57 KG/M2

## 2018-04-23 DIAGNOSIS — R53.81 PHYSICAL DECONDITIONING: ICD-10-CM

## 2018-04-23 DIAGNOSIS — S80.12XD HEMATOMA OF LEFT LOWER EXTREMITY, SUBSEQUENT ENCOUNTER: Primary | ICD-10-CM

## 2018-04-23 DIAGNOSIS — R11.0 NAUSEA: ICD-10-CM

## 2018-04-23 DIAGNOSIS — I10 ESSENTIAL HYPERTENSION: ICD-10-CM

## 2018-04-23 DIAGNOSIS — K21.9 GASTROESOPHAGEAL REFLUX DISEASE, ESOPHAGITIS PRESENCE NOT SPECIFIED: ICD-10-CM

## 2018-04-23 DIAGNOSIS — K59.03 DRUG-INDUCED CONSTIPATION: ICD-10-CM

## 2018-04-23 LAB
HEMOGLOBIN: 10.3 G/DL (ref 12–16)
HGB BLD-MCNC: 10.3 G/DL (ref 12–16)

## 2018-04-23 PROCEDURE — 99310 SBSQ NF CARE HIGH MDM 45: CPT | Performed by: NURSE PRACTITIONER

## 2018-04-23 RX ORDER — AMOXICILLIN 250 MG
1 CAPSULE ORAL 2 TIMES DAILY PRN
COMMUNITY

## 2018-04-23 NOTE — PROGRESS NOTES
[facility]  PRIMARY CARE PROVIDER AND CLINIC:  Lenin Perez [place] [id number] [facility] [id number]  Chief Complaint   Patient presents with     ER F/U       HPI:    Batsheva Barkley is a 84 year old  (11/6/1933),admitted to the [facility]  [place] from Emergency Room  [facility] on 4/21/18.  Admitted to this facility for  rehab, medical management and nursing care.  HPI information obtained from: facility chart records, facility staff, patient report and [facility] chart review.      See below for ER visit (in italics):  Emergency Department Course      Imaging:  Radiology findings were communicated with the patient who voiced understanding of the findings.     XR Tibia&Fibula Left 2 Views  IMPRESSION: Nothing acute. No interval change.  Report per radiology       Laboratory:  Laboratory findings were communicated with the patient who voiced understanding of the findings.  CBC: HGB 10.8 (L) o/w WNL. (WBC 7.8, )   BMP: BUN 31 (H) o/w WNL (Creatinine 0.74)      Interventions:  2118: Oxycodone 5 mg PO      Emergency Department Course:  Nursing notes and vitals reviewed.  The patient was sent for a XR Tibia&Fibula Left 2 Views while in the emergency department, results above.    IV was inserted and blood was drawn for laboratory testing, results above.   2012: I performed an exam of the patient as documented above.   2102: Patient rechecked and updated.   2128:Patient rechecked and updated.    I spoke with Dr. Patrick of the hospitalist service regarding patient's presentation, findings, and plan of care.      Findings and plan explained to the Patient and family who consents to admission. Discussed the patient with Dr. Patrick, who will admit the patient to an Obs bed for further monitoring, evaluation, and treatment.   I personally reviewed the laboratory and imaging results with the Patient and answered all related questions prior to  admit.     Impression & Plan       Medical Decision Making:  Batsheva Barkley is an 84 year old female who presents to the emergency department today via EMS for evaluation of leg pain.  Upon presentation in the ED, the patient is nontoxic-appearing.  Vitals are within normal limits and stable.  On exam, she is well-appearing.  She is alert, oriented, and neurologic exam is nonfocal.  Cardiopulmonary exam is unremarkable.  Abdomen is soft and nontender throughout.  On exam of the left lower extremity, the patient does have evidence of a hematoma over the medial aspect of the lower leg.  There is scattered ecchymoses present to the lower leg distal to the knee.  She does endorse diffuse tenderness with palpation over the lower leg.  She is good range of motion of the left lower extremity without significant pain.  She is neurovascularly intact.  The rest of her exam is as mentioned above.     Of note, per review of records, the patient did sustain a fall on 4/4 at which time she developed a hematoma to the left lower leg.  Initially following the fall, the patient was evaluated in the emergency department and was ultimately admitted to the hospital for observation.  She was then discharged home.  She had increasing pain and another visit to the emergency department since that time.  She reports today that she is having increasing pain at home, therefore decided to present to the emergency department.  Ultrasound of the left lower extremity was obtained at the TCU and the results were reviewed.  Per the radiology report, there is no evidence of DVT.  She does have evidence of a complex fluid collection within the medial aspect of the left calf, consistent with hematoma versus abscess.  Labs were obtained and are as mentioned above.  Notably, she does not have a leukocytosis.  X-ray of the tib-fib demonstrates no acute abnormality.  Given the patient's history and presentation, I do feel her symptoms are likely secondary to  her traumatic hematoma.  She is afebrile, without a leukocytosis, and without concerning findings to suggest an infectious process such as cellulitis or abscess.  She has no evidence to suggest a compartment syndrome.  The patient's family notes that they are concerned regarding the ability of her pain to be controlled at the TCU, therefore they do request admission at this time.  The patient was discussed with the hospitalist and he agreed to accept this patient for an observation admission.  She was stable/improved at time of admission.     Current issues are:      Hematoma of left lower extremity, subsequent encounter  Physical deconditioning  Ms. Barkley was initially brought to ER by family after she fell trying to transfer from walker to car. Xrays negative for fracture. Has large hematoma to left medial calf. Were concerned about compartment syndrome and ortho followed, but it did not develop. She was then transferred to ER again last week after experiencing increased pain, edema of left leg and foot, chills, and N/V. After evaluation in ER it was determined that the increased pain/ swelling was likely due to difficult session with physical therapy/OT. However family preferred that she stay overnight due to nausea/vomiting she was experiencing. Over this past weekend Ms. Barkley again developed significant pain to the left leg. An US was obtained at the TCU that showed possible hematoma versus abscess so she was sent to the ER for further evaluation. Xrays were obtained and ruled out fracture. She was started on lidocaine patch, voltaren gel, and hydroxyzine PRN for itching and returned back to the TCU.    Baseline hemoglobin 12-13. Hemoglobin continues to improve. Today on exam she continues to report pain and rates it 6/10. On tylenol TID and oxycodone PRN. Denies any problems urinating. she has been having some nausea with the oxycodone and her pain does not seem well controlled so will switch to dilaudid. Spoke  to geriatric pharmacist for conversion of oxycodone to dilaudid. Continue to ice and elevate leg. Wrap loosely with ACE wrap. She reports that she lives with her  in an apartment and family lives locally. She uses a walker for ambulation. Is working with therapy. Continue to ice and elevate.       Nausea  Gastroesophageal reflux disease, esophagitis presence not specified  Ms. Barkley's daughter reportrf that the oxycodone makes her nauseous. Has PRN Zofran ordered. Additionally Ms. Barkley states that she occasionally gets acid and that is what is making her nauseous. On exam today she reports occasional nausea and GERD. Denies any emesis. Today she has not eaten due to the nausea- as noted above will d/c oxycodone and try dilaudid to see if it is better tolerated.      Essential hypertension  On losartan, metoprolol. Bp controlled.       BP: 111-187/59-87 mmHg  P: 56-98 bpm      Constipation, drug induced constipation  Reports significant constipation today. Did have BM yesterday but not for 3 days prior. Has miralax PRN.    BP: 111-149/60-78 mmHg  P: 56-98 bpm  Admission weight: 102 lbs  Current weight: 94.6 lbs    CODE STATUS/ADVANCE DIRECTIVES DISCUSSION:   CPR/Full code   Patient's living condition: lives with spouse in an apartment in [place]    ALLERGIES:Codeine and Lisinopril  PAST MEDICAL HISTORY:  has a past medical history of CAD (coronary artery disease) (6/20/05); Cardiomyopathy (H); CHF NYHA class III, chronic, systolic (H) (1/25/2018); Coronary atherosclerosis (6/20/2005); Edema (1/26/2015); Generalized osteoarthrosis; Hypertension goal BP (blood pressure) < 140/90 (1/21/2015); Leg weakness, bilateral (2/13/2018); Mitral regurgitation; Mixed hyperlipidemia; Osteoporosis; Physical deconditioning (1/21/2015); and Total urinary incontinence (12/27/2017).  PAST SURGICAL HISTORY:  has a past surgical history that includes L bunion + hammer toes (1/04, 4/04); ovarian cyst surgery; Colonoscopy (3/05); L  total knee replacement (10/2009 ); ENT surgery; Eye surgery; Heart Cath, Angioplasty (2005); and Esophagoscopy, gastroscopy, duodenoscopy (EGD), combined (N/A, 4/14/2016).  FAMILY HISTORY: family history includes Breast Cancer in her sister and sister; CEREBROVASCULAR DISEASE in her mother; HEART DISEASE in her sister; Respiratory in her father.  SOCIAL HISTORY:  reports that she has quit smoking. She has never used smokeless tobacco. She reports that she does not drink alcohol or use illicit drugs.    Post Discharge Medication Reconciliation Status: discharge medications reconciled and changed, per note/orders (see AVS).  Current Outpatient Prescriptions   Medication Sig Dispense Refill     acetaminophen (TYLENOL) 500 MG tablet Take 2 tablets (1,000 mg) by mouth every 8 hours       ASPIRIN 81 MG OR TABS 1 tab po QD (Once per day) 100 3     atorvastatin (LIPITOR) 40 MG tablet TAKE ONE TABLET BY MOUTH ONCE DAILY 90 tablet 2     diclofenac (VOLTAREN) 1 % GEL topical gel Place 4 g onto the skin 4 times daily 1 Tube 0     FAMOTIDINE PO Take 10 mg by mouth daily as needed for heartburn       HYDROmorphone HCl (DILAUDID PO) Take 1 mg by mouth every 6 hours as needed for moderate to severe pain Max 3 tabs per day       hydrOXYzine (ATARAX) 10 MG tablet Take 1 tablet (10 mg) by mouth every 6 hours as needed for itching 30 tablet 0     lidocaine (LIDODERM) 5 % Patch Place 1 patch onto the skin every 24 hours for 10 days 10 patch 0     losartan (COZAAR) 50 MG tablet Take 1 tablet (50 mg) by mouth 2 times daily 180 tablet 3     MELATONIN PO Take 3 mg by mouth nightly as needed        metoprolol (LOPRESSOR) 50 MG tablet TAKE ONE TABLET BY MOUTH TWICE DAILY 180 tablet 3     Ondansetron (ZOFRAN ODT PO) Take 4 mg by mouth every 8 hours as needed for nausea       order for DME ANTOINE stockings- below knee. 1 Package 0     order for DME Light weight wheelchair Body mass index is 19.08 kg/(m^2). 1 Device 0     polyethylene glycol  (MIRALAX/GLYCOLAX) Packet Take 17 g by mouth daily as needed for constipation 7 packet      senna-docusate (SENOKOT-S;PERICOLACE) 8.6-50 MG per tablet Take 1 tablet by mouth 2 times daily         ROS:  10 point ROS of systems including Constitutional, Eyes, Respiratory, Cardiovascular, Gastroenterology, Genitourinary, Integumentary, Muscularskeletal, Neurologic, Psychiatric were all negative except for pertinent positives noted in my HPI.    Exam:  /66  Pulse 77  Temp 98.2  F (36.8  C)  Resp 18  Ht 5' (1.524 m)  Wt 94 lb 9.6 oz (42.9 kg)  SpO2 97%  BMI 18.48 kg/m2  GENERAL APPEARANCE:  Alert, oriented x 3  EYES:  EOM, lids, pupils and irises normal, sclera clear and conjunctiva normal, no discharge or mattering on lids or lashes noted  ENT:  Mouth normal, moist mucous membranes, nose without drainage or crusting, external ears without lesions, hearing acuity : [place]  RESP:  respiratory effort and palpation of chest normal, no chest wall tenderness, no respiratory distress, Lung sounds clear, patient is on room air   CV:  Palpation and auscultation of heart done, rate and rhythm regular. + edema L, +1 left pedal pulse. Right leg with ANTOINE stocking in place.  ABDOMEN:  normal bowel sounds, soft, nontender.  M/S:   Gait and station abnormal: uses walker for ambulation. Able to plantar and dorsiflex bilateral ankles with some pain. Arthritic changes to hands, feet  SKIN:  Inspection and palpation of skin and subcutaneous tissue: skin warm, dry without rashes, significant ecchymosis to LLE- calf and surrounding areas soft, but tender with palpation. Sensation intact  NEURO: cranial nerves 2-12 grossly intact, no facial asymmetry, no speech deficits and able to follow directions, moves all extremities symmetrically  PSYCH:  insight and judgement intact, memory intact, affect and mood normal      Lab/Diagnostic data:     CBC RESULTS:   Recent Labs   Lab Test  04/21/18 2020 04/16/18 04/11/18   0620   WBC  7.8    --    --   5.2   RBC  3.66*   --    --   3.18*   HGB  10.8*  10.1*   < >  9.2*   HCT  33.3*   --    --   29.2*   MCV  91   --    --   92   MCH  29.5   --    --   28.9   MCHC  32.4   --    --   31.5   RDW  14.5   --    --   14.4   PLT  258   --    --   211    < > = values in this interval not displayed.       Last Basic Metabolic Panel:  Recent Labs   Lab Test  04/21/18 2020 04/11/18   0620   NA  133  139   POTASSIUM  4.6  4.1   CHLORIDE  100  108   REILLY  8.7  8.2*   CO2  25  27   BUN  31*  18   CR  0.74  0.79   GLC  93  88       Liver Function Studies -   Recent Labs   Lab Test  04/10/18   1855  01/18/18   1135   PROTTOTAL  6.0*  6.8   ALBUMIN  2.7*  3.8   BILITOTAL  0.3  0.5   ALKPHOS  74  74   AST  44  24   ALT  39  19       Assessment/Plan:  (S80.12XD) Hematoma of left lower extremity, subsequent encounter  (primary encounter diagnosis)  (R53.81) Physical deconditioning  Comment: Acute,reports pain on exam today-no signs of compartment syndrome  Plan: D/c oxycodone. Spoke to pharmacist for help with conversion to dilaudid. Take 1 mg q6hPRN with max 3 pills daily. Hoping this will also help with the nausea. Continue scheduled tylenol TID.  Hemoglobin 4/23. Monitor for symptoms of urinary retention. Continue to ice and elevate. Wrap with ACE bandage lightly. Nursing to continue to monitor pulse, skin, pain, sensation, and ability to dorsi/plantar flex the ankle. Encourage participation in physical therapy/occupational therapy for strengthening and deconditioning. Discharge planning per their recommendation. Social work to assist with d/c planning.      (R11.0) Nausea  (K21.9) Gastroesophageal reflux disease, esophagitis presence not specified  Comment: Acute, nausea likely related to oxycodone per family report, acid also contributing per patient  Plan: Switch pain medication as above to dilaudid. Continue zofran PRN and famotidine PRN for acid.      (I10) Essential hypertension   Comment: Chronic, stable,  although a few recent on the lower side  Plan: Continue medications as above, however if they remain lower consider adjusting. Monitor VS  Per TCU policy    (K59.03) Drug-induced constipation  Comment: Acute, related to narcotic  Plan: Miralax daily today and then daily PRN. Start Senna S 1 tab BID. Monitor bowels closely.       Total time spent with patient visit at the [facility] nursing facility was 36 minutes including patient visit, review of ER visit and facility records, writing of orders, and phone call to geriatric pharmacist. Greater than 50% of total time spent with counseling and coordinating care due to review of history, status, and plan of care regarding the above medical issues      Electronically signed by:  NICKOLAS Ronquillo CNP

## 2018-04-27 ENCOUNTER — NURSING HOME VISIT (OUTPATIENT)
Dept: GERIATRICS | Facility: CLINIC | Age: 83
End: 2018-04-27
Payer: COMMERCIAL

## 2018-04-27 VITALS
TEMPERATURE: 98 F | DIASTOLIC BLOOD PRESSURE: 75 MMHG | SYSTOLIC BLOOD PRESSURE: 133 MMHG | HEART RATE: 95 BPM | HEIGHT: 60 IN | WEIGHT: 95.6 LBS | OXYGEN SATURATION: 97 % | RESPIRATION RATE: 16 BRPM | BODY MASS INDEX: 18.77 KG/M2

## 2018-04-27 DIAGNOSIS — I10 ESSENTIAL HYPERTENSION: ICD-10-CM

## 2018-04-27 DIAGNOSIS — R53.81 PHYSICAL DECONDITIONING: ICD-10-CM

## 2018-04-27 DIAGNOSIS — R11.0 NAUSEA: ICD-10-CM

## 2018-04-27 DIAGNOSIS — S80.12XD HEMATOMA OF LEFT LOWER EXTREMITY, SUBSEQUENT ENCOUNTER: Primary | ICD-10-CM

## 2018-04-27 DIAGNOSIS — K21.9 GASTROESOPHAGEAL REFLUX DISEASE, ESOPHAGITIS PRESENCE NOT SPECIFIED: ICD-10-CM

## 2018-04-27 DIAGNOSIS — R19.5 LOOSE STOOLS: ICD-10-CM

## 2018-04-27 DIAGNOSIS — I25.10 CORONARY ARTERY DISEASE INVOLVING NATIVE CORONARY ARTERY OF NATIVE HEART WITHOUT ANGINA PECTORIS: ICD-10-CM

## 2018-04-27 PROCEDURE — 99316 NF DSCHRG MGMT 30 MIN+: CPT | Performed by: NURSE PRACTITIONER

## 2018-04-27 RX ORDER — LOPERAMIDE HCL 2 MG
2 CAPSULE ORAL 4 TIMES DAILY PRN
COMMUNITY

## 2018-04-27 NOTE — PROGRESS NOTES
[facility] DISCHARGE SUMMARY    PATIENT'S NAME: Batsheva Barkley  YOB: 1933  MEDICAL RECORD NUMBER:  [id number]    PRIMARY CARE PROVIDER AND CLINIC RESPONSIBLE AFTER TRANSFER: Lenin Perez [address] [facility] [address]     CODE STATUS/ADVANCE DIRECTIVES DISCUSSION:   CPR/Full code        Allergies   Allergen Reactions     Codeine      nausea, HA     Lisinopril Cough       TRANSFERRING PROVIDERS: NICKOLAS Ronquillo CNP, Dr. Ze MD  DATE OF SNF ADMISSION:  April / 05 / 2018  DATE OF SNF (anticipated) DISCHARGE: April / 30 / 2018  DISCHARGE DISPOSITION: Assisted Living: [facility]   Nursing Facility: [facility] stay 4/4/18 to 4/5/18.     Condition on Discharge:  Improving.  Function:    Eating Independent (6/6)  Oral Hygiene Setup/Cleanup Assistance (5/6)  Toilet Hygiene Supervision/Touching Assistance (4/6)  Dressing UB Independent (6/6)  Dressing LB Supervision/Touching Assistance (4/6)  Washing UB Supervision/Touching Assistance (4/6)  Showering/Bathing Self Partial/Moderate Assistance (3/6)  Putting On & Removing Footwear Partial/Moderate Assistance (3/6)  Walking Distance (ft.) >/= 150 feet  Walking - Level of Assistance Independent (6/6)  Picking Up Objects Independent (6/6)  Car Transfer Independent (6/6)  Walking 50 ft. with 2 Turns Independent (6/6)  Walking 10 ft. on Uneven Surfaces Independent (6/6)    Cognitive Scores: BIMS 13/15    Equipment: walker    DISCHARGE DIAGNOSIS:   1. Hematoma of left lower extremity, subsequent encounter    2. Physical deconditioning    3. Nausea    4. Gastroesophageal reflux disease, esophagitis presence not specified    5. Loose stools    6. Essential hypertension    7. Coronary artery disease involving native coronary artery of native heart without angina pectoris        HPI Nursing Facility Course:  HPI information obtained from: facility chart  records, facility staff, patient report and [facility] chart review.    Current issues are:      Hematoma of left lower extremity, subsequent encounter  Physical deconditioning  Ms. Barkley was initially brought to ER by family after she fell trying to transfer from walker to car. Xrays negative for fracture. Has large hematoma to left medial calf. Were concerned about compartment syndrome and ortho followed, but it did not develop. She was then transferred to ER from the TCU after experiencing increased pain, edema of left leg and foot, chills, and N/V. After evaluation in ER it was determined that the increased pain/ swelling was likely due to difficult session with physical therapy/OT. However family preferred that she stay overnight due to nausea/vomiting she was experiencing. Over this past weekend Ms. Barkley again developed significant pain to the left leg. An US was obtained at the TCU that showed possible hematoma versus abscess so she was sent to the ER for further evaluation. Xrays were obtained and ruled out fracture. She was started on lidocaine patch, voltaren gel, and hydroxyzine PRN for itching and returned back to the TCU.     Her baseline hemoglobin is 12-13. Hemoglobin continues to improve.Today on exam she continues to report pain, but states it is improving. She is on ttylenol TID, voltaren gel, lidocaine patches, and dilaudid PRN. She was initially discharged to the TCU on oxycodone PRN, but continued to have significant pain, as well as some nausea from it so she was switched to dilaudid PRN. She will be discharged with a small quantity of dilaudid on discharge. I did review with  and her daughter that this medication should be reserved for severe pain only and that she should only take it if someone is home with her. Additionally I reviewed that it can cause fatigue and can put her at increased risks for falling. She should continue to ice and elevate leg. Wrap loosely with ACE wrap. During  her initial hospitalization she did have some urinary retention. This has not occurred during her stay at TCU. She will be discharging to the [facility] with her . The family moved both her and her  during 's stay at the TCU. [facility] will [facility], and will receive home care including physical therapy, occupational therapy, WILLIS, RN at discharge.       Nausea  Gastroesophageal reflux disease, esophagitis presence not specified  Ms. Barkley had nausea on and off throughout her stay at the TCU. The nausea seems multifactorial from oxycodone she was initially taking, and more recently from acid. Ms. Barkley did have emesis on two occasions that I am aware of. She had famotidine and zofran available PRN, but nursing usually only gave the zofran, likely not recognizing the cause of the nausea was due to acid.  In discussion with Ms. Barkley's daughter she will start on lansoprazole daily to see if this helps with the nausea. Zofran will continue to be available PRN. Her daughter states her mother is overall eating well, although Ms. Barkley tells me she is not eating well. Her daughter, Raisa says this is normal for her mother to report this and is not sure why she tells people this.         Loose stools  Has had alternating constipation with loose stools during her stay here. I suspect this most recent bought of loose stools is due to taking Senna S and miralax from her previous bought of constipation. At this time will make all bowel medications: miralax, senna-s and imodium available PRN only. In discussion with her daughterRaisa she would prefer her mother to not take imodium now to see if we can get the stools to regulate better on their own.       Essential hypertension  On losartan, metoprolol. Bp controlled throughout her stay at [facility].      BP: 113-171/60-79 mmHg  P: 60-95 bpm    Coronary artery disease involving native coronary artery of native heart without angina pectoris  Stent placed  in 2005. Echo from 11/2017 with EF 40%. On statin and baby ASA.       Admission weight: 102 lbs  Current weight: 95.6 lbs    PAST MEDICAL HISTORY:  has a past medical history of CAD (coronary artery disease) (6/20/05); Cardiomyopathy (H); CHF NYHA class III, chronic, systolic (H) (1/25/2018); Coronary atherosclerosis (6/20/2005); Edema (1/26/2015); Generalized osteoarthrosis; Hypertension goal BP (blood pressure) < 140/90 (1/21/2015); Leg weakness, bilateral (2/13/2018); Mitral regurgitation; Mixed hyperlipidemia; Osteoporosis; Physical deconditioning (1/21/2015); and Total urinary incontinence (12/27/2017).    DISCHARGE MEDICATIONS:  Current Outpatient Prescriptions   Medication Sig Dispense Refill     acetaminophen (TYLENOL) 500 MG tablet Take 2 tablets (1,000 mg) by mouth every 8 hours       ASPIRIN 81 MG OR TABS 1 tab po QD (Once per day) 100 3     atorvastatin (LIPITOR) 40 MG tablet TAKE ONE TABLET BY MOUTH ONCE DAILY 90 tablet 2     diclofenac (VOLTAREN) 1 % GEL topical gel Place 4 g onto the skin 4 times daily 1 Tube 0     HYDROmorphone HCl (DILAUDID PO) Take 1 mg by mouth every 6 hours as needed for moderate to severe pain Max 2 tabs per day       hydrOXYzine (ATARAX) 10 MG tablet Take 1 tablet (10 mg) by mouth every 6 hours as needed for itching 30 tablet 0     LANSOPRAZOLE PO Take 15 mg by mouth every morning (before breakfast)       lidocaine (LIDODERM) 5 % Patch Place 1 patch onto the skin every 24 hours for 10 days 10 patch 0     loperamide (IMODIUM) 2 MG capsule Take 2 mg by mouth 4 times daily as needed for diarrhea       losartan (COZAAR) 50 MG tablet Take 1 tablet (50 mg) by mouth 2 times daily 180 tablet 3     MELATONIN PO Take 3 mg by mouth nightly as needed        metoprolol (LOPRESSOR) 50 MG tablet TAKE ONE TABLET BY MOUTH TWICE DAILY 180 tablet 3     Ondansetron (ZOFRAN ODT PO) Take 4 mg by mouth every 8 hours as needed for nausea       order for DME ANTOINE stockings- below knee. 1 Package 0      order for DME Light weight wheelchair Body mass index is 19.08 kg/(m^2). 1 Device 0     polyethylene glycol (MIRALAX/GLYCOLAX) Packet Take 17 g by mouth daily as needed for constipation 7 packet      senna-docusate (SENOKOT-S;PERICOLACE) 8.6-50 MG per tablet Take 1 tablet by mouth 2 times daily as needed          MEDICATION CHANGES/RATIONALE:   Miralax, SennaS and Imodium available PRN for constipation/ loose stools  Oxycodone was changed to Dilaudid to see if patient could achieve better pain control  Lansoprazole was started due to acid leading to nausea/ emesis.     Controlled medications sent with patient:   Script for dilaudid medication for 5 tabs and 0 refills given to patient at dischage to have them fill at their out patient pharmacy     ROS:    10 point ROS of systems including Constitutional, Eyes, Respiratory, Cardiovascular, Gastroenterology, Genitourinary, Integumentary, Muscularskeletal, Psychiatric were all negative except for pertinent positives noted in my HPI.    Physical Exam:   Vitals: /75  Pulse 95  Temp 98  F (36.7  C)  Resp 16  Ht 5' (1.524 m)  Wt 95 lb 9.6 oz (43.4 kg)  SpO2 97%  BMI 18.67 kg/m2  BMI= Body mass index is 18.67 kg/(m^2).  GENERAL APPEARANCE:  Alert, oriented x 3  EYES:  EOM, lids, pupils and irises normal, sclera clear and conjunctiva normal, no discharge or mattering on lids or lashes noted  ENT:  Mouth normal, moist mucous membranes, nose without drainage or crusting, external ears without lesions, hearing acuity : [place]  RESP:  respiratory effort and palpation of chest normal, no chest wall tenderness, no respiratory distress, Lung sounds clear, patient is on room air   CV:  Palpation and auscultation of heart done, rate and rhythm regular. + edema L, +1 left pedal pulse. Right leg with ANTOINE stocking in place.  ABDOMEN:  normal bowel sounds, soft, nontender.  M/S:   Gait and station abnormal: uses walker for ambulation. Able to plantar and dorsiflex bilateral  ankles. Arthritic changes to hands, feet  SKIN:  Inspection and palpation of skin and subcutaneous tissue: skin warm, dry without rashes, improving ecchymosis to LLE- calf and surrounding areas soft, but tender with palpation. Sensation intact  NEURO: cranial nerves 2-12 grossly intact, no facial asymmetry, no speech deficits and able to follow directions, moves all extremities symmetrically  PSYCH:  insight and judgement intact, memory intact, affect and mood normal       DISCHARGE PLAN:  Occupational Therapy and Physical Therapy, RN, TORIBIOA  Patient instructed to follow-up with:  PCP in 7 days      MTM referral needed and placed by this provider: No    Pending labs: None  SNF labs     CBC RESULTS:   Recent Labs   Lab Test 04/23/18 04/21/18 2020 04/11/18   0620   WBC   --   7.8   --   5.2   RBC   --   3.66*   --   3.18*   HGB  10.3*  10.8*   < >  9.2*   HCT   --   33.3*   --   29.2*   MCV   --   91   --   92   MCH   --   29.5   --   28.9   MCHC   --   32.4   --   31.5   RDW   --   14.5   --   14.4   PLT   --   258   --   211    < > = values in this interval not displayed.       Last Basic Metabolic Panel:  Recent Labs   Lab Test  04/21/18 2020 04/11/18   0620   NA  133  139   POTASSIUM  4.6  4.1   CHLORIDE  100  108   REILLY  8.7  8.2*   CO2  25  27   BUN  31*  18   CR  0.74  0.79   GLC  93  88       Liver Function Studies -   Recent Labs   Lab Test  04/10/18   1855  01/18/18   1135   PROTTOTAL  6.0*  6.8   ALBUMIN  2.7*  3.8   BILITOTAL  0.3  0.5   ALKPHOS  74  74   AST  44  24   ALT  39  19       Discharge Treatments:  F/u with PCP in 7 days  Home Care: physical therapy , occupational therapy, RN, TORIBIOA    TOTAL DISCHARGE TIME:   Greater than 30 minutes  Electronically signed by:  NICKOLAS Ronquillo CNP

## 2018-05-01 ENCOUNTER — TELEPHONE (OUTPATIENT)
Dept: FAMILY MEDICINE | Facility: CLINIC | Age: 83
End: 2018-05-01

## 2018-05-01 NOTE — TELEPHONE ENCOUNTER
Cindi calling from  Home Care PT for orders-    PT  2xwk/3wks  1xwk/1wk    Skilled Nurse- evaluate and treat    OT- evaluate and treat    HHA  2xwk/3wks    Verbal order given.  Will fax for MD signature.  Estelita Bojorquez RN

## 2018-05-03 ENCOUNTER — TELEPHONE (OUTPATIENT)
Dept: GERIATRICS | Facility: CLINIC | Age: 83
End: 2018-05-03

## 2018-05-03 NOTE — TELEPHONE ENCOUNTER
Prior Authorization Retail Medication Request    Medication/Dose: Diclofenac Sodium gel 1% Place 4 g onto the skin 4 times daily  ICD code (if different than what is on RX):    Previously Tried and Failed:    Rationale:      Insurance Name:  [employer]  Insurance ID:  [id number]      Pharmacy Information (if different than what is on RX)  Name:  [facility] Key: [id number]  Phone:

## 2018-05-03 NOTE — TELEPHONE ENCOUNTER
Central Prior Authorization Team   Phone: [phone]    PA Initiation    Medication: Diclofenac Sodium gel 1% Place 4 g onto the skin 4 times daily  Insurance Company: [employer]vette Elliott - Phone [phone] Fax [phone]  Pharmacy Filling the Rx:  NOT PROVIDED   Filling Pharmacy Phone:  UNKNOWN  Filling Pharmacy Fax:    Start Date: 5/3/2018    MANUALLY FAXED REQUEST TO INSURANCE

## 2018-05-08 NOTE — TELEPHONE ENCOUNTER
Central Prior Authorization Team   Phone: [phone]    Claim is currently under review.  Question set was faxed to clinic, and PA team did not received it.  New case has been opened up.  A determination should be reached within 72 hours.

## 2018-05-09 ENCOUNTER — TELEPHONE (OUTPATIENT)
Dept: FAMILY MEDICINE | Facility: CLINIC | Age: 83
End: 2018-05-09

## 2018-05-09 NOTE — TELEPHONE ENCOUNTER
Karen with [facility] home [facility] calls, verbal orders provided for OT 2 times a week x 1 week for bathroom safety and cognitive assessment, they will confirm PCP but thought pt was resuming care with SERGIO DRUMMOND MD, they will also remind pt/family that discharge instructions were to see pcp in 7 days, FYI to MD Brittny KEMP, RN, BSN  Message handled by Nurse Triage.

## 2018-05-11 ENCOUNTER — ASSISTED LIVING VISIT (OUTPATIENT)
Dept: GERIATRICS | Facility: CLINIC | Age: 83
End: 2018-05-11
Payer: COMMERCIAL

## 2018-05-11 VITALS
DIASTOLIC BLOOD PRESSURE: 83 MMHG | RESPIRATION RATE: 18 BRPM | OXYGEN SATURATION: 98 % | HEART RATE: 71 BPM | WEIGHT: 95 LBS | BODY MASS INDEX: 18.55 KG/M2 | SYSTOLIC BLOOD PRESSURE: 145 MMHG

## 2018-05-11 DIAGNOSIS — Z71.89 ADVANCED DIRECTIVES, COUNSELING/DISCUSSION: ICD-10-CM

## 2018-05-11 DIAGNOSIS — R19.7 DIARRHEA, UNSPECIFIED TYPE: ICD-10-CM

## 2018-05-11 DIAGNOSIS — R29.898 LEG WEAKNESS, BILATERAL: ICD-10-CM

## 2018-05-11 DIAGNOSIS — S80.12XS HEMATOMA OF LEFT LOWER EXTREMITY, SEQUELA: Primary | ICD-10-CM

## 2018-05-11 DIAGNOSIS — I10 HYPERTENSION GOAL BP (BLOOD PRESSURE) < 140/90: ICD-10-CM

## 2018-05-11 DIAGNOSIS — I50.22 CHRONIC SYSTOLIC CONGESTIVE HEART FAILURE (H): ICD-10-CM

## 2018-05-11 NOTE — TELEPHONE ENCOUNTER
Prior Authorization Approval    Authorization Effective Date: 2/9/2018  Authorization Expiration Date: 5/10/2019  Medication: Diclofenac Sodium gel 1% Approved  Approved Dose/Quantity: 100g  Reference #: [id number]/a   Insurance Company: [employer] - Phone [phone] Fax [phone]  Which Pharmacy is filling the prescription (Not needed for infusion/clinic administered):  Not Provided

## 2018-05-11 NOTE — PROGRESS NOTES
[facility]  PRIMARY CARE PROVIDER AND CLINIC:  Tacos Bhakta [address] / [place] [address]  Chief Complaint   Patient presents with     [facility] Medical Record Number:  [id number]    HPI:    Batsheva Barkley is a 84 year old  (11/6/1933),[facility] stay 4/5/18 through 4/30/18.  Admitted to this facility for  rehab, medical management and nursing care.  HPI information obtained from: facility chart records, facility staff, patient report, [facility] chart review and family/first contact dtr report.  Current issues are:      Hematoma of left lower extremity, sequela  4/21/18 ED visit [facility] s/p fall LLE hematoma. LLE US done at TCU, sent to Ed FOR EVAL.  Returned to TCU with pain meds. Has ace wrap to site. Pain gen. controlled    Hypertension goal BP (blood pressure) < 140/90  Currently stable. Cont. On cozaar    Chronic systolic congestive heart failure (H)  LE edema stable except for hematoma site. No recent resp. Distress. Cont. On lopressor    Leg weakness, bilateral  Some recent deconditioning , LE pain. Amb. With walker. Cont. With therapies    Diarrhea, unspecified type  Per dtr recent h/o diarrhea which has been stable. Reports onset of diarrhea again this am. No abd pain. Has prn imodium    Advanced directives, counseling/discussion  Full code      CODE STATUS/ADVANCE DIRECTIVES DISCUSSION:   Full Code  Patient's living condition: lives in an assisted living facility    ALLERGIES:Codeine and Lisinopril  PAST MEDICAL HISTORY:  has a past medical history of CAD (coronary artery disease) (6/20/05); Cardiomyopathy (H); CHF NYHA class III, chronic, systolic (H) (1/25/2018); Coronary atherosclerosis (6/20/2005); Edema (1/26/2015); Generalized osteoarthrosis; Hypertension goal BP (blood pressure) < 140/90 (1/21/2015); Leg weakness, bilateral (2/13/2018); Mitral regurgitation; Mixed  hyperlipidemia; Osteoporosis; Physical deconditioning (1/21/2015); and Total urinary incontinence (12/27/2017).  PAST SURGICAL HISTORY:  has a past surgical history that includes L bunion + hammer toes (1/04, 4/04); ovarian cyst surgery; Colonoscopy (3/05); L total knee replacement (10/2009 ); ENT surgery; Eye surgery; Heart Cath, Angioplasty (2005); and Esophagoscopy, gastroscopy, duodenoscopy (EGD), combined (N/A, 4/14/2016).  FAMILY HISTORY: family history includes Breast Cancer in her sister and sister; CEREBROVASCULAR DISEASE in her mother; HEART DISEASE in her sister; Respiratory in her father.  SOCIAL HISTORY:  reports that she has quit smoking. She has never used smokeless tobacco. She reports that she does not drink alcohol or use illicit drugs.    Post Discharge Medication Reconciliation Status: discharge medications reconciled and changed, per note/orders (see AVS).  Current Outpatient Prescriptions   Medication Sig Dispense Refill     acetaminophen (TYLENOL) 500 MG tablet Take 2 tablets (1,000 mg) by mouth every 8 hours       ASPIRIN 81 MG OR TABS 1 tab po QD (Once per day) 100 3     atorvastatin (LIPITOR) 40 MG tablet TAKE ONE TABLET BY MOUTH ONCE DAILY 90 tablet 2     diclofenac (VOLTAREN) 1 % GEL topical gel Place 4 g onto the skin 4 times daily 1 Tube 0     HYDROmorphone HCl (DILAUDID PO) Take 1 mg by mouth every 6 hours as needed for moderate to severe pain Max 2 tabs per day       loperamide (IMODIUM) 2 MG capsule Take 2 mg by mouth 4 times daily as needed for diarrhea       losartan (COZAAR) 50 MG tablet Take 1 tablet (50 mg) by mouth 2 times daily 180 tablet 3     metoprolol (LOPRESSOR) 50 MG tablet TAKE ONE TABLET BY MOUTH TWICE DAILY 180 tablet 3     order for DME ANTOINE stockings- below knee. 1 Package 0     order for DME Light weight wheelchair Body mass index is 19.08 kg/(m^2). 1 Device 0     polyethylene glycol (MIRALAX/GLYCOLAX) Packet Take 17 g by mouth daily as needed for constipation 7  packet      senna-docusate (SENOKOT-S;PERICOLACE) 8.6-50 MG per tablet Take 1 tablet by mouth 2 times daily as needed          ROS:  CONSTITUTIONAL:  weight loss, EYES:  neg, ENT:  neg, CV:  lower extremity edema, RESPIRATORY: neg, GI:  diarrhea, NEURO:  neg, PSYCH: neg, MUSCULOSKELETAL: neg and SKIN: LLE hematoma    Exam:  /83  Pulse 71  Resp 18  Wt 95 lb (43.1 kg)  SpO2 98%  BMI 18.55 kg/m2  GENERAL APPEARANCE:  Alert, in no distress, cooperative  ENT:  Mouth and posterior oropharynx normal, moist mucous membranes, [place], edentulous  EYES:  EOM, conjunctivae, lids, pupils and irises normal, PERRL  NECK:  No adenopathy,masses or thyromegaly, FROM  RESP:  respiratory effort and palpation of chest normal, lungs clear to auscultation , no respiratory distress  CV:  Palpation and auscultation of heart done , regular rate and rhythm, no murmur, rub, or gallop, no edema  ABDOMEN:  normal bowel sounds, soft, nontender, no hepatosplenomegaly or other masses, no guarding or rebound  LYMPHATICS:  No adenopathy in neck , No adenopathy in axillae  M/S:   Gait and station normal  muscle strength 5/5 all 4 ext., scoliosis  SKIN:  L calf hematoma, sl. red, no increased warmth  NEURO:   Cranial nerves 2-12 are normal tested and grossly at patient's baseline, alert, speech clear  PSYCH:  insight and judgement impaired, memory impaired , affect and mood normal    Lab/Diagnostic data:     CBC RESULTS:   Recent Labs   Lab Test 04/23/18 04/21/18 2020 04/11/18   0620   WBC   --   7.8   --   5.2   RBC   --   3.66*   --   3.18*   HGB  10.3*  10.8*   < >  9.2*   HCT   --   33.3*   --   29.2*   MCV   --   91   --   92   MCH   --   29.5   --   28.9   MCHC   --   32.4   --   31.5   RDW   --   14.5   --   14.4   PLT   --   258   --   211    < > = values in this interval not displayed.       Last Basic Metabolic Panel:  Recent Labs   Lab Test  04/21/18 2020 04/11/18   0620   NA  133  139   POTASSIUM  4.6  4.1   CHLORIDE  100   108   REILLY  8.7  8.2*   CO2  25  27   BUN  31*  18   CR  0.74  0.79   GLC  93  88       Liver Function Studies -   Recent Labs   Lab Test  04/10/18   1855  01/18/18   1135   PROTTOTAL  6.0*  6.8   ALBUMIN  2.7*  3.8   BILITOTAL  0.3  0.5   ALKPHOS  74  74   AST  44  24   ALT  39  19       TSH   Date Value Ref Range Status   10/06/2017 1.48 0.40 - 4.00 mU/L Final   01/09/2017 1.87 0.40 - 4.00 mU/L Final       ASSESSMENT/PLAN:  Hematoma of left lower extremity, sequela  Improved, pain gen. Controlled  1. Cont. Ace wraps  2. Cont. Tylenol, voltaren gel, prn dilaudid  3. Monitor for increased pain, s/s cellulitis    Hypertension goal BP (blood pressure) < 140/90  Currently controlled  1. Cont. Cozaar  2. Follow BPs, HRs  3. Monitor for reports of dizziness    Chronic systolic congestive heart failure (H)  Gen. Controlled  1. Cont. Lopressor  2. Monitor for increased edema  3. Monitor for resp.distres  4. Bmp in next 1-3 mos    Leg weakness, bilateral  improved  1. Cont. PT, OT  2. Walker at all times  3. Monitor for further falls    Diarrhea, unspecified type  Newer onset  1. Cont. Prn imodium  2. Reassess over next few days  3. For ongoing s/s, may sched. imodium    Advanced directives, counseling/discussion  Full code       Electronically signed by:  NICKOLAS Hallman CNP

## 2018-05-11 NOTE — LETTER
5/11/2018        RE: Batsheva Barkley  [facility] [address]  [address]  [facility] [address]        [place] [facility]  PRIMARY CARE PROVIDER AND CLINIC:  Tacos Bhakta [address] / [place] [address]  Chief Complaint   Patient presents with     [facility]     [facility] Medical Record Number:  [id number]    HPI:    Batsheva Barkley is a 84 year old  (11/6/1933),ad[facility] to the [facility] TCU stay 4/5/18 through 4/30/18.  Admitted to this facility for  rehab, medical management and nursing care.  HPI information obtained from: facility chart records, facility staff, patient report, [facility] chart review and family/first contact dtr report.  Current issues are:      Hematoma of left lower extremity, sequela  4/21/18 ED visit [facility] s/p fall LLE hematoma. LLE US done at TCU, sent to Ed FOR EVAL.  Returned to TCU with pain meds. Has ace wrap to site. Pain gen. controlled    Hypertension goal BP (blood pressure) < 140/90  Currently stable. Cont. On cozaar    Chronic systolic congestive heart failure (H)  LE edema stable except for hematoma site. No recent resp. Distress. Cont. On lopressor    Leg weakness, bilateral  Some recent deconditioning , LE pain. Amb. With walker. Cont. With therapies    Diarrhea, unspecified type  Per dtr recent h/o diarrhea which has been stable. Reports onset of diarrhea again this am. No abd pain. Has prn imodium    Advanced directives, counseling/discussion  Full code      CODE STATUS/ADVANCE DIRECTIVES DISCUSSION:   Full Code  Patient's living condition: lives in an assisted living facility    ALLERGIES:Codeine and Lisinopril  PAST MEDICAL HISTORY:  has a past medical history of CAD (coronary artery disease) (6/20/05); Cardiomyopathy (H); CHF NYHA class III, chronic, systolic (H) (1/25/2018); Coronary atherosclerosis (6/20/2005); Edema (1/26/2015); Generalized osteoarthrosis; Hypertension  goal BP (blood pressure) < 140/90 (1/21/2015); Leg weakness, bilateral (2/13/2018); Mitral regurgitation; Mixed hyperlipidemia; Osteoporosis; Physical deconditioning (1/21/2015); and Total urinary incontinence (12/27/2017).  PAST SURGICAL HISTORY:  has a past surgical history that includes L bunion + hammer toes (1/04, 4/04); ovarian cyst surgery; Colonoscopy (3/05); L total knee replacement (10/2009 ); ENT surgery; Eye surgery; Heart Cath, Angioplasty (2005); and Esophagoscopy, gastroscopy, duodenoscopy (EGD), combined (N/A, 4/14/2016).  FAMILY HISTORY: family history includes Breast Cancer in her sister and sister; CEREBROVASCULAR DISEASE in her mother; HEART DISEASE in her sister; Respiratory in her father.  SOCIAL HISTORY:  reports that she has quit smoking. She has never used smokeless tobacco. She reports that she does not drink alcohol or use illicit drugs.    Post Discharge Medication Reconciliation Status: discharge medications reconciled and changed, per note/orders (see AVS).  Current Outpatient Prescriptions   Medication Sig Dispense Refill     acetaminophen (TYLENOL) 500 MG tablet Take 2 tablets (1,000 mg) by mouth every 8 hours       ASPIRIN 81 MG OR TABS 1 tab po QD (Once per day) 100 3     atorvastatin (LIPITOR) 40 MG tablet TAKE ONE TABLET BY MOUTH ONCE DAILY 90 tablet 2     diclofenac (VOLTAREN) 1 % GEL topical gel Place 4 g onto the skin 4 times daily 1 Tube 0     HYDROmorphone HCl (DILAUDID PO) Take 1 mg by mouth every 6 hours as needed for moderate to severe pain Max 2 tabs per day       loperamide (IMODIUM) 2 MG capsule Take 2 mg by mouth 4 times daily as needed for diarrhea       losartan (COZAAR) 50 MG tablet Take 1 tablet (50 mg) by mouth 2 times daily 180 tablet 3     metoprolol (LOPRESSOR) 50 MG tablet TAKE ONE TABLET BY MOUTH TWICE DAILY 180 tablet 3     order for DME ANTOINE stockings- below knee. 1 Package 0     order for DME Light weight wheelchair Body mass index is 19.08 kg/(m^2). 1  Device 0     polyethylene glycol (MIRALAX/GLYCOLAX) Packet Take 17 g by mouth daily as needed for constipation 7 packet      senna-docusate (SENOKOT-S;PERICOLACE) 8.6-50 MG per tablet Take 1 tablet by mouth 2 times daily as needed          ROS:  CONSTITUTIONAL:  weight loss, EYES:  neg, ENT:  neg, CV:  lower extremity edema, RESPIRATORY: neg, GI:  diarrhea, NEURO:  neg, PSYCH: neg, MUSCULOSKELETAL: neg and SKIN: LLE hematoma    Exam:  /83  Pulse 71  Resp 18  Wt 95 lb (43.1 kg)  SpO2 98%  BMI 18.55 kg/m2  GENERAL APPEARANCE:  Alert, in no distress, cooperative  ENT:  Mouth and posterior oropharynx normal, moist mucous membranes, [place], edentulous  EYES:  EOM, conjunctivae, lids, pupils and irises normal, PERRL  NECK:  No adenopathy,masses or thyromegaly, FROM  RESP:  respiratory effort and palpation of chest normal, lungs clear to auscultation , no respiratory distress  CV:  Palpation and auscultation of heart done , regular rate and rhythm, no murmur, rub, or gallop, no edema  ABDOMEN:  normal bowel sounds, soft, nontender, no hepatosplenomegaly or other masses, no guarding or rebound  LYMPHATICS:  No adenopathy in neck , No adenopathy in axillae  M/S:   Gait and station normal  muscle strength 5/5 all 4 ext., scoliosis  SKIN:  L calf hematoma, sl. red, no increased warmth  NEURO:   Cranial nerves 2-12 are normal tested and grossly at patient's baseline, alert, speech clear  PSYCH:  insight and judgement impaired, memory impaired , affect and mood normal    Lab/Diagnostic data:     CBC RESULTS:   Recent Labs   Lab Test 04/23/18 04/21/18   2020   04/11/18   0620   WBC   --   7.8   --   5.2   RBC   --   3.66*   --   3.18*   HGB  10.3*  10.8*   < >  9.2*   HCT   --   33.3*   --   29.2*   MCV   --   91   --   92   MCH   --   29.5   --   28.9   MCHC   --   32.4   --   31.5   RDW   --   14.5   --   14.4   PLT   --   258   --   211    < > = values in this interval not displayed.       Last Basic Metabolic  Panel:  Recent Labs   Lab Test  04/21/18 2020 04/11/18   0620   NA  133  139   POTASSIUM  4.6  4.1   CHLORIDE  100  108   REILLY  8.7  8.2*   CO2  25  27   BUN  31*  18   CR  0.74  0.79   GLC  93  88       Liver Function Studies -   Recent Labs   Lab Test  04/10/18   1855  01/18/18   1135   PROTTOTAL  6.0*  6.8   ALBUMIN  2.7*  3.8   BILITOTAL  0.3  0.5   ALKPHOS  74  74   AST  44  24   ALT  39  19       TSH   Date Value Ref Range Status   10/06/2017 1.48 0.40 - 4.00 mU/L Final   01/09/2017 1.87 0.40 - 4.00 mU/L Final       ASSESSMENT/PLAN:  Hematoma of left lower extremity, sequela  Improved, pain gen. Controlled  1. Cont. Ace wraps  2. Cont. Tylenol, voltaren gel, prn dilaudid  3. Monitor for increased pain, s/s cellulitis    Hypertension goal BP (blood pressure) < 140/90  Currently controlled  1. Cont. Cozaar  2. Follow BPs, HRs  3. Monitor for reports of dizziness    Chronic systolic congestive heart failure (H)  Gen. Controlled  1. Cont. Lopressor  2. Monitor for increased edema  3. Monitor for resp.distres  4. Bmp in next 1-3 mos    Leg weakness, bilateral  improved  1. Cont. PT, OT  2. Walker at all times  3. Monitor for further falls    Diarrhea, unspecified type  Newer onset  1. Cont. Prn imodium  2. Reassess over next few days  3. For ongoing s/s, may sched. imodium    Advanced directives, counseling/discussion  Full code       Electronically signed by:  NICKOLAS Hallman CNP                    Sincerely,        NICKOLAS Hallman CNP

## 2018-05-15 ENCOUNTER — ASSISTED LIVING VISIT (OUTPATIENT)
Dept: GERIATRICS | Facility: CLINIC | Age: 83
End: 2018-05-15
Payer: COMMERCIAL

## 2018-05-15 VITALS
RESPIRATION RATE: 18 BRPM | SYSTOLIC BLOOD PRESSURE: 158 MMHG | DIASTOLIC BLOOD PRESSURE: 88 MMHG | OXYGEN SATURATION: 92 % | HEART RATE: 73 BPM

## 2018-05-15 DIAGNOSIS — S80.12XD HEMATOMA OF LEFT LOWER EXTREMITY, SUBSEQUENT ENCOUNTER: Primary | ICD-10-CM

## 2018-05-15 DIAGNOSIS — R11.2 NON-INTRACTABLE VOMITING WITH NAUSEA, UNSPECIFIED VOMITING TYPE: ICD-10-CM

## 2018-05-15 DIAGNOSIS — R19.7 DIARRHEA, UNSPECIFIED TYPE: ICD-10-CM

## 2018-05-15 RX ORDER — MAGNESIUM HYDROXIDE/ALUMINUM HYDROXICE/SIMETHICONE 120; 1200; 1200 MG/30ML; MG/30ML; MG/30ML
10 SUSPENSION ORAL 2 TIMES DAILY PRN
COMMUNITY
End: 2020-08-07

## 2018-05-15 NOTE — LETTER
5/15/2018        RE: Batsheva Bakrley  [facility] [address]  [address]  [facility] [address]        [place] [facility]    Chief Complaint   Patient presents with     Pain     Nausea       [facility] Medical Record Number:  [id number]    HPI:    Batsheva Barkley is a 84 year old  (11/6/1933), who is being seen today for an episodic care visit at [facility].  HPI information obtained from: facility chart records, facility staff, patient report and [facility] chart review.Today's concern is: pain, N/V, diarrhea. S/p hematoma L LE. Reports pain sig. Improved asking to dc dilaudid which is prn. Reports tylenol effective. Last week had episode of diarrhea.  Has recent h/o of longer standing diarrhea with stool incont. At times.  Instructed to use prn imodium.  No further diarrhea per resident.  Does reports nausea past few days, emesis last pm. Prn zofran ineffective. Reports worse at hs when laying down.  Has h/o GERD. Not currently on PPI-reports taking one in past.       ALLERGIES: Codeine and Lisinopril  Past Medical, Surgical, Family and Social History reviewed and updated in [facility].    Current Outpatient Prescriptions   Medication Sig Dispense Refill     acetaminophen (TYLENOL) 500 MG tablet Take 2 tablets (1,000 mg) by mouth every 8 hours       alum & mag hydroxide-simethicone (MYLANTA/MAALOX) 200-200-20 MG/5ML SUSP suspension Take 10 mLs by mouth 2 times daily       ASPIRIN 81 MG OR TABS 1 tab po QD (Once per day) 100 3     atorvastatin (LIPITOR) 40 MG tablet TAKE ONE TABLET BY MOUTH ONCE DAILY 90 tablet 2     diclofenac (VOLTAREN) 1 % GEL topical gel Place 4 g onto the skin 4 times daily 1 Tube 0     loperamide (IMODIUM) 2 MG capsule Take 2 mg by mouth 4 times daily as needed for diarrhea       losartan (COZAAR) 50 MG tablet Take 1 tablet (50 mg) by mouth 2 times daily 180 tablet 3     metoprolol (LOPRESSOR) 50 MG tablet TAKE ONE TABLET BY MOUTH TWICE DAILY 180 tablet 3     Omeprazole  (PRILOSEC PO) Take 20 mg by mouth every morning       ONDANSETRON PO Take 4 mg by mouth every 8 hours as needed for nausea       order for DME ANTOINE stockings- below knee. 1 Package 0     order for DME Light weight wheelchair Body mass index is 19.08 kg/(m^2). 1 Device 0     polyethylene glycol (MIRALAX/GLYCOLAX) Packet Take 17 g by mouth daily as needed for constipation 7 packet      senna-docusate (SENOKOT-S;PERICOLACE) 8.6-50 MG per tablet Take 1 tablet by mouth 2 times daily as needed        Medications reviewed:  Medications reconciled to facility chart and changes were made to reflect current medications as identified as above med list. Below are the changes that were made:   Medications stopped since last EPIC medication reconciliation:   There are no discontinued medications.    Medications started since last [facility] medication reconciliation:  Orders Placed This Encounter   Medications     ONDANSETRON PO     Sig: Take 4 mg by mouth every 8 hours as needed for nausea         REVIEW OF SYSTEMS:  CONSTITUTIONAL:  forgetfulness, EYES:  glasses or contacts, ENT:  [place], CV:  neg, RESPIRATORY: neg, GI:  heartburn or reflux and nausea, NEURO:  neg, PSYCH: neg, MUSCULOSKELETAL: occ. mild pain at LLE hematoma site and SKIN: bruising LLE-hematoma. ROS limited due to STML    Physical Exam:  /88  Pulse 73  Resp 18  SpO2 92%  GENERAL APPEARANCE:  Alert, in no distress, thin, cooperative  ENT:  Mouth and posterior oropharynx normal, moist mucous membranes, [place], edentulous  EYES:  EOM, conjunctivae, lids, pupils and irises normal, PERRL  NECK:  No adenopathy,masses or thyromegaly  RESP:  respiratory effort and palpation of chest normal, lungs clear to auscultation , no respiratory distress  CV:  Palpation and auscultation of heart done , regular rate and rhythm, no murmur, rub, or gallop, peripheral edema LLE, hematoma site, decreased from last week+ in LLE  ABDOMEN:  normal bowel sounds, soft, nontender, no  hepatosplenomegaly or other masses, no guarding or rebound  M/S:   Gait and station normal  muscle strength 5/5 all 4 ext., normal tone  SKIN:  brusing to LLE, central scab at hematoma site  NEURO:   Cranial nerves 2-12 are normal tested and grossly at patient's baseline, alert, speech clear  PSYCH:  insight and judgement impaired, memory impaired , affect and mood normal    Recent Labs:     CBC RESULTS:   Recent Labs   Lab Test 04/23/18 04/21/18 2020 04/11/18   0620   WBC   --   7.8   --   5.2   RBC   --   3.66*   --   3.18*   HGB  10.3*  10.8*   < >  9.2*   HCT   --   33.3*   --   29.2*   MCV   --   91   --   92   MCH   --   29.5   --   28.9   MCHC   --   32.4   --   31.5   RDW   --   14.5   --   14.4   PLT   --   258   --   211    < > = values in this interval not displayed.       Last Basic Metabolic Panel:  Recent Labs   Lab Test  04/21/18 2020 04/11/18   0620   NA  133  139   POTASSIUM  4.6  4.1   CHLORIDE  100  108   REILLY  8.7  8.2*   CO2  25  27   BUN  31*  18   CR  0.74  0.79   GLC  93  88       Liver Function Studies -   Recent Labs   Lab Test  04/10/18   1855  01/18/18   1135   PROTTOTAL  6.0*  6.8   ALBUMIN  2.7*  3.8   BILITOTAL  0.3  0.5   ALKPHOS  74  74   AST  44  24   ALT  39  19       Assessment/Plan:  Hematoma of left lower extremity, subsequent encounter  Smaller, pain improved  1. Cont. Ace wrap to area  2. Dc dilaudid  3. Cont. Tylenol, voltaren gel  4. Monitor for reports of increased pain    Non-intractable vomiting with nausea, unspecified vomiting type  Ongoing, emesis last pm, likely r/t GERD  1. Cont. Prn zofran for now  2. Start prilosec  3. Start prn maalox  4. Monitor for ongoing s/s    Diarrhea, unspecified type  Currently resolved per resident  1. Cont. Prn imodium  2. Monitor for ongoing s/s  3. For more freq. Diarrhea, may sched. Imodium  4. Monitor for episodes of stool incont.  5. Left vm for dtr with update, also to clarify POLST      Electronically signed by  Tacos  NICKOLAS Betts CNP                      Sincerely,        NICKOLAS Hallman CNP

## 2018-05-15 NOTE — PROGRESS NOTES
[place] [facility]    Chief Complaint   Patient presents with     Pain     Nausea       [facility] Medical Record Number:  [id number]    HPI:    Batsheva Barkley is a 84 year old  (11/6/1933), who is being seen today for an episodic care visit at [facility]  HPI information obtained from: facility chart records, facility staff, patient report and [facility] chart review.Today's concern is: pain, N/V, diarrhea. S/p hematoma L LE. Reports pain sig. Improved asking to dc dilaudid which is prn. Reports tylenol effective. Last week had episode of diarrhea.  Has recent h/o of longer standing diarrhea with stool incont. At times.  Instructed to use prn imodium.  No further diarrhea per resident.  Does reports nausea past few days, emesis last pm. Prn zofran ineffective. Reports worse at hs when laying down.  Has h/o GERD. Not currently on PPI-reports taking one in past.       ALLERGIES: Codeine and Lisinopril  Past Medical, Surgical, Family and Social History reviewed and [facility] in [facility].    Current Outpatient Prescriptions   Medication Sig Dispense Refill     acetaminophen (TYLENOL) 500 MG tablet Take 2 tablets (1,000 mg) by mouth every 8 hours       alum & mag hydroxide-simethicone (MYLANTA/MAALOX) 200-200-20 MG/5ML SUSP suspension Take 10 mLs by mouth 2 times daily       ASPIRIN 81 MG OR TABS 1 tab po QD (Once per day) 100 3     atorvastatin (LIPITOR) 40 MG tablet TAKE ONE TABLET BY MOUTH ONCE DAILY 90 tablet 2     diclofenac (VOLTAREN) 1 % GEL topical gel Place 4 g onto the skin 4 times daily 1 Tube 0     loperamide (IMODIUM) 2 MG capsule Take 2 mg by mouth 4 times daily as needed for diarrhea       losartan (COZAAR) 50 MG tablet Take 1 tablet (50 mg) by mouth 2 times daily 180 tablet 3     metoprolol (LOPRESSOR) 50 MG tablet TAKE ONE TABLET BY MOUTH TWICE DAILY 180 tablet 3     Omeprazole (PRILOSEC PO) Take 20 mg by mouth every morning       ONDANSETRON PO Take 4 mg by mouth every 8 hours as needed  for nausea       order for DME ANTOINE stockings- below knee. 1 Package 0     order for DME Light weight wheelchair Body mass index is 19.08 kg/(m^2). 1 Device 0     polyethylene glycol (MIRALAX/GLYCOLAX) Packet Take 17 g by mouth daily as needed for constipation 7 packet      senna-docusate (SENOKOT-S;PERICOLACE) 8.6-50 MG per tablet Take 1 tablet by mouth 2 times daily as needed        Medications reviewed:  Medications reconciled to facility chart and changes were made to reflect current medications as identified as above med list. Below are the changes that were made:   Medications stopped since last EPIC medication reconciliation:   There are no discontinued medications.    Medications started since last [facility] medication reconciliation:  Orders Placed This Encounter   Medications     ONDANSETRON PO     Sig: Take 4 mg by mouth every 8 hours as needed for nausea         REVIEW OF SYSTEMS:  CONSTITUTIONAL:  forgetfulness, EYES:  glasses or contacts, ENT:  [place], CV:  neg, RESPIRATORY: neg, GI:  heartburn or reflux and nausea, NEURO:  neg, PSYCH: neg, MUSCULOSKELETAL: occ. mild pain at LLE hematoma site and SKIN: bruising LLE-hematoma. ROS limited due to STML    Physical Exam:  /88  Pulse 73  Resp 18  SpO2 92%  GENERAL APPEARANCE:  Alert, in no distress, thin, cooperative  ENT:  Mouth and posterior oropharynx normal, moist mucous membranes, [place], edentulous  EYES:  EOM, conjunctivae, lids, pupils and irises normal, PERRL  NECK:  No adenopathy,masses or thyromegaly  RESP:  respiratory effort and palpation of chest normal, lungs clear to auscultation , no respiratory distress  CV:  Palpation and auscultation of heart done , regular rate and rhythm, no murmur, rub, or gallop, peripheral edema LLE, hematoma site, decreased from last week+ in LLE  ABDOMEN:  normal bowel sounds, soft, nontender, no hepatosplenomegaly or other masses, no guarding or rebound  M/S:   Gait and station normal  muscle strength 5/5 all 4 ext.,  normal tone  SKIN:  brusing to LLE, central scab at hematoma site  NEURO:   Cranial nerves 2-12 are normal tested and grossly at patient's baseline, alert, speech clear  PSYCH:  insight and judgement impaired, memory impaired , affect and mood normal    Recent Labs:     CBC RESULTS:   Recent Labs   Lab Test 04/23/18 04/21/18 2020 04/11/18   0620   WBC   --   7.8   --   5.2   RBC   --   3.66*   --   3.18*   HGB  10.3*  10.8*   < >  9.2*   HCT   --   33.3*   --   29.2*   MCV   --   91   --   92   MCH   --   29.5   --   28.9   MCHC   --   32.4   --   31.5   RDW   --   14.5   --   14.4   PLT   --   258   --   211    < > = values in this interval not displayed.       Last Basic Metabolic Panel:  Recent Labs   Lab Test  04/21/18 2020 04/11/18   0620   NA  133  139   POTASSIUM  4.6  4.1   CHLORIDE  100  108   REILLY  8.7  8.2*   CO2  25  27   BUN  31*  18   CR  0.74  0.79   GLC  93  88       Liver Function Studies -   Recent Labs   Lab Test  04/10/18   1855  01/18/18   1135   PROTTOTAL  6.0*  6.8   ALBUMIN  2.7*  3.8   BILITOTAL  0.3  0.5   ALKPHOS  74  74   AST  44  24   ALT  39  19       Assessment/Plan:  Hematoma of left lower extremity, subsequent encounter  Smaller, pain improved  1. Cont. Ace wrap to area  2. Dc dilaudid  3. Cont. Tylenol, voltaren gel  4. Monitor for reports of increased pain    Non-intractable vomiting with nausea, unspecified vomiting type  Ongoing, emesis last pm, likely r/t GERD  1. Cont. Prn zofran for now  2. Start prilosec  3. Start prn maalox  4. Monitor for ongoing s/s    Diarrhea, unspecified type  Currently resolved per resident  1. Cont. Prn imodium  2. Monitor for ongoing s/s  3. For more freq. Diarrhea, may sched. Imodium  4. Monitor for episodes of stool incont.  5. Left  for dtr with update, also to clarify POLST      Electronically signed by  NICKOLAS Hallman CNP

## 2018-05-23 ENCOUNTER — ASSISTED LIVING VISIT (OUTPATIENT)
Dept: GERIATRICS | Facility: CLINIC | Age: 83
End: 2018-05-23
Payer: COMMERCIAL

## 2018-05-23 VITALS
SYSTOLIC BLOOD PRESSURE: 158 MMHG | DIASTOLIC BLOOD PRESSURE: 88 MMHG | OXYGEN SATURATION: 92 % | BODY MASS INDEX: 18.55 KG/M2 | HEART RATE: 73 BPM | RESPIRATION RATE: 18 BRPM | WEIGHT: 95 LBS

## 2018-05-23 DIAGNOSIS — M81.0 AGE-RELATED OSTEOPOROSIS WITHOUT CURRENT PATHOLOGICAL FRACTURE: ICD-10-CM

## 2018-05-23 DIAGNOSIS — I25.10 CORONARY ARTERY DISEASE INVOLVING NATIVE CORONARY ARTERY OF NATIVE HEART WITHOUT ANGINA PECTORIS: ICD-10-CM

## 2018-05-23 DIAGNOSIS — S80.12XD HEMATOMA OF LEFT LOWER EXTREMITY, SUBSEQUENT ENCOUNTER: Primary | ICD-10-CM

## 2018-05-23 DIAGNOSIS — K52.9 CHRONIC DIARRHEA: ICD-10-CM

## 2018-05-23 DIAGNOSIS — K21.9 GASTROESOPHAGEAL REFLUX DISEASE, ESOPHAGITIS PRESENCE NOT SPECIFIED: ICD-10-CM

## 2018-05-23 DIAGNOSIS — M15.0 PRIMARY OSTEOARTHRITIS INVOLVING MULTIPLE JOINTS: ICD-10-CM

## 2018-05-23 DIAGNOSIS — I10 ESSENTIAL HYPERTENSION: ICD-10-CM

## 2018-06-02 NOTE — PROGRESS NOTES
Batsheva Barkley is a 84 year old female seen May 23, 2018 at [facility] where she has resided for one month (admit 4/2018) seen for initial visit.     Patient is seen on the unit, ambulating with her 4WW and getting ready to go out with a friend. States she is feeling well.   Patient was hospitalized 4/4/18 after a fall in which she suffered a LLE hematoma.  There was some concern for compartment syndrome, but not felt to be present.  Doppler U/S and x-rays negative.    She went to TCU, and while there her family moved her and her  to this [place].   Pain and swelling have gradually improved.      Patient has had recent GI symptoms that include reflux, nausea, and diarrhea with urgency and incontinence      She had a GI consult in March, declined colonoscopy for definitive dx.    Currently on omeprazole and prn Imodium which she notes works well when she has diarrhea.     Patient has longstanding cardiac history, sees Dr Tang routinely.    She had an acute IMI in 2005, s/p stenting.   ECHO has shown EF 45-50%, moderate MR and WMAs   Nuclear stress test in 2017 showed inferolateral hypokinesis without reversible ischemia.   She has mild peripheral edema, on furosemide only intermittently.      Past Medical History:   Diagnosis Date     CAD (coronary artery disease) 6/20/05    [place], transient CHF     Cardiomyopathy (H)      CHF NYHA class III, chronic, systolic (H) 1/25/2018     Coronary atherosclerosis 6/20/2005    circ vessel was stented;  had a 50% LAD lesion which was not treated Problem list name updated by automated process. Provider to review     Edema 1/26/2015     Generalized osteoarthrosis     knees ;; L total kne 10/09     Hypertension goal BP (blood pressure) < 140/90 1/21/2015     Leg weakness, bilateral 2/13/2018     Mitral regurgitation     mod     Mixed hyperlipidemia      Osteoporosis      Physical deconditioning 1/21/2015     Total urinary  incontinence 12/27/2017       Past Surgical History:   Procedure Laterality Date     COLONOSCOPY  3/05     ENT SURGERY       ESOPHAGOSCOPY, GASTROSCOPY, DUODENOSCOPY (EGD), COMBINED N/A 4/14/2016    Procedure: COMBINED ESOPHAGOSCOPY, GASTROSCOPY, DUODENOSCOPY (EGD), BIOPSY SINGLE OR MULTIPLE;  Surgeon: Jonathan Gramajo MD;  Location:  GI     EYE SURGERY       HEART CATH, ANGIOPLASTY  2005    RONI to left circ     L bunion + hammer toes  1/04, 4/04     L total knee replacement  10/2009      ovarian cyst surgery         Family History   Problem Relation Age of Onset     CEREBROVASCULAR DISEASE Mother      Respiratory Father      heart     Breast Cancer Sister      HEART DISEASE Sister      Breast Cancer Sister        Social History   Substance Use Topics     Smoking status: Former Smoker     Smokeless tobacco: Never Used      Comment: only smoked for 3 years.     Alcohol use No      SH:  Lives with her  [place] apartment   [place] apartment in [place].   They have 2 daughters.       ROS:   Ambulatory with 4WW  Wt Readings from Last 5 Encounters:   05/23/18 95 lb (43.1 kg)   05/11/18 95 lb (43.1 kg)   04/27/18 95 lb 9.6 oz (43.4 kg)   04/23/18 94 lb 9.6 oz (42.9 kg)   04/17/18 102 lb (46.3 kg)        EXAM:  Pleasant, NAD  /88  Pulse 73  Resp 18  Wt 95 lb (43.1 kg)  SpO2 92%  BMI 18.55 kg/m2   Neck supple without adenopathy  Lungs clear bilaterally with fair air movement  Heart RRR s1s2   Abd soft, NT, no distention, +BS  Ext trace LE edema.   +deforming changes of OA in hands and feet.   Trigger fingers left hand.    Flexion contractures at both knees  Neuro: STML, left eye droop  Psych: affect okay.     Last Basic Metabolic Panel:  Lab Results   Component Value Date     04/21/2018      Lab Results   Component Value Date    POTASSIUM 4.6 04/21/2018     Lab Results   Component Value Date    CHLORIDE 100 04/21/2018     Lab Results   Component Value Date    REILLY 8.7  04/21/2018     Lab Results   Component Value Date    CO2 25 04/21/2018     Lab Results   Component Value Date    BUN 31 04/21/2018     Lab Results   Component Value Date    CR 0.74 04/21/2018     Lab Results   Component Value Date    GLC 93 04/21/2018     Lab Results   Component Value Date    WBC 7.8 04/21/2018      HGB 10.3 04/23/2018      MCV 91 04/21/2018       04/21/2018     TSH   Date Value Ref Range Status   10/06/2017 1.48 0.40 - 4.00 mU/L Final       IMP/PLAN:   (S80.12XD) Hematoma of left lower extremity, subsequent encounter   Comment: much less pain, decreased swelling     Plan: continue wraps, scheduled acetaminophen    (M15.0) Primary osteoarthritis involving multiple joints  Comment: left wrist pain, trigger fingers    Using diclofenac gel and acetaminophen as above.   Plan: outpatient OCCUPATIONAL THERAPY consult to eval and tx.       (I25.10) Coronary artery disease involving native coronary artery of native heart without angina pectoris  Comment: h/o stent after IMI     Plan: daily ASA, statin and beta blocker for secondary prevention.   Follow up with Dr Tang as scheduled.       (M81.0) Age-related osteoporosis without current pathological fracture  Comment: with h/o falls   Plan: consider addition of vit D.       (K52.9) Chronic diarrhea  Comment: as above    Plan: scheduled Imodium daily, and continue prn doses.        (K21.9) Gastroesophageal reflux disease, esophagitis presence not specified  Comment: recently started on omeprazole, which is helpful   Plan: continue same       (I10) Essential hypertension  Comment:   BP Readings from Last 3 Encounters:   05/23/18 158/88   05/15/18 158/88   05/11/18 145/83      Plan: continue losartan, metoprolol     AD: DNR/DNI    Nivia Lowery MD

## 2018-06-19 ENCOUNTER — PATIENT OUTREACH (OUTPATIENT)
Dept: GERIATRIC MEDICINE | Facility: CLINIC | Age: 83
End: 2018-06-19

## 2018-06-19 NOTE — PROGRESS NOTES
[facility] Care Coordination Contact  Client is new enrollee to [facility] effective 6/1/2018 with [facility] health plan. Client transferred from [facility] system.    Welcome letter mailed to client on 6/6/2018  6/15/18 Rec'd vm from [facility] requesting a HHA auth for 5/4/2018  [phone].  6/18/2018 Rec'd vm from client's spouse, Bin stating that they have rec'd this CM letter. [facility] requests that CM contact Batsheva at [phone] or her cell [phone]  6/19/18 Call placed to [facility] to report that client was not assigned to [facility] 5/4/2018, provided contact number for previous [facility] CM.     CM has received all required documentation from the previous CM: UTF, OBRA, Care Plan, Signed signature Page, LTCC, RS tool.    Secure e-mail sent to Jeri at [facility] to request RS tool to be resent in Excel vs PDF.  EPIC notes reviewed.  Call placed to client, left vm introducing myself and requesting a return call.  Call placed to [facility] to introduce myself, per [facility] no changes.   CM to f/u with client to complete [facility] HRA  CPS updated.    Rec'd tele call from client's spouse, CM spoke with client to introduce myself.  Scheduled home visit to complete face to face [employer] [facility] HRA, 6/21/18 @ 4 PM  Rosibel Rhodes RN, BC  Supervisor [facility]   [phone] (Fax)

## 2018-06-21 ENCOUNTER — PATIENT OUTREACH (OUTPATIENT)
Dept: GERIATRIC MEDICINE | Facility: CLINIC | Age: 83
End: 2018-06-21

## 2018-06-22 NOTE — PROGRESS NOTES
"[facility] Care Coordination Contact    [facility] Care System Change (Transfer)    Completed face to face home visit 6/21/18 to review [facility]ujano HRA.  Client's spouse Bin present. Both Gene and client recently moved to the [facility], client is very happy, \"I love it here.\"   Reviewed LTCC/Health Risk Assessment, RS tool and POC with member. No changes noted.  Transitional HRA completed. Care Plan Summary updated and reflects current services.  Required referral authorization information communicated to CMS: Yes (RS Tool, [employer] card and incont supplies)  MMIS entry completed by: Care Coordinator  Writer reviewed the following with member:  ER visits: Yes -  [facility]  Hospitalizations: No  TCU stays: Yes -  [facility]  Significant health status changes: none reported  Falls/Injuries: Yes: ED visit LLE hematoma   ADL/IADL changes: No  Changes in services: No    Client's spouse requested a copy of the RS tool be mailed.  Client inquired on podiatrist, stating that due to her arthritic hands she is not able to cut her toenails. Explained that CM will f/u with AL to inquire on site provider, if not CM will assist with scheduling an appt   Client inquired on cost of meals, explained that CM will f/u with facility. Client is paying 30% of her income for rental costs.   CM to f/u with client's dtr Huyen Murcia.     Follow-Up Plan: Member informed of future contact, plan to f/u with member with at next regularly scheduled contact.  Contact information shared with member and family, encouraged member to call with any questions or concerns.  Rosibel Rhodes RN, BC  Supervisor [facility]   [phone] (Fax)      "

## 2018-06-25 ENCOUNTER — PATIENT OUTREACH (OUTPATIENT)
Dept: GERIATRIC MEDICINE | Facility: CLINIC | Age: 83
End: 2018-06-25

## 2018-06-26 ENCOUNTER — ASSISTED LIVING VISIT (OUTPATIENT)
Dept: GERIATRICS | Facility: CLINIC | Age: 83
End: 2018-06-26
Payer: COMMERCIAL

## 2018-06-26 ENCOUNTER — PATIENT OUTREACH (OUTPATIENT)
Dept: GERIATRIC MEDICINE | Facility: CLINIC | Age: 83
End: 2018-06-26

## 2018-06-26 VITALS — RESPIRATION RATE: 16 BRPM | HEART RATE: 65 BPM | SYSTOLIC BLOOD PRESSURE: 133 MMHG | DIASTOLIC BLOOD PRESSURE: 67 MMHG

## 2018-06-26 DIAGNOSIS — L03.113 CELLULITIS OF RIGHT UPPER EXTREMITY: Primary | ICD-10-CM

## 2018-06-26 DIAGNOSIS — R19.7 DIARRHEA, UNSPECIFIED TYPE: ICD-10-CM

## 2018-06-26 DIAGNOSIS — M79.89 LEFT LEG SWELLING: ICD-10-CM

## 2018-06-26 NOTE — PROGRESS NOTES
[facility]    Chief Complaint   Patient presents with     Derm Problem       [facility] Medical Record Number:  [id number]    HPI:    Batsheva Barkley is a 84 year old  (11/6/1933), who is being seen today for an episodic care visit at [facility].  HPI information obtained from: facility chart records, facility staff, patient report and [facility] chart review.Today's concern is: cellulitis, diarrhea, edema. Last week reported to have L elbow boil with redness, increased warmth of surrounding skin. Started on keflex. Reports area improved, some ongoing pain. Reports diarrhea stable. No reports of recent increased s/s. Cont. On imodium. No reports of nausea. S/p fall with LLE hematoma, edema. Cont. With care wraps. Edema mostly resolved. Sl. Pinkness to area remains.       ALLERGIES: Codeine and Lisinopril  Past Medical, Surgical, Family and Social History reviewed and updated in [facility].    Current Outpatient Prescriptions   Medication Sig Dispense Refill     Acetaminophen (TYLENOL PO) Take 1,000 mg by mouth every 8 hours as needed for mild pain or fever       alum & mag hydroxide-simethicone (MYLANTA/MAALOX) 200-200-20 MG/5ML SUSP suspension Take 10 mLs by mouth 2 times daily       ASPIRIN 81 MG OR TABS 1 tab po QD (Once per day) 100 3     Atorvastatin Calcium (LIPITOR PO) Take 20 mg by mouth daily       Cephalexin (KEFLEX PO) Take 250 mg by mouth 3 times daily       loperamide (IMODIUM) 2 MG capsule Take 2 mg by mouth 4 times daily as needed for diarrhea (and 2 mg QAM scheduled)        losartan (COZAAR) 50 MG tablet Take 1 tablet (50 mg) by mouth 2 times daily 180 tablet 3     metoprolol (LOPRESSOR) 50 MG tablet TAKE ONE TABLET BY MOUTH TWICE DAILY 180 tablet 3     Omeprazole (PRILOSEC PO) Take 20 mg by mouth every morning       ONDANSETRON PO Take 4 mg by mouth every 8 hours as needed for nausea       order for DME Light weight wheelchair Body mass index is 19.08 kg/(m^2). 1 Device 0      order for DME ANTOINE stockings- below knee. 1 Package 0     polyethylene glycol (MIRALAX/GLYCOLAX) Packet Take 17 g by mouth daily as needed for constipation 7 packet      senna-docusate (SENOKOT-S;PERICOLACE) 8.6-50 MG per tablet Take 1 tablet by mouth 2 times daily as needed        Medications reviewed:  Medications reconciled to facility chart and changes were made to reflect current medications as identified as above med list. Below are the changes that were made:   Medications stopped since last EPIC medication reconciliation:   Medications Discontinued During This Encounter   Medication Reason     diclofenac (VOLTAREN) 1 % GEL topical gel Medication Reconciliation Clean Up       Medications started since last [facility] medication reconciliation:  Orders Placed This Encounter   Medications     Cephalexin (KEFLEX PO)     Sig: Take 250 mg by mouth 3 times daily         REVIEW OF SYSTEMS:  No chest pain, shortness of breath, fevers, chills, headache, nausea, vomiting, dysuria or bowel abnormalities.  Appetite is  fair.  No pain except occ aches. ROS limited due to STML.    Physical Exam:  /67  Pulse 65  Resp 16  GENERAL APPEARANCE:  Alert, in no distress, thin, cooperative  ENT:  Mouth and posterior oropharynx normal, moist mucous membranes, [place]  EYES:  EOM, conjunctivae, lids, pupils and irises normal, PERRL  NECK:  No adenopathy,masses or thyromegaly, FROM  RESP:  respiratory effort and palpation of chest normal, lungs clear to auscultation , no respiratory distress  CV:  Palpation and auscultation of heart done , regular rate and rhythm, no murmur, rub, or gallop, peripheral edema trace+ in LLE  ABDOMEN:  normal bowel sounds, soft, nontender, no hepatosplenomegaly or other masses, no guarding or rebound  M/S:   Gait and station normal  muscle strength 5/5 all 4 ext., normal tone  SKIN:  1.0x.05cm scab R elbow, no drainage, sl. pink of surrounding tissue  NEURO:   Cranial nerves 2-12 are normal tested and  grossly at patient's baseline, alert, speech clear  PSYCH:  insight and judgement impaired, memory impaired , affect and mood normal    Recent Labs:     CBC RESULTS:   Recent Labs   Lab Test 04/23/18 04/21/18 2020 04/11/18   0620   WBC   --   7.8   --   5.2   RBC   --   3.66*   --   3.18*   HGB  10.3*  10.8*   < >  9.2*   HCT   --   33.3*   --   29.2*   MCV   --   91   --   92   MCH   --   29.5   --   28.9   MCHC   --   32.4   --   31.5   RDW   --   14.5   --   14.4   PLT   --   258   --   211    < > = values in this interval not displayed.       Last Basic Metabolic Panel:  Recent Labs   Lab Test  04/21/18 2020 04/11/18   0620   NA  133  139   POTASSIUM  4.6  4.1   CHLORIDE  100  108   REILLY  8.7  8.2*   CO2  25  27   BUN  31*  18   CR  0.74  0.79   GLC  93  88       Liver Function Studies -   Recent Labs   Lab Test  04/10/18   1855  01/18/18   1135   PROTTOTAL  6.0*  6.8   ALBUMIN  2.7*  3.8   BILITOTAL  0.3  0.5   ALKPHOS  74  74   AST  44  24   ALT  39  19       Assessment/Plan:  Cellulitis of right upper extremity  Boil, improved  1. Cont. Keflex for total of 7 days  2. Monitor for ongoing pain at site  3. Monitor for increased redness, drainage  4. For above, may extend keflex course    Diarrhea, unspecified type  Currently stable  1. Cont. Sched., prn imodium  2. Monitor for reports of stool incont.  3. For further s/s may increase sched. Imodium  4. Cont. Prn miralax, senna for constipation    Left leg swelling  Mostly resolved, s/p hematoma, falls  1. Cont. Ace wrap to LLE  2. Monitor for increased edema, redness, warmth to area  3. Bmp in next 1-3 mos        The health plan new enrollment has happened. I have reviewed the  MDS, the preventative needs,  and facility care plan. The level of care is appropriate. I have reviewed the code status/advanced directives.     Electronically signed by  NICKOLAS Hallman CNP

## 2018-06-26 NOTE — LETTER
6/26/2018        RE: Batsheva Barkley  [facility]  [address]  [facility] [address]        [facility]    Chief Complaint   Patient presents with     Derm Problem       [facility] Medical Record Number:  [id number]    HPI:    Batsheva Barkley is a 84 year old  (11/6/1933), who is being seen today for an episodic care visit at [facility].  HPI information obtained from: facility chart records, facility staff, patient report and [facility] chart review.Today's concern is: cellulitis, diarrhea, edema. Last week reported to have L elbow boil with redness, increased warmth of surrounding skin. Started on keflex. Reports area improved, some ongoing pain. Reports diarrhea stable. No reports of recent increased s/s. Cont. On imodium. No reports of nausea. S/p fall with LLE hematoma, edema. Cont. With care wraps. Edema mostly resolved. Sl. Pinkness to area remains.       ALLERGIES: Codeine and Lisinopril  Past Medical, Surgical, Family and Social History reviewed and updated in [facility].    Current Outpatient Prescriptions   Medication Sig Dispense Refill     Acetaminophen (TYLENOL PO) Take 1,000 mg by mouth every 8 hours as needed for mild pain or fever       alum & mag hydroxide-simethicone (MYLANTA/MAALOX) 200-200-20 MG/5ML SUSP suspension Take 10 mLs by mouth 2 times daily       ASPIRIN 81 MG OR TABS 1 tab po QD (Once per day) 100 3     Atorvastatin Calcium (LIPITOR PO) Take 20 mg by mouth daily       Cephalexin (KEFLEX PO) Take 250 mg by mouth 3 times daily       loperamide (IMODIUM) 2 MG capsule Take 2 mg by mouth 4 times daily as needed for diarrhea (and 2 mg QAM scheduled)        losartan (COZAAR) 50 MG tablet Take 1 tablet (50 mg) by mouth 2 times daily 180 tablet 3     metoprolol (LOPRESSOR) 50 MG tablet TAKE ONE TABLET BY MOUTH TWICE DAILY 180 tablet 3     Omeprazole (PRILOSEC PO) Take 20 mg by mouth every morning       ONDANSETRON PO Take 4 mg by mouth every 8 hours  as needed for nausea       order for DME Light weight wheelchair Body mass index is 19.08 kg/(m^2). 1 Device 0     order for DME ANTOINE stockings- below knee. 1 Package 0     polyethylene glycol (MIRALAX/GLYCOLAX) Packet Take 17 g by mouth daily as needed for constipation 7 packet      senna-docusate (SENOKOT-S;PERICOLACE) 8.6-50 MG per tablet Take 1 tablet by mouth 2 times daily as needed        Medications reviewed:  Medications reconciled to facility chart and changes were made to reflect current medications as identified as above med list. Below are the changes that were made:   Medications stopped since last EPIC medication reconciliation:   Medications Discontinued During This Encounter   Medication Reason     diclofenac (VOLTAREN) 1 % GEL topical gel Medication Reconciliation Clean Up       Medications started since last [facility] medication reconciliation:  Orders Placed This Encounter   Medications     Cephalexin (KEFLEX PO)     Sig: Take 250 mg by mouth 3 times daily         REVIEW OF SYSTEMS:  No chest pain, shortness of breath, fevers, chills, headache, nausea, vomiting, dysuria or bowel abnormalities.  Appetite is  fair.  No pain except occ aches. ROS limited due to STML.    Physical Exam:  /67  Pulse 65  Resp 16  GENERAL APPEARANCE:  Alert, in no distress, thin, cooperative  ENT:  Mouth and posterior oropharynx normal, moist mucous membranes, [place]  EYES:  EOM, conjunctivae, lids, pupils and irises normal, PERRL  NECK:  No adenopathy,masses or thyromegaly, FROM  RESP:  respiratory effort and palpation of chest normal, lungs clear to auscultation , no respiratory distress  CV:  Palpation and auscultation of heart done , regular rate and rhythm, no murmur, rub, or gallop, peripheral edema trace+ in LLE  ABDOMEN:  normal bowel sounds, soft, nontender, no hepatosplenomegaly or other masses, no guarding or rebound  M/S:   Gait and station normal  muscle strength 5/5 all 4 ext., normal tone  SKIN:  1.0x.05cm  scab R elbow, no drainage, sl. pink of surrounding tissue  NEURO:   Cranial nerves 2-12 are normal tested and grossly at patient's baseline, alert, speech clear  PSYCH:  insight and judgement impaired, memory impaired , affect and mood normal    Recent Labs:     CBC RESULTS:   Recent Labs   Lab Test 04/23/18 04/21/18 2020 04/11/18   0620   WBC   --   7.8   --   5.2   RBC   --   3.66*   --   3.18*   HGB  10.3*  10.8*   < >  9.2*   HCT   --   33.3*   --   29.2*   MCV   --   91   --   92   MCH   --   29.5   --   28.9   MCHC   --   32.4   --   31.5   RDW   --   14.5   --   14.4   PLT   --   258   --   211    < > = values in this interval not displayed.       Last Basic Metabolic Panel:  Recent Labs   Lab Test  04/21/18 2020 04/11/18   0620   NA  133  139   POTASSIUM  4.6  4.1   CHLORIDE  100  108   REILLY  8.7  8.2*   CO2  25  27   BUN  31*  18   CR  0.74  0.79   GLC  93  88       Liver Function Studies -   Recent Labs   Lab Test  04/10/18   1855  01/18/18   1135   PROTTOTAL  6.0*  6.8   ALBUMIN  2.7*  3.8   BILITOTAL  0.3  0.5   ALKPHOS  74  74   AST  44  24   ALT  39  19       Assessment/Plan:  Cellulitis of right upper extremity  Boil, improved  1. Cont. Keflex for total of 7 days  2. Monitor for ongoing pain at site  3. Monitor for increased redness, drainage  4. For above, may extend keflex course    Diarrhea, unspecified type  Currently stable  1. Cont. Sched., prn imodium  2. Monitor for reports of stool incont.  3. For further s/s may increase sched. Imodium  4. Cont. Prn miralax, senna for constipation    Left leg swelling  Mostly resolved, s/p hematoma, falls  1. Cont. Ace wrap to LLE  2. Monitor for increased edema, redness, warmth to area  3. Bmp in next 1-3 mos        The health plan new enrollment has happened. I have reviewed the  MDS, the preventative needs,  and facility care plan. The level of care is appropriate. I have reviewed the code status/advanced directives.     Electronically signed  by  NICKOLAS Hallman CNP                      Sincerely,        NICKOLAS Hallman CNP

## 2018-06-26 NOTE — PROGRESS NOTES
[facility] Care Coordination Contact  6/25/18 Rec'd vm from client's spouse inquiring if CM was able to find a podiatrist for client, requests a return call.  6/25/18 Call placed to AL, spoke with Shruti in the nursing office to inquire if they have on site podiatrist. Per Shruti they do not offer this service.  6/25/18 Call placed to client's daughter Huyen to introduce myself as client's CM. Shared that CM completed a face to face visit with her mother on 6/21/18, spouse present. Huyen had no questions or concerns. Explained that client requested foot care due to inability to cut own nails (arthritic fingers/hands), that the AL does not offer onsite podiatry. Explained CM can schedule an appt, Huyen agreed, requested Wednesday AM appt, stating that her father is able to accompany her mother to the appt. Huyen shared that her mother receives [employer] and she does not have any raw food costs.   6/26/18 Scheduled podiatry appt with Jg 7/25/18 @ 10:15, [place] location.   Call placed to client's spouse Bin to inform of the above appt. Bin stated that he would like a sooner appt. CM to mail information on appt: address/tele number in order for spouse/client to reschedule appt that works best for both of them (spouse works outside the home 4 days/wk).  Bin had no further questions for CM  6/26/18 Left vm with client's dtr Huyen to inform of the above info.  Rosibel Rhodes RN, BC  Supervisor [facility]   [phone] (Fax)

## 2018-07-03 NOTE — PROGRESS NOTES
[facility] Care Coordination Contact  Mailed copy of RS tool to client, submitted authorization to health plan.     Noelle Acevedo  Case Management Specialist  [facility]  [phone]

## 2018-07-10 ENCOUNTER — ASSISTED LIVING VISIT (OUTPATIENT)
Dept: GERIATRICS | Facility: CLINIC | Age: 83
End: 2018-07-10
Payer: COMMERCIAL

## 2018-07-10 DIAGNOSIS — L03.113 CELLULITIS OF RIGHT UPPER EXTREMITY: Primary | ICD-10-CM

## 2018-07-10 DIAGNOSIS — R19.7 DIARRHEA, UNSPECIFIED TYPE: ICD-10-CM

## 2018-07-10 DIAGNOSIS — I10 HYPERTENSION GOAL BP (BLOOD PRESSURE) < 140/90: ICD-10-CM

## 2018-07-10 NOTE — PROGRESS NOTES
[facility] [facility]    Chief Complaint   Patient presents with     Derm Problem       [place] Medical Record Number:  [id number]    HPI:    Batsheva Barkley is a 84 year old  (11/6/1933), who is being seen today for an episodic care visit at [facility].  HPI information obtained from: facility chart records, facility staff, patient report and [facility] chart review.Today's concern is: cellulitis, diarrhea, HTN. Last month had boil of R elbow with cellulitis of surrounding tissue. Pain at area. Started on keflex which ended earlier this month. Small scab remains, no redness, increased warmth of surrounding tissue. Reports diarrhea has been stable. Cont. On imodium. No increased s/s keflex. No reports of nausea. Cont. On prilosec for GERD. For HTN taking metoprolol, cozaar. BPs gen. Stale, sl. Elevated at times.       ALLERGIES: Codeine and Lisinopril  Past Medical, Surgical, Family and Social History reviewed and updated in [facility].    Current Outpatient Prescriptions   Medication Sig Dispense Refill     Acetaminophen (TYLENOL PO) Take 1,000 mg by mouth every 8 hours as needed for mild pain or fever       alum & mag hydroxide-simethicone (MYLANTA/MAALOX) 200-200-20 MG/5ML SUSP suspension Take 10 mLs by mouth 2 times daily       ASPIRIN 81 MG OR TABS 1 tab po QD (Once per day) 100 3     Atorvastatin Calcium (LIPITOR PO) Take 20 mg by mouth daily       Cephalexin (KEFLEX PO) Take 250 mg by mouth 3 times daily       loperamide (IMODIUM) 2 MG capsule Take 2 mg by mouth 4 times daily as needed for diarrhea (and 2 mg QAM scheduled)        losartan (COZAAR) 50 MG tablet Take 1 tablet (50 mg) by mouth 2 times daily 180 tablet 3     metoprolol (LOPRESSOR) 50 MG tablet TAKE ONE TABLET BY MOUTH TWICE DAILY 180 tablet 3     Omeprazole (PRILOSEC PO) Take 20 mg by mouth every morning       ONDANSETRON PO Take 4 mg by mouth every 8 hours as needed for nausea       order for DME Light weight wheelchair Body mass  index is 19.08 kg/(m^2). 1 Device 0     order for DME ANTOINE stockings- below knee. 1 Package 0     polyethylene glycol (MIRALAX/GLYCOLAX) Packet Take 17 g by mouth daily as needed for constipation 7 packet      senna-docusate (SENOKOT-S;PERICOLACE) 8.6-50 MG per tablet Take 1 tablet by mouth 2 times daily as needed        Medications reviewed:  Medications reconciled to facility chart and changes were made to reflect current medications as identified as above med list. Below are the changes that were made:   Medications stopped since last EPIC medication reconciliation:   There are no discontinued medications.    Medications started since last [facility] medication reconciliation:  No orders of the defined types were placed in this encounter.        REVIEW OF SYSTEMS:  No chest pain, shortness of breath, fevers, chills, headache, nausea, vomiting, dysuria or bowel abnormalities.  Appetite is  fair.  No pain except occ knees.    Physical Exam:  /64  Pulse 64  Resp 18  SpO2 92%  GENERAL APPEARANCE:  Alert, in no distress, thin, cooperative  ENT:  Mouth and posterior oropharynx normal, moist mucous membranes, [place], cerumen bilat ear canals  EYES:  EOM, conjunctivae, lids, pupils and irises normal, PERRL  NECK:  No adenopathy,masses or thyromegaly, FROM  RESP:  respiratory effort and palpation of chest normal, lungs clear to auscultation , no respiratory distress  CV:  Palpation and auscultation of heart done , regular rate and rhythm, no murmur, rub, or gallop, peripheral edema trace+ in LLE  ABDOMEN:  normal bowel sounds, soft, nontender, no hepatosplenomegaly or other masses, no guarding or rebound  M/S:   Gait and station normal  muscle strength 5/5 all 4 ext., normal tone  SKIN:  resolving hematoma R medial pretibial area  NEURO:   Cranial nerves 2-12 are normal tested and grossly at patient's baseline, alert, speech clear  PSYCH:  insight and judgement impaired, memory impaired , affect and mood normal    Recent  Labs:     CBC RESULTS:   Recent Labs   Lab Test 04/23/18 04/21/18 2020 04/11/18   0620   WBC   --   7.8   --   5.2   RBC   --   3.66*   --   3.18*   HGB  10.3*  10.8*   < >  9.2*   HCT   --   33.3*   --   29.2*   MCV   --   91   --   92   MCH   --   29.5   --   28.9   MCHC   --   32.4   --   31.5   RDW   --   14.5   --   14.4   PLT   --   258   --   211    < > = values in this interval not displayed.       Last Basic Metabolic Panel:  Recent Labs   Lab Test  04/21/18 2020 04/11/18   0620   NA  133  139   POTASSIUM  4.6  4.1   CHLORIDE  100  108   REILLY  8.7  8.2*   CO2  25  27   BUN  31*  18   CR  0.74  0.79   GLC  93  88       Liver Function Studies -   Recent Labs   Lab Test  04/10/18   1855  01/18/18   1135   PROTTOTAL  6.0*  6.8   ALBUMIN  2.7*  3.8   BILITOTAL  0.3  0.5   ALKPHOS  74  74   AST  44  24   ALT  39  19       Assessment/Plan:  Cellulitis of right upper extremity  Resolved, small scab area remains  1. Monitor for further increased redness, warmth to area  2. Monitor for reports of pain  3. Again instructed to minimize pressure to area-avoid when sitting in chair with armrest     Diarrhea, unspecified type  Currently controlled. No increased s/s with recent keflex  1. Cont. Imodium  2. Monitor for further increased s/s  3. Cont. Prn bowel meds for constipation  4. For further diarrhea, may increase sched. Imodium dose    Hypertension goal BP (blood pressure) < 140/90  Gen. Controlled. Sl elevated BPs at times  1. Cont. Metoprolol, cozaar  2. Follow BPs, HRs  3. FLP tomorrow  4. Bmp in next 1-3 mos      Electronically signed by  NICKOLAS Hallman CNP

## 2018-07-10 NOTE — LETTER
7/10/2018        RE: Batsheva Barkley  [facility] [address]  [address]  [facility] [address]        [place] [facility]    Chief Complaint   Patient presents with     Derm Problem       [place] Medical Record Number:  [id number]    HPI:    Batsheva Barkley is a 84 year old  (11/6/1933), who is being seen today for an episodic care visit at [facility].  HPI information obtained from: facility chart records, facility staff, patient report and [facility] chart review.Today's concern is: cellulitis, diarrhea, HTN. Last month had boil of R elbow with cellulitis of surrounding tissue. Pain at area. Started on keflex which ended earlier this month. Small scab remains, no redness, increased warmth of surrounding tissue. Reports diarrhea has been stable. Cont. On imodium. No increased s/s keflex. No reports of nausea. Cont. On prilosec for GERD. For HTN taking metoprolol, cozaar. BPs gen. Stale, sl. Elevated at times.       ALLERGIES: Codeine and Lisinopril  Past Medical, Surgical, Family and Social History reviewed and updated in [facility].    Current Outpatient Prescriptions   Medication Sig Dispense Refill     Acetaminophen (TYLENOL PO) Take 1,000 mg by mouth every 8 hours as needed for mild pain or fever       alum & mag hydroxide-simethicone (MYLANTA/MAALOX) 200-200-20 MG/5ML SUSP suspension Take 10 mLs by mouth 2 times daily       ASPIRIN 81 MG OR TABS 1 tab po QD (Once per day) 100 3     Atorvastatin Calcium (LIPITOR PO) Take 20 mg by mouth daily       Cephalexin (KEFLEX PO) Take 250 mg by mouth 3 times daily       loperamide (IMODIUM) 2 MG capsule Take 2 mg by mouth 4 times daily as needed for diarrhea (and 2 mg QAM scheduled)        losartan (COZAAR) 50 MG tablet Take 1 tablet (50 mg) by mouth 2 times daily 180 tablet 3     metoprolol (LOPRESSOR) 50 MG tablet TAKE ONE TABLET BY MOUTH TWICE DAILY 180 tablet 3     Omeprazole (PRILOSEC PO) Take 20 mg by mouth every morning        ONDANSETRON PO Take 4 mg by mouth every 8 hours as needed for nausea       order for DME Light weight wheelchair Body mass index is 19.08 kg/(m^2). 1 Device 0     order for DME ANTOINE stockings- below knee. 1 Package 0     polyethylene glycol (MIRALAX/GLYCOLAX) Packet Take 17 g by mouth daily as needed for constipation 7 packet      senna-docusate (SENOKOT-S;PERICOLACE) 8.6-50 MG per tablet Take 1 tablet by mouth 2 times daily as needed        Medications reviewed:  Medications reconciled to facility chart and changes were made to reflect current medications as identified as above med list. Below are the changes that were made:   Medications stopped since last EPIC medication reconciliation:   There are no discontinued medications.    Medications started since last [facility] medication reconciliation:  No orders of the defined types were placed in this encounter.        REVIEW OF SYSTEMS:  No chest pain, shortness of breath, fevers, chills, headache, nausea, vomiting, dysuria or bowel abnormalities.  Appetite is  fair.  No pain except occ knees.    Physical Exam:  /64  Pulse 64  Resp 18  SpO2 92%  GENERAL APPEARANCE:  Alert, in no distress, thin, cooperative  ENT:  Mouth and posterior oropharynx normal, moist mucous membranes, [place], cerumen bilat ear canals  EYES:  EOM, conjunctivae, lids, pupils and irises normal, PERRL  NECK:  No adenopathy,masses or thyromegaly, FROM  RESP:  respiratory effort and palpation of chest normal, lungs clear to auscultation , no respiratory distress  CV:  Palpation and auscultation of heart done , regular rate and rhythm, no murmur, rub, or gallop, peripheral edema trace+ in LLE  ABDOMEN:  normal bowel sounds, soft, nontender, no hepatosplenomegaly or other masses, no guarding or rebound  M/S:   Gait and station normal  muscle strength 5/5 all 4 ext., normal tone  SKIN:  resolving hematoma R medial pretibial area  NEURO:   Cranial nerves 2-12 are normal tested and grossly at patient's  baseline, alert, speech clear  PSYCH:  insight and judgement impaired, memory impaired , affect and mood normal    Recent Labs:     CBC RESULTS:   Recent Labs   Lab Test 04/23/18 04/21/18 2020 04/11/18   0620   WBC   --   7.8   --   5.2   RBC   --   3.66*   --   3.18*   HGB  10.3*  10.8*   < >  9.2*   HCT   --   33.3*   --   29.2*   MCV   --   91   --   92   MCH   --   29.5   --   28.9   MCHC   --   32.4   --   31.5   RDW   --   14.5   --   14.4   PLT   --   258   --   211    < > = values in this interval not displayed.       Last Basic Metabolic Panel:  Recent Labs   Lab Test  04/21/18 2020 04/11/18   0620   NA  133  139   POTASSIUM  4.6  4.1   CHLORIDE  100  108   REILLY  8.7  8.2*   CO2  25  27   BUN  31*  18   CR  0.74  0.79   GLC  93  88       Liver Function Studies -   Recent Labs   Lab Test  04/10/18   1855  01/18/18   1135   PROTTOTAL  6.0*  6.8   ALBUMIN  2.7*  3.8   BILITOTAL  0.3  0.5   ALKPHOS  74  74   AST  44  24   ALT  39  19       Assessment/Plan:  Cellulitis of right upper extremity  Resolved, small scab area remains  1. Monitor for further increased redness, warmth to area  2. Monitor for reports of pain  3. Again instructed to minimize pressure to area-avoid when sitting in chair with armrest     Diarrhea, unspecified type  Currently controlled. No increased s/s with recent keflex  1. Cont. Imodium  2. Monitor for further increased s/s  3. Cont. Prn bowel meds for constipation  4. For further diarrhea, may increase sched. Imodium dose    Hypertension goal BP (blood pressure) < 140/90  Gen. Controlled. Sl elevated BPs at times  1. Cont. Metoprolol, cozaar  2. Follow BPs, HRs  3. FLP tomorrow  4. Bmp in next 1-3 mos      Electronically signed by  NICKOLAS Hallman CNP                      Sincerely,        NICKOLAS Hallman CNP

## 2018-07-11 ENCOUNTER — RECORDS - HEALTHEAST (OUTPATIENT)
Dept: LAB | Facility: CLINIC | Age: 83
End: 2018-07-11

## 2018-07-11 VITALS
DIASTOLIC BLOOD PRESSURE: 64 MMHG | OXYGEN SATURATION: 92 % | SYSTOLIC BLOOD PRESSURE: 151 MMHG | HEART RATE: 64 BPM | RESPIRATION RATE: 18 BRPM

## 2018-07-11 LAB
CHOLEST SERPL-MCNC: 173 MG/DL
FASTING STATUS PATIENT QL REPORTED: NORMAL
HDLC SERPL-MCNC: 86 MG/DL
LDLC SERPL CALC-MCNC: 78 MG/DL
TRIGL SERPL-MCNC: 45 MG/DL

## 2018-07-17 ENCOUNTER — ASSISTED LIVING VISIT (OUTPATIENT)
Dept: GERIATRICS | Facility: CLINIC | Age: 83
End: 2018-07-17
Payer: COMMERCIAL

## 2018-07-17 DIAGNOSIS — R11.0 NAUSEA: ICD-10-CM

## 2018-07-17 DIAGNOSIS — H61.23 BILATERAL IMPACTED CERUMEN: Primary | ICD-10-CM

## 2018-07-17 DIAGNOSIS — R60.0 LOWER EXTREMITY EDEMA: ICD-10-CM

## 2018-07-17 NOTE — PROGRESS NOTES
[place] [facility]    Chief Complaint   Patient presents with     Cerumen (impacted)       [place] Medical Record Number:  [id number]    HPI:    Batsheva Barkley is a 84 year old  (11/6/1933), who is being seen today for an episodic care visit at [facility].  HPI information obtained from: facility chart records, facility staff, patient report and [facility] chart review.Today's concern is: cerumen, edema, nausea.  Wears bilat hearing aids. Reports decreased hearing acuity. Cerumen present bilat ear canals.  Has had debrox gtts past week. LLE hematoma stable. Cont. To have edema. Not wearing acre wrap currently. No redness or increased warmth to site. No sig. Pain. Reports nausea has been stable since taking prilosec. No recent prn zofran use. Po inake gen. Stable.      ALLERGIES: Codeine and Lisinopril  Past Medical, Surgical, Family and Social History reviewed and updated in [facility].    Current Outpatient Prescriptions   Medication Sig Dispense Refill     Acetaminophen (TYLENOL PO) Take 1,000 mg by mouth every 8 hours as needed for mild pain or fever       alum & mag hydroxide-simethicone (MYLANTA/MAALOX) 200-200-20 MG/5ML SUSP suspension Take 10 mLs by mouth 2 times daily       ASPIRIN 81 MG OR TABS 1 tab po QD (Once per day) 100 3     Atorvastatin Calcium (LIPITOR PO) Take 20 mg by mouth daily       loperamide (IMODIUM) 2 MG capsule Take 2 mg by mouth 4 times daily as needed for diarrhea (and 2 mg QAM scheduled)        losartan (COZAAR) 50 MG tablet Take 1 tablet (50 mg) by mouth 2 times daily 180 tablet 3     metoprolol (LOPRESSOR) 50 MG tablet TAKE ONE TABLET BY MOUTH TWICE DAILY 180 tablet 3     Omeprazole (PRILOSEC PO) Take 20 mg by mouth every morning       ONDANSETRON PO Take 4 mg by mouth every 8 hours as needed for nausea       order for DME Light weight wheelchair Body mass index is 19.08 kg/(m^2). 1 Device 0     order for DME ANTOINE stockings- below knee. 1 Package 0     polyethylene  glycol (MIRALAX/GLYCOLAX) Packet Take 17 g by mouth daily as needed for constipation 7 packet      senna-docusate (SENOKOT-S;PERICOLACE) 8.6-50 MG per tablet Take 1 tablet by mouth 2 times daily as needed        Medications reviewed:  Medications reconciled to facility chart and changes were made to reflect current medications as identified as above med list. Below are the changes that were made:   Medications stopped since last EPIC medication reconciliation:   There are no discontinued medications.    Medications started since last [facility] medication reconciliation:  No orders of the defined types were placed in this encounter.        REVIEW OF SYSTEMS:  No chest pain, shortness of breath, fevers, chills, headache, nausea, vomiting, dysuria or bowel abnormalities.  Appetite is  fair.  No pain except occ LLE.    Physical Exam:  /78  Pulse 73  Resp 16  SpO2 93%  GENERAL APPEARANCE:  Alert, in no distress, thin, cooperative  ENT:  Mouth and posterior oropharynx normal, moist mucous membranes, [place] bilat ear canals irrigated, now clear of cerumen. no s/s otitis  EYES:  EOM, conjunctivae, lids, pupils and irises normal, PERRL  NECK:  No adenopathy,masses or thyromegaly, FROM  RESP:  respiratory effort and palpation of chest normal, lungs clear to auscultation , no respiratory distress  CV:  Palpation and auscultation of heart done , regular rate and rhythm, no murmur, rub, or gallop, peripheral edema trace+ in LLE  ABDOMEN:  normal bowel sounds, soft, nontender, no hepatosplenomegaly or other masses, no guarding or rebound  M/S:   Gait and station normal  muscle strength 5/5 all 4 ext., normal tone  NEURO:   Cranial nerves 2-12 are normal tested and grossly at patient's baseline, alert, speech clear  PSYCH:  insight and judgement impaired, memory impaired , affect and mood normal    Recent Labs:     CBC RESULTS:   Recent Labs   Lab Test 04/23/18 04/21/18   2020   04/11/18   0620   WBC   --   7.8   --   5.2   RBC    --   3.66*   --   3.18*   HGB  10.3*  10.8*   < >  9.2*   HCT   --   33.3*   --   29.2*   MCV   --   91   --   92   MCH   --   29.5   --   28.9   MCHC   --   32.4   --   31.5   RDW   --   14.5   --   14.4   PLT   --   258   --   211    < > = values in this interval not displayed.       Last Basic Metabolic Panel:  Recent Labs   Lab Test  04/21/18 2020 04/11/18   0620   NA  133  139   POTASSIUM  4.6  4.1   CHLORIDE  100  108   REILLY  8.7  8.2*   CO2  25  27   BUN  31*  18   CR  0.74  0.79   GLC  93  88       Liver Function Studies -   Recent Labs   Lab Test  04/10/18   1855  01/18/18   1135   PROTTOTAL  6.0*  6.8   ALBUMIN  2.7*  3.8   BILITOTAL  0.3  0.5   ALKPHOS  74  74   AST  44  24   ALT  39  19       Assessment/Plan:  Bilateral impacted cerumen  Resolved, s/p irrigation bilat. Era canals  1. Monitor for further cerumen of ear canals  2. Cont. bilat hearing aids  3. Monitor for reports of ear pain    Lower extremity edema  S/p hematoma LLE. Pain gen. Controlled  1. Cont. Ace wrap to LLE prn  2. Monitor for redness, increased warmth, pain to area  3. Bmp in next 1-3 mos  4. Elevate LE    Nausea  Currently controlled  1. Cont. prilosec  2. Cont. Prn zofran  3. Monitor for further s/s, if stable over next couple mos, may dc prn zofran        Electronically signed by  NICKOLAS Hallman CNP

## 2018-07-17 NOTE — LETTER
7/17/2018        RE: Batsheva Barkley  [facility] [address]  [address]  [facility] [address]        [place] [facility]    Chief Complaint   Patient presents with     Cerumen (impacted)       [place] Medical Record Number:  [id number]    HPI:    Batsheva Barkley is a 84 year old  (11/6/1933), who is being seen today for an episodic care visit at [facility].  HPI information obtained from: facility chart records, facility staff, patient report and [facility] chart review.Today's concern is: cerumen, edema, nausea.  Wears bilat hearing aids. Reports decreased hearing acuity. Cerumen present bilat ear canals.  Has had debrox gtts past week. LLE hematoma stable. Cont. To have edema. Not wearing acre wrap currently. No redness or increased warmth to site. No sig. Pain. Reports nausea has been stable since taking prilosec. No recent prn zofran use. Po inake gen. Stable.      ALLERGIES: Codeine and Lisinopril  Past Medical, Surgical, Family and Social History reviewed and updated in [facility].    Current Outpatient Prescriptions   Medication Sig Dispense Refill     Acetaminophen (TYLENOL PO) Take 1,000 mg by mouth every 8 hours as needed for mild pain or fever       alum & mag hydroxide-simethicone (MYLANTA/MAALOX) 200-200-20 MG/5ML SUSP suspension Take 10 mLs by mouth 2 times daily       ASPIRIN 81 MG OR TABS 1 tab po QD (Once per day) 100 3     Atorvastatin Calcium (LIPITOR PO) Take 20 mg by mouth daily       loperamide (IMODIUM) 2 MG capsule Take 2 mg by mouth 4 times daily as needed for diarrhea (and 2 mg QAM scheduled)        losartan (COZAAR) 50 MG tablet Take 1 tablet (50 mg) by mouth 2 times daily 180 tablet 3     metoprolol (LOPRESSOR) 50 MG tablet TAKE ONE TABLET BY MOUTH TWICE DAILY 180 tablet 3     Omeprazole (PRILOSEC PO) Take 20 mg by mouth every morning       ONDANSETRON PO Take 4 mg by mouth every 8 hours as needed for nausea       order for DME Light weight wheelchair Body  mass index is 19.08 kg/(m^2). 1 Device 0     order for DME ANTOINE stockings- below knee. 1 Package 0     polyethylene glycol (MIRALAX/GLYCOLAX) Packet Take 17 g by mouth daily as needed for constipation 7 packet      senna-docusate (SENOKOT-S;PERICOLACE) 8.6-50 MG per tablet Take 1 tablet by mouth 2 times daily as needed        Medications reviewed:  Medications reconciled to facility chart and changes were made to reflect current medications as identified as above med list. Below are the changes that were made:   Medications stopped since last EPIC medication reconciliation:   There are no discontinued medications.    Medications started since last [facility] medication reconciliation:  No orders of the defined types were placed in this encounter.        REVIEW OF SYSTEMS:  No chest pain, shortness of breath, fevers, chills, headache, nausea, vomiting, dysuria or bowel abnormalities.  Appetite is  fair.  No pain except occ LLE.    Physical Exam:  /78  Pulse 73  Resp 16  SpO2 93%  GENERAL APPEARANCE:  Alert, in no distress, thin, cooperative  ENT:  Mouth and posterior oropharynx normal, moist mucous membranes, [place], bilat ear canals irrigated, now clear of cerumen. no s/s otitis  EYES:  EOM, conjunctivae, lids, pupils and irises normal, PERRL  NECK:  No adenopathy,masses or thyromegaly, FROM  RESP:  respiratory effort and palpation of chest normal, lungs clear to auscultation , no respiratory distress  CV:  Palpation and auscultation of heart done , regular rate and rhythm, no murmur, rub, or gallop, peripheral edema trace+ in LLE  ABDOMEN:  normal bowel sounds, soft, nontender, no hepatosplenomegaly or other masses, no guarding or rebound  M/S:   Gait and station normal  muscle strength 5/5 all 4 ext., normal tone  NEURO:   Cranial nerves 2-12 are normal tested and grossly at patient's baseline, alert, speech clear  PSYCH:  insight and judgement impaired, memory impaired , affect and mood normal    Recent Labs:      CBC RESULTS:   Recent Labs   Lab Test 04/23/18 04/21/18 2020 04/11/18   0620   WBC   --   7.8   --   5.2   RBC   --   3.66*   --   3.18*   HGB  10.3*  10.8*   < >  9.2*   HCT   --   33.3*   --   29.2*   MCV   --   91   --   92   MCH   --   29.5   --   28.9   MCHC   --   32.4   --   31.5   RDW   --   14.5   --   14.4   PLT   --   258   --   211    < > = values in this interval not displayed.       Last Basic Metabolic Panel:  Recent Labs   Lab Test  04/21/18 2020 04/11/18   0620   NA  133  139   POTASSIUM  4.6  4.1   CHLORIDE  100  108   REILLY  8.7  8.2*   CO2  25  27   BUN  31*  18   CR  0.74  0.79   GLC  93  88       Liver Function Studies -   Recent Labs   Lab Test  04/10/18   1855  01/18/18   1135   PROTTOTAL  6.0*  6.8   ALBUMIN  2.7*  3.8   BILITOTAL  0.3  0.5   ALKPHOS  74  74   AST  44  24   ALT  39  19       Assessment/Plan:  Bilateral impacted cerumen  Resolved, s/p irrigation bilat. Era canals  1. Monitor for further cerumen of ear canals  2. Cont. bilat hearing aids  3. Monitor for reports of ear pain    Lower extremity edema  S/p hematoma LLE. Pain gen. Controlled  1. Cont. Ace wrap to LLE prn  2. Monitor for redness, increased warmth, pain to area  3. Bmp in next 1-3 mos  4. Elevate LE    Nausea  Currently controlled  1. Cont. prilosec  2. Cont. Prn zofran  3. Monitor for further s/s, if stable over next couple mos, may dc prn zofran        Electronically signed by  NICKOLAS Hallman CNP                      Sincerely,        NICKOLAS Hallman CNP

## 2018-07-19 VITALS
OXYGEN SATURATION: 93 % | SYSTOLIC BLOOD PRESSURE: 144 MMHG | HEART RATE: 73 BPM | DIASTOLIC BLOOD PRESSURE: 78 MMHG | RESPIRATION RATE: 16 BRPM

## 2018-08-15 ENCOUNTER — PATIENT OUTREACH (OUTPATIENT)
Dept: GERIATRIC MEDICINE | Facility: CLINIC | Age: 83
End: 2018-08-15

## 2018-08-15 NOTE — PROGRESS NOTES
[facility] Care Coordination Contact  Rec'd 2 letters in the mail from client and her spouse Bin ([facility] statements). CC also rec'd a check written to [employer] for .65   Call placed to [facility], explained that client should not have a co pay due the EW program, also reviewed bill for Atorvastatin. CC was informed that client could not bring medications from home, and the refill for Atorvastatin was filled to soon   Inquired if client would be responsible for this payment for Atorvastatin, ? If auth can be completed  CC was informed that this is client's responsibility  Call placed to [employer], CC informed that client should not have any co payment after 6/1/18 and that [facility] should contact [facility] to review client's benefits.  Inquired on [employer] bill for .65. CC was informed that client should disregard.   Call placed back to [facility], spoke with Karol who stated that the above patient responsibility hs been taken care of.  Client will have a bill for 8.95 (co payments prior to 6/1/18)  Call placed to client who requested CC speak with her  Bin. CC shared above info to Gene.  Rosibel Rhodes RN, BC  Manager [facility] Care Coordinator   [phone]  (Fax)

## 2018-09-17 ENCOUNTER — PATIENT OUTREACH (OUTPATIENT)
Dept: GERIATRIC MEDICINE | Facility: CLINIC | Age: 83
End: 2018-09-17

## 2018-09-25 NOTE — PROGRESS NOTES
Late entry for 9/17/18. Rec'd tele call from client's  Bin stating that client had an exam with [facility] 6 months ago and rec'd upgraded lenses. Gene states that client has been stating that the lenses are not strong enough. Gene inquired if he could schedule an appt with [facility]. Explained that [facility] is likely not an in network provider, CC will f/u with the health plan. Gene requests that CC mail a listing of in network providers to him.  Letter mailed to client and  on 9/18/18 9/25/18 Call placed to client to f/u on the above info. Client states that her  scheduled her an appt wit [facility] for 10/19/18  Rosibel Rhodes RN, BC  Manager [facility] Care Coordinator   [phone]  (Fax)

## 2018-09-27 ENCOUNTER — ASSISTED LIVING VISIT (OUTPATIENT)
Dept: GERIATRICS | Facility: CLINIC | Age: 83
End: 2018-09-27
Payer: COMMERCIAL

## 2018-09-27 DIAGNOSIS — R11.0 NAUSEA: ICD-10-CM

## 2018-09-27 DIAGNOSIS — I10 ESSENTIAL HYPERTENSION: ICD-10-CM

## 2018-09-27 DIAGNOSIS — I50.22 CHF NYHA CLASS III, CHRONIC, SYSTOLIC (H): Primary | ICD-10-CM

## 2018-09-27 NOTE — LETTER
9/27/2018        RE: Batsheva Barkley  [facility]  [address]  [facility] [address]        [place] [facility]    Chief Complaint   Patient presents with     Edema       [place] Medical Record Number:  [id number]    HPI:    Batsheva Barkley is a 84 year old  (11/6/1933), who is being seen today for an episodic care visit at [facility].  HPI information obtained from: facility chart records, facility staff, patient report and [facility] chart review.Today's concern is:  CHF, HTN, nausea. Hs CHF with occ LE edema.  Had contusion of LLE with area of increased edema. dtr asking if comp. Wrap can be dc'd.  Resp.status stable. No recent pain of LLE. For HTN taking losartan, metoprolol. BPs, HRs stable. Had nausea last last week. Resolved. Cont. On prilosec. No recent reports of diarrhea.     ALLERGIES: Codeine and Lisinopril  Past Medical, Surgical, Family and Social History reviewed and updated in [facility].    Current Outpatient Prescriptions   Medication Sig Dispense Refill     Acetaminophen (TYLENOL PO) Take 1,000 mg by mouth every 8 hours as needed for mild pain or fever       alum & mag hydroxide-simethicone (MYLANTA/MAALOX) 200-200-20 MG/5ML SUSP suspension Take 10 mLs by mouth 2 times daily       ASPIRIN 81 MG OR TABS 1 tab po QD (Once per day) 100 3     Atorvastatin Calcium (LIPITOR PO) Take 20 mg by mouth daily       loperamide (IMODIUM) 2 MG capsule Take 2 mg by mouth 4 times daily as needed for diarrhea (and 2 mg QAM scheduled)        losartan (COZAAR) 50 MG tablet Take 1 tablet (50 mg) by mouth 2 times daily 180 tablet 3     metoprolol (LOPRESSOR) 50 MG tablet TAKE ONE TABLET BY MOUTH TWICE DAILY 180 tablet 3     Omeprazole (PRILOSEC PO) Take 20 mg by mouth every morning       ONDANSETRON PO Take 4 mg by mouth every 8 hours as needed for nausea       order for DME Light weight wheelchair Body mass index is 19.08 kg/(m^2). 1 Device 0     order for DME  ANTOINE stockings- below knee. 1 Package 0     polyethylene glycol (MIRALAX/GLYCOLAX) Packet Take 17 g by mouth daily as needed for constipation 7 packet      senna-docusate (SENOKOT-S;PERICOLACE) 8.6-50 MG per tablet Take 1 tablet by mouth 2 times daily as needed        Medications reviewed:  Medications reconciled to facility chart and changes were made to reflect current medications as identified as above med list. Below are the changes that were made:   Medications stopped since last EPIC medication reconciliation:   There are no discontinued medications.    Medications started since last [facility] medication reconciliation:  No orders of the defined types were placed in this encounter.        REVIEW OF SYSTEMS:  No chest pain, shortness of breath, fevers, chills, headache, nausea, vomiting, dysuria or bowel abnormalities.  Appetite is  fair.  No pain except occ aches.    Physical Exam:  /57  Pulse 67  Resp 18  GENERAL APPEARANCE:  Alert, in no distress, cooperative  ENT:  Mouth and posterior oropharynx normal, moist mucous membranes, [place]  EYES:  EOM, conjunctivae, lids, pupils and irises normal, PERRL  NECK:  No adenopathy,masses or thyromegaly, FROM  RESP:  respiratory effort and palpation of chest normal, lungs clear to auscultation , no respiratory distress  CV:  Palpation and auscultation of heart done , regular rate and rhythm, no murmur, rub, or gallop, peripheral edema trace+ in LLE  ABDOMEN:  normal bowel sounds, soft, nontender, no hepatosplenomegaly or other masses, no guarding or rebound  M/S:   Gait and station normal  muscle strength 5/5 all 4 ext., normal tone  NEURO:   Cranial nerves 2-12 are normal tested and grossly at patient's baseline, alert, speech clear  PSYCH:  insight and judgement impaired, memory impaired , affect and mood normal    Recent Labs:   CBC RESULTS:   Recent Labs   Lab Test 04/23/18 04/21/18   2020   04/11/18   0620   WBC   --   7.8   --   5.2   RBC   --   3.66*   --   3.18*    HGB  10.3*  10.8*   < >  9.2*   HCT   --   33.3*   --   29.2*   MCV   --   91   --   92   MCH   --   29.5   --   28.9   MCHC   --   32.4   --   31.5   RDW   --   14.5   --   14.4   PLT   --   258   --   211    < > = values in this interval not displayed.       Last Basic Metabolic Panel:  Recent Labs   Lab Test  04/21/18 2020 04/11/18   0620   NA  133  139   POTASSIUM  4.6  4.1   CHLORIDE  100  108   REILLY  8.7  8.2*   CO2  25  27   BUN  31*  18   CR  0.74  0.79   GLC  93  88         Assessment/Plan:  CHF NYHA class III, chronic, systolic (H)  Currently controlled. Sl edema at contusion site LLE-mostly resolved  1. Discontinue comp. Wraps [place]  2. Monitor for LE edema  3. Monitor for c/o LE pain  4. For increased edema, restart comp.stockings    Essential hypertension  Currently controlled  1. Cont.losartan, metoprolol  2. Follow BPs, HRs  3. Cbc,bmp next week    Nausea  Recurrent. Currently stable  1. Cont. prilosec  2. Cont. Prn bowel meds  3. For further increased nausea, may sched. zofran  4. Follow wt.s        Electronically signed by  NICKOLAS Hallman CNP                      Sincerely,        NICKOLAS Hallman CNP

## 2018-09-27 NOTE — PROGRESS NOTES
[place] [facility]    Chief Complaint   Patient presents with     Edema       [place] Medical Record Number:  [id number]    HPI:    Batsheva Barkley is a 84 year old  (11/6/1933), who is being seen today for an episodic care visit at [facility].  HPI information obtained from: facility chart records, facility staff, patient report and [facility] chart review.Today's concern is:  CHF, HTN, nausea. Hs CHF with occ LE edema.  Had contusion of LLE with area of increased edema. dtr asking if comp. Wrap can be dc'd.  Resp.status stable. No recent pain of LLE. For HTN taking losartan, metoprolol. BPs, HRs stable. Had nausea last last week. Resolved. Cont. On prilosec. No recent reports of diarrhea.     ALLERGIES: Codeine and Lisinopril  Past Medical, Surgical, Family and Social History reviewed and updated in [facility].    Current Outpatient Prescriptions   Medication Sig Dispense Refill     Acetaminophen (TYLENOL PO) Take 1,000 mg by mouth every 8 hours as needed for mild pain or fever       alum & mag hydroxide-simethicone (MYLANTA/MAALOX) 200-200-20 MG/5ML SUSP suspension Take 10 mLs by mouth 2 times daily       ASPIRIN 81 MG OR TABS 1 tab po QD (Once per day) 100 3     Atorvastatin Calcium (LIPITOR PO) Take 20 mg by mouth daily       loperamide (IMODIUM) 2 MG capsule Take 2 mg by mouth 4 times daily as needed for diarrhea (and 2 mg QAM scheduled)        losartan (COZAAR) 50 MG tablet Take 1 tablet (50 mg) by mouth 2 times daily 180 tablet 3     metoprolol (LOPRESSOR) 50 MG tablet TAKE ONE TABLET BY MOUTH TWICE DAILY 180 tablet 3     Omeprazole (PRILOSEC PO) Take 20 mg by mouth every morning       ONDANSETRON PO Take 4 mg by mouth every 8 hours as needed for nausea       order for DME Light weight wheelchair Body mass index is 19.08 kg/(m^2). 1 Device 0     order for DME ANTOINE stockings- below knee. 1 Package 0     polyethylene glycol (MIRALAX/GLYCOLAX) Packet Take 17 g by mouth daily as  needed for constipation 7 packet      senna-docusate (SENOKOT-S;PERICOLACE) 8.6-50 MG per tablet Take 1 tablet by mouth 2 times daily as needed        Medications reviewed:  Medications reconciled to facility chart and changes were made to reflect current medications as identified as above med list. Below are the changes that were made:   Medications stopped since last EPIC medication reconciliation:   There are no discontinued medications.    Medications started since last [facility] medication reconciliation:  No orders of the defined types were placed in this encounter.        REVIEW OF SYSTEMS:  No chest pain, shortness of breath, fevers, chills, headache, nausea, vomiting, dysuria or bowel abnormalities.  Appetite is  fair.  No pain except occ aches.    Physical Exam:  /57  Pulse 67  Resp 18  GENERAL APPEARANCE:  Alert, in no distress, cooperative  ENT:  Mouth and posterior oropharynx normal, moist mucous membranes, [place]  EYES:  EOM, conjunctivae, lids, pupils and irises normal, PERRL  NECK:  No adenopathy,masses or thyromegaly, FROM  RESP:  respiratory effort and palpation of chest normal, lungs clear to auscultation , no respiratory distress  CV:  Palpation and auscultation of heart done , regular rate and rhythm, no murmur, rub, or gallop, peripheral edema trace+ in LLE  ABDOMEN:  normal bowel sounds, soft, nontender, no hepatosplenomegaly or other masses, no guarding or rebound  M/S:   Gait and station normal  muscle strength 5/5 all 4 ext., normal tone  NEURO:   Cranial nerves 2-12 are normal tested and grossly at patient's baseline, alert, speech clear  PSYCH:  insight and judgement impaired, memory impaired , affect and mood normal    Recent Labs:   CBC RESULTS:   Recent Labs   Lab Test 04/23/18 04/21/18   2020   04/11/18   0620   WBC   --   7.8   --   5.2   RBC   --   3.66*   --   3.18*   HGB  10.3*  10.8*   < >  9.2*   HCT   --   33.3*   --   29.2*   MCV   --   91   --   92   MCH   --   29.5   --    28.9   MCHC   --   32.4   --   31.5   RDW   --   14.5   --   14.4   PLT   --   258   --   211    < > = values in this interval not displayed.       Last Basic Metabolic Panel:  Recent Labs   Lab Test  04/21/18 2020 04/11/18   0620   NA  133  139   POTASSIUM  4.6  4.1   CHLORIDE  100  108   REILLY  8.7  8.2*   CO2  25  27   BUN  31*  18   CR  0.74  0.79   GLC  93  88         Assessment/Plan:  CHF NYHA class III, chronic, systolic (H)  Currently controlled. Sl edema at contusion site LLE-mostly resolved  1. Discontinue comp. Wraps [place]  2. Monitor for LE edema  3. Monitor for c/o LE pain  4. For increased edema, restart comp.stockings    Essential hypertension  Currently controlled  1. Cont.losartan, metoprolol  2. Follow BPs, HRs  3. Cbc,bmp next week    Nausea  Recurrent. Currently stable  1. Cont. prilosec  2. Cont. Prn bowel meds  3. For further increased nausea, may sched. zofran  4. Follow wt.s        Electronically signed by  NICKOLAS Hallman CNP

## 2018-09-28 ENCOUNTER — RECORDS - HEALTHEAST (OUTPATIENT)
Dept: LAB | Facility: CLINIC | Age: 83
End: 2018-09-28

## 2018-10-01 ENCOUNTER — TRANSFERRED RECORDS (OUTPATIENT)
Dept: HEALTH INFORMATION MANAGEMENT | Facility: CLINIC | Age: 83
End: 2018-10-01

## 2018-10-01 VITALS — HEART RATE: 67 BPM | SYSTOLIC BLOOD PRESSURE: 116 MMHG | RESPIRATION RATE: 18 BRPM | DIASTOLIC BLOOD PRESSURE: 57 MMHG

## 2018-10-01 LAB
ANION GAP SERPL CALCULATED.3IONS-SCNC: 7 MMOL/L (ref 5–18)
ANION GAP SERPL CALCULATED.3IONS-SCNC: 7 MMOL/L (ref 5–18)
BASOPHILS # BLD AUTO: 0 THOU/UL (ref 0–0.2)
BASOPHILS NFR BLD AUTO: 1 % (ref 0–2)
BUN SERPL-MCNC: 18 MG/DL (ref 8–28)
BUN SERPL-MCNC: 18 MG/DL (ref 8–28)
CALCIUM SERPL-MCNC: 8.5 MG/DL (ref 8.5–10.5)
CALCIUM SERPL-MCNC: 8.5 MG/DL (ref 8.5–10.5)
CHLORIDE BLD-SCNC: 106 MMOL/L (ref 98–107)
CHLORIDE SERPLBLD-SCNC: 106 MMOL/L (ref 98–107)
CO2 SERPL-SCNC: 23 MMOL/L (ref 22–31)
CO2 SERPL-SCNC: 23 MMOL/L (ref 22–31)
CREAT SERPL-MCNC: 0.77 MG/DL (ref 0.6–1.1)
CREAT SERPL-MCNC: 0.77 MG/DL (ref 0.6–1.1)
DIFFERENTIAL: ABNORMAL
EOSINOPHIL # BLD AUTO: 0.1 THOU/UL (ref 0–0.4)
EOSINOPHIL NFR BLD AUTO: 2 % (ref 0–6)
ERYTHROCYTE [DISTWIDTH] IN BLOOD BY AUTOMATED COUNT: 15 % (ref 11–14.5)
ERYTHROCYTE [DISTWIDTH] IN BLOOD BY AUTOMATED COUNT: 15 % (ref 11–14.5)
GFR SERPL CREATININE-BSD FRML MDRD: >60 ML/MIN/1.73M2
GFR SERPL CREATININE-BSD FRML MDRD: >60 ML/MIN/1.73M2
GLUCOSE BLD-MCNC: 85 MG/DL (ref 70–125)
GLUCOSE SERPL-MCNC: 85 MG/DL (ref 70–125)
HCT VFR BLD AUTO: 31.4 % (ref 35–47)
HCT VFR BLD AUTO: 31.4 % (ref 35–47)
HEMOGLOBIN: 10 G/DL (ref 12–16)
HGB BLD-MCNC: 10 G/DL (ref 12–16)
LYMPHOCYTES # BLD AUTO: 1.2 THOU/UL (ref 0.8–4.4)
LYMPHOCYTES NFR BLD AUTO: 22 % (ref 20–40)
MCH RBC QN AUTO: 27.8 PG (ref 27–34)
MCH RBC QN AUTO: 27.8 PG (ref 27–34)
MCHC RBC AUTO-ENTMCNC: 31.8 G/DL (ref 32–36)
MCHC RBC AUTO-ENTMCNC: 31.8 G/DL (ref 32–36)
MCV RBC AUTO: 87 FL (ref 80–100)
MCV RBC AUTO: 87 FL (ref 80–100)
MONOCYTES # BLD AUTO: 0.8 THOU/UL (ref 0–0.9)
MONOCYTES NFR BLD AUTO: 15 % (ref 2–10)
NEUTROPHILS # BLD AUTO: 3.1 THOU/UL (ref 2–7.7)
NEUTROPHILS NFR BLD AUTO: 60 % (ref 50–70)
PLATELET # BLD AUTO: 207 THOU/UL (ref 140–440)
PLATELET # BLD AUTO: 207 THOU/UL (ref 140–440)
PMV BLD AUTO: 9.9 FL (ref 8.5–12.5)
POTASSIUM BLD-SCNC: 3.9 MMOL/L (ref 3.5–5)
POTASSIUM SERPL-SCNC: 3.9 MMOL/L (ref 3.5–5)
RBC # BLD AUTO: 3.6 MILL/UL (ref 3.8–5.4)
RBC # BLD AUTO: 3.6 MILL/UL (ref 3.8–5.4)
SODIUM SERPL-SCNC: 136 MMOL/L (ref 136–145)
SODIUM SERPL-SCNC: 136 MMOL/L (ref 136–145)
WBC # BLD AUTO: 5.2 THOU/UL (ref 4–11)
WBC: 5.2 THOU/UL (ref 4–11)

## 2018-10-19 ENCOUNTER — ASSISTED LIVING VISIT (OUTPATIENT)
Dept: GERIATRICS | Facility: CLINIC | Age: 83
End: 2018-10-19

## 2018-10-19 ENCOUNTER — ASSISTED LIVING VISIT (OUTPATIENT)
Dept: GERIATRICS | Facility: CLINIC | Age: 83
End: 2018-10-19
Payer: COMMERCIAL

## 2018-10-19 VITALS
RESPIRATION RATE: 16 BRPM | OXYGEN SATURATION: 94 % | SYSTOLIC BLOOD PRESSURE: 154 MMHG | DIASTOLIC BLOOD PRESSURE: 95 MMHG | HEART RATE: 64 BPM

## 2018-10-19 DIAGNOSIS — M25.561 CHRONIC PAIN OF RIGHT KNEE: Primary | ICD-10-CM

## 2018-10-19 DIAGNOSIS — G89.29 CHRONIC PAIN OF RIGHT KNEE: Primary | ICD-10-CM

## 2018-10-19 DIAGNOSIS — R60.0 LOWER EXTREMITY EDEMA: ICD-10-CM

## 2018-10-19 DIAGNOSIS — I10 HYPERTENSION GOAL BP (BLOOD PRESSURE) < 140/90: ICD-10-CM

## 2018-10-19 NOTE — PROGRESS NOTES
[place] [facility]  Chief Complaint   Patient presents with     Annual Comprehensive Exam Assisted Living       [place] Medical Record Number:  [id number]  Place of Service where encounter took place:  [facility] [[id number]]      HPI:    Batsheva Barkley is a 84 year old  (11/6/1933), who is being seen today for an annual comprehensive visit.  HPI information obtained from: facility chart records, facility staff, patient report, [place] Epic chart review and family/first contact dtr report.  Today's concerns are:  Chronic pain of right knee  Recent exac. Amb. Per baseline. No recent trauma. Not using prn tylenol.  Had knee inj in past with good effect    Hypertension goal BP (blood pressure) < 140/90  BPs variable at times, sl elevated today. Cont. On cozaar, lopressor    Lower extremity edema  Stable. Resolving hematoma LLE. No recent increased LE edema        BP goals are  <140/90 mm Hg.This is higher than ACC and AHA recommendations due to risk of dizziness and falls. Noted patients BP is higher than goal and will adjust plan of care by following BPs, possible increase in BP meds for ongoing, further elevated BPs.      ALLERGIES: Codeine and Lisinopril  PROBLEM LIST:  Patient Active Problem List   Diagnosis     Osteoporosis     Advanced directives, counseling/discussion     Concussion     Impacted cerumen     Pelvic fracture: Left( 1/17/15)     Fracture of rib of left side     Constipation     Physical deconditioning     Systolic CHF (H)     Hypertension goal BP (blood pressure) < 140/90     Cough     Edema     Cardiomyopathy (H)     CAD (coronary artery disease)     Stented coronary artery     Other dysphagia     HTN (hypertension)     Total urinary incontinence     CHF NYHA class III, chronic, systolic (H)     Leg weakness, bilateral     Lower extremity edema     Hematoma of left lower extremity     Left leg swelling     Health Care Home     PAST MEDICAL HISTORY:  has a  past medical history of CAD (coronary artery disease) (6/20/05); Cardiomyopathy (H); CHF NYHA class III, chronic, systolic (H) (1/25/2018); Coronary atherosclerosis (6/20/2005); Edema (1/26/2015); Generalized osteoarthrosis; Hypertension goal BP (blood pressure) < 140/90 (1/21/2015); Leg weakness, bilateral (2/13/2018); Mitral regurgitation; Mixed hyperlipidemia; Osteoporosis; Physical deconditioning (1/21/2015); and Total urinary incontinence (12/27/2017).  PAST SURGICAL HISTORY:  has a past surgical history that includes L bunion + hammer toes (1/04, 4/04); ovarian cyst surgery; Colonoscopy (3/05); L total knee replacement (10/2009 ); ENT surgery; Eye surgery; Heart Cath, Angioplasty (2005); and Esophagoscopy, gastroscopy, duodenoscopy (EGD), combined (N/A, 4/14/2016).  FAMILY HISTORY: family history includes Breast Cancer in her sister and sister; Cerebrovascular Disease in her mother; HEART DISEASE in her sister; Respiratory in her father.  SOCIAL HISTORY:  reports that she has quit smoking. She has never used smokeless tobacco. She reports that she does not drink alcohol or use illicit drugs.  IMMUNIZATIONS:  Most Recent Immunizations   Administered Date(s) Administered     Influenza (High Dose) 3 valent vaccine 09/05/2017     Influenza (IIV3) PF 09/24/2014     Pneumo Conj 13-V (2010&after) 09/18/2015     Pneumococcal 23 valent 01/01/2003     TD (ADULT, 7+) 12/19/2005     TDAP Vaccine (Adacel) 09/18/2015     Above immunizations pulled from [facility]. [facility] and facility records also reconciled. Outstanding information sent to  to update [facility].  Future immunizations needed:  yearly influenza per facility protocol  MEDICATIONS:  Current Outpatient Prescriptions   Medication Sig Dispense Refill     Acetaminophen (TYLENOL PO) Take 1,000 mg by mouth 2 times daily And every day prn       alum & mag hydroxide-simethicone (MYLANTA/MAALOX) 200-200-20 MG/5ML SUSP suspension Take 10 mLs by mouth  2 times daily       ASPIRIN 81 MG OR TABS 1 tab po QD (Once per day) 100 3     Atorvastatin Calcium (LIPITOR PO) Take 20 mg by mouth daily       Carboxymethylcellulose Sodium (THERATEARS OP) Place 2 drops into both eyes 4 times daily as needed       loperamide (IMODIUM) 2 MG capsule Take 2 mg by mouth 4 times daily as needed for diarrhea (and 2 mg QAM scheduled)        losartan (COZAAR) 50 MG tablet Take 1 tablet (50 mg) by mouth 2 times daily 180 tablet 3     metoprolol (LOPRESSOR) 50 MG tablet TAKE ONE TABLET BY MOUTH TWICE DAILY 180 tablet 3     Omeprazole (PRILOSEC PO) Take 20 mg by mouth every morning       ONDANSETRON PO Take 4 mg by mouth every 8 hours as needed for nausea       order for DME Light weight wheelchair Body mass index is 19.08 kg/(m^2). 1 Device 0     order for DME ANTOINE stockings- below knee. 1 Package 0     polyethylene glycol (MIRALAX/GLYCOLAX) Packet Take 17 g by mouth daily as needed for constipation 7 packet      senna-docusate (SENOKOT-S;PERICOLACE) 8.6-50 MG per tablet Take 1 tablet by mouth 2 times daily as needed        Medications reviewed:  Medications reconciled to facility chart and changes were made to reflect current medications as identified as above med list. Below are the changes that were made:   Medications stopped since last EPIC medication reconciliation:   There are no discontinued medications.    Medications started since last [facility] medication reconciliation:  Orders Placed This Encounter   Medications     Carboxymethylcellulose Sodium (THERATEARS OP)     Sig: Place 2 drops into both eyes 4 times daily as needed         Case Management:  I have reviewed the Assisted Living care plan, current immunizations and preventive care/cancer screening..Future cancer screening is not clinically indicated secondary to age/goals of care Patient's desire to return to the community is not present. Current Level of Care is appropriate.    Advance Directive Discussion:    I reviewed the  current advanced directives as reflected in EPIC, the POLST and the facility chart, and verified the congruency of orders 10/19/18. I contacted the first party dtr and discussed the plan of Care.  I did review the advance directives with the resident.     Team Discussion:  I communicated with the appropriate disciplines involved with the Plan of Care:   Nursing      Patient Goal:  Patient's goal is pain control and comfort and tx of reversible illness.    Information reviewed:  Medications, vital signs, orders, and nursing notes.    ROS:  No chest pain, shortness of breath, fevers, chills, headache, nausea, vomiting, dysuria or bowel abnormalities.  Appetite is  fair.  No pain except R knee, occ R LE cramping at hs.    Exam:  BP (!) 154/95  Pulse 64  Resp 16  SpO2 94%    GENERAL APPEARANCE:  Alert, in no distress, thin, cooperative  ENT:  Mouth and posterior oropharynx normal, moist mucous membranes, [place]  EYES:  EOM, conjunctivae, lids, pupils and irises normal, PERRL  NECK:  No adenopathy,masses or thyromegaly, FROM  RESP:  respiratory effort and palpation of chest normal, lungs clear to auscultation , no respiratory distress  CV:  Palpation and auscultation of heart done , regular rate and rhythm, no murmur, rub, or gallop, no edema  ABDOMEN:  normal bowel sounds, soft, nontender, no hepatosplenomegaly or other masses, no guarding or rebound  M/S:   Gait and station normal  some pain with PROM R knee  NEURO:   Cranial nerves 2-12 are normal tested and grossly at patient's baseline, alert, speech clear  PSYCH:  memory impaired , affect and mood normal     Lab/Diagnostic data:      CBC RESULTS:   Recent Labs   Lab Test 10/01/18 04/23/18  04/21/18   2020   WBC  5.2   --   7.8   RBC  3.60*   --   3.66*   HGB  10.0*  10.3*  10.8*   HCT  31.4*   --   33.3*   MCV  87   --   91   MCH  27.8   --   29.5   MCHC  31.8*   --   32.4   RDW  15.0*   --   14.5   PLT  207   --   258       Last Basic Metabolic Panel:  Recent Labs    Lab Test 10/01/18  04/21/18   2020   NA  136  133   POTASSIUM  3.9  4.6   CHLORIDE  106  100   REILLY  8.5  8.7   CO2  23  25   BUN  18  31*   CR  0.77  0.74   GLC  85  93       Liver Function Studies -   Recent Labs   Lab Test  04/10/18   1855  01/18/18   1135   PROTTOTAL  6.0*  6.8   ALBUMIN  2.7*  3.8   BILITOTAL  0.3  0.5   ALKPHOS  74  74   AST  44  24   ALT  39  19       ASSESSMENT/PLAN  Chronic pain of right knee  Current exac. Gait steady with walker  1. Start sched. Tylenol bid  2. Refer to [facility] for R knee inj  3. Reassess over next couple week, for ongoing s/s may consider voltaren gel  4. Monitor for changes in gait    Hypertension goal BP (blood pressure) < 140/90  Sl. Elevated today. BP taken after amb  1. Cont. Cozaar, lopressor  2. Follow BPs, HRs  3. For further elevated BPs, may increase BP meds  4. Cbc, bmp in next 1-3 mos    Lower extremity edema  Currently stable  1. Elevate LEs  2. Monitor for increased edema-ace wrap to LLE hematoma area recently dc'd-monitor for changes in skin at site  3. Follow wt.s., monitor for resp. distress    Electronically signed by:  NICKOLAS Hallman CNP

## 2018-10-19 NOTE — PROGRESS NOTES
Ortho Nursing home visit    Batsheva Barkley is a 84 year old female who resides at [place], [place]    Patient is seen today for Chronic Right knee pain , hs of OA, and has had contralateral knee replaced, however, right knee was never done, she has had injections for pain relief, and now family is requesting same;      Past Medical History:   Diagnosis Date     CAD (coronary artery disease) 6/20/05    [place], transient CHF     Cardiomyopathy (H)      CHF NYHA class III, chronic, systolic (H) 1/25/2018     Coronary atherosclerosis 6/20/2005    circ vessel was stented;  had a 50% LAD lesion which was not treated Problem list name updated by automated process. Provider to review     Edema 1/26/2015     Generalized osteoarthrosis     knees ;; L total kne 10/09     Hypertension goal BP (blood pressure) < 140/90 1/21/2015     Leg weakness, bilateral 2/13/2018     Mitral regurgitation     mod     Mixed hyperlipidemia      Osteoporosis      Physical deconditioning 1/21/2015     Total urinary incontinence 12/27/2017      Past Surgical History:   Procedure Laterality Date     COLONOSCOPY  3/05     ENT SURGERY       ESOPHAGOSCOPY, GASTROSCOPY, DUODENOSCOPY (EGD), COMBINED N/A 4/14/2016    Procedure: COMBINED ESOPHAGOSCOPY, GASTROSCOPY, DUODENOSCOPY (EGD), BIOPSY SINGLE OR MULTIPLE;  Surgeon: Jonathan Gramajo MD;  Location:  GI     EYE SURGERY       HEART CATH, ANGIOPLASTY  2005    RONI to left circ     L bunion + hammer toes  1/04, 4/04     L total knee replacement  10/2009      ovarian cyst surgery          Allergies   Allergen Reactions     Codeine      nausea, HA     Lisinopril Cough      There were no vitals taken for this visit.     Exam: Seen today with daughter, and family, seated allows PROM to Right knee WNL/ lig remain intact; no noted effusion or swelling, some crepitus noted, PROM -10/90.  Pain medial joint line;      X-rays show Advanced DJD right knee     ASSESSMENT / PLAN:  After R&B  discussed and verbal consent obtained, Right knee prepped, injected with 1 ml depo medrol, 4 ml 1% lidocaine into knee joint, tolerated well, no complaints.  Plan; F/u 3-4 months prn pain;      [id number]          JWilder OPA-C  [phone]

## 2018-11-28 ENCOUNTER — PATIENT OUTREACH (OUTPATIENT)
Dept: GERIATRIC MEDICINE | Facility: CLINIC | Age: 83
End: 2018-11-28

## 2018-11-28 NOTE — PROGRESS NOTES
[facility] Care Coordination Contact    Rec'd information on 10/30/18 that member's  passed away and client will be terminating her [employer] [facility] due to asset reallocation.  Per E& E staff, client will enroll in [employer].    CC checked MA and [employer] earlier this month, client was still eligible with both.      Rec'd notification from [facility] dated 11/2018 that client's [facility] case has been closed as of 12/1/18 due increased income  CC will f/u with [place] [facility] 12/3/2018.  Rosibel Rhodes RN, BC  Manager [facility] Care Coordinator   [phone]  (Fax)

## 2018-12-03 ENCOUNTER — RECORDS - HEALTHEAST (OUTPATIENT)
Dept: LAB | Facility: CLINIC | Age: 83
End: 2018-12-03

## 2018-12-03 NOTE — PROGRESS NOTES
[facility] Care Coordination Contact    [place] [facility] and [employer] [facility] show client eligible for MA and MSHO  CC to f/u 1/2/19.  Rosibel Rhodes RN, BC  Manager [facility] Care Coordinator   [phone]  (Fax)

## 2018-12-04 ENCOUNTER — TRANSFERRED RECORDS (OUTPATIENT)
Dept: HEALTH INFORMATION MANAGEMENT | Facility: CLINIC | Age: 83
End: 2018-12-04

## 2018-12-04 LAB
ALT SERPL W P-5'-P-CCNC: 14 U/L (ref 0–45)
ALT SERPL-CCNC: 14 U/L (ref 0–45)
AST SERPL W P-5'-P-CCNC: 23 U/L (ref 0–40)
AST SERPL-CCNC: 23 U/L (ref 0–40)
CHOLEST SERPL-MCNC: 183 MG/DL
CHOLEST SERPL-MCNC: 183 MG/DL
FASTING STATUS PATIENT QL REPORTED: NORMAL
HDLC SERPL-MCNC: 84 MG/DL
HDLC SERPL-MCNC: 84 MG/DL
LDLC SERPL CALC-MCNC: 82 MG/DL
LDLC SERPL CALC-MCNC: 82 MG/DL
TRIGL SERPL-MCNC: 84 MG/DL
TRIGL SERPL-MCNC: 84 MG/DL

## 2018-12-27 ENCOUNTER — PATIENT OUTREACH (OUTPATIENT)
Dept: GERIATRIC MEDICINE | Facility: CLINIC | Age: 83
End: 2018-12-27

## 2018-12-27 NOTE — PROGRESS NOTES
[facility] Care Coordination Contact    Attempted to reach client, number is no longer correct. Epic notes reviewed.Called Patricia MORGAN at [facility] requesting a return call to inquire on how client and request telephone number.   Rosibel Rhodes RN, BC  Manager [facility] Care Coordinator   [phone]  (Fax)

## 2018-12-28 NOTE — PROGRESS NOTES
[facility] Care Coordination Contact      [facility] Six-Month Telephone Assessment    6 month telephone assessment completed on 12/28/18.  Call placed to [facility] nursing office, spoke with Kimmy. Kimmy reports that client is stable, no changes. Kimmy reports that the passing of her spouse has been hard, but client is doing well, has support of family and friends.     ER visits: No  Hospitalizations: No  TCU stays: No  Significant health status changes: none   Falls/Injuries: Yes: per nursing staff last fall 11/2018, no injuries  ADL/IADL changes: No  Changes in services: No    Caregiver Assessment follow up: N/A    Goals: See POC in chart for goal progress documentation.   Client did see podiatrist, daughter has information on scheduling any f/u appt's.   Client will discharge from [facility] due to income, CC to follow.  Encouraged member to call CC with any questions or concerns in the meantime.   12/31/18 Rec'd vm from Patricia MORGAN at [facility] to report that client is doing well, no new changes, stating that Batsheva is stable and doing pretty well.  Rosibel Rhodes RN, BC  Manager [facility] Care Coordinator   [phone]  (Fax)

## 2019-01-03 ENCOUNTER — ASSISTED LIVING VISIT (OUTPATIENT)
Dept: GERIATRICS | Facility: CLINIC | Age: 84
End: 2019-01-03
Payer: COMMERCIAL

## 2019-01-03 DIAGNOSIS — I10 ESSENTIAL HYPERTENSION: ICD-10-CM

## 2019-01-03 DIAGNOSIS — R05.9 COUGH: Primary | ICD-10-CM

## 2019-01-03 DIAGNOSIS — R07.9 CHEST PAIN, UNSPECIFIED TYPE: ICD-10-CM

## 2019-01-03 NOTE — LETTER
1/3/2019        RE: Batsheva Barkley  [facility]  [address]  [facility] [address]        [place] [facility]    No chief complaint on file.      [place] Medical Record Number:  [id number]  Place of Service where encounter took place:  [facility] ([facility]) [[id number]]    HPI:    Batsheva Barkley is a 85 year old  (11/6/1933), who is being seen today for an episodic care visit.  HPI information obtained from: facility chart records, facility staff, patient report and [facility] chart review.Today's concern is: cough, chest pain, HTN. This am reports some chest pain, cough.  Has CHF, HTN. BP stable. Cont. On cozaar, lopressor.  For GERD takes prilosec. Afebrile. O2 sats stable. Some increased chest discomfort with deep inspiration. No apparent resp. Distress.        ALLERGIES: Codeine and Lisinopril  Past Medical, Surgical, Family and Social History reviewed and updated in [facility].    Current Outpatient Medications   Medication Sig Dispense Refill     Acetaminophen (TYLENOL PO) Take 1,000 mg by mouth 2 times daily And every day prn       alum & mag hydroxide-simethicone (MYLANTA/MAALOX) 200-200-20 MG/5ML SUSP suspension Take 10 mLs by mouth 2 times daily       ASPIRIN 81 MG OR TABS 1 tab po QD (Once per day) 100 3     Atorvastatin Calcium (LIPITOR PO) Take 20 mg by mouth daily       Carboxymethylcellulose Sodium (THERATEARS OP) Place 2 drops into both eyes 4 times daily as needed       loperamide (IMODIUM) 2 MG capsule Take 2 mg by mouth 4 times daily as needed for diarrhea (and 2 mg QAM scheduled)        losartan (COZAAR) 50 MG tablet Take 1 tablet (50 mg) by mouth 2 times daily 180 tablet 3     metoprolol (LOPRESSOR) 50 MG tablet TAKE ONE TABLET BY MOUTH TWICE DAILY 180 tablet 3     Omeprazole (PRILOSEC PO) Take 20 mg by mouth every morning       ONDANSETRON PO Take 4 mg by mouth every 8 hours as needed for nausea       order for DME Light weight wheelchair  Body mass index is 19.08 kg/(m^2). 1 Device 0     order for DME ANTOINE stockings- below knee. 1 Package 0     polyethylene glycol (MIRALAX/GLYCOLAX) Packet Take 17 g by mouth daily as needed for constipation 7 packet      senna-docusate (SENOKOT-S;PERICOLACE) 8.6-50 MG per tablet Take 1 tablet by mouth 2 times daily as needed        Medications reviewed:  Medications reconciled to facility chart and changes were made to reflect current medications as identified as above med list. Below are the changes that were made:   Medications stopped since last EPIC medication reconciliation:   There are no discontinued medications.    Medications started since last [facility] medication reconciliation:  No orders of the defined types were placed in this encounter.        REVIEW OF SYSTEMS:  CONSTITUTIONAL:  body aches, EYES:  neg, ENT:  [place], CV:  chest pain, RESPIRATORY: cough, :  neg, GI:  neg, NEURO:  neg, PSYCH: neg and MUSCULOSKELETAL: neg    Physical Exam:  There were no vitals taken for this visit.  GENERAL APPEARANCE:  Alert, cooperative  ENT:  Mouth and posterior oropharynx normal, moist mucous membranes, [place]  EYES:  EOM, conjunctivae, lids, pupils and irises normal, PERRL  NECK:  No adenopathy,masses or thyromegaly, FROM, no carotid bruit  RESP:  respiratory effort and palpation of chest normal, lungs clear to auscultation , no respiratory distress, diminished breath sounds bibasilar  CV:  Palpation and auscultation of heart done , irregular rhythm : sl. irreg, rate-normal  ABDOMEN:  normal bowel sounds, soft, nontender, no hepatosplenomegaly or other masses, no guarding or rebound  M/S:   Gait and station normal  muscle strength 5/5 all 4 ext. some discomfort with palp. of L chest, L upper back. some pain with ROM L shoulder  NEURO:   Cranial nerves 2-12 are normal tested and grossly at patient's baseline, alert, speech clear  PSYCH:  insight and judgement impaired, memory impaired , affect and mood normal    Recent Labs:      CBC RESULTS:   Recent Labs   Lab Test 10/01/18 04/23/18 04/21/18  2020   WBC 5.2  --  7.8   RBC 3.60*  --  3.66*   HGB 10.0* 10.3* 10.8*   HCT 31.4*  --  33.3*   MCV 87  --  91   MCH 27.8  --  29.5   MCHC 31.8*  --  32.4   RDW 15.0*  --  14.5     --  258       Last Basic Metabolic Panel:  Recent Labs   Lab Test 10/01/18 04/21/18  2020    133   POTASSIUM 3.9 4.6   CHLORIDE 106 100   REILLY 8.5 8.7   CO2 23 25   BUN 18 31*   CR 0.77 0.74   GLC 85 93       Liver Function Studies -   Recent Labs   Lab Test 12/04/18 04/10/18  1855 01/18/18  1135   PROTTOTAL  --  6.0* 6.8   ALBUMIN  --  2.7* 3.8   BILITOTAL  --  0.3 0.5   ALKPHOS  --  74 74   AST 23 44 24   ALT 14 39 19       Assessment/Plan:  Cough  Newer onset, afebrile  1. Check CXR  2. Start mucinex  3. Follow O2 sats    Chest pain, unspecified type  Appears MSK in nature, possibly r/t #1. O2 sats, BP, HR stable  1. CXR as above  2. Cont. Tylenol  3. Monitor for further reports of chest pain    Essential hypertension  Currently controlled  1. Cont. Cozaar, lopressor  2. Follow  BPs, HRs  3. Cbc, bmp in next 1-3 mos        Electronically signed by  Noelle Kincaid                      Sincerely,        NICKOLAS Hallman CNP

## 2019-01-03 NOTE — PROGRESS NOTES
[place] [facility]    No chief complaint on file.      [place] Medical Record Number:  [id number]  Place of Service where encounter took place:  [facility] ([facility] [[id number]]    HPI:    Batsheva Barkley is a 85 year old  (11/6/1933), who is being seen today for an episodic care visit.  HPI information obtained from: facility chart records, facility staff, patient report and [facility] chart review.Today's concern is: cough, chest pain, HTN. This am reports some chest pain, cough.  Has CHF, HTN. BP stable. Cont. On cozaar, lopressor.  For GERD takes prilosec. Afebrile. O2 sats stable. Some increased chest discomfort with deep inspiration. No apparent resp. Distress.        ALLERGIES: Codeine and Lisinopril  Past Medical, Surgical, Family and Social History reviewed and updated in [facility].    Current Outpatient Medications   Medication Sig Dispense Refill     Acetaminophen (TYLENOL PO) Take 1,000 mg by mouth 2 times daily And every day prn       alum & mag hydroxide-simethicone (MYLANTA/MAALOX) 200-200-20 MG/5ML SUSP suspension Take 10 mLs by mouth 2 times daily       ASPIRIN 81 MG OR TABS 1 tab po QD (Once per day) 100 3     Atorvastatin Calcium (LIPITOR PO) Take 20 mg by mouth daily       Carboxymethylcellulose Sodium (THERATEARS OP) Place 2 drops into both eyes 4 times daily as needed       loperamide (IMODIUM) 2 MG capsule Take 2 mg by mouth 4 times daily as needed for diarrhea (and 2 mg QAM scheduled)        losartan (COZAAR) 50 MG tablet Take 1 tablet (50 mg) by mouth 2 times daily 180 tablet 3     metoprolol (LOPRESSOR) 50 MG tablet TAKE ONE TABLET BY MOUTH TWICE DAILY 180 tablet 3     Omeprazole (PRILOSEC PO) Take 20 mg by mouth every morning       ONDANSETRON PO Take 4 mg by mouth every 8 hours as needed for nausea       order for DME Light weight wheelchair Body mass index is 19.08 kg/(m^2). 1 Device 0     order for DME ANTOINE stockings- below knee. 1 Package 0     polyethylene  glycol (MIRALAX/GLYCOLAX) Packet Take 17 g by mouth daily as needed for constipation 7 packet      senna-docusate (SENOKOT-S;PERICOLACE) 8.6-50 MG per tablet Take 1 tablet by mouth 2 times daily as needed        Medications reviewed:  Medications reconciled to facility chart and changes were made to reflect current medications as identified as above med list. Below are the changes that were made:   Medications stopped since last EPIC medication reconciliation:   There are no discontinued medications.    Medications started since last [facility] medication reconciliation:  No orders of the defined types were placed in this encounter.        REVIEW OF SYSTEMS:  CONSTITUTIONAL:  body aches, EYES:  neg, ENT:  [place], CV:  chest pain, RESPIRATORY: cough, :  neg, GI:  neg, NEURO:  neg, PSYCH: neg and MUSCULOSKELETAL: neg    Physical Exam:  There were no vitals taken for this visit.  GENERAL APPEARANCE:  Alert, cooperative  ENT:  Mouth and posterior oropharynx normal, moist mucous membranes, [place]  EYES:  EOM, conjunctivae, lids, pupils and irises normal, PERRL  NECK:  No adenopathy,masses or thyromegaly, FROM, no carotid bruit  RESP:  respiratory effort and palpation of chest normal, lungs clear to auscultation , no respiratory distress, diminished breath sounds bibasilar  CV:  Palpation and auscultation of heart done , irregular rhythm : sl. irreg, rate-normal  ABDOMEN:  normal bowel sounds, soft, nontender, no hepatosplenomegaly or other masses, no guarding or rebound  M/S:   Gait and station normal  muscle strength 5/5 all 4 ext. some discomfort with palp. of L chest, L upper back. some pain with ROM L shoulder  NEURO:   Cranial nerves 2-12 are normal tested and grossly at patient's baseline, alert, speech clear  PSYCH:  insight and judgement impaired, memory impaired , affect and mood normal    Recent Labs:     CBC RESULTS:   Recent Labs   Lab Test 10/01/18 04/23/18 04/21/18  2020   WBC 5.2  --  7.8   RBC 3.60*  --  3.66*    HGB 10.0* 10.3* 10.8*   HCT 31.4*  --  33.3*   MCV 87  --  91   MCH 27.8  --  29.5   MCHC 31.8*  --  32.4   RDW 15.0*  --  14.5     --  258       Last Basic Metabolic Panel:  Recent Labs   Lab Test 10/01/18 04/21/18  2020    133   POTASSIUM 3.9 4.6   CHLORIDE 106 100   REILLY 8.5 8.7   CO2 23 25   BUN 18 31*   CR 0.77 0.74   GLC 85 93       Liver Function Studies -   Recent Labs   Lab Test 12/04/18 04/10/18  1855 01/18/18  1135   PROTTOTAL  --  6.0* 6.8   ALBUMIN  --  2.7* 3.8   BILITOTAL  --  0.3 0.5   ALKPHOS  --  74 74   AST 23 44 24   ALT 14 39 19       Assessment/Plan:  Cough  Newer onset, afebrile  1. Check CXR  2. Start mucinex  3. Follow O2 sats    Chest pain, unspecified type  Appears MSK in nature, possibly r/t #1. O2 sats, BP, HR stable  1. CXR as above  2. Cont. Tylenol  3. Monitor for further reports of chest pain    Essential hypertension  Currently controlled  1. Cont. Cozaar, lopressor  2. Follow  BPs, HRs  3. Cbc, bmp in next 1-3 mos        Electronically signed by  Noelle Kincaid

## 2019-01-04 VITALS
OXYGEN SATURATION: 94 % | DIASTOLIC BLOOD PRESSURE: 76 MMHG | RESPIRATION RATE: 18 BRPM | HEART RATE: 74 BPM | TEMPERATURE: 97.5 F | SYSTOLIC BLOOD PRESSURE: 131 MMHG

## 2019-01-16 ENCOUNTER — TELEPHONE (OUTPATIENT)
Dept: FAMILY MEDICINE | Facility: CLINIC | Age: 84
End: 2019-01-16

## 2019-01-17 ENCOUNTER — ASSISTED LIVING VISIT (OUTPATIENT)
Dept: GERIATRICS | Facility: CLINIC | Age: 84
End: 2019-01-17
Payer: COMMERCIAL

## 2019-01-17 VITALS
RESPIRATION RATE: 18 BRPM | TEMPERATURE: 98 F | HEART RATE: 68 BPM | DIASTOLIC BLOOD PRESSURE: 76 MMHG | SYSTOLIC BLOOD PRESSURE: 123 MMHG

## 2019-01-17 DIAGNOSIS — R29.898 WEAKNESS OF RIGHT LOWER EXTREMITY: ICD-10-CM

## 2019-01-17 DIAGNOSIS — M17.11 PRIMARY OSTEOARTHRITIS OF RIGHT KNEE: Primary | ICD-10-CM

## 2019-01-17 DIAGNOSIS — I10 ESSENTIAL HYPERTENSION: ICD-10-CM

## 2019-01-17 RX ORDER — ACETAMINOPHEN 500 MG
1000 TABLET ORAL 3 TIMES DAILY
COMMUNITY

## 2019-01-17 RX ORDER — TROLAMINE SALICYLATE 10 G/100G
CREAM TOPICAL 3 TIMES DAILY
COMMUNITY
End: 2020-08-07

## 2019-01-17 NOTE — LETTER
1/17/2019        RE: Bathseva Barkley  [facility]  [address]  [facility] [address]        [place] [facility]    Chief Complaint   Patient presents with     Knee Pain       [place] Medical Record Number:  [id number]  Place of Service where encounter took place:  [facility] ([facility] [[id number]]    HPI:    Batsheva Barkley is a 85 year old  (11/6/1933), who is being seen today for an episodic care visit.  HPI information obtained from: facility chart records, facility staff, patient report, [facility] chart review and family/first contact dtr report.Today's concern is: R knee pain, weakness, HTN.  Has h/o OA R knee. S/p L TKA. Taking bid tylenol. Reports sig increased R knee past over past day with increased diff. Amb. Standing. Uses walker. Feels gait less steady at times. For HTN taking lopressor. BPs, HRs stable. No recent reports of dizziness or falls. Has had knee inj in past which were effective.       ALLERGIES: Codeine and Lisinopril  Past Medical, Surgical, Family and Social History reviewed and updated in [facility].    Current Outpatient Medications   Medication Sig Dispense Refill     acetaminophen (TYLENOL) 500 MG tablet Take 1,000 mg by mouth 3 times daily And every day prn       trolamine salicylate (ASPERCREME) 10 % external cream Apply topically 2 times daily       alum & mag hydroxide-simethicone (MYLANTA/MAALOX) 200-200-20 MG/5ML SUSP suspension Take 10 mLs by mouth 2 times daily       ASPIRIN 81 MG OR TABS 1 tab po QD (Once per day) 100 3     Atorvastatin Calcium (LIPITOR PO) Take 20 mg by mouth daily       Carboxymethylcellulose Sodium (THERATEARS OP) Place 2 drops into both eyes 4 times daily as needed       loperamide (IMODIUM) 2 MG capsule Take 2 mg by mouth 4 times daily as needed for diarrhea (and 2 mg QAM scheduled)        losartan (COZAAR) 50 MG tablet Take 1 tablet (50 mg) by mouth 2 times daily 180 tablet 3     metoprolol (LOPRESSOR)  50 MG tablet TAKE ONE TABLET BY MOUTH TWICE DAILY 180 tablet 3     Omeprazole (PRILOSEC PO) Take 20 mg by mouth every morning       ONDANSETRON PO Take 4 mg by mouth every 8 hours as needed for nausea       order for DME Light weight wheelchair Body mass index is 19.08 kg/(m^2). 1 Device 0     order for DME ANTOINE stockings- below knee. 1 Package 0     polyethylene glycol (MIRALAX/GLYCOLAX) Packet Take 17 g by mouth daily as needed for constipation 7 packet      senna-docusate (SENOKOT-S;PERICOLACE) 8.6-50 MG per tablet Take 1 tablet by mouth 2 times daily as needed        Medications reviewed:  Medications reconciled to facility chart and changes were made to reflect current medications as identified as above med list. Below are the changes that were made:   Medications stopped since last EPIC medication reconciliation:   There are no discontinued medications.    Medications started since last [facility] medication reconciliation:  No orders of the defined types were placed in this encounter.        REVIEW OF SYSTEMS:  No chest pain, shortness of breath, fevers, chills, headache, nausea, vomiting, dysuria or bowel abnormalities.  Appetite is  fair.  No pain except R knee.    Physical Exam:  /76   Pulse 68   Temp 98  F (36.7  C)   Resp 18   GENERAL APPEARANCE:  Alert, in no distress, cooperative  ENT:  Mouth and posterior oropharynx normal, moist mucous membranes, [place]  EYES:  EOM, conjunctivae, lids, pupils and irises normal, PERRL  NECK:  No adenopathy,masses or thyromegaly, FROM  RESP:  respiratory effort and palpation of chest normal, lungs clear to auscultation , no respiratory distress  CV:  Palpation and auscultation of heart done , regular rate and rhythm, no murmur, rub, or gallop, no edema  ABDOMEN:  normal bowel sounds, soft, nontender, no hepatosplenomegaly or other masses, no guarding or rebound  M/S:   some diff. standing from seated position. gen LE weakness. pain with ROM R knee, crepitus  present  NEURO:   Cranial nerves 2-12 are normal tested and grossly at patient's baseline, alert, speech clear  PSYCH:  insight and judgement impaired, memory impaired , affect and mood normal    Recent Labs:     CBC RESULTS:   Recent Labs   Lab Test 10/01/18 04/23/18 04/21/18  2020   WBC 5.2  --  7.8   RBC 3.60*  --  3.66*   HGB 10.0* 10.3* 10.8*   HCT 31.4*  --  33.3*   MCV 87  --  91   MCH 27.8  --  29.5   MCHC 31.8*  --  32.4   RDW 15.0*  --  14.5     --  258       Last Basic Metabolic Panel:  Recent Labs   Lab Test 10/01/18 04/21/18  2020    133   POTASSIUM 3.9 4.6   CHLORIDE 106 100   REILLY 8.5 8.7   CO2 23 25   BUN 18 31*   CR 0.77 0.74   GLC 85 93       Liver Function Studies -   Recent Labs   Lab Test 12/04/18 04/10/18  1855 01/18/18  1135   PROTTOTAL  --  6.0* 6.8   ALBUMIN  --  2.7* 3.8   BILITOTAL  --  0.3 0.5   ALKPHOS  --  74 74   AST 23 44 24   ALT 14 39 19       Assessment/Plan:  Primary osteoarthritis of right knee  Increased pain  1. Increase sched. Tylenol to tid  2. Start bid aspercreme to R knee  3. Refer to [facility] for R knee inj. Reassess over next week    Weakness of right lower extremity  R/t#1. Increased diff. Standing from seated position  1. Address R knee pain as above  2. Encourage amb. As thu  3. Monitor for ongoing LE weakness, further changes in gait  4. For above, refer to PT    Essential hypertension  Currently controlled  1. Cont. Lopressor  2. Follow BPs, HRs  3. Monitor for dizziness  4. Bmp in  Next 1-3 mos        Electronically signed by  NICKOLAS Hallman CNP                      Sincerely,        NICKOLAS Hallman CNP

## 2019-01-17 NOTE — PROGRESS NOTES
[place] [facility]    Chief Complaint   Patient presents with     Knee Pain       [place] Medical Record Number:  [id number]  Place of Service where encounter took place:  [facility] ([facility] [[id number]]    HPI:    Batsheva Barkley is a 85 year old  (11/6/1933), who is being seen today for an episodic care visit.  HPI information obtained from: facility chart records, facility staff, patient report, [facility] chart review and family/first contact dtr report.Today's concern is: R knee pain, weakness, HTN.  Has h/o OA R knee. S/p L TKA. Taking bid tylenol. Reports sig increased R knee past over past day with increased diff. Amb. Standing. Uses walker. Feels gait less steady at times. For HTN taking lopressor. BPs, HRs stable. No recent reports of dizziness or falls. Has had knee inj in past which were effective.       ALLERGIES: Codeine and Lisinopril  Past Medical, Surgical, Family and Social History reviewed and updated in [facility].    Current Outpatient Medications   Medication Sig Dispense Refill     acetaminophen (TYLENOL) 500 MG tablet Take 1,000 mg by mouth 3 times daily And every day prn       trolamine salicylate (ASPERCREME) 10 % external cream Apply topically 2 times daily       alum & mag hydroxide-simethicone (MYLANTA/MAALOX) 200-200-20 MG/5ML SUSP suspension Take 10 mLs by mouth 2 times daily       ASPIRIN 81 MG OR TABS 1 tab po QD (Once per day) 100 3     Atorvastatin Calcium (LIPITOR PO) Take 20 mg by mouth daily       Carboxymethylcellulose Sodium (THERATEARS OP) Place 2 drops into both eyes 4 times daily as needed       loperamide (IMODIUM) 2 MG capsule Take 2 mg by mouth 4 times daily as needed for diarrhea (and 2 mg QAM scheduled)        losartan (COZAAR) 50 MG tablet Take 1 tablet (50 mg) by mouth 2 times daily 180 tablet 3     metoprolol (LOPRESSOR) 50 MG tablet TAKE ONE TABLET BY MOUTH TWICE DAILY 180 tablet 3     Omeprazole (PRILOSEC PO) Take 20 mg by mouth every  morning       ONDANSETRON PO Take 4 mg by mouth every 8 hours as needed for nausea       order for DME Light weight wheelchair Body mass index is 19.08 kg/(m^2). 1 Device 0     order for DME ANTOINE stockings- below knee. 1 Package 0     polyethylene glycol (MIRALAX/GLYCOLAX) Packet Take 17 g by mouth daily as needed for constipation 7 packet      senna-docusate (SENOKOT-S;PERICOLACE) 8.6-50 MG per tablet Take 1 tablet by mouth 2 times daily as needed        Medications reviewed:  Medications reconciled to facility chart and changes were made to reflect current medications as identified as above med list. Below are the changes that were made:   Medications stopped since last EPIC medication reconciliation:   There are no discontinued medications.    Medications started since last [facility] medication reconciliation:  No orders of the defined types were placed in this encounter.        REVIEW OF SYSTEMS:  No chest pain, shortness of breath, fevers, chills, headache, nausea, vomiting, dysuria or bowel abnormalities.  Appetite is  fair.  No pain except R knee.    Physical Exam:  /76   Pulse 68   Temp 98  F (36.7  C)   Resp 18   GENERAL APPEARANCE:  Alert, in no distress, cooperative  ENT:  Mouth and posterior oropharynx normal, moist mucous membranes, [place]  EYES:  EOM, conjunctivae, lids, pupils and irises normal, PERRL  NECK:  No adenopathy,masses or thyromegaly, FROM  RESP:  respiratory effort and palpation of chest normal, lungs clear to auscultation , no respiratory distress  CV:  Palpation and auscultation of heart done , regular rate and rhythm, no murmur, rub, or gallop, no edema  ABDOMEN:  normal bowel sounds, soft, nontender, no hepatosplenomegaly or other masses, no guarding or rebound  M/S:   some diff. standing from seated position. gen LE weakness. pain with ROM R knee, crepitus present  NEURO:   Cranial nerves 2-12 are normal tested and grossly at patient's baseline, alert, speech clear  PSYCH:  insight  and judgement impaired, memory impaired , affect and mood normal    Recent Labs:     CBC RESULTS:   Recent Labs   Lab Test 10/01/18 04/23/18 04/21/18  2020   WBC 5.2  --  7.8   RBC 3.60*  --  3.66*   HGB 10.0* 10.3* 10.8*   HCT 31.4*  --  33.3*   MCV 87  --  91   MCH 27.8  --  29.5   MCHC 31.8*  --  32.4   RDW 15.0*  --  14.5     --  258       Last Basic Metabolic Panel:  Recent Labs   Lab Test 10/01/18 04/21/18  2020    133   POTASSIUM 3.9 4.6   CHLORIDE 106 100   REILLY 8.5 8.7   CO2 23 25   BUN 18 31*   CR 0.77 0.74   GLC 85 93       Liver Function Studies -   Recent Labs   Lab Test 12/04/18 04/10/18  1855 01/18/18  1135   PROTTOTAL  --  6.0* 6.8   ALBUMIN  --  2.7* 3.8   BILITOTAL  --  0.3 0.5   ALKPHOS  --  74 74   AST 23 44 24   ALT 14 39 19       Assessment/Plan:  Primary osteoarthritis of right knee  Increased pain  1. Increase sched. Tylenol to tid  2. Start bid aspercreme to R knee  3. Refer to [facility] for R knee inj. Reassess over next week    Weakness of right lower extremity  R/t#1. Increased diff. Standing from seated position  1. Address R knee pain as above  2. Encourage amb. As thu  3. Monitor for ongoing LE weakness, further changes in gait  4. For above, refer to PT    Essential hypertension  Currently controlled  1. Cont. Lopressor  2. Follow BPs, HRs  3. Monitor for dizziness  4. Bmp in  Next 1-3 mos        Electronically signed by  NICKOLAS Hallman CNP

## 2019-01-18 ENCOUNTER — NURSING HOME VISIT (OUTPATIENT)
Dept: GERIATRICS | Facility: CLINIC | Age: 84
End: 2019-01-18
Payer: COMMERCIAL

## 2019-01-18 DIAGNOSIS — M17.11 PRIMARY OSTEOARTHRITIS OF RIGHT KNEE: Primary | ICD-10-CM

## 2019-01-18 NOTE — PROGRESS NOTES
Ortho Nursing home visit    Batsheva Barkley is a 85 year old female who resides at [facility]    Patient is seen today for Right knee pain with prior injection of cortisone, helpful to relieve pain.      Past Medical History:   Diagnosis Date     CAD (coronary artery disease) 6/20/05    [place], transient CHF     Cardiomyopathy (H)      CHF NYHA class III, chronic, systolic (H) 1/25/2018     Coronary atherosclerosis 6/20/2005    circ vessel was stented;  had a 50% LAD lesion which was not treated Problem list name updated by automated process. Provider to review     Edema 1/26/2015     Generalized osteoarthrosis     knees ;; L total kne 10/09     Hypertension goal BP (blood pressure) < 140/90 1/21/2015     Leg weakness, bilateral 2/13/2018     Mitral regurgitation     mod     Mixed hyperlipidemia      Osteoporosis      Physical deconditioning 1/21/2015     Total urinary incontinence 12/27/2017      Past Surgical History:   Procedure Laterality Date     COLONOSCOPY  3/05     ENT SURGERY       ESOPHAGOSCOPY, GASTROSCOPY, DUODENOSCOPY (EGD), COMBINED N/A 4/14/2016    Procedure: COMBINED ESOPHAGOSCOPY, GASTROSCOPY, DUODENOSCOPY (EGD), BIOPSY SINGLE OR MULTIPLE;  Surgeon: Jonathan Gramajo MD;  Location:  GI     EYE SURGERY       HEART CATH, ANGIOPLASTY  2005    RONI to left circ     L bunion + hammer toes  1/04, 4/04     L total knee replacement  10/2009      ovarian cyst surgery          Allergies   Allergen Reactions     Codeine      nausea, HA     Lisinopril Cough      There were no vitals taken for this visit.     Exam: Seated, allows PROM / lig intact, no noted swelling or effusion.. Pain medial joint space.      X-rays show: moderate DJD right knee    ASSESSMENT / PLAN:  After R&B discussed, verbal consent,   Right knee prepped, injected with 1 ml depo medrol, 4 c 1% lidocaine into knee joint. Tolerated well, no complaints,          Plan; F/U 3-4 months prn    [id number]          [place]  [facility]-  [phone]

## 2019-01-22 ENCOUNTER — ASSISTED LIVING VISIT (OUTPATIENT)
Dept: GERIATRICS | Facility: CLINIC | Age: 84
End: 2019-01-22
Payer: COMMERCIAL

## 2019-01-22 VITALS
OXYGEN SATURATION: 92 % | HEART RATE: 69 BPM | SYSTOLIC BLOOD PRESSURE: 131 MMHG | RESPIRATION RATE: 16 BRPM | DIASTOLIC BLOOD PRESSURE: 78 MMHG

## 2019-01-22 DIAGNOSIS — R60.0 LOWER EXTREMITY EDEMA: ICD-10-CM

## 2019-01-22 DIAGNOSIS — M25.561 ACUTE PAIN OF RIGHT KNEE: Primary | ICD-10-CM

## 2019-01-22 DIAGNOSIS — R29.898 LEG WEAKNESS, BILATERAL: ICD-10-CM

## 2019-01-22 PROBLEM — Z76.89 HEALTH CARE HOME: Status: ACTIVE | Noted: 2018-06-12

## 2019-01-22 NOTE — PROGRESS NOTES
[place] [facility]    Chief Complaint   Patient presents with     Knee Pain       [place] Medical Record Number:  [id number]  Place of Service where encounter took place:  [facility] [[id number]]    HPI:    Batsheva Barkley is a 85 year old  (11/6/1933), who is being seen today for an episodic care visit.  HPI information obtained from: facility chart records, facility staff, patient report and [facility] chart review.Today's concern is: R knee pain, weakness, edema. Has OA of R knee. Exac. Over past week. Diff. With amb, standing from seated position.  Has sig. Decreased act. Level. Started on aspercreme. Had R knee inj per [facility]. Reports R knee pain sig. Improved. Also taking tylenol. Act. Level back to baseline. Gait steady with walker. Improved LE strength-standing from seated position without difficulty. No recent exac of LE edema. Had mary hose dc'elda Not taking diuretics.       ALLERGIES: Codeine and Lisinopril  Past Medical, Surgical, Family and Social History reviewed and updated in [facility].    Current Outpatient Medications   Medication Sig Dispense Refill     acetaminophen (TYLENOL) 500 MG tablet Take 1,000 mg by mouth 3 times daily And every day prn       alum & mag hydroxide-simethicone (MYLANTA/MAALOX) 200-200-20 MG/5ML SUSP suspension Take 10 mLs by mouth 2 times daily       ASPIRIN 81 MG OR TABS 1 tab po QD (Once per day) 100 3     Atorvastatin Calcium (LIPITOR PO) Take 20 mg by mouth daily       Carboxymethylcellulose Sodium (THERATEARS OP) Place 2 drops into both eyes 4 times daily as needed       loperamide (IMODIUM) 2 MG capsule Take 2 mg by mouth 4 times daily as needed for diarrhea (and 2 mg QAM scheduled)        losartan (COZAAR) 50 MG tablet Take 1 tablet (50 mg) by mouth 2 times daily 180 tablet 3     metoprolol (LOPRESSOR) 50 MG tablet TAKE ONE TABLET BY MOUTH TWICE DAILY 180 tablet 3     Omeprazole (PRILOSEC PO) Take 20 mg by mouth every  morning       ONDANSETRON PO Take 4 mg by mouth every 8 hours as needed for nausea       order for DME Light weight wheelchair Body mass index is 19.08 kg/(m^2). 1 Device 0     order for DME ANTOINE stockings- below knee. 1 Package 0     polyethylene glycol (MIRALAX/GLYCOLAX) Packet Take 17 g by mouth daily as needed for constipation 7 packet      senna-docusate (SENOKOT-S;PERICOLACE) 8.6-50 MG per tablet Take 1 tablet by mouth 2 times daily as needed        trolamine salicylate (ASPERCREME) 10 % external cream Apply topically 2 times daily       Medications reviewed:  Medications reconciled to facility chart and changes were made to reflect current medications as identified as above med list. Below are the changes that were made:   Medications stopped since last EPIC medication reconciliation:   There are no discontinued medications.    Medications started since last [facility] medication reconciliation:  No orders of the defined types were placed in this encounter.        REVIEW OF SYSTEMS:  Limited secondary to cognitive impairment but today pt reports minimal R knee pain. No GI distress    Physical Exam:  /78   Pulse 69   Resp 16   SpO2 92%   GENERAL APPEARANCE:  Alert, in no distress, cooperative  ENT:  Mouth and posterior oropharynx normal, moist mucous membranes, [place]  EYES:  EOM, conjunctivae, lids, pupils and irises normal, PERRL  NECK:  No adenopathy,masses or thyromegaly, FROM  RESP:  respiratory effort and palpation of chest normal, lungs clear to auscultation , no respiratory distress  CV:  Palpation and auscultation of heart done , regular rate and rhythm, no murmur, rub, or gallop, no edema  ABDOMEN:  normal bowel sounds, soft, nontender, no hepatosplenomegaly or other masses, no guarding or rebound  M/S:   gait slowed, steady with walker. no pain with ROM R knee  NEURO:   Cranial nerves 2-12 are normal tested and grossly at patient's baseline, alert, speech clear  PSYCH:  insight and judgement  impaired, memory impaired , affect and mood normal    Recent Labs:   CBC RESULTS:   Recent Labs   Lab Test 10/01/18 04/23/18 04/21/18  2020   WBC 5.2  --  7.8   RBC 3.60*  --  3.66*   HGB 10.0* 10.3* 10.8*   HCT 31.4*  --  33.3*   MCV 87  --  91   MCH 27.8  --  29.5   MCHC 31.8*  --  32.4   RDW 15.0*  --  14.5     --  258       Last Basic Metabolic Panel:  Recent Labs   Lab Test 10/01/18 04/21/18  2020    133   POTASSIUM 3.9 4.6   CHLORIDE 106 100   REILLY 8.5 8.7   CO2 23 25   BUN 18 31*   CR 0.77 0.74   GLC 85 93       Liver Function Studies -   Recent Labs   Lab Test 12/04/18 04/10/18  1855 01/18/18  1135   PROTTOTAL  --  6.0* 6.8   ALBUMIN  --  2.7* 3.8   BILITOTAL  --  0.3 0.5   ALKPHOS  --  74 74   AST 23 44 24   ALT 14 39 19         Assessment/Plan:  Acute pain of right knee  Sig. Improved, s/p cortisone inj  1. Cont. Tylenol  2. Cont. Bid aspercreme  3. Monitor for reports of further increased pain  4. Reassess over next couple mos-f/u inj prn    Leg weakness, bilateral  Improved  1. Cont. Pain meds as above  2. Instructed to increased act. Level as thu  3. Monitor for further increased LE edema, changes in gait-may consider PT referal    Lower extremity edema  Currently controlled  1. Elevate LEs  2. Monitor for increased s/s  3. For above, may restart mary hose  4. Bmp in next 1-3 mos          Electronically signed by  NICKOLAS Hallman CNP

## 2019-01-22 NOTE — LETTER
1/22/2019        RE: Batsheva Barkley  [facility] [address]  [address]  [facility] [address]        [place] [facility]    Chief Complaint   Patient presents with     Knee Pain       [place] Medical Record Number:  [id number]  Place of Service where encounter took place:  [facility] [[id number]]    HPI:    Batsheva Barkley is a 85 year old  (11/6/1933), who is being seen today for an episodic care visit.  HPI information obtained from: facility chart records, facility staff, patient report and [facility] chart review.Today's concern is: R knee pain, weakness, edema. Has OA of R knee. Exac. Over past week. Diff. With amb, standing from seated position.  Has sig. Decreased act. Level. Started on aspercreme. Had R knee inj per [facility]. Reports R knee pain sig. Improved. Also taking tylenol. Act. Level back to baseline. Gait steady with walker. Improved LE strength-standing from seated position without difficulty. No recent exac of LE edema. Had mary hose dc'd. Not taking diuretics.       ALLERGIES: Codeine and Lisinopril  Past Medical, Surgical, Family and Social History reviewed and updated in [facility].    Current Outpatient Medications   Medication Sig Dispense Refill     acetaminophen (TYLENOL) 500 MG tablet Take 1,000 mg by mouth 3 times daily And every day prn       alum & mag hydroxide-simethicone (MYLANTA/MAALOX) 200-200-20 MG/5ML SUSP suspension Take 10 mLs by mouth 2 times daily       ASPIRIN 81 MG OR TABS 1 tab po QD (Once per day) 100 3     Atorvastatin Calcium (LIPITOR PO) Take 20 mg by mouth daily       Carboxymethylcellulose Sodium (THERATEARS OP) Place 2 drops into both eyes 4 times daily as needed       loperamide (IMODIUM) 2 MG capsule Take 2 mg by mouth 4 times daily as needed for diarrhea (and 2 mg QAM scheduled)        losartan (COZAAR) 50 MG tablet Take 1 tablet (50 mg) by mouth 2 times daily 180 tablet 3     metoprolol (LOPRESSOR) 50 MG  tablet TAKE ONE TABLET BY MOUTH TWICE DAILY 180 tablet 3     Omeprazole (PRILOSEC PO) Take 20 mg by mouth every morning       ONDANSETRON PO Take 4 mg by mouth every 8 hours as needed for nausea       order for DME Light weight wheelchair Body mass index is 19.08 kg/(m^2). 1 Device 0     order for DME ANTOINE stockings- below knee. 1 Package 0     polyethylene glycol (MIRALAX/GLYCOLAX) Packet Take 17 g by mouth daily as needed for constipation 7 packet      senna-docusate (SENOKOT-S;PERICOLACE) 8.6-50 MG per tablet Take 1 tablet by mouth 2 times daily as needed        trolamine salicylate (ASPERCREME) 10 % external cream Apply topically 2 times daily       Medications reviewed:  Medications reconciled to facility chart and changes were made to reflect current medications as identified as above med list. Below are the changes that were made:   Medications stopped since last EPIC medication reconciliation:   There are no discontinued medications.    Medications started since last [facility] medication reconciliation:  No orders of the defined types were placed in this encounter.        REVIEW OF SYSTEMS:  Limited secondary to cognitive impairment but today pt reports minimal R knee pain. No GI distress    Physical Exam:  /78   Pulse 69   Resp 16   SpO2 92%   GENERAL APPEARANCE:  Alert, in no distress, cooperative  ENT:  Mouth and posterior oropharynx normal, moist mucous membranes, [place]  EYES:  EOM, conjunctivae, lids, pupils and irises normal, PERRL  NECK:  No adenopathy,masses or thyromegaly, FROM  RESP:  respiratory effort and palpation of chest normal, lungs clear to auscultation , no respiratory distress  CV:  Palpation and auscultation of heart done , regular rate and rhythm, no murmur, rub, or gallop, no edema  ABDOMEN:  normal bowel sounds, soft, nontender, no hepatosplenomegaly or other masses, no guarding or rebound  M/S:   gait slowed, steady with walker. no pain with ROM R knee  NEURO:   Cranial nerves 2-12  are normal tested and grossly at patient's baseline, alert, speech clear  PSYCH:  insight and judgement impaired, memory impaired , affect and mood normal    Recent Labs:   CBC RESULTS:   Recent Labs   Lab Test 10/01/18 04/23/18 04/21/18  2020   WBC 5.2  --  7.8   RBC 3.60*  --  3.66*   HGB 10.0* 10.3* 10.8*   HCT 31.4*  --  33.3*   MCV 87  --  91   MCH 27.8  --  29.5   MCHC 31.8*  --  32.4   RDW 15.0*  --  14.5     --  258       Last Basic Metabolic Panel:  Recent Labs   Lab Test 10/01/18 04/21/18  2020    133   POTASSIUM 3.9 4.6   CHLORIDE 106 100   REILLY 8.5 8.7   CO2 23 25   BUN 18 31*   CR 0.77 0.74   GLC 85 93       Liver Function Studies -   Recent Labs   Lab Test 12/04/18 04/10/18  1855 01/18/18  1135   PROTTOTAL  --  6.0* 6.8   ALBUMIN  --  2.7* 3.8   BILITOTAL  --  0.3 0.5   ALKPHOS  --  74 74   AST 23 44 24   ALT 14 39 19         Assessment/Plan:  Acute pain of right knee  Sig. Improved, s/p cortisone inj  1. Cont. Tylenol  2. Cont. Bid aspercreme  3. Monitor for reports of further increased pain  4. Reassess over next couple mos-f/u inj prn    Leg weakness, bilateral  Improved  1. Cont. Pain meds as above  2. Instructed to increased act. Level as thu  3. Monitor for further increased LE edema, changes in gait-may consider PT referal    Lower extremity edema  Currently controlled  1. Elevate LEs  2. Monitor for increased s/s  3. For above, may restart mary hose  4. Bmp in next 1-3 mos          Electronically signed by  NICKOLAS Hallman CNP                      Sincerely,        NICKOLAS Hallman CNP

## 2019-02-12 ENCOUNTER — ASSISTED LIVING VISIT (OUTPATIENT)
Dept: GERIATRICS | Facility: CLINIC | Age: 84
End: 2019-02-12
Payer: COMMERCIAL

## 2019-02-12 VITALS
DIASTOLIC BLOOD PRESSURE: 67 MMHG | OXYGEN SATURATION: 93 % | SYSTOLIC BLOOD PRESSURE: 119 MMHG | HEART RATE: 68 BPM | RESPIRATION RATE: 18 BRPM

## 2019-02-12 DIAGNOSIS — G89.29 CHRONIC PAIN OF RIGHT KNEE: ICD-10-CM

## 2019-02-12 DIAGNOSIS — M25.561 CHRONIC PAIN OF RIGHT KNEE: ICD-10-CM

## 2019-02-12 DIAGNOSIS — H61.23 BILATERAL IMPACTED CERUMEN: Primary | ICD-10-CM

## 2019-02-12 DIAGNOSIS — I10 HYPERTENSION GOAL BP (BLOOD PRESSURE) < 140/90: ICD-10-CM

## 2019-02-12 NOTE — LETTER
2/12/2019        RE: Batsheva Barkley  [facility]  [address]  [facility] [address]        [place] [facility]    Chief Complaint   Patient presents with     Cerumen (impacted)       [place] Medical Record Number:  [id number]  Place of Service where encounter took place:  [facility] [[id number]]    HPI:    Batsheva Barkley is a 85 year old  (11/6/1933), who is being seen today for an episodic care visit.  HPI information obtained from: facility chart records, facility staff, patient report and [facility] chart review.Today's concern is: cerumen, r knee pain, HTN. Wears bilat hearing aids. Has sig. Hearing loss. Has recurrent cerumen. Restarted on debrox gtts. Has bilat cerumen. No ear pain reported. Had R knee inj last month for increased pain. Reports pain is still well-controlled s/p inj. Cont. On tylenol, aspercreme. Gait steady with walker. No recent falls. Cont. On cozaar, lopressor. For HTN. BPs stable. No recent reports of dizziness. LE edema stable.       ALLERGIES: Codeine and Lisinopril  Past Medical, Surgical, Family and Social History reviewed and updated in [facility].    Current Outpatient Medications   Medication Sig Dispense Refill     acetaminophen (TYLENOL) 500 MG tablet Take 1,000 mg by mouth 3 times daily And every day prn       alum & mag hydroxide-simethicone (MYLANTA/MAALOX) 200-200-20 MG/5ML SUSP suspension Take 10 mLs by mouth 2 times daily       ASPIRIN 81 MG OR TABS 1 tab po QD (Once per day) 100 3     Atorvastatin Calcium (LIPITOR PO) Take 20 mg by mouth daily       Carboxymethylcellulose Sodium (THERATEARS OP) Place 2 drops into both eyes 4 times daily as needed       loperamide (IMODIUM) 2 MG capsule Take 2 mg by mouth 4 times daily as needed for diarrhea (and 2 mg QAM scheduled)        losartan (COZAAR) 50 MG tablet Take 1 tablet (50 mg) by mouth 2 times daily 180 tablet 3     metoprolol (LOPRESSOR) 50 MG tablet TAKE ONE  TABLET BY MOUTH TWICE DAILY 180 tablet 3     Omeprazole (PRILOSEC PO) Take 20 mg by mouth every morning       ONDANSETRON PO Take 4 mg by mouth every 8 hours as needed for nausea       order for DME Light weight wheelchair Body mass index is 19.08 kg/(m^2). 1 Device 0     order for DME ANTOINE stockings- below knee. 1 Package 0     polyethylene glycol (MIRALAX/GLYCOLAX) Packet Take 17 g by mouth daily as needed for constipation 7 packet      senna-docusate (SENOKOT-S;PERICOLACE) 8.6-50 MG per tablet Take 1 tablet by mouth 2 times daily as needed        trolamine salicylate (ASPERCREME) 10 % external cream Apply topically 2 times daily       Medications reviewed:  Medications reconciled to facility chart and changes were made to reflect current medications as identified as above med list. Below are the changes that were made:   Medications stopped since last EPIC medication reconciliation:   There are no discontinued medications.    Medications started since last [facility] medication reconciliation:  No orders of the defined types were placed in this encounter.        REVIEW OF SYSTEMS:  No chest pain, shortness of breath, fevers, chills, headache, nausea, vomiting, dysuria or bowel abnormalities.  Appetite is  fair.  No pain except occ R knee.    Physical Exam:  /67   Pulse 68   Resp 18   SpO2 93%   GENERAL APPEARANCE:  Alert, in no distress, cooperative  ENT:  Mouth and posterior oropharynx normal, moist mucous membranes, [place], bilat ear canals free of cerumen s/p cleaning. no increased redness or pain  EYES:  EOM, conjunctivae, lids, pupils and irises normal, PERRL  NECK:  No adenopathy,masses or thyromegaly, FROM  RESP:  respiratory effort and palpation of chest normal, lungs clear to auscultation , no respiratory distress  CV:  Palpation and auscultation of heart done , regular rate and rhythm, no murmur, rub, or gallop, peripheral edema trace+ in LEs  ABDOMEN:  normal bowel sounds, soft, nontender, no  hepatosplenomegaly or other masses, no guarding or rebound  M/S:   Gait and station normal  muscle strength 5/5 all 4 ext., normal tone  NEURO:   Cranial nerves 2-12 are normal tested and grossly at patient's baseline, alert, speech clear  PSYCH:  insight and judgement impaired, memory impaired , affect and mood normal    Recent Labs:     CBC RESULTS:   Recent Labs   Lab Test 10/01/18 04/23/18 04/21/18  2020   WBC 5.2  --  7.8   RBC 3.60*  --  3.66*   HGB 10.0* 10.3* 10.8*   HCT 31.4*  --  33.3*   MCV 87  --  91   MCH 27.8  --  29.5   MCHC 31.8*  --  32.4   RDW 15.0*  --  14.5     --  258       Last Basic Metabolic Panel:  Recent Labs   Lab Test 10/01/18 04/21/18  2020    133   POTASSIUM 3.9 4.6   CHLORIDE 106 100   REILLY 8.5 8.7   CO2 23 25   BUN 18 31*   CR 0.77 0.74   GLC 85 93       Liver Function Studies -   Recent Labs   Lab Test 12/04/18 04/10/18  1855 01/18/18  1135   PROTTOTAL  --  6.0* 6.8   ALBUMIN  --  2.7* 3.8   BILITOTAL  --  0.3 0.5   ALKPHOS  --  74 74   AST 23 44 24   ALT 14 39 19       Assessment/Plan:  Bilateral impacted cerumen  Cleared. No increased redness, pain of ear canals  - REMOVE IMPACTED CERUMEN  1. Discontinue debrox  2. Instructed to restart wearing hearing aids  3. Monitor for further cerumen  4. For above, repeat debrox gtts    Chronic pain of right knee  Currently stable  1. Cont. Aspercreme, tylenol  2. Monitor for changes in gait  3. Monitor for increased diff. With standing, transfers  4. Repeat R knee inj per [facility] on prn basis    Hypertension goal BP (blood pressure) < 140/90  BP stable  1. Cont. Cozaar, lopressor  2. Follow BPs, HRs  3. Monitor fluid intake, reports of dizziness  4. Cbc, bmp in next 1-3 mos        Electronically signed by  NICKOLAS Hallman CNP                      Sincerely,        NICKOLAS Hallman CNP

## 2019-02-12 NOTE — PROGRESS NOTES
[place] [facility]    Chief Complaint   Patient presents with     Cerumen (impacted)       [facility] Record Number:  [id number]  Place of Service where encounter took place:  [facility] ([facility]) [[id number]]    HPI:    Batsheva Barkley is a 85 year old  (11/6/1933), who is being seen today for an episodic care visit.  HPI information obtained from: facility chart records, facility staff, patient report and [facility] chart review.Today's concern is: cerumen, r knee pain, HTN. Wears bilat hearing aids. Has sig. Hearing loss. Has recurrent cerumen. Restarted on debrox gtts. Has bilat cerumen. No ear pain reported. Had R knee inj last month for increased pain. Reports pain is still well-controlled s/p inj. Cont. On tylenol, aspercreme. Gait steady with walker. No recent falls. Cont. On cozaar, lopressor. For HTN. BPs stable. No recent reports of dizziness. LE edema stable.       ALLERGIES: Codeine and Lisinopril  Past Medical, Surgical, Family and Social History reviewed and updated in [facility].    Current Outpatient Medications   Medication Sig Dispense Refill     acetaminophen (TYLENOL) 500 MG tablet Take 1,000 mg by mouth 3 times daily And every day prn       alum & mag hydroxide-simethicone (MYLANTA/MAALOX) 200-200-20 MG/5ML SUSP suspension Take 10 mLs by mouth 2 times daily       ASPIRIN 81 MG OR TABS 1 tab po QD (Once per day) 100 3     Atorvastatin Calcium (LIPITOR PO) Take 20 mg by mouth daily       Carboxymethylcellulose Sodium (THERATEARS OP) Place 2 drops into both eyes 4 times daily as needed       loperamide (IMODIUM) 2 MG capsule Take 2 mg by mouth 4 times daily as needed for diarrhea (and 2 mg QAM scheduled)        losartan (COZAAR) 50 MG tablet Take 1 tablet (50 mg) by mouth 2 times daily 180 tablet 3     metoprolol (LOPRESSOR) 50 MG tablet TAKE ONE TABLET BY MOUTH TWICE DAILY 180 tablet 3     Omeprazole (PRILOSEC PO) Take 20 mg by mouth every morning       ONDANSETRON  PO Take 4 mg by mouth every 8 hours as needed for nausea       order for DME Light weight wheelchair Body mass index is 19.08 kg/(m^2). 1 Device 0     order for DME ANTOINE stockings- below knee. 1 Package 0     polyethylene glycol (MIRALAX/GLYCOLAX) Packet Take 17 g by mouth daily as needed for constipation 7 packet      senna-docusate (SENOKOT-S;PERICOLACE) 8.6-50 MG per tablet Take 1 tablet by mouth 2 times daily as needed        trolamine salicylate (ASPERCREME) 10 % external cream Apply topically 2 times daily       Medications reviewed:  Medications reconciled to facility chart and changes were made to reflect current medications as identified as above med list. Below are the changes that were made:   Medications stopped since last EPIC medication reconciliation:   There are no discontinued medications.    Medications started since last [facility] medication reconciliation:  No orders of the defined types were placed in this encounter.        REVIEW OF SYSTEMS:  No chest pain, shortness of breath, fevers, chills, headache, nausea, vomiting, dysuria or bowel abnormalities.  Appetite is  fair.  No pain except occ R knee.    Physical Exam:  /67   Pulse 68   Resp 18   SpO2 93%   GENERAL APPEARANCE:  Alert, in no distress, cooperative  ENT:  Mouth and posterior oropharynx normal, moist mucous membranes, [place], bilat ear canals free of cerumen s/p cleaning. no increased redness or pain  EYES:  EOM, conjunctivae, lids, pupils and irises normal, PERRL  NECK:  No adenopathy,masses or thyromegaly, FROM  RESP:  respiratory effort and palpation of chest normal, lungs clear to auscultation , no respiratory distress  CV:  Palpation and auscultation of heart done , regular rate and rhythm, no murmur, rub, or gallop, peripheral edema trace+ in LEs  ABDOMEN:  normal bowel sounds, soft, nontender, no hepatosplenomegaly or other masses, no guarding or rebound  M/S:   Gait and station normal  muscle strength 5/5 all 4 ext., normal  tone  NEURO:   Cranial nerves 2-12 are normal tested and grossly at patient's baseline, alert, speech clear  PSYCH:  insight and judgement impaired, memory impaired , affect and mood normal    Recent Labs:     CBC RESULTS:   Recent Labs   Lab Test 10/01/18 04/23/18 04/21/18  2020   WBC 5.2  --  7.8   RBC 3.60*  --  3.66*   HGB 10.0* 10.3* 10.8*   HCT 31.4*  --  33.3*   MCV 87  --  91   MCH 27.8  --  29.5   MCHC 31.8*  --  32.4   RDW 15.0*  --  14.5     --  258       Last Basic Metabolic Panel:  Recent Labs   Lab Test 10/01/18 04/21/18  2020    133   POTASSIUM 3.9 4.6   CHLORIDE 106 100   REILLY 8.5 8.7   CO2 23 25   BUN 18 31*   CR 0.77 0.74   GLC 85 93       Liver Function Studies -   Recent Labs   Lab Test 12/04/18 04/10/18  1855 01/18/18  1135   PROTTOTAL  --  6.0* 6.8   ALBUMIN  --  2.7* 3.8   BILITOTAL  --  0.3 0.5   ALKPHOS  --  74 74   AST 23 44 24   ALT 14 39 19       Assessment/Plan:  Bilateral impacted cerumen  Cleared. No increased redness, pain of ear canals  - REMOVE IMPACTED CERUMEN  1. Discontinue debrox  2. Instructed to restart wearing hearing aids  3. Monitor for further cerumen  4. For above, repeat debrox gtts    Chronic pain of right knee  Currently stable  1. Cont. Aspercreme, tylenol  2. Monitor for changes in gait  3. Monitor for increased diff. With standing, transfers  4. Repeat R knee inj per [facility] on prn basis    Hypertension goal BP (blood pressure) < 140/90  BP stable  1. Cont. Cozaar, lopressor  2. Follow BPs, HRs  3. Monitor fluid intake, reports of dizziness  4. Cbc, bmp in next 1-3 mos        Electronically signed by  NICKOLAS Hallman CNP

## 2019-03-05 ENCOUNTER — ASSISTED LIVING VISIT (OUTPATIENT)
Dept: GERIATRICS | Facility: CLINIC | Age: 84
End: 2019-03-05
Payer: COMMERCIAL

## 2019-03-05 VITALS
RESPIRATION RATE: 16 BRPM | HEART RATE: 66 BPM | SYSTOLIC BLOOD PRESSURE: 127 MMHG | OXYGEN SATURATION: 92 % | DIASTOLIC BLOOD PRESSURE: 78 MMHG

## 2019-03-05 DIAGNOSIS — F51.01 PRIMARY INSOMNIA: ICD-10-CM

## 2019-03-05 DIAGNOSIS — R29.898 RIGHT LEG WEAKNESS: Primary | ICD-10-CM

## 2019-03-05 DIAGNOSIS — I10 HYPERTENSION GOAL BP (BLOOD PRESSURE) < 140/90: ICD-10-CM

## 2019-03-05 RX ORDER — TRAZODONE HYDROCHLORIDE 50 MG/1
50 TABLET, FILM COATED ORAL AT BEDTIME
COMMUNITY

## 2019-03-05 NOTE — PROGRESS NOTES
"[place] [facility]    Chief Complaint   Patient presents with     Fatigue       [place] Medical Record Number:  [id number]  Place of Service where encounter took place:  [facility] ([facility] [[id number]]    HPI:    Batsheva Barkley is a 85 year old  (11/6/1933), who is being seen today for an episodic care visit.  HPI information obtained from: facility chart records, facility staff, patient report and [facility] chart review.Today's concern is: R LE weakness, HTN, insomnia. Has OA with chronic knee pain. R knee inj 1/18/19-pain has been stable. For past week, R knee \"giving out\" at times per resident/dtr. Had fall OOB earlier this week, no apparent inj. Uses walker. Had recent trazodone dose increase due to insomnia. Reports sleeping ok. dtr requesting trazodone dose decrease given recent fall OOB. For HTN cont. On cozaar, lopressor.  BPs, HRs stable, no recent reports of dizziness.       ALLERGIES: Codeine and Lisinopril  Past Medical, Surgical, Family and Social History reviewed and updated in [facility].    Current Outpatient Medications   Medication Sig Dispense Refill     acetaminophen (TYLENOL) 500 MG tablet Take 1,000 mg by mouth 3 times daily And every day prn       alum & mag hydroxide-simethicone (MYLANTA/MAALOX) 200-200-20 MG/5ML SUSP suspension Take 10 mLs by mouth 2 times daily       ASPIRIN 81 MG OR TABS 1 tab po QD (Once per day) 100 3     Atorvastatin Calcium (LIPITOR PO) Take 20 mg by mouth daily       Carboxymethylcellulose Sodium (THERATEARS OP) Place 2 drops into both eyes 4 times daily as needed       loperamide (IMODIUM) 2 MG capsule Take 2 mg by mouth 4 times daily as needed for diarrhea (and 2 mg QAM scheduled)        losartan (COZAAR) 50 MG tablet Take 1 tablet (50 mg) by mouth 2 times daily 180 tablet 3     metoprolol (LOPRESSOR) 50 MG tablet TAKE ONE TABLET BY MOUTH TWICE DAILY 180 tablet 3     Omeprazole (PRILOSEC PO) Take 20 mg by mouth every morning       " ONDANSETRON PO Take 4 mg by mouth every 8 hours as needed for nausea       order for DME Light weight wheelchair Body mass index is 19.08 kg/(m^2). 1 Device 0     order for DME ANTOINE stockings- below knee. 1 Package 0     polyethylene glycol (MIRALAX/GLYCOLAX) Packet Take 17 g by mouth daily as needed for constipation 7 packet      senna-docusate (SENOKOT-S;PERICOLACE) 8.6-50 MG per tablet Take 1 tablet by mouth 2 times daily as needed        traZODone (DESYREL) 50 MG tablet Take 50 mg by mouth At Bedtime       trolamine salicylate (ASPERCREME) 10 % external cream Apply topically 2 times daily       Medications reviewed:  Medications reconciled to facility chart and changes were made to reflect current medications as identified as above med list. Below are the changes that were made:   Medications stopped since last EPIC medication reconciliation:   There are no discontinued medications.    Medications started since last [facility] medication reconciliation:  Orders Placed This Encounter   Medications     traZODone (DESYREL) 50 MG tablet     Sig: Take 50 mg by mouth At Bedtime         REVIEW OF SYSTEMS:  Limited secondary to cognitive impairment but today pt reports no knee pain. Some R knee weakness    Physical Exam:  /78   Pulse 66   Resp 16   SpO2 92%   GENERAL APPEARANCE:  Alert, in no distress, cooperative  ENT:  Mouth and posterior oropharynx normal, moist mucous membranes, [place]  EYES:  EOM, conjunctivae, lids, pupils and irises normal, PERRL  NECK:  No adenopathy,masses or thyromegaly, FROM  RESP:  respiratory effort and palpation of chest normal, lungs clear to auscultation , no respiratory distress  CV:  Palpation and auscultation of heart done , regular rate and rhythm, no murmur, rub, or gallop, no edema  ABDOMEN:  normal bowel sounds, soft, nontender, no hepatosplenomegaly or other masses, no guarding or rebound  M/S:   gait slowed, sl. toe walking, some diff. with turning-shuffles. muscle strength 5/5  "all 4 ext.  NEURO:   Cranial nerves 2-12 are normal tested and grossly at patient's baseline, alert, speech clear  PSYCH:  insight and judgement impaired, memory impaired , affect and mood normal    Recent Labs:     CBC RESULTS:   Recent Labs   Lab Test 10/01/18 04/23/18 04/21/18  2020   WBC 5.2  --  7.8   RBC 3.60*  --  3.66*   HGB 10.0* 10.3* 10.8*   HCT 31.4*  --  33.3*   MCV 87  --  91   MCH 27.8  --  29.5   MCHC 31.8*  --  32.4   RDW 15.0*  --  14.5     --  258       Last Basic Metabolic Panel:  Recent Labs   Lab Test 10/01/18 04/21/18  2020    133   POTASSIUM 3.9 4.6   CHLORIDE 106 100   REILLY 8.5 8.7   CO2 23 25   BUN 18 31*   CR 0.77 0.74   GLC 85 93       Liver Function Studies -   Recent Labs   Lab Test 12/04/18 04/10/18  1855 01/18/18  1135   PROTTOTAL  --  6.0* 6.8   ALBUMIN  --  2.7* 3.8   BILITOTAL  --  0.3 0.5   ALKPHOS  --  74 74   AST 23 44 24   ALT 14 39 19       Assessment/Plan:  Right leg weakness  Ongoing, increased over past week R knee \"giving out\" at times. S/p fall OOB, no apparent inj  1. Restart PT  2. Cont. With walker at all times  3. Monitor for further changes in gait  4. Cont. Tylenol, aspercreme for knee pain  5. Anticipate f/u R knee inj in next 2-3 mos    Hypertension goal BP (blood pressure) < 140/90  Currently stable  1. Cont. Cozaar, lopressor  2. Follow BPs. HRs  3. Monitor for dizziness  4. Cbc, bmp next week    Primary insomnia  Currently stable. occ nocturia  1. Decrease trazodone to 50 mg at bedtime  2. Reassess for s/s over next week  3. Again instructed to taper off fluid intake later in day to decrease nocturia  4. Spoke with dtr for update        Electronically signed by  NICKOLAS Hallman CNP        Documentation of Face-to-Face and Certification for Home Health Services     Patient: Batsheva A Hoy   YOB: 1933  MR Number: [id number]  Today's Date: 3/5/2019    I certify that patient: Batsheva Barkley is under my care and that I, or a nurse " practitioner or physician's assistant working with me, had a face-to-face encounter that meets the physician face-to-face encounter requirements with this patient on: 3/5/2019.    This encounter with the patient was in whole, or in part, for the following medical condition, which is the primary reason for home health care: R LE weakness.    I certify that, based on my findings, the following services are medically necessary home health services: Physical Therapy.    My clinical findings support the need for the above services because: Physical Therapy Services are needed to assess and treat the following functional impairments: R LE weakness.    Further, I certify that my clinical findings support that this patient is homebound (i.e. absences from home require considerable and taxing effort and are for medical reasons or [employer] services or infrequently or of short duration when for other reasons) because: Requires assistance of another person or specialized equipment to access medical services because patient:  memory loss, RLE weakness..    Based on the above findings. I certify that this patient is confined to the home and needs intermittent skilled nursing care, physical therapy and/or speech therapy.  The patient is under my care, and I have initiated the establishment of the plan of care.  This patient will be followed by a physician who will periodically review the plan of care.  Physician/Provider to provide follow up care: Tacos Bhakta    Responsible Medicare certified [place] Physician:   Physician Signature: See electronic signature associated with these discharge orders.  Date: 3/5/2019

## 2019-03-05 NOTE — LETTER
"    3/5/2019        RE: Batsheva Barkley  [facility]  [address]  [facility] [address]        [place] [facility]    Chief Complaint   Patient presents with     Fatigue       [place] Medical Record Number:  [id number]  Place of Service where encounter took place:  [facility] ([facility] [[id number]]    HPI:    Batsheva Barkley is a 85 year old  (11/6/1933), who is being seen today for an episodic care visit.  HPI information obtained from: facility chart records, facility staff, patient report and [facility] chart review.Today's concern is: R LE weakness, HTN, insomnia. Has OA with chronic knee pain. R knee inj 1/18/19-pain has been stable. For past week, R knee \"giving out\" at times per resident/dtr. Had fall OOB earlier this week, no apparent inj. Uses walker. Had recent trazodone dose increase due to insomnia. Reports sleeping ok. dtr requesting trazodone dose decrease given recent fall OOB. For HTN cont. On cozaar, lopressor.  BPs, HRs stable, no recent reports of dizziness.       ALLERGIES: Codeine and Lisinopril  Past Medical, Surgical, Family and Social History reviewed and updated in [facility].    Current Outpatient Medications   Medication Sig Dispense Refill     acetaminophen (TYLENOL) 500 MG tablet Take 1,000 mg by mouth 3 times daily And every day prn       alum & mag hydroxide-simethicone (MYLANTA/MAALOX) 200-200-20 MG/5ML SUSP suspension Take 10 mLs by mouth 2 times daily       ASPIRIN 81 MG OR TABS 1 tab po QD (Once per day) 100 3     Atorvastatin Calcium (LIPITOR PO) Take 20 mg by mouth daily       Carboxymethylcellulose Sodium (THERATEARS OP) Place 2 drops into both eyes 4 times daily as needed       loperamide (IMODIUM) 2 MG capsule Take 2 mg by mouth 4 times daily as needed for diarrhea (and 2 mg QAM scheduled)        losartan (COZAAR) 50 MG tablet Take 1 tablet (50 mg) by mouth 2 times daily 180 tablet 3     metoprolol (LOPRESSOR) 50 MG tablet TAKE " ONE TABLET BY MOUTH TWICE DAILY 180 tablet 3     Omeprazole (PRILOSEC PO) Take 20 mg by mouth every morning       ONDANSETRON PO Take 4 mg by mouth every 8 hours as needed for nausea       order for DME Light weight wheelchair Body mass index is 19.08 kg/(m^2). 1 Device 0     order for DME ANTOINE stockings- below knee. 1 Package 0     polyethylene glycol (MIRALAX/GLYCOLAX) Packet Take 17 g by mouth daily as needed for constipation 7 packet      senna-docusate (SENOKOT-S;PERICOLACE) 8.6-50 MG per tablet Take 1 tablet by mouth 2 times daily as needed        traZODone (DESYREL) 50 MG tablet Take 50 mg by mouth At Bedtime       trolamine salicylate (ASPERCREME) 10 % external cream Apply topically 2 times daily       Medications reviewed:  Medications reconciled to facility chart and changes were made to reflect current medications as identified as above med list. Below are the changes that were made:   Medications stopped since last EPIC medication reconciliation:   There are no discontinued medications.    Medications started since last [facility] medication reconciliation:  Orders Placed This Encounter   Medications     traZODone (DESYREL) 50 MG tablet     Sig: Take 50 mg by mouth At Bedtime         REVIEW OF SYSTEMS:  Limited secondary to cognitive impairment but today pt reports no knee pain. Some R knee weakness    Physical Exam:  /78   Pulse 66   Resp 16   SpO2 92%   GENERAL APPEARANCE:  Alert, in no distress, cooperative  ENT:  Mouth and posterior oropharynx normal, moist mucous membranes, [place]  EYES:  EOM, conjunctivae, lids, pupils and irises normal, PERRL  NECK:  No adenopathy,masses or thyromegaly, FROM  RESP:  respiratory effort and palpation of chest normal, lungs clear to auscultation , no respiratory distress  CV:  Palpation and auscultation of heart done , regular rate and rhythm, no murmur, rub, or gallop, no edema  ABDOMEN:  normal bowel sounds, soft, nontender, no hepatosplenomegaly or other masses, no  "guarding or rebound  M/S:   gait slowed, sl. toe walking, some diff. with turning-shuffles. muscle strength 5/5 all 4 ext.  NEURO:   Cranial nerves 2-12 are normal tested and grossly at patient's baseline, alert, speech clear  PSYCH:  insight and judgement impaired, memory impaired , affect and mood normal    Recent Labs:     CBC RESULTS:   Recent Labs   Lab Test 10/01/18 04/23/18 04/21/18  2020   WBC 5.2  --  7.8   RBC 3.60*  --  3.66*   HGB 10.0* 10.3* 10.8*   HCT 31.4*  --  33.3*   MCV 87  --  91   MCH 27.8  --  29.5   MCHC 31.8*  --  32.4   RDW 15.0*  --  14.5     --  258       Last Basic Metabolic Panel:  Recent Labs   Lab Test 10/01/18 04/21/18  2020    133   POTASSIUM 3.9 4.6   CHLORIDE 106 100   REILLY 8.5 8.7   CO2 23 25   BUN 18 31*   CR 0.77 0.74   GLC 85 93       Liver Function Studies -   Recent Labs   Lab Test 12/04/18 04/10/18  1855 01/18/18  1135   PROTTOTAL  --  6.0* 6.8   ALBUMIN  --  2.7* 3.8   BILITOTAL  --  0.3 0.5   ALKPHOS  --  74 74   AST 23 44 24   ALT 14 39 19       Assessment/Plan:  Right leg weakness  Ongoing, increased over past week R knee \"giving out\" at times. S/p fall OOB, no apparent inj  1. Restart PT  2. Cont. With walker at all times  3. Monitor for further changes in gait  4. Cont. Tylenol, aspercreme for knee pain  5. Anticipate f/u R knee inj in next 2-3 mos    Hypertension goal BP (blood pressure) < 140/90  Currently stable  1. Cont. Cozaar, lopressor  2. Follow BPs. HRs  3. Monitor for dizziness  4. Cbc, bmp next week    Primary insomnia  Currently stable. occ nocturia  1. Decrease trazodone to 50 mg at bedtime  2. Reassess for s/s over next week  3. Again instructed to taper off fluid intake later in day to decrease nocturia  4. Spoke with dtr for update        Electronically signed by  NICKOLAS Hallman CNP        Documentation of Face-to-Face and Certification for Home Health Services     Patient: Batsheva Barkley   YOB: 1933  MR Number: " [id number]  Today's Date: 3/5/2019    I certify that patient: Batsheva Barkley is under my care and that I, or a nurse practitioner or physician's assistant working with me, had a face-to-face encounter that meets the physician face-to-face encounter requirements with this patient on: 3/5/2019.    This encounter with the patient was in whole, or in part, for the following medical condition, which is the primary reason for home health care: R LE weakness.    I certify that, based on my findings, the following services are medically necessary home health services: Physical Therapy.    My clinical findings support the need for the above services because: Physical Therapy Services are needed to assess and treat the following functional impairments: R LE weakness.    Further, I certify that my clinical findings support that this patient is homebound (i.e. absences from home require considerable and taxing effort and are for medical reasons or [employer] services or infrequently or of short duration when for other reasons) because: Requires assistance of another person or specialized equipment to access medical services because patient:  memory loss, RLE weakness..    Based on the above findings. I certify that this patient is confined to the home and needs intermittent skilled nursing care, physical therapy and/or speech therapy.  The patient is under my care, and I have initiated the establishment of the plan of care.  This patient will be followed by a physician who will periodically review the plan of care.  Physician/Provider to provide follow up care: Tacos Bhakta    Responsible Medicare certified [facility] Physician:   Physician Signature: See electronic signature associated with these discharge orders.  Date: 3/5/2019                  Sincerely,        NICKOLAS Hallman CNP

## 2019-03-11 ENCOUNTER — RECORDS - HEALTHEAST (OUTPATIENT)
Dept: LAB | Facility: CLINIC | Age: 84
End: 2019-03-11

## 2019-03-11 LAB
ANION GAP SERPL CALCULATED.3IONS-SCNC: 11 MMOL/L (ref 5–18)
BASOPHILS # BLD AUTO: 0 THOU/UL (ref 0–0.2)
BASOPHILS NFR BLD AUTO: 1 % (ref 0–2)
BUN SERPL-MCNC: 21 MG/DL (ref 8–28)
CALCIUM SERPL-MCNC: 9.1 MG/DL (ref 8.5–10.5)
CHLORIDE BLD-SCNC: 104 MMOL/L (ref 98–107)
CO2 SERPL-SCNC: 20 MMOL/L (ref 22–31)
CREAT SERPL-MCNC: 0.79 MG/DL (ref 0.6–1.1)
EOSINOPHIL # BLD AUTO: 0.1 THOU/UL (ref 0–0.4)
EOSINOPHIL NFR BLD AUTO: 2 % (ref 0–6)
ERYTHROCYTE [DISTWIDTH] IN BLOOD BY AUTOMATED COUNT: 16.1 % (ref 11–14.5)
GFR SERPL CREATININE-BSD FRML MDRD: >60 ML/MIN/1.73M2
GLUCOSE BLD-MCNC: 87 MG/DL (ref 70–125)
HCT VFR BLD AUTO: 36.7 % (ref 35–47)
HGB BLD-MCNC: 11.4 G/DL (ref 12–16)
LYMPHOCYTES # BLD AUTO: 1.2 THOU/UL (ref 0.8–4.4)
LYMPHOCYTES NFR BLD AUTO: 30 % (ref 20–40)
MCH RBC QN AUTO: 29.5 PG (ref 27–34)
MCHC RBC AUTO-ENTMCNC: 31.1 G/DL (ref 32–36)
MCV RBC AUTO: 95 FL (ref 80–100)
MONOCYTES # BLD AUTO: 0.5 THOU/UL (ref 0–0.9)
MONOCYTES NFR BLD AUTO: 13 % (ref 2–10)
NEUTROPHILS # BLD AUTO: 2.2 THOU/UL (ref 2–7.7)
NEUTROPHILS NFR BLD AUTO: 55 % (ref 50–70)
PLATELET # BLD AUTO: 224 THOU/UL (ref 140–440)
PMV BLD AUTO: 9.9 FL (ref 8.5–12.5)
POTASSIUM BLD-SCNC: 4.5 MMOL/L (ref 3.5–5)
RBC # BLD AUTO: 3.87 MILL/UL (ref 3.8–5.4)
SODIUM SERPL-SCNC: 135 MMOL/L (ref 136–145)
WBC: 4 THOU/UL (ref 4–11)

## 2019-03-22 ENCOUNTER — ASSISTED LIVING VISIT (OUTPATIENT)
Dept: GERIATRICS | Facility: CLINIC | Age: 84
End: 2019-03-22
Payer: COMMERCIAL

## 2019-03-22 VITALS
SYSTOLIC BLOOD PRESSURE: 119 MMHG | OXYGEN SATURATION: 96 % | RESPIRATION RATE: 18 BRPM | HEART RATE: 80 BPM | DIASTOLIC BLOOD PRESSURE: 64 MMHG

## 2019-03-22 DIAGNOSIS — H61.23 BILATERAL IMPACTED CERUMEN: ICD-10-CM

## 2019-03-22 DIAGNOSIS — R06.02 SOB (SHORTNESS OF BREATH): ICD-10-CM

## 2019-03-22 DIAGNOSIS — R60.0 LOWER EXTREMITY EDEMA: Primary | ICD-10-CM

## 2019-03-22 NOTE — LETTER
3/22/2019        RE: Batsheva Barkley  [facility]  [address]  [facility] [address]        [facility]  [facility] Record Number:  [id number]  Place of Service where encounter took place:  [facility] ([facility] [[id number]]  Chief Complaint   Patient presents with     Edema       HPI:    Batsheva Barkely  is a 85 year old (11/6/1933), who is being seen today for an episodic care visit.  HPI information obtained from: facility chart records, facility staff, patient report, [facility] chart review and family/first contact dtr report. Today's concern is: edema, sob, cerumen. Today spoke with dtr who is concerned that resident has had increased LE edema past week. Also noted to be sob at times. Resident denies any further sob. No cough. Has been afebrile. Cont. To amb. Per usual. Completed course of PT. Has recurrent bilat cerumen. Completing course of debrox gtts today. Wears hearing aids.       Past Medical and Surgical History reviewed in Epic today.    MEDICATIONS:  Current Outpatient Medications   Medication Sig Dispense Refill     acetaminophen (TYLENOL) 500 MG tablet Take 1,000 mg by mouth 3 times daily And every day prn       alum & mag hydroxide-simethicone (MYLANTA/MAALOX) 200-200-20 MG/5ML SUSP suspension Take 10 mLs by mouth 2 times daily       ASPIRIN 81 MG OR TABS 1 tab po QD (Once per day) 100 3     Atorvastatin Calcium (LIPITOR PO) Take 20 mg by mouth daily       Carboxymethylcellulose Sodium (THERATEARS OP) Place 2 drops into both eyes 4 times daily as needed       loperamide (IMODIUM) 2 MG capsule Take 2 mg by mouth 4 times daily as needed for diarrhea (and 2 mg QAM scheduled)        losartan (COZAAR) 50 MG tablet Take 1 tablet (50 mg) by mouth 2 times daily 180 tablet 3     metoprolol (LOPRESSOR) 50 MG tablet TAKE ONE TABLET BY MOUTH TWICE DAILY 180 tablet 3     Omeprazole (PRILOSEC PO) Take 20 mg by mouth every morning        ONDANSETRON PO Take 4 mg by mouth every 8 hours as needed for nausea       order for DME Light weight wheelchair Body mass index is 19.08 kg/(m^2). 1 Device 0     order for DME ANTOINE stockings- below knee. 1 Package 0     polyethylene glycol (MIRALAX/GLYCOLAX) Packet Take 17 g by mouth daily as needed for constipation 7 packet      senna-docusate (SENOKOT-S;PERICOLACE) 8.6-50 MG per tablet Take 1 tablet by mouth 2 times daily as needed        traZODone (DESYREL) 50 MG tablet Take 50 mg by mouth At Bedtime       trolamine salicylate (ASPERCREME) 10 % external cream Apply topically 2 times daily           REVIEW OF SYSTEMS:  CONSTITUTIONAL:  forgetfulness, EYES:  neg, ENT:  [place], CV:  occ. Le edemema, RESPIRATORY: neg, :  urinary freq.-chronic, no dysuria, GI:  neg, NEURO:  neg, PSYCH: neg and MUSCULOSKELETAL: neg    Objective:  /64   Pulse 80   Resp 18   SpO2 96%   Exam:  GENERAL APPEARANCE:  Alert, in no distress, thin, cooperative  ENT:  Mouth and posterior oropharynx normal, moist mucous membranes, [place], bilat ear canals free of cerumen  EYES:  EOM, conjunctivae, lids, pupils and irises normal, PERRL  NECK:  No adenopathy,masses or thyromegaly, no carotid bruit  RESP:  respiratory effort and palpation of chest normal, lungs clear to auscultation , no respiratory distress, O2 sats 96% RA, remained at 96% with amb  CV:  Palpation and auscultation of heart done , regular rate and rhythm, no murmur, rub, or gallop, no edema  ABDOMEN:  normal bowel sounds, soft, nontender, no hepatosplenomegaly or other masses, no guarding or rebound  M/S:   gait slowed, steady with walker. muscle strength 5/5 all 4 ext., normal tone  NEURO:   Cranial nerves 2-12 are normal tested and grossly at patient's baseline, alert, speech clear  PSYCH:  insight and judgement impaired, memory impaired , affect and mood normal    Labs:     Most Recent 3 CBC's:  Recent Labs   Lab Test 10/01/18 04/23/18 04/21/18  2020  04/11/18  0620   WBC  5.2  --  7.8  --  5.2   HGB 10.0* 10.3* 10.8*   < > 9.2*   MCV 87  --  91  --  92     --  258  --  211    < > = values in this interval not displayed.     Most Recent 3 BMP's:  Recent Labs   Lab Test 10/01/18 04/21/18  2020 04/11/18  0620    133 139   POTASSIUM 3.9 4.6 4.1   CHLORIDE 106 100 108   CO2 23 25 27   BUN 18 31* 18   CR 0.77 0.74 0.79   ANIONGAP 7 8 4   REILLY 8.5 8.7 8.2*   GLC 85 93 88       ASSESSMENT/PLAN:  Lower extremity edema  No current edema  1. Instructed to elevate LEs  2. Monitor for further edema  3. Follow wt.s  4. Cbc, bmp next week    SOB (shortness of breath)  None currently, no cough, afebrile. O2 sats stable with activity  1. Follow O2 sats  2. Monitor for cough, fever  3. For further episode of sob, may check CXR  4. Monitor for decreased act. level    Bilateral impacted cerumen  Currently clear s/p debrox gtts  1. Monitor for further cerumen  2. For above, repeat debrox  3. Irrigate ears prn        Electronically signed by:  NICKOLAS Hallman CNP           Sincerely,        NICKOLAS Hallman CNP

## 2019-03-22 NOTE — PROGRESS NOTES
[facility]  [facility] Record Number:  [id number]  Place of Service where encounter took place:  [facility] ([facility] [[id number]]  Chief Complaint   Patient presents with     Edema       HPI:    Batsheva Barkley  is a 85 year old (11/6/1933), who is being seen today for an episodic care visit.  HPI information obtained from: facility chart records, facility staff, patient report, [facility] chart review and family/first contact dtr report. Today's concern is: edema, sob, cerumen. Today spoke with dtr who is concerned that resident has had increased LE edema past week. Also noted to be sob at times. Resident denies any further sob. No cough. Has been afebrile. Cont. To amb. Per usual. Completed course of PT. Has recurrent bilat cerumen. Completing course of debrox gtts today. Wears hearing aids.       Past Medical and Surgical History reviewed in Epic today.    MEDICATIONS:  Current Outpatient Medications   Medication Sig Dispense Refill     acetaminophen (TYLENOL) 500 MG tablet Take 1,000 mg by mouth 3 times daily And every day prn       alum & mag hydroxide-simethicone (MYLANTA/MAALOX) 200-200-20 MG/5ML SUSP suspension Take 10 mLs by mouth 2 times daily       ASPIRIN 81 MG OR TABS 1 tab po QD (Once per day) 100 3     Atorvastatin Calcium (LIPITOR PO) Take 20 mg by mouth daily       Carboxymethylcellulose Sodium (THERATEARS OP) Place 2 drops into both eyes 4 times daily as needed       loperamide (IMODIUM) 2 MG capsule Take 2 mg by mouth 4 times daily as needed for diarrhea (and 2 mg QAM scheduled)        losartan (COZAAR) 50 MG tablet Take 1 tablet (50 mg) by mouth 2 times daily 180 tablet 3     metoprolol (LOPRESSOR) 50 MG tablet TAKE ONE TABLET BY MOUTH TWICE DAILY 180 tablet 3     Omeprazole (PRILOSEC PO) Take 20 mg by mouth every morning       ONDANSETRON PO Take 4 mg by mouth every 8 hours as needed for nausea       order for DME Light weight wheelchair Body mass  index is 19.08 kg/(m^2). 1 Device 0     order for DME ANTOINE stockings- below knee. 1 Package 0     polyethylene glycol (MIRALAX/GLYCOLAX) Packet Take 17 g by mouth daily as needed for constipation 7 packet      senna-docusate (SENOKOT-S;PERICOLACE) 8.6-50 MG per tablet Take 1 tablet by mouth 2 times daily as needed        traZODone (DESYREL) 50 MG tablet Take 50 mg by mouth At Bedtime       trolamine salicylate (ASPERCREME) 10 % external cream Apply topically 2 times daily           REVIEW OF SYSTEMS:  CONSTITUTIONAL:  forgetfulness, EYES:  neg, ENT:  [place], CV:  occ. Le edemema, RESPIRATORY: neg, :  urinary freq.-chronic, no dysuria, GI:  neg, NEURO:  neg, PSYCH: neg and MUSCULOSKELETAL: neg    Objective:  /64   Pulse 80   Resp 18   SpO2 96%   Exam:  GENERAL APPEARANCE:  Alert, in no distress, thin, cooperative  ENT:  Mouth and posterior oropharynx normal, moist mucous membranes, [place], bilat ear canals free of cerumen  EYES:  EOM, conjunctivae, lids, pupils and irises normal, PERRL  NECK:  No adenopathy,masses or thyromegaly, no carotid bruit  RESP:  respiratory effort and palpation of chest normal, lungs clear to auscultation , no respiratory distress, O2 sats 96% RA, remained at 96% with amb  CV:  Palpation and auscultation of heart done , regular rate and rhythm, no murmur, rub, or gallop, no edema  ABDOMEN:  normal bowel sounds, soft, nontender, no hepatosplenomegaly or other masses, no guarding or rebound  M/S:   gait slowed, steady with walker. muscle strength 5/5 all 4 ext., normal tone  NEURO:   Cranial nerves 2-12 are normal tested and grossly at patient's baseline, alert, speech clear  PSYCH:  insight and judgement impaired, memory impaired , affect and mood normal    Labs:     Most Recent 3 CBC's:  Recent Labs   Lab Test 10/01/18 04/23/18 04/21/18  2020  04/11/18  0620   WBC 5.2  --  7.8  --  5.2   HGB 10.0* 10.3* 10.8*   < > 9.2*   MCV 87  --  91  --  92     --  258  --  211    < > = values  in this interval not displayed.     Most Recent 3 BMP's:  Recent Labs   Lab Test 10/01/18 04/21/18  2020 04/11/18  0620    133 139   POTASSIUM 3.9 4.6 4.1   CHLORIDE 106 100 108   CO2 23 25 27   BUN 18 31* 18   CR 0.77 0.74 0.79   ANIONGAP 7 8 4   REILLY 8.5 8.7 8.2*   GLC 85 93 88       ASSESSMENT/PLAN:  Lower extremity edema  No current edema  1. Instructed to elevate LEs  2. Monitor for further edema  3. Follow wt.s  4. Cbc, bmp next week    SOB (shortness of breath)  None currently, no cough, afebrile. O2 sats stable with activity  1. Follow O2 sats  2. Monitor for cough, fever  3. For further episode of sob, may check CXR  4. Monitor for decreased act. level    Bilateral impacted cerumen  Currently clear s/p debrox gtts  1. Monitor for further cerumen  2. For above, repeat debrox  3. Irrigate ears prn        Electronically signed by:  NICKOLAS Hallman CNP

## 2019-03-23 ENCOUNTER — RECORDS - HEALTHEAST (OUTPATIENT)
Dept: LAB | Facility: CLINIC | Age: 84
End: 2019-03-23

## 2019-03-25 ENCOUNTER — TRANSFERRED RECORDS (OUTPATIENT)
Dept: HEALTH INFORMATION MANAGEMENT | Facility: CLINIC | Age: 84
End: 2019-03-25

## 2019-03-25 LAB
ANION GAP SERPL CALCULATED.3IONS-SCNC: 9 MMOL/L (ref 5–18)
ANION GAP SERPL CALCULATED.3IONS-SCNC: 9 MMOL/L (ref 5–18)
BASOPHILS # BLD AUTO: 0 THOU/UL (ref 0–0.2)
BASOPHILS NFR BLD AUTO: 1 % (ref 0–2)
BUN SERPL-MCNC: 20 MG/DL (ref 8–28)
BUN SERPL-MCNC: 20 MG/DL (ref 8–28)
CALCIUM SERPL-MCNC: 8.9 MG/DL (ref 8.5–10.5)
CALCIUM SERPL-MCNC: 8.9 MG/DL (ref 8.5–10.5)
CHLORIDE BLD-SCNC: 103 MMOL/L (ref 98–107)
CHLORIDE SERPLBLD-SCNC: 103 MMOL/L (ref 98–107)
CO2 SERPL-SCNC: 23 MMOL/L (ref 22–31)
CO2 SERPL-SCNC: 23 MMOL/L (ref 22–31)
CREAT SERPL-MCNC: 0.81 MG/DL (ref 0.6–1.1)
CREAT SERPL-MCNC: 0.81 MG/DL (ref 0.6–1.1)
DIFFERENTIAL: ABNORMAL
EOSINOPHIL # BLD AUTO: 0 THOU/UL (ref 0–0.4)
EOSINOPHIL NFR BLD AUTO: 1 % (ref 0–6)
ERYTHROCYTE [DISTWIDTH] IN BLOOD BY AUTOMATED COUNT: 15.8 % (ref 11–14.5)
ERYTHROCYTE [DISTWIDTH] IN BLOOD BY AUTOMATED COUNT: 16.8 % (ref 11–14.5)
GFR SERPL CREATININE-BSD FRML MDRD: >60 ML/MIN/1.73M2
GFR SERPL CREATININE-BSD FRML MDRD: >60 ML/MIN/1.73M2
GLUCOSE BLD-MCNC: 89 MG/DL (ref 70–125)
GLUCOSE SERPL-MCNC: 89 MG/DL (ref 70–125)
HCT VFR BLD AUTO: 34 % (ref 35–47)
HCT VFR BLD AUTO: 34 % (ref 35–47)
HEMOGLOBIN: 10.7 G/DL (ref 12–16)
HGB BLD-MCNC: 10.7 G/DL (ref 12–16)
LYMPHOCYTES # BLD AUTO: 0.9 THOU/UL (ref 0.8–4.4)
LYMPHOCYTES NFR BLD AUTO: 25 % (ref 20–40)
MCH RBC QN AUTO: 29.2 PG (ref 27–34)
MCH RBC QN AUTO: 29.2 PG (ref 27–34)
MCHC RBC AUTO-ENTMCNC: 31.5 G/DL (ref 32–36)
MCHC RBC AUTO-ENTMCNC: 31.6 G/DL (ref 32–36)
MCV RBC AUTO: 93 FL (ref 80–100)
MCV RBC AUTO: 93 FL (ref 80–100)
MONOCYTES # BLD AUTO: 0.5 THOU/UL (ref 0–0.9)
MONOCYTES NFR BLD AUTO: 14 % (ref 2–10)
NEUTROPHILS # BLD AUTO: 2.2 THOU/UL (ref 2–7.7)
NEUTROPHILS NFR BLD AUTO: 60 % (ref 50–70)
PLATELET # BLD AUTO: 218 THOU/UL (ref 140–440)
PLATELET # BLD AUTO: 218 THOU/UL (ref 140–440)
PMV BLD AUTO: 9.3 FL (ref 8.5–12.5)
POTASSIUM BLD-SCNC: 4.4 MMOL/L (ref 3.5–5)
POTASSIUM SERPL-SCNC: 4.4 MMOL/L (ref 3.5–5)
RBC # BLD AUTO: 3.66 MILL/UL (ref 3.8–5.4)
RBC # BLD AUTO: 3.66 MILL/UL (ref 3.8–5.4)
SODIUM SERPL-SCNC: 135 MMOL/L (ref 136–145)
SODIUM SERPL-SCNC: 135 MMOL/L (ref 136–145)
WBC # BLD AUTO: 3.7 THOU/UL (ref 4–11)
WBC: 3.7 THOU/UL (ref 4–11)

## 2019-04-08 ENCOUNTER — PATIENT OUTREACH (OUTPATIENT)
Dept: GERIATRIC MEDICINE | Facility: CLINIC | Age: 84
End: 2019-04-08

## 2019-04-08 NOTE — PROGRESS NOTES
[facility] Care Coordination Contact    Called member to schedule annual HRA home visit. HRA has been scheduled for 4/10/19 @ 3 PM . Member's daughter Linda present with client, CC introduced myself and reason for the home visit.  Rosibel Rhodes RN, BC  Manager [facility] Care Coordinator   [phone]  (Fax)

## 2019-04-10 NOTE — PROGRESS NOTES
[facility] Care Coordination Contact    Rescheduled home visit/assessment for 4/15/19 @ 3:30, CC spoke with member who will inform her family    VM left with Patricia MORGAN at [facility] to inform of the above info. Request copy of med list and care plan.  Rosibel Rhodes RN, BC  Manager [facility] Care Coordinator   [phone]  (Fax)

## 2019-04-15 ENCOUNTER — PATIENT OUTREACH (OUTPATIENT)
Dept: GERIATRIC MEDICINE | Facility: CLINIC | Age: 84
End: 2019-04-15

## 2019-04-15 DIAGNOSIS — Z76.89 HEALTH CARE HOME: ICD-10-CM

## 2019-04-15 ASSESSMENT — ACTIVITIES OF DAILY LIVING (ADL)
DEPENDENT_IADLS:: CLEANING;COOKING;LAUNDRY;SHOPPING;MEAL PREPARATION;MEDICATION MANAGEMENT;MONEY MANAGEMENT;TRANSPORTATION;INCONTINENCE

## 2019-04-16 NOTE — PROGRESS NOTES
[facility] Care Coordination Contact    [facility] Home Visit Assessment     Home visit for Health Risk Assessment with Batsheva Barkley completed on April 15, 2019    Type of residence:: Assisted living, [facility]   Current living arrangement:: I live in assisted living, 1 bedroom apt with full kitchen, client does not use kitchen.      Assessment completed with:: Patient and Daughter Raisa Herman reports that she is very happy with living at [facility]     Current Care Plan  Member currently receiving the following home care services:   N/A   Member currently receiving the following community resources: [facility] [place], Transportation Services, [facility], [facility].      Medication Review  Medication reconciliation completed in Epic: Yes  4/18/19 VM left with nursing office to inquire on dosing of Mylanta. Note on 4/18/19 rec'd vm from Soumya DAVILA at [facility] to report dosing of Mylanta is BID prn.  Medication set-up & administration: RN set up weekly.  Assisting Living staff administers medications.  Medication Risk Assessment Medication (1 or more, place referral to MTM): N/A: No risk factors identified  MTM Referral Placed: No: No risk factors idenified    Mental/Behavioral Health   Depression Screening: See PHQ assessment flowsheet.   Mental health DX:: No      Mental Health Diagnosis: No  Mental Health Services: None: No further intervention needed at this time.    Falls Assessment:   Fallen 2 or more times in the past year?: Yes   Any fall with injury in the past year?: No    ADL/IADL Dependencies:   Dependent ADLs:: Ambulation-walker, Bathing, Dressing, Grooming, Incontinence, Toileting  Dependent IADLs:: Cleaning, Cooking, Laundry, Shopping, Meal Preparation, Medication Management, Money Management, Transportation, Incontinence    [facility] Plan sponsored benefits: Shared information re: Silver Sneakers/gym memberships, ASA,  "Calcium +D.    PCA Assessment completed at visit: No     Elderly Waiver Eligibility: Yes-will open to EW    Care Plan & Recommendations:   Observed contractures to bilateral fingers (digging into palm of her hands), noted odor and nails are in need of grooming/trimming  Batsheva and Raisa report recent falls during the night while getting up to go to the bathroom  Raisa would like AL staff to make bed daily, empty commode and put laundry away  Raisa inquired if her mother was still on a wait list for another apt -concern is that current bathroom is not large enough for Batsheva to maneuver her walker and that is why she is utilizing the BSC  Raisa reports that AL facility has a coy of her mother's HCD.     See LTCC for detailed assessment information.    Follow-Up Plan: Member informed of future contact, plan to f/u with member with a 6 month telephone assessment.  Contact information shared with member and family, encouraged member to call with any questions or concerns at any time.    [facility] continuum providers: Please refer to Health Care Home on the Epic Problem List to view this patient's [facility] Care Plan Summary.    4/16/19 Rec'd tele call from Raisa to inquire if CC would be able to obtain \"sucker sponges,\" stating that OT had once recommended to aide in cleaning palm of hands. Explained that CC will inquire with [facility]   4/18/19 Rec'd tele call from Raisa, she states that she spoke with nursing and they offer foot and nail care through a program that they have onsite.  CC to follow  4/18/19 MMIS completed, Rate Cell B, Case Mix D.   4/18/19 VM left with Patricia MORGAN at [facility] requesting a return call to review AL POC.  Rosibel Rhodes RN, BC  Manager [facility] Care Coordinator   [phone]  (Fax)    "

## 2019-04-18 ENCOUNTER — PATIENT OUTREACH (OUTPATIENT)
Dept: GERIATRIC MEDICINE | Facility: CLINIC | Age: 84
End: 2019-04-18

## 2019-04-18 NOTE — PROGRESS NOTES
[facility] Care Coordination Contact    Tacos,     I am the [facility] care coordinator for Batsheva Barkley, and I am writing to notify you of a change in services.   Elderly Waiver [employer] Card has been terminated    This change has occurred because, Batsheva has requested.     I am required by the health plan to notify you of this change. No action is required on your part. Please do not hesitate to contact me with any questions or concerns. Thank you.    Rosibel Rhodes RN, BC  Manager [facility] Care Coordinator   [phone]  (Fax)

## 2019-04-18 NOTE — PROGRESS NOTES
Received a request to submit a DTR for the termination of EW transportation. Documentation completed and faxed to the health plan. Care Coordinator aware.    Monet Nguyen RN  Utilization   [facility]  [phone]

## 2019-04-24 ENCOUNTER — PATIENT OUTREACH (OUTPATIENT)
Dept: GERIATRIC MEDICINE | Facility: CLINIC | Age: 84
End: 2019-04-24

## 2019-04-24 ENCOUNTER — ASSISTED LIVING VISIT (OUTPATIENT)
Dept: GERIATRICS | Facility: CLINIC | Age: 84
End: 2019-04-24
Payer: COMMERCIAL

## 2019-04-24 VITALS
HEART RATE: 66 BPM | RESPIRATION RATE: 18 BRPM | SYSTOLIC BLOOD PRESSURE: 122 MMHG | OXYGEN SATURATION: 92 % | DIASTOLIC BLOOD PRESSURE: 76 MMHG

## 2019-04-24 DIAGNOSIS — M15.0 PRIMARY OSTEOARTHRITIS INVOLVING MULTIPLE JOINTS: Primary | ICD-10-CM

## 2019-04-24 DIAGNOSIS — B35.1 ONYCHOMYCOSIS: ICD-10-CM

## 2019-04-24 DIAGNOSIS — R60.0 LOWER EXTREMITY EDEMA: ICD-10-CM

## 2019-04-24 DIAGNOSIS — Z76.89 HEALTH CARE HOME: ICD-10-CM

## 2019-04-24 NOTE — PROGRESS NOTES
[facility] Care Coordination Contact    4/23/19 [facility] a [facility] call to review POC and request to add services  4/24/19 f/u call to Patricia, request a return call  4/24/19 DANDRE left with client's daughter Raisa requesting a return call  Rosibel Rhodes RN, BC  Manager [facility] Care Coordinator   [phone]  (Fax)

## 2019-04-24 NOTE — LETTER
4/24/2019        RE: Batsheva Barkley  [facility]  [address]  [facility] [address]        [facility]  [facility] Record Number:  [id number]  Place of Service where encounter took place:  [facility] ([facility] [[id number]]  Chief Complaint   Patient presents with     Pain       HPI:    Batsheva Barkley  is a 85 year old (11/6/1933), who is being seen today for an episodic care visit.  HPI information obtained from: facility chart records, facility staff, patient report, [facility] chart review and family/first contact dtr report. Today's concern is: OA, onychomycosis, edema. Has OA, bony deformities of hands, chronic R knee pain. Varus of L knee. Had cortisone inj R knee 1/18/19-effective.  Cont. On tylenol, aspercreme.  Has R hand contracture. dtr notes darkened nail L 4th finger.  Has pain with extension of fingers R hand. Had recent increased lE edema. Per family had occ. Episodes of sob.  Stable for past few weeks. Minimal LE edema. Resp. Status stable. O2 sats low 90s RA.       Past Medical and Surgical History reviewed in Epic today.    MEDICATIONS:  Current Outpatient Medications   Medication Sig Dispense Refill     acetaminophen (TYLENOL) 500 MG tablet Take 1,000 mg by mouth 3 times daily And every day prn       alum & mag hydroxide-simethicone (MYLANTA/MAALOX) 200-200-20 MG/5ML SUSP suspension Take 10 mLs by mouth 2 times daily       ASPIRIN 81 MG OR TABS 1 tab po QD (Once per day) 100 3     Atorvastatin Calcium (LIPITOR PO) Take 20 mg by mouth daily       Carboxymethylcellulose Sodium (THERATEARS OP) Place 2 drops into both eyes 4 times daily as needed       loperamide (IMODIUM) 2 MG capsule Take 2 mg by mouth 4 times daily as needed for diarrhea (and 2 mg QAM scheduled)        losartan (COZAAR) 50 MG tablet Take 1 tablet (50 mg) by mouth 2 times daily 180 tablet 3     menthol-zinc oxide (CALMOSEPTINE) 0.44-20.6 % OINT ointment Apply  topically 2 times daily as needed for skin protection       metoprolol (LOPRESSOR) 50 MG tablet TAKE ONE TABLET BY MOUTH TWICE DAILY 180 tablet 3     Omeprazole (PRILOSEC PO) Take 20 mg by mouth every morning       ONDANSETRON PO Take 4 mg by mouth every 8 hours as needed for nausea       order for DME Light weight wheelchair Body mass index is 19.08 kg/(m^2). 1 Device 0     order for DME ANTOINE stockings- below knee. 1 Package 0     polyethylene glycol (MIRALAX/GLYCOLAX) Packet Take 17 g by mouth daily as needed for constipation 7 packet      senna-docusate (SENOKOT-S;PERICOLACE) 8.6-50 MG per tablet Take 1 tablet by mouth 2 times daily as needed        traZODone (DESYREL) 50 MG tablet Take 50 mg by mouth At Bedtime       trolamine salicylate (ASPERCREME) 10 % external cream Apply topically 2 times daily AND BID PRN           REVIEW OF SYSTEMS:  No chest pain, shortness of breath, fevers, chills, headache, nausea, vomiting, dysuria or bowel abnormalities.  Appetite is  fair.  No pain except hands, occ R knee.    Objective:  /76   Pulse 66   Resp 18   SpO2 92%   Exam:  GENERAL APPEARANCE:  Alert, in no distress, thin, cooperative  ENT:  Mouth and posterior oropharynx normal, moist mucous membranes, [place]  EYES:  EOM, conjunctivae, lids, pupils and irises normal, PERRL  NECK:  No adenopathy,masses or thyromegaly, no carotid bruit  RESP:  respiratory effort and palpation of chest normal, lungs clear to auscultation , no respiratory distress  CV:  Palpation and auscultation of heart done , regular rate and rhythm, no murmur, rub, or gallop, no edema  ABDOMEN:  normal bowel sounds, soft, nontender, no hepatosplenomegaly or other masses, no guarding or rebound  M/S:   gait slowed, steady with walker, R LE varus. bony deformities of bilat hands, R hand contracture.  SKIN:  dark brown color to distal hald of R 4th fingernail. surrounding skin intact, no increased redness, no drainage  NEURO:   Cranial nerves 2-12 are  normal tested and grossly at patient's baseline, alert, speech clear  PSYCH:  insight and judgement impaired, memory impaired , affect and mood normal    Labs:     Most Recent 3 CBC's:  Recent Labs   Lab Test 03/25/19 10/01/18 04/23/18 04/21/18  2020   WBC 3.7* 5.2  --  7.8   HGB 10.7* 10.0* 10.3* 10.8*   MCV 93 87  --  91    207  --  258     Most Recent 3 BMP's:  Recent Labs   Lab Test 03/25/19 10/01/18 04/21/18  2020   * 136 133   POTASSIUM 4.4 3.9 4.6   CHLORIDE 103 106 100   CO2 23 23 25   BUN 20 18 31*   CR 0.81 0.77 0.74   ANIONGAP 9 7 8   REILLY 8.9 8.5 8.7   GLC 89 85 93       ASSESSMENT/PLAN:  Primary osteoarthritis involving multiple joints  R knee pain stable. Hand pain, javier. With extension R fingers  1. Cont. Tylenol  2. Cont. aspercreme to R knee  3. Refer to OT for R hand contracture  4. For further increased R knee pain, sched. F/u inj with [facility]    Onychomycosis  R 4th fingernail.  1. OT to eval for R palm guard-keep nails off palm  2. Monitor for skin changes of palm, try to keep dry  3. Monitor nails for changes, keep trimmed  4. Spoke with dtr who does not want to tx with oral antifungal     Lower extremity edema  Currently controlled  1. Cont. To elevate LEs  2. Follow wt.s  3. For increased s/s, restart mary hose. Monitor for sob  4. Bmp in next 2-4 mos      Electronically signed by:  NICKOLAS Hallman CNP           Documentation of Face-to-Face and Certification for Home Health Services     Patient: Batsheva Barkley   YOB: 1933  MR Number: [id number]  Today's Date: 4/24/2019    I certify that patient: Batsheva Barkley is under my care and that I, or a nurse practitioner or physician's assistant working with me, had a face-to-face encounter that meets the physician face-to-face encounter requirements with this patient on: 4/24/2019.    This encounter with the patient was in whole, or in part, for the following medical condition, which is the primary reason for  home health care: OA.    I certify that, based on my findings, the following services are medically necessary home health services: Occupational Therapy and Physical Therapy.    My clinical findings support the need for the above services because: Occupational Therapy Services are needed to assess and treat cognitive ability and address ADL safety due to impairment in R hand contracture. and Physical Therapy Services are needed to assess and treat the following functional impairments: R hand contracture.    Further, I certify that my clinical findings support that this patient is homebound (i.e. absences from home require considerable and taxing effort and are for medical reasons or Hindu services or infrequently or of short duration when for other reasons) because: Requires assistance of another person or specialized equipment to access medical services because patient:  LE weakness, memory loss..    Based on the above findings. I certify that this patient is confined to the home and needs intermittent skilled nursing care, physical therapy and/or speech therapy.  The patient is under my care, and I have initiated the establishment of the plan of care.  This patient will be followed by a physician who will periodically review the plan of care.  Physician/Provider to provide follow up care: Tacos Bhakta    Responsible Medicare certified PECOS Physician:   Physician Signature: See electronic signature associated with these discharge orders.  Date: 4/24/2019      Sincerely,        NICKOLAS Hallman CNP

## 2019-04-24 NOTE — PROGRESS NOTES
[facility] [facility]  [facility] Record Number:  [id number]  Place of Service where encounter took place:  [facility] ([facility] [[id number]]  Chief Complaint   Patient presents with     Pain       HPI:    Batsheva Barkley  is a 85 year old (11/6/1933), who is being seen today for an episodic care visit.  HPI information obtained from: facility chart records, facility staff, patient report, [facility] chart review and family/first contact dtr report. Today's concern is: OA, onychomycosis, edema. Has OA, bony deformities of hands, chronic R knee pain. Varus of L knee. Had cortisone inj R knee 1/18/19-effective.  Cont. On tylenol, aspercreme.  Has R hand contracture. dtr notes darkened nail L 4th finger.  Has pain with extension of fingers R hand. Had recent increased lE edema. Per family had occ. Episodes of sob.  Stable for past few weeks. Minimal LE edema. Resp. Status stable. O2 sats low 90s RA.       Past Medical and Surgical History reviewed in Epic today.    MEDICATIONS:  Current Outpatient Medications   Medication Sig Dispense Refill     acetaminophen (TYLENOL) 500 MG tablet Take 1,000 mg by mouth 3 times daily And every day prn       alum & mag hydroxide-simethicone (MYLANTA/MAALOX) 200-200-20 MG/5ML SUSP suspension Take 10 mLs by mouth 2 times daily       ASPIRIN 81 MG OR TABS 1 tab po QD (Once per day) 100 3     Atorvastatin Calcium (LIPITOR PO) Take 20 mg by mouth daily       Carboxymethylcellulose Sodium (THERATEARS OP) Place 2 drops into both eyes 4 times daily as needed       loperamide (IMODIUM) 2 MG capsule Take 2 mg by mouth 4 times daily as needed for diarrhea (and 2 mg QAM scheduled)        losartan (COZAAR) 50 MG tablet Take 1 tablet (50 mg) by mouth 2 times daily 180 tablet 3     menthol-zinc oxide (CALMOSEPTINE) 0.44-20.6 % OINT ointment Apply topically 2 times daily as needed for skin protection       metoprolol (LOPRESSOR) 50 MG tablet TAKE ONE TABLET BY MOUTH  TWICE DAILY 180 tablet 3     Omeprazole (PRILOSEC PO) Take 20 mg by mouth every morning       ONDANSETRON PO Take 4 mg by mouth every 8 hours as needed for nausea       order for DME Light weight wheelchair Body mass index is 19.08 kg/(m^2). 1 Device 0     order for DME ANTOINE stockings- below knee. 1 Package 0     polyethylene glycol (MIRALAX/GLYCOLAX) Packet Take 17 g by mouth daily as needed for constipation 7 packet      senna-docusate (SENOKOT-S;PERICOLACE) 8.6-50 MG per tablet Take 1 tablet by mouth 2 times daily as needed        traZODone (DESYREL) 50 MG tablet Take 50 mg by mouth At Bedtime       trolamine salicylate (ASPERCREME) 10 % external cream Apply topically 2 times daily AND BID PRN           REVIEW OF SYSTEMS:  No chest pain, shortness of breath, fevers, chills, headache, nausea, vomiting, dysuria or bowel abnormalities.  Appetite is  fair.  No pain except hands, occ R knee.    Objective:  /76   Pulse 66   Resp 18   SpO2 92%   Exam:  GENERAL APPEARANCE:  Alert, in no distress, thin, cooperative  ENT:  Mouth and posterior oropharynx normal, moist mucous membranes, [place]  EYES:  EOM, conjunctivae, lids, pupils and irises normal, PERRL  NECK:  No adenopathy,masses or thyromegaly, no carotid bruit  RESP:  respiratory effort and palpation of chest normal, lungs clear to auscultation , no respiratory distress  CV:  Palpation and auscultation of heart done , regular rate and rhythm, no murmur, rub, or gallop, no edema  ABDOMEN:  normal bowel sounds, soft, nontender, no hepatosplenomegaly or other masses, no guarding or rebound  M/S:   gait slowed, steady with walker, R LE varus. bony deformities of bilat hands, R hand contracture.  SKIN:  dark brown color to distal hald of R 4th fingernail. surrounding skin intact, no increased redness, no drainage  NEURO:   Cranial nerves 2-12 are normal tested and grossly at patient's baseline, alert, speech clear  PSYCH:  insight and judgement impaired, memory  impaired , affect and mood normal    Labs:     Most Recent 3 CBC's:  Recent Labs   Lab Test 03/25/19 10/01/18 04/23/18 04/21/18  2020   WBC 3.7* 5.2  --  7.8   HGB 10.7* 10.0* 10.3* 10.8*   MCV 93 87  --  91    207  --  258     Most Recent 3 BMP's:  Recent Labs   Lab Test 03/25/19 10/01/18 04/21/18  2020   * 136 133   POTASSIUM 4.4 3.9 4.6   CHLORIDE 103 106 100   CO2 23 23 25   BUN 20 18 31*   CR 0.81 0.77 0.74   ANIONGAP 9 7 8   REILLY 8.9 8.5 8.7   GLC 89 85 93       ASSESSMENT/PLAN:  Primary osteoarthritis involving multiple joints  R knee pain stable. Hand pain, javier. With extension R fingers  1. Cont. Tylenol  2. Cont. aspercreme to R knee  3. Refer to OT for R hand contracture  4. For further increased R knee pain, sched. F/u inj with [facility]    Onychomycosis  R 4th fingernail.  1. OT to eval for R palm guard-keep nails off palm  2. Monitor for skin changes of palm, try to keep dry  3. Monitor nails for changes, keep trimmed  4. Spoke with dtr who does not want to tx with oral antifungal     Lower extremity edema  Currently controlled  1. Cont. To elevate LEs  2. Follow wt.s  3. For increased s/s, restart mary hose. Monitor for sob  4. Bmp in next 2-4 mos      Electronically signed by:  NICKOLAS Hallman CNP           Documentation of Face-to-Face and Certification for Home Health Services     Patient: Batsheva Barkley   YOB: 1933  MR Number: [id number]  Today's Date: 4/24/2019    I certify that patient: Batsheva Barkley is under my care and that I, or a nurse practitioner or physician's assistant working with me, had a face-to-face encounter that meets the physician face-to-face encounter requirements with this patient on: 4/24/2019.    This encounter with the patient was in whole, or in part, for the following medical condition, which is the primary reason for home health care: OA.    I certify that, based on my findings, the following services are medically necessary home  health services: Occupational Therapy and Physical Therapy.    My clinical findings support the need for the above services because: Occupational Therapy Services are needed to assess and treat cognitive ability and address ADL safety due to impairment in R hand contracture. and Physical Therapy Services are needed to assess and treat the following functional impairments: R hand contracture.    Further, I certify that my clinical findings support that this patient is homebound (i.e. absences from home require considerable and taxing effort and are for medical reasons or [employer] services or infrequently or of short duration when for other reasons) because: Requires assistance of another person or specialized equipment to access medical services because patient:  LE weakness, memory loss..    Based on the above findings. I certify that this patient is confined to the home and needs intermittent skilled nursing care, physical therapy and/or speech therapy.  The patient is under my care, and I have initiated the establishment of the plan of care.  This patient will be followed by a physician who will periodically review the plan of care.  Physician/Provider to provide follow up care: Tacos Bhakta    Responsible Medicare certified PECOS Physician:   Physician Signature: See electronic signature associated with these discharge orders.  Date: 4/24/2019

## 2019-04-26 NOTE — PROGRESS NOTES
[facility] Care Coordination Contact    Order placed with [facility] (p: [phone]; f: [phone]) for palm protector (L and R).  Order placed on 4/26/19. Access updated.  As required, authorization submitted to health plan.    Rebecca Sung  Care Management Specialist  [facility]  [phone]

## 2019-04-26 NOTE — PROGRESS NOTES
[facility] Care Coordination Contact    Attempted to reach Luciosa MORGAN at [facility] to review RS POC, family and client's request to add services.  Left vm requesting a return call.  Call placed to client's dtr Raisa to update, provided information from [facility] regarding palm protectors, Raisa would like CC to place order.  Rosibel Rhodes RN, BC  Manager [facility] Care Coordinator   [phone]  (Fax)

## 2019-04-29 NOTE — PROGRESS NOTES
[facility] Care Coordination Contact    Rec'd notification from Dina at [facility] that they are not able to provide the palm protectors as the system is showing client resides in a LTC facility  Call placed to [facility], spoke with Flo to inquire on the above. Flo states that the [facility] screen indicates that client resides in a LTC facility. Explained that client was transferred to [facility] 6/2018, UTF indicates that client discharge from SNF 4/30/18  Bob requests that CC contact Jessi at [facility] to request that the [facility] screen be updated.  VM left with Jessi regarding the above info, request a call back as needed.  Rosibel Rhodes RN, BC  Manager [facility] Care Coordinator   [phone]  (Fax)

## 2019-05-03 ENCOUNTER — PATIENT OUTREACH (OUTPATIENT)
Dept: GERIATRIC MEDICINE | Facility: CLINIC | Age: 84
End: 2019-05-03

## 2019-05-03 NOTE — PROGRESS NOTES
[facility] Care Coordination Contact    CC has not rec'd return call from Patricia MORGAN at [facility] to review annual assessment and request to make changes to the RS Tool/plan of care.  Left vm with Patricia requesting a return call, secure e-mal sent to Patricia with information from annual assessment and recommendations for changes in POC.    CC did not receive return call from [employer] regarding changing client's living environment from LTC to community  VM left with [facility] requesting a return call.  Rosibel Rhodes RN, BC  Manager [facility] Care Coordinator   [phone]  (Fax)

## 2019-05-06 NOTE — PROGRESS NOTES
[facility] Care Coordination Contact    Completed RS tool, left vm with Patricia MORGAN at [facility] to report that CC will send RS tool via secure e-mail. Request that she review the RS tool and contact CC with questions or concerns.  Call placed to [facility] to inquire on client's dental history (family request as client's  managed all of client's appt in the past). Client had her last dental exam on 6/15/18, received upper and lower partials. Provider: [facility] [phone]    CC has not rec'd a return call from [employer] after 2 attempts, to f/u on information rec'd from [facility] that client is listed in the system as a LTC member.  CC to f/u with [facility] on palm protectors.     Care plan completed.  MMIS completed, Rate Cell B, Case Mix D.  5/7/19 CC received information from [facility] that everything is good on their end and will be sending out the palm protectors shortly.   Rosibel Rhodes RN, BC  Manager [facility] Care Coordinator   [phone]  (Fax)

## 2019-05-08 NOTE — PROGRESS NOTES
[facility] Care Coordination Contact    5/7/19 VM left with nursing office at [facility] requesting a return call.  5/7/19 spoke with Nickie, Director of Health Services at [facility]. Reviewed POC.  Nickie states that she has had discussions with family re night time toileting and they have declined. Nickie is in favor of adding toileting assistance at 3:30 AM  Inquired on nail/foot care, Nickie states that nursing staff does not provide assistance. Nickie states that they have a private pay option, [employer]. Reviewed need for nursing staff to provide good hygiene care to hands due to contractures, Nickie will f/u with client.  Nickie shared that family is requesting a level of care for nursing home, that some of the services they cannot provide.   Nickie states that the only HCD that they have is the POLST.   Offered family care conference if needed.   Nickie states that CC should f/u with Patricia regarding hmkg.   5/7/19 Call placed to Raisa, client's dtr, shared above info. Offered assistance with scheduling a podiatry appt, Raisa will review with her mother  Explained that this CC did schedule a podiatry appt for her mother in the past, Jg.   Provided information last dental exam, shared contact information to [facility] to schedule routine exam.  CC provided info for [facility] to schedule an eye exam. Raisa accompanies her mother to medical appt's.   Raisa states that the staff have already been washing her mother's hands every morning, noticed an improvement.  Raisa would like daily hmkg to assist with making the bed, putting laundry away and emptying the commode.   Raisa will f/u with her mother/family members regarding HCD as she believes they have completed.   Rosibel Rhodes RN, BC  Manager [facility] Care Coordinator   [phone]  (Fax)

## 2019-05-09 ENCOUNTER — PATIENT OUTREACH (OUTPATIENT)
Dept: GERIATRIC MEDICINE | Facility: CLINIC | Age: 84
End: 2019-05-09

## 2019-05-09 NOTE — PROGRESS NOTES
[facility] Care Coordination Contact    5/8/19 Spoke with Patricia MORGAN at [facility], reviewed request to changes in POC. Patricia will f/u with hmkg on daily services to make bed, p/u garbage and to put away laundry once completed.   Patricia stated that client was not on a wait list for another apt, stating that she will put her on the list.   Shared changes in nursing POC (night time toileting and additional toilet assist to provide hygiene cares and empty commode.)  5/9/19 RS tool completed. To mail copy of RS tool and care plan to client's daughter Raisa, per request of client.   Rosibel Rhodes RN, BC  Manager [facility] Care Coordinator   [phone]  (Fax)

## 2019-05-14 ENCOUNTER — PATIENT OUTREACH (OUTPATIENT)
Dept: GERIATRIC MEDICINE | Facility: CLINIC | Age: 84
End: 2019-05-14

## 2019-05-14 NOTE — LETTER
May 14, 2019    Important [employer] Information    BATSHEVA HIGGINS  CARE OF: EZEQUIEL MENDEZ  [address] [place] [address]  Your Care Plan  Dear Batsheva,  When we spoke recently, I promised to send you a Care Plan. The plan enclosed is a summary of our discussion. It includes the steps we agreed would help you meet your health goals. In addition, I can help you with:  Bwyijyq-C-BbpqLU  This program is available to members who need a ride to medical and dental visits. To schedule a ride, call [phone] or [phone] (toll free). TTY/TTD: 711. You can call 8 a.m. to 8 p.m. Seven days a week. Access to a representative may be limited at times.    mobile mum   The mobile mum program empowers you to improve your health through education and exercise. To learn more, visit [employer], or call [employer]er Service at [phone] (toll free) (TTY:711) from 7 a.m. - 7 p.m. Central Time, Monday-Friday.  Health Care Directive   This form helps you outline your health care wishes. You can request a form from me and I will answer any questions you have before you discuss it with your doctor.   Annual Physical  Take a key step on your path to good health and set up an annual physical at your clinic.  Questions?  Call me at [phone] Monday-Friday between 8am and 5pm.  TTY/TTD: 711. As we discussed, I plan to be in touch with you again in 6 months to follow up via phone.  Sincerely,    Rosibel Rhodes RN    E-mail: [email or web address]  Phone: [phone]      [employer]          cc: member records                    Civil Rights Notice  Discrimination is against the law. [employer] does not discriminate on the basis of any of the following:    Race    Color    National Origin    Creed    Restoration    Age    Public Assistance Status    Receipt of Health Care Services    Disability (including physical or mental impairment)    Sex (including sex stereotypes and gender  identity)    Marital Status    Political Beliefs    Medical Condition    Genetic Information    Sexual Orientation    Claims Experience    Medical History    Health Status    Auxiliary Aids and Services:  [employer] provides auxiliary aids and services, like qualified interpreters or information in accessible formats, free of charge and in a timely manner, to ensure an equal opportunity to participate in our health care programs. Contact [employer] Customer Service at [employer]/contact medicaid or call [phone] (toll free); TTY:718 or at [employer]/contactLVL6caid.    Language Assistance Services:  [employer] provides translated documents and spoken language interpreting, free of charge and in a timely manner, when language assistance services are necessary to ensure limited English speakers have meaningful access to our information and services. Contact [employer] at -[phone] (toll free); TTY: 588 or [employer]/contactmedicaid.     Civil Rights Complaints  You have the right to file a discrimination complaint if you believe you were treated in a discriminatory way by [employer]. You may contact any of the following four agencies directly to file a discrimination complaint.    U.S. Department of Health and Human Services  Office for Civil Rights (OCR)  You have the right to file a complaint with the OCR, a federal agency, if you believe you have been discriminated against because of any of the following:    Race    Disability    Color    Sex    National Origin    Age      Contact the OCR directly to file a complaint:         Director         U.S. Department of Health and Human Services  Office for Civil Rights         [address] [place] [address], [place] [address]         [phone] (Voice)         [phone] (TDD)         Complaint Portal - https://ocrportal.hhs.gov/ocr/portal/lobby.jsf     [place] Department of Human Rights ([facility])  In [place], you have the right to  file a complaint with the MDHR if you believe you have been discriminated against because of any of the following:      Race    Color    National Origin    Methodist    Creed    Sex    Sexual Orientation    Marital Status    Public Assistance Status    Disability    Contact the MDHR directly to file a complaint:         [place] Department of Human Rights         [facility], [address] [place] [address]         [phone] (voice)          [phone] (toll free)         711 or [phone] ([place] Relay)         [phone] (Fax)         [email or web address]. (Email)     [place] Department of Human Services (DHS)  You have the right to file a complaint with [facility] if you believe you have been discriminated against in our health care programs because of any of the following:    Race    Color    National Origin    Creed    Methodist    Age    Public Assistance Status    Receipt of Health Care Services    Disability (including physical or mental impairment)    Sex (including sex stereotypes and gender identity)    Marital Status    Political Beliefs    Medical Condition    Genetic Information    Sexual Orientation    Claims Experience    Medical History    Health Status    Complaints must be in writing and filed within 180 days of the date you discovered the alleged discrimination. The complaint must contain your name and address and describe the discrimination you are complaining about. After we get your complaint, we will review it and notify you in writing about whether we have authority to investigate. If we do, we will investigate the complaint.      [facility] will notify you in writing of the investigation s outcome. You have a right to appeal the outcome if you disagree with the decision. To appeal, you must send a written request to have [facility] review the investigation outcome period. Be brief and state why you disagree with the decision. Include additional information you think is important.       If you file a complaint in this way, the people who work for the agency named in the complaint cannot retaliate against you. This means they cannot punish you in any way for filing a complaint. Filing a complaint in this way does not stop you from seeking out other legal or administration actions.     Contact DHS directly to file a discrimination complaint:        Civil Rights Coordinator        [facility]        Equal Opportunity and Access Division        [address] [address]        [place] [address] [phone] (voice) or use your preferred relay service     [employer] Complaint Notice   You have the right to file a complaint with [employer] if you believe you have been discriminated against because of any of the following:       Medical condition    Health status    Receipt of health care services    Claims experience    Medical history    Genetic information    Disability (including mental or physical impairment)    Marital status    Age    Sex (including sex stereotypes and gender identity)    Sexual orientation    National origin    Race    Color    Mandaen    Creed    Public assistance status    Political beliefs    You can file a complaint and ask for help in filing a complaint in person or by mail, phone, fax, or email at:     [employer] Civil Rights Coordinator  [facility] [employer] [facility]  [address], [place] [address] [phone] (voice and fax) or TTY:246  Email: [email or web address]    American Indians can continue to use [facility] and  Health Services (IHS) clinics. We will not require prior approval or impose any conditions for you to get services at these clinics. For elders age 65 years and older this includes Elderly Waiver (EW) services accessed through the [place]. If a doctor or other provider in a [facility] or IHS clinic refers you to a provider in our network, we will not require you to see your primary care provider prior to the  referral.    For accessible formats of this publication or assistance with equal or access to our services, visit [email or web address], or call [phone] (toll free) or use your preferred relay service.

## 2019-05-14 NOTE — PROGRESS NOTES
[facility] Care Coordination Contact    Received after visit chart from care coordinator.  Completed following tasks: Mailed copy of care plan to client, Updated services in access and Submitted referrals/auths for [facility]: Assisted Living  Chart was returned to CC.   Mailed copy of CL tool to member, faxed copy to AL facility, uploaded into [email or web address] and submitted authorization to health plan.   Provider Signature - No POC Shared:  Member indicates that they do not want their POC shared with any EW providers.  Order placed with [employer] (p: [phone]; f: [phone]) for Incontinence Supplies: Pull Ups: Small: 3 packs per Month.  Order is continuous. Access updated.  As required, authorization submitted to health plan.  Order placed with [employer] (p: [phone]; f: [phone]) for Bilateral Palm Protectors.  Order placed on 04/26/2019. Access updated.  As required, authorization submitted to health plan.    Nico Bowles  Care Management Specialist  [facility]  [phone]

## 2019-05-15 ENCOUNTER — PATIENT OUTREACH (OUTPATIENT)
Dept: GERIATRIC MEDICINE | Facility: CLINIC | Age: 84
End: 2019-05-15

## 2019-05-15 NOTE — PROGRESS NOTES
[facility] Care Coordination Contact  CC attended care conference for member on 5/15/2019 at [facility].   Present at care conference was client, this CC, adult daughter's Linda and Patricia Kline and Nickie RN .  Care conference requested by family to review increased needs.  Due to client's incontinence at night and risk of falls with self transfers from bed, AL staff to assist with toileting 2x/night. Client reports indep with toileting during the day.  CC to place order for chux pads and washable bed pads.  Patricia will review with [facility] to add daily assistance with making bed and emptying garbage.  Nursing to ensure that handwashing is completed with AM/PM cares.    Raisa reports that she scheduled an eye exam with [facility]  CC to follow as needed.  Rosibel Rhodes RN, BC  Manager [facility] Care Coordinator   [phone]  (Fax)

## 2019-05-21 ENCOUNTER — PATIENT OUTREACH (OUTPATIENT)
Dept: GERIATRIC MEDICINE | Facility: CLINIC | Age: 84
End: 2019-05-21

## 2019-05-21 NOTE — PROGRESS NOTES
[facility] Care Coordination Contact    5/20/19 Rec'd vm from client's daughter Raisa, stating that she does not feel that her mother will need the palm protectors as her mother will not be able to put them on indep and nursing staff cannot assist. Raisa also inquired if the bed pads (washable) will need to be washed daily.  5/21/19 Attempted to reach Raisa,explained that with the increased services at [facility] to provide handwashing and night time toileting assistance, CC would agree to discontinue the referral.  Request a return call.  Rosibel Rhodes RN, BC  Manager [facility] Coordinator   [phone]  (Fax)

## 2019-05-21 NOTE — PROGRESS NOTES
[facility] Care Coordination Contact    Rec'd tele call from client's dtr Raisa, plan to discontinue washable bed pads and palm protectors.  Rosibel Rhodes RN, BC  Manager [facility] Care Coordinator   [phone]  (Fax)

## 2019-05-23 DIAGNOSIS — Z53.9 DIAGNOSIS NOT YET DEFINED: Primary | ICD-10-CM

## 2019-05-23 PROCEDURE — G0180 MD CERTIFICATION HHA PATIENT: HCPCS | Performed by: INTERNAL MEDICINE

## 2019-05-24 ENCOUNTER — ASSISTED LIVING VISIT (OUTPATIENT)
Dept: GERIATRICS | Facility: CLINIC | Age: 84
End: 2019-05-24
Payer: COMMERCIAL

## 2019-05-24 VITALS
HEART RATE: 66 BPM | RESPIRATION RATE: 18 BRPM | SYSTOLIC BLOOD PRESSURE: 122 MMHG | DIASTOLIC BLOOD PRESSURE: 76 MMHG | OXYGEN SATURATION: 92 %

## 2019-05-24 DIAGNOSIS — M15.0 PRIMARY OSTEOARTHRITIS INVOLVING MULTIPLE JOINTS: Primary | ICD-10-CM

## 2019-05-24 DIAGNOSIS — I25.10 CORONARY ARTERY DISEASE INVOLVING NATIVE CORONARY ARTERY OF NATIVE HEART WITHOUT ANGINA PECTORIS: ICD-10-CM

## 2019-05-24 DIAGNOSIS — I10 ESSENTIAL HYPERTENSION: ICD-10-CM

## 2019-05-24 DIAGNOSIS — M81.0 AGE-RELATED OSTEOPOROSIS WITHOUT CURRENT PATHOLOGICAL FRACTURE: ICD-10-CM

## 2019-05-24 DIAGNOSIS — K21.9 GASTROESOPHAGEAL REFLUX DISEASE, ESOPHAGITIS PRESENCE NOT SPECIFIED: ICD-10-CM

## 2019-05-24 NOTE — LETTER
5/24/2019        RE: Batsheva Barkley  [facility]  [address]  [place] [address]        Batsheva Barkley is a 85 year old female seen May 24, 2019 at [facility] where she has resided for one year (admit 4/2018) seen to follow up HTN and CHF   Patient is seen in her apartment, up to chair.   Just getting started and having breakfast late morning.   She reports shoulder pain.   Also has left hand contracture; she has been soaking it and working with therapies without much improvement yet.  She had cortisone injection to right knee in January, which was helpful.     Patient has chronic GI symptoms that include reflux, nausea, and diarrhea with urgency and incontinence      She had a GI consult in March 2018, declined colonoscopy for definitive dx.    Currently on omeprazole and prn Imodium which she notes works well when she has diarrhea.     Patient has longstanding cardiac history, sees Dr Tang routinely.    She had an acute IMI in 2005, s/p stenting.   ECHO has shown EF 45-50%, moderate MR and WMAs   Nuclear stress test in 2017 showed inferolateral hypokinesis without reversible ischemia.   She has mild peripheral edema, on furosemide only intermittently.      Past Medical History:   Diagnosis Date     CAD (coronary artery disease) 6/20/05    [place], transient CHF     Cardiomyopathy (H)      CHF NYHA class III, chronic, systolic (H) 1/25/2018     Coronary atherosclerosis 6/20/2005    circ vessel was stented;  had a 50% LAD lesion which was not treated Problem list name updated by automated process. Provider to review     Edema 1/26/2015     Generalized osteoarthrosis     knees ;; L total kne 10/09     Hypertension goal BP (blood pressure) < 140/90 1/21/2015     Leg weakness, bilateral 2/13/2018     Mitral regurgitation     mod     Mixed hyperlipidemia      Osteoporosis      Physical deconditioning 1/21/2015     Total urinary incontinence  12/27/2017       Past Surgical History:   Procedure Laterality Date     COLONOSCOPY  3/05     ENT SURGERY       ESOPHAGOSCOPY, GASTROSCOPY, DUODENOSCOPY (EGD), COMBINED N/A 4/14/2016    Procedure: COMBINED ESOPHAGOSCOPY, GASTROSCOPY, DUODENOSCOPY (EGD), BIOPSY SINGLE OR MULTIPLE;  Surgeon: Jonathan Gramajo MD;  Location:  GI     EYE SURGERY       HEART CATH, ANGIOPLASTY  2005    RONI to left circ     L bunion + hammer toes  1/04, 4/04     L total knee replacement  10/2009      ovarian cyst surgery       SH:  [place]   [place]   They have 2 daughters.       ROS:   Ambulatory with 4WW  Wt Readings from Last 5 Encounters:   05/23/18 43.1 kg (95 lb)   05/11/18 43.1 kg (95 lb)   04/27/18 43.4 kg (95 lb 9.6 oz)   04/23/18 42.9 kg (94 lb 9.6 oz)   04/17/18 46.3 kg (102 lb)        EXAM:  Pleasant, NAD  /76   Pulse 66   Resp 18   SpO2 92%    +[place]  Neck supple without adenopathy  Lungs clear bilaterally with fair air movement  Heart RRR s1s2   Abd soft, NT, no distention, +BS  Ext without LE edema.   +deforming changes of OA in hands and feet.   Trigger fingers left hand now unable to release.    Flexion contractures at both knees  Neuro: STML, left eye droop  Psych: affect okay.     Last Comprehensive Metabolic Panel:  Sodium   Date Value Ref Range Status   03/25/2019 135 (A) 136 - 145 mmol/L Final     Potassium   Date Value Ref Range Status   03/25/2019 4.4 3.5 - 5.0 mmol/L Final     Chloride   Date Value Ref Range Status   03/25/2019 103 98 - 107 mmol/L Final     Carbon Dioxide   Date Value Ref Range Status   03/25/2019 23 22 - 31 mmol/L Final     Anion Gap   Date Value Ref Range Status   03/25/2019 9 5 - 18 mmol/L Final     Glucose   Date Value Ref Range Status   03/25/2019 89 70 - 125 mg/dL Final     Urea Nitrogen   Date Value Ref Range Status   03/25/2019 20 8 - 28 mg/dL Final     Creatinine   Date Value Ref Range Status    03/25/2019 0.81 0.60 - 1.10 mg/dL Final     GFR Estimate   Date Value Ref Range Status   03/25/2019 >60 >60 ml/min/1.73m2 Final     Calcium   Date Value Ref Range Status   03/25/2019 8.9 8.5 - 10.5 mg/dL Final     Lab Results   Component Value Date    WBC 3.7 03/25/2019      HGB 10.7 03/25/2019      MCV 93 03/25/2019       03/25/2019        IMP/PLAN:   (M15.0) Primary osteoarthritis involving multiple joints  Comment: shoulder and knee pain, trigger fingers    Using diclofenac gel and acetaminophen as above.   Plan: outpatient OCCUPATIONAL THERAPY, awaiting splint for right hand.       (I25.10) Coronary artery disease involving native coronary artery of native heart without angina pectoris  Comment: h/o stent after IMI     Plan: daily ASA, statin and beta blocker for secondary prevention.   Follow up with Dr Tang as scheduled.       (M81.0) Age-related osteoporosis without current pathological fracture  Comment: with h/o falls   Plan: consider addition of vit D.       (K21.9) Gastroesophageal reflux disease, esophagitis presence not specified  Comment: no current sx  Plan: continue omeprazole    (I10) Essential hypertension  Comment:   BP Readings from Last 3 Encounters:   05/24/19 122/76   04/24/19 122/76   03/22/19 119/64      Plan: continue losartan, metoprolol     AD: DNR/DNI    Nivia Lowery MD           Sincerely,        Nivia Lowery MD

## 2019-05-27 PROBLEM — I50.22: Status: RESOLVED | Noted: 2018-01-25 | Resolved: 2019-05-27

## 2019-05-28 NOTE — PROGRESS NOTES
Batsheva Barkley is a 85 year old female seen May 24, 2019 at [facility] where she has resided for one year (admit 4/2018) seen to follow up HTN and CHF   Patient is seen in her apartment, up to chair.   Just getting started and having breakfast late morning.   She reports shoulder pain.   Also has left hand contracture; she has been soaking it and working with therapies without much improvement yet.  She had cortisone injection to right knee in January, which was helpful.     Patient has chronic GI symptoms that include reflux, nausea, and diarrhea with urgency and incontinence      She had a GI consult in March 2018, declined colonoscopy for definitive dx.    Currently on omeprazole and prn Imodium which she notes works well when she has diarrhea.     Patient has longstanding cardiac history, sees Dr Tang routinely.    She had an acute IMI in 2005, s/p stenting.   ECHO has shown EF 45-50%, moderate MR and WMAs   Nuclear stress test in 2017 showed inferolateral hypokinesis without reversible ischemia.   She has mild peripheral edema, on furosemide only intermittently.      Past Medical History:   Diagnosis Date     CAD (coronary artery disease) 6/20/05    [place], transient CHF     Cardiomyopathy (H)      CHF NYHA class III, chronic, systolic (H) 1/25/2018     Coronary atherosclerosis 6/20/2005    circ vessel was stented;  had a 50% LAD lesion which was not treated Problem list name updated by automated process. Provider to review     Edema 1/26/2015     Generalized osteoarthrosis     knees ;; L total kne 10/09     Hypertension goal BP (blood pressure) < 140/90 1/21/2015     Leg weakness, bilateral 2/13/2018     Mitral regurgitation     mod     Mixed hyperlipidemia      Osteoporosis      Physical deconditioning 1/21/2015     Total urinary incontinence 12/27/2017       Past Surgical History:   Procedure Laterality Date     COLONOSCOPY  3/05     ENT SURGERY        ESOPHAGOSCOPY, GASTROSCOPY, DUODENOSCOPY (EGD), COMBINED N/A 4/14/2016    Procedure: COMBINED ESOPHAGOSCOPY, GASTROSCOPY, DUODENOSCOPY (EGD), BIOPSY SINGLE OR MULTIPLE;  Surgeon: Jonathan Gramajo MD;  Location:  GI     EYE SURGERY       HEART CATH, ANGIOPLASTY  2005    RONI to left circ     L bunion + hammer toes  1/04, 4/04     L total knee replacement  10/2009      ovarian cyst surgery       SH:  [place] apartment   [place] in [place].   They have 2 daughters.       ROS:   Ambulatory with 4WW  Wt Readings from Last 5 Encounters:   05/23/18 43.1 kg (95 lb)   05/11/18 43.1 kg (95 lb)   04/27/18 43.4 kg (95 lb 9.6 oz)   04/23/18 42.9 kg (94 lb 9.6 oz)   04/17/18 46.3 kg (102 lb)        EXAM:  Pleasant, NAD  /76   Pulse 66   Resp 18   SpO2 92%    +[place]  Neck supple without adenopathy  Lungs clear bilaterally with fair air movement  Heart RRR s1s2   Abd soft, NT, no distention, +BS  Ext without LE edema.   +deforming changes of OA in hands and feet.   Trigger fingers left hand now unable to release.    Flexion contractures at both knees  Neuro: STML, left eye droop  Psych: affect okay.     Last Comprehensive Metabolic Panel:  Sodium   Date Value Ref Range Status   03/25/2019 135 (A) 136 - 145 mmol/L Final     Potassium   Date Value Ref Range Status   03/25/2019 4.4 3.5 - 5.0 mmol/L Final     Chloride   Date Value Ref Range Status   03/25/2019 103 98 - 107 mmol/L Final     Carbon Dioxide   Date Value Ref Range Status   03/25/2019 23 22 - 31 mmol/L Final     Anion Gap   Date Value Ref Range Status   03/25/2019 9 5 - 18 mmol/L Final     Glucose   Date Value Ref Range Status   03/25/2019 89 70 - 125 mg/dL Final     Urea Nitrogen   Date Value Ref Range Status   03/25/2019 20 8 - 28 mg/dL Final     Creatinine   Date Value Ref Range Status   03/25/2019 0.81 0.60 - 1.10 mg/dL Final     GFR Estimate   Date Value Ref Range Status   03/25/2019 >60 >60 ml/min/1.73m2  Final     Calcium   Date Value Ref Range Status   03/25/2019 8.9 8.5 - 10.5 mg/dL Final     Lab Results   Component Value Date    WBC 3.7 03/25/2019      HGB 10.7 03/25/2019      MCV 93 03/25/2019       03/25/2019        IMP/PLAN:   (M15.0) Primary osteoarthritis involving multiple joints  Comment: shoulder and knee pain, trigger fingers    Using diclofenac gel and acetaminophen as above.   Plan: outpatient OCCUPATIONAL THERAPY, awaiting splint for right hand.       (I25.10) Coronary artery disease involving native coronary artery of native heart without angina pectoris  Comment: h/o stent after IMI     Plan: daily ASA, statin and beta blocker for secondary prevention.   Follow up with Dr Tang as scheduled.       (M81.0) Age-related osteoporosis without current pathological fracture  Comment: with h/o falls   Plan: consider addition of vit D.       (K21.9) Gastroesophageal reflux disease, esophagitis presence not specified  Comment: no current sx  Plan: continue omeprazole    (I10) Essential hypertension  Comment:   BP Readings from Last 3 Encounters:   05/24/19 122/76   04/24/19 122/76   03/22/19 119/64      Plan: continue losartan, metoprolol     AD: DNR/DNI    Nivia Lowery MD

## 2019-05-29 ENCOUNTER — ASSISTED LIVING VISIT (OUTPATIENT)
Dept: GERIATRICS | Facility: CLINIC | Age: 84
End: 2019-05-29
Payer: COMMERCIAL

## 2019-05-29 VITALS
OXYGEN SATURATION: 95 % | DIASTOLIC BLOOD PRESSURE: 82 MMHG | TEMPERATURE: 98.5 F | HEART RATE: 62 BPM | RESPIRATION RATE: 18 BRPM | SYSTOLIC BLOOD PRESSURE: 124 MMHG

## 2019-05-29 DIAGNOSIS — J04.0 ACUTE LARYNGITIS: Primary | ICD-10-CM

## 2019-05-29 DIAGNOSIS — R05.9 COUGH: ICD-10-CM

## 2019-05-29 NOTE — PROGRESS NOTES
[facility]  [place] Medical Record Number:  [id number]  Place of Service where encounter took place:  [facility] [[id number]]  Chief Complaint   Patient presents with     Pharyngitis     Cough       HPI:    Batsheva Barkley  is a 85 year old (11/6/1933), who is being seen today for an episodic care visit.  HPI information obtained from: facility chart records, facility staff, patient report and [facility] chart review. Today's concern is: sore throat, cough.  Reports sore throat today. Has rhinitis, post-nasal drip. Afebrile.  Has occ prod. Cough. Afebrile today, O2 sats stable. Act. Level baseline. No apparent sob. Reports cough drops help with throat discomfort. Reports extensive talking/laughing with visitor yesterday which may have exac. Throat discomfort.       Past Medical and Surgical History reviewed in Epic today.    MEDICATIONS:  Current Outpatient Medications   Medication Sig Dispense Refill     acetaminophen (TYLENOL) 500 MG tablet Take 1,000 mg by mouth 3 times daily And every day prn       alum & mag hydroxide-simethicone (MYLANTA/MAALOX) 200-200-20 MG/5ML SUSP suspension Take 10 mLs by mouth 2 times daily       ASPIRIN 81 MG OR TABS 1 tab po QD (Once per day) 100 3     Atorvastatin Calcium (LIPITOR PO) Take 20 mg by mouth daily       Carboxymethylcellulose Sodium (THERATEARS OP) Place 2 drops into both eyes 4 times daily as needed       loperamide (IMODIUM) 2 MG capsule Take 2 mg by mouth 4 times daily as needed for diarrhea (and 2 mg QAM scheduled)        losartan (COZAAR) 50 MG tablet Take 1 tablet (50 mg) by mouth 2 times daily 180 tablet 3     Menthol ([employer] COUGH DROPS SUGAR FREE MT) Take 1 lozenge by mouth 4 times daily as needed       menthol-zinc oxide (CALMOSEPTINE) 0.44-20.6 % OINT ointment Apply topically 2 times daily as needed for skin protection       metoprolol (LOPRESSOR) 50 MG tablet TAKE ONE TABLET BY MOUTH TWICE DAILY 180 tablet 3      Omeprazole (PRILOSEC PO) Take 20 mg by mouth every morning       ONDANSETRON PO Take 4 mg by mouth every 8 hours as needed for nausea       order for DME Light weight wheelchair Body mass index is 19.08 kg/(m^2). 1 Device 0     order for DME ANTOINE stockings- below knee. 1 Package 0     polyethylene glycol (MIRALAX/GLYCOLAX) Packet Take 17 g by mouth daily as needed for constipation 7 packet      senna-docusate (SENOKOT-S;PERICOLACE) 8.6-50 MG per tablet Take 1 tablet by mouth 2 times daily as needed        traZODone (DESYREL) 50 MG tablet Take 50 mg by mouth At Bedtime       trolamine salicylate (ASPERCREME) 10 % external cream Apply topically 2 times daily AND BID PRN           REVIEW OF SYSTEMS:  No chest pain, shortness of breath, fevers, chills, headache, nausea, vomiting, dysuria or bowel abnormalities.  Appetite is  fair.  No pain except occ LEs.    Objective:  /82   Pulse 62   Temp 98.5  F (36.9  C)   Resp 18   SpO2 95%   Exam:  GENERAL APPEARANCE:  Alert, in no distress, cooperative  ENT:  Mouth and posterior oropharynx normal, moist mucous membranes, [place], no rhinitis  EYES:  EOM, conjunctivae, lids, pupils and irises normal, PERRL  NECK:  No adenopathy,masses or thyromegaly, FROM  RESP:  respiratory effort and palpation of chest normal, lungs clear to auscultation , no respiratory distress  CV:  Palpation and auscultation of heart done , no edema, irregular rhythm : Sl. irreg, rate-normal  ABDOMEN:  normal bowel sounds, soft, nontender, no hepatosplenomegaly or other masses, no guarding or rebound  M/S:   Gait and station normal  muscle strength 5/5 all 4 ext, normal tone  NEURO:   Cranial nerves 2-12 are normal tested and grossly at patient's baseline, alert, speech clear  PSYCH:  memory impaired , affect and mood normal    Labs:     Most Recent 3 CBC's:  Recent Labs   Lab Test 03/25/19 10/01/18 04/23/18 04/21/18  2020   WBC 3.7* 5.2  --  7.8   HGB 10.7* 10.0* 10.3* 10.8*   MCV 93 87  --  91   PLT  218 207  --  258     Most Recent 3 BMP's:  Recent Labs   Lab Test 03/25/19 10/01/18 04/21/18  2020   * 136 133   POTASSIUM 4.4 3.9 4.6   CHLORIDE 103 106 100   CO2 23 23 25   BUN 20 18 31*   CR 0.81 0.77 0.74   ANIONGAP 9 7 8   REILLY 8.9 8.5 8.7   GLC 89 85 93       ASSESSMENT/PLAN:  Acute laryngitis  Likely r/t sinus drainage, no apparent infection  1. Start prn cough drops  2. Monitor for fever  3. For ongoing s/s may start prn cepacol    Cough  Ongoing. Lungs CTA, no increased sob  1. Monitor for fever as above  2. For fever, may check CXR  3. Follow O2 sats  4. Encourage fluids      Electronically signed by:  NICKOLAS Hallman CNP

## 2019-05-29 NOTE — LETTER
5/29/2019        RE: Batsheva Barkley  [facility]  [address]  [facility] [address]        [facility]  [place] Medical Record Number:  [id number]  Place of Service where encounter took place:  [facility] [[id number]]  Chief Complaint   Patient presents with     Pharyngitis     Cough       HPI:    Batsheva Barkley  is a 85 year old (11/6/1933), who is being seen today for an episodic care visit.  HPI information obtained from: facility chart records, facility staff, patient report and [facility] chart review. Today's concern is: sore throat, cough.  Reports sore throat today. Has rhinitis, post-nasal drip. Afebrile.  Has occ prod. Cough. Afebrile today, O2 sats stable. Act. Level baseline. No apparent sob. Reports cough drops help with throat discomfort. Reports extensive talking/laughing with visitor yesterday which may have exac. Throat discomfort.       Past Medical and Surgical History reviewed in Epic today.    MEDICATIONS:  Current Outpatient Medications   Medication Sig Dispense Refill     acetaminophen (TYLENOL) 500 MG tablet Take 1,000 mg by mouth 3 times daily And every day prn       alum & mag hydroxide-simethicone (MYLANTA/MAALOX) 200-200-20 MG/5ML SUSP suspension Take 10 mLs by mouth 2 times daily       ASPIRIN 81 MG OR TABS 1 tab po QD (Once per day) 100 3     Atorvastatin Calcium (LIPITOR PO) Take 20 mg by mouth daily       Carboxymethylcellulose Sodium (THERATEARS OP) Place 2 drops into both eyes 4 times daily as needed       loperamide (IMODIUM) 2 MG capsule Take 2 mg by mouth 4 times daily as needed for diarrhea (and 2 mg QAM scheduled)        losartan (COZAAR) 50 MG tablet Take 1 tablet (50 mg) by mouth 2 times daily 180 tablet 3     Menthol ([employer] COUGH DROPS SUGAR FREE MT) Take 1 lozenge by mouth 4 times daily as needed       menthol-zinc oxide (CALMOSEPTINE) 0.44-20.6 % OINT ointment Apply topically 2 times daily as  needed for skin protection       metoprolol (LOPRESSOR) 50 MG tablet TAKE ONE TABLET BY MOUTH TWICE DAILY 180 tablet 3     Omeprazole (PRILOSEC PO) Take 20 mg by mouth every morning       ONDANSETRON PO Take 4 mg by mouth every 8 hours as needed for nausea       order for DME Light weight wheelchair Body mass index is 19.08 kg/(m^2). 1 Device 0     order for DME ANTOINE stockings- below knee. 1 Package 0     polyethylene glycol (MIRALAX/GLYCOLAX) Packet Take 17 g by mouth daily as needed for constipation 7 packet      senna-docusate (SENOKOT-S;PERICOLACE) 8.6-50 MG per tablet Take 1 tablet by mouth 2 times daily as needed        traZODone (DESYREL) 50 MG tablet Take 50 mg by mouth At Bedtime       trolamine salicylate (ASPERCREME) 10 % external cream Apply topically 2 times daily AND BID PRN           REVIEW OF SYSTEMS:  No chest pain, shortness of breath, fevers, chills, headache, nausea, vomiting, dysuria or bowel abnormalities.  Appetite is  fair.  No pain except occ LEs.    Objective:  /82   Pulse 62   Temp 98.5  F (36.9  C)   Resp 18   SpO2 95%   Exam:  GENERAL APPEARANCE:  Alert, in no distress, cooperative  ENT:  Mouth and posterior oropharynx normal, moist mucous membranes, [place], no rhinitis  EYES:  EOM, conjunctivae, lids, pupils and irises normal, PERRL  NECK:  No adenopathy,masses or thyromegaly, FROM  RESP:  respiratory effort and palpation of chest normal, lungs clear to auscultation , no respiratory distress  CV:  Palpation and auscultation of heart done , no edema, irregular rhythm : Sl. irreg, rate-normal  ABDOMEN:  normal bowel sounds, soft, nontender, no hepatosplenomegaly or other masses, no guarding or rebound  M/S:   Gait and station normal  muscle strength 5/5 all 4 ext, normal tone  NEURO:   Cranial nerves 2-12 are normal tested and grossly at patient's baseline, alert, speech clear  PSYCH:  memory impaired , affect and mood normal    Labs:     Most Recent 3 CBC's:  Recent Labs   Lab Test  03/25/19 10/01/18 04/23/18 04/21/18  2020   WBC 3.7* 5.2  --  7.8   HGB 10.7* 10.0* 10.3* 10.8*   MCV 93 87  --  91    207  --  258     Most Recent 3 BMP's:  Recent Labs   Lab Test 03/25/19 10/01/18 04/21/18  2020   * 136 133   POTASSIUM 4.4 3.9 4.6   CHLORIDE 103 106 100   CO2 23 23 25   BUN 20 18 31*   CR 0.81 0.77 0.74   ANIONGAP 9 7 8   REILLY 8.9 8.5 8.7   GLC 89 85 93       ASSESSMENT/PLAN:  Acute laryngitis  Likely r/t sinus drainage, no apparent infection  1. Start prn cough drops  2. Monitor for fever  3. For ongoing s/s may start prn cepacol    Cough  Ongoing. Lungs CTA, no increased sob  1. Monitor for fever as above  2. For fever, may check CXR  3. Follow O2 sats  4. Encourage fluids      Electronically signed by:  NICKOLAS Hallman CNP             Sincerely,        NICKOLAS Hallman CNP

## 2019-05-31 NOTE — PROGRESS NOTES
[facility] Care Coordination Contact    Order placed with [facility] (p: [phone]; f: [phone])     Batsheva Barkley 11/6/1933 Chux Pads 1 per day (the largest size possible) 5/17/2019 Delivered 5/23/19       Nico Bowles  Care Management Specialist  [facility]  [phone]

## 2019-06-24 ENCOUNTER — ASSISTED LIVING VISIT (OUTPATIENT)
Dept: GERIATRICS | Facility: CLINIC | Age: 84
End: 2019-06-24
Payer: COMMERCIAL

## 2019-06-24 DIAGNOSIS — R06.02 SOB (SHORTNESS OF BREATH): ICD-10-CM

## 2019-06-24 DIAGNOSIS — R41.3 MEMORY LOSS: Primary | ICD-10-CM

## 2019-06-24 DIAGNOSIS — I10 ESSENTIAL HYPERTENSION: ICD-10-CM

## 2019-06-24 NOTE — PROGRESS NOTES
[facility]  [place] [facility] Record Number:  [id number]  Place of Service where encounter took place:  [facility] ([facility]) [[id number]]  Chief Complaint   Patient presents with     Confusion       HPI:    Batsheva Barkley  is a 85 year old (11/6/1933), who is being seen today for an episodic care visit.  HPI information obtained from: facility chart records, facility staff, patient report, [facility] chart review and family/first contact dtr report. Today's concern is: memory loss, HTN, sob. Last week HC RN noted increased confusion. Per staff reports upon further eval, cog. Status appeared baseline. VSS. Per dtr, no noted increased confusion. Does have STML.  Mood, behaviors stable. For HTN cont on losartan, lopressor. BP stable today. No reports of dizziness. Afebrile. No dysuria or increased urinary freq. Has occ QUEZADA, however no recent reports of QUEZADA. No current cough. O2 sats stable RA.       Past Medical and Surgical History reviewed in Epic today.    MEDICATIONS:  Current Outpatient Medications   Medication Sig Dispense Refill     acetaminophen (TYLENOL) 500 MG tablet Take 1,000 mg by mouth 3 times daily And every day prn       alum & mag hydroxide-simethicone (MYLANTA/MAALOX) 200-200-20 MG/5ML SUSP suspension Take 10 mLs by mouth 2 times daily       ASPIRIN 81 MG OR TABS 1 tab po QD (Once per day) 100 3     Atorvastatin Calcium (LIPITOR PO) Take 20 mg by mouth daily       Carboxymethylcellulose Sodium (THERATEARS OP) Place 2 drops into both eyes 4 times daily as needed AND 2 DROPS BOTH EYES BID SCHEDULED       loperamide (IMODIUM) 2 MG capsule Take 2 mg by mouth 4 times daily as needed for diarrhea (and 2 mg QAM scheduled)        losartan (COZAAR) 50 MG tablet Take 1 tablet (50 mg) by mouth 2 times daily 180 tablet 3     Menthol ([employer]S COUGH DROPS SUGAR FREE MT) Take 1 lozenge by mouth 4 times daily as needed       menthol-zinc oxide (CALMOSEPTINE) 0.44-20.6 % OINT  ointment Apply topically 2 times daily as needed for skin protection       metoprolol (LOPRESSOR) 50 MG tablet TAKE ONE TABLET BY MOUTH TWICE DAILY 180 tablet 3     Omeprazole (PRILOSEC PO) Take 20 mg by mouth every morning       ONDANSETRON PO Take 4 mg by mouth every 8 hours as needed for nausea       order for DME Light weight wheelchair Body mass index is 19.08 kg/(m^2). 1 Device 0     order for DME ANTOINE stockings- below knee. 1 Package 0     polyethylene glycol (MIRALAX/GLYCOLAX) Packet Take 17 g by mouth daily as needed for constipation 7 packet      senna-docusate (SENOKOT-S;PERICOLACE) 8.6-50 MG per tablet Take 1 tablet by mouth 2 times daily as needed        traZODone (DESYREL) 50 MG tablet Take 50 mg by mouth At Bedtime       trolamine salicylate (ASPERCREME) 10 % external cream Apply topically 2 times daily AND BID PRN           REVIEW OF SYSTEMS:  No chest pain, shortness of breath, fevers, chills, headache, nausea, vomiting, dysuria or bowel abnormalities.  Appetite is  fair.  No pain except occ LEs.    Objective:  /80   Pulse 60   Temp 97.5  F (36.4  C)   Resp 18   SpO2 95%   Exam:  GENERAL APPEARANCE:  Alert, in no distress, cooperative  ENT:  Mouth and posterior oropharynx normal, moist mucous membranes, [place], no sinus tenderness  EYES:  EOM, conjunctivae, lids, pupils and irises normal, PERRL  NECK:  No adenopathy,masses or thyromegaly, no carotid bruit  RESP:  respiratory effort and palpation of chest normal, lungs clear to auscultation , no respiratory distress  CV:  Palpation and auscultation of heart done , regular rate and rhythm, no murmur, rub, or gallop, no edema  ABDOMEN:  normal bowel sounds, soft, nontender, no hepatosplenomegaly or other masses, no guarding or rebound  M/S:   Gait and station normal  muscle strength 5/5 all 4 ext, normal tone  NEURO:   Cranial nerves 2-12 are normal tested and grossly at patient's baseline, alert, speech clear  PSYCH:  insight and judgement  impaired, memory impaired , affect and mood normal    Labs:     Most Recent 3 CBC's:  Recent Labs   Lab Test 03/25/19 10/01/18 04/23/18 04/21/18  2020   WBC 3.7* 5.2  --  7.8   HGB 10.7* 10.0* 10.3* 10.8*   MCV 93 87  --  91    207  --  258     Most Recent 3 BMP's:  Recent Labs   Lab Test 03/25/19 10/01/18 04/21/18  2020   * 136 133   POTASSIUM 4.4 3.9 4.6   CHLORIDE 103 106 100   CO2 23 23 25   BUN 20 18 31*   CR 0.81 0.77 0.74   ANIONGAP 9 7 8   REILLY 8.9 8.5 8.7   GLC 89 85 93       ASSESSMENT/PLAN:  Memory loss  Cog status appears baseline today. Ears cleared of cerumen today. [place], wears hearing aids  1. Monitor for further reports of increased confusion  2. Monitor for fever  3. Monitor for changes in mood, behaviors, increased need of staff assist    Essential hypertension  BP stable, today, occ lower  BPs  1. Cont. Losartan, lopressor  2. Follow BPs, HRs  3. Encourage fluids  4. Cbc, bmp in next 1-3 mos    SOB (shortness of breath)  Currently stable  1. Follow O2 sats  2. Monitor for QUEZADA  3.  Monitor for cough, fever  4. For wheezing, increased QUEZADA consider nebs      Electronically signed by:  NICKOLAS Hallman CNP

## 2019-06-24 NOTE — LETTER
6/24/2019        RE: Batsheva Barkley  [facility]  [address]  [facility] [address]        [facility]  [place] Medical Record Number:  [id number]  Place of Service where encounter took place:  [facility] ([facility] [[id number]]  Chief Complaint   Patient presents with     Confusion       HPI:    Batsheva Barkley  is a 85 year old (11/6/1933), who is being seen today for an episodic care visit.  HPI information obtained from: facility chart records, facility staff, patient report, [facility] chart review and family/first contact dtr report. Today's concern is: memory loss, HTN, sob. Last week HC RN noted increased confusion. Per staff reports upon further eval, cog. Status appeared baseline. VSS. Per dtr, no noted increased confusion. Does have STML.  Mood, behaviors stable. For HTN cont on losartan, lopressor. BP stable today. No reports of dizziness. Afebrile. No dysuria or increased urinary freq. Has occ QUEZADA, however no recent reports of QUEZADA. No current cough. O2 sats stable RA.       Past Medical and Surgical History reviewed in Epic today.    MEDICATIONS:  Current Outpatient Medications   Medication Sig Dispense Refill     acetaminophen (TYLENOL) 500 MG tablet Take 1,000 mg by mouth 3 times daily And every day prn       alum & mag hydroxide-simethicone (MYLANTA/MAALOX) 200-200-20 MG/5ML SUSP suspension Take 10 mLs by mouth 2 times daily       ASPIRIN 81 MG OR TABS 1 tab po QD (Once per day) 100 3     Atorvastatin Calcium (LIPITOR PO) Take 20 mg by mouth daily       Carboxymethylcellulose Sodium (THERATEARS OP) Place 2 drops into both eyes 4 times daily as needed AND 2 DROPS BOTH EYES BID SCHEDULED       loperamide (IMODIUM) 2 MG capsule Take 2 mg by mouth 4 times daily as needed for diarrhea (and 2 mg QAM scheduled)        losartan (COZAAR) 50 MG tablet Take 1 tablet (50 mg) by mouth 2 times daily 180 tablet 3     Menthol (CVS COUGH DROPS SUGAR  FREE MT) Take 1 lozenge by mouth 4 times daily as needed       menthol-zinc oxide (CALMOSEPTINE) 0.44-20.6 % OINT ointment Apply topically 2 times daily as needed for skin protection       metoprolol (LOPRESSOR) 50 MG tablet TAKE ONE TABLET BY MOUTH TWICE DAILY 180 tablet 3     Omeprazole (PRILOSEC PO) Take 20 mg by mouth every morning       ONDANSETRON PO Take 4 mg by mouth every 8 hours as needed for nausea       order for DME Light weight wheelchair Body mass index is 19.08 kg/(m^2). 1 Device 0     order for DME ANTOINE stockings- below knee. 1 Package 0     polyethylene glycol (MIRALAX/GLYCOLAX) Packet Take 17 g by mouth daily as needed for constipation 7 packet      senna-docusate (SENOKOT-S;PERICOLACE) 8.6-50 MG per tablet Take 1 tablet by mouth 2 times daily as needed        traZODone (DESYREL) 50 MG tablet Take 50 mg by mouth At Bedtime       trolamine salicylate (ASPERCREME) 10 % external cream Apply topically 2 times daily AND BID PRN           REVIEW OF SYSTEMS:  No chest pain, shortness of breath, fevers, chills, headache, nausea, vomiting, dysuria or bowel abnormalities.  Appetite is  fair.  No pain except occ LEs.    Objective:  /80   Pulse 60   Temp 97.5  F (36.4  C)   Resp 18   SpO2 95%   Exam:  GENERAL APPEARANCE:  Alert, in no distress, cooperative  ENT:  Mouth and posterior oropharynx normal, moist mucous membranes, [place], no sinus tenderness  EYES:  EOM, conjunctivae, lids, pupils and irises normal, PERRL  NECK:  No adenopathy,masses or thyromegaly, no carotid bruit  RESP:  respiratory effort and palpation of chest normal, lungs clear to auscultation , no respiratory distress  CV:  Palpation and auscultation of heart done , regular rate and rhythm, no murmur, rub, or gallop, no edema  ABDOMEN:  normal bowel sounds, soft, nontender, no hepatosplenomegaly or other masses, no guarding or rebound  M/S:   Gait and station normal  muscle strength 5/5 all 4 ext, normal tone  NEURO:   Cranial nerves  2-12 are normal tested and grossly at patient's baseline, alert, speech clear  PSYCH:  insight and judgement impaired, memory impaired , affect and mood normal    Labs:     Most Recent 3 CBC's:  Recent Labs   Lab Test 03/25/19 10/01/18 04/23/18 04/21/18  2020   WBC 3.7* 5.2  --  7.8   HGB 10.7* 10.0* 10.3* 10.8*   MCV 93 87  --  91    207  --  258     Most Recent 3 BMP's:  Recent Labs   Lab Test 03/25/19 10/01/18 04/21/18  2020   * 136 133   POTASSIUM 4.4 3.9 4.6   CHLORIDE 103 106 100   CO2 23 23 25   BUN 20 18 31*   CR 0.81 0.77 0.74   ANIONGAP 9 7 8   REILLY 8.9 8.5 8.7   GLC 89 85 93       ASSESSMENT/PLAN:  Memory loss  Cog status appears baseline today. Ears cleared of cerumen today. [place], wears hearing aids  1. Monitor for further reports of increased confusion  2. Monitor for fever  3. Monitor for changes in mood, behaviors, increased need of staff assist    Essential hypertension  BP stable, today, occ lower  BPs  1. Cont. Losartan, lopressor  2. Follow BPs, HRs  3. Encourage fluids  4. Cbc, bmp in next 1-3 mos    SOB (shortness of breath)  Currently stable  1. Follow O2 sats  2. Monitor for QUEZADA  3.  Monitor for cough, fever  4. For wheezing, increased QUEZADA consider nebs      Electronically signed by:  NICKOLAS Hallman CNP             Sincerely,        NICKOLAS Hallman CNP

## 2019-06-25 ENCOUNTER — TRANSFERRED RECORDS (OUTPATIENT)
Dept: HEALTH INFORMATION MANAGEMENT | Facility: CLINIC | Age: 84
End: 2019-06-25

## 2019-06-25 ENCOUNTER — RECORDS - HEALTHEAST (OUTPATIENT)
Dept: LAB | Facility: CLINIC | Age: 84
End: 2019-06-25

## 2019-06-25 VITALS
DIASTOLIC BLOOD PRESSURE: 80 MMHG | RESPIRATION RATE: 18 BRPM | TEMPERATURE: 97.5 F | OXYGEN SATURATION: 95 % | SYSTOLIC BLOOD PRESSURE: 145 MMHG | HEART RATE: 60 BPM

## 2019-06-25 LAB
ANION GAP SERPL CALCULATED.3IONS-SCNC: 9 MMOL/L (ref 5–18)
ANION GAP SERPL CALCULATED.3IONS-SCNC: 9 MMOL/L (ref 5–18)
BUN SERPL-MCNC: 19 MG/DL (ref 8–28)
BUN SERPL-MCNC: 19 MG/DL (ref 8–28)
CALCIUM SERPL-MCNC: 9.6 MG/DL (ref 8.5–10.5)
CALCIUM SERPL-MCNC: 9.6 MG/DL (ref 8.5–10.5)
CHLORIDE BLD-SCNC: 102 MMOL/L (ref 98–107)
CHLORIDE SERPLBLD-SCNC: 102 MMOL/L (ref 98–107)
CO2 SERPL-SCNC: 23 MMOL/L (ref 22–31)
CO2 SERPL-SCNC: 23 MMOL/L (ref 22–31)
CREAT SERPL-MCNC: 0.79 MG/DL (ref 0.6–1.1)
CREAT SERPL-MCNC: 0.79 MG/DL (ref 0.6–1.1)
GFR SERPL CREATININE-BSD FRML MDRD: >60 ML/MIN/1.73M2
GFR SERPL CREATININE-BSD FRML MDRD: >60 ML/MIN/1.73M2
GLUCOSE BLD-MCNC: 131 MG/DL (ref 70–125)
GLUCOSE SERPL-MCNC: 131 MG/DL (ref 70–125)
HEMOGLOBIN: 11.4 G/DL (ref 12–16)
HGB BLD-MCNC: 11.4 G/DL (ref 12–16)
POTASSIUM BLD-SCNC: 4.2 MMOL/L (ref 3.5–5)
POTASSIUM SERPL-SCNC: 4.2 MMOL/L (ref 3.5–5)
SODIUM SERPL-SCNC: 134 MMOL/L (ref 136–145)
SODIUM SERPL-SCNC: 134 MMOL/L (ref 136–145)

## 2019-07-09 ENCOUNTER — PATIENT OUTREACH (OUTPATIENT)
Dept: GERIATRIC MEDICINE | Facility: CLINIC | Age: 84
End: 2019-07-09

## 2019-07-09 NOTE — PROGRESS NOTES
[facility] Care Coordination Contact    Spoke with Nickie DAVILA at [facility] regarding member's hand splints. Nickie reports that member is opened to [facility] home care, OT for recommendations of hand splints due to contractures.   Nickie expressed concern of staff applying the splints due to member's report of pain, difficulty applying the splints. Nickie states that member is only tolerating the splints for 30 minutes. Nickie states that if she makes the determination to allow staff to apply splints, member would need to decrease other services due to the length of time it takes to apply the splints.  Explained that the morning cares/toileting were added to the plan of care due to risk of falls.   Explained that the decision to apply splints will be the facilities.  Had discussion on the benefits of the splints if it is painful for member to wear and also the short amount of time member is tolerating the splints. Nickie will contact the OT tomorrow and review options.  Rosibel Rhodes RN, BC  Manager [facility] Care Coordinator   [phone]  (Fax)

## 2019-07-11 DIAGNOSIS — Z53.9 DIAGNOSIS NOT YET DEFINED: Primary | ICD-10-CM

## 2019-07-11 PROCEDURE — G0179 MD RECERTIFICATION HHA PT: HCPCS | Performed by: INTERNAL MEDICINE

## 2019-08-09 ENCOUNTER — ASSISTED LIVING VISIT (OUTPATIENT)
Dept: GERIATRICS | Facility: CLINIC | Age: 84
End: 2019-08-09
Payer: COMMERCIAL

## 2019-08-09 ENCOUNTER — ASSISTED LIVING VISIT (OUTPATIENT)
Dept: GERIATRICS | Facility: CLINIC | Age: 84
End: 2019-08-09

## 2019-08-09 VITALS
RESPIRATION RATE: 18 BRPM | OXYGEN SATURATION: 94 % | HEART RATE: 66 BPM | DIASTOLIC BLOOD PRESSURE: 64 MMHG | SYSTOLIC BLOOD PRESSURE: 109 MMHG

## 2019-08-09 DIAGNOSIS — K21.9 GASTROESOPHAGEAL REFLUX DISEASE, ESOPHAGITIS PRESENCE NOT SPECIFIED: ICD-10-CM

## 2019-08-09 DIAGNOSIS — I10 ESSENTIAL HYPERTENSION: ICD-10-CM

## 2019-08-09 DIAGNOSIS — M15.0 PRIMARY OSTEOARTHRITIS INVOLVING MULTIPLE JOINTS: Primary | ICD-10-CM

## 2019-08-09 DIAGNOSIS — G89.29 CHRONIC PAIN OF RIGHT KNEE: Primary | ICD-10-CM

## 2019-08-09 DIAGNOSIS — M25.561 CHRONIC PAIN OF RIGHT KNEE: Primary | ICD-10-CM

## 2019-08-09 NOTE — PROGRESS NOTES
"[facility]  [place] [facility] Record Number:  [id number]  Place of Service where encounter took place:  [facility] [[id number]]  Chief Complaint   Patient presents with     Knee Pain       HPI:    Batsheva Barkley  is a 85 year old (11/6/1933), who is being seen today for an episodic care visit.  HPI information obtained from: facility chart records, facility staff, patient report and [facility] chart review. Today's concern is: knee pain, HTN, GERD.  Has chronic knee pain R>L. Cont. On tylenol, aspercreme.  Reports R knee \"gives out\" at times.  Uses walker for amb.  No recent falls. Has had recent therapies. For HTN cont on cozaar, lopressor.  BPs gen. Stable sl lower at times.  No reports of dizziness. Reports increased GERD s/s at hs.  Cont. On prilosec, prn gerilanta, zofran.  No reports of diarrhea or nausea.  Reports decreased appetite, however this is not new.       Past Medical and Surgical History reviewed in Epic today.    MEDICATIONS:  Current Outpatient Medications   Medication Sig Dispense Refill     acetaminophen (TYLENOL) 500 MG tablet Take 1,000 mg by mouth 3 times daily And every day prn       alum & mag hydroxide-simethicone (MYLANTA/MAALOX) 200-200-20 MG/5ML SUSP suspension Take 10 mLs by mouth 2 times daily       ASPIRIN 81 MG OR TABS 1 tab po QD (Once per day) 100 3     Atorvastatin Calcium (LIPITOR PO) Take 20 mg by mouth daily       Carboxymethylcellulose Sodium (THERATEARS OP) Place 2 drops into both eyes 4 times daily as needed AND 2 DROPS BOTH EYES BID SCHEDULED       loperamide (IMODIUM) 2 MG capsule Take 2 mg by mouth 4 times daily as needed for diarrhea (and 2 mg QAM scheduled)        losartan (COZAAR) 50 MG tablet Take 1 tablet (50 mg) by mouth 2 times daily 180 tablet 3     Menthol (CVS COUGH DROPS SUGAR FREE MT) Take 1 lozenge by mouth 4 times daily as needed       menthol-zinc oxide (CALMOSEPTINE) 0.44-20.6 % OINT ointment Apply " topically 2 times daily as needed for skin protection       metoprolol (LOPRESSOR) 50 MG tablet TAKE ONE TABLET BY MOUTH TWICE DAILY 180 tablet 3     Omeprazole (PRILOSEC PO) Take 20 mg by mouth every morning       ONDANSETRON PO Take 4 mg by mouth every 8 hours as needed for nausea       order for DME Light weight wheelchair Body mass index is 19.08 kg/(m^2). 1 Device 0     order for DME ANTOINE stockings- below knee. 1 Package 0     polyethylene glycol (MIRALAX/GLYCOLAX) Packet Take 17 g by mouth daily as needed for constipation 7 packet      senna-docusate (SENOKOT-S;PERICOLACE) 8.6-50 MG per tablet Take 1 tablet by mouth 2 times daily as needed        traZODone (DESYREL) 50 MG tablet Take 50 mg by mouth At Bedtime       trolamine salicylate (ASPERCREME) 10 % external cream Apply topically 2 times daily AND BID PRN           REVIEW OF SYSTEMS:  No chest pain, shortness of breath, fevers, chills, headache, nausea, vomiting, dysuria or bowel abnormalities.  Appetite is  poor.  No pain except R knee.    Objective:  /64   Pulse 66   Resp 18   SpO2 94%   Exam:  GENERAL APPEARANCE:  Alert, in no distress, cooperative  ENT:  Mouth and posterior oropharynx normal, moist mucous membranes, [place]  EYES:  EOM, conjunctivae, lids, pupils and irises normal, PERRL  NECK:  No adenopathy,masses or thyromegaly, FROM  RESP:  respiratory effort and palpation of chest normal, lungs clear to auscultation , no respiratory distress  CV:  Palpation and auscultation of heart done , regular rate and rhythm, no murmur, rub, or gallop, no edema  ABDOMEN:  normal bowel sounds, soft, nontender, no hepatosplenomegaly or other masses, no guarding or rebound  M/S:   Gait and station normal  muscle strength 5/5 all 4 ext., bony deformites, contractues of bilat hands  NEURO:   Cranial nerves 2-12 are normal tested and grossly at patient's baseline, alert, speech clear  PSYCH:  insight and judgement impaired, memory impaired , affect and mood  normal    Labs:     Most Recent 3 CBC's:  Recent Labs   Lab Test 06/25/19 03/25/19 10/01/18  04/21/18  2020   WBC  --  3.7* 5.2  --  7.8   HGB 11.4* 10.7* 10.0*   < > 10.8*   MCV  --  93 87  --  91   PLT  --  218 207  --  258    < > = values in this interval not displayed.     Most Recent 3 BMP's:  Recent Labs   Lab Test 06/25/19 03/25/19 10/01/18   * 135* 136   POTASSIUM 4.2 4.4 3.9   CHLORIDE 102 103 106   CO2 23 23 23   BUN 19 20 18   CR 0.79 0.81 0.77   ANIONGAP 9 9 7   REILLY 9.6 8.9 8.5   * 89 85     Most Recent 2 LFT's:  Recent Labs   Lab Test 12/04/18 04/10/18  1855 01/18/18  1135   AST 23 44 24   ALT 14 39 19   ALKPHOS  --  74 74   BILITOTAL  --  0.3 0.5     Most Recent Cholesterol Panel:  Recent Labs   Lab Test 12/04/18   CHOL 183   LDL 82   HDL 84   TRIG 84       ASSESSMENT/PLAN:  Primary osteoarthritis involving multiple joints  Increased R knee pain  1. Cont. Tylenol, aspercreme  2. Contact J Tyson for repeat R knee inj  3. Reassess over next month  4. For ongoing s/s, may try voltaren gel  5. Monitor for changes in gait, increased LE weakness    Essential hypertension  Gen. Stable, loser BPs at times  1. Cont. Losartan, lopressor  2. Follow BPs, HRs. Cont. Hold parameter for lopressor  3. Monitor for reports of dizziness  4. Encourage fluids  5. Hgb, bmp in next 1-3 mox    Gastroesophageal reflux disease, esophagitis presence not specified  Increased s/s at hs  1. Cont. Prilosec, change admin time from am to hs  2. Cont. Prn gerilanta  3. Reassess over next few weeks  4. For ongoing s/s, may increase prilosec to bid      Electronically signed by:  NICKOLAS Hallman CNP

## 2019-08-09 NOTE — LETTER
"    8/9/2019        RE: Batsheva Barkley  [facility]  [address]  [facility] [address]        [place] [facility]  [place] Medical Record Number:  [id number]  Place of Service where encounter took place:  [facility] ([facility] [[id number]]  Chief Complaint   Patient presents with     Knee Pain       HPI:    Batsheva Barkley  is a 85 year old (11/6/1933), who is being seen today for an episodic care visit.  HPI information obtained from: facility chart records, facility staff, patient report and [facility] chart review. Today's concern is: knee pain, HTN, GERD.  Has chronic knee pain R>L. Cont. On tylenol, aspercreme.  Reports R knee \"gives out\" at times.  Uses walker for amb.  No recent falls. Has had recent therapies. For HTN cont on cozaar, lopressor.  BPs gen. Stable sl lower at times.  No reports of dizziness. Reports increased GERD s/s at hs.  Cont. On prilosec, prn gerilanta, zofran.  No reports of diarrhea or nausea.  Reports decreased appetite, however this is not new.       Past Medical and Surgical History reviewed in Epic today.    MEDICATIONS:  Current Outpatient Medications   Medication Sig Dispense Refill     acetaminophen (TYLENOL) 500 MG tablet Take 1,000 mg by mouth 3 times daily And every day prn       alum & mag hydroxide-simethicone (MYLANTA/MAALOX) 200-200-20 MG/5ML SUSP suspension Take 10 mLs by mouth 2 times daily       ASPIRIN 81 MG OR TABS 1 tab po QD (Once per day) 100 3     Atorvastatin Calcium (LIPITOR PO) Take 20 mg by mouth daily       Carboxymethylcellulose Sodium (THERATEARS OP) Place 2 drops into both eyes 4 times daily as needed AND 2 DROPS BOTH EYES BID SCHEDULED       loperamide (IMODIUM) 2 MG capsule Take 2 mg by mouth 4 times daily as needed for diarrhea (and 2 mg QAM scheduled)        losartan (COZAAR) 50 MG tablet Take 1 tablet (50 mg) by mouth 2 times daily 180 tablet 3     Menthol (CVS COUGH DROPS SUGAR FREE MT) Take 1 " lozenge by mouth 4 times daily as needed       menthol-zinc oxide (CALMOSEPTINE) 0.44-20.6 % OINT ointment Apply topically 2 times daily as needed for skin protection       metoprolol (LOPRESSOR) 50 MG tablet TAKE ONE TABLET BY MOUTH TWICE DAILY 180 tablet 3     Omeprazole (PRILOSEC PO) Take 20 mg by mouth every morning       ONDANSETRON PO Take 4 mg by mouth every 8 hours as needed for nausea       order for DME Light weight wheelchair Body mass index is 19.08 kg/(m^2). 1 Device 0     order for DME ANTOINE stockings- below knee. 1 Package 0     polyethylene glycol (MIRALAX/GLYCOLAX) Packet Take 17 g by mouth daily as needed for constipation 7 packet      senna-docusate (SENOKOT-S;PERICOLACE) 8.6-50 MG per tablet Take 1 tablet by mouth 2 times daily as needed        traZODone (DESYREL) 50 MG tablet Take 50 mg by mouth At Bedtime       trolamine salicylate (ASPERCREME) 10 % external cream Apply topically 2 times daily AND BID PRN           REVIEW OF SYSTEMS:  No chest pain, shortness of breath, fevers, chills, headache, nausea, vomiting, dysuria or bowel abnormalities.  Appetite is  poor.  No pain except R knee.    Objective:  /64   Pulse 66   Resp 18   SpO2 94%   Exam:  GENERAL APPEARANCE:  Alert, in no distress, cooperative  ENT:  Mouth and posterior oropharynx normal, moist mucous membranes, [place]  EYES:  EOM, conjunctivae, lids, pupils and irises normal, PERRL  NECK:  No adenopathy,masses or thyromegaly, FROM  RESP:  respiratory effort and palpation of chest normal, lungs clear to auscultation , no respiratory distress  CV:  Palpation and auscultation of heart done , regular rate and rhythm, no murmur, rub, or gallop, no edema  ABDOMEN:  normal bowel sounds, soft, nontender, no hepatosplenomegaly or other masses, no guarding or rebound  M/S:   Gait and station normal  muscle strength 5/5 all 4 ext., bony deformites, contractues of bilat hands  NEURO:   Cranial nerves 2-12 are normal tested and grossly at  patient's baseline, alert, speech clear  PSYCH:  insight and judgement impaired, memory impaired , affect and mood normal    Labs:     Most Recent 3 CBC's:  Recent Labs   Lab Test 06/25/19 03/25/19 10/01/18  04/21/18  2020   WBC  --  3.7* 5.2  --  7.8   HGB 11.4* 10.7* 10.0*   < > 10.8*   MCV  --  93 87  --  91   PLT  --  218 207  --  258    < > = values in this interval not displayed.     Most Recent 3 BMP's:  Recent Labs   Lab Test 06/25/19 03/25/19 10/01/18   * 135* 136   POTASSIUM 4.2 4.4 3.9   CHLORIDE 102 103 106   CO2 23 23 23   BUN 19 20 18   CR 0.79 0.81 0.77   ANIONGAP 9 9 7   REILLY 9.6 8.9 8.5   * 89 85     Most Recent 2 LFT's:  Recent Labs   Lab Test 12/04/18 04/10/18  1855 01/18/18  1135   AST 23 44 24   ALT 14 39 19   ALKPHOS  --  74 74   BILITOTAL  --  0.3 0.5     Most Recent Cholesterol Panel:  Recent Labs   Lab Test 12/04/18   CHOL 183   LDL 82   HDL 84   TRIG 84       ASSESSMENT/PLAN:  Primary osteoarthritis involving multiple joints  Increased R knee pain  1. Cont. Tylenol, aspercreme  2. Contact J Tyson for repeat R knee inj  3. Reassess over next month  4. For ongoing s/s, may try voltaren gel  5. Monitor for changes in gait, increased LE weakness    Essential hypertension  Gen. Stable, loser BPs at times  1. Cont. Losartan, lopressor  2. Follow BPs, HRs. Cont. Hold parameter for lopressor  3. Monitor for reports of dizziness  4. Encourage fluids  5. Hgb, bmp in next 1-3 mox    Gastroesophageal reflux disease, esophagitis presence not specified  Increased s/s at hs  1. Cont. Prilosec, change admin time from am to hs  2. Cont. Prn gerilanta  3. Reassess over next few weeks  4. For ongoing s/s, may increase prilosec to bid      Electronically signed by:  NICKOLAS Hallman CNP             Sincerely,        NICKOLAS Hallman CNP

## 2019-08-09 NOTE — PROGRESS NOTES
[facility] home visit    Batsheva Barkley is a 85 year old female who resides at [facility]    Patient is seen today for Chronic right knee pain, requesting injection today, last injection was 6 months ago.       Past Medical History:   Diagnosis Date     CAD (coronary artery disease) 6/20/05    [place], transient CHF     Cardiomyopathy (H)      CHF NYHA class III, chronic, systolic (H) 1/25/2018     Coronary atherosclerosis 6/20/2005    circ vessel was stented;  had a 50% LAD lesion which was not treated Problem list name updated by automated process. Provider to review     Edema 1/26/2015     Generalized osteoarthrosis     knees ;; L total kne 10/09     Hypertension goal BP (blood pressure) < 140/90 1/21/2015     Leg weakness, bilateral 2/13/2018     Mitral regurgitation     mod     Mixed hyperlipidemia      Osteoporosis      Physical deconditioning 1/21/2015     Total urinary incontinence 12/27/2017      Past Surgical History:   Procedure Laterality Date     COLONOSCOPY  3/05     ENT SURGERY       ESOPHAGOSCOPY, GASTROSCOPY, DUODENOSCOPY (EGD), COMBINED N/A 4/14/2016    Procedure: COMBINED ESOPHAGOSCOPY, GASTROSCOPY, DUODENOSCOPY (EGD), BIOPSY SINGLE OR MULTIPLE;  Surgeon: Jonathan Gramajo MD;  Location:  GI     EYE SURGERY       HEART CATH, ANGIOPLASTY  2005    RONI to left circ     L bunion + hammer toes  1/04, 4/04     L total knee replacement  10/2009      ovarian cyst surgery          Allergies   Allergen Reactions     Codeine      nausea, HA     Lisinopril Cough      There were no vitals taken for this visit.     Exam: Seated, allows PROM to right knee, valgus deformity, noted crepitus, no swelling, nor effusion, lateral joint pain.      X-rays show DJD right knee    ASSESSMENT / PLAN:  After verbal consent, R&B discussed, Right knee injected with 1 ml depo medrol, 5 ml 1% lidocaine, into lateral knee joint, tolerated well, no complaints,    F/U prn 3 months    [id number]          [place]  [facility]- [phone]

## 2019-08-14 ENCOUNTER — PATIENT OUTREACH (OUTPATIENT)
Dept: GERIATRIC MEDICINE | Facility: CLINIC | Age: 84
End: 2019-08-14

## 2019-08-14 NOTE — PROGRESS NOTES
[facility] Care Coordination Contact    8/12/19 Rec'd secure e-mail from Patricia MORGAN at [facility] that per PT recommendations member will need staff assistance with application of bilateral splints   Patricia is requesting 15 min per day for cares.   8/14/19 RS tool and care plan updated.  Rosibel Rhodes RN, BC  Manager [facility] Care Coordinator   [phone]  (Fax)

## 2019-08-26 ENCOUNTER — PATIENT OUTREACH (OUTPATIENT)
Dept: GERIATRIC MEDICINE | Facility: CLINIC | Age: 84
End: 2019-08-26

## 2019-08-28 ENCOUNTER — ASSISTED LIVING VISIT (OUTPATIENT)
Dept: GERIATRICS | Facility: CLINIC | Age: 84
End: 2019-08-28
Payer: COMMERCIAL

## 2019-08-28 VITALS — HEART RATE: 70 BPM | DIASTOLIC BLOOD PRESSURE: 64 MMHG | RESPIRATION RATE: 18 BRPM | SYSTOLIC BLOOD PRESSURE: 119 MMHG

## 2019-08-28 DIAGNOSIS — L89.312 PRESSURE INJURY OF RIGHT BUTTOCK, STAGE 2 (H): Primary | ICD-10-CM

## 2019-08-28 DIAGNOSIS — R22.9 SKIN MASS: ICD-10-CM

## 2019-08-28 DIAGNOSIS — R60.0 LOWER EXTREMITY EDEMA: ICD-10-CM

## 2019-08-28 NOTE — PROGRESS NOTES
[facility]  [place] Medical Record Number:  [id number]  Place of Service where encounter took place:  [facility] [[id number]]  Chief Complaint   Patient presents with     Derm Problem       HPI:    Batsheva Barkley  is a 85 year old (11/6/1933), who is being seen today for an episodic care visit.  HPI information obtained from: facility chart records, facility staff, patient report, [facility] chart review and family/first contact dtr report. Today's concern is: R buttock ulcer, skin mass scalp, edema.  Has had previous buttock ulcer. Has barrier cream to area. Today staff notes new open area R buttock. No drainage.  Does sit in chair much of day, does not currently offload during the day.  Has skin mass top L scalp. Some scabbed area, no active bleed.  LE edema has been stable. Has CHF.  occ QUEZADA.       Past Medical and Surgical History reviewed in Epic today.    MEDICATIONS:  Current Outpatient Medications   Medication Sig Dispense Refill     acetaminophen (TYLENOL) 500 MG tablet Take 1,000 mg by mouth 3 times daily And every day prn       alum & mag hydroxide-simethicone (MYLANTA/MAALOX) 200-200-20 MG/5ML SUSP suspension Take 10 mLs by mouth 2 times daily       ASPIRIN 81 MG OR TABS 1 tab po QD (Once per day) 100 3     Atorvastatin Calcium (LIPITOR PO) Take 20 mg by mouth daily       Carboxymethylcellulose Sodium (THERATEARS OP) Place 2 drops into both eyes 4 times daily as needed AND 2 DROPS BOTH EYES BID SCHEDULED       loperamide (IMODIUM) 2 MG capsule Take 2 mg by mouth 4 times daily as needed for diarrhea (and 2 mg QAM scheduled)        losartan (COZAAR) 50 MG tablet Take 1 tablet (50 mg) by mouth 2 times daily 180 tablet 3     Menthol ([employer] COUGH DROPS SUGAR FREE MT) Take 1 lozenge by mouth 4 times daily as needed       menthol-zinc oxide (CALMOSEPTINE) 0.44-20.6 % OINT ointment Apply topically 2 times daily as needed for skin protection       metoprolol  (LOPRESSOR) 50 MG tablet TAKE ONE TABLET BY MOUTH TWICE DAILY 180 tablet 3     Omeprazole (PRILOSEC PO) Take 20 mg by mouth every morning       ONDANSETRON PO Take 4 mg by mouth every 8 hours as needed for nausea       order for DME Light weight wheelchair Body mass index is 19.08 kg/(m^2). 1 Device 0     order for DME ANTOINE stockings- below knee. 1 Package 0     polyethylene glycol (MIRALAX/GLYCOLAX) Packet Take 17 g by mouth daily as needed for constipation 7 packet      senna-docusate (SENOKOT-S;PERICOLACE) 8.6-50 MG per tablet Take 1 tablet by mouth 2 times daily as needed        traZODone (DESYREL) 50 MG tablet Take 50 mg by mouth At Bedtime       trolamine salicylate (ASPERCREME) 10 % external cream Apply topically 2 times daily AND BID PRN           REVIEW OF SYSTEMS:  CONSTITUTIONAL:  fatigue, body aches and forgetfulness, EYES:  glasses or contacts, ENT:  [place], CV:  neg, RESPIRATORY: occ QUEZADA, GI:  neg, NEURO:  neg, PSYCH: neg, MUSCULOSKELETAL: joint pain and SKIN: buttock ulcer    Objective:  /64   Pulse 70   Resp 18   Exam:  GENERAL APPEARANCE:  Alert, in no distress, cooperative  ENT:  Mouth and posterior oropharynx normal, moist mucous membranes, [place]  EYES:  EOM, conjunctivae, lids, pupils and irises normal, PERRL  NECK:  No adenopathy,masses or thyromegaly, no carotid bruit  RESP:  respiratory effort and palpation of chest normal, lungs clear to auscultation , no respiratory distress  CV:  Palpation and auscultation of heart done , regular rate and rhythm, no murmur, rub, or gallop, peripheral edema trace+ in LEs  ABDOMEN:  normal bowel sounds, soft, nontender, no hepatosplenomegaly or other masses, no guarding or rebound  M/S:   gait slowed, sl. flexed at waist. uses walker. muscle strength 5/5 all 4 ext., bony deformities of hands  SKIN:  stage 2 R buttock ulcer 2 cm diameter. no drainage. skin mass 1.5 cm scalp, raised, black scabbed center. no active bleed  NEURO:   Cranial nerves 2-12 are  normal tested and grossly at patient's baseline, alert, speech clear  PSYCH:  insight and judgement impaired, memory impaired , affect and mood normal    Labs:     Most Recent 3 CBC's:  Recent Labs   Lab Test 06/25/19 03/25/19 10/01/18  04/21/18  2020   WBC  --  3.7* 5.2  --  7.8   HGB 11.4* 10.7* 10.0*   < > 10.8*   MCV  --  93 87  --  91   PLT  --  218 207  --  258    < > = values in this interval not displayed.     Most Recent 3 BMP's:  Recent Labs   Lab Test 06/25/19 03/25/19 10/01/18   * 135* 136   POTASSIUM 4.2 4.4 3.9   CHLORIDE 102 103 106   CO2 23 23 23   BUN 19 20 18   CR 0.79 0.81 0.77   ANIONGAP 9 9 7   REILLY 9.6 8.9 8.5   * 89 85       ASSESSMENT/PLAN:  Pressure injury of right buttock, stage 2  Newly opened-recurrent  1. Cont. Barrier cream to area  2. Cont. OT-eval for specialty cushion  3. Start off load bid, ly in bed  4. Monitor site for s/s infection    Skin mass  Scalp  1. Spoke with dtr who will make derm appt  2. Monitor site for s/s infection  3. Monitor for active bleed    Lower extremity edema  Currently stable  1. Cont current CV meds  2. For increased edema, restart comp. Stockings  3. Monitor for further increased QUEZADA  4. Bmp in next 1-3 mos  5. Offload during day as above      Electronically signed by:  NICKOLAS Hallman CNP

## 2019-08-28 NOTE — LETTER
8/28/2019        RE: Batsheva Barkley  [facility]  [address]  [facility] [address]        [facility]  [facility] Medical Record Number:  [id number]  Place of Service where encounter took place:  [facility] ([facility] [[id number]]  Chief Complaint   Patient presents with     Derm Problem       HPI:    Batsheva Barkley  is a 85 year old (11/6/1933), who is being seen today for an episodic care visit.  HPI information obtained from: facility chart records, facility staff, patient report, [facility] chart review and family/first contact dtr report. Today's concern is: R buttock ulcer, skin mass scalp, edema.  Has had previous buttock ulcer. Has barrier cream to area. Today staff notes new open area R buttock. No drainage.  Does sit in chair much of day, does not currently offload during the day.  Has skin mass top L scalp. Some scabbed area, no active bleed.  LE edema has been stable. Has CHF.  occ QUEZADA.       Past Medical and Surgical History reviewed in Epic today.    MEDICATIONS:  Current Outpatient Medications   Medication Sig Dispense Refill     acetaminophen (TYLENOL) 500 MG tablet Take 1,000 mg by mouth 3 times daily And every day prn       alum & mag hydroxide-simethicone (MYLANTA/MAALOX) 200-200-20 MG/5ML SUSP suspension Take 10 mLs by mouth 2 times daily       ASPIRIN 81 MG OR TABS 1 tab po QD (Once per day) 100 3     Atorvastatin Calcium (LIPITOR PO) Take 20 mg by mouth daily       Carboxymethylcellulose Sodium (THERATEARS OP) Place 2 drops into both eyes 4 times daily as needed AND 2 DROPS BOTH EYES BID SCHEDULED       loperamide (IMODIUM) 2 MG capsule Take 2 mg by mouth 4 times daily as needed for diarrhea (and 2 mg QAM scheduled)        losartan (COZAAR) 50 MG tablet Take 1 tablet (50 mg) by mouth 2 times daily 180 tablet 3     Menthol ([employer] COUGH DROPS SUGAR FREE MT) Take 1 lozenge by mouth 4 times daily as needed       menthol-zinc oxide  (CALMOSEPTINE) 0.44-20.6 % OINT ointment Apply topically 2 times daily as needed for skin protection       metoprolol (LOPRESSOR) 50 MG tablet TAKE ONE TABLET BY MOUTH TWICE DAILY 180 tablet 3     Omeprazole (PRILOSEC PO) Take 20 mg by mouth every morning       ONDANSETRON PO Take 4 mg by mouth every 8 hours as needed for nausea       order for DME Light weight wheelchair Body mass index is 19.08 kg/(m^2). 1 Device 0     order for DME ANTOINE stockings- below knee. 1 Package 0     polyethylene glycol (MIRALAX/GLYCOLAX) Packet Take 17 g by mouth daily as needed for constipation 7 packet      senna-docusate (SENOKOT-S;PERICOLACE) 8.6-50 MG per tablet Take 1 tablet by mouth 2 times daily as needed        traZODone (DESYREL) 50 MG tablet Take 50 mg by mouth At Bedtime       trolamine salicylate (ASPERCREME) 10 % external cream Apply topically 2 times daily AND BID PRN           REVIEW OF SYSTEMS:  CONSTITUTIONAL:  fatigue, body aches and forgetfulness, EYES:  glasses or contacts, ENT:  [place], CV:  neg, RESPIRATORY: occ QUEZADA, GI:  neg, NEURO:  neg, PSYCH: neg, MUSCULOSKELETAL: joint pain and SKIN: buttock ulcer    Objective:  /64   Pulse 70   Resp 18   Exam:  GENERAL APPEARANCE:  Alert, in no distress, cooperative  ENT:  Mouth and posterior oropharynx normal, moist mucous membranes, [place]  EYES:  EOM, conjunctivae, lids, pupils and irises normal, PERRL  NECK:  No adenopathy,masses or thyromegaly, no carotid bruit  RESP:  respiratory effort and palpation of chest normal, lungs clear to auscultation , no respiratory distress  CV:  Palpation and auscultation of heart done , regular rate and rhythm, no murmur, rub, or gallop, peripheral edema trace+ in LEs  ABDOMEN:  normal bowel sounds, soft, nontender, no hepatosplenomegaly or other masses, no guarding or rebound  M/S:   gait slowed, sl. flexed at waist. uses walker. muscle strength 5/5 all 4 ext., bony deformities of hands  SKIN:  stage 2 R buttock ulcer 2 cm diameter. no  drainage. skin mass 1.5 cm scalp, raised, black scabbed center. no active bleed  NEURO:   Cranial nerves 2-12 are normal tested and grossly at patient's baseline, alert, speech clear  PSYCH:  insight and judgement impaired, memory impaired , affect and mood normal    Labs:     Most Recent 3 CBC's:  Recent Labs   Lab Test 06/25/19 03/25/19 10/01/18  04/21/18  2020   WBC  --  3.7* 5.2  --  7.8   HGB 11.4* 10.7* 10.0*   < > 10.8*   MCV  --  93 87  --  91   PLT  --  218 207  --  258    < > = values in this interval not displayed.     Most Recent 3 BMP's:  Recent Labs   Lab Test 06/25/19 03/25/19 10/01/18   * 135* 136   POTASSIUM 4.2 4.4 3.9   CHLORIDE 102 103 106   CO2 23 23 23   BUN 19 20 18   CR 0.79 0.81 0.77   ANIONGAP 9 9 7   REILLY 9.6 8.9 8.5   * 89 85       ASSESSMENT/PLAN:  Pressure injury of right buttock, stage 2  Newly opened-recurrent  1. Cont. Barrier cream to area  2. Cont. OT-eval for specialty cushion  3. Start off load bid, ly in bed  4. Monitor site for s/s infection    Skin mass  Scalp  1. Spoke with dtr who will make derm appt  2. Monitor site for s/s infection  3. Monitor for active bleed    Lower extremity edema  Currently stable  1. Cont current CV meds  2. For increased edema, restart comp. Stockings  3. Monitor for further increased QUEZADA  4. Bmp in next 1-3 mos  5. Offload during day as above      Electronically signed by:  NICKOLAS Hallman CNP             Sincerely,        NICKOLAS Hallman CNP

## 2019-09-04 NOTE — PROGRESS NOTES
[facility] Care Coordination Contact    8/26/19 Rec'd tele call from Nickie DAVILA, [place], family has reported that member will be coming off MA.  Explained that CC will f/u with  [facility] [employer].  Per Enrollment staff, family reports that they are in the process of completing MA renewal paperwork and likely member will be over asset allowances.  If member does lose MA eligibility, member will enroll in [employer].  8/29/19 Rec'd tele call from Nickie Fu states that member has an open area on buttocks due to prolonged sitting, member's preference is to sit up all day.  Nickie reviewed case with OT, recommendation is staff to assist member with laying down daily.    Staff to assist member 3x/day, CC to update RS tool.  CC shared information above regarding MA eligibility.  Rosibel Rhodes RN, BC  Manager [facility] Care Coordinator   [phone]  (Fax)

## 2019-09-09 ENCOUNTER — ASSISTED LIVING VISIT (OUTPATIENT)
Dept: GERIATRICS | Facility: CLINIC | Age: 84
End: 2019-09-09
Payer: COMMERCIAL

## 2019-09-09 ENCOUNTER — TRANSFERRED RECORDS (OUTPATIENT)
Dept: HEALTH INFORMATION MANAGEMENT | Facility: CLINIC | Age: 84
End: 2019-09-09

## 2019-09-09 DIAGNOSIS — I10 ESSENTIAL HYPERTENSION: ICD-10-CM

## 2019-09-09 DIAGNOSIS — L89.312 PRESSURE INJURY OF RIGHT BUTTOCK, STAGE 2 (H): ICD-10-CM

## 2019-09-09 DIAGNOSIS — R11.2 NAUSEA AND VOMITING, INTRACTABILITY OF VOMITING NOT SPECIFIED, UNSPECIFIED VOMITING TYPE: Primary | ICD-10-CM

## 2019-09-09 NOTE — PROGRESS NOTES
[facility]  [place] Medical Record Number:  [id number]  Place of Service where encounter took place:  [facility] [[id number]]  Chief Complaint   Patient presents with     Nausea     Derm Problem       HPI:    Batsheva Barkley  is a 85 year old (11/6/1933), who is being seen today for an episodic care visit.  HPI information obtained from: facility chart records, facility staff, patient report and [facility] chart review. Today's concern is: n/v, R buttock ulcer, HTN.  Has GERD. Reports increased s/s past 3 days. Nausea to day.  Has prilosec, prn zofran. zofran dose given this am.  Has had recent constipation, diarrhea per staff. Does not have sched. Bowel meds.  Per staff, moved bowels yesterday.  Afebrile.  BP stable. Cont. On cozaar, lopressor for HTN.  R buttock ulcer mostly healed. Cont. With barrier cream to area.  Has been offloading during day, lying in bed.       Past Medical and Surgical History reviewed in Epic today.    MEDICATIONS:  Current Outpatient Medications   Medication Sig Dispense Refill     acetaminophen (TYLENOL) 500 MG tablet Take 1,000 mg by mouth 3 times daily And every day prn       alum & mag hydroxide-simethicone (MYLANTA/MAALOX) 200-200-20 MG/5ML SUSP suspension Take 10 mLs by mouth 2 times daily       ASPIRIN 81 MG OR TABS 1 tab po QD (Once per day) 100 3     Atorvastatin Calcium (LIPITOR PO) Take 20 mg by mouth daily       Carboxymethylcellulose Sodium (THERATEARS OP) Place 2 drops into both eyes 4 times daily as needed AND 2 DROPS BOTH EYES BID SCHEDULED       loperamide (IMODIUM) 2 MG capsule Take 2 mg by mouth 4 times daily as needed for diarrhea (and 2 mg QAM scheduled)        losartan (COZAAR) 50 MG tablet Take 1 tablet (50 mg) by mouth 2 times daily 180 tablet 3     Menthol ([employer]S COUGH DROPS SUGAR FREE MT) Take 1 lozenge by mouth 4 times daily as needed       menthol-zinc oxide (CALMOSEPTINE) 0.44-20.6 % OINT ointment Apply  topically 2 times daily as needed for skin protection       metoprolol (LOPRESSOR) 50 MG tablet TAKE ONE TABLET BY MOUTH TWICE DAILY 180 tablet 3     Omeprazole (PRILOSEC PO) Take 20 mg by mouth every morning       ONDANSETRON PO Take 4 mg by mouth every 8 hours as needed for nausea       order for DME Light weight wheelchair Body mass index is 19.08 kg/(m^2). 1 Device 0     order for DME ANTOINE stockings- below knee. 1 Package 0     polyethylene glycol (MIRALAX/GLYCOLAX) Packet Take 17 g by mouth daily as needed for constipation 7 packet      senna-docusate (SENOKOT-S;PERICOLACE) 8.6-50 MG per tablet Take 1 tablet by mouth 2 times daily as needed        traZODone (DESYREL) 50 MG tablet Take 50 mg by mouth At Bedtime       trolamine salicylate (ASPERCREME) 10 % external cream Apply topically 2 times daily AND BID PRN           REVIEW OF SYSTEMS:  CONSTITUTIONAL:  forgetfulness, EYES:  decreased vision, ENT:  [place], CV:  neg, RESPIRATORY: neg, :  neg, GI:  nausea, NEURO:  neg, PSYCH: neg and MUSCULOSKELETAL: neg    Objective:  BP (!) 142/78   Pulse 74   Temp 97.8  F (36.6  C)   Resp 16   SpO2 97%   Exam:  GENERAL APPEARANCE:  Alert, cooperative  ENT:  Mouth and posterior oropharynx normal, moist mucous membranes, [place]  EYES:  EOM, conjunctivae, lids, pupils and irises normal, PERRL  NECK:  No adenopathy,masses or thyromegaly,  no carotid bruit  RESP:  respiratory effort and palpation of chest normal, lungs clear to auscultation , no respiratory distress  CV:  Palpation and auscultation of heart done , regular rate and rhythm, no murmur, rub, or gallop, no edema  ABDOMEN:  normal bowel sounds, soft, nontender, no hepatosplenomegaly or other masses, no guarding or rebound  M/S:   muscle strength 5/5 all 4 ext., normal tone  SKIN:  R buttock ulcer healed, no open areas on buttocks  NEURO:   Cranial nerves 2-12 are normal tested and grossly at patient's baseline, speech clear  PSYCH:  insight and judgement impaired,  memory impaired , affect abnormal -flat    Labs:     Most Recent 3 CBC's:  Recent Labs   Lab Test 06/25/19 03/25/19 10/01/18  04/21/18  2020   WBC  --  3.7* 5.2  --  7.8   HGB 11.4* 10.7* 10.0*   < > 10.8*   MCV  --  93 87  --  91   PLT  --  218 207  --  258    < > = values in this interval not displayed.     Most Recent 3 BMP's:  Recent Labs   Lab Test 06/25/19 03/25/19 10/01/18   * 135* 136   POTASSIUM 4.2 4.4 3.9   CHLORIDE 102 103 106   CO2 23 23 23   BUN 19 20 18   CR 0.79 0.81 0.77   ANIONGAP 9 9 7   REILLY 9.6 8.9 8.5   * 89 85       ASSESSMENT/PLAN:  Nausea and vomiting, intractability of vomiting not specified, unspecified vomiting type  Increased GERD past 3 days, nausea today  1. Cont. Prn zofran  2. Cont. Prilosec, prn gerilanta  3. abd XR today  4. Monitor for abd pain, constipation, diarrhea, emesis  5. Encourage fluids    Pressure injury of right buttock, stage 2  Healed  1. Cont. Barrier cream  2. Cont. HC RN visits  3. Cont. To offload, ly in bed throughout day  4. Monitor for further skin breakdown    Essential hypertension  Gen. Stable.   1. Cont. Lopressor, cozaar  2. Follow BPs, HRs  3. Monitor for decreased fluid intake, lower BPs  4. Cbc, bmp next 1-3 mos      Electronically signed by:  NICKOLAS Hallman CNP

## 2019-09-09 NOTE — LETTER
9/9/2019        RE: Batsheva Barkley  [facility]  [address]  [facility] [address]        [place] [facility]  [place] Medical Record Number:  [id number]  Place of Service where encounter took place:  [facility] ([facility] [[id number]]  Chief Complaint   Patient presents with     Nausea     Derm Problem       HPI:    Btasheva Barkley  is a 85 year old (11/6/1933), who is being seen today for an episodic care visit.  HPI information obtained from: facility chart records, facility staff, patient report and [facility] chart review. Today's concern is: n/v, R buttock ulcer, HTN.  Has GERD. Reports increased s/s past 3 days. Nausea to day.  Has prilosec, prn zofran. zofran dose given this am.  Has had recent constipation, diarrhea per staff. Does not have sched. Bowel meds.  Per staff, moved bowels yesterday.  Afebrile.  BP stable. Cont. On cozaar, lopressor for HTN.  R buttock ulcer mostly healed. Cont. With barrier cream to area.  Has been offloading during day, lying in bed.       Past Medical and Surgical History reviewed in Epic today.    MEDICATIONS:  Current Outpatient Medications   Medication Sig Dispense Refill     acetaminophen (TYLENOL) 500 MG tablet Take 1,000 mg by mouth 3 times daily And every day prn       alum & mag hydroxide-simethicone (MYLANTA/MAALOX) 200-200-20 MG/5ML SUSP suspension Take 10 mLs by mouth 2 times daily       ASPIRIN 81 MG OR TABS 1 tab po QD (Once per day) 100 3     Atorvastatin Calcium (LIPITOR PO) Take 20 mg by mouth daily       Carboxymethylcellulose Sodium (THERATEARS OP) Place 2 drops into both eyes 4 times daily as needed AND 2 DROPS BOTH EYES BID SCHEDULED       loperamide (IMODIUM) 2 MG capsule Take 2 mg by mouth 4 times daily as needed for diarrhea (and 2 mg QAM scheduled)        losartan (COZAAR) 50 MG tablet Take 1 tablet (50 mg) by mouth 2 times daily 180 tablet 3     Menthol ([employer] COUGH DROPS SUGAR FREE MT) Take 1  lozenge by mouth 4 times daily as needed       menthol-zinc oxide (CALMOSEPTINE) 0.44-20.6 % OINT ointment Apply topically 2 times daily as needed for skin protection       metoprolol (LOPRESSOR) 50 MG tablet TAKE ONE TABLET BY MOUTH TWICE DAILY 180 tablet 3     Omeprazole (PRILOSEC PO) Take 20 mg by mouth every morning       ONDANSETRON PO Take 4 mg by mouth every 8 hours as needed for nausea       order for DME Light weight wheelchair Body mass index is 19.08 kg/(m^2). 1 Device 0     order for DME ANTOINE stockings- below knee. 1 Package 0     polyethylene glycol (MIRALAX/GLYCOLAX) Packet Take 17 g by mouth daily as needed for constipation 7 packet      senna-docusate (SENOKOT-S;PERICOLACE) 8.6-50 MG per tablet Take 1 tablet by mouth 2 times daily as needed        traZODone (DESYREL) 50 MG tablet Take 50 mg by mouth At Bedtime       trolamine salicylate (ASPERCREME) 10 % external cream Apply topically 2 times daily AND BID PRN           REVIEW OF SYSTEMS:  CONSTITUTIONAL:  forgetfulness, EYES:  decreased vision, ENT:  [place], CV:  neg, RESPIRATORY: neg, :  neg, GI:  nausea, NEURO:  neg, PSYCH: neg and MUSCULOSKELETAL: neg    Objective:  BP (!) 142/78   Pulse 74   Temp 97.8  F (36.6  C)   Resp 16   SpO2 97%   Exam:  GENERAL APPEARANCE:  Alert, cooperative  ENT:  Mouth and posterior oropharynx normal, moist mucous membranes, [place]  EYES:  EOM, conjunctivae, lids, pupils and irises normal, PERRL  NECK:  No adenopathy,masses or thyromegaly,  no carotid bruit  RESP:  respiratory effort and palpation of chest normal, lungs clear to auscultation , no respiratory distress  CV:  Palpation and auscultation of heart done , regular rate and rhythm, no murmur, rub, or gallop, no edema  ABDOMEN:  normal bowel sounds, soft, nontender, no hepatosplenomegaly or other masses, no guarding or rebound  M/S:   muscle strength 5/5 all 4 ext., normal tone  SKIN:  R buttock ulcer healed, no open areas on buttocks  NEURO:   Cranial nerves  2-12 are normal tested and grossly at patient's baseline, speech clear  PSYCH:  insight and judgement impaired, memory impaired , affect abnormal -flat    Labs:     Most Recent 3 CBC's:  Recent Labs   Lab Test 06/25/19 03/25/19 10/01/18  04/21/18  2020   WBC  --  3.7* 5.2  --  7.8   HGB 11.4* 10.7* 10.0*   < > 10.8*   MCV  --  93 87  --  91   PLT  --  218 207  --  258    < > = values in this interval not displayed.     Most Recent 3 BMP's:  Recent Labs   Lab Test 06/25/19 03/25/19 10/01/18   * 135* 136   POTASSIUM 4.2 4.4 3.9   CHLORIDE 102 103 106   CO2 23 23 23   BUN 19 20 18   CR 0.79 0.81 0.77   ANIONGAP 9 9 7   REILLY 9.6 8.9 8.5   * 89 85       ASSESSMENT/PLAN:  Nausea and vomiting, intractability of vomiting not specified, unspecified vomiting type  Increased GERD past 3 days, nausea today  1. Cont. Prn zofran  2. Cont. Prilosec, prn gerilanta  3. abd XR today  4. Monitor for abd pain, constipation, diarrhea, emesis  5. Encourage fluids    Pressure injury of right buttock, stage 2  Healed  1. Cont. Barrier cream  2. Cont. HC RN visits  3. Cont. To offload, ly in bed throughout day  4. Monitor for further skin breakdown    Essential hypertension  Gen. Stable.   1. Cont. Lopressor, cozaar  2. Follow BPs, HRs  3. Monitor for decreased fluid intake, lower BPs  4. Cbc, bmp next 1-3 mos      Electronically signed by:  NICKOLAS Hallman CNP               Sincerely,        NICKOLAS Hallman CNP

## 2019-09-10 VITALS
OXYGEN SATURATION: 97 % | HEART RATE: 74 BPM | RESPIRATION RATE: 16 BRPM | SYSTOLIC BLOOD PRESSURE: 142 MMHG | DIASTOLIC BLOOD PRESSURE: 78 MMHG | TEMPERATURE: 97.8 F

## 2019-09-11 ENCOUNTER — HOSPITAL ENCOUNTER (OUTPATIENT)
Facility: CLINIC | Age: 84
Setting detail: OBSERVATION
Discharge: HOME | End: 2019-09-13
Attending: INTERNAL MEDICINE | Admitting: INTERNAL MEDICINE
Payer: COMMERCIAL

## 2019-09-11 ENCOUNTER — APPOINTMENT (OUTPATIENT)
Dept: CT IMAGING | Facility: CLINIC | Age: 84
End: 2019-09-11
Attending: PHYSICIAN ASSISTANT
Payer: COMMERCIAL

## 2019-09-11 ENCOUNTER — APPOINTMENT (OUTPATIENT)
Dept: GENERAL RADIOLOGY | Facility: CLINIC | Age: 84
End: 2019-09-11
Attending: PHYSICIAN ASSISTANT
Payer: COMMERCIAL

## 2019-09-11 ENCOUNTER — ASSISTED LIVING VISIT (OUTPATIENT)
Dept: GERIATRICS | Facility: CLINIC | Age: 84
End: 2019-09-11
Payer: COMMERCIAL

## 2019-09-11 DIAGNOSIS — R11.2 NAUSEA AND VOMITING, INTRACTABILITY OF VOMITING NOT SPECIFIED, UNSPECIFIED VOMITING TYPE: Primary | ICD-10-CM

## 2019-09-11 DIAGNOSIS — M15.0 PRIMARY OSTEOARTHRITIS INVOLVING MULTIPLE JOINTS: ICD-10-CM

## 2019-09-11 DIAGNOSIS — K21.9 GASTROESOPHAGEAL REFLUX DISEASE WITHOUT ESOPHAGITIS: Primary | ICD-10-CM

## 2019-09-11 DIAGNOSIS — R91.8 PULMONARY NODULES: ICD-10-CM

## 2019-09-11 DIAGNOSIS — Z53.9 DIAGNOSIS NOT YET DEFINED: Primary | ICD-10-CM

## 2019-09-11 DIAGNOSIS — I10 ESSENTIAL HYPERTENSION: ICD-10-CM

## 2019-09-11 DIAGNOSIS — R10.13 EPIGASTRIC PAIN: ICD-10-CM

## 2019-09-11 DIAGNOSIS — K44.9 HIATAL HERNIA: ICD-10-CM

## 2019-09-11 DIAGNOSIS — R11.10 VOMITING: ICD-10-CM

## 2019-09-11 DIAGNOSIS — R53.81 PHYSICAL DECONDITIONING: ICD-10-CM

## 2019-09-11 LAB
ALBUMIN SERPL-MCNC: 3.4 G/DL (ref 3.4–5)
ALP SERPL-CCNC: 51 U/L (ref 40–150)
ALT SERPL W P-5'-P-CCNC: 21 U/L (ref 0–50)
ANION GAP SERPL CALCULATED.3IONS-SCNC: 6 MMOL/L (ref 3–14)
AST SERPL W P-5'-P-CCNC: 27 U/L (ref 0–45)
BASOPHILS # BLD AUTO: 0 10E9/L (ref 0–0.2)
BASOPHILS NFR BLD AUTO: 0.5 %
BILIRUB SERPL-MCNC: 0.3 MG/DL (ref 0.2–1.3)
BUN SERPL-MCNC: 24 MG/DL (ref 7–30)
CALCIUM SERPL-MCNC: 8.4 MG/DL (ref 8.5–10.1)
CHLORIDE SERPL-SCNC: 101 MMOL/L (ref 94–109)
CO2 SERPL-SCNC: 24 MMOL/L (ref 20–32)
CREAT SERPL-MCNC: 0.64 MG/DL (ref 0.52–1.04)
DIFFERENTIAL METHOD BLD: ABNORMAL
EOSINOPHIL # BLD AUTO: 0.1 10E9/L (ref 0–0.7)
EOSINOPHIL NFR BLD AUTO: 0.8 %
ERYTHROCYTE [DISTWIDTH] IN BLOOD BY AUTOMATED COUNT: 15.1 % (ref 10–15)
GFR SERPL CREATININE-BSD FRML MDRD: 81 ML/MIN/{1.73_M2}
GLUCOSE SERPL-MCNC: 97 MG/DL (ref 70–99)
HCT VFR BLD AUTO: 33.9 % (ref 35–47)
HGB BLD-MCNC: 10.9 G/DL (ref 11.7–15.7)
IMM GRANULOCYTES # BLD: 0 10E9/L (ref 0–0.4)
IMM GRANULOCYTES NFR BLD: 0.3 %
INTERPRETATION ECG - MUSE: NORMAL
LIPASE SERPL-CCNC: 464 U/L (ref 73–393)
LYMPHOCYTES # BLD AUTO: 1.3 10E9/L (ref 0.8–5.3)
LYMPHOCYTES NFR BLD AUTO: 21.7 %
MCH RBC QN AUTO: 29.4 PG (ref 26.5–33)
MCHC RBC AUTO-ENTMCNC: 32.2 G/DL (ref 31.5–36.5)
MCV RBC AUTO: 91 FL (ref 78–100)
MONOCYTES # BLD AUTO: 0.9 10E9/L (ref 0–1.3)
MONOCYTES NFR BLD AUTO: 15.8 %
NEUTROPHILS # BLD AUTO: 3.6 10E9/L (ref 1.6–8.3)
NEUTROPHILS NFR BLD AUTO: 60.9 %
NRBC # BLD AUTO: 0 10*3/UL
NRBC BLD AUTO-RTO: 0 /100
PLATELET # BLD AUTO: 215 10E9/L (ref 150–450)
POTASSIUM SERPL-SCNC: 4.2 MMOL/L (ref 3.4–5.3)
PROT SERPL-MCNC: 6.3 G/DL (ref 6.8–8.8)
RBC # BLD AUTO: 3.71 10E12/L (ref 3.8–5.2)
SODIUM SERPL-SCNC: 131 MMOL/L (ref 133–144)
TROPONIN I SERPL-MCNC: <0.015 UG/L (ref 0–0.04)
WBC # BLD AUTO: 5.9 10E9/L (ref 4–11)

## 2019-09-11 PROCEDURE — 96375 TX/PRO/DX INJ NEW DRUG ADDON: CPT

## 2019-09-11 PROCEDURE — 83690 ASSAY OF LIPASE: CPT | Performed by: PHYSICIAN ASSISTANT

## 2019-09-11 PROCEDURE — 93005 ELECTROCARDIOGRAM TRACING: CPT

## 2019-09-11 PROCEDURE — 80053 COMPREHEN METABOLIC PANEL: CPT | Performed by: PHYSICIAN ASSISTANT

## 2019-09-11 PROCEDURE — 99285 EMERGENCY DEPT VISIT HI MDM: CPT | Mod: 25

## 2019-09-11 PROCEDURE — 25000125 ZZHC RX 250: Performed by: PHYSICIAN ASSISTANT

## 2019-09-11 PROCEDURE — 85025 COMPLETE CBC W/AUTO DIFF WBC: CPT | Performed by: PHYSICIAN ASSISTANT

## 2019-09-11 PROCEDURE — 96374 THER/PROPH/DIAG INJ IV PUSH: CPT | Mod: 59

## 2019-09-11 PROCEDURE — 25000131 ZZH RX MED GY IP 250 OP 636 PS 637: Performed by: PHYSICIAN ASSISTANT

## 2019-09-11 PROCEDURE — 71046 X-RAY EXAM CHEST 2 VIEWS: CPT

## 2019-09-11 PROCEDURE — 25000128 H RX IP 250 OP 636: Performed by: PHYSICIAN ASSISTANT

## 2019-09-11 PROCEDURE — 25000132 ZZH RX MED GY IP 250 OP 250 PS 637: Performed by: PHYSICIAN ASSISTANT

## 2019-09-11 PROCEDURE — G0179 MD RECERTIFICATION HHA PT: HCPCS | Performed by: INTERNAL MEDICINE

## 2019-09-11 PROCEDURE — 84484 ASSAY OF TROPONIN QUANT: CPT | Performed by: PHYSICIAN ASSISTANT

## 2019-09-11 PROCEDURE — 74177 CT ABD & PELVIS W/CONTRAST: CPT

## 2019-09-11 RX ORDER — LORAZEPAM 2 MG/ML
0.5 INJECTION INTRAMUSCULAR ONCE
Status: COMPLETED | OUTPATIENT
Start: 2019-09-11 | End: 2019-09-11

## 2019-09-11 RX ORDER — IOPAMIDOL 755 MG/ML
500 INJECTION, SOLUTION INTRAVASCULAR ONCE
Status: COMPLETED | OUTPATIENT
Start: 2019-09-11 | End: 2019-09-11

## 2019-09-11 RX ORDER — ONDANSETRON 4 MG/1
4 TABLET, ORALLY DISINTEGRATING ORAL ONCE
Status: COMPLETED | OUTPATIENT
Start: 2019-09-11 | End: 2019-09-11

## 2019-09-11 RX ORDER — ONDANSETRON 2 MG/ML
4 INJECTION INTRAMUSCULAR; INTRAVENOUS ONCE
Status: COMPLETED | OUTPATIENT
Start: 2019-09-11 | End: 2019-09-11

## 2019-09-11 RX ORDER — ACETAMINOPHEN 325 MG/1
650 TABLET ORAL ONCE
Status: COMPLETED | OUTPATIENT
Start: 2019-09-11 | End: 2019-09-11

## 2019-09-11 RX ADMIN — ONDANSETRON 4 MG: 4 TABLET, ORALLY DISINTEGRATING ORAL at 19:52

## 2019-09-11 RX ADMIN — ONDANSETRON 4 MG: 2 INJECTION INTRAMUSCULAR; INTRAVENOUS at 23:07

## 2019-09-11 RX ADMIN — IOPAMIDOL 48 ML: 755 INJECTION, SOLUTION INTRAVENOUS at 23:25

## 2019-09-11 RX ADMIN — ACETAMINOPHEN 650 MG: 325 TABLET, FILM COATED ORAL at 22:05

## 2019-09-11 RX ADMIN — LORAZEPAM 0.5 MG: 2 INJECTION INTRAMUSCULAR; INTRAVENOUS at 23:08

## 2019-09-11 RX ADMIN — SODIUM CHLORIDE 42 ML: 9 INJECTION, SOLUTION INTRAVENOUS at 23:25

## 2019-09-11 RX ADMIN — LIDOCAINE HYDROCHLORIDE 30 ML: 20 SOLUTION ORAL; TOPICAL at 19:52

## 2019-09-11 RX ADMIN — FAMOTIDINE 20 MG: 10 INJECTION, SOLUTION INTRAVENOUS at 22:05

## 2019-09-11 ASSESSMENT — ENCOUNTER SYMPTOMS
SHORTNESS OF BREATH: 0
ABDOMINAL PAIN: 0
VOMITING: 1
NAUSEA: 1

## 2019-09-11 NOTE — PROGRESS NOTES
[place] [facility]  [place] Medical Record Number:  [id number]  Place of Service where encounter took place:  [facility] ([facility]) [[id number]]  Chief Complaint   Patient presents with     Nausea     Hypertension       HPI:    Batsheva Barkley  is a 85 year old (11/6/1933), who is being seen today for an episodic care visit.  HPI information obtained from: facility chart records, facility staff, patient report and [facility] chart review. Today's concern is:n/v, HTN, OA.  Has had n/v past few days. Has taken prn zofran. Staff reports BM stable. abd XR done neg. No recent abd pain. Has had decreased po intake. No fever.  Has GERD. Cont. On prilosec.  For HTN taking cozaar, lopressor. BPs stable. No reports of dizziness.  For OA cont. On aspercreme, tylenol. Amb. Steady with walker.  Has had decreased act. Level. R buttock ulcer healed.       Past Medical and Surgical History reviewed in Epic today.    MEDICATIONS:  No current outpatient medications on file.         REVIEW OF SYSTEMS:  CONSTITUTIONAL:  poor appetite, fatigue, body aches and forgetfulness, EYES:  neg, ENT:  neg, CV:  neg, RESPIRATORY: neg, :  neg, GI:  nausea and vomiting, NEURO:  neg, PSYCH: neg, MUSCULOSKELETAL: joint pain and SKIN: neg    Objective:  /66   Pulse 67   Temp 97.8  F (36.6  C)   Resp 18   SpO2 94%   Exam:  GENERAL APPEARANCE:  Alert, cooperative  ENT:  Mouth and posterior oropharynx normal, moist mucous membranes, [place]  EYES:  EOM, conjunctivae, lids, pupils and irises normal, PERRL  NECK:  No adenopathy,masses or thyromegaly, no carotid bruit  RESP:  respiratory effort and palpation of chest normal, lungs clear to auscultation , no respiratory distress, diminished breath sounds bibasilar  CV:  Palpation and auscultation of heart done , regular rate and rhythm, no murmur, rub, or gallop, peripheral edema trace+ in LEs  ABDOMEN:  normal bowel sounds, soft, nontender, no hepatosplenomegaly or  other masses, no guarding or rebound  M/S:   muscle strength 5/5 all 4 ext., normal tone  NEURO:   Cranial nerves 2-12 are normal tested and grossly at patient's baseline, alert, speech clear  PSYCH:  insight and judgement impaired, memory impaired , affect and mood normal    Labs:     Most Recent 3 CBC's:  Recent Labs   Lab Test 09/11/19  1934 06/25/19 03/25/19 10/01/18   WBC 5.9  --  3.7* 5.2   HGB 10.9* 11.4* 10.7* 10.0*   MCV 91  --  93 87     --  218 207     Most Recent 3 BMP's:  Recent Labs   Lab Test 09/11/19 1934 06/25/19 03/25/19   * 134* 135*   POTASSIUM 4.2 4.2 4.4   CHLORIDE 101 102 103   CO2 24 23 23   BUN 24 19 20   CR 0.64 0.79 0.81   ANIONGAP 6 9 9   REILLY 8.4* 9.6 8.9   GLC 97 131* 89       ASSESSMENT/PLAN:  Nausea and vomiting, intractability of vomiting not specified, unspecified vomiting type  Ongoing, improved over past day per resident. Ongoing decreased po intake. abd XR neg  1. sched zofran tid x 4 days  2. Cont. prilosec  3. Encourage fluids  4. Monitor for further emesis  5. Monitor for constipation, diarrhea    Essential hypertension  Gen. Stable  1. Cont. Cozaar, lopressor  2. Follow BPs, HRs  3. Encourage fluids as above  4. Bmp in next 1-3 mos    Primary osteoarthritis involving multiple joints  occ shoulder, neck pain, gen. Stable  1. Cont tylenol  2. Cont. aspercreme  3. Increased act. Level as thu  4. Cont. To offload, ly in bed throughout day  5. Monitor for further skin breakdown      Electronically signed by:  NICKOLAS Hallman CNP

## 2019-09-11 NOTE — LETTER
9/11/2019        RE: Batsheva Barkley  [facility]  [address]  [facility] [address]        [place] [facility]  [place] Medical Record Number:  [id number]  Place of Service where encounter took place:  [facility] ([facility]) [[id number]]  Chief Complaint   Patient presents with     Nausea     Hypertension       HPI:    Batsheva Barkley  is a 85 year old (11/6/1933), who is being seen today for an episodic care visit.  HPI information obtained from: facility chart records, facility staff, patient report and [facility] chart review. Today's concern is:n/v, HTN, OA.  Has had n/v past few days. Has taken prn zofran. Staff reports BM stable. abd XR done neg. No recent abd pain. Has had decreased po intake. No fever.  Has GERD. Cont. On prilosec.  For HTN taking cozaar, lopressor. BPs stable. No reports of dizziness.  For OA cont. On aspercreme, tylenol. Amb. Steady with walker.  Has had decreased act. Level. R buttock ulcer healed.       Past Medical and Surgical History reviewed in Epic today.    MEDICATIONS:  No current outpatient medications on file.         REVIEW OF SYSTEMS:  CONSTITUTIONAL:  poor appetite, fatigue, body aches and forgetfulness, EYES:  neg, ENT:  neg, CV:  neg, RESPIRATORY: neg, :  neg, GI:  nausea and vomiting, NEURO:  neg, PSYCH: neg, MUSCULOSKELETAL: joint pain and SKIN: neg    Objective:  /66   Pulse 67   Temp 97.8  F (36.6  C)   Resp 18   SpO2 94%   Exam:  GENERAL APPEARANCE:  Alert, cooperative  ENT:  Mouth and posterior oropharynx normal, moist mucous membranes, [place]  EYES:  EOM, conjunctivae, lids, pupils and irises normal, PERRL  NECK:  No adenopathy,masses or thyromegaly, no carotid bruit  RESP:  respiratory effort and palpation of chest normal, lungs clear to auscultation , no respiratory distress, diminished breath sounds bibasilar  CV:  Palpation and auscultation of heart done , regular rate and rhythm, no murmur, rub,  or gallop, peripheral edema trace+ in LEs  ABDOMEN:  normal bowel sounds, soft, nontender, no hepatosplenomegaly or other masses, no guarding or rebound  M/S:   muscle strength 5/5 all 4 ext., normal tone  NEURO:   Cranial nerves 2-12 are normal tested and grossly at patient's baseline, alert, speech clear  PSYCH:  insight and judgement impaired, memory impaired , affect and mood normal    Labs:     Most Recent 3 CBC's:  Recent Labs   Lab Test 09/11/19  1934 06/25/19 03/25/19 10/01/18   WBC 5.9  --  3.7* 5.2   HGB 10.9* 11.4* 10.7* 10.0*   MCV 91  --  93 87     --  218 207     Most Recent 3 BMP's:  Recent Labs   Lab Test 09/11/19 1934 06/25/19 03/25/19   * 134* 135*   POTASSIUM 4.2 4.2 4.4   CHLORIDE 101 102 103   CO2 24 23 23   BUN 24 19 20   CR 0.64 0.79 0.81   ANIONGAP 6 9 9   REILLY 8.4* 9.6 8.9   GLC 97 131* 89       ASSESSMENT/PLAN:  Nausea and vomiting, intractability of vomiting not specified, unspecified vomiting type  Ongoing, improved over past day per resident. Ongoing decreased po intake. abd XR neg  1. sched zofran tid x 4 days  2. Cont. prilosec  3. Encourage fluids  4. Monitor for further emesis  5. Monitor for constipation, diarrhea    Essential hypertension  Gen. Stable  1. Cont. Cozaar, lopressor  2. Follow BPs, HRs  3. Encourage fluids as above  4. Bmp in next 1-3 mos    Primary osteoarthritis involving multiple joints  occ shoulder, neck pain, gen. Stable  1. Cont tylenol  2. Cont. aspercreme  3. Increased act. Level as thu  4. Cont. To offload, ly in bed throughout day  5. Monitor for further skin breakdown      Electronically signed by:  NICKOLAS Hallman CNP             Sincerely,        NICKOLAS Hallman CNP

## 2019-09-12 ENCOUNTER — APPOINTMENT (OUTPATIENT)
Dept: PHYSICAL THERAPY | Facility: CLINIC | Age: 84
End: 2019-09-12
Attending: INTERNAL MEDICINE
Payer: COMMERCIAL

## 2019-09-12 ENCOUNTER — PATIENT OUTREACH (OUTPATIENT)
Dept: GERIATRIC MEDICINE | Facility: CLINIC | Age: 84
End: 2019-09-12

## 2019-09-12 VITALS
DIASTOLIC BLOOD PRESSURE: 66 MMHG | TEMPERATURE: 97.8 F | OXYGEN SATURATION: 94 % | SYSTOLIC BLOOD PRESSURE: 137 MMHG | HEART RATE: 67 BPM | RESPIRATION RATE: 18 BRPM

## 2019-09-12 PROBLEM — R11.10 EMESIS: Status: ACTIVE | Noted: 2019-09-12

## 2019-09-12 PROCEDURE — 25800030 ZZH RX IP 258 OP 636: Performed by: INTERNAL MEDICINE

## 2019-09-12 PROCEDURE — 25000132 ZZH RX MED GY IP 250 OP 250 PS 637: Performed by: INTERNAL MEDICINE

## 2019-09-12 PROCEDURE — 99207 ZZC APP CREDIT; MD BILLING SHARED VISIT: CPT | Performed by: PHYSICIAN ASSISTANT

## 2019-09-12 PROCEDURE — 97530 THERAPEUTIC ACTIVITIES: CPT | Mod: GP | Performed by: PHYSICAL THERAPIST

## 2019-09-12 PROCEDURE — 99207 ZZC CDG-MDM COMPONENT: MEETS MODERATE - UP CODED: CPT | Performed by: INTERNAL MEDICINE

## 2019-09-12 PROCEDURE — G0378 HOSPITAL OBSERVATION PER HR: HCPCS

## 2019-09-12 PROCEDURE — 99220 ZZC INITIAL OBSERVATION CARE,LEVL III: CPT | Performed by: INTERNAL MEDICINE

## 2019-09-12 PROCEDURE — 97161 PT EVAL LOW COMPLEX 20 MIN: CPT | Mod: GP | Performed by: PHYSICAL THERAPIST

## 2019-09-12 RX ORDER — ASPIRIN 81 MG/1
81 TABLET, CHEWABLE ORAL DAILY
COMMUNITY

## 2019-09-12 RX ORDER — TROLAMINE SALICYLATE 10 G/100G
CREAM TOPICAL 2 TIMES DAILY PRN
COMMUNITY
End: 2019-09-26

## 2019-09-12 RX ORDER — ACETAMINOPHEN 325 MG/1
650 TABLET ORAL EVERY 4 HOURS PRN
Status: DISCONTINUED | OUTPATIENT
Start: 2019-09-12 | End: 2019-09-13 | Stop reason: HOSPADM

## 2019-09-12 RX ORDER — POLYETHYLENE GLYCOL 3350 17 G/17G
17 POWDER, FOR SOLUTION ORAL DAILY PRN
Status: DISCONTINUED | OUTPATIENT
Start: 2019-09-12 | End: 2019-09-13 | Stop reason: HOSPADM

## 2019-09-12 RX ORDER — CARBOXYMETHYLCELLULOSE SODIUM 5 MG/ML
2 SOLUTION/ DROPS OPHTHALMIC 2 TIMES DAILY
COMMUNITY

## 2019-09-12 RX ORDER — ACETAMINOPHEN 650 MG/1
650 SUPPOSITORY RECTAL EVERY 4 HOURS PRN
Status: DISCONTINUED | OUTPATIENT
Start: 2019-09-12 | End: 2019-09-13 | Stop reason: HOSPADM

## 2019-09-12 RX ORDER — SUCRALFATE ORAL 1 G/10ML
1 SUSPENSION ORAL
Status: DISCONTINUED | OUTPATIENT
Start: 2019-09-12 | End: 2019-09-13 | Stop reason: HOSPADM

## 2019-09-12 RX ORDER — NALOXONE HYDROCHLORIDE 0.4 MG/ML
.1-.4 INJECTION, SOLUTION INTRAMUSCULAR; INTRAVENOUS; SUBCUTANEOUS
Status: DISCONTINUED | OUTPATIENT
Start: 2019-09-12 | End: 2019-09-13 | Stop reason: HOSPADM

## 2019-09-12 RX ORDER — SODIUM CHLORIDE 9 MG/ML
INJECTION, SOLUTION INTRAVENOUS CONTINUOUS
Status: DISCONTINUED | OUTPATIENT
Start: 2019-09-12 | End: 2019-09-13 | Stop reason: HOSPADM

## 2019-09-12 RX ORDER — CARBOXYMETHYLCELLULOSE SODIUM 5 MG/ML
2 SOLUTION/ DROPS OPHTHALMIC 4 TIMES DAILY PRN
COMMUNITY
End: 2019-11-29

## 2019-09-12 RX ORDER — ONDANSETRON 2 MG/ML
4 INJECTION INTRAMUSCULAR; INTRAVENOUS EVERY 6 HOURS PRN
Status: DISCONTINUED | OUTPATIENT
Start: 2019-09-12 | End: 2019-09-13 | Stop reason: HOSPADM

## 2019-09-12 RX ORDER — ONDANSETRON 4 MG/1
4 TABLET, ORALLY DISINTEGRATING ORAL EVERY 6 HOURS PRN
Status: DISCONTINUED | OUTPATIENT
Start: 2019-09-12 | End: 2019-09-13 | Stop reason: HOSPADM

## 2019-09-12 RX ADMIN — OMEPRAZOLE 20 MG: 20 CAPSULE, DELAYED RELEASE ORAL at 15:55

## 2019-09-12 RX ADMIN — SUCRALFATE 1 G: 1 SUSPENSION ORAL at 07:59

## 2019-09-12 RX ADMIN — SODIUM CHLORIDE: 9 INJECTION, SOLUTION INTRAVENOUS at 14:18

## 2019-09-12 RX ADMIN — SODIUM CHLORIDE: 9 INJECTION, SOLUTION INTRAVENOUS at 01:29

## 2019-09-12 RX ADMIN — SUCRALFATE 1 G: 1 SUSPENSION ORAL at 15:55

## 2019-09-12 RX ADMIN — SUCRALFATE 1 G: 1 SUSPENSION ORAL at 21:18

## 2019-09-12 RX ADMIN — SUCRALFATE 1 G: 1 SUSPENSION ORAL at 11:09

## 2019-09-12 RX ADMIN — Medication 1 MG: at 17:47

## 2019-09-12 RX ADMIN — OMEPRAZOLE 20 MG: 20 CAPSULE, DELAYED RELEASE ORAL at 07:59

## 2019-09-12 ASSESSMENT — MIFFLIN-ST. JEOR: SCORE: 847.32

## 2019-09-12 NOTE — ED PROVIDER NOTES
Emergency Department Attending Supervision Note  9/12/2019  12:07 AM      I evaluated this patient with JONATHAN Barajas.       Briefly, the patient presented with recurrent nausea and nonbloody nonbilious vomiting without significant abdominal pain.  She has a history of the same with known hiatal hernia and GERD.      On my exam, abdomen is soft.    Workup is notable for:  CT Abdomen Pelvis w Contrast   Final Result   CONCLUSION:    1.  Underdistention of the gastric outlet and proximal duodenum reduces evaluation for wall thickening/gastroenteritis.    2.  Moderate hiatal hernia.   3.  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   4.  Chronic reticulonodular change right middle lobe. 8 mm right middle lobe nodule more prominent compared to prior but could be exaggerated by slight respiratory motion. Consider follow-up.   5.  Small pericardial effusion.         XR Chest 2 Views   Final Result   IMPRESSION: Large hiatal hernia, increased in size compared to prior exam. Given the patient's history there may be gastric outlet obstruction. No pneumothorax or pleural effusion. No evidence for CHF or pneumonia.        EKG without evidence of ischemia, troponin negative.  Elect lites within normal limits.    In summary, my impression is intractable nausea and vomiting likely secondary to hiatal hernia and acid reflux.  EKG without evidence of ischemia, troponin negative low suspicion for atypical presentation of MI.  CT imaging shows hiatal hernia and small pericardial effusion of unclear significance.  Dry heaving here refractory to multiple medications, will admit for further symptomatic control.    Visit Diagnosis, Associated Orders, and Comments     ICD-10-CM    1. Vomiting R11.10    2. Epigastric pain R10.13    3. Hiatal hernia K44.9          Disposition:  Admit to OBS    Vitaly Menjivar MD  [facility]  [facility]    12:07 AM 09/12/19           Vitaly Menjivar MD  09/12/19  0053

## 2019-09-12 NOTE — PLAN OF CARE
"PRIMARY DIAGNOSIS: emesis  OUTPATIENT/OBSERVATION GOALS TO BE MET BEFORE DISCHARGE:  ADLs back to baseline: Yes    Activity and level of assistance: Ax1 gait belt and walker     Pain status: Pain free.    Return to near baseline physical activity: Yes     Discharge Planner Nurse   Safe discharge environment identified: Yes  Barriers to discharge: No       Entered by: Wanda Mcgowan 09/12/2019 7:54 AM     Please review provider order for any additional goals.   Nurse to notify provider when observation goals have been met and patient is ready for discharge.    BP (!) 147/68   Pulse 63   Temp 96  F (35.6  C) (Oral)   Resp 16   Ht 1.6 m (5' 3\")   Wt 43.3 kg (95 lb 8 oz)   SpO2 93%   BMI 16.92 kg/m      A&Ox3, disoriented to time.  Scheduled prilosec and sucralfate given.  IVF running NS 75mL/hr.  Pt states she overall feels good - denies nausea, vomiting, or heart but, but \"out of this world.\"  Ambulated from bed to chair Ax1 via gait belt and walker.  Plan to monitor for signs of nausea and encourage ambulation.  "

## 2019-09-12 NOTE — PROGRESS NOTES
09/12/19 1420   Quick Adds   Type of Visit Initial PT Evaluation   Living Environment   Lives With alone   Living Arrangements assisted living  ([facility])   Home Accessibility no concerns   Transportation Anticipated family or friend will provide   Living Environment Comment Pt lives in [facility]   Self-Care   Usual Activity Tolerance fair   Current Activity Tolerance fair   Regular Exercise No   Equipment Currently Used at Home commode;walker, rolling;raised toilet   Functional Level Prior   Ambulation 1-->assistive equipment   Transferring 1-->assistive equipment   Toileting 1-->assistive equipment  (during the day at night has assistance 3 times per night)   Bathing 3-->assistive equipment and person   Communication 0-->understands/communicates without difficulty   Swallowing 0-->swallows foods/liquids without difficulty   Cognition 1 - attention or memory deficits  (per family but undiagnosed)   Fall history within last six months no   Prior Functional Level Comment Pt has assist with dressing, showering 2/week, assist with medications, 3 meals per day (pt walks to dining [place] 2/day and has dinner delivered to her room), assist with getting in/out of bed 3 times per night, assist with am/pm cares.    General Information   Onset of Illness/Injury or Date of Surgery - Date 09/11/19   Referring Physician Dr. Girard   Patient/Family Goals Statement Pt no stated goals   Pertinent History of Current Problem (include personal factors and/or comorbidities that impact the POC)  Batsheva Barkley is a 85 year old female with a PMHx of CAD, ischemic cardiomyopathy with most recent EF of 40%, HTN, HLP, moderate mitral regurg, and urinary incontinence, who was admitted on 9/11/2019 with nausea and vomiting.    Precautions/Limitations fall precautions   Cognitive Status Examination   Orientation person;place   Level of Consciousness alert;confused   Follows Commands and Answers Questions 100% of the time;able to  follow single-step instructions   Personal Safety and Judgment impaired  (concern for care for herself during the day)   Memory impaired  (poor consistency between her report and dtg in law report)   Cognitive Comment Pt dtg in law reports some gradual cognitive decline   Posture    Posture Kyphosis;Scoliosis   Posture Comments R LE contracture   Range of Motion (ROM)   ROM Comment limited R knee extension due to conracture in R knee   Strength   Strength Comments limited functional strength, needing A with transfers and ambulation   Bed Mobility   Bed Mobility Comments CGA for supine<>sit   Transfer Skills   Transfer Comments min A for sit<>Stand and transfer to chair   Gait   Gait Comments Crouched gait pattern with limited heel contact with floor throughout gait cycle on the L. Improved stability with FWW   Balance   Balance Comments high risk of falls due to limited    General Therapy Interventions   Planned Therapy Interventions transfer training;progressive activity/exercise;home program guidelines;risk factor education   Clinical Impression   Criteria for Skilled Therapeutic Intervention yes, treatment indicated   PT Diagnosis decreased functional mobility   Influenced by the following impairments pain, decreased strength, decreased ROM, cognition   Functional limitations due to impairments decreased transfers, decreased safe ambulation   Clinical Presentation Evolving/Changing   Clinical Presentation Rationale continued confusion, uncertain of how close she is to baseline with this    Clinical Decision Making (Complexity) Low complexity   Therapy Frequency Daily   Predicted Duration of Therapy Intervention (days/wks) 1 day   Anticipated Equipment Needs at Discharge front wheeled walker   Anticipated Discharge Disposition Home with Home Therapy;Home with Assist   Risk & Benefits of therapy have been explained Yes   Patient, Family & other staff in agreement with plan of care Yes   [facility] AM-PAC TM  "\"6 Clicks\"   2016, Trustees of [facility], under license to [employer].  All rights reserved.   6 Clicks Short Forms Basic Mobility Inpatient Short Form   [facility] AM-PAC  \"6 Clicks\" V.2 Basic Mobility Inpatient Short Form   1. Turning from your back to your side while in a flat bed without using bedrails? 4 - None   2. Moving from lying on your back to sitting on the side of a flat bed without using bedrails? 4 - None   3. Moving to and from a bed to a chair (including a wheelchair)? 3 - A Little   4. Standing up from a chair using your arms (e.g., wheelchair, or bedside chair)? 3 - A Little   5. To walk in hospital room? 3 - A Little   6. Climbing 3-5 steps with a railing? 2 - A Lot   Basic Mobility Raw Score (Score out of 24.Lower scores equate to lower levels of function) 19   Total Evaluation Time   Total Evaluation Time (Minutes) 20     "

## 2019-09-12 NOTE — PROGRESS NOTES
[facility] Care Coordination Contact    TRANSITIONS OF CARE (MACIE) LOG   MACIE tasks should be completed by the CC within one (1) business day of notification of each transition. Follow up contact with member is required after return to their usual care setting.  Note:  If CC finds out about the transitions fifteen (15) days or more after the member has returned to their usual care setting, no MACIE log is needed. However, the CC should check in with the member to discuss the transition process, any changes needed to the care plan and document it in a case note.    Member Name:  Batsheva Barkley [facility] Name:  Bettya O/Health Plan Member ID#: [id number]   Product: [facility] Care Coordinator Contact:  Rosibel Rhodes RN Agency/County/Care System: [facility]   Transition Communication Actions from Care Management Contact   Transition #1   Notification Date: 9/12/19 Transition Date:   9/11/19 Transition From: Home     Is this the member s usual care setting?               yes Transition To: Hospital, [facility] ED to Observation    Transition Type:  Unplanned  Reason for Admission/Comments:Nausea and Vomiting   CC contacted Hospital /discharge planner [facility] Observation and left a message with this CC contact information, reviewed community POC as well requested to be notified of concerns, care conferences and discharge planning.  Reviewed and update care plan as needed.  Notified community service providers and placed services Assisted Living on hold as needed.  Transition log initiated.   PCP notified of hospitalization via EMR.  Rosibel Rhodes RN, BC  Manager [facility] Care Coordinator   [phone]  (Fax)           Shared CC contact info, care plan/services with receiving setting--Date completed: 9/12/19    Notified PCP of transition--Date completed:  9/12/19     via  EMR   Transition #2   Transition #3  (if applicable)   Notification Date: 9/13/2019         Transition To:   Home, [facility] [place]  Transition Date: 9/113/19     Transition Type:    Planned  Notified PCP -- Date completed: 9/13/19              Shared CC contact info, care plan/services with receiving setting or, if applicable, home care agency--Date completed:  9/13/19  *Complete additional tasks below, if this transition is a return to usual care setting.      Comments:  Rec'd vm from Karolina DAVILA CC [facility] to report that member discharged back to her apt @ [facility] with 24 hr care from family with plan to move to [facility] early next week.   9/13/19 VM left with Nickie DAVILA, [facility], reviewed above information, request a return call.  9/13/19 rec'd tele call from Karolina at [facility] to report that family has been requested to stay with member to assist with transfer, Karolina confirmed that they have staff available to assist as needed. CC will f/u with Nickie on 9/16/19  Rosibel Rhodes RN, BC  Manager [facility] Care Coordinator   [phone]  (Fax)     Notification Date:          Transition To:    Transition Date:              Transition Type:      Notified PCP--Date completed:          Shared CC contact info, care plan/services with receiving setting or, if applicable, home care agency--Date completed:       *Complete additional tasks below, if this transition is a return to usual care setting.      Comments:       *Complete tasks below when the member is discharging TO their usual care setting within one (1) business day of notification.  For situations where the Care Coordinator is notified of the discharge prior to the date of discharge, the Care Coordinator must follow up with the member or designated representative to confirm that discharge actually occurred and discuss required MACIE tasks as outlined in the MACIE Instructions.  (This includes situations where it may be a  new  usual care setting for the member. (i.e., a community member who decides upon  permanent nursing home placement following hospitalization and rehab).    Date completed:  9/16/19 Communicated with member or their designated representative about the following:  care transition process; about changes to the member s health status; plan of care updates; education about transitions and how to prevent unplanned transitions/readmissions  Four Pillars for Optimal Transition:    Check  Yes  - if the member, family member and/or SNF/facility staff manages the following:    If  No  provide explanation in the comments section.          [x]  Yes     []  No     Does the member have a follow-up appointment scheduled with primary care or specialist? (Mental health hospitalizations--the appt. should be w/in 7 days)   [x]  Yes     []  No     Can the member manage their medications or is there a system in place to manage medications (e.g. home care set-up)?         [x]  Yes     []  No     Can the member verbalize warning signs and symptoms to watch for and how to respond?         [x]  Yes     []  No     Does the member use a Personal Health Care Record?  Check  Yes  if visit summary, discharge summary, and/or healthcare summary are being used as a PHR.                                                                                                                                                                                    [x] Yes      [] No      Have you updated the member s care plan?  If  No  provide explanation in comments.   Comments:

## 2019-09-12 NOTE — PHARMACY-ADMISSION MEDICATION HISTORY
Admission medication history interview status for this patient is complete. See [facility] admission navigator for allergy information, prior to admission medications and immunization status.     Medication history interview source(s):med list from [facility]  Medication history resources (including written lists, pill bottles, clinic record):see above    Changes made to PTA medication list: see list    Actions taken by pharmacist (provider contacted, etc):None     Additional medication history information:None    Medication reconciliation/reorder completed by provider prior to medication history? No    Do you take OTC medications (eg tylenol, ibuprofen, fish oil, eye/ear drops, etc)? Y(Y/N)    For patients on insulin therapy: N (Y/N)  Lantus/levemir/NPH/Mix 70/30 dose:   (Y/N) (see Med list for doses)   Sliding scale Novolog Y/N  If Yes, do you have a baseline novolog pre-meal dose:  units with meals  Patients eat three meals a day:   Y/N    How many episodes of hypoglycemia do you have per week: _______  How many missed doses do you have per week: ______  How many times do you check your blood glucose per day: _______  Do you have a Continuous glucose monitor (CGM)   Y/N (remind pt that not approved for hospital use)   Any Barriers to therapy - Be specific :  cost of medications, comfortable with giving injections (if applicable), comfortable and confident with current diabetes regimen: Y/N ______________      Prior to Admission medications    Medication Sig Last Dose Taking? Auth Provider   acetaminophen (TYLENOL) 500 MG tablet Take 1,000 mg by mouth 3 times daily  9/11/2019 at Unknown time Yes Reported, Patient   alum & mag hydroxide-simethicone (MYLANTA/MAALOX) 200-200-20 MG/5ML SUSP suspension Take 10 mLs by mouth 2 times daily as needed   Yes Reported, Patient   aspirin (ASA) 81 MG chewable tablet Take 81 mg by mouth daily 9/11/2019 at Unknown time Yes Unknown, Entered By History   Atorvastatin Calcium  (LIPITOR PO) Take 20 mg by mouth At Bedtime  9/10/2019 at Unknown time Yes Reported, Patient   carboxymethylcellulose PF (REFRESH PLUS) 0.5 % ophthalmic solution Place 2 drops into both eyes 2 times daily 9/11/2019 at Unknown time Yes Unknown, Entered By History   carboxymethylcellulose PF (REFRESH PLUS) 0.5 % ophthalmic solution Place 2 drops into both eyes 4 times daily as needed for dry eyes  Yes Unknown, Entered By History   loperamide (IMODIUM) 2 MG capsule Take 2 mg by mouth 4 times daily as needed for diarrhea   Yes Reported, Patient   losartan (COZAAR) 50 MG tablet Take 1 tablet (50 mg) by mouth 2 times daily 9/11/2019 at Unknown time Yes Iona Huang APRN CNP   Menthol ([employer] COUGH DROPS SUGAR FREE MT) Take 1 lozenge by mouth 4 times daily as needed  Yes Reported, Patient   menthol-zinc oxide (CALMOSEPTINE) 0.44-20.6 % OINT ointment Apply 1 g topically 2 times daily 9/11/2019 at Unknown time Yes Unknown, Entered By History   menthol-zinc oxide (CALMOSEPTINE) 0.44-20.6 % OINT ointment Apply topically 2 times daily as needed for skin protection  Yes Reported, Patient   metoprolol (LOPRESSOR) 50 MG tablet TAKE ONE TABLET BY MOUTH TWICE DAILY 9/11/2019 at Unknown time Yes Lenin Perez MD   Omeprazole (PRILOSEC PO) Take 20 mg by mouth At Bedtime  9/10/2019 at Unknown time Yes Reported, Patient   ONDANSETRON PO Take 4 mg by mouth every 8 hours as needed for nausea  Yes Reported, Patient   polyethylene glycol (MIRALAX/GLYCOLAX) Packet Take 17 g by mouth daily as needed for constipation  Yes Naye Amaya PA   senna-docusate (SENOKOT-S;PERICOLACE) 8.6-50 MG per tablet Take 1 tablet by mouth 2 times daily as needed   Yes Reported, Patient   traZODone (DESYREL) 50 MG tablet Take 50 mg by mouth At Bedtime 9/10/2019 at Unknown time Yes Reported, Patient   trolamine salicylate (ASPERCREME) 10 % external cream Apply topically 2 times daily as needed (to low back twice daily as needed for pain)  Yes  Unknown, Entered By History   trolamine salicylate (ASPERCREME) 10 % external cream Apply topically 2 times daily AND BID PRN  Apply to affected areas of right shoulder/neck and apply to right knee twice daily 9/11/2019 at Unknown time Yes Reported, Patient   order for DME Light weight wheelchair Body mass index is 19.08 kg/(m^2).   Lenin Perez MD   order for DME ANTOINE stockings- below knee.   Regla Yepez MD

## 2019-09-12 NOTE — ED PROVIDER NOTES
History     Chief Complaint:  Nausea & Vomiting    HPI   Batsheva Barkley is a 85 year old female with a history of mitral regurgitation, CAD, and CHF who presents for the evaluation of nausea and vomiting. The patient reports that over the past week she has been experiencing nausea and vomiting while laying down at night and trying to sleep, prompting her to the ED. The patient describes that she will wake up in the middle of the night and vomit randomly and that she does not experiencing nausea during the day. She denies nausea currently. She also notes that she will experience a burning sensation in the center of her chest during her nausea and vomiting episodes. She denies chest pain currently. She also states that she was found to have a low grade fever this afternoon by her nurse at her assisted living facility. The patient denies shortness of breath, abdominal pain, and other issues.   No dysuria or hematuria.    Allergies:  Codeine  Lisinopril      Medications:    Mylanta  Aspirin 81 mg  Lipitor  Imodium  Cozaar  Lopressor  Prilosec  Ondansetron  Senokot  Desyrel    Past Medical History:    CAD  Cardiomyopathy  CHF NYHA class II  Coronary atherosclerosis   Edema  Osteoarthrosis  Hypertension  Leg weakness, bilateral  Mitral regurgitation  Hyperlipidemia  Total urinary incontinence   Dysphagia     Past Surgical History:    Heart cath, angioplasty     Family History:    Cerebrovascular disease  Cancer     Social History:  Smoking status: Former Smoker  Alcohol use: No  Drug use: No   Marital Status:   [2]     Review of Systems   Respiratory: Negative for shortness of breath.    Cardiovascular: Positive for chest pain.   Gastrointestinal: Positive for nausea and vomiting. Negative for abdominal pain.   All other systems reviewed and are negative.    Physical Exam     Patient Vitals for the past 24 hrs:   BP Temp Temp src Pulse Heart Rate Resp SpO2 Weight   09/12/19 0030 (!) 141/60 -- -- 67 -- -- 97 % --    09/12/19 0015 134/63 -- -- 68 -- -- 91 % --   09/12/19 0000 135/68 -- -- 68 -- -- 92 % --   09/11/19 2345 (!) 146/63 -- -- 69 -- -- 98 % --   09/11/19 2315 138/63 -- -- 70 -- -- 94 % --   09/11/19 2300 (!) 167/75 -- -- 73 -- -- 97 % --   09/11/19 2245 (!) 156/109 -- -- 74 -- -- 97 % --   09/11/19 2230 (!) 142/62 -- -- 71 -- -- 96 % --   09/11/19 2215 (!) 160/61 -- -- 75 -- -- -- --   09/11/19 2145 (!) 153/99 -- -- 93 -- -- -- --   09/11/19 2130 (!) 180/81 -- -- 81 -- -- -- --   09/11/19 2115 (!) 165/73 -- -- 72 -- -- -- --   09/11/19 2100 (!) 146/67 -- -- 71 -- -- 96 % --   09/11/19 2045 (!) 143/72 -- -- 69 -- -- 97 % --   09/11/19 2030 (!) 150/65 -- -- 67 -- -- 97 % --   09/11/19 2015 (!) 145/67 -- -- 67 -- -- 98 % --   09/11/19 2000 (!) 162/73 -- -- 71 -- -- 99 % --   09/11/19 1945 -- -- -- 66 -- -- 99 % --   09/11/19 1930 (!) 168/78 -- -- 72 -- -- 97 % --   09/11/19 1929 (!) 165/73 98.8  F (37.1  C) Oral 72 72 16 98 % 43.1 kg (95 lb)        Physical Exam  General:  Elderly appearing female.  Vomiting and retching.  Head:  Scalp is NC/AT  Eyes:  No scleral icterus, PERRL  ENT:  The external nose and ears are normal.   Neck:  Normal range of motion without rigidity.  CV:  Regular rate and rhythm    No pathologic murmur, rubs, or gallops.  Resp:  Breath sounds are clear bilaterally.  No crackles, wheezes, rhonchi.    Non-labored, no retractions or accessory muscle use  GI:  Abdomen is soft, no distension, no tenderness, no masses. No peritoneal signs.  Bowel sounds present in all quadrants  :  No suprapubic or flank tenderness  MS:  No lower extremity edema or asymmetric calf swelling.  Skin:  Warm and dry, No rash or lesions noted.  Neuro: Oriented. No gross motor deficits.  Psych: Awake. Alert. Normal affect. Appropriate interactions.     Emergency Department Course   ECG (20:22:22):  Rate 69 bpm. HI interval 166. QRS duration 104. QT/QTc 402/430. P-R-T axes 36 -8 50. Normal sinus rhythm. Inferior infarct, age  undetermined. Abnormal ECG. Interpreted at 2022 by Vitaly Menjivar MD.     Imaging:  Radiographic findings were communicated with the patient who voiced understanding of the findings.  XR Chest 2 Views  IMPRESSION: Large hiatal hernia, increased in size compared to prior exam. Given the patient's history there may be gastric outlet obstruction. No pneumothorax or pleural effusion. No evidence for CHF or pneumonia.  As read by Radiology.     CT Abdomen Pelvis w Contrast  CONCLUSION:   1.  Underdistention of the gastric outlet and proximal duodenum reduces evaluation for wall thickening/gastroenteritis.   2.  Moderate hiatal hernia.  3.  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  4.  Chronic reticulonodular change right middle lobe. 8 mm right middle lobe nodule more prominent compared to prior but could be exaggerated by slight respiratory motion. Consider follow-up.  5.  Small pericardial effusion.  As read by Radiology.     Laboratory:  CBC: HGB 10.9 (L), WNL (WBC 5.9, )  CMP: na 131 (L), Calcium 8.4 (L), Protein Total 6.3 (L), WNL (Creatinine 0.64)  Lipase: 464 (H)  Troponin (1934): <0.015     Interventions:  1952, Zofran, 4 mg, IV  1952, Mylanta, 30 mLs, PO  2205, Pepcid, 20 mg, IV  2205, Tylenol, 650 mg, PO  2307, Zofran, 4 mg, IV  2308, Ativan, 0.5 mg, IV    Emergency Department Course:  Past medical records, nursing notes, and vitals reviewed.  1938: I performed an exam of the patient and obtained history, as documented above.     IV inserted and blood drawn.     The patient was sent for a chest x ray and abdominal CT while in the emergency department, findings above.    2254: I rechecked the patient. Explained findings to patient and daughter. Patient still vomiting.     0033: I rechecked the patient. Explained findings to patient and daughter.     0038: Discussed the patient with Dr. Vasquez, who will admit the patient to an observation bed for further monitoring, evaluation, and  treatment. Findings and plan explained to the Patient and daughter who consents to admission.     Impression & Plan    Medical Decision Makin-year-old female who presents for vomiting and epigastric discomfort.  Patient history and records reviewed.  I considered a broad differential including GI, cardiac, and pulmonary causes.  My impression is vomiting and nausea likely secondary to hiatal hernia/GERD.  Possible that she may also have a contributing viral GI illness.  She denies dark stools, and no evidence of GI bleed, and hemoglobin stable and BUN normal also making this less likely.  Chest x-ray did demonstrate large hernia, and given concern for possible obstruction from x-ray, I did obtain a CT of the abdomen which showed no signs of obstruction, but did demonstrate incidental pulmonary nodule, as well as very small pericardial effusion of uncertain significance.  No evidence of cardiac tamponade.  EKG does not demonstrate ischemic changes, troponin negative, and patient denies chest pain or shortness of breath making ACS very unlikely.  She is not hypoxic or tachycardic, and I doubt pulmonary embolism and would not pursue this further given the absence of chest pain or shortness of breath.  She has no abdominal tenderness, RUQ tenderness and LFTs are unremarkable and I doubt gallbladder etiology.  Lipase very mildly elevated though decreased from patient's prior and likely secondary to vomiting.  No signs of pancreatitis on CT scan.    The patient continued to have some vomiting, and the patient's daughters are not comfortable sending her back to her living arrangement in this current state right now.  She will be admitted to the hospital for observation.  I discussed the patient with Dr. Vasquez, who graciously agrees to admit the patient for observation.  The patient and family consent to admission all questions answered.  Diagnosis:    ICD-10-CM    1. Vomiting R11.10    2. Epigastric pain R10.13     3. Hiatal hernia K44.9    4. Pulmonary nodules R91.8        Disposition:  Admitted to an observation bed.     Scribe Disclosure:  I, Gage Coelho, am serving as a scribe at 7:37 PM on 9/11/2019 to document services personally performed by Nicola Baugh PA-C based on my observations and the provider's statements to me.      Gage Coelho  9/11/2019   [facility] EMERGENCY DEPARTMENT       Nicola Baugh PA-C  09/12/19 0155

## 2019-09-12 NOTE — H&P
"Admitted:     09/11/2019      CHIEF COMPLAINT:  \"I am here for a secret scan.\"  The patient presented with nausea and vomiting.      HISTORY OF PRESENT ILLNESS:  Ms. Barkley is an 85-year-old female who apparently presented to the hospital for concerns about nausea and vomiting.  The patient is a suboptimal historian.  In speaking to the ER provider, it sounds like she was given some Ativan for agitation or anxiety in the ER and now is somewhat sedated from it.  She will fairly easily awaken when I entered the room by verbal stimuli, but she fell asleep during my discussion with her.  History from her is unreliable as she is unable to tell me why she is here this evening.      Per ER provider, it sounds like she was brought in by family members for concerns about ongoing nausea and vomiting.  Unfortunately, family is not here any longer and I do not have much more historical information than that.  When I asked the patient about her symptoms, she says she has been having nausea and vomiting symptoms \"for months.\"  She has no abdominal pain.  No diarrhea.  No fevers or chills.  No other concerning symptoms that she reports to me tonight, but again I suspect accurate information from her currently is suboptimal at best.      On arrival to the ER this evening, vital signs included a blood pressure 160/70 with a heart rate of 66, afebrile, saturation 99%.  Workup in the ER included labs and imaging.  Lipase was 460.  Hemoglobin 10.9.  White cell count normal.  Sodium 131.  Otherwise, complete metabolic panel essentially normal.  Troponin is undetectable.  Chest x-ray was done showing a large hiatal hernia, increased in size compared to prior exam.  Given the patient's history, there may be a gastric outlet obstruction per Radiology read.      This prompted a CT of the abdomen and pelvis showing under-distention of the gastric outlet and proximal duodenum with moderate hiatal hernia present.  Bowel normal in caliber.      I " spoke to the ER providers requesting admission of this patient to the Observation unit.  The ER provider did relate that the family has concerns about her current living situation and it is unclear if she is in the appropriate place.  The patient reports that she lives at [facility] currently.      PAST MEDICAL HISTORY:   1.  History of coronary artery disease.   2.  Ischemic cardiomyopathy:  Most recent echocardiogram in 2017 showed ejection fraction of 40%.   3.  Hypertension.   4.  Hyperlipidemia.   5.  Moderate mitral regurgitation.   6.  Urinary incontinence.   7.  Bilateral lower extremity weakness.   8.  History of myocardial infarction.      CURRENT MEDICATIONS:  Await pharmacy reconciliation medication list.  It appears that the patient is on a PPI once a day.      ALLERGIES:  CODEINE CAUSES NAUSEA AND HEADACHE AND LISINOPRIL CAUSES COUGH.      FAMILY HISTORY:  Reviewed.  Nothing contributory to this admission.      SOCIAL HISTORY:  Former smoker.  No alcohol use.  She reports she is a .  She reports that she lives at the [facility].      CODE STATUS:  Presumed full based on previous admissions.      REVIEW OF SYSTEMS:  Somewhat challenging in this patient given her current mental status.  However, as best as able, comprehensive greater than 10-point review of systems is negative besides that detailed above.      PHYSICAL EXAMINATION:   VITAL SIGNS:  Blood pressure is currently 132/64 with a heart rate of 65, afebrile, saturation 98% on room air.   GENERAL:  The patient appears nontoxic and in no acute distress.  She was sleeping when I entered the room.  She was easily awakened by verbal stimuli, but did fall asleep during my exam.  Unreliable historian as detailed above.   HEENT:  Head is atraumatic.  Sclerae white.  Eyelids normal.  Conjunctivae normal.  Extraocular movements are intact.   NECK:  Supple.  No cervical or supraclavicular lymphadenopathy.   HEART:  Regular rate and  rhythm.  No significant murmurs.  No lower extremity edema.   LUNGS:  Clear to auscultation bilateral.  No intercostal retractions.  No conversational dyspnea.   ABDOMEN:  Nontender, nondistended.  Soft.  No masses.  No organomegaly.   EXTREMITIES:  No edema.   SKIN:  Reveals no rashes.  No jaundice.  Skin is dry to touch.   NEUROLOGIC:  Cranial nerves II-XII are intact.  Moves all extremities appropriately.  Sensation intact to light touch in the upper and lower extremities bilaterally.   PSYCHIATRIC:  As above.  Does not appear anxious.      LABORATORY AND IMAGING DATA:  Reviewed above in HPI.  I personally reviewed the EKG which shows a sinus rhythm with a heart rate of 69,  Q-wave in III.  Very similar EKG to that which I compared to from 04/10/2018.      IMPRESSION AND PLAN:  Ms. Barkley is an 85-year-old female with a history of heart failure, coronary artery disease, hypertension, who presents to the hospital today for concerns about nausea and vomiting.  The patient is an unreliable historian currently, in large part due to Ativan she received in the ER.  History is obtained through the electronic medical record and the ER physician.   1.  Nausea and vomiting:  The patient appears dehydrated on exam.  She has a benign abdomen on exam with a normal white blood cell count and no apparent obstruction on CT imaging.   2.  Mildly elevated lipase:  I suspect due to vomiting.  No evidence of pancreatitis noted on CT imaging.   3.  History of reflux disease and hiatal hernia:  May be a cause of her nausea and vomiting.  I will increase her Omeprazole to twice a day dosing and try sucralfate to see if this improves some of her symptoms.  We will administer IV fluids overnight and use antiemetics as needed.   4.  Apparent family concern for current living situation:  I was unable to speak to the family about this as they were not here  when I saw the patient.  However, this is per ER provider.  I will obtain a PT consult  and Social Work input to help assess this issue.   5.  Confusion:  Suspect related to Ativan.  No documented history of dementia.  I suspect she will clear in the morning after Ativan  wears off.      The patient will be admitted to observation status.  Anticipate discharge within 24-48 hours.         DANNA KATE MD             D: 2019   T: 2019   MT:       Name:     RENEE HIGGINS   MRN:      [id number]        Account:      [id number]   :      1933        Admitted:     2019                   Document: [id number]       cc: Tacos OLMOS CNP

## 2019-09-12 NOTE — PLAN OF CARE
"PRIMARY DIAGNOSIS: emesis  OUTPATIENT/OBSERVATION GOALS TO BE MET BEFORE DISCHARGE:  1. ADLs back to baseline: Yes    2. Activity and level of assistance: Ax1 gait belt and walker     3. Pain status: Pain free - denies nausea and heart burn    4. Return to near baseline physical activity: Yes, ambulated in hallway Ax1 via gait belt and walker, pt was noted to have some unsteadiness from sitting to standing and when trying to sit down. During ambulation pt noted to be weak and have difficulties when turning with her walker.     Discharge Planner Nurse   Safe discharge environment identified: Yes  Barriers to discharge: No       Entered by: Wanda Mcgowan 09/12/2019 12:06 PM     Please review provider order for any additional goals.   Nurse to notify provider when observation goals have been met and patient is ready for discharge.    /75   Pulse 80   Temp 97.2  F (36.2  C) (Oral)   Resp 18   Ht 1.6 m (5' 3\")   Wt 43.3 kg (95 lb 8 oz)   SpO2 97%   BMI 16.92 kg/m      Pt continues to have intermittent confusion, but is oriented to staff names/place and why she is here.   Plan to continue to monitor pt during meals (with swallowing and nausea/heartburn).  Encourage ambulation.  IVF running NS 75mL/hr.  "

## 2019-09-12 NOTE — PLAN OF CARE
PRIMARY DIAGNOSIS: Nausea   OUTPATIENT/OBSERVATION GOALS TO BE MET BEFORE DISCHARGE:  ADLs back to baseline: No    Activity and level of assistance: Up with maximum assistance. Consider SW and/or PT evaluation.     Pain status: Pain free.    Return to near baseline physical activity: No     Discharge Planner Nurse   Safe discharge environment identified: Yes  Barriers to discharge: Yes-consults        Entered by: Valerie Gibbs 09/12/2019 4:45 AM     Pt forgetful, VSS on RA. Denies pain. Nausea improved with ativan given prior in ED. Assist x2 with walker and GB. IVF infusing. PT and SW to see in AM. [facility] from [facility]. Will continue to monitor.     Please review provider order for any additional goals.   Nurse to notify provider when observation goals have been met and patient is ready for discharge.

## 2019-09-12 NOTE — ED TRIAGE NOTES
Pt c/o nausea and vomiting for 5 days, has also had a low grade temp today.  Pt was recently prescribed zofran, but pt says it did not help her nausea.  Pt wakes up night vomiting , but able to tolerate some fluids during the day.

## 2019-09-12 NOTE — PROGRESS NOTES
ROOM # [id number]    Living Situation (if not independent, order SW consult): Lives in [place]   Facility name: [facility]   : Shakila daughter [phone]    Activity level at baseline: unknown   Activity level on admit: Assist x2 with walker       Patient registered to observation; given Patient Bill of Rights; given the opportunity to ask questions about observation status and their plan of care.  Patient has been oriented to the observation room, bathroom and call light is in place.    Discussed discharge goals and expectations with patient/family.

## 2019-09-12 NOTE — PROGRESS NOTES
"[facility]  Hospitalist Progress Note  Carri Burnett PA-C 09/12/2019    Reason for Stay (Diagnosis): nausea and vomiting         Assessment and Plan:      Summary of Stay: Batsheva Barkley is a 85 year old female with a PMHx of CAD, ischemic cardiomyopathy with most recent EF of 40%, HTN, HLP, moderate mitral regurg, and urinary incontinence, who was admitted on 9/11/2019 with nausea and vomiting.       Problem List:   1. Nausea and vomiting - unclear etiology, no definite source identified on CT. Pt and daughters state that the symptoms are worse at night. Has hx of reflux and hiatal hernia, possibly contributing. Omeprazole increased to BID and started on carafate. Tolerating diet today so far but sx typically worsen at night. Will continue to monitor symptoms and tolerance of PO intake. Anticipate discharge back to [facility] tomorrow. CT did show underdistention of the gastric outlet and proximal duodenum which reduces evaluation for wall thickening/gastroenteritis. Did discuss with family that further work up may be indicated if symptoms do not improve but also discussed that at this pt's age, further work up may not be appropriate. Recheck labs in AM.  2. HTN - continue home Cozaar with parameters and metoprolol.   3. HLP - continue statin.   4. CAD with ischemic cardiomyopathy - last echo showed EF 40% in 11/2017. Resume ASA and metoprolol.    DVT Prophylaxis: Ambulate every shift  Code Status: Full Code  Discharge Dispo: [facility]  Estimated Disch Date / # of Days until Disch: tomorrow. She is mildly confused today, feels \"out of it\", likely due to receiving ativan in ED last evening        Interval History (Subjective):      Pt denies specific complaints, but states she does not feel great. She ate breakfast but states she did not like the eggs so didn't eat much. Daughter notes some choking on the bread.                   Physical Exam:      Last Vital Signs:  /75   Pulse 80   Temp 97.2  F (36.2 " " C) (Oral)   Resp 18   Ht 1.6 m (5' 3\")   Wt 43.3 kg (95 lb 8 oz)   SpO2 97%   BMI 16.92 kg/m        Intake/Output Summary (Last 24 hours) at 9/12/2019 1349  Last data filed at 9/12/2019 0600  Gross per 24 hour   Intake 434 ml   Output --   Net 434 ml       Constitutional: Awake, alert, cooperative, no apparent distress   Respiratory: Normal respiratory effort   Cardiovascular: Regular rate and rhythm   Abdomen: Normal bowel sounds, soft, non-distended, non-tender   Skin: No rashes, no cyanosis, dry to touch   Neuro: Alert and oriented x3   Extremities: No edema, normal range of motion   Other(s):        All other systems: Negative          Medications:      All current medications were reviewed with changes reflected in problem list.         Data:      All new lab and imaging data was reviewed.   Labs:  Recent Labs   Lab 09/11/19 1934   WBC 5.9   HGB 10.9*   HCT 33.9*   MCV 91        Recent Labs   Lab 09/11/19 1934   *   POTASSIUM 4.2   CHLORIDE 101   CO2 24   ANIONGAP 6   GLC 97   BUN 24   CR 0.64   GFRESTIMATED 81   GFRESTBLACK >90   REILLY 8.4*   PROTTOTAL 6.3*   ALBUMIN 3.4   BILITOTAL 0.3   ALKPHOS 51   AST 27   ALT 21     Recent Labs   Lab 09/11/19 1934   LIPASE 464*     Recent Labs   Lab 09/11/19 1934   TROPI <0.015      Imaging:   Recent Results (from the past 48 hour(s))   XR Chest 2 Views    Narrative    EXAM: XR CHEST 2 VW  LOCATION: [facility]  DATE/TIME: 9/11/2019 9:57 PM    INDICATION: Chest pain, vomiting  COMPARISON: 09/24/2016      Impression    IMPRESSION: Large hiatal hernia, increased in size compared to prior exam. Given the patient's history there may be gastric outlet obstruction. No pneumothorax or pleural effusion. No evidence for CHF or pneumonia.   CT Abdomen Pelvis w Contrast    Narrative    EXAM: CT ABDOMEN PELVIS W CONTRAST  LOCATION: [facility]  DATE/TIME: 9/11/2019 11:21 PM    INDICATION: Chest pain and vomiting  COMPARISON: " 1/17/2015  TECHNIQUE: Helical enhanced thin-section CT scan of the abdomen and pelvis was performed following injection of IV contrast. Multiplanar reformats were obtained. Dose reduction techniques were used.  CONTRAST: 48mL Isovue-370    FINDINGS:   LUNG BASES: Chronic reticulonodular change in the right middle lobe. 8 mm right middle lobe nodule or prominent compared to prior study. This could be exaggerated by respiratory motion. 4 mm right lower lobe nodule stable. Moderate hiatal hernia. Small   pericardial effusion.    ABDOMEN: Probable subcentimeter hemangioma within the spleen unchanged. Hepatic steatosis. Pancreas, adrenals and kidneys are stable. Subcentimeter right renal hypodensity small for characterization. Atherosclerotic aorta with dense calcification origin   of the celiac, superior mesenteric and renal arteries. Bowel is normal in caliber. Underdistention of the gastric outlet and proximal duodenum reducing evaluation for focal wall thickening.    PELVIS: No significant free fluid. Appendix not seen with certainty. No right lower quadrant inflammation.    MUSCULOSKELETAL: Degenerative change osseous structures. Scoliosis.      Impression    CONCLUSION:   1.  Underdistention of the gastric outlet and proximal duodenum reduces evaluation for wall thickening/gastroenteritis.   2.  Moderate hiatal hernia.  3.  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  4.  Chronic reticulonodular change right middle lobe. 8 mm right middle lobe nodule more prominent compared to prior but could be exaggerated by slight respiratory motion. Consider follow-up.  5.  Small pericardial effusion.           Carri Burnett PA-C

## 2019-09-12 NOTE — PLAN OF CARE
"PRIMARY DIAGNOSIS: emesis  OUTPATIENT/OBSERVATION GOALS TO BE MET BEFORE DISCHARGE:  1. ADLs back to baseline: Yes    2. Activity and level of assistance: Ax1 gait belt and walker     3. Pain status: Pain free.    4. Return to near baseline physical activity: Yes     Discharge Planner Nurse   Safe discharge environment identified: Yes  Barriers to discharge: No       Entered by: Wanda Mcgowan 09/12/2019 4:12 PM     Please review provider order for any additional goals.   Nurse to notify provider when observation goals have been met and patient is ready for discharge.    BP (!) 198/93 (BP Location: Right arm)   Pulse 91   Temp 99.3  F (37.4  C) (Oral)   Resp 16   Ht 1.6 m (5' 3\")   Wt 43.3 kg (95 lb 8 oz)   SpO2 99%   BMI 16.92 kg/m      Pt noted to be confused and upset, wishing to return home, BP elevated and checked x 2. Will reassess BP when pt is calm.  Scheduled Prilosec and Carafate given.  Pt picking at food, no swallowing difficulties assessed.  Denies nausea or heartburn.  IVF running NS 75mL/hr.  Plan to continue to monitor overnight.  "

## 2019-09-12 NOTE — PLAN OF CARE
PT: PT eval completed. Pt here due to nausea and vomiting. Of note pt also having increased confusion. Pt has PMH of heart failure, coronary artery disease, hypertension. [facility] lives [facility]. Pt has assist with dressing, showering 2/week, assist with medications, 3 meals per day (pt walks to [employer] 2/day and has dinner delivered to her room), assist with getting in/out of bed 3 times per night, assist with am/pm cares.   Discharge Planner PT   Patient plan for discharge: none stated, pt's dtg in law reporting that they have been talking about moving her into a part of [facility] that has 24 hour care. Per dtg in law reports they are all not on the same page on this though  Current status: Pt needing min A for sit<>Stand. Pt poor balance and 4WW management, poor stability with ambulation of 100 feet, improved with FWW. Pt has scoiliotic curve, significant kyphosis, contracture in the L knee (~35). Crouched gait pattern with limited heel contact with floor throughout gait cycle on the L. CGA/SBA with ambulation. Pt mildly confused, masks her confusion a lot with humor. Pt oriented to place and person only. Impaired memory with attempts to recall past medical history and falls history(dtg in law able to relate correct information).   Barriers to return to prior living situation: needing min A for transfers  Recommendations for discharge: Recommend home with assist with transfers during the day from [facility] if able otherwise family assist with all daytime transfers. Recommend home PT/OT(cognition eval) and continued assist from [facility] as stated above. Would recommend PCP to work up for cognitive decline as well.   Rationale for recommendations: Pt currently able to ambulate functional distances with CGA/SBA, but needing A with transfers, pt likely close to baseline with strength and gait pattern. If family is not able or [facility] not able to assist would recommend TCU stay to improve transfers. Pt would benefit from  long term change in living situation as likely due to her posture/contractures will continue to decline functionally. Family is looking into LTC at this time, but timing is still not detirmed/planned.        Entered by: Brittny Santa 09/12/2019 3:06 PM     Physical Therapy Discharge Summary     Reason for therapy discharge:    All goals and outcomes met, no further needs identified.     Progress towards therapy goal(s). See goals on Care Plan in [facility] electronic health record for goal details.  Goals met, but see above for recs- will need A with transfers at this time     Therapy recommendation(s):    See above

## 2019-09-12 NOTE — ED NOTES
[facility]  ED Nurse Handoff Report    Batsheva Barkley is a 85 year old female   ED Chief complaint: Nausea & Vomiting  . ED Diagnosis:   Final diagnoses:   Vomiting   Epigastric pain   Hiatal hernia   Pulmonary nodules     Allergies:   Allergies   Allergen Reactions     Codeine      nausea, HA     Lisinopril Cough       Code Status: DNR / DNI  Activity level - Baseline/Home:  Independent. Activity Level - Current:   Assist X 1. Lift room needed: No. Bariatric: No   Needed: No   Isolation: No. Infection: Not Applicable.     Vital Signs:   Vitals:    09/11/19 2245 09/11/19 2300 09/11/19 2315 09/11/19 2345   BP: (!) 156/109 (!) 167/75 138/63    Pulse: 74 73 70    Resp:       Temp:       TempSrc:       SpO2: 97% 97% 94% 98%   Weight:           Cardiac Rhythm:  ,      Pain level: 0-10 Pain Scale: 0  Patient confused: No. Patient Falls Risk: Yes.   Elimination Status: Has voided   Patient Report - Initial Complaint: Pt c/o nausea and vomiting for 5 days, has also had a low grade temp today.  Pt was recently prescribed zofran, but pt says it did not help her nausea.  Pt wakes up night vomiting , but able to tolerate some fluids during the day.. Focused Assessment: continued to have nausea   Tests Performed:   CT Abdomen Pelvis w Contrast   Final Result   CONCLUSION:    1.  Underdistention of the gastric outlet and proximal duodenum reduces evaluation for wall thickening/gastroenteritis.    2.  Moderate hiatal hernia.   3.  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   4.  Chronic reticulonodular change right middle lobe. 8 mm right middle lobe nodule more prominent compared to prior but could be exaggerated by slight respiratory motion. Consider follow-up.   5.  Small pericardial effusion.         XR Chest 2 Views   Final Result   IMPRESSION: Large hiatal hernia, increased in size compared to prior exam. Given the patient's history there may be gastric outlet obstruction. No pneumothorax  or pleural effusion. No evidence for CHF or pneumonia.        . Abnormal Results:   Labs Ordered and Resulted from Time of ED Arrival Up to the Time of Departure from the ED   CBC WITH PLATELETS DIFFERENTIAL - Abnormal; Notable for the following components:       Result Value    RBC Count 3.71 (*)     Hemoglobin 10.9 (*)     Hematocrit 33.9 (*)     RDW 15.1 (*)     All other components within normal limits   COMPREHENSIVE METABOLIC PANEL - Abnormal; Notable for the following components:    Sodium 131 (*)     Calcium 8.4 (*)     Protein Total 6.3 (*)     All other components within normal limits   LIPASE - Abnormal; Notable for the following components:    Lipase 464 (*)     All other components within normal limits   TROPONIN I   FREE WATER     .   Treatments provided: see mar  Family Comments: present  OBS brochure/video discussed/provided to patient:  Yes  ED Medications:   Medications   lidocaine (XYLOCAINE) 2 % 15 mL, alum & mag hydroxide-simethicone (MYLANTA ES/MAALOX  ES) 15 mL GI Cocktail (30 mLs Oral Given 9/11/19 1952)   ondansetron (ZOFRAN-ODT) ODT tab 4 mg (4 mg Oral Given 9/11/19 1952)   famotidine (PEPCID) injection 20 mg (20 mg Intravenous Given 9/11/19 2205)   acetaminophen (TYLENOL) tablet 650 mg (650 mg Oral Given 9/11/19 2205)   LORazepam (ATIVAN) injection 0.5 mg (0.5 mg Intravenous Given 9/11/19 2308)   ondansetron (ZOFRAN) injection 4 mg (4 mg Intravenous Given 9/11/19 2307)   CT Scan Flush (42 mLs Intravenous Given 9/11/19 2325)   iopamidol (ISOVUE-370) solution 500 mL (48 mLs Intravenous Given 9/11/19 2325)     Drips infusing:  No  For the majority of the shift, the patient's behavior Green. Interventions performed were see mar.     Severe Sepsis OR Septic Shock Diagnosis Present: No      ED Nurse Name/Phone Number: Cristian Garland RN,   12:22 AM    RECEIVING UNIT ED HANDOFF REVIEW    Above ED Nurse Handoff Report was reviewed: Yes  Reviewed by: Valerie Gibbs RN on September 12, 2019 at  1:04 AM

## 2019-09-13 ENCOUNTER — PATIENT OUTREACH (OUTPATIENT)
Dept: GERIATRIC MEDICINE | Facility: CLINIC | Age: 84
End: 2019-09-13

## 2019-09-13 VITALS
RESPIRATION RATE: 20 BRPM | BODY MASS INDEX: 16.92 KG/M2 | TEMPERATURE: 97.4 F | HEIGHT: 63 IN | SYSTOLIC BLOOD PRESSURE: 153 MMHG | DIASTOLIC BLOOD PRESSURE: 76 MMHG | WEIGHT: 95.5 LBS | OXYGEN SATURATION: 97 % | HEART RATE: 71 BPM

## 2019-09-13 LAB
ALBUMIN SERPL-MCNC: 2.9 G/DL (ref 3.4–5)
ALP SERPL-CCNC: 46 U/L (ref 40–150)
ALT SERPL W P-5'-P-CCNC: 18 U/L (ref 0–50)
ANION GAP SERPL CALCULATED.3IONS-SCNC: 6 MMOL/L (ref 3–14)
AST SERPL W P-5'-P-CCNC: 21 U/L (ref 0–45)
BASOPHILS # BLD AUTO: 0 10E9/L (ref 0–0.2)
BASOPHILS NFR BLD AUTO: 0.2 %
BILIRUB SERPL-MCNC: 0.3 MG/DL (ref 0.2–1.3)
BUN SERPL-MCNC: 9 MG/DL (ref 7–30)
CALCIUM SERPL-MCNC: 8.1 MG/DL (ref 8.5–10.1)
CHLORIDE SERPL-SCNC: 103 MMOL/L (ref 94–109)
CO2 SERPL-SCNC: 25 MMOL/L (ref 20–32)
CREAT SERPL-MCNC: 0.54 MG/DL (ref 0.52–1.04)
DIFFERENTIAL METHOD BLD: ABNORMAL
EOSINOPHIL # BLD AUTO: 0.1 10E9/L (ref 0–0.7)
EOSINOPHIL NFR BLD AUTO: 1.2 %
ERYTHROCYTE [DISTWIDTH] IN BLOOD BY AUTOMATED COUNT: 15.1 % (ref 10–15)
GFR SERPL CREATININE-BSD FRML MDRD: 85 ML/MIN/{1.73_M2}
GLUCOSE SERPL-MCNC: 93 MG/DL (ref 70–99)
HCT VFR BLD AUTO: 30.9 % (ref 35–47)
HGB BLD-MCNC: 9.9 G/DL (ref 11.7–15.7)
IMM GRANULOCYTES # BLD: 0 10E9/L (ref 0–0.4)
IMM GRANULOCYTES NFR BLD: 0.5 %
LIPASE SERPL-CCNC: 339 U/L (ref 73–393)
LYMPHOCYTES # BLD AUTO: 0.8 10E9/L (ref 0.8–5.3)
LYMPHOCYTES NFR BLD AUTO: 20.3 %
MCH RBC QN AUTO: 28.9 PG (ref 26.5–33)
MCHC RBC AUTO-ENTMCNC: 32 G/DL (ref 31.5–36.5)
MCV RBC AUTO: 90 FL (ref 78–100)
MONOCYTES # BLD AUTO: 0.6 10E9/L (ref 0–1.3)
MONOCYTES NFR BLD AUTO: 15 %
NEUTROPHILS # BLD AUTO: 2.6 10E9/L (ref 1.6–8.3)
NEUTROPHILS NFR BLD AUTO: 62.8 %
NRBC # BLD AUTO: 0 10*3/UL
NRBC BLD AUTO-RTO: 0 /100
PLATELET # BLD AUTO: 185 10E9/L (ref 150–450)
POTASSIUM SERPL-SCNC: 3.8 MMOL/L (ref 3.4–5.3)
PROT SERPL-MCNC: 5.5 G/DL (ref 6.8–8.8)
RBC # BLD AUTO: 3.43 10E12/L (ref 3.8–5.2)
SODIUM SERPL-SCNC: 134 MMOL/L (ref 133–144)
WBC # BLD AUTO: 4.1 10E9/L (ref 4–11)

## 2019-09-13 PROCEDURE — 83690 ASSAY OF LIPASE: CPT | Performed by: PHYSICIAN ASSISTANT

## 2019-09-13 PROCEDURE — 99217 ZZC OBSERVATION CARE DISCHARGE: CPT | Performed by: PHYSICIAN ASSISTANT

## 2019-09-13 PROCEDURE — 80053 COMPREHEN METABOLIC PANEL: CPT | Performed by: PHYSICIAN ASSISTANT

## 2019-09-13 PROCEDURE — 36415 COLL VENOUS BLD VENIPUNCTURE: CPT | Performed by: PHYSICIAN ASSISTANT

## 2019-09-13 PROCEDURE — 25000132 ZZH RX MED GY IP 250 OP 250 PS 637: Performed by: INTERNAL MEDICINE

## 2019-09-13 PROCEDURE — 85025 COMPLETE CBC W/AUTO DIFF WBC: CPT | Performed by: PHYSICIAN ASSISTANT

## 2019-09-13 PROCEDURE — 25800030 ZZH RX IP 258 OP 636: Performed by: INTERNAL MEDICINE

## 2019-09-13 PROCEDURE — G0378 HOSPITAL OBSERVATION PER HR: HCPCS

## 2019-09-13 RX ORDER — SUCRALFATE ORAL 1 G/10ML
1 SUSPENSION ORAL
Qty: 1200 ML | Refills: 0 | Status: SHIPPED | OUTPATIENT
Start: 2019-09-13 | End: 2020-08-13

## 2019-09-13 RX ADMIN — SODIUM CHLORIDE: 9 INJECTION, SOLUTION INTRAVENOUS at 04:32

## 2019-09-13 RX ADMIN — OMEPRAZOLE 20 MG: 20 CAPSULE, DELAYED RELEASE ORAL at 08:01

## 2019-09-13 RX ADMIN — SUCRALFATE 1 G: 1 SUSPENSION ORAL at 08:01

## 2019-09-13 RX ADMIN — ACETAMINOPHEN 650 MG: 325 TABLET, FILM COATED ORAL at 08:01

## 2019-09-13 NOTE — PLAN OF CARE
PRIMARY DIAGNOSIS: Nausea & Emesis    OUTPATIENT/OBSERVATION GOALS TO BE MET BEFORE DISCHARGE  1. Orthostatic performed: N/A            2. Tolerating PO fluid and/or antibiotics (if applicable): Yes    3. Nausea/Vomiting/Diarrhea symptoms improved: Yes    4. Pain status: Pain free.    5. Return to near baseline physical activity: Yes    Discharge Planner Nurse   Safe discharge environment identified: Yes  Barriers to discharge: No        Entered by: Valerie Gibbs 09/13/2019      VSS. Denies any pain/nausea. Assist x1 with walker, pt incontinent. IVF infusing. PT & SW consulted. Plan to discharge back to [facility] in AM. Will continue to monitor.     Please review provider order for any additional goals.   Nurse to notify provider when observation goals have been met and patient is ready for discharge.

## 2019-09-13 NOTE — PROGRESS NOTES
Patient's After Visit Summary was reviewed with patient and/or daughters.   Patient verbalized understanding of After Visit Summary, recommended follow up and was given an opportunity to ask questions.   Discharge medications sent home with patient/family: No, paper scripts   Discharged with daughter    OBSERVATION patient END time: [id number]

## 2019-09-13 NOTE — PROGRESS NOTES
[facility] Care Coordination Contact    Rec'd the following information from  [employer]:  Per member's daughter Raisa, she confirmed that member's MA will be closing on 9/30/19. Member will enroll in [employer] effective 10/1/19  VM left with [employer] worker to confirm above information.  Rosibel Rhodes RN, BC  Manager [facility] Care Coordinator   [phone]  (Fax)

## 2019-09-13 NOTE — PROGRESS NOTES
Discharge Planner   Discharge Plans in progress: Yes, patient able to return to [facility] with assist of family 24/7.   Medications to be filled at [facility]. Daughter Melody to provide transport home to [facility].   Barriers to discharge plan: Orders to be sent to [facility] Fax [phone]. [facility] will arrange for home PT/OT as ordered.   Follow up plan: CM available for any further discharge planning or needs.        Entered by: Karolina Silva 09/13/2019 11:16 AM       Karolina REESERN  Care Transition Services  [phone]    Update 1323: Orders faxed to [facility].

## 2019-09-13 NOTE — PLAN OF CARE
PRIMARY DIAGNOSIS: Nausea & Emesis    OUTPATIENT/OBSERVATION GOALS TO BE MET BEFORE DISCHARGE  1. Orthostatic performed: N/A            2. Tolerating PO fluid and/or antibiotics (if applicable): Yes    3. Nausea/Vomiting/Diarrhea symptoms improved: Yes    4. Pain status: Pain free.    5. Return to near baseline physical activity: Yes    Discharge Planner Nurse   Safe discharge environment identified: Yes  Barriers to discharge: No       Entered by: Valerie Gibbs 09/13/2019      Alert but forgetful. VSS. Denies any pain/nausea. Tolerating regular diet. Assist x1 with walker, pt incontinent. IVF infusing. PT & SW consulted. Plan to discharge back to [facility] in AM. Will continue to monitor.     Please review provider order for any additional goals.   Nurse to notify provider when observation goals have been met and patient is ready for discharge.

## 2019-09-13 NOTE — PLAN OF CARE
"PRIMARY DIAGNOSIS: Nausea & Emesis    OUTPATIENT/OBSERVATION GOALS TO BE MET BEFORE DISCHARGE  1. Orthostatic performed: N/A            2. Tolerating PO fluid and/or antibiotics (if applicable): Yes    3. Nausea/Vomiting/Diarrhea symptoms improved: Yes    4. Pain status: Pain free.    5. Return to near baseline physical activity: Yes    Discharge Planner Nurse   Safe discharge environment identified: Yes  Barriers to discharge: No       Entered by: Cici Stinson 09/12/2019        VSS. Denies pain. Reports \"I feel completely fine, great. Tell the doctor, I'm ready to go home.\" Disoriented to time. Reoriented frequently. Denies nausea. Incontinent of urine x 1. Asked several times for \"something to knock me out\". Melatonin given on previous shift. NS infusing at 75mL/hr. Up w/ Ax1, GB, and walker. Alarms on for safety.     Please review provider order for any additional goals.   Nurse to notify provider when observation goals have been met and patient is ready for discharge.  "

## 2019-09-13 NOTE — DISCHARGE SUMMARY
[facility]    Observation Unit  Discharge Summary  Hospitalist    Date of Admission:  9/11/2019  Date of Discharge:  9/13/2019  Provider:  Elli Metcalf PA-C  Date of Service (when I last saw the patient): 09/13/19    Discharge Diagnoses   Nausea, vomiting     Other medical issues:  Past Medical History:   Diagnosis Date     CAD (coronary artery disease) 6/20/05    [place], transient CHF     Cardiomyopathy (H)      CHF NYHA class III, chronic, systolic (H) 1/25/2018     Coronary atherosclerosis 6/20/2005    circ vessel was stented;  had a 50% LAD lesion which was not treated Problem list name updated by automated process. Provider to review     Edema 1/26/2015     Generalized osteoarthrosis     knees ;; L total kne 10/09     Hypertension goal BP (blood pressure) < 140/90 1/21/2015     Leg weakness, bilateral 2/13/2018     Mitral regurgitation     mod     Mixed hyperlipidemia      Osteoporosis      Physical deconditioning 1/21/2015     Total urinary incontinence 12/27/2017     History of Present Illness   Batsheva Barkley is a 85 year old female with a PMHx of CAD, ischemic cardiomyopathy with most recent EF of 40%, HTN, HLP, moderate mitral regurg, and urinary incontinence, who was admitted on 9/11/2019 with nausea and vomiting. Please see the admission history and physical for full details.    Hospital Course   Batsheva Barkley was admitted on 9/11/2019.  The following problems were addressed during her hospitalization:    #Nausea and vomiting: unclear etiology, no definite source identified on CT. Pt and daughters state that the symptoms are worse at night. Has hx of reflux and hiatal hernia, possibly contributing. Omeprazole increased to BID and started on carafate which will be continued at discharge. Tolerating diet without recurrence of symptoms overnight prior to discharge. CT did show underdistention of the gastric outlet and proximal duodenum which reduces evaluation for wall  "thickening/gastroenteritis, discussed further workup with patient's family which they are not interested in pursuing at this time. Patient will discharge back to her custodial today with plans for home PT/OT and transition to LTC within the next week per family.    #Confusion: noted on admission, thought to be related to receiving Ativan in the ED, resolved prior to discharge and back to baseline per family.     Pending Results   None    Code Status   Full Code       Primary Care Physician   Tacos Bhakta    Exam:    BP (!) 153/76   Pulse 71   Temp 97.4  F (36.3  C)   Resp 20   Ht 1.6 m (5' 3\")   Wt 43.3 kg (95 lb 8 oz)   SpO2 97%   BMI 16.92 kg/m    GEN:  Alert, oriented x 3, appears comfortable, no overt distress  HEENT:  Normocephalic/atraumatic, no scleral icterus, no nasal discharge, mouth moist.  CV:  Regular rate and rhythm, no murmur or JVD.  S1 + S2 noted, no S3 or S4.  LUNGS:  Clear to auscultation bilaterally without rales/rhonchi/wheezing/retractions.  Symmetric chest rise on inhalation noted.  ABD:  Active bowel sounds, soft, non-tender/non-distended.  No rebound/guarding/rigidity.  EXT:  No edema.  No cyanosis.  No acute joint synovitis noted.  SKIN:  Dry to touch, no exanthems noted in the visualized areas.  NEURO:  Symmetric muscle strength, sensation to touch grossly intact.  Coordination symmetric on general exam.  No new focal deficits appreciated.    Discharge Disposition   Discharged to home    Consultations This Hospital Stay   PHYSICAL THERAPY ADULT IP CONSULT  SOCIAL WORK IP CONSULT    Time Spent on this Encounter   IRadha PA-C, personally saw the patient today and spent greater than 30 minutes discharging this patient.    Discharge Orders      Home Care PT Referral for Hospital Discharge      Home Care OT Referral for Hospital Discharge      Reason for your hospital stay    You were admitted due to concerns of nausea and vomiting. It is unclear that exact cause of " your symptoms but could be related to acid reflux and your known hiatal hernia. You were treated with increased omeprazole twice daily and carafate with meals that improved your symptoms. You were assessed by physical therapy during your stay with recommendation for assistance with transfers at your facility and home therapy with plans for increased services at your facility this upcoming week.     Follow-up and recommended labs and tests     Follow up with primary care provider, Tacos Bhakta, within 7 days for hospital follow-up.  No follow up labs or test are needed.     Activity    Your activity upon discharge: activity as tolerated     MD face to face encounter    Documentation of Face to Face and Certification for Home Health Services    I certify that patient: Batsheva Barkley is under my care and that I, or a nurse practitioner or physician's assistant working with me, had a face-to-face encounter that meets the physician face-to-face encounter requirements with this patient on: 9/13/2019.    This encounter with the patient was in whole, or in part, for the following medical condition, which is the primary reason for home health care: weakness, deconditioning.    I certify that, based on my findings, the following services are medically necessary home health services: Occupational Therapy and Physical Therapy.    My clinical findings support the need for the above services because: Occupational Therapy Services are needed to assess and treat cognitive ability and address ADL safety due to impairment in mobility from weakness. and Physical Therapy Services are needed to assess and treat the following functional impairments: weakness, deconditioning.    Further, I certify that my clinical findings support that this patient is homebound (i.e. absences from home require considerable and taxing effort and are for medical reasons or [facility] services or infrequently or of short duration when for other  reasons) because: Requires assistance of another person or specialized equipment to access medical services because patient: Is unable to operate assistive equipment on their own and Requires supervision of another for safe transfer.    Based on the above findings. I certify that this patient is confined to the home and needs intermittent skilled nursing care, physical therapy and/or speech therapy.  The patient is under my care, and I have initiated the establishment of the plan of care.  This patient will be followed by a physician who will periodically review the plan of care.  Physician/Provider to provide follow up care: Tacos Bhakta    Attending hospital physician (the Medicare certified Soldier provider): Krysta Piedra MD  Physician Signature: See electronic signature associated with these discharge orders.  Date: 9/13/2019     Full Code     Diet    Follow this diet upon discharge: Orders Placed This Encounter      Regular Diet Adult     Discharge Medications   Discharge Medication List as of 9/13/2019  1:22 PM      START taking these medications    Details   !! order for DME Equipment being ordered: Walker Wheels () and Walker ()  Treatment Diagnosis: gait instabilityDisp-1 each, R-0, Local Print      sucralfate (CARAFATE) 1 GM/10ML suspension Take 10 mLs (1 g) by mouth 4 times daily (before meals and nightly), Disp-1200 mL, R-0, Local Print       !! - Potential duplicate medications found. Please discuss with provider.      CONTINUE these medications which have CHANGED    Details   omeprazole (PRILOSEC) 20 MG DR capsule Take 1 capsule (20 mg) by mouth 2 times daily (before meals), Disp-60 capsule, R-0, Local Print         CONTINUE these medications which have NOT CHANGED    Details   acetaminophen (TYLENOL) 500 MG tablet Take 1,000 mg by mouth 3 times daily , Historical      alum & mag hydroxide-simethicone (MYLANTA/MAALOX) 200-200-20 MG/5ML SUSP suspension Take 10 mLs by mouth 2 times daily  as needed , Historical      aspirin (ASA) 81 MG chewable tablet Take 81 mg by mouth daily, Historical      Atorvastatin Calcium (LIPITOR PO) Take 20 mg by mouth At Bedtime , Historical      !! carboxymethylcellulose PF (REFRESH PLUS) 0.5 % ophthalmic solution Place 2 drops into both eyes 2 times daily, Historical      !! carboxymethylcellulose PF (REFRESH PLUS) 0.5 % ophthalmic solution Place 2 drops into both eyes 4 times daily as needed for dry eyes, Historical      loperamide (IMODIUM) 2 MG capsule Take 2 mg by mouth 4 times daily as needed for diarrhea , Historical      losartan (COZAAR) 50 MG tablet Take 1 tablet (50 mg) by mouth 2 times daily, Disp-180 tablet, R-3, E-Prescribe      Menthol ([employer] COUGH DROPS SUGAR FREE MT) Take 1 lozenge by mouth 4 times daily as needed, Historical      !! menthol-zinc oxide (CALMOSEPTINE) 0.44-20.6 % OINT ointment Apply 1 g topically 2 times dailyHistorical      !! menthol-zinc oxide (CALMOSEPTINE) 0.44-20.6 % OINT ointment Apply topically 2 times daily as needed for skin protectionHistorical      metoprolol (LOPRESSOR) 50 MG tablet TAKE ONE TABLET BY MOUTH TWICE DAILY, Disp-180 tablet, R-3, E-Prescribe      ONDANSETRON PO Take 4 mg by mouth every 8 hours as needed for nausea, Historical      polyethylene glycol (MIRALAX/GLYCOLAX) Packet Take 17 g by mouth daily as needed for constipation, Disp-7 packet, Transitional      senna-docusate (SENOKOT-S;PERICOLACE) 8.6-50 MG per tablet Take 1 tablet by mouth 2 times daily as needed , Historical      traZODone (DESYREL) 50 MG tablet Take 50 mg by mouth At Bedtime, Historical      !! trolamine salicylate (ASPERCREME) 10 % external cream Apply topically 2 times daily as needed (to low back twice daily as needed for pain)Historical      !! trolamine salicylate (ASPERCREME) 10 % external cream Apply topically 2 times daily AND BID PRN  Apply to affected areas of right shoulder/neck and apply to right knee twice dailyHistorical      !!  order for DME Light weight wheelchair Body mass index is 19.08 kg/(m^2).Disp-1 Device, R-0, Local Print      !! order for DME ANTOINE stockings- below knee.Disp-1 Package, R-0, Local Print       !! - Potential duplicate medications found. Please discuss with provider.        Allergies   Allergies   Allergen Reactions     Codeine      nausea, HA     Lisinopril Cough     Data   Results for orders placed or performed during the hospital encounter of 09/11/19   XR Chest 2 Views    Narrative    EXAM: XR CHEST 2 VW  LOCATION: [facility]  DATE/TIME: 9/11/2019 9:57 PM    INDICATION: Chest pain, vomiting  COMPARISON: 09/24/2016      Impression    IMPRESSION: Large hiatal hernia, increased in size compared to prior exam. Given the patient's history there may be gastric outlet obstruction. No pneumothorax or pleural effusion. No evidence for CHF or pneumonia.   CT Abdomen Pelvis w Contrast    Narrative    EXAM: CT ABDOMEN PELVIS W CONTRAST  LOCATION: [facility]  DATE/TIME: 9/11/2019 11:21 PM    INDICATION: Chest pain and vomiting  COMPARISON: 1/17/2015  TECHNIQUE: Helical enhanced thin-section CT scan of the abdomen and pelvis was performed following injection of IV contrast. Multiplanar reformats were obtained. Dose reduction techniques were used.  CONTRAST: 48mL Isovue-370    FINDINGS:   LUNG BASES: Chronic reticulonodular change in the right middle lobe. 8 mm right middle lobe nodule or prominent compared to prior study. This could be exaggerated by respiratory motion. 4 mm right lower lobe nodule stable. Moderate hiatal hernia. Small   pericardial effusion.    ABDOMEN: Probable subcentimeter hemangioma within the spleen unchanged. Hepatic steatosis. Pancreas, adrenals and kidneys are stable. Subcentimeter right renal hypodensity small for characterization. Atherosclerotic aorta with dense calcification origin   of the celiac, superior mesenteric and renal arteries. Bowel is normal in caliber.  Underdistention of the gastric outlet and proximal duodenum reducing evaluation for focal wall thickening.    PELVIS: No significant free fluid. Appendix not seen with certainty. No right lower quadrant inflammation.    MUSCULOSKELETAL: Degenerative change osseous structures. Scoliosis.      Impression    CONCLUSION:   1.  Underdistention of the gastric outlet and proximal duodenum reduces evaluation for wall thickening/gastroenteritis.   2.  Moderate hiatal hernia.  3.  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  4.  Chronic reticulonodular change right middle lobe. 8 mm right middle lobe nodule more prominent compared to prior but could be exaggerated by slight respiratory motion. Consider follow-up.  5.  Small pericardial effusion.     CBC with platelets differential   Result Value Ref Range    WBC 5.9 4.0 - 11.0 10e9/L    RBC Count 3.71 (L) 3.8 - 5.2 10e12/L    Hemoglobin 10.9 (L) 11.7 - 15.7 g/dL    Hematocrit 33.9 (L) 35.0 - 47.0 %    MCV 91 78 - 100 fl    MCH 29.4 26.5 - 33.0 pg    MCHC 32.2 31.5 - 36.5 g/dL    RDW 15.1 (H) 10.0 - 15.0 %    Platelet Count 215 150 - 450 10e9/L    Diff Method Automated Method     % Neutrophils 60.9 %    % Lymphocytes 21.7 %    % Monocytes 15.8 %    % Eosinophils 0.8 %    % Basophils 0.5 %    % Immature Granulocytes 0.3 %    Nucleated RBCs 0 0 /100    Absolute Neutrophil 3.6 1.6 - 8.3 10e9/L    Absolute Lymphocytes 1.3 0.8 - 5.3 10e9/L    Absolute Monocytes 0.9 0.0 - 1.3 10e9/L    Absolute Eosinophils 0.1 0.0 - 0.7 10e9/L    Absolute Basophils 0.0 0.0 - 0.2 10e9/L    Abs Immature Granulocytes 0.0 0 - 0.4 10e9/L    Absolute Nucleated RBC 0.0    Comprehensive metabolic panel   Result Value Ref Range    Sodium 131 (L) 133 - 144 mmol/L    Potassium 4.2 3.4 - 5.3 mmol/L    Chloride 101 94 - 109 mmol/L    Carbon Dioxide 24 20 - 32 mmol/L    Anion Gap 6 3 - 14 mmol/L    Glucose 97 70 - 99 mg/dL    Urea Nitrogen 24 7 - 30 mg/dL    Creatinine 0.64 0.52 - 1.04 mg/dL    GFR  Estimate 81 >60 mL/min/[1.73_m2]    GFR Estimate If Black >90 >60 mL/min/[1.73_m2]    Calcium 8.4 (L) 8.5 - 10.1 mg/dL    Bilirubin Total 0.3 0.2 - 1.3 mg/dL    Albumin 3.4 3.4 - 5.0 g/dL    Protein Total 6.3 (L) 6.8 - 8.8 g/dL    Alkaline Phosphatase 51 40 - 150 U/L    ALT 21 0 - 50 U/L    AST 27 0 - 45 U/L   Lipase   Result Value Ref Range    Lipase 464 (H) 73 - 393 U/L   Troponin I   Result Value Ref Range    Troponin I ES <0.015 0.000 - 0.045 ug/L   Comprehensive metabolic panel   Result Value Ref Range    Sodium 134 133 - 144 mmol/L    Potassium 3.8 3.4 - 5.3 mmol/L    Chloride 103 94 - 109 mmol/L    Carbon Dioxide 25 20 - 32 mmol/L    Anion Gap 6 3 - 14 mmol/L    Glucose 93 70 - 99 mg/dL    Urea Nitrogen 9 7 - 30 mg/dL    Creatinine 0.54 0.52 - 1.04 mg/dL    GFR Estimate 85 >60 mL/min/[1.73_m2]    GFR Estimate If Black >90 >60 mL/min/[1.73_m2]    Calcium 8.1 (L) 8.5 - 10.1 mg/dL    Bilirubin Total 0.3 0.2 - 1.3 mg/dL    Albumin 2.9 (L) 3.4 - 5.0 g/dL    Protein Total 5.5 (L) 6.8 - 8.8 g/dL    Alkaline Phosphatase 46 40 - 150 U/L    ALT 18 0 - 50 U/L    AST 21 0 - 45 U/L   Lipase   Result Value Ref Range    Lipase 339 73 - 393 U/L   CBC with platelets differential   Result Value Ref Range    WBC 4.1 4.0 - 11.0 10e9/L    RBC Count 3.43 (L) 3.8 - 5.2 10e12/L    Hemoglobin 9.9 (L) 11.7 - 15.7 g/dL    Hematocrit 30.9 (L) 35.0 - 47.0 %    MCV 90 78 - 100 fl    MCH 28.9 26.5 - 33.0 pg    MCHC 32.0 31.5 - 36.5 g/dL    RDW 15.1 (H) 10.0 - 15.0 %    Platelet Count 185 150 - 450 10e9/L    Diff Method Automated Method     % Neutrophils 62.8 %    % Lymphocytes 20.3 %    % Monocytes 15.0 %    % Eosinophils 1.2 %    % Basophils 0.2 %    % Immature Granulocytes 0.5 %    Nucleated RBCs 0 0 /100    Absolute Neutrophil 2.6 1.6 - 8.3 10e9/L    Absolute Lymphocytes 0.8 0.8 - 5.3 10e9/L    Absolute Monocytes 0.6 0.0 - 1.3 10e9/L    Absolute Eosinophils 0.1 0.0 - 0.7 10e9/L    Absolute Basophils 0.0 0.0 - 0.2 10e9/L    Abs Immature  Granulocytes 0.0 0 - 0.4 10e9/L    Absolute Nucleated RBC 0.0    EKG 12-lead, tracing only   Result Value Ref Range    Interpretation ECG Click View Image link to view waveform and result        Radha Metcalf PA-C

## 2019-09-13 NOTE — PROGRESS NOTES
Pt disoriented x2. Denies pain. REgular diet.   Pt very weak. 2 assist. Walker and gb.     Writer expressed to family that patient will need two ppl for transfers and toileting at home.

## 2019-09-16 ENCOUNTER — ASSISTED LIVING VISIT (OUTPATIENT)
Dept: GERIATRICS | Facility: CLINIC | Age: 84
End: 2019-09-16
Payer: COMMERCIAL

## 2019-09-16 ENCOUNTER — PATIENT OUTREACH (OUTPATIENT)
Dept: GERIATRIC MEDICINE | Facility: CLINIC | Age: 84
End: 2019-09-16

## 2019-09-16 VITALS
DIASTOLIC BLOOD PRESSURE: 81 MMHG | HEART RATE: 61 BPM | RESPIRATION RATE: 18 BRPM | WEIGHT: 94 LBS | SYSTOLIC BLOOD PRESSURE: 168 MMHG | BODY MASS INDEX: 16.65 KG/M2 | OXYGEN SATURATION: 93 %

## 2019-09-16 DIAGNOSIS — R11.2 NAUSEA AND VOMITING, INTRACTABILITY OF VOMITING NOT SPECIFIED, UNSPECIFIED VOMITING TYPE: Primary | ICD-10-CM

## 2019-09-16 DIAGNOSIS — M62.81 GENERALIZED MUSCLE WEAKNESS: ICD-10-CM

## 2019-09-16 DIAGNOSIS — I10 ESSENTIAL HYPERTENSION: ICD-10-CM

## 2019-09-16 NOTE — PROGRESS NOTES
[facility]  PRIMARY CARE PROVIDER AND CLINIC:  Tacos Bhakta, APRN CNP, [address] / [place] [address]  Chief Complaint   Patient presents with     Hospital F/U     [place] [facility] Record Number:  [id number]  Place of Service where encounter took place:  [facility] [[id number]]    Batsheva Barkley  is a 85 year old  (11/6/1933), returned to the above facility from  [facility]. Hospital stay 9/11/19 - 9/13/19. .  Admitted to this facility for  rehab, medical management and nursing care.    HPI:    HPI information obtained from: facility chart records, facility staff, patient report and [facility] chart review.   Brief Summary of Hospital Course:   N/V: s/p hosp. Stay 9/11/19-9/13/19. Had unresolved n/v.  Had started sched zofran which was ineffective. N/V unclear etiology per hosp. Notes.  abd CT showed underdistention of there gastric outlet and proximal duodenum, reducing evaluation for wall thickening/gastroeneritis. prilosec dose increased. Started on carafate. Nausea currently stable    HTN. BP sl. Elevated today, however checked after amb.  Cont. On cozaar, metoprolol. BPs gen. Stable    Weakness: deconditioning. Slowed gait. Cont. With therapies. R buttock ulcer sl open-stage2 small area.       Updates on Status Since Skilled nursing Admission: no emesis since return. No nausea today. Kal. Reg diet.    CODE STATUS/ADVANCE DIRECTIVES DISCUSSION:   CPR/Full code   Patient's living condition: lives in an assisted living facility  ALLERGIES: Codeine and Lisinopril  PAST MEDICAL HISTORY:  has a past medical history of CAD (coronary artery disease) (6/20/05), Cardiomyopathy (H), CHF NYHA class III, chronic, systolic (H) (1/25/2018), Coronary atherosclerosis (6/20/2005), Edema (1/26/2015), Generalized osteoarthrosis, Hypertension goal BP (blood pressure) < 140/90 (1/21/2015), Leg weakness, bilateral (2/13/2018), Mitral regurgitation,  Mixed hyperlipidemia, Osteoporosis, Physical deconditioning (1/21/2015), and Total urinary incontinence (12/27/2017).  PAST SURGICAL HISTORY:   has a past surgical history that includes L bunion + hammer toes (1/04, 4/04); ovarian cyst surgery; Colonoscopy (3/05); L total knee replacement (10/2009 ); ENT surgery; Eye surgery; Heart Cath, Angioplasty (2005); and Esophagoscopy, gastroscopy, duodenoscopy (EGD), combined (N/A, 4/14/2016).  FAMILY HISTORY: family history includes Breast Cancer in her sister and sister; Cerebrovascular Disease in her mother; Heart Disease in her sister; Respiratory in her father.  SOCIAL HISTORY:   reports that she has quit smoking. She has never used smokeless tobacco. She reports that she does not drink alcohol or use drugs.    Post Discharge Medication Reconciliation Status: discharge medications reconciled, continue medications without change    Current Outpatient Medications   Medication Sig Dispense Refill     acetaminophen (TYLENOL) 500 MG tablet Take 1,000 mg by mouth 3 times daily        alum & mag hydroxide-simethicone (MYLANTA/MAALOX) 200-200-20 MG/5ML SUSP suspension Take 10 mLs by mouth 2 times daily as needed        aspirin (ASA) 81 MG chewable tablet Take 81 mg by mouth daily       Atorvastatin Calcium (LIPITOR PO) Take 20 mg by mouth At Bedtime        carboxymethylcellulose PF (REFRESH PLUS) 0.5 % ophthalmic solution Place 2 drops into both eyes 2 times daily       carboxymethylcellulose PF (REFRESH PLUS) 0.5 % ophthalmic solution Place 2 drops into both eyes 4 times daily as needed for dry eyes       loperamide (IMODIUM) 2 MG capsule Take 2 mg by mouth 4 times daily as needed for diarrhea        losartan (COZAAR) 50 MG tablet Take 1 tablet (50 mg) by mouth 2 times daily 180 tablet 3     Menthol ([employer] COUGH DROPS SUGAR FREE MT) Take 1 lozenge by mouth 4 times daily as needed       menthol-zinc oxide (CALMOSEPTINE) 0.44-20.6 % OINT ointment Apply 1 g topically 2 times daily        menthol-zinc oxide (CALMOSEPTINE) 0.44-20.6 % OINT ointment Apply topically 2 times daily as needed for skin protection       metoprolol (LOPRESSOR) 50 MG tablet TAKE ONE TABLET BY MOUTH TWICE DAILY 180 tablet 3     omeprazole (PRILOSEC) 20 MG DR capsule Take 1 capsule (20 mg) by mouth 2 times daily (before meals) 60 capsule 0     ONDANSETRON PO Take 4 mg by mouth every 8 hours as needed for nausea       order for DME Equipment being ordered: Walker Wheels () and Walker ()  Treatment Diagnosis: gait instability 1 each 0     order for DME Light weight wheelchair Body mass index is 19.08 kg/(m^2). 1 Device 0     order for DME ANTOINE stockings- below knee. 1 Package 0     polyethylene glycol (MIRALAX/GLYCOLAX) Packet Take 17 g by mouth daily as needed for constipation 7 packet      senna-docusate (SENOKOT-S;PERICOLACE) 8.6-50 MG per tablet Take 1 tablet by mouth 2 times daily as needed        sucralfate (CARAFATE) 1 GM/10ML suspension Take 10 mLs (1 g) by mouth 4 times daily (before meals and nightly) 1200 mL 0     traZODone (DESYREL) 50 MG tablet Take 50 mg by mouth At Bedtime       trolamine salicylate (ASPERCREME) 10 % external cream Apply topically 2 times daily as needed (to low back twice daily as needed for pain)       trolamine salicylate (ASPERCREME) 10 % external cream Apply topically 2 times daily AND BID PRN  Apply to affected areas of right shoulder/neck and apply to right knee twice daily         ROS:  No chest pain, shortness of breath, fevers, chills, headache, nausea, vomiting, dysuria or bowel abnormalities.  Appetite is  fair.  No pain except occ knees.    Vitals:  BP (!) 168/81   Pulse 61   Resp 18   Wt 42.6 kg (94 lb)   SpO2 93%   BMI 16.65 kg/m    Exam:  GENERAL APPEARANCE:  Alert, in no distress, thin, cooperative  ENT:  Mouth and posterior oropharynx normal, moist mucous membranes, [place]  EYES:  EOM, conjunctivae, lids, pupils and irises normal, PERRL  NECK:  No adenopathy,masses  or thyromegaly, FROM  RESP:  respiratory effort and palpation of chest normal, lungs clear to auscultation , no respiratory distress  CV:  Palpation and auscultation of heart done , regular rate and rhythm, no murmur, rub, or gallop, no edema  ABDOMEN:  normal bowel sounds, soft, nontender, no hepatosplenomegaly or other masses, no guarding or rebound  M/S:   Gait and station normal  muscle strength 5/5 all 4 ext., some gen. LE weakness  SKIN:  R buttock ulcer stage 2 0.5cm diameter  NEURO:   Cranial nerves 2-12 are normal tested and grossly at patient's baseline, alert, speech clear  PSYCH:  insight and judgement impaired, memory impaired , affect and mood normal    Lab/Diagnostic data:    Most Recent 3 CBC's:  Recent Labs   Lab Test 09/13/19  0659 09/11/19 1934 06/25/19 03/25/19   WBC 4.1 5.9  --  3.7*   HGB 9.9* 10.9* 11.4* 10.7*   MCV 90 91  --  93    215  --  218     Most Recent 3 BMP's:  Recent Labs   Lab Test 09/13/19  0659 09/11/19  1934 06/25/19    131* 134*   POTASSIUM 3.8 4.2 4.2   CHLORIDE 103 101 102   CO2 25 24 23   BUN 9 24 19   CR 0.54 0.64 0.79   ANIONGAP 6 6 9   REILLY 8.1* 8.4* 9.6   GLC 93 97 131*       ASSESSMENT/PLAN:  Nausea and vomiting, intractability of vomiting not specified, unspecified vomiting type  Currently resolved, unclear etiology. Bowel moving ok  1. Cont. Increased prilosec dose  2. Cont. carafate  3. Cont. Prn zofran  4. Monitor for further n/v    Essential hypertension  BP sl elevated today. Gen. Stable  1. Cont. Cozaar, metoprolol  2. Follow BPs, HRs  3. Start HC RN  4. Encourage fluids    Generalized muscle weakness  S/p hops. Stay, decond.  1. Cont. PT/OT  2. Monitor for changes in gait  3. To transfer to higher care unit       Electronically signed by:  NICKOLAS Hallman CNP

## 2019-09-16 NOTE — LETTER
9/16/2019        RE: Batsheva Barkley  [facility]  [address]  [facility] [address]        [facility]  PRIMARY CARE PROVIDER AND CLINIC:  Tacos Bhakta, NICKOLAS CNP, [address] / [place] [address]  Chief Complaint   Patient presents with     Hospital F/U     [facility] Medical Record Number:  [id number]  Place of Service where encounter took place:  [facility] [[id number]]    Batsheva Barkley  is a 85 year old  (11/6/1933), returned to the above facility from  [facility]. Hospital stay 9/11/19 - 9/13/19. .  Admitted to this facility for  rehab, medical management and nursing care.    HPI:    HPI information obtained from: facility chart records, facility staff, patient report and [facility] chart review.   Brief Summary of Hospital Course:   N/V: s/p hosp. Stay 9/11/19-9/13/19. Had unresolved n/v.  Had started sched zofran which was ineffective. N/V unclear etiology per hosp. Notes.  abd CT showed underdistention of there gastric outlet and proximal duodenum, reducing evaluation for wall thickening/gastroeneritis. prilosec dose increased. Started on carafate. Nausea currently stable    HTN. BP sl. Elevated today, however checked after amb.  Cont. On cozaar, metoprolol. BPs gen. Stable    Weakness: deconditioning. Slowed gait. Cont. With therapies. R buttock ulcer sl open-stage2 small area.       Updates on Status Since Skilled nursing Admission: no emesis since return. No nausea today. Kal. Reg diet.    CODE STATUS/ADVANCE DIRECTIVES DISCUSSION:   CPR/Full code   Patient's living condition: lives in an assisted living facility  ALLERGIES: Codeine and Lisinopril  PAST MEDICAL HISTORY:  has a past medical history of CAD (coronary artery disease) (6/20/05), Cardiomyopathy (H), CHF NYHA class III, chronic, systolic (H) (1/25/2018), Coronary atherosclerosis (6/20/2005), Edema (1/26/2015), Generalized osteoarthrosis,  Hypertension goal BP (blood pressure) < 140/90 (1/21/2015), Leg weakness, bilateral (2/13/2018), Mitral regurgitation, Mixed hyperlipidemia, Osteoporosis, Physical deconditioning (1/21/2015), and Total urinary incontinence (12/27/2017).  PAST SURGICAL HISTORY:   has a past surgical history that includes L bunion + hammer toes (1/04, 4/04); ovarian cyst surgery; Colonoscopy (3/05); L total knee replacement (10/2009 ); ENT surgery; Eye surgery; Heart Cath, Angioplasty (2005); and Esophagoscopy, gastroscopy, duodenoscopy (EGD), combined (N/A, 4/14/2016).  FAMILY HISTORY: family history includes Breast Cancer in her sister and sister; Cerebrovascular Disease in her mother; Heart Disease in her sister; Respiratory in her father.  SOCIAL HISTORY:   reports that she has quit smoking. She has never used smokeless tobacco. She reports that she does not drink alcohol or use drugs.    Post Discharge Medication Reconciliation Status: discharge medications reconciled, continue medications without change    Current Outpatient Medications   Medication Sig Dispense Refill     acetaminophen (TYLENOL) 500 MG tablet Take 1,000 mg by mouth 3 times daily        alum & mag hydroxide-simethicone (MYLANTA/MAALOX) 200-200-20 MG/5ML SUSP suspension Take 10 mLs by mouth 2 times daily as needed        aspirin (ASA) 81 MG chewable tablet Take 81 mg by mouth daily       Atorvastatin Calcium (LIPITOR PO) Take 20 mg by mouth At Bedtime        carboxymethylcellulose PF (REFRESH PLUS) 0.5 % ophthalmic solution Place 2 drops into both eyes 2 times daily       carboxymethylcellulose PF (REFRESH PLUS) 0.5 % ophthalmic solution Place 2 drops into both eyes 4 times daily as needed for dry eyes       loperamide (IMODIUM) 2 MG capsule Take 2 mg by mouth 4 times daily as needed for diarrhea        losartan (COZAAR) 50 MG tablet Take 1 tablet (50 mg) by mouth 2 times daily 180 tablet 3     Menthol ([employer] COUGH DROPS SUGAR FREE MT) Take 1 lozenge by mouth 4  times daily as needed       menthol-zinc oxide (CALMOSEPTINE) 0.44-20.6 % OINT ointment Apply 1 g topically 2 times daily       menthol-zinc oxide (CALMOSEPTINE) 0.44-20.6 % OINT ointment Apply topically 2 times daily as needed for skin protection       metoprolol (LOPRESSOR) 50 MG tablet TAKE ONE TABLET BY MOUTH TWICE DAILY 180 tablet 3     omeprazole (PRILOSEC) 20 MG DR capsule Take 1 capsule (20 mg) by mouth 2 times daily (before meals) 60 capsule 0     ONDANSETRON PO Take 4 mg by mouth every 8 hours as needed for nausea       order for DME Equipment being ordered: Walker Wheels () and Walker ()  Treatment Diagnosis: gait instability 1 each 0     order for DME Light weight wheelchair Body mass index is 19.08 kg/(m^2). 1 Device 0     order for DME ANTOINE stockings- below knee. 1 Package 0     polyethylene glycol (MIRALAX/GLYCOLAX) Packet Take 17 g by mouth daily as needed for constipation 7 packet      senna-docusate (SENOKOT-S;PERICOLACE) 8.6-50 MG per tablet Take 1 tablet by mouth 2 times daily as needed        sucralfate (CARAFATE) 1 GM/10ML suspension Take 10 mLs (1 g) by mouth 4 times daily (before meals and nightly) 1200 mL 0     traZODone (DESYREL) 50 MG tablet Take 50 mg by mouth At Bedtime       trolamine salicylate (ASPERCREME) 10 % external cream Apply topically 2 times daily as needed (to low back twice daily as needed for pain)       trolamine salicylate (ASPERCREME) 10 % external cream Apply topically 2 times daily AND BID PRN  Apply to affected areas of right shoulder/neck and apply to right knee twice daily         ROS:  No chest pain, shortness of breath, fevers, chills, headache, nausea, vomiting, dysuria or bowel abnormalities.  Appetite is  fair.  No pain except occ knees.    Vitals:  BP (!) 168/81   Pulse 61   Resp 18   Wt 42.6 kg (94 lb)   SpO2 93%   BMI 16.65 kg/m     Exam:  GENERAL APPEARANCE:  Alert, in no distress, thin, cooperative  ENT:  Mouth and posterior oropharynx  normal, moist mucous membranes, [place]  EYES:  EOM, conjunctivae, lids, pupils and irises normal, PERRL  NECK:  No adenopathy,masses or thyromegaly, FROM  RESP:  respiratory effort and palpation of chest normal, lungs clear to auscultation , no respiratory distress  CV:  Palpation and auscultation of heart done , regular rate and rhythm, no murmur, rub, or gallop, no edema  ABDOMEN:  normal bowel sounds, soft, nontender, no hepatosplenomegaly or other masses, no guarding or rebound  M/S:   Gait and station normal  muscle strength 5/5 all 4 ext., some gen. LE weakness  SKIN:  R buttock ulcer stage 2 0.5cm diameter  NEURO:   Cranial nerves 2-12 are normal tested and grossly at patient's baseline, alert, speech clear  PSYCH:  insight and judgement impaired, memory impaired , affect and mood normal    Lab/Diagnostic data:    Most Recent 3 CBC's:  Recent Labs   Lab Test 09/13/19 0659 09/11/19 1934 06/25/19 03/25/19   WBC 4.1 5.9  --  3.7*   HGB 9.9* 10.9* 11.4* 10.7*   MCV 90 91  --  93    215  --  218     Most Recent 3 BMP's:  Recent Labs   Lab Test 09/13/19 0659 09/11/19 1934 06/25/19    131* 134*   POTASSIUM 3.8 4.2 4.2   CHLORIDE 103 101 102   CO2 25 24 23   BUN 9 24 19   CR 0.54 0.64 0.79   ANIONGAP 6 6 9   REILLY 8.1* 8.4* 9.6   GLC 93 97 131*       ASSESSMENT/PLAN:  Nausea and vomiting, intractability of vomiting not specified, unspecified vomiting type  Currently resolved, unclear etiology. Bowel moving ok  1. Cont. Increased prilosec dose  2. Cont. carafate  3. Cont. Prn zofran  4. Monitor for further n/v    Essential hypertension  BP sl elevated today. Gen. Stable  1. Cont. Cozaar, metoprolol  2. Follow BPs, HRs  3. Start HC RN  4. Encourage fluids    Generalized muscle weakness  S/p hops. Stay, decond.  1. Cont. PT/OT  2. Monitor for changes in gait  3. To transfer to higher care unit       Electronically signed by:  NICKOLAS Hallman CNP                       Sincerely,        Tacos  NICKOLAS Betts CNP

## 2019-09-16 NOTE — PROGRESS NOTES
[facility] Care Coordination Contact    Rec'd tele call from Nickie DAVILA DON at [facility] to review member's condition.  Nickie states that she observed member transfer today, states that member is doing better. Nickie states that they would like to add additional time for transfers during the night. RS tool updated.  Shared info that CC rec'd information that member will be off her MA 9/30/19 and has elected a Medicare Advantage Plan.   Nickie states that she has spoken with family, they would like to ease member into moving to the Care Suites. Plan is that [facility] has agreed to hold the Care Suites until 10/1/19, then family will need to start making payments.    Explained that CC will f/u once MA closure has been confirmed by [employer]. CC will also review with family    Rec'd [employer] letter that member's last day of Medical Assistance benefits will stop at the end of the day on 9/30/19, due to > $3000 in assets.    Call placed to member's daughter Huyen to inquire on how member is doing. Raisa agreed that her mother is doing much better, agreed that they need to ease her mother into moving to the Care Suite. During conversation, phone call was disconnected. Call placed back to Raisa, left  that CC rec'd notice of MA closure and enrollment into a Medicare Advantage plan. Request a call back.   left with Nickie DAVILA to report confirmation of MA closure 9/30/2019.  Rosibel Rhodes RN, BC  Manager [facility] Care Coordinator   [phone]  (Fax)

## 2019-09-24 ENCOUNTER — HOSPITAL ENCOUNTER (EMERGENCY)
Facility: CLINIC | Age: 84
Discharge: HOME | End: 2019-09-24
Attending: INTERNAL MEDICINE | Admitting: INTERNAL MEDICINE
Payer: COMMERCIAL

## 2019-09-24 ENCOUNTER — APPOINTMENT (OUTPATIENT)
Dept: GENERAL RADIOLOGY | Facility: CLINIC | Age: 84
End: 2019-09-24
Attending: INTERNAL MEDICINE
Payer: COMMERCIAL

## 2019-09-24 VITALS
SYSTOLIC BLOOD PRESSURE: 160 MMHG | TEMPERATURE: 98.7 F | DIASTOLIC BLOOD PRESSURE: 74 MMHG | OXYGEN SATURATION: 97 % | HEART RATE: 62 BPM | RESPIRATION RATE: 30 BRPM

## 2019-09-24 DIAGNOSIS — R07.9 CHEST PAIN, UNSPECIFIED TYPE: ICD-10-CM

## 2019-09-24 LAB
ANION GAP SERPL CALCULATED.3IONS-SCNC: 5 MMOL/L (ref 3–14)
BASOPHILS # BLD AUTO: 0 10E9/L (ref 0–0.2)
BASOPHILS NFR BLD AUTO: 0.4 %
BUN SERPL-MCNC: 26 MG/DL (ref 7–30)
CALCIUM SERPL-MCNC: 8.5 MG/DL (ref 8.5–10.1)
CHLORIDE SERPL-SCNC: 101 MMOL/L (ref 94–109)
CO2 SERPL-SCNC: 28 MMOL/L (ref 20–32)
CREAT SERPL-MCNC: 0.67 MG/DL (ref 0.52–1.04)
DIFFERENTIAL METHOD BLD: ABNORMAL
EOSINOPHIL # BLD AUTO: 0.1 10E9/L (ref 0–0.7)
EOSINOPHIL NFR BLD AUTO: 1.6 %
ERYTHROCYTE [DISTWIDTH] IN BLOOD BY AUTOMATED COUNT: 15 % (ref 10–15)
GFR SERPL CREATININE-BSD FRML MDRD: 80 ML/MIN/{1.73_M2}
GLUCOSE SERPL-MCNC: 91 MG/DL (ref 70–99)
HCT VFR BLD AUTO: 30.2 % (ref 35–47)
HGB BLD-MCNC: 9.8 G/DL (ref 11.7–15.7)
IMM GRANULOCYTES # BLD: 0 10E9/L (ref 0–0.4)
IMM GRANULOCYTES NFR BLD: 0.6 %
LYMPHOCYTES # BLD AUTO: 1.3 10E9/L (ref 0.8–5.3)
LYMPHOCYTES NFR BLD AUTO: 25.5 %
MCH RBC QN AUTO: 29.2 PG (ref 26.5–33)
MCHC RBC AUTO-ENTMCNC: 32.5 G/DL (ref 31.5–36.5)
MCV RBC AUTO: 90 FL (ref 78–100)
MONOCYTES # BLD AUTO: 0.8 10E9/L (ref 0–1.3)
MONOCYTES NFR BLD AUTO: 16.8 %
NEUTROPHILS # BLD AUTO: 2.8 10E9/L (ref 1.6–8.3)
NEUTROPHILS NFR BLD AUTO: 55.1 %
NRBC # BLD AUTO: 0 10*3/UL
NRBC BLD AUTO-RTO: 0 /100
PLATELET # BLD AUTO: 233 10E9/L (ref 150–450)
POTASSIUM SERPL-SCNC: 4.4 MMOL/L (ref 3.4–5.3)
RBC # BLD AUTO: 3.36 10E12/L (ref 3.8–5.2)
SODIUM SERPL-SCNC: 134 MMOL/L (ref 133–144)
TROPONIN I SERPL-MCNC: <0.015 UG/L (ref 0–0.04)
WBC # BLD AUTO: 5 10E9/L (ref 4–11)

## 2019-09-24 PROCEDURE — 99285 EMERGENCY DEPT VISIT HI MDM: CPT | Mod: 25

## 2019-09-24 PROCEDURE — 85025 COMPLETE CBC W/AUTO DIFF WBC: CPT | Performed by: INTERNAL MEDICINE

## 2019-09-24 PROCEDURE — 84484 ASSAY OF TROPONIN QUANT: CPT | Performed by: INTERNAL MEDICINE

## 2019-09-24 PROCEDURE — 71046 X-RAY EXAM CHEST 2 VIEWS: CPT

## 2019-09-24 PROCEDURE — 80048 BASIC METABOLIC PNL TOTAL CA: CPT | Performed by: INTERNAL MEDICINE

## 2019-09-24 PROCEDURE — 93005 ELECTROCARDIOGRAM TRACING: CPT

## 2019-09-24 ASSESSMENT — ENCOUNTER SYMPTOMS
LIGHT-HEADEDNESS: 1
WEAKNESS: 0
RHINORRHEA: 0
COUGH: 0
ABDOMINAL PAIN: 0
NUMBNESS: 0
SHORTNESS OF BREATH: 1

## 2019-09-24 NOTE — ED PROVIDER NOTES
"  History     Chief Complaint:  Chest pain     HPI   Batsheva Barkley is an 85 year old female with a history of tobacco use, myocardial infarction, CAD, hypertension, hyperlipidemia, cardiomyopathy, and CHF, who presents via EMS from [facility] after having a ten minute long episode of chest pain at 9:00 AM this morning. To note, the patient reports her chest pain is gone while here but states her daughter wanted her to come in to the ED. The patient describes her discomfort as a \"bad gas pain\" which she states is similar to the pain she felt when she had an MI 10 years ago. She endorses lightheadedness and shortness of breath earlier this morning during her episode of chest pain, which has since resolved. Her daughter describes that she did not have shortness of breath however, and notes that she was very thirsty during this episode. She reports that she is currently in the process of moving downstairs in her building and is dealing with the anniversary of her 's death and notes that she feels overwhelmed with emotions. She believes that her symptoms are due to her emotions. She states she was not moving boxes today but felt overwhelmed \"going down memory artemio.\" She also endorses knee pain, which is chronic for her. She denies appetite or fluid intake changes, cough, congestion, rhinorrhea, headache, visual disturbances, new leg swelling, numbness, weakness, or abdominal pain. No alcohol or tobacco use.     CARDIAC RISK FACTORS:  Sex:    Female   Tobacco:   Former   Hypertension:   Yes   Hyperlipidemia:  Yes   Diabetes:   No   Family History:  Yes     Allergies:  Codeine  Lisinopril     Medications:    Aspirin  Mylanta  Imodium  Cozaar  Lopressor  Prilosec  Miralax  Carafate  Desyrel    Past Medical History:    CAD  Cardiomyopathy  CHF  Coronary atherosclerosis  Edema  HTN  Mitral regurgitation  HLD  Osteoporosis  Urinary incontinence  Physical deconditioning  MI    Past Surgical History:    Colonoscopy "   ENT surgery  EGD  Eye surgery  Heart cath angioplasty  Total knee replacement   Ovarian cyst surgery    Family History:    CVD  Respiratory  Heart disease  Breast cancer    Social History:  Smoking status: former   Alcohol use: no   Drug use: no  PCP: Tacos Bhakta  Presents to the ED with her daughter.   Marital Status:        Review of Systems   HENT: Negative for congestion and rhinorrhea.    Eyes: Negative for visual disturbance.   Respiratory: Positive for shortness of breath (resolved). Negative for cough.         Transient dyspnea, now resolved   Cardiovascular: Positive for chest pain (resolved) and leg swelling.        Leg swelling no different from usual   Gastrointestinal: Negative for abdominal pain.   Neurological: Positive for light-headedness (resolved). Negative for weakness and numbness.   All other systems reviewed and are negative.      Physical Exam     Patient Vitals for the past 24 hrs:   BP Pulse Heart Rate Resp SpO2   09/24/19 1800 (!) 160/74 62 60 30 97 %   09/24/19 1745 (!) 151/70 61 61 (!) 33 97 %   09/24/19 1730 137/72 60 60 28 98 %   09/24/19 1715 (!) 141/70 60 63 18 (!) 83 %   09/24/19 1700 (!) 145/63 57 -- -- --   09/24/19 1645 138/65 58 -- -- 97 %   09/24/19 1630 134/71 59 -- -- 97 %   09/24/19 1628 (!) 145/72 57 57 15 97 %        Physical Exam  Constitutional:       Comments: Pleasant and cooperative   HENT:      Mouth/Throat:      Pharynx: No posterior oropharyngeal erythema.   Eyes:      Conjunctiva/sclera: Conjunctivae normal.   Neck:      Musculoskeletal: Neck supple.   Cardiovascular:      Rate and Rhythm: Normal rate and regular rhythm.      Heart sounds: Normal heart sounds.   Pulmonary:      Effort: Pulmonary effort is normal.      Breath sounds: Normal breath sounds.   Abdominal:      General: Bowel sounds are normal. There is no distension.      Palpations: Abdomen is soft.      Tenderness: There is no tenderness. There is no guarding or rebound.    Musculoskeletal:      Comments: Deforming arthritis   Skin:     General: Skin is warm and dry.   Neurological:      Mental Status: She is alert.         Emergency Department Course   ECG:  ECG (17:08:28):  Rate 58 bpm. HI interval 166. QRS duration 102. QT/QTc 452/443. P-R-T axes 39 -13 44. Sinus bradycardia with premature atrial complexes. Moderate voltage criteria for LVH, may be normal variant. Inferior infarct, age undetermined. Abnormal ECG. Agree with computer interpretation. No significant change compared to EKG dated 9/11/19.  Interpreted at 1715 by Marylou Kincaid MD.    Imaging:  Radiographic findings were communicated with the patient who voiced understanding of the findings.    Chest XR, PA & LAT  Large hiatal hernia. No evidence for CHF or pneumonia. Few scattered tiny granulomas. No pleural effusion or pneumothorax.  As read by Radiology.    Laboratory:  WELLS PE SCORE for Pulmonary Embolism likelihood (calculator)  Background  Calculates likelihood of PE based on 7 criteria including suspected DVT, HR>100, recent surgery or immobilization, prior VTE, hemoptysis, active cancer.  Data  has Osteoporosis; Advanced directives, counseling/discussion; Concussion; Impacted cerumen; Pelvic fracture: Left( 1/17/15); Fracture of rib of left side; Constipation; Physical deconditioning; Hypertension goal BP (blood pressure) < 140/90; Cough; Edema; CAD (coronary artery disease); Stented coronary artery; Other dysphagia; HTN (hypertension); Total urinary incontinence; Leg weakness, bilateral; Lower extremity edema; Hematoma of left lower extremity; Left leg swelling; Health Care Home; and Emesis on their problem list.   has a past surgical history that includes L bunion + hammer toes (1/04, 4/04); ovarian cyst surgery; Colonoscopy (3/05); L total knee replacement (10/2009 ); ENT surgery; Eye surgery; Heart Cath, Angioplasty (2005); and Esophagoscopy, gastroscopy, duodenoscopy (EGD), combined (N/A,  4/14/2016).  Pulse: 57  Criteria   Of possible 12.5 points for 7 possible items:  3.0 points: PE more likely than alternatives  3.0 points: Deep Vein Thrombosis (DVT) suspected  1.5 points: Tachycardia with pulse >100 beats per minute  1.5 points: Surgery or immobilization in last 4 weeks  1.5 points: Prior DVT or Pulmonary embolism  1.0 points: Hemoptysis  1.0 points: Active malignancy  Interpretation  Wells PE Score: 0  Score 0-2 points: Low PE probability (3.6% risk)    Troponin 1 (1700): <0.015  CBC: WBC 5.0, HGB 9.8 (L),    BMP: WNL (Creatinine: 0.67)     Emergency Department Course:  1624: Nursing notes and vitals reviewed. I performed an exam of the patient as documented above.     Blood drawn. This was sent to the lab for further testing, results above.    The patient was sent for a chest xray while in the emergency department, findings above.    1843: I rechecked the patient and discussed the results of her workup thus far.     Findings and plan explained to the Patient. Patient discharged home with instructions regarding supportive care, medications, and reasons to return. The importance of close follow-up was reviewed.    I personally reviewed the laboratory results with the Patient and answered all related questions prior to discharge.     Impression & Plan      Medical Decision Making:    Batsheva Barkley is a 85 year old female with a personal history of cardiac disease who presents to the emergency department after short episode of chest pain this morning.  Evaluation here is unremarkable.  EKG is unchanged from recent comparison.  Troponin is negative.  Discussed with the patient and her son that given the brief episode of pain this cannot rule out a cardiac etiology.  I recommended that we keep the patient on observation for serial troponins and provocative testing.  She declined.  Son indicates that he believes this is consistent with his mother's expressed wishes and I believe she has capacity  to make this decision.    I also considered pulmonary embolus.  Patient is low risk by Wells criteria.  She does not have hypoxia, tachycardia, or other indication of PE.  Without ongoing symptoms I do not feel further testing for this is required.  I have invited the patient to return back at any time should symptoms worsen, otherwise close follow-up with primary care.    Critical Care time:  none    Diagnosis:    ICD-10-CM    1. Chest pain, unspecified type R07.9        Disposition:  discharged to home    I, Toshia Stoner, am serving as a scribe at 4:24 PM on 9/24/2019 to document services personally performed by Marylou Kincaid MD based on my observations and the provider's statements to me.       Toshia Stoner  9/24/2019   [facility] EMERGENCY DEPARTMENT       Marylou Kincaid MD  09/24/19 1918

## 2019-09-24 NOTE — ED AVS SNAPSHOT
[facility] Emergency Department  [address]  [facility] [address]  Phone:  [phone]  Fax:  [phone]                                    Batsheva Barkley   MRN: [id number]    Department:  [facility] Emergency Department   Date of Visit:  9/24/2019           After Visit Summary Signature Page    I have received my discharge instructions, and my questions have been answered. I have discussed any challenges I see with this plan with the nurse or doctor.    ..........................................................................................................................................  Patient/Patient Representative Signature      ..........................................................................................................................................  Patient Representative Print Name and Relationship to Patient    ..................................................               ................................................  Date                                   Time    ..........................................................................................................................................  Reviewed by Signature/Title    ...................................................              ..............................................  Date                                               Time          22EPIC Rev 08/18

## 2019-09-24 NOTE — DISCHARGE INSTRUCTIONS
Discharge Instructions  Chest Pain    You have been seen today for chest pain or discomfort.  At this time, your doctor has found no signs that your chest pain is due to a serious or life-threatening condition, (or you have declined more testing and/or admission to the hospital). However, sometimes there is a serious problem that does not show up right away. Your evaluation today may not be complete and you may need further testing and evaluation.     You need to follow-up with your regular doctor within 3 days.    Return to the Emergency Department if:    Your chest pain changes, gets worse, starts to happen more often, or comes with less activity.    You are short of breath.    You get very weak or tired.    You pass out or faint.    You have any new symptoms, like fever, cough, numb legs, or you cough up blood.    You have anything else that worries you.    Until you follow-up with your regular doctor please do the following:    If you have questions, contact your regular doctor.    If your doctor today has told you to follow-up with your regular doctor, it is very important that you make an appointment with your clinic and go to the appointment.  If you do not follow-up with your primary doctor, it may result in missing an important development which could result in permanent injury or disability and/or lasting pain.  If there is any problem keeping your appointment, call your doctor or return to the Emergency Department.    Remember that you can always come back to the Emergency Department if you are not able to see your regular doctor in the amount of time listed above, if you get any new symptoms, or if there is anything that worries you.

## 2019-09-24 NOTE — ED NOTES
Bed: ED29  Expected date: 9/24/19  Expected time: 4:10 PM  Means of arrival: Ambulance  Comments:  Molly [phone] F chest pain

## 2019-09-24 NOTE — ED TRIAGE NOTES
pt comes in from [facility] where she is moving apts. and today is anniversary of  death. daughter on the way. pt vss, pt had a MI 15 years ago and pain was in her back. EKG shows no changes. pt today had about 15 min. of epigastric pain and had some nausea earier today. pain resolved prior to EMS arrival without intervention.

## 2019-09-25 ENCOUNTER — ASSISTED LIVING VISIT (OUTPATIENT)
Dept: GERIATRICS | Facility: CLINIC | Age: 84
End: 2019-09-25
Payer: COMMERCIAL

## 2019-09-25 DIAGNOSIS — M62.81 GENERALIZED MUSCLE WEAKNESS: ICD-10-CM

## 2019-09-25 DIAGNOSIS — R11.2 NAUSEA AND VOMITING, INTRACTABILITY OF VOMITING NOT SPECIFIED, UNSPECIFIED VOMITING TYPE: ICD-10-CM

## 2019-09-25 DIAGNOSIS — R07.9 CHEST PAIN, UNSPECIFIED TYPE: Primary | ICD-10-CM

## 2019-09-25 LAB — INTERPRETATION ECG - MUSE: NORMAL

## 2019-09-25 NOTE — LETTER
9/25/2019        RE: Batsheva Barkley  [address]  [facility] [address]        [facility]  PRIMARY CARE PROVIDER AND CLINIC:  Tacos Bhakta, APRN CNP, [address] / [place] [address]  Chief Complaint   Patient presents with     ER F/U     [facility] Medical Record Number:  [id number]  Place of Service where encounter took place:  [facility] [[id number]]    Batsheva Barkley  is a 85 year old  (11/6/1933), returned to the above facility from  [facility]. Hospital stay 9/24/19 - 9/24/19. .  Admitted to this facility for  rehab, medical management and nursing care.    HPI:    HPI information obtained from: facility chart records, facility staff, patient report and [facility] chart review.   Brief Summary of Hospital Course:     Chest pain: 9/24/19 in am reported chest pain, sob, lightheadedness.  Sent to [facility] ED. Troponin neg. ECG showed sinus bradycardia with PACs, unchanged since 9/11/19 ECG.  SBP 140s-160s in ED.  Cont. On losartan, metoprolol. Family did not want further cardiac w/u.  Chest pain resolved.  Recently transferred to new unit, has had some increased anxiety a times    N/V. Currently stable. Po intake stable. Recent hosp. Stay for GI s/s. Has large hiatal hernia. Cont. On prilosec, carafate which was started last hosp. Stay.     Weakness:  Has had gen LE weakness. occ knee pain. amb with walker. Cont. With PT. Transferred to higher care unit.        Updates on Status Since Skilled nursing Admission: po intake stable. No c.p. since return form ED    CODE STATUS/ADVANCE DIRECTIVES DISCUSSION:   DNR / DNI  Patient's living condition: lives in an assisted living facility  ALLERGIES: Codeine and Lisinopril  PAST MEDICAL HISTORY:  has a past medical history of CAD (coronary artery disease) (6/20/05), Cardiomyopathy (H), CHF NYHA class III, chronic, systolic (H) (1/25/2018), Coronary atherosclerosis (6/20/2005), Edema  (1/26/2015), Generalized osteoarthrosis, Hypertension goal BP (blood pressure) < 140/90 (1/21/2015), Leg weakness, bilateral (2/13/2018), Mitral regurgitation, Mixed hyperlipidemia, Osteoporosis, Physical deconditioning (1/21/2015), and Total urinary incontinence (12/27/2017).  PAST SURGICAL HISTORY:   has a past surgical history that includes L bunion + hammer toes (1/04, 4/04); ovarian cyst surgery; Colonoscopy (3/05); L total knee replacement (10/2009 ); ENT surgery; Eye surgery; Heart Cath, Angioplasty (2005); and Esophagoscopy, gastroscopy, duodenoscopy (EGD), combined (N/A, 4/14/2016).  FAMILY HISTORY: family history includes Breast Cancer in her sister and sister; Cerebrovascular Disease in her mother; Heart Disease in her sister; Respiratory in her father.  SOCIAL HISTORY:   reports that she has quit smoking. She has never used smokeless tobacco. She reports that she does not drink alcohol or use drugs.    Post Discharge Medication Reconciliation Status: discharge medications reconciled, continue medications without change    Current Outpatient Medications   Medication Sig Dispense Refill     acetaminophen (TYLENOL) 500 MG tablet Take 1,000 mg by mouth 3 times daily AND 1000 every day PRN       alum & mag hydroxide-simethicone (MYLANTA/MAALOX) 200-200-20 MG/5ML SUSP suspension Take 10 mLs by mouth 2 times daily as needed        aspirin (ASA) 81 MG chewable tablet Take 81 mg by mouth daily       Atorvastatin Calcium (LIPITOR PO) Take 20 mg by mouth At Bedtime        carboxymethylcellulose PF (REFRESH PLUS) 0.5 % ophthalmic solution Place 2 drops into both eyes 2 times daily       carboxymethylcellulose PF (REFRESH PLUS) 0.5 % ophthalmic solution Place 2 drops into both eyes 4 times daily as needed for dry eyes       loperamide (IMODIUM) 2 MG capsule Take 2 mg by mouth 4 times daily as needed for diarrhea        losartan (COZAAR) 50 MG tablet Take 1 tablet (50 mg) by mouth 2 times daily 180 tablet 3      Menthol ([employer] COUGH DROPS SUGAR FREE MT) Take 1 lozenge by mouth 4 times daily as needed       menthol-zinc oxide (CALMOSEPTINE) 0.44-20.6 % OINT ointment Apply 1 g topically 2 times daily       menthol-zinc oxide (CALMOSEPTINE) 0.44-20.6 % OINT ointment Apply topically 2 times daily as needed for skin protection       metoprolol (LOPRESSOR) 50 MG tablet TAKE ONE TABLET BY MOUTH TWICE DAILY 180 tablet 3     omeprazole (PRILOSEC) 20 MG DR capsule Take 1 capsule (20 mg) by mouth 2 times daily (before meals) 60 capsule 0     ONDANSETRON PO Take 4 mg by mouth every 8 hours as needed for nausea       order for DME Equipment being ordered: Walker Wheels () and Walker ()  Treatment Diagnosis: gait instability 1 each 0     order for DME Light weight wheelchair Body mass index is 19.08 kg/(m^2). 1 Device 0     order for DME ANTOINE stockings- below knee. 1 Package 0     polyethylene glycol (MIRALAX/GLYCOLAX) Packet Take 17 g by mouth daily as needed for constipation 7 packet      senna-docusate (SENOKOT-S;PERICOLACE) 8.6-50 MG per tablet Take 1 tablet by mouth 2 times daily as needed        sucralfate (CARAFATE) 1 GM/10ML suspension Take 10 mLs (1 g) by mouth 4 times daily (before meals and nightly) 1200 mL 0     traZODone (DESYREL) 50 MG tablet Take 50 mg by mouth At Bedtime       trolamine salicylate (ASPERCREME) 10 % external cream Apply topically 2 times daily AND BID PRN  Apply to affected areas of right shoulder/neck and apply to right knee twice daily         ROS:  No chest pain, shortness of breath, fevers, chills, headache, nausea, vomiting, dysuria or bowel abnormalities.  Appetite is  fair.  No pain except occ knees.    Vitals:  /63   Pulse 67   Temp 98.1  F (36.7  C)   Resp 18   SpO2 98%   Exam:  GENERAL APPEARANCE:  Alert, in no distress, cooperative  ENT:  Mouth and posterior oropharynx normal, moist mucous membranes, [place]  EYES:  EOM, conjunctivae, lids, pupils and irises normal, PERRL  NECK:  No  adenopathy,masses or thyromegaly, no carotid bruit  RESP:  respiratory effort and palpation of chest normal, lungs clear to auscultation , no respiratory distress  CV:  Palpation and auscultation of heart done , regular rate and rhythm, no murmur, rub, or gallop, no edema  ABDOMEN:  normal bowel sounds, soft, nontender, no hepatosplenomegaly or other masses, no guarding or rebound  M/S:   Gait and station normal  muscle strength 5/5 all 4 ext., some gen.LE weakness. bony deformities of hands  NEURO:   Cranial nerves 2-12 are normal tested and grossly at patient's baseline, alert, speech clear  PSYCH:  insight and judgement impaired, memory impaired , affect and mood normal    Lab/Diagnostic data:    Most Recent 3 CBC's:  Recent Labs   Lab Test 09/24/19 1700 09/13/19 0659 09/11/19 1934   WBC 5.0 4.1 5.9   HGB 9.8* 9.9* 10.9*   MCV 90 90 91    185 215     Most Recent 3 BMP's:  Recent Labs   Lab Test 09/24/19 1700 09/13/19 0659 09/11/19 1934    134 131*   POTASSIUM 4.4 3.8 4.2   CHLORIDE 101 103 101   CO2 28 25 24   BUN 26 9 24   CR 0.67 0.54 0.64   ANIONGAP 5 6 6   REILLY 8.5 8.1* 8.4*   GLC 91 93 97     Most Recent 3 Troponin's:  Recent Labs   Lab Test 09/24/19  1700 09/11/19  1934 04/10/18  1855   TROPI <0.015 <0.015 <0.015       ASSESSMENT/PLAN:  Chest pain, unspecified type  Resolved, unclear etiology. No apparent ACS in ED  1. Cont. Cozaar, metoprolol  2. Follow BPs, HRs  3. Monitor for further sob, lightheadedness  4. Cbc, bmp in next 1-3 mos    Nausea and vomiting, intractability of vomiting not specified, unspecified vomiting type  Currently stable  1. Cont. prilosec  2. Cont. carafate  3. Cont  Prn gerilanta, zofran  4. Follow po intake, wt.s    Generalized muscle weakness  Ongoing. Gait steady with walker. Recent transfer to higher level of care unit  1. Cont. PT  2. Cont. HC RN  3. Cont. Tylenol, aspercreme  4. Monitor for changes in gait, falls       Electronically signed by:  Tacos Beasley  NICKOLAS Bhakta CNP                       Sincerely,        NICKOLAS Hallman CNP

## 2019-09-25 NOTE — PROGRESS NOTES
[facility]  PRIMARY CARE PROVIDER AND CLINIC:  Tacos Bhakta, APRN CNP, [address] / [place] [address]  Chief Complaint   Patient presents with     ER F/U     [facility] Medical Record Number:  [id number]  Place of Service where encounter took place:  [facility]) [[id number]]    Batsheva Barkley  is a 85 year old  (11/6/1933), returned to the above facility from  [facility]. Hospital stay 9/24/19 - 9/24/19. .  Admitted to this facility for  rehab, medical management and nursing care.    HPI:    HPI information obtained from: facility chart records, facility staff, patient report and [facility] chart review.   Brief Summary of Hospital Course:     Chest pain: 9/24/19 in am reported chest pain, sob, lightheadedness.  Sent to [facility] ED. Troponin neg. ECG showed sinus bradycardia with PACs, unchanged since 9/11/19 ECG.  SBP 140s-160s in ED.  Cont. On losartan, metoprolol. Family did not want further cardiac w/u.  Chest pain resolved.  Recently transferred to new unit, has had some increased anxiety a times    N/V. Currently stable. Po intake stable. Recent hosp. Stay for GI s/s. Has large hiatal hernia. Cont. On prilosec, carafate which was started last hosp. Stay.     Weakness:  Has had gen LE weakness. occ knee pain. amb with walker. Cont. With PT. Transferred to higher care unit.        Updates on Status Since Skilled nursing Admission: po intake stable. No c.p. since return form ED    CODE STATUS/ADVANCE DIRECTIVES DISCUSSION:   DNR / DNI  Patient's living condition: lives in an assisted living facility  ALLERGIES: Codeine and Lisinopril  PAST MEDICAL HISTORY:  has a past medical history of CAD (coronary artery disease) (6/20/05), Cardiomyopathy (H), CHF NYHA class III, chronic, systolic (H) (1/25/2018), Coronary atherosclerosis (6/20/2005), Edema (1/26/2015), Generalized osteoarthrosis, Hypertension goal BP (blood pressure) < 140/90  (1/21/2015), Leg weakness, bilateral (2/13/2018), Mitral regurgitation, Mixed hyperlipidemia, Osteoporosis, Physical deconditioning (1/21/2015), and Total urinary incontinence (12/27/2017).  PAST SURGICAL HISTORY:   has a past surgical history that includes L bunion + hammer toes (1/04, 4/04); ovarian cyst surgery; Colonoscopy (3/05); L total knee replacement (10/2009 ); ENT surgery; Eye surgery; Heart Cath, Angioplasty (2005); and Esophagoscopy, gastroscopy, duodenoscopy (EGD), combined (N/A, 4/14/2016).  FAMILY HISTORY: family history includes Breast Cancer in her sister and sister; Cerebrovascular Disease in her mother; Heart Disease in her sister; Respiratory in her father.  SOCIAL HISTORY:   reports that she has quit smoking. She has never used smokeless tobacco. She reports that she does not drink alcohol or use drugs.    Post Discharge Medication Reconciliation Status: discharge medications reconciled, continue medications without change    Current Outpatient Medications   Medication Sig Dispense Refill     acetaminophen (TYLENOL) 500 MG tablet Take 1,000 mg by mouth 3 times daily AND 1000 every day PRN       alum & mag hydroxide-simethicone (MYLANTA/MAALOX) 200-200-20 MG/5ML SUSP suspension Take 10 mLs by mouth 2 times daily as needed        aspirin (ASA) 81 MG chewable tablet Take 81 mg by mouth daily       Atorvastatin Calcium (LIPITOR PO) Take 20 mg by mouth At Bedtime        carboxymethylcellulose PF (REFRESH PLUS) 0.5 % ophthalmic solution Place 2 drops into both eyes 2 times daily       carboxymethylcellulose PF (REFRESH PLUS) 0.5 % ophthalmic solution Place 2 drops into both eyes 4 times daily as needed for dry eyes       loperamide (IMODIUM) 2 MG capsule Take 2 mg by mouth 4 times daily as needed for diarrhea        losartan (COZAAR) 50 MG tablet Take 1 tablet (50 mg) by mouth 2 times daily 180 tablet 3     Menthol ([employer] COUGH DROPS SUGAR FREE MT) Take 1 lozenge by mouth 4 times daily as needed        menthol-zinc oxide (CALMOSEPTINE) 0.44-20.6 % OINT ointment Apply 1 g topically 2 times daily       menthol-zinc oxide (CALMOSEPTINE) 0.44-20.6 % OINT ointment Apply topically 2 times daily as needed for skin protection       metoprolol (LOPRESSOR) 50 MG tablet TAKE ONE TABLET BY MOUTH TWICE DAILY 180 tablet 3     omeprazole (PRILOSEC) 20 MG DR capsule Take 1 capsule (20 mg) by mouth 2 times daily (before meals) 60 capsule 0     ONDANSETRON PO Take 4 mg by mouth every 8 hours as needed for nausea       order for DME Equipment being ordered: Walker Wheels () and Walker ()  Treatment Diagnosis: gait instability 1 each 0     order for DME Light weight wheelchair Body mass index is 19.08 kg/(m^2). 1 Device 0     order for DME ANTOINE stockings- below knee. 1 Package 0     polyethylene glycol (MIRALAX/GLYCOLAX) Packet Take 17 g by mouth daily as needed for constipation 7 packet      senna-docusate (SENOKOT-S;PERICOLACE) 8.6-50 MG per tablet Take 1 tablet by mouth 2 times daily as needed        sucralfate (CARAFATE) 1 GM/10ML suspension Take 10 mLs (1 g) by mouth 4 times daily (before meals and nightly) 1200 mL 0     traZODone (DESYREL) 50 MG tablet Take 50 mg by mouth At Bedtime       trolamine salicylate (ASPERCREME) 10 % external cream Apply topically 2 times daily AND BID PRN  Apply to affected areas of right shoulder/neck and apply to right knee twice daily         ROS:  No chest pain, shortness of breath, fevers, chills, headache, nausea, vomiting, dysuria or bowel abnormalities.  Appetite is  fair.  No pain except occ knees.    Vitals:  /63   Pulse 67   Temp 98.1  F (36.7  C)   Resp 18   SpO2 98%   Exam:  GENERAL APPEARANCE:  Alert, in no distress, cooperative  ENT:  Mouth and posterior oropharynx normal, moist mucous membranes, [place]  EYES:  EOM, conjunctivae, lids, pupils and irises normal, PERRL  NECK:  No adenopathy,masses or thyromegaly, no carotid bruit  RESP:  respiratory effort and palpation  of chest normal, lungs clear to auscultation , no respiratory distress  CV:  Palpation and auscultation of heart done , regular rate and rhythm, no murmur, rub, or gallop, no edema  ABDOMEN:  normal bowel sounds, soft, nontender, no hepatosplenomegaly or other masses, no guarding or rebound  M/S:   Gait and station normal  muscle strength 5/5 all 4 ext., some gen.LE weakness. bony deformities of hands  NEURO:   Cranial nerves 2-12 are normal tested and grossly at patient's baseline, alert, speech clear  PSYCH:  insight and judgement impaired, memory impaired , affect and mood normal    Lab/Diagnostic data:    Most Recent 3 CBC's:  Recent Labs   Lab Test 09/24/19 1700 09/13/19 0659 09/11/19 1934   WBC 5.0 4.1 5.9   HGB 9.8* 9.9* 10.9*   MCV 90 90 91    185 215     Most Recent 3 BMP's:  Recent Labs   Lab Test 09/24/19 1700 09/13/19 0659 09/11/19 1934    134 131*   POTASSIUM 4.4 3.8 4.2   CHLORIDE 101 103 101   CO2 28 25 24   BUN 26 9 24   CR 0.67 0.54 0.64   ANIONGAP 5 6 6   REILLY 8.5 8.1* 8.4*   GLC 91 93 97     Most Recent 3 Troponin's:  Recent Labs   Lab Test 09/24/19  1700 09/11/19  1934 04/10/18  1855   TROPI <0.015 <0.015 <0.015       ASSESSMENT/PLAN:  Chest pain, unspecified type  Resolved, unclear etiology. No apparent ACS in ED  1. Cont. Cozaar, metoprolol  2. Follow BPs, HRs  3. Monitor for further sob, lightheadedness  4. Cbc, bmp in next 1-3 mos    Nausea and vomiting, intractability of vomiting not specified, unspecified vomiting type  Currently stable  1. Cont. prilosec  2. Cont. carafate  3. Cont  Prn gerilanta, zofran  4. Follow po intake, wt.s    Generalized muscle weakness  Ongoing. Gait steady with walker. Recent transfer to higher level of care unit  1. Cont. PT  2. Cont. HC RN  3. Cont. Tylenol, aspercreme  4. Monitor for changes in gait, falls       Electronically signed by:  NICKOLAS Hallman CNP

## 2019-09-26 ENCOUNTER — PATIENT OUTREACH (OUTPATIENT)
Dept: GERIATRIC MEDICINE | Facility: CLINIC | Age: 84
End: 2019-09-26

## 2019-09-26 VITALS
OXYGEN SATURATION: 98 % | DIASTOLIC BLOOD PRESSURE: 63 MMHG | HEART RATE: 67 BPM | TEMPERATURE: 98.1 F | SYSTOLIC BLOOD PRESSURE: 126 MMHG | RESPIRATION RATE: 18 BRPM

## 2019-09-26 NOTE — PROGRESS NOTES
[facility] Care Coordination Contact  CC received notification of Emergency Room visit.  ER visit occurred on *9/24/2019 at [facility] with Dx of Chest pain .    Member had a follow-up appointment with PCP: on 9/25/2019  New referrals placed: No  Home Visit Needed: No  Care plan reviewed and updated.  PCP notified of ED visit via EMR.    CC did not make f/u call to member at this time, receives 24 hr CL services and onsite medical care.  Rosibel Rhodes RN, BC  Manager [facility] Care Coordinator   [phone]  (Fax)

## 2019-10-01 ENCOUNTER — PATIENT OUTREACH (OUTPATIENT)
Dept: GERIATRIC MEDICINE | Facility: CLINIC | Age: 84
End: 2019-10-01

## 2019-10-01 NOTE — PROGRESS NOTES
[facility] Care Coordination Contact    No longer active with [facility] case management effective 9/30/19. [place] ITS reviewed, per [facility], member enrolled in [facility] for Seniors Classic.  Reason for community disenrollment: MA Term     MMIS completed to close EW eff 9/30/19.  Providers notified via secure e-mail to Patricia at [facility].   [facility] Medical notified of change in payment status  Call placed to member's daughter Huyen to inform of above information. Raisa states that she has been in contact with [facility] and will continue to have supplies delivered monthly, she is aware of the private pay status.  Case closed.  Rosibel Rhodes RN, BC  Manager [facility] Care Coordinator   [phone]  (Fax)

## 2019-10-01 NOTE — PROGRESS NOTES
[employer] [facility] [employer] enrollment date: 10-1-19    Received new [employer] [facility] [employer] enrollment, reviewed EPIC notes.  No triggering event noted, will follow up as needed.  Member has had 2 ED visit but previous CM followed up on these as appropriate.      CC will continue to monitor and f/u and open to CM as appropriate.   Meagan Rodrigues MA [facility] Care Coordinator   [phone]

## 2019-10-02 PROBLEM — Z76.89 HEALTH CARE HOME: Status: RESOLVED | Noted: 2018-06-12 | Resolved: 2019-10-02

## 2019-10-11 ENCOUNTER — TELEPHONE (OUTPATIENT)
Dept: GERIATRICS | Facility: CLINIC | Age: 84
End: 2019-10-11

## 2019-10-11 ENCOUNTER — NURSING HOME VISIT (OUTPATIENT)
Dept: GERIATRICS | Facility: CLINIC | Age: 84
End: 2019-10-11
Payer: COMMERCIAL

## 2019-10-11 VITALS
SYSTOLIC BLOOD PRESSURE: 137 MMHG | RESPIRATION RATE: 18 BRPM | OXYGEN SATURATION: 93 % | DIASTOLIC BLOOD PRESSURE: 84 MMHG | HEART RATE: 65 BPM

## 2019-10-11 DIAGNOSIS — M15.0 PRIMARY OSTEOARTHRITIS INVOLVING MULTIPLE JOINTS: Primary | ICD-10-CM

## 2019-10-11 DIAGNOSIS — I10 ESSENTIAL HYPERTENSION: ICD-10-CM

## 2019-10-11 DIAGNOSIS — K21.9 GASTROESOPHAGEAL REFLUX DISEASE, ESOPHAGITIS PRESENCE NOT SPECIFIED: ICD-10-CM

## 2019-10-11 NOTE — LETTER
10/11/2019        RE: Batsheva Barkley  [facility]  [address]  [facility] [address]        [facility]  Chief Complaint   Patient presents with     Annual Comprehensive Exam Assisted Living     [facility] Medical Record Number:  [id number]  Place of Service where encounter took place:  [facility] ([facility] [[id number]]    HPI:    Batsheva Barkley  is a 85 year old  (11/6/1933), who is being seen today for an annual comprehensive visit. HPI information obtained from: facility chart records, facility staff, patient report, [facility] Epic chart review and family/first contact dtr report.  Today's concerns are:  Primary osteoarthritis involving multiple joints  Recent increased R shoulder pain. Has chronic R shoulder pain, neck pain, hip pain. Gait stable with walker. Cont. On tylenol, aspercreme    Gastroesophageal reflux disease, esophagitis presence not specified  Stable. Recent hosp with emesis. abd CT showed mod. Hiatal hernia. Started on carafate. Also taking prilosec. No recent nausea. Po intake stable    Essential hypertension  BP goals are  <140/90 mm Hg.This is higher than ACC and AHA recommendations due to risk for hypotension. Patient is stable with current plan of care and routine assessment. Cont. On cozaar, metoprolol.          ALLERGIES: Codeine and Lisinopril  PAST MEDICAL HISTORY:  has a past medical history of CAD (coronary artery disease) (6/20/05), Cardiomyopathy (H), CHF NYHA class III, chronic, systolic (H) (1/25/2018), Coronary atherosclerosis (6/20/2005), Edema (1/26/2015), Generalized osteoarthrosis, Hypertension goal BP (blood pressure) < 140/90 (1/21/2015), Leg weakness, bilateral (2/13/2018), Mitral regurgitation, Mixed hyperlipidemia, Osteoporosis, Physical deconditioning (1/21/2015), and Total urinary incontinence (12/27/2017).  PAST SURGICAL HISTORY:  has a past surgical history that includes L bunion + hammer toes (1/04, 4/04);  ovarian cyst surgery; Colonoscopy (3/05); L total knee replacement (10/2009 ); ENT surgery; Eye surgery; Heart Cath, Angioplasty (2005); and Esophagoscopy, gastroscopy, duodenoscopy (EGD), combined (N/A, 4/14/2016).  IMMUNIZATIONS:  Immunization History   Administered Date(s) Administered     Influenza (High Dose) 3 valent vaccine 10/01/2013, 09/24/2014, 09/18/2015, 11/01/2016, 09/05/2017     Influenza (IIV3) PF 11/01/2004, 10/11/2005, 11/01/2006, 10/21/2008, 09/24/2014     Pneumo Conj 13-V (2010&after) 09/18/2015     Pneumococcal 23 valent 01/01/2003     TD (ADULT, 7+) 12/19/2005     TDAP Vaccine (Adacel) 09/18/2015     Above immunizations pulled from [employer]. MIIC and facility records also reconciled. Outstanding information sent to  to update [facility] [employer].  Future immunizations are not needed at this point as all recommended immunizations are up to date.     Current Outpatient Medications   Medication Sig Dispense Refill     acetaminophen (TYLENOL) 500 MG tablet Take 1,000 mg by mouth 3 times daily        alum & mag hydroxide-simethicone (MYLANTA/MAALOX) 200-200-20 MG/5ML SUSP suspension Take 10 mLs by mouth 2 times daily as needed        aspirin (ASA) 81 MG chewable tablet Take 81 mg by mouth daily       Atorvastatin Calcium (LIPITOR PO) Take 20 mg by mouth At Bedtime        carboxymethylcellulose PF (REFRESH PLUS) 0.5 % ophthalmic solution Place 2 drops into both eyes 2 times daily       carboxymethylcellulose PF (REFRESH PLUS) 0.5 % ophthalmic solution Place 2 drops into both eyes 4 times daily as needed for dry eyes       diclofenac (VOLTAREN) 1 % topical gel Apply 2 g topically 2 times daily Right shoulder       loperamide (IMODIUM) 2 MG capsule Take 2 mg by mouth 4 times daily as needed for diarrhea        losartan (COZAAR) 50 MG tablet Take 1 tablet (50 mg) by mouth 2 times daily 180 tablet 3     Menthol ([employer] COUGH DROPS SUGAR FREE MT) Take 1 lozenge by mouth 4 times daily as needed        menthol-zinc oxide (CALMOSEPTINE) 0.44-20.6 % OINT ointment Apply 1 g topically 2 times daily       menthol-zinc oxide (CALMOSEPTINE) 0.44-20.6 % OINT ointment Apply topically 2 times daily as needed for skin protection       metoprolol (LOPRESSOR) 50 MG tablet TAKE ONE TABLET BY MOUTH TWICE DAILY 180 tablet 3     omeprazole (PRILOSEC) 20 MG DR capsule Take 1 capsule (20 mg) by mouth 2 times daily (before meals) 60 capsule 0     ONDANSETRON PO Take 4 mg by mouth every 8 hours as needed for nausea       order for DME Equipment being ordered: Walker Wheels () and Walker ()  Treatment Diagnosis: gait instability 1 each 0     order for DME Light weight wheelchair Body mass index is 19.08 kg/(m^2). 1 Device 0     order for DME ANTOINE stockings- below knee. 1 Package 0     polyethylene glycol (MIRALAX/GLYCOLAX) Packet Take 17 g by mouth daily as needed for constipation 7 packet      senna-docusate (SENOKOT-S;PERICOLACE) 8.6-50 MG per tablet Take 1 tablet by mouth 2 times daily as needed        sucralfate (CARAFATE) 1 GM/10ML suspension Take 10 mLs (1 g) by mouth 4 times daily (before meals and nightly) 1200 mL 0     traZODone (DESYREL) 50 MG tablet Take 50 mg by mouth At Bedtime       trolamine salicylate (ASPERCREME) 10 % external cream Apply topically 2 times daily Apply to affected areas right knee twice daily AND BID PRN         Case Management:  I have reviewed the Assisted Living care plan, current immunizations and preventive care/cancer screening. .Future cancer screening is not clinically indicated secondary to age/goals of care Patient's desire to return to the community is not present. Current Level of Care is appropriate.    Advance Directive Discussion:    I reviewed the current advanced directives as reflected in EPIC, the POLST and the facility chart, and verified the congruency of orders 10/11/19. I contacted the first party dtr and discussed the plan of Care.  I did not due to cognitive  impairment review the advance directives with the resident.     Team Discussion:  I communicated with the appropriate disciplines involved with the Plan of Care:   Nursing    Patient's goal is pain control and comfort and tx of reversible illness.  Information reviewed:  Medications, vital signs, orders, and nursing notes.    ROS:  No chest pain, shortness of breath, fevers, chills, headache, nausea, vomiting, dysuria or bowel abnormalities.  Appetite is  fair.  No pain except R shoulder, occ neck/hips.    Vitals:  /84   Pulse 65   Resp 18   SpO2 93%  There is no height or weight on file to calculate BMI.  Exam:  GENERAL APPEARANCE:  Alert, in no distress, thin, cooperative  ENT:  Mouth and posterior oropharynx normal, moist mucous membranes, [place]  EYES:  EOM, conjunctivae, lids, pupils and irises normal, PERRL  NECK:  No adenopathy,masses or thyromegaly, FROM  RESP:  respiratory effort and palpation of chest normal, lungs clear to auscultation , no respiratory distress  CV:  Palpation and auscultation of heart done , regular rate and rhythm, no murmur, rub, or gallop, no edema  ABDOMEN:  normal bowel sounds, soft, nontender, no hepatosplenomegaly or other masses, no guarding or rebound  M/S:   Gait and station normal  muscle strength 5/5 all 4 ext. bony deformities of hands. pain with ROM R shoulder  SKIN:  R buttck ulcer healed. some flaking skin in area  NEURO:   Cranial nerves 2-12 are normal tested and grossly at patient's baseline, alert, speech clear  PSYCH:  insight and judgement impaired, memory impaired , affect and mood normal     Lab/Diagnostic data:     Most Recent 3 CBC's:  Recent Labs   Lab Test 09/24/19 1700 09/13/19  0659 09/11/19 1934   WBC 5.0 4.1 5.9   HGB 9.8* 9.9* 10.9*   MCV 90 90 91    185 215     Most Recent 3 BMP's:  Recent Labs   Lab Test 09/24/19 1700 09/13/19 0659 09/11/19 1934    134 131*   POTASSIUM 4.4 3.8 4.2   CHLORIDE 101 103 101   CO2 28 25 24   BUN 26 9  24   CR 0.67 0.54 0.64   ANIONGAP 5 6 6   REILLY 8.5 8.1* 8.4*   GLC 91 93 97       ASSESSMENT/PLAN  Primary osteoarthritis involving multiple joints  Increased R shoulder pain. No recent falls. Some siff. Sleeping on side at hs  1. Cont. Tylenol, aspercreme  2. Start voltaren gel to R shoulder bid  3. Monitor for diff. With w.b RUE using walker-currently stable    Gastroesophageal reflux disease, esophagitis presence not specified  recent exac. With nausea. Improved since starting carafate  1. Cont. carafate  2. Cont. prilosec  3. Monitor for decreased po intake, c/o nasuea  4. For above s/s will cont. Prn gerilanta, zofran    Essential hypertension  Currently stable  1. Cont. Cozaar, metoprolol  2. Follow BPsm HRs  3. Encourage fluids  4. Bmp in next 1-3 mos      Electronically signed by:  NICKOLAS Hallman CNP           Sincerely,        NICKOLAS Hallman CNP

## 2019-10-11 NOTE — TELEPHONE ENCOUNTER
Prior Authorization Retail Medication Request    Medication/Dose: Voltaren Gel 1% Apply 2 g topically BID  ICD code (if different than what is on RX):  M25.511, M15.0  Previously Tried and Failed:    Rationale:      Insurance Name: [employer]  Insurance ID: [id number]      Pharmacy Information (if different than what is on RX)  Name:  [facility]  Phone:  [phone]

## 2019-10-11 NOTE — PROGRESS NOTES
[place] [facility]  Chief Complaint   Patient presents with     Annual Comprehensive Exam Assisted Living     [facility] Medical Record Number:  [id number]  Place of Service where encounter took place:  [facility] [[id number]]    HPI:    Batsheva Barkley  is a 85 year old  (11/6/1933), who is being seen today for an annual comprehensive visit. HPI information obtained from: facility chart records, facility staff, patient report, [facility] Epic chart review and family/first contact dtr report.  Today's concerns are:  Primary osteoarthritis involving multiple joints  Recent increased R shoulder pain. Has chronic R shoulder pain, neck pain, hip pain. Gait stable with walker. Cont. On tylenol, aspercreme    Gastroesophageal reflux disease, esophagitis presence not specified  Stable. Recent hosp with emesis. abd CT showed mod. Hiatal hernia. Started on carafate. Also taking prilosec. No recent nausea. Po intake stable    Essential hypertension  BP goals are  <140/90 mm Hg.This is higher than ACC and AHA recommendations due to risk for hypotension. Patient is stable with current plan of care and routine assessment. Cont. On cozaar, metoprolol.          ALLERGIES: Codeine and Lisinopril  PAST MEDICAL HISTORY:  has a past medical history of CAD (coronary artery disease) (6/20/05), Cardiomyopathy (H), CHF NYHA class III, chronic, systolic (H) (1/25/2018), Coronary atherosclerosis (6/20/2005), Edema (1/26/2015), Generalized osteoarthrosis, Hypertension goal BP (blood pressure) < 140/90 (1/21/2015), Leg weakness, bilateral (2/13/2018), Mitral regurgitation, Mixed hyperlipidemia, Osteoporosis, Physical deconditioning (1/21/2015), and Total urinary incontinence (12/27/2017).  PAST SURGICAL HISTORY:  has a past surgical history that includes L bunion + hammer toes (1/04, 4/04); ovarian cyst surgery; Colonoscopy (3/05); L total knee replacement (10/2009 ); ENT surgery; Eye surgery; Heart Cath,  Angioplasty (2005); and Esophagoscopy, gastroscopy, duodenoscopy (EGD), combined (N/A, 4/14/2016).  IMMUNIZATIONS:  Immunization History   Administered Date(s) Administered     Influenza (High Dose) 3 valent vaccine 10/01/2013, 09/24/2014, 09/18/2015, 11/01/2016, 09/05/2017     Influenza (IIV3) PF 11/01/2004, 10/11/2005, 11/01/2006, 10/21/2008, 09/24/2014     Pneumo Conj 13-V (2010&after) 09/18/2015     Pneumococcal 23 valent 01/01/2003     TD (ADULT, 7+) 12/19/2005     TDAP Vaccine (Adacel) 09/18/2015     Above immunizations pulled from [facility]. [facility] and facility records also reconciled. Outstanding information sent to  to update [facility].  Future immunizations are not needed at this point as all recommended immunizations are up to date.     Current Outpatient Medications   Medication Sig Dispense Refill     acetaminophen (TYLENOL) 500 MG tablet Take 1,000 mg by mouth 3 times daily        alum & mag hydroxide-simethicone (MYLANTA/MAALOX) 200-200-20 MG/5ML SUSP suspension Take 10 mLs by mouth 2 times daily as needed        aspirin (ASA) 81 MG chewable tablet Take 81 mg by mouth daily       Atorvastatin Calcium (LIPITOR PO) Take 20 mg by mouth At Bedtime        carboxymethylcellulose PF (REFRESH PLUS) 0.5 % ophthalmic solution Place 2 drops into both eyes 2 times daily       carboxymethylcellulose PF (REFRESH PLUS) 0.5 % ophthalmic solution Place 2 drops into both eyes 4 times daily as needed for dry eyes       diclofenac (VOLTAREN) 1 % topical gel Apply 2 g topically 2 times daily Right shoulder       loperamide (IMODIUM) 2 MG capsule Take 2 mg by mouth 4 times daily as needed for diarrhea        losartan (COZAAR) 50 MG tablet Take 1 tablet (50 mg) by mouth 2 times daily 180 tablet 3     Menthol ([employer] COUGH DROPS SUGAR FREE MT) Take 1 lozenge by mouth 4 times daily as needed       menthol-zinc oxide (CALMOSEPTINE) 0.44-20.6 % OINT ointment Apply 1 g topically 2 times daily        menthol-zinc oxide (CALMOSEPTINE) 0.44-20.6 % OINT ointment Apply topically 2 times daily as needed for skin protection       metoprolol (LOPRESSOR) 50 MG tablet TAKE ONE TABLET BY MOUTH TWICE DAILY 180 tablet 3     omeprazole (PRILOSEC) 20 MG DR capsule Take 1 capsule (20 mg) by mouth 2 times daily (before meals) 60 capsule 0     ONDANSETRON PO Take 4 mg by mouth every 8 hours as needed for nausea       order for DME Equipment being ordered: Walker Wheels () and Walker ()  Treatment Diagnosis: gait instability 1 each 0     order for DME Light weight wheelchair Body mass index is 19.08 kg/(m^2). 1 Device 0     order for DME ANTOINE stockings- below knee. 1 Package 0     polyethylene glycol (MIRALAX/GLYCOLAX) Packet Take 17 g by mouth daily as needed for constipation 7 packet      senna-docusate (SENOKOT-S;PERICOLACE) 8.6-50 MG per tablet Take 1 tablet by mouth 2 times daily as needed        sucralfate (CARAFATE) 1 GM/10ML suspension Take 10 mLs (1 g) by mouth 4 times daily (before meals and nightly) 1200 mL 0     traZODone (DESYREL) 50 MG tablet Take 50 mg by mouth At Bedtime       trolamine salicylate (ASPERCREME) 10 % external cream Apply topically 2 times daily Apply to affected areas right knee twice daily AND BID PRN         Case Management:  I have reviewed the Assisted Living care plan, current immunizations and preventive care/cancer screening. .Future cancer screening is not clinically indicated secondary to age/goals of care Patient's desire to return to the community is not present. Current Level of Care is appropriate.    Advance Directive Discussion:    I reviewed the current advanced directives as reflected in EPIC, the POLST and the facility chart, and verified the congruency of orders 10/11/19. I contacted the first party dtr and discussed the plan of Care.  I did not due to cognitive impairment review the advance directives with the resident.     Team Discussion:  I communicated with the  appropriate disciplines involved with the Plan of Care:   Nursing    Patient's goal is pain control and comfort and tx of reversible illness.  Information reviewed:  Medications, vital signs, orders, and nursing notes.    ROS:  No chest pain, shortness of breath, fevers, chills, headache, nausea, vomiting, dysuria or bowel abnormalities.  Appetite is  fair.  No pain except R shoulder, occ neck/hips.    Vitals:  /84   Pulse 65   Resp 18   SpO2 93%  There is no height or weight on file to calculate BMI.  Exam:  GENERAL APPEARANCE:  Alert, in no distress, thin, cooperative  ENT:  Mouth and posterior oropharynx normal, moist mucous membranes, [place]  EYES:  EOM, conjunctivae, lids, pupils and irises normal, PERRL  NECK:  No adenopathy,masses or thyromegaly, FROM  RESP:  respiratory effort and palpation of chest normal, lungs clear to auscultation , no respiratory distress  CV:  Palpation and auscultation of heart done , regular rate and rhythm, no murmur, rub, or gallop, no edema  ABDOMEN:  normal bowel sounds, soft, nontender, no hepatosplenomegaly or other masses, no guarding or rebound  M/S:   Gait and station normal  muscle strength 5/5 all 4 ext. bony deformities of hands. pain with ROM R shoulder  SKIN:  R buttck ulcer healed. some flaking skin in area  NEURO:   Cranial nerves 2-12 are normal tested and grossly at patient's baseline, alert, speech clear  PSYCH:  insight and judgement impaired, memory impaired , affect and mood normal     Lab/Diagnostic data:     Most Recent 3 CBC's:  Recent Labs   Lab Test 09/24/19  1700 09/13/19  0659 09/11/19 1934   WBC 5.0 4.1 5.9   HGB 9.8* 9.9* 10.9*   MCV 90 90 91    185 215     Most Recent 3 BMP's:  Recent Labs   Lab Test 09/24/19  1700 09/13/19  0659 09/11/19 1934    134 131*   POTASSIUM 4.4 3.8 4.2   CHLORIDE 101 103 101   CO2 28 25 24   BUN 26 9 24   CR 0.67 0.54 0.64   ANIONGAP 5 6 6   REILLY 8.5 8.1* 8.4*   GLC 91 93 97       ASSESSMENT/PLAN  Primary  osteoarthritis involving multiple joints  Increased R shoulder pain. No recent falls. Some siff. Sleeping on side at hs  1. Cont. Tylenol, aspercreme  2. Start voltaren gel to R shoulder bid  3. Monitor for diff. With wdomenic SINCLAIR using walker-currently stable    Gastroesophageal reflux disease, esophagitis presence not specified  recent exac. With nausea. Improved since starting carafate  1. Cont. carafate  2. Cont. prilosec  3. Monitor for decreased po intake, c/o nasuea  4. For above s/s will cont. Prn gerilanta, zofran    Essential hypertension  Currently stable  1. Cont. Cozaar, metoprolol  2. Follow BPsm HRs  3. Encourage fluids  4. Bmp in next 1-3 mos      Electronically signed by:  NICKOLAS Hallman CNP

## 2019-10-15 NOTE — TELEPHONE ENCOUNTER
[facility] Prior Authorization Team   Phone: [phone]      Additional clinical questions received and faxed back to insurance.

## 2019-10-15 NOTE — TELEPHONE ENCOUNTER
[facility] Prior Authorization Team   Phone: [phone]      PA Initiation    Medication: Voltaren Gel 1% - INITIATED  Insurance Company: Silver [employer] Part D - Phone [phone] Fax [phone]  Pharmacy Filling the Rx: [facility] - [place], [place] - [address]  Filling Pharmacy Phone: [phone]  Filling Pharmacy Fax: [phone]  Start Date: 10/15/2019    PA manually faxed to Anjali.

## 2019-10-16 NOTE — TELEPHONE ENCOUNTER
[facility] Prior Authorization Team   Phone: [phone]      Prior Authorization Approval    Authorization Effective Date: 7/17/2019  Authorization Expiration Date: 10/14/2020  Medication: Voltaren Gel 1% - APPROVED  Approved Dose/Quantity: 100 FOR 25  Reference #:     Insurance Company: Silver [employer] Part D - Phone [phone] Fax [phone]  Expected CoPay:       CoPay Card Available:      Foundation Assistance Needed:    Which Pharmacy is filling the prescription (Not needed for infusion/clinic administered): [facility] - [place], [place] - [address]  Pharmacy Notified: Yes  Patient Notified: Yes (**Instructed pharmacy to notify patient when script is ready to /ship.**)

## 2019-10-23 ENCOUNTER — ASSISTED LIVING VISIT (OUTPATIENT)
Dept: GERIATRICS | Facility: CLINIC | Age: 84
End: 2019-10-23
Payer: COMMERCIAL

## 2019-10-23 VITALS
HEART RATE: 72 BPM | SYSTOLIC BLOOD PRESSURE: 129 MMHG | OXYGEN SATURATION: 92 % | DIASTOLIC BLOOD PRESSURE: 77 MMHG | RESPIRATION RATE: 19 BRPM

## 2019-10-23 DIAGNOSIS — M15.0 PRIMARY OSTEOARTHRITIS INVOLVING MULTIPLE JOINTS: Primary | ICD-10-CM

## 2019-10-23 DIAGNOSIS — F51.01 PRIMARY INSOMNIA: ICD-10-CM

## 2019-10-23 DIAGNOSIS — R19.7 DIARRHEA, UNSPECIFIED TYPE: ICD-10-CM

## 2019-10-23 NOTE — PROGRESS NOTES
[place] [facility]  [facility] Record Number:  [id number]  Place of Service where encounter took place:  [facility] ([facility] [[id number]]  Chief Complaint   Patient presents with     Pain       HPI:    Batsheva Barkley  is a 85 year old (11/6/1933), who is being seen today for an episodic care visit.  HPI information obtained from: facility chart records, facility staff, patient report and [facility] chart review. Today's concern is: OA, insomnia, diarrhea.  Has chronic shoulder pain, more recenct exac. Of R shoulder pain. Had increased tylenol dose. voltaren gel added this week-had to await prior auth before starting.  Reports voltaren gel effective during the day.  Reports increased pain of R shoulder in middle of night-jose alfredo on side when sleeping.  Has prn tylenol.  Has h/o insomnia. Cont. On hs trazodone.  Reports diarrhea past 2 days.  Per staff, stool formed, just softer than than typical for resident.  No recent nausea, GERD s/s.       Past Medical and Surgical History reviewed in Epic today.    MEDICATIONS:  Current Outpatient Medications   Medication Sig Dispense Refill     diclofenac (VOLTAREN) 1 % topical gel Place 2 g onto the skin 2 times daily And every day prn       acetaminophen (TYLENOL) 500 MG tablet Take 1,000 mg by mouth 3 times daily        alum & mag hydroxide-simethicone (MYLANTA/MAALOX) 200-200-20 MG/5ML SUSP suspension Take 10 mLs by mouth 2 times daily as needed        aspirin (ASA) 81 MG chewable tablet Take 81 mg by mouth daily       Atorvastatin Calcium (LIPITOR PO) Take 20 mg by mouth At Bedtime        carboxymethylcellulose PF (REFRESH PLUS) 0.5 % ophthalmic solution Place 2 drops into both eyes 2 times daily       carboxymethylcellulose PF (REFRESH PLUS) 0.5 % ophthalmic solution Place 2 drops into both eyes 4 times daily as needed for dry eyes       loperamide (IMODIUM) 2 MG capsule Take 2 mg by mouth 4 times daily as needed for diarrhea         losartan (COZAAR) 50 MG tablet Take 1 tablet (50 mg) by mouth 2 times daily 180 tablet 3     Menthol ([employer]S COUGH DROPS SUGAR FREE MT) Take 1 lozenge by mouth 4 times daily as needed       menthol-zinc oxide (CALMOSEPTINE) 0.44-20.6 % OINT ointment Apply 1 g topically 2 times daily       menthol-zinc oxide (CALMOSEPTINE) 0.44-20.6 % OINT ointment Apply topically 2 times daily as needed for skin protection       metoprolol (LOPRESSOR) 50 MG tablet TAKE ONE TABLET BY MOUTH TWICE DAILY 180 tablet 3     omeprazole (PRILOSEC) 20 MG DR capsule Take 1 capsule (20 mg) by mouth 2 times daily (before meals) 60 capsule 0     ONDANSETRON PO Take 4 mg by mouth every 8 hours as needed for nausea       order for DME Equipment being ordered: Walker Wheels () and Walker ()  Treatment Diagnosis: gait instability 1 each 0     order for DME Light weight wheelchair Body mass index is 19.08 kg/(m^2). 1 Device 0     order for DME ANTOINE stockings- below knee. 1 Package 0     polyethylene glycol (MIRALAX/GLYCOLAX) Packet Take 17 g by mouth daily as needed for constipation 7 packet      senna-docusate (SENOKOT-S;PERICOLACE) 8.6-50 MG per tablet Take 1 tablet by mouth 2 times daily as needed        sucralfate (CARAFATE) 1 GM/10ML suspension Take 10 mLs (1 g) by mouth 4 times daily (before meals and nightly) 1200 mL 0     traZODone (DESYREL) 50 MG tablet Take 50 mg by mouth At Bedtime       trolamine salicylate (ASPERCREME) 10 % external cream Apply topically 2 times daily Apply to affected areas right knee twice daily AND BID PRN           REVIEW OF SYSTEMS:  No chest pain, shortness of breath, fevers, chills, headache, nausea, vomiting, dysuria or bowel abnormalities.  Appetite is  normal.  No pain except occ R shoulder.    Objective:  /77   Pulse 72   Resp 19   SpO2 92%   Exam:  GENERAL APPEARANCE:  Alert, in no distress, cooperative  ENT:  Mouth and posterior oropharynx normal, moist mucous membranes, [place]  EYES:  EOM,  conjunctivae, lids, pupils and irises normal, PERRL  NECK:  No adenopathy,masses or thyromegaly, FROM  RESP:  respiratory effort and palpation of chest normal, lungs clear to auscultation , no respiratory distress  CV:  Palpation and auscultation of heart done , regular rate and rhythm, no murmur, rub, or gallop, no edema  ABDOMEN:  normal bowel sounds, soft, nontender, no hepatosplenomegaly or other masses, no guarding or rebound  M/S:   Gait and station normal  scoliosis, R varus, bony deformities of hands. decreased ROM R shoulder  NEURO:   Cranial nerves 2-12 are normal tested and grossly at patient's baseline, alert, speech clear  PSYCH:  memory impaired , affect and mood normal    Labs:     Most Recent 3 CBC's:  Recent Labs   Lab Test 09/24/19  1700 09/13/19  0659 09/11/19  1934   WBC 5.0 4.1 5.9   HGB 9.8* 9.9* 10.9*   MCV 90 90 91    185 215     Most Recent 3 BMP's:  Recent Labs   Lab Test 09/24/19  1700 09/13/19  0659 09/11/19 1934    134 131*   POTASSIUM 4.4 3.8 4.2   CHLORIDE 101 103 101   CO2 28 25 24   BUN 26 9 24   CR 0.67 0.54 0.64   ANIONGAP 5 6 6   REILLY 8.5 8.1* 8.4*   GLC 91 93 97       ASSESSMENT/PLAN:  Primary osteoarthritis involving multiple joints  R shoulder pain improved since start of voltaren gel  1. Cont. Bid voltaren gel  2. Add every day prn voltaren gel to address R shoulder pain during the night  3. Cont. Sched., prn tylenol  4. Cont. Sched/pnr aspercreme to L shoulder, neck, knee    Primary insomnia  Some increased s/s due to R shoulder pain  1. Cont. Hs trazodone  2. Add prn voltaren gel as above  3. Reassess for increased s/s over next few weeks  4. For ongoing s/s, may increase sched. voltaren gel to tid with later pm admin time    Diarrhea, unspecified type  Every day BMs past 2 days, formed per staff  1. Cont. Prn imodium  2. Encourage fluids  3. For diarrhea use prn imodium  4. Monitor for GERD, nausea      Electronically signed by:  NICKOLAS Hallman  CNP

## 2019-10-23 NOTE — LETTER
10/23/2019        RE: Batsheva Barkley  [facility]  [address]  [facility] [address]        [place] [facility]  [place] [facility] Record Number:  [id number]  Place of Service where encounter took place:  [facility] [[id number]]  Chief Complaint   Patient presents with     Pain       HPI:    Batsheva Barkley  is a 85 year old (11/6/1933), who is being seen today for an episodic care visit.  HPI information obtained from: facility chart records, facility staff, patient report and [facility] chart review. Today's concern is: OA, insomnia, diarrhea.  Has chronic shoulder pain, more recenct exac. Of R shoulder pain. Had increased tylenol dose. voltaren gel added this week-had to await prior auth before starting.  Reports voltaren gel effective during the day.  Reports increased pain of R shoulder in middle of night-jose alfredo on side when sleeping.  Has prn tylenol.  Has h/o insomnia. Cont. On hs trazodone.  Reports diarrhea past 2 days.  Per staff, stool formed, just softer than than typical for resident.  No recent nausea, GERD s/s.       Past Medical and Surgical History reviewed in Epic today.    MEDICATIONS:  Current Outpatient Medications   Medication Sig Dispense Refill     diclofenac (VOLTAREN) 1 % topical gel Place 2 g onto the skin 2 times daily And every day prn       acetaminophen (TYLENOL) 500 MG tablet Take 1,000 mg by mouth 3 times daily        alum & mag hydroxide-simethicone (MYLANTA/MAALOX) 200-200-20 MG/5ML SUSP suspension Take 10 mLs by mouth 2 times daily as needed        aspirin (ASA) 81 MG chewable tablet Take 81 mg by mouth daily       Atorvastatin Calcium (LIPITOR PO) Take 20 mg by mouth At Bedtime        carboxymethylcellulose PF (REFRESH PLUS) 0.5 % ophthalmic solution Place 2 drops into both eyes 2 times daily       carboxymethylcellulose PF (REFRESH PLUS) 0.5 % ophthalmic solution Place 2 drops into both eyes 4 times daily as needed for  dry eyes       loperamide (IMODIUM) 2 MG capsule Take 2 mg by mouth 4 times daily as needed for diarrhea        losartan (COZAAR) 50 MG tablet Take 1 tablet (50 mg) by mouth 2 times daily 180 tablet 3     Menthol ([employer]S COUGH DROPS SUGAR FREE MT) Take 1 lozenge by mouth 4 times daily as needed       menthol-zinc oxide (CALMOSEPTINE) 0.44-20.6 % OINT ointment Apply 1 g topically 2 times daily       menthol-zinc oxide (CALMOSEPTINE) 0.44-20.6 % OINT ointment Apply topically 2 times daily as needed for skin protection       metoprolol (LOPRESSOR) 50 MG tablet TAKE ONE TABLET BY MOUTH TWICE DAILY 180 tablet 3     omeprazole (PRILOSEC) 20 MG DR capsule Take 1 capsule (20 mg) by mouth 2 times daily (before meals) 60 capsule 0     ONDANSETRON PO Take 4 mg by mouth every 8 hours as needed for nausea       order for DME Equipment being ordered: Walker Wheels () and Walker ()  Treatment Diagnosis: gait instability 1 each 0     order for DME Light weight wheelchair Body mass index is 19.08 kg/(m^2). 1 Device 0     order for DME ANTOINE stockings- below knee. 1 Package 0     polyethylene glycol (MIRALAX/GLYCOLAX) Packet Take 17 g by mouth daily as needed for constipation 7 packet      senna-docusate (SENOKOT-S;PERICOLACE) 8.6-50 MG per tablet Take 1 tablet by mouth 2 times daily as needed        sucralfate (CARAFATE) 1 GM/10ML suspension Take 10 mLs (1 g) by mouth 4 times daily (before meals and nightly) 1200 mL 0     traZODone (DESYREL) 50 MG tablet Take 50 mg by mouth At Bedtime       trolamine salicylate (ASPERCREME) 10 % external cream Apply topically 2 times daily Apply to affected areas right knee twice daily AND BID PRN           REVIEW OF SYSTEMS:  No chest pain, shortness of breath, fevers, chills, headache, nausea, vomiting, dysuria or bowel abnormalities.  Appetite is  normal.  No pain except occ R shoulder.    Objective:  /77   Pulse 72   Resp 19   SpO2 92%   Exam:  GENERAL APPEARANCE:  Alert, in no  distress, cooperative  ENT:  Mouth and posterior oropharynx normal, moist mucous membranes, [place]  EYES:  EOM, conjunctivae, lids, pupils and irises normal, PERRL  NECK:  No adenopathy,masses or thyromegaly, FROM  RESP:  respiratory effort and palpation of chest normal, lungs clear to auscultation , no respiratory distress  CV:  Palpation and auscultation of heart done , regular rate and rhythm, no murmur, rub, or gallop, no edema  ABDOMEN:  normal bowel sounds, soft, nontender, no hepatosplenomegaly or other masses, no guarding or rebound  M/S:   Gait and station normal  scoliosis, R varus, bony deformities of hands. decreased ROM R shoulder  NEURO:   Cranial nerves 2-12 are normal tested and grossly at patient's baseline, alert, speech clear  PSYCH:  memory impaired , affect and mood normal    Labs:     Most Recent 3 CBC's:  Recent Labs   Lab Test 09/24/19  1700 09/13/19  0659 09/11/19  1934   WBC 5.0 4.1 5.9   HGB 9.8* 9.9* 10.9*   MCV 90 90 91    185 215     Most Recent 3 BMP's:  Recent Labs   Lab Test 09/24/19  1700 09/13/19  0659 09/11/19  1934    134 131*   POTASSIUM 4.4 3.8 4.2   CHLORIDE 101 103 101   CO2 28 25 24   BUN 26 9 24   CR 0.67 0.54 0.64   ANIONGAP 5 6 6   REILLY 8.5 8.1* 8.4*   GLC 91 93 97       ASSESSMENT/PLAN:  Primary osteoarthritis involving multiple joints  R shoulder pain improved since start of voltaren gel  1. Cont. Bid voltaren gel  2. Add every day prn voltaren gel to address R shoulder pain during the night  3. Cont. Sched., prn tylenol  4. Cont. Sched/pnr aspercreme to L shoulder, neck, knee    Primary insomnia  Some increased s/s due to R shoulder pain  1. Cont. Hs trazodone  2. Add prn voltaren gel as above  3. Reassess for increased s/s over next few weeks  4. For ongoing s/s, may increase sched. voltaren gel to tid with later pm admin time    Diarrhea, unspecified type  Every day BMs past 2 days, formed per staff  1. Cont. Prn imodium  2. Encourage fluids  3. For diarrhea  use prn imodium  4. Monitor for GERD, nausea      Electronically signed by:  NICKOLAS Hallman CNP             Sincerely,        NICKOLAS Hallman CNP

## 2019-10-30 ENCOUNTER — ASSISTED LIVING VISIT (OUTPATIENT)
Dept: GERIATRICS | Facility: CLINIC | Age: 84
End: 2019-10-30
Payer: COMMERCIAL

## 2019-10-30 VITALS
RESPIRATION RATE: 18 BRPM | OXYGEN SATURATION: 94 % | SYSTOLIC BLOOD PRESSURE: 120 MMHG | DIASTOLIC BLOOD PRESSURE: 65 MMHG | HEART RATE: 66 BPM

## 2019-10-30 DIAGNOSIS — H61.21 IMPACTED CERUMEN OF RIGHT EAR: Primary | ICD-10-CM

## 2019-10-30 DIAGNOSIS — F51.01 PRIMARY INSOMNIA: ICD-10-CM

## 2019-10-30 DIAGNOSIS — M15.0 PRIMARY OSTEOARTHRITIS INVOLVING MULTIPLE JOINTS: ICD-10-CM

## 2019-10-30 PROCEDURE — 69210 REMOVE IMPACTED EAR WAX UNI: CPT | Performed by: NURSE PRACTITIONER

## 2019-10-30 NOTE — PROGRESS NOTES
[facility]  [place] Medical Record Number:  [id number]  Place of Service where encounter took place:  [facility] [[id number]]  Chief Complaint   Patient presents with     Cerumen (impacted)       HPI:    Batsheva Barkley  is a 85 year old (11/6/1933), who is being seen today for an episodic care visit.  HPI information obtained from: facility chart records, facility staff, patient report, [facility] chart review and family/first contact dtr report. Today's concern is: cerumen, OA, insomnia.  Per dtr has had further decreased hearing acuity. Has cerumen R ear. No cerumen L ear.  Has tried hearing aids in past, however due to narrow ear canals, unable to keep hearing aids in place.  Reports R shoulder pain improved since starting voltaren gel to area.  Also taking tylenol.  Reports nocturia, however sleeping better at hs due to decreased R shoulder pain.       Past Medical and Surgical History reviewed in Epic today.    MEDICATIONS:  Current Outpatient Medications   Medication Sig Dispense Refill     acetaminophen (TYLENOL) 500 MG tablet Take 1,000 mg by mouth 3 times daily        alum & mag hydroxide-simethicone (MYLANTA/MAALOX) 200-200-20 MG/5ML SUSP suspension Take 10 mLs by mouth 2 times daily as needed        aspirin (ASA) 81 MG chewable tablet Take 81 mg by mouth daily       Atorvastatin Calcium (LIPITOR PO) Take 20 mg by mouth At Bedtime        carboxymethylcellulose PF (REFRESH PLUS) 0.5 % ophthalmic solution Place 2 drops into both eyes 2 times daily       carboxymethylcellulose PF (REFRESH PLUS) 0.5 % ophthalmic solution Place 2 drops into both eyes 4 times daily as needed for dry eyes       diclofenac (VOLTAREN) 1 % topical gel Place 2 g onto the skin 2 times daily And every day prn       loperamide (IMODIUM) 2 MG capsule Take 2 mg by mouth 4 times daily as needed for diarrhea        losartan (COZAAR) 50 MG tablet Take 1 tablet (50 mg) by mouth 2 times daily  180 tablet 3     Menthol ([employer] COUGH DROPS SUGAR FREE MT) Take 1 lozenge by mouth 4 times daily as needed       menthol-zinc oxide (CALMOSEPTINE) 0.44-20.6 % OINT ointment Apply 1 g topically 2 times daily       menthol-zinc oxide (CALMOSEPTINE) 0.44-20.6 % OINT ointment Apply topically 2 times daily as needed for skin protection       metoprolol (LOPRESSOR) 50 MG tablet TAKE ONE TABLET BY MOUTH TWICE DAILY 180 tablet 3     omeprazole (PRILOSEC) 20 MG DR capsule Take 1 capsule (20 mg) by mouth 2 times daily (before meals) 60 capsule 0     ONDANSETRON PO Take 4 mg by mouth every 8 hours as needed for nausea       order for DME Equipment being ordered: Walker Wheels () and Walker ()  Treatment Diagnosis: gait instability 1 each 0     order for DME Light weight wheelchair Body mass index is 19.08 kg/(m^2). 1 Device 0     order for DME ANTOINE stockings- below knee. 1 Package 0     polyethylene glycol (MIRALAX/GLYCOLAX) Packet Take 17 g by mouth daily as needed for constipation 7 packet      senna-docusate (SENOKOT-S;PERICOLACE) 8.6-50 MG per tablet Take 1 tablet by mouth 2 times daily as needed        sucralfate (CARAFATE) 1 GM/10ML suspension Take 10 mLs (1 g) by mouth 4 times daily (before meals and nightly) 1200 mL 0     traZODone (DESYREL) 50 MG tablet Take 50 mg by mouth At Bedtime       trolamine salicylate (ASPERCREME) 10 % external cream Apply topically 2 times daily Apply to affected areas right knee twice daily AND BID PRN           REVIEW OF SYSTEMS:  No chest pain, shortness of breath, fevers, chills, headache, nausea, vomiting, dysuria or bowel abnormalities.  Appetite is  fair.  No pain except occ R shoulder.    Objective:  /65   Pulse 66   Resp 18   SpO2 94%   Exam:  GENERAL APPEARANCE:  Alert, in no distress, cooperative  ENT:  Mouth and posterior oropharynx normal, moist mucous membranes, [place], bilat ear canals free of cerumen, s/p cerumen removal with curette R ear canal  EYES:  EOM,  conjunctivae, lids, pupils and irises normal, PERRL  NECK:  No adenopathy,masses or thyromegaly, FROM  RESP:  respiratory effort and palpation of chest normal, lungs clear to auscultation , no respiratory distress  CV:  Palpation and auscultation of heart done , regular rate and rhythm, no murmur, rub, or gallop, no edema  ABDOMEN:  normal bowel sounds, soft, nontender, no hepatosplenomegaly or other masses, no guarding or rebound  M/S:   gait steady with walker. flexed at waist. bony deformity of hands, some decreased ROM R shoulder  NEURO:   Cranial nerves 2-12 are normal tested and grossly at patient's baseline, alert, speech clear  PSYCH:  insight and judgement impaired, memory impaired , affect and mood normal    Labs:     Most Recent 3 CBC's:  Recent Labs   Lab Test 09/24/19  1700 09/13/19  0659 09/11/19  1934   WBC 5.0 4.1 5.9   HGB 9.8* 9.9* 10.9*   MCV 90 90 91    185 215     Most Recent 3 BMP's:  Recent Labs   Lab Test 09/24/19  1700 09/13/19  0659 09/11/19  1934    134 131*   POTASSIUM 4.4 3.8 4.2   CHLORIDE 101 103 101   CO2 28 25 24   BUN 26 9 24   CR 0.67 0.54 0.64   ANIONGAP 5 6 6   REILLY 8.5 8.1* 8.4*   GLC 91 93 97       ASSESSMENT/PLAN:  Impacted cerumen of right ear  Cerumen clear s/p removal with curette   - REMOVE IMPACTED CERUMEN  1. Monitor for further decreased hearing acuity-appears to hear better s/p cerumen removal  2. Monitor for further cerumen impaction-repeat debrox gtts  3. Spoke with dtr. About pocket talker device resident could use, unable to use hearing aids    Primary osteoarthritis involving multiple joints  R shoulder pain improved  1. Cont. voltaren gel  2. Cont. tyleol  3. Monitor for increased R shoulder pain with wb, using walker  4. Monitor for decreased activity level    Primary insomnia  Improved since last visit with better pain controlled.  Ongoing nocturia  1. Cont. Pain meds as above  2. Cont. Hs trazodone  3. Monitor for increased freq; of nocturia  4.  Instructed to taper down fluid intake later in day      Electronically signed by:  NICKOLAS Hallman CNP

## 2019-10-30 NOTE — LETTER
10/30/2019        RE: Batsheva Barkley  [facility]  [address]  [facility] [address]        [facility]  [place] Medical Record Number:  [id number]  Place of Service where encounter took place:  [facility] [[id number]]  Chief Complaint   Patient presents with     Cerumen (impacted)       HPI:    Batsheva Barkley  is a 85 year old (11/6/1933), who is being seen today for an episodic care visit.  HPI information obtained from: facility chart records, facility staff, patient report, [facility] chart review and family/first contact dtr report. Today's concern is: cerumen, OA, insomnia.  Per dtr has had further decreased hearing acuity. Has cerumen R ear. No cerumen L ear.  Has tried hearing aids in past, however due to narrow ear canals, unable to keep hearing aids in place.  Reports R shoulder pain improved since starting voltaren gel to area.  Also taking tylenol.  Reports nocturia, however sleeping better at hs due to decreased R shoulder pain.       Past Medical and Surgical History reviewed in Epic today.    MEDICATIONS:  Current Outpatient Medications   Medication Sig Dispense Refill     acetaminophen (TYLENOL) 500 MG tablet Take 1,000 mg by mouth 3 times daily        alum & mag hydroxide-simethicone (MYLANTA/MAALOX) 200-200-20 MG/5ML SUSP suspension Take 10 mLs by mouth 2 times daily as needed        aspirin (ASA) 81 MG chewable tablet Take 81 mg by mouth daily       Atorvastatin Calcium (LIPITOR PO) Take 20 mg by mouth At Bedtime        carboxymethylcellulose PF (REFRESH PLUS) 0.5 % ophthalmic solution Place 2 drops into both eyes 2 times daily       carboxymethylcellulose PF (REFRESH PLUS) 0.5 % ophthalmic solution Place 2 drops into both eyes 4 times daily as needed for dry eyes       diclofenac (VOLTAREN) 1 % topical gel Place 2 g onto the skin 2 times daily And every day prn       loperamide (IMODIUM) 2 MG capsule Take 2 mg by mouth 4  times daily as needed for diarrhea        losartan (COZAAR) 50 MG tablet Take 1 tablet (50 mg) by mouth 2 times daily 180 tablet 3     Menthol ([employer] COUGH DROPS SUGAR FREE MT) Take 1 lozenge by mouth 4 times daily as needed       menthol-zinc oxide (CALMOSEPTINE) 0.44-20.6 % OINT ointment Apply 1 g topically 2 times daily       menthol-zinc oxide (CALMOSEPTINE) 0.44-20.6 % OINT ointment Apply topically 2 times daily as needed for skin protection       metoprolol (LOPRESSOR) 50 MG tablet TAKE ONE TABLET BY MOUTH TWICE DAILY 180 tablet 3     omeprazole (PRILOSEC) 20 MG DR capsule Take 1 capsule (20 mg) by mouth 2 times daily (before meals) 60 capsule 0     ONDANSETRON PO Take 4 mg by mouth every 8 hours as needed for nausea       order for DME Equipment being ordered: Walker Wheels () and Walker ()  Treatment Diagnosis: gait instability 1 each 0     order for DME Light weight wheelchair Body mass index is 19.08 kg/(m^2). 1 Device 0     order for DME ANTOINE stockings- below knee. 1 Package 0     polyethylene glycol (MIRALAX/GLYCOLAX) Packet Take 17 g by mouth daily as needed for constipation 7 packet      senna-docusate (SENOKOT-S;PERICOLACE) 8.6-50 MG per tablet Take 1 tablet by mouth 2 times daily as needed        sucralfate (CARAFATE) 1 GM/10ML suspension Take 10 mLs (1 g) by mouth 4 times daily (before meals and nightly) 1200 mL 0     traZODone (DESYREL) 50 MG tablet Take 50 mg by mouth At Bedtime       trolamine salicylate (ASPERCREME) 10 % external cream Apply topically 2 times daily Apply to affected areas right knee twice daily AND BID PRN           REVIEW OF SYSTEMS:  No chest pain, shortness of breath, fevers, chills, headache, nausea, vomiting, dysuria or bowel abnormalities.  Appetite is  fair.  No pain except occ R shoulder.    Objective:  /65   Pulse 66   Resp 18   SpO2 94%   Exam:  GENERAL APPEARANCE:  Alert, in no distress, cooperative  ENT:  Mouth and posterior oropharynx normal, moist  mucous membranes, [place], bilat ear canals free of cerumen, s/p cerumen removal with curette R ear canal  EYES:  EOM, conjunctivae, lids, pupils and irises normal, PERRL  NECK:  No adenopathy,masses or thyromegaly, FROM  RESP:  respiratory effort and palpation of chest normal, lungs clear to auscultation , no respiratory distress  CV:  Palpation and auscultation of heart done , regular rate and rhythm, no murmur, rub, or gallop, no edema  ABDOMEN:  normal bowel sounds, soft, nontender, no hepatosplenomegaly or other masses, no guarding or rebound  M/S:   gait steady with walker. flexed at waist. bony deformity of hands, some decreased ROM R shoulder  NEURO:   Cranial nerves 2-12 are normal tested and grossly at patient's baseline, alert, speech clear  PSYCH:  insight and judgement impaired, memory impaired , affect and mood normal    Labs:     Most Recent 3 CBC's:  Recent Labs   Lab Test 09/24/19  1700 09/13/19  0659 09/11/19  1934   WBC 5.0 4.1 5.9   HGB 9.8* 9.9* 10.9*   MCV 90 90 91    185 215     Most Recent 3 BMP's:  Recent Labs   Lab Test 09/24/19  1700 09/13/19  0659 09/11/19  1934    134 131*   POTASSIUM 4.4 3.8 4.2   CHLORIDE 101 103 101   CO2 28 25 24   BUN 26 9 24   CR 0.67 0.54 0.64   ANIONGAP 5 6 6   REILLY 8.5 8.1* 8.4*   GLC 91 93 97       ASSESSMENT/PLAN:  Impacted cerumen of right ear  Cerumen clear s/p removal with curette   - REMOVE IMPACTED CERUMEN  1. Monitor for further decreased hearing acuity-appears to hear better s/p cerumen removal  2. Monitor for further cerumen impaction-repeat debrox gtts  3. Spoke with dtr. About pocket talker device resident could use, unable to use hearing aids    Primary osteoarthritis involving multiple joints  R shoulder pain improved  1. Cont. voltaren gel  2. Cont. tyleol  3. Monitor for increased R shoulder pain with wb, using walker  4. Monitor for decreased activity level    Primary insomnia  Improved since last visit with better pain controlled.   Ongoing nocturia  1. Cont. Pain meds as above  2. Cont. Hs trazodone  3. Monitor for increased freq; of nocturia  4. Instructed to taper down fluid intake later in day      Electronically signed by:  NICKOLAS Hallman CNP             Sincerely,        NICKOLAS Hallman CNP

## 2019-11-05 NOTE — PROGRESS NOTES
[facility] Care Coordination Contact    2nd Attempt: Signed Letter not received from [place] [facility], resent per process.   Radha Rubio  Case Management Specialist  [facility]  [phone]

## 2019-11-27 ENCOUNTER — ASSISTED LIVING VISIT (OUTPATIENT)
Dept: GERIATRICS | Facility: CLINIC | Age: 84
End: 2019-11-27
Payer: COMMERCIAL

## 2019-11-27 DIAGNOSIS — M15.0 PRIMARY OSTEOARTHRITIS INVOLVING MULTIPLE JOINTS: Primary | ICD-10-CM

## 2019-11-27 NOTE — PROGRESS NOTES
Ortho Nursing home visit    Batsheva Barkley is a 86 year old female who resides at [facility]    Patient is seen today for Chronic Right knee pain, hs or RA. Multiple joints, has had cortisone injections in the past, with some pain relief; requesting same today.  Discussed with NP today prior to visit.      Past Medical History:   Diagnosis Date     CAD (coronary artery disease) 6/20/05    [place], transient CHF     Cardiomyopathy (H)      CHF NYHA class III, chronic, systolic (H) 1/25/2018     Coronary atherosclerosis 6/20/2005    circ vessel was stented;  had a 50% LAD lesion which was not treated Problem list name updated by automated process. Provider to review     Edema 1/26/2015     Generalized osteoarthrosis     knees ;; L total kne 10/09     Hypertension goal BP (blood pressure) < 140/90 1/21/2015     Leg weakness, bilateral 2/13/2018     Mitral regurgitation     mod     Mixed hyperlipidemia      Osteoporosis      Physical deconditioning 1/21/2015     Total urinary incontinence 12/27/2017      Past Surgical History:   Procedure Laterality Date     COLONOSCOPY  3/05     ENT SURGERY       ESOPHAGOSCOPY, GASTROSCOPY, DUODENOSCOPY (EGD), COMBINED N/A 4/14/2016    Procedure: COMBINED ESOPHAGOSCOPY, GASTROSCOPY, DUODENOSCOPY (EGD), BIOPSY SINGLE OR MULTIPLE;  Surgeon: Jonathan Gramajo MD;  Location:  GI     EYE SURGERY       HEART CATH, ANGIOPLASTY  2005    RONI to left circ     L bunion + hammer toes  1/04, 4/04     L total knee replacement  10/2009      ovarian cyst surgery          Allergies   Allergen Reactions     Codeine      nausea, HA     Lisinopril Cough      There were no vitals taken for this visit.     Exam: Seated, allows PROM to right knee, with some pain, flexion, extension, Lig intact, valgus deformity. Some crepitus,  No noted effusion.      X-rays show Advanced DJD; right knee    ASSESSMENT / PLAN:     After R&B and consent; Right knee prepped; injected, with 1cc depo medrol,  4 ml 1% lidocaine . Into medial joint space;  Tolerated well, no complaints;      [id number]          J.Tyson OPA-C  [phone] Cell

## 2019-11-29 ENCOUNTER — APPOINTMENT (OUTPATIENT)
Dept: CARDIOLOGY | Facility: CLINIC | Age: 84
End: 2019-11-29
Attending: PHYSICIAN ASSISTANT
Payer: COMMERCIAL

## 2019-11-29 ENCOUNTER — HOSPITAL ENCOUNTER (OUTPATIENT)
Facility: CLINIC | Age: 84
Setting detail: OBSERVATION
Discharge: HOME | End: 2019-11-30
Attending: EMERGENCY MEDICINE | Admitting: INTERNAL MEDICINE
Payer: COMMERCIAL

## 2019-11-29 ENCOUNTER — APPOINTMENT (OUTPATIENT)
Dept: GENERAL RADIOLOGY | Facility: CLINIC | Age: 84
End: 2019-11-29
Attending: EMERGENCY MEDICINE
Payer: COMMERCIAL

## 2019-11-29 DIAGNOSIS — K44.9 HIATAL HERNIA: ICD-10-CM

## 2019-11-29 DIAGNOSIS — R07.9 CHEST PAIN, UNSPECIFIED TYPE: ICD-10-CM

## 2019-11-29 LAB
ANION GAP SERPL CALCULATED.3IONS-SCNC: 6 MMOL/L (ref 3–14)
BASOPHILS # BLD AUTO: 0 10E9/L (ref 0–0.2)
BASOPHILS NFR BLD AUTO: 0.4 %
BUN SERPL-MCNC: 25 MG/DL (ref 7–30)
CALCIUM SERPL-MCNC: 8.6 MG/DL (ref 8.5–10.1)
CHLORIDE SERPL-SCNC: 106 MMOL/L (ref 94–109)
CO2 SERPL-SCNC: 25 MMOL/L (ref 20–32)
CREAT SERPL-MCNC: 0.79 MG/DL (ref 0.52–1.04)
DIFFERENTIAL METHOD BLD: ABNORMAL
EOSINOPHIL # BLD AUTO: 0.1 10E9/L (ref 0–0.7)
EOSINOPHIL NFR BLD AUTO: 2 %
ERYTHROCYTE [DISTWIDTH] IN BLOOD BY AUTOMATED COUNT: 14.5 % (ref 10–15)
GFR SERPL CREATININE-BSD FRML MDRD: 67 ML/MIN/{1.73_M2}
GLUCOSE SERPL-MCNC: 107 MG/DL (ref 70–99)
HCT VFR BLD AUTO: 28.9 % (ref 35–47)
HGB BLD-MCNC: 8.5 G/DL (ref 11.7–15.7)
IMM GRANULOCYTES # BLD: 0 10E9/L (ref 0–0.4)
IMM GRANULOCYTES NFR BLD: 0.4 %
LYMPHOCYTES # BLD AUTO: 1 10E9/L (ref 0.8–5.3)
LYMPHOCYTES NFR BLD AUTO: 22.4 %
MCH RBC QN AUTO: 25.8 PG (ref 26.5–33)
MCHC RBC AUTO-ENTMCNC: 29.4 G/DL (ref 31.5–36.5)
MCV RBC AUTO: 88 FL (ref 78–100)
MONOCYTES # BLD AUTO: 0.8 10E9/L (ref 0–1.3)
MONOCYTES NFR BLD AUTO: 16.5 %
NEUTROPHILS # BLD AUTO: 2.7 10E9/L (ref 1.6–8.3)
NEUTROPHILS NFR BLD AUTO: 58.3 %
NRBC # BLD AUTO: 0 10*3/UL
NRBC BLD AUTO-RTO: 0 /100
PLATELET # BLD AUTO: 263 10E9/L (ref 150–450)
POTASSIUM SERPL-SCNC: 3.9 MMOL/L (ref 3.4–5.3)
RBC # BLD AUTO: 3.29 10E12/L (ref 3.8–5.2)
SODIUM SERPL-SCNC: 137 MMOL/L (ref 133–144)
TROPONIN I SERPL-MCNC: <0.015 UG/L (ref 0–0.04)
WBC # BLD AUTO: 4.6 10E9/L (ref 4–11)

## 2019-11-29 PROCEDURE — 85025 COMPLETE CBC W/AUTO DIFF WBC: CPT | Performed by: EMERGENCY MEDICINE

## 2019-11-29 PROCEDURE — 36415 COLL VENOUS BLD VENIPUNCTURE: CPT | Performed by: PHYSICIAN ASSISTANT

## 2019-11-29 PROCEDURE — 25000132 ZZH RX MED GY IP 250 OP 250 PS 637: Performed by: PHYSICIAN ASSISTANT

## 2019-11-29 PROCEDURE — 71046 X-RAY EXAM CHEST 2 VIEWS: CPT

## 2019-11-29 PROCEDURE — 93306 TTE W/DOPPLER COMPLETE: CPT | Mod: 26 | Performed by: INTERNAL MEDICINE

## 2019-11-29 PROCEDURE — 93306 TTE W/DOPPLER COMPLETE: CPT

## 2019-11-29 PROCEDURE — 80048 BASIC METABOLIC PNL TOTAL CA: CPT | Performed by: EMERGENCY MEDICINE

## 2019-11-29 PROCEDURE — G0378 HOSPITAL OBSERVATION PER HR: HCPCS

## 2019-11-29 PROCEDURE — 25000132 ZZH RX MED GY IP 250 OP 250 PS 637: Performed by: EMERGENCY MEDICINE

## 2019-11-29 PROCEDURE — 99220 ZZC INITIAL OBSERVATION CARE,LEVL III: CPT | Performed by: PHYSICIAN ASSISTANT

## 2019-11-29 PROCEDURE — 99285 EMERGENCY DEPT VISIT HI MDM: CPT | Mod: 25

## 2019-11-29 PROCEDURE — 93005 ELECTROCARDIOGRAM TRACING: CPT

## 2019-11-29 PROCEDURE — 25000125 ZZHC RX 250: Performed by: EMERGENCY MEDICINE

## 2019-11-29 PROCEDURE — 84484 ASSAY OF TROPONIN QUANT: CPT | Mod: 91 | Performed by: PHYSICIAN ASSISTANT

## 2019-11-29 PROCEDURE — 84484 ASSAY OF TROPONIN QUANT: CPT | Performed by: EMERGENCY MEDICINE

## 2019-11-29 RX ORDER — ASPIRIN 81 MG/1
81 TABLET, CHEWABLE ORAL DAILY
Status: DISCONTINUED | OUTPATIENT
Start: 2019-11-30 | End: 2019-11-30 | Stop reason: HOSPADM

## 2019-11-29 RX ORDER — ATORVASTATIN CALCIUM 20 MG/1
20 TABLET, FILM COATED ORAL AT BEDTIME
Status: DISCONTINUED | OUTPATIENT
Start: 2019-11-29 | End: 2019-11-30 | Stop reason: HOSPADM

## 2019-11-29 RX ORDER — NALOXONE HYDROCHLORIDE 0.4 MG/ML
.1-.4 INJECTION, SOLUTION INTRAMUSCULAR; INTRAVENOUS; SUBCUTANEOUS
Status: DISCONTINUED | OUTPATIENT
Start: 2019-11-29 | End: 2019-11-30 | Stop reason: HOSPADM

## 2019-11-29 RX ORDER — ACETAMINOPHEN 325 MG/1
650 TABLET ORAL EVERY 4 HOURS PRN
Status: DISCONTINUED | OUTPATIENT
Start: 2019-11-29 | End: 2019-11-30 | Stop reason: HOSPADM

## 2019-11-29 RX ORDER — METOPROLOL TARTRATE 50 MG
50 TABLET ORAL 2 TIMES DAILY
Status: DISCONTINUED | OUTPATIENT
Start: 2019-11-29 | End: 2019-11-30 | Stop reason: HOSPADM

## 2019-11-29 RX ORDER — CARBOXYMETHYLCELLULOSE SODIUM 5 MG/ML
2 SOLUTION/ DROPS OPHTHALMIC EVERY 4 HOURS PRN
COMMUNITY
End: 2020-09-08

## 2019-11-29 RX ORDER — LOSARTAN POTASSIUM 50 MG/1
50 TABLET ORAL 2 TIMES DAILY
Status: DISCONTINUED | OUTPATIENT
Start: 2019-11-29 | End: 2019-11-30 | Stop reason: HOSPADM

## 2019-11-29 RX ORDER — NITROGLYCERIN 0.4 MG/1
0.4 TABLET SUBLINGUAL EVERY 5 MIN PRN
Status: DISCONTINUED | OUTPATIENT
Start: 2019-11-29 | End: 2019-11-30 | Stop reason: HOSPADM

## 2019-11-29 RX ORDER — LIDOCAINE 40 MG/G
CREAM TOPICAL
Status: DISCONTINUED | OUTPATIENT
Start: 2019-11-29 | End: 2019-11-30 | Stop reason: HOSPADM

## 2019-11-29 RX ORDER — ALUMINA, MAGNESIA, AND SIMETHICONE 2400; 2400; 240 MG/30ML; MG/30ML; MG/30ML
30 SUSPENSION ORAL EVERY 4 HOURS PRN
Status: DISCONTINUED | OUTPATIENT
Start: 2019-11-29 | End: 2019-11-30 | Stop reason: HOSPADM

## 2019-11-29 RX ORDER — LANOLIN ALCOHOL/MO/W.PET/CERES
3 CREAM (GRAM) TOPICAL
Status: DISCONTINUED | OUTPATIENT
Start: 2019-11-29 | End: 2019-11-30 | Stop reason: HOSPADM

## 2019-11-29 RX ORDER — ACETAMINOPHEN 500 MG
500 TABLET ORAL DAILY PRN
COMMUNITY
End: 2020-08-07

## 2019-11-29 RX ADMIN — MELATONIN TAB 3 MG 3 MG: 3 TAB at 21:29

## 2019-11-29 RX ADMIN — OMEPRAZOLE 20 MG: 20 CAPSULE, DELAYED RELEASE ORAL at 16:36

## 2019-11-29 RX ADMIN — LIDOCAINE HYDROCHLORIDE 30 ML: 20 SOLUTION ORAL; TOPICAL at 11:00

## 2019-11-29 RX ADMIN — METOPROLOL TARTRATE 50 MG: 50 TABLET, FILM COATED ORAL at 21:27

## 2019-11-29 RX ADMIN — ATORVASTATIN CALCIUM 20 MG: 20 TABLET, FILM COATED ORAL at 21:28

## 2019-11-29 ASSESSMENT — MIFFLIN-ST. JEOR: SCORE: 901.28

## 2019-11-29 NOTE — ED PROVIDER NOTES
History     Chief Complaint:  Chest Pain    HPI   Batsheva Barkley is a 86 year old female with a history of myocardial infarction, coronary artery disease, hypertension, and hyperlipidemia who presents with chest pain. The patient reports that this morning during breakfast she started to have chest tightness and shortness of breath with nausea and diaphoresis. She notes difficulty swallowing due to this tightness. She states her chest tightness is worse with deep breaths. She reports this is not similar to her past heart attack and believes this is reflux.  She states a lot of people are sick at her [facility] where she lives. She denies choking, fevers, chills, and vomiting.    CARDIAC RISK FACTORS:  Sex:    Female  Tobacco:   Former  Hypertension:   Yes  Hyperlipidemia:  Yes  Diabetes:   No  Family History:  No    PE/DVT RISK FACTORS:  Sex:    Female  Hormones:   No  Tobacco:   Former  Cancer:   No  Travel:   No  Surgery:   No  Other immobilization: No  Personal history:  No  Family history:  No     Allergies:  Codeine  Lisinopril    Medications:    Prilosec  Carafate  Miralax  Mylata  Aspirin 81 mg  Lipitor  Imodium  Ondansetron  Dysrel  Senokot-S    Past Medical History:    Coronary artery disease  Cardiomyopathy  Congestive heart failure class III  Coronary atherosclerosis  Osteoarthrosis  Hypertension  Mitral regurgitation  Hyperlipidemia  Osteoporosis  Total urinary incontinence  Heart attack    Past Surgical History:    ENT surgery  Eye surgery  Heart cath, angioplasty  L bunion and hammer toes  L total knee replacement  Ovarian cyst surgery    Family History:    Cerebrovascular disease (Mother)  Heart (Father)  Breast cancer (Sister)  Heart disease (Sister)    Social History:  Smoking status: Former  Alcohol use: No  Drug use: No  PCP: Tacos Beasley Yony  Presents to the ED with daughter  Marital Status:   [5]    Review of Systems   All other systems reviewed and are negative.      Physical Exam      Patient Vitals for the past 24 hrs:   BP Temp Temp src Pulse Heart Rate Resp SpO2   11/29/19 1200 114/56 -- -- 58 60 20 --   11/29/19 1130 116/54 -- -- 60 61 17 98 %   11/29/19 1100 119/63 -- -- 64 59 25 100 %   11/29/19 1035 136/64 97.7  F (36.5  C) Oral 61 -- 16 100 %     Physical Exam  General: Resting on the bed.  Head: No obvious trauma to head.  Ears, Nose, Throat:  External ears normal.  Nose normal.   Eyes:  Conjunctivae clear.  Pupils are equal, round, and reactive.   Neck: Normal range of motion.  Neck supple.   CV: Regular rate and rhythm.  No murmurs.    2+ radial pulses  Respiratory: Effort normal and breath sounds normal.  No wheezing or crackles.   Gastrointestinal: Soft.  No distension. There is no tenderness.  There is no rigidity, no rebound and no guarding.   Musculoskeletal: Non tender non edematous calves  Neuro: Alert. Moving all extremities appropriately.  Normal speech.    Skin: Skin is warm and dry.  bruising noted on the [facility]    Emergency Department Course   ECG (10:53:17):  Rate 59 bpm. GA interval 162. QRS duration 98. QT/QTc 440/435. P-R-T axes 10 4 63. Sinus bradycardia. Otherwise normal ECG. No significant change compared to EKG dated 9/11/19. Interpreted at 1102 by Jeri Pollard MD.     Imaging:  Radiographic findings were communicated with the patient who voiced understanding of the findings.    XR Chest 2 Views  There is a large hiatal hernia. This appears larger than  on the prior exam since it is now distended with gas and fluid,  measuring up to 11 x 6.8 cm. Vascular calcifications are seen. Heart  size is at the upper limits of normal but unchanged. Minimal left  costophrenic angle blunting which is probably unchanged. No  infiltrates, mass lesions or vascular congestion.  As read by Radiology.     Laboratory:  CBC: HGB 8.5 (L) o/w WNL (WBC 4.6, )  BMP: Glucose 107 (H) o/w WNL (Creatinine 0.79)  Troponin I (Collected 1055):  <0.015    Procedures:  None    Interventions:  1100: GI Cocktail - Maalox 15 mL, Viscous Lidocaine 15 mL, 30 mL suspension PO      Emergency Department Course:  Past medical records, nursing notes, and vitals reviewed.  1039: I performed an exam of the patient and obtained history, as documented above.  EKG performed, results above.  The patient was sent for a chest x-ray while in the emergency department, findings above.  Blood drawn.    1205: Findings and plan explained to the Patient who consents to admission.     1234: Discussed the patient with Dr. James, who will admit the patient to an observation unit bed for further monitoring, evaluation, and treatment.         Impression & Plan    Medical Decision Makin-year-old female with history of prior CAD presents with chest pain.  Vital signs are reassuring.  Broad differential was pursued including but not limited to electrolyte metabolic or renal dysfunction, acid reflux, PE, dissection, pneumonia, pneumothorax, effusion, aspiration, acute coronary syndrome, etc.  CBC shows mild anemia worse from prior baseline in the 9-10 range.  No evidence of active bleeding otherwise.  BMP shows no acute electrolyte, metabolic, renal dysfunction.  Chest x-ray shows a hiatal hernia but no evidence acute pneumonia, pneumothorax, effusion.  No focal crackles or wheezing.  No suggestion of asthma or aspiration or COPD.  I discussed the read with the on-call radiologist, they did note some distention and gas fluid but this is not acute.  Patient does not have intractable pain or nausea.  No intractable vomiting.  Do not suspect incarcerated hernia.  EKG showed sinus rhythm no acute ST-T wave changes.  No evidence of focal ischemia.  No evidence of focal arrhythmia.  Troponin is negative.  Patient is overall rather high risk given her age, risk factors, heart score 5.  She opted to come in for observation.  Discussed with hospitalist who graciously accepted.    Diagnosis:     ICD-10-CM    1. Chest pain, unspecified type R07.9    2. Hiatal hernia K44.9      Disposition:  Admitted to observation unit    Mckinley Carranza  11/29/2019   [facility] EMERGENCY DEPARTMENT  I, Mckinley Carranza, am serving as a scribe at 10:39 AM on 11/29/2019 to document services personally performed by Jeri Pollard MD based on my observations and the provider's statements to me.      Jeri Pollard MD  11/29/19 8239

## 2019-11-29 NOTE — ED NOTES
[facility]  ED Nurse Handoff Report    Batsheva Barkley is a 86 year old female   ED Chief complaint: Chest Pain  . ED Diagnosis:   Final diagnoses:   Chest pain, unspecified type   Hiatal hernia     Allergies:   Allergies   Allergen Reactions     Codeine      nausea, HA     Lisinopril Cough       Code Status: Full Code  Activity level - Baseline/Home:  Independent. Activity Level - Current:   Stand by Assist. Lift room needed: No. Bariatric: No   Needed: No   Isolation: No. Infection: Not Applicable.     Vital Signs:   Vitals:    11/29/19 1035 11/29/19 1100 11/29/19 1130 11/29/19 1200   BP: 136/64 119/63 116/54 114/56   Pulse: 61 64 60 58   Resp: 16 25 17 20   Temp: 97.7  F (36.5  C)      TempSrc: Oral      SpO2: 100% 100% 98%        Cardiac Rhythm:  ,      Pain level:    Patient confused: No. Patient Falls Risk: Yes.   Elimination Status: Has voided   Patient Report - Initial Complaint: Patient brought in via EMS for chest pain with nausea & SOB that started at breakfast around 0830 this morning. Pain worsens with deep breathing. Patient has a a total of 324 mg of aspirin and 1 nitroglycerin. [facility]. ABC's intact.. Focused Assessment: Cardiac - Cardiac WDL: chest pain   Chest Pain Assessment - Associated Signs/Symptoms: nausea   Cardiac Monitoring - EKG Monitoring: Yes   Chest Pain Assessment - Character: tightness   Manage Acute Chest Pain - Chest Pain Intervention: cardiac biomarkers drawn; cardiac monitoring continued; cardiac monitor placed; 12-lead ECG obtained; activity minimized    Tests Performed: ekg, labs, chest xray. Abnormal Results:   Labs Ordered and Resulted from Time of ED Arrival Up to the Time of Departure from the ED   CBC WITH PLATELETS DIFFERENTIAL - Abnormal; Notable for the following components:       Result Value    RBC Count 3.29 (*)     Hemoglobin 8.5 (*)     Hematocrit 28.9 (*)     MCH 25.8 (*)     MCHC 29.4 (*)     All other components within  normal limits   BASIC METABOLIC PANEL - Abnormal; Notable for the following components:    Glucose 107 (*)     All other components within normal limits   TROPONIN I     XR Chest 2 Views   Preliminary Result   IMPRESSION: There is a large hiatal hernia. This appears larger than   on the prior exam since it is now distended with gas and fluid,   measuring up to 11 x 6.8 cm. Vascular calcifications are seen. Heart   size is at the upper limits of normal but unchanged. Minimal left   costophrenic angle blunting which is probably unchanged. No   infiltrates, mass lesions or vascular congestion.        .   Treatments provided: PIV  Family Comments: Raisa (daughter) at bedside  OBS brochure/video discussed/provided to patient:  Yes  ED Medications:   Medications   lidocaine (XYLOCAINE) 2 % 15 mL, alum & mag hydroxide-simethicone (MYLANTA ES/MAALOX  ES) 15 mL GI Cocktail (30 mLs Oral Given 11/29/19 1100)     Drips infusing:  No  For the majority of the shift, the patient's behavior Green. Interventions performed were NA.     Severe Sepsis OR Septic Shock Diagnosis Present: No      ED Nurse Name/Phone Number: Yanet Laguna RN,   12:18 PM    RECEIVING UNIT ED HANDOFF REVIEW    Above ED Nurse Handoff Report was reviewed: Yes  Reviewed by: Sally Thomas RN on November 29, 2019 at 12:56 PM

## 2019-11-29 NOTE — PLAN OF CARE
ROOM # 206-2    Living Situation (if not independent, order SW consult): [facility]   Facility name:  : Raisa Daughter @ [phone]    Activity level at baseline: Independent with rolling walker   Activity level on admit: SBA      Patient registered to observation; given Patient Bill of Rights; given the opportunity to ask questions about observation status and their plan of care.  Patient has been oriented to the observation room, bathroom and call light is in place.    Discussed discharge goals and expectations with patient/family.

## 2019-11-29 NOTE — PHARMACY-ADMISSION MEDICATION HISTORY
Admission medication history interview status for this patient is complete. See [facility] admission navigator for allergy information, prior to admission medications and immunization status.     Medication history interview source(s): [facility] records   Medication history resources written lists  Primary pharmacy: n/a     Changes made to PTA medication list:  Added: PRN APAP  Deleted: none   Changed: none     Actions taken by pharmacist (provider contacted, etc): Left reminder for provider to review med rec      Additional medication history information:None    Medication reconciliation/reorder completed by provider prior to medication history?  No     Do you take OTC medications (eg tylenol, ibuprofen, fish oil, eye/ear drops, etc)? No         Prior to Admission medications    Medication Sig Last Dose Taking? Auth Provider   acetaminophen (TYLENOL) 500 MG tablet Take 500 mg by mouth daily as needed for mild pain  Yes Unknown, Entered By History   acetaminophen (TYLENOL) 500 MG tablet Take 1,000 mg by mouth 3 times daily  11/29/2019 at 0800 Yes Reported, Patient   alum & mag hydroxide-simethicone (MYLANTA/MAALOX) 200-200-20 MG/5ML SUSP suspension Take 10 mLs by mouth 2 times daily as needed   Yes Reported, Patient   aspirin (ASA) 81 MG chewable tablet Take 81 mg by mouth daily 11/29/2019 at 0800 Yes Unknown, Entered By History   Atorvastatin Calcium (LIPITOR PO) Take 20 mg by mouth At Bedtime  11/28/2019 at 2000 Yes Reported, Patient   carboxymethylcellulose PF (REFRESH PLUS) 0.5 % ophthalmic solution Place 2 drops into both eyes every 4 hours as needed for dry eyes  Yes Unknown, Entered By History   carboxymethylcellulose PF (REFRESH PLUS) 0.5 % ophthalmic solution Place 2 drops into both eyes 2 times daily 11/29/2019 at 0800 Yes Unknown, Entered By History   diclofenac (VOLTAREN) 1 % topical gel Place 2 g onto the skin 2 times daily Apply to right shoulder  Yes Unknown, Entered By History   diclofenac  (VOLTAREN) 1 % topical gel Place 2 g onto the skin daily as needed for moderate pain Apply to right shoulder as needed for pain  Yes Unknown, Entered By History   loperamide (IMODIUM) 2 MG capsule Take 2 mg by mouth 4 times daily as needed for diarrhea   Yes Reported, Patient   losartan (COZAAR) 50 MG tablet Take 1 tablet (50 mg) by mouth 2 times daily 11/29/2019 at 0800 Yes Iona Huang APRN CNP   Menthol ([employer] COUGH DROPS SUGAR FREE MT) Take 1 lozenge by mouth 4 times daily as needed  Yes Reported, Patient   menthol-zinc oxide (CALMOSEPTINE) 0.44-20.6 % OINT ointment Apply 1 g topically 2 times daily 11/29/2019 at 0800 Yes Unknown, Entered By History   menthol-zinc oxide (CALMOSEPTINE) 0.44-20.6 % OINT ointment Apply topically 2 times daily as needed for skin protection  Yes Reported, Patient   metoprolol (LOPRESSOR) 50 MG tablet TAKE ONE TABLET BY MOUTH TWICE DAILY 11/29/2019 at 0800 Yes Lenin Perez MD   omeprazole (PRILOSEC) 20 MG DR capsule Take 1 capsule (20 mg) by mouth 2 times daily (before meals) 11/29/2019 at 0800 Yes Radha Metcalf PA-C   ONDANSETRON PO Take 4 mg by mouth every 8 hours as needed for nausea  Yes Reported, Patient   polyethylene glycol (MIRALAX/GLYCOLAX) Packet Take 17 g by mouth daily as needed for constipation  Yes Naye Amaya PA   senna-docusate (SENOKOT-S;PERICOLACE) 8.6-50 MG per tablet Take 1 tablet by mouth 2 times daily as needed   Yes Reported, Patient   sucralfate (CARAFATE) 1 GM/10ML suspension Take 10 mLs (1 g) by mouth 4 times daily (before meals and nightly) 11/29/2019 at 0800 Yes Radha Metcalf PA-C   traZODone (DESYREL) 50 MG tablet Take 50 mg by mouth At Bedtime 11/28/2019 at 2000 Yes Reported, Patient   trolamine salicylate (ASPERCREME) 10 % external cream Apply topically 2 times daily Apply to affected areas right knee twice daily AND BID PRN 11/29/2019 at 0800 Yes Reported, Patient   order for DME Equipment being ordered: Walker Wheels  () and Walker ()  Treatment Diagnosis: gait instability   Carri Burnett PA-C   order for DME Light weight wheelchair Body mass index is 19.08 kg/(m^2).   Lenin Perez MD   order for DME ANTOINE stockings- below knee.   Relga Yepez MD

## 2019-11-29 NOTE — ED TRIAGE NOTES
Patient brought in via EMS for chest pain with nausea & SOB that started at breakfast around 0830 this morning. Pain worsens with deep breathing. Patient has a a total of 324 mg of aspirin and 1 nitroglycerin. [facility] at [facility]. ABC's intact.

## 2019-11-29 NOTE — H&P
[facility] Outpatient / Observation Unit  History and Physical Exam     Batsheva Barkley MRN# [id number]   YOB: 1933 Age: 86 year old      Date of Admission:  11/29/2019    Primary care provider: [facility]          Assessment:   Batsheva Barkley is a 86 year old female with a PMH significant for CAD, hx of MI with associated cardiogenic shock in 2005 s/p stent to left circumflex, HTN, GERD, systolic CHF and HLP, who presents with chest pain.   Work up in ED reveals: VSS.  BMP is unremarkable. CBC shows hgb of 8.5, previously 9.8 in 9/2019. Troponin is undetectable. EKG sinus bradycardia. CXR shows large hiatal hernia, otherwise clear. She received a GI cocktail in the ED. She received 324 mg ASA and 1 nitroglycerin with EMS.  Patient is being registered to observation for further evaluation and to rule out possible ACS.     1. Acute Chest pain: likely related to large hiatal hernia causing reflux and possible esophageal spasm. Has hx of CAD, s/p MI with arrest in 2005, stent placed to left circumflex. No interventions since that time. Will monitor on tele, trend troponins and get an echocardiogram, if unremarkable, consider follow up with PCP vs cardiology as an outpatient to determine if stress testing is indicated (unable to do Lexiscan on the weekend). Already on carafate, consider alternative PPI, consider adding short course of Zantac.  2. HTN - resume home losartan with parameters and metoprolol  3. HLP - resume home statin  4. GERD - has known hiatal hernia, larger per CXR today. Already on PPI and carafate and PRN Mylanta. Consider adding H2 blocker  5. Systolic CHF - appear euvolemic. Last echo in 2017, EF 40% with mild inferior and inferoseptal hypokinesis, severe inferolateral hypokinesis.            Plan:     1. [place] to Observation  2. Continue telemetry  3. Follow serial troponin  4. Stress testing with Echocardiogram  5. Cont Aspirin EC 81 mg po daily  6. Blood pressure control  7.  Morphine and nitroglycerine PRN for pain    8. regular diet, No caffeine if Nuclear testing selected  9. DVT prophylaxis: pt at low risk, encourage ambulation  10. Code Status: full code  11. Dispo: anticipate discharge home in AM, could go tonight if work up is unremarkable.                  Chief Complaint:   Chest Pain         History of Present Illness:   Batsheva Barkley is a 86 year old female with a PMH significant for CAD, hx of MI with associated cardiogenic shock in 2005 s/p stent to left circumflex, HTN, GERD, systolic CHF and HLP, who presents with chest pain. Pt reports onset of chest tightness and difficulty breathing this morning while eating her oatmeal for breakfast. She also noted mild nausea and diaphoresis. She tried taking deep breaths but was unable and made her pain worse. She states this is not similar to when she had a MI in the past. She denies recent similar sx. She denies fever, chills, abd pain, vomiting, diarrhea or dysuria.                Past Medical History:     Past Medical History:   Diagnosis Date     CAD (coronary artery disease) 6/20/05    [place], transient CHF     Cardiomyopathy (H)      CHF NYHA class III, chronic, systolic (H) 1/25/2018     Coronary atherosclerosis 6/20/2005    circ vessel was stented;  had a 50% LAD lesion which was not treated Problem list name updated by automated process. Provider to review     Edema 1/26/2015     Generalized osteoarthrosis     knees ;; L total kne 10/09     Hypertension goal BP (blood pressure) < 140/90 1/21/2015     Leg weakness, bilateral 2/13/2018     Mitral regurgitation     mod     Mixed hyperlipidemia      Osteoporosis      Physical deconditioning 1/21/2015     Total urinary incontinence 12/27/2017               Past Surgical History:     Past Surgical History:   Procedure Laterality Date     COLONOSCOPY  3/05     ENT SURGERY       ESOPHAGOSCOPY, GASTROSCOPY, DUODENOSCOPY (EGD), COMBINED N/A 4/14/2016     Procedure: COMBINED ESOPHAGOSCOPY, GASTROSCOPY, DUODENOSCOPY (EGD), BIOPSY SINGLE OR MULTIPLE;  Surgeon: Jonathan Gramajo MD;  Location:  GI     EYE SURGERY       HEART CATH, ANGIOPLASTY  2005    RONI to left circ     L bunion + hammer toes  1/04, 4/04     L total knee replacement  10/2009      ovarian cyst surgery                 Social History:     Social History     Socioeconomic History     Marital status:      Spouse name: Gene     Number of children: 6     Years of education: Not on file     Highest education level: Not on file   Occupational History     Not on file   Social Needs     Financial resource strain: Not on file     Food insecurity:     Worry: Not on file     Inability: Not on file     Transportation needs:     Medical: Not on file     Non-medical: Not on file   Tobacco Use     Smoking status: Former Smoker     Smokeless tobacco: Never Used     Tobacco comment: only smoked for 3 years.   Substance and Sexual Activity     Alcohol use: No     Drug use: No     Sexual activity: Yes     Partners: Male   Lifestyle     Physical activity:     Days per week: Not on file     Minutes per session: Not on file     Stress: Not on file   Relationships     Social connections:     Talks on phone: Not on file     Gets together: Not on file     Attends Christian service: Not on file     Active member of club or organization: Not on file     Attends meetings of clubs or organizations: Not on file     Relationship status: Not on file     Intimate partner violence:     Fear of current or ex partner: Not on file     Emotionally abused: Not on file     Physically abused: Not on file     Forced sexual activity: Not on file   Other Topics Concern     Parent/sibling w/ CABG, MI or angioplasty before 65F 55M? Not Asked      Service Not Asked     Blood Transfusions Not Asked     Caffeine Concern No     Comment: decaf coffee and tea      Occupational Exposure Not Asked     Hobby Hazards Not Asked     Sleep  Concern Not Asked     Stress Concern Not Asked     Weight Concern Not Asked     Special Diet No     Back Care Not Asked     Exercise Yes     Comment: walk everyday      Bike Helmet Not Asked     Seat Belt Not Asked     Self-Exams Not Asked   Social History Narrative     Not on file               Family History:     Family History   Problem Relation Age of Onset     Cerebrovascular Disease Mother      Respiratory Father         heart     Breast Cancer Sister      Heart Disease Sister      Breast Cancer Sister               Allergies:      Allergies   Allergen Reactions     Codeine      nausea, HA     Lisinopril Cough               Medications:     Prior to Admission medications    Medication Sig Last Dose Taking? Auth Provider   acetaminophen (TYLENOL) 500 MG tablet Take 1,000 mg by mouth 3 times daily    Reported, Patient   alum & mag hydroxide-simethicone (MYLANTA/MAALOX) 200-200-20 MG/5ML SUSP suspension Take 10 mLs by mouth 2 times daily as needed    Reported, Patient   aspirin (ASA) 81 MG chewable tablet Take 81 mg by mouth daily   Unknown, Entered By History   Atorvastatin Calcium (LIPITOR PO) Take 20 mg by mouth At Bedtime    Reported, Patient   carboxymethylcellulose PF (REFRESH PLUS) 0.5 % ophthalmic solution Place 2 drops into both eyes 2 times daily   Unknown, Entered By History   carboxymethylcellulose PF (REFRESH PLUS) 0.5 % ophthalmic solution Place 2 drops into both eyes 4 times daily as needed for dry eyes   Unknown, Entered By History   diclofenac (VOLTAREN) 1 % topical gel Place 2 g onto the skin 2 times daily And every day prn   Reported, Patient   loperamide (IMODIUM) 2 MG capsule Take 2 mg by mouth 4 times daily as needed for diarrhea    Reported, Patient   losartan (COZAAR) 50 MG tablet Take 1 tablet (50 mg) by mouth 2 times daily   Iona Huang E, APRN CNP   Menthol ([employer] COUGH DROPS SUGAR FREE MT) Take 1 lozenge by mouth 4 times daily as needed   Reported, Patient   menthol-zinc oxide  (CALMOSEPTINE) 0.44-20.6 % OINT ointment Apply 1 g topically 2 times daily   Unknown, Entered By History   menthol-zinc oxide (CALMOSEPTINE) 0.44-20.6 % OINT ointment Apply topically 2 times daily as needed for skin protection   Reported, Patient   metoprolol (LOPRESSOR) 50 MG tablet TAKE ONE TABLET BY MOUTH TWICE DAILY   Lenin Perez MD   omeprazole (PRILOSEC) 20 MG DR capsule Take 1 capsule (20 mg) by mouth 2 times daily (before meals)   Radha Metcalf PA-C   ONDANSETRON PO Take 4 mg by mouth every 8 hours as needed for nausea   Reported, Patient   order for DME Equipment being ordered: Walker Wheels () and Walker ()  Treatment Diagnosis: gait instability   Carri Burnett PA-C   order for DME Light weight wheelchair Body mass index is 19.08 kg/(m^2).   Lenin Perez MD   order for DME ANTOINE stockings- below knee.   Regla Yepez MD   polyethylene glycol (MIRALAX/GLYCOLAX) Packet Take 17 g by mouth daily as needed for constipation   Naye Amaya PA   senna-docusate (SENOKOT-S;PERICOLACE) 8.6-50 MG per tablet Take 1 tablet by mouth 2 times daily as needed    Reported, Patient   sucralfate (CARAFATE) 1 GM/10ML suspension Take 10 mLs (1 g) by mouth 4 times daily (before meals and nightly)   Radha Metcalf PA-C   traZODone (DESYREL) 50 MG tablet Take 50 mg by mouth At Bedtime   Reported, Patient   trolamine salicylate (ASPERCREME) 10 % external cream Apply topically 2 times daily Apply to affected areas right knee twice daily AND BID PRN   Reported, Patient              Review of Systems:   A Comprehensive greater than 10 system review of systems was carried out.  Pertinent positives and negatives are noted above.  Otherwise negative for contributory information.     Constitutional, neuro, ENT, endocrine, pulmonary, cardiac, gastrointestinal, genitourinary, musculoskeletal, integument and psychiatric systems are negative, except as otherwise  noted.           Physical Exam:   Blood pressure 114/56, pulse 58, temperature 97.7  F (36.5  C), temperature source Oral, resp. rate 20, SpO2 98 %, not currently breastfeeding.    GENERAL:  Comfortable.  PSYCH: pleasant, oriented, No acute distress.  HEENT:  Atraumatic, normocephalic. Normal conjunctiva, normal hearing, and oropharynx is normal.  NECK:  Supple, no neck vein distention  HEART:  Normal S1, S2 with no murmur, no pericardial rub, gallops or S3 or S4.  LUNGS:  Clear to auscultation, normal Respiratory effort. No wheezing, rales or ronchi. Bowel sounds heard in chest  GI:  Soft, normal bowel sounds. Non-tender, non distended.   EXTREMITIES:  No pedal edema, +2 pulses bilateral and equal.  SKIN:  Dry to touch, No rash, wound or ulcerations.  NEUROLOGIC:  CN 2-12 intact, BL 5/5 symmetric upper and lower extremity strength, sensation is intact with no focal deficits.            Data:     EKG demonstrates:  sinus bradycardia.    Recent Labs   Lab 11/29/19  1055   WBC 4.6   HGB 8.5*   HCT 28.9*   MCV 88        Recent Labs   Lab 11/29/19  1055      POTASSIUM 3.9   CHLORIDE 106   CO2 25   ANIONGAP 6   *   BUN 25   CR 0.79   GFRESTIMATED 67   GFRESTBLACK 78   REILLY 8.6     Recent Labs   Lab 11/29/19  1451 11/29/19  1055   TROPI <0.015 <0.015       Recent Results (from the past 48 hour(s))   XR Chest 2 Views    Narrative    CHEST TWO VIEWS  11/29/2019 11:10 AM     HISTORY: Chest pain.    COMPARISON: 9/24/2019.      Impression    IMPRESSION: There is a large hiatal hernia. This appears larger than  on the prior exam since it is now distended with gas and fluid,  measuring up to 11 x 6.8 cm. Vascular calcifications are seen. Heart  size is at the upper limits of normal but unchanged. Minimal left  costophrenic angle blunting which is probably unchanged. No  infiltrates, mass lesions or vascular congestion.         Carri Burnett PA-C PA-C

## 2019-11-30 VITALS
HEART RATE: 60 BPM | OXYGEN SATURATION: 97 % | BODY MASS INDEX: 17.93 KG/M2 | WEIGHT: 105 LBS | SYSTOLIC BLOOD PRESSURE: 164 MMHG | HEIGHT: 64 IN | RESPIRATION RATE: 16 BRPM | TEMPERATURE: 96 F | DIASTOLIC BLOOD PRESSURE: 74 MMHG

## 2019-11-30 PROCEDURE — G0378 HOSPITAL OBSERVATION PER HR: HCPCS

## 2019-11-30 PROCEDURE — 99217 ZZC OBSERVATION CARE DISCHARGE: CPT | Performed by: PHYSICIAN ASSISTANT

## 2019-11-30 PROCEDURE — 25000132 ZZH RX MED GY IP 250 OP 250 PS 637: Performed by: PHYSICIAN ASSISTANT

## 2019-11-30 RX ADMIN — ASPIRIN 81 MG 81 MG: 81 TABLET ORAL at 07:28

## 2019-11-30 RX ADMIN — LOSARTAN POTASSIUM 50 MG: 50 TABLET, FILM COATED ORAL at 07:28

## 2019-11-30 RX ADMIN — METOPROLOL TARTRATE 50 MG: 50 TABLET, FILM COATED ORAL at 07:28

## 2019-11-30 RX ADMIN — OMEPRAZOLE 20 MG: 20 CAPSULE, DELAYED RELEASE ORAL at 06:46

## 2019-11-30 NOTE — PLAN OF CARE
PRIMARY DIAGNOSIS: CHEST PAIN  OUTPATIENT/OBSERVATION GOALS TO BE MET BEFORE DISCHARGE:  1. Ruled out acute coronary syndrome (negative or stable Troponin):  Trops negative x3  2. Pain Status: Pain free.  3. Appropriate provocative testing performed: Yes  - Stress Test Procedure: Echo completed  - Interpretation of cardiac rhythm per telemetry tech: SB w/HR in 50s    4. Cleared by Consultants (if applicable):N/A  5. Return to near baseline physical activity: No  Discharge Planner Nurse   Safe discharge environment identified: Yes, from [facility]  Barriers to discharge: Yes       Entered by: Charanjit Milian 11/30/2019 1:36 AM    Vitals are Temp: 97.4  F (36.3  C) Temp src: Oral BP: (!) 146/51 Pulse: 60 Heart Rate: 57 Resp: 16 SpO2: 97 %.  Patient is Alert and Oriented x4. They are 1 Assist with Gait Belt and Walker.  On a Regular and No caffeine diet.  Denies chest pain, nausea, SOB, dizziness. Patient has no IV access. Purewick in place overnight for baseline incontinence. On tele. Will continue to monitor and provide cares.     Plan- monitor on tele, pain control if needed, discharge tomorrow.     Please review provider order for any additional goals.   Nurse to notify provider when observation goals have been met and patient is ready for discharge.

## 2019-11-30 NOTE — PLAN OF CARE
PRIMARY DIAGNOSIS: CHEST PAIN  OUTPATIENT/OBSERVATION GOALS TO BE MET BEFORE DISCHARGE:  1. Ruled out acute coronary syndrome (negative or stable Troponin):  Yes  2. Pain Status: Pain free.  3. Appropriate provocative testing performed: N/A  - Stress Test Procedure: Echo completed  - Interpretation of cardiac rhythm per telemetry tech: NSR    4. Cleared by Consultants (if applicable):N/A  5. Return to near baseline physical activity: Yes  Discharge Planner Nurse   Safe discharge environment identified: Yes  Barriers to discharge: No       Entered by: Divina Nowak 11/30/2019 7:38 AM     Please review provider order for any additional goals.   Nurse to notify provider when observation goals have been met and patient is ready for discharge.    Up with SBA with walker and GB, A&Ox4, BANDAR, beka CP, LS clear, room air, BS A&Ax4, tolerating regular diet, states she feels better and is ready to discharge, will continue to monitor and provide supportive cares.

## 2019-11-30 NOTE — PLAN OF CARE
PRIMARY DIAGNOSIS: CHEST PAIN  OUTPATIENT/OBSERVATION GOALS TO BE MET BEFORE DISCHARGE:  1. Ruled out acute coronary syndrome (negative or stable Troponin):  Trops negative x3  2. Pain Status: Pain free.  3. Appropriate provocative testing performed: Yes  - Stress Test Procedure: Echo completed  - Interpretation of cardiac rhythm per telemetry tech: SB    4. Cleared by Consultants (if applicable):N/A  5. Return to near baseline physical activity: No  Discharge Planner Nurse   Safe discharge environment identified: Yes, from [facility]  Barriers to discharge: Yes       Entered by: Charanjit Milian 11/30/2019 5:12 AM    Vitals are Temp: 97.1  F (36.2  C) Temp src: Oral BP: (!) 152/63 Pulse: 60 Heart Rate: 63 Resp: 16 SpO2: 96 %.  Patient is Alert and Oriented x4. They are 1 Assist with Gait Belt and Walker. Up to bedside commode. Had small BM.  On a Regular and No caffeine diet.  Denies chest pain, nausea, SOB, dizziness. Patient has no IV access. Purewick in place overnight for baseline incontinence. On tele. Will continue to monitor and provide cares.     Plan- monitor on tele, pain control if needed, discharge tomorrow.     Please review provider order for any additional goals.   Nurse to notify provider when observation goals have been met and patient is ready for discharge.

## 2019-11-30 NOTE — DISCHARGE SUMMARY
[facility] Outpatient / Observation Unit  Discharge Summary        Batsheva Barkley MRN# [id number]   YOB: 1933 Age: 86 year old     Date of Admission:  11/29/2019  Date of Discharge:  11/30/2019  Admitting Physician:  William James MD  Discharge Physician: Carri Burnett PA-C  Discharging Service: Hospitalist      Primary Provider: Tacos Bhakta  Primary Care Physician Phone Number: [phone]         Primary Discharge Diagnoses:    Batsheva Barkley was admitted on 11/29/2019 for concerns of acute chest pain.     1. Chest pain: Ruled out ACS. Serial troponins were negative. Echocardiogram done, EF 45% (unchanged or slightly better than previous), no definite new wall motion abnormalities, did show worsening of mitral regurgitation. This is unlikely related to chest pain. Chest pain likely related to large hiatal hernia.  2. Progression of Mitral regurgitation - unlikely related to chest pain. Recommended follow up with cardiology for further management and follow up          Secondary Discharge Diagnoses:     Past Medical History:   Diagnosis Date     CAD (coronary artery disease) 6/20/05    [place], transient CHF     Cardiomyopathy (H)      CHF NYHA class III, chronic, systolic (H) 1/25/2018     Coronary atherosclerosis 6/20/2005    circ vessel was stented;  had a 50% LAD lesion which was not treated Problem list name updated by automated process. Provider to review     Edema 1/26/2015     Generalized osteoarthrosis     knees ;; L total kne 10/09     Hypertension goal BP (blood pressure) < 140/90 1/21/2015     Leg weakness, bilateral 2/13/2018     Mitral regurgitation     mod     Mixed hyperlipidemia      Osteoporosis      Physical deconditioning 1/21/2015     Total urinary incontinence 12/27/2017                Code Status:      Full Code        Brief Hospital Summary:        Reason for your hospital stay      Chest pain. Serial troponins were negative. Echocardiogram was  obtained   and showed worsening mitral regurgitation, EF was same as previous and   wall motion abnormalities appear similar to previous echos. No evidence of   volume overload or acute decompensation. No significant events on cardiac   monitoring. Large hiatal hernia also noted on work up, may be contributing   to symptoms.             Please refer to initial admission history and physical for further details.   Briefly, Batsheva Barkley was admitted on 11/29/2019 for concerns of acute chest pain.   Initial work up in the ED did not reveal evidence of STEMI or findings consistent with unstable angina or acute coronary ischemia. Pt was registered to the Observation Unit for further evaluation.   Pt ruled out with serial troponins, underwent Echocardiogram  that did not show evidence of significant coronary ischemia. Labs were reviewed and significant results addressed. On the day of discharge, pt was pain free, with no complaints of pain. Medications were reviewed and adjustments made as necessary. Pt is instructed to follow up as below.           Significant Lab During Hospitalization:      Recent Labs   Lab 11/29/19  1055   WBC 4.6   HGB 8.5*   HCT 28.9*   MCV 88        Recent Labs   Lab 11/29/19  1055      POTASSIUM 3.9   CHLORIDE 106   CO2 25   ANIONGAP 6   *   BUN 25   CR 0.79   GFRESTIMATED 67   GFRESTBLACK 78   REILLY 8.6     Recent Labs   Lab 11/29/19  1919 11/29/19  1451 11/29/19  1055   TROPI <0.015 <0.015 <0.015                Significant Imaging During Hospitalization:      No results found for this or any previous visit (from the past 48 hour(s)).           Pending Results:        Unresulted Labs Ordered in the Past 30 Days of this Admission     No orders found for last 31 day(s).              Consultations This Hospital Stay:      No consultations were requested during this admission         Discharge Instructions and Follow-Up:      Follow-up Appointments     Follow-up and recommended labs  and tests       Follow up with primary care provider, Tacos Bhakta, within 7   days for hospital follow- up.  No follow up labs or test are needed.   Discuss treatment options for hiatal hernia.   Follow up with Cardiology regarding increase in mitral regurgitation.           Pt instructed to follow up with PCP in 7 days and with Consultant if indicated.   Follow-up Labs None        Discharge Disposition:      Discharged to home         Discharge Medications:        Current Discharge Medication List      CONTINUE these medications which have NOT CHANGED    Details   !! acetaminophen (TYLENOL) 500 MG tablet Take 500 mg by mouth daily as needed for mild pain      !! acetaminophen (TYLENOL) 500 MG tablet Take 1,000 mg by mouth 3 times daily       alum & mag hydroxide-simethicone (MYLANTA/MAALOX) 200-200-20 MG/5ML SUSP suspension Take 10 mLs by mouth 2 times daily as needed       aspirin (ASA) 81 MG chewable tablet Take 81 mg by mouth daily      Atorvastatin Calcium (LIPITOR PO) Take 20 mg by mouth At Bedtime       !! carboxymethylcellulose PF (REFRESH PLUS) 0.5 % ophthalmic solution Place 2 drops into both eyes every 4 hours as needed for dry eyes      !! carboxymethylcellulose PF (REFRESH PLUS) 0.5 % ophthalmic solution Place 2 drops into both eyes 2 times daily      !! diclofenac (VOLTAREN) 1 % topical gel Place 2 g onto the skin 2 times daily Apply to right shoulder      !! diclofenac (VOLTAREN) 1 % topical gel Place 2 g onto the skin daily as needed for moderate pain Apply to right shoulder as needed for pain      loperamide (IMODIUM) 2 MG capsule Take 2 mg by mouth 4 times daily as needed for diarrhea       losartan (COZAAR) 50 MG tablet Take 1 tablet (50 mg) by mouth 2 times daily  Qty: 180 tablet, Refills: 3    Associated Diagnoses: Essential hypertension      Menthol ([employer] COUGH DROPS SUGAR FREE MT) Take 1 lozenge by mouth 4 times daily as needed      !! menthol-zinc oxide (CALMOSEPTINE) 0.44-20.6 %  OINT ointment Apply 1 g topically 2 times daily      !! menthol-zinc oxide (CALMOSEPTINE) 0.44-20.6 % OINT ointment Apply topically 2 times daily as needed for skin protection      metoprolol (LOPRESSOR) 50 MG tablet TAKE ONE TABLET BY MOUTH TWICE DAILY  Qty: 180 tablet, Refills: 3    Associated Diagnoses: Hypertension goal BP (blood pressure) < 140/90      omeprazole (PRILOSEC) 20 MG DR capsule Take 1 capsule (20 mg) by mouth 2 times daily (before meals)  Qty: 60 capsule, Refills: 0    Associated Diagnoses: Gastroesophageal reflux disease without esophagitis      ONDANSETRON PO Take 4 mg by mouth every 8 hours as needed for nausea      polyethylene glycol (MIRALAX/GLYCOLAX) Packet Take 17 g by mouth daily as needed for constipation  Qty: 7 packet    Associated Diagnoses: Hematoma of left lower extremity, initial encounter      senna-docusate (SENOKOT-S;PERICOLACE) 8.6-50 MG per tablet Take 1 tablet by mouth 2 times daily as needed       sucralfate (CARAFATE) 1 GM/10ML suspension Take 10 mLs (1 g) by mouth 4 times daily (before meals and nightly)  Qty: 1200 mL, Refills: 0    Associated Diagnoses: Gastroesophageal reflux disease without esophagitis      traZODone (DESYREL) 50 MG tablet Take 50 mg by mouth At Bedtime      trolamine salicylate (ASPERCREME) 10 % external cream Apply topically 2 times daily Apply to affected areas right knee twice daily AND BID PRN      !! order for DME Equipment being ordered: Walker Wheels () and Walker ()  Treatment Diagnosis: gait instability  Qty: 1 each, Refills: 0    Associated Diagnoses: Epigastric pain      !! order for DME Light weight wheelchair Body mass index is 19.08 kg/(m^2).  Qty: 1 Device, Refills: 0    Associated Diagnoses: Diarrhea of presumed infectious origin; Lack of coordination; Weakness of both lower extremities      !! order for DME ANTOINE stockings- below knee.  Qty: 1 Package, Refills: 0    Associated Diagnoses: Bilateral leg edema       !! - Potential  "duplicate medications found. Please discuss with provider.              Allergies:         Allergies   Allergen Reactions     Codeine      nausea, HA     Lisinopril Cough           Condition and Physical on Discharge:      Discharge condition: Stable   Vitals: Blood pressure (!) 164/74, pulse 60, temperature 96  F (35.6  C), temperature source Axillary, resp. rate 16, height 1.626 m (5' 4\"), weight 47.6 kg (105 lb), SpO2 97 %, not currently breastfeeding.  105 lbs 0 oz      GENERAL:  Comfortable.  PSYCH: pleasant, oriented, No acute distress.  HEART:  RRR  LUNGS:  Normal Respiratory effort.   EXTREMITIES:  Able to ambulate independently  SKIN:  Dry to touch, No rash, wound or ulcerations.  NEUROLOGIC:  Grossly intact    Carri Burnett PA-C   "

## 2019-11-30 NOTE — PROGRESS NOTES
Pts IV site was causing her a lot of pain. IV was removed. Provider aware, a new IV does not need to be placed at this time.

## 2019-11-30 NOTE — PLAN OF CARE
Patient's After Visit Summary was reviewed with patient and daughter.  Patient verbalized understanding of After Visit Summary, recommended follow up and was given an opportunity to ask questions.   Discharge medications sent home with patient/family: no changes were made to medications  Discharged with: daughter Raisa    OBSERVATION patient END time: 0930

## 2019-11-30 NOTE — PLAN OF CARE
PRIMARY DIAGNOSIS: Chest pain/ sob/ nausea  OUTPATIENT/OBSERVATION GOALS TO BE MET BEFORE DISCHARGE:  1. Ruled out acute coronary syndrome (negative or stable Troponin):  Yes, trops negative x2  2. Pain Status: Pain free.  3. Appropriate provocative testing performed: Yes  - Stress Test Procedure: n/a  - Interpretation of cardiac rhythm per telemetry tech: not on tele yet, will order tele     4. Cleared by Consultants (if applicable): No  5. Return to near baseline physical activity: No  Discharge Planner Nurse   Safe discharge environment identified: Yes  Barriers to discharge: Yes       Entered by: Hanna Rodrigues 11/29/2019 6:41 PM     Please review provider order for any additional goals.   Nurse to notify provider when observation goals have been met and patient is ready for discharge.  VSS, on RA, afebrile. Ax1 with walker and gait belt, weak. A&O. Denies chest pain. Denies nausea. Denies sob. Family at bedside. Echo completed. Regular diet, no caffeine diet, good appetite. Plan- tele, trops, pending echo results, pain control if needed.

## 2019-11-30 NOTE — PLAN OF CARE
PRIMARY DIAGNOSIS: Chest pain/ sob/ nausea  OUTPATIENT/OBSERVATION GOALS TO BE MET BEFORE DISCHARGE:  1. Ruled out acute coronary syndrome (negative or stable Troponin):  Yes, trops negative x3  2. Pain Status: Pain free.  3. Appropriate provocative testing performed: Yes  - Stress Test Procedure: n/a  - Interpretation of cardiac rhythm per telemetry tech: SR HR 60s     4. Cleared by Consultants (if applicable): No  5. Return to near baseline physical activity: No  Discharge Planner Nurse   Safe discharge environment identified: Yes  Barriers to discharge: Yes       Entered by: Hanna Rodrigues 11/29/2019 6:41 PM  Please review provider order for any additional goals.   Nurse to notify provider when observation goals have been met and patient is ready for discharge.    VSS, on RA, afebrile. Ax1 with walker and gait belt, weak. A&O. Denies chest pain. Denies nausea. Denies sob. Echo completed. Regular diet, no caffeine diet, good appetite. IV was removed as it was causing her too much pain, provider aware and does not need a new IV placed at this time. Held losartan. Gave prn melatonin per pt request. Purewick in place overnight as pt is incontinent. Plan- monitor on tele, pain control if needed, discharge tomorrow.

## 2019-12-02 ENCOUNTER — ASSISTED LIVING VISIT (OUTPATIENT)
Dept: GERIATRICS | Facility: CLINIC | Age: 84
End: 2019-12-02
Payer: COMMERCIAL

## 2019-12-02 ENCOUNTER — TELEPHONE (OUTPATIENT)
Dept: CARDIOLOGY | Facility: CLINIC | Age: 84
End: 2019-12-02

## 2019-12-02 VITALS
OXYGEN SATURATION: 93 % | SYSTOLIC BLOOD PRESSURE: 125 MMHG | WEIGHT: 105 LBS | BODY MASS INDEX: 18.02 KG/M2 | RESPIRATION RATE: 18 BRPM | DIASTOLIC BLOOD PRESSURE: 60 MMHG | HEART RATE: 62 BPM

## 2019-12-02 DIAGNOSIS — I50.22 CHRONIC SYSTOLIC CONGESTIVE HEART FAILURE (H): ICD-10-CM

## 2019-12-02 DIAGNOSIS — K21.9 GASTROESOPHAGEAL REFLUX DISEASE, ESOPHAGITIS PRESENCE NOT SPECIFIED: Primary | ICD-10-CM

## 2019-12-02 DIAGNOSIS — R07.9 CHEST PAIN, UNSPECIFIED TYPE: ICD-10-CM

## 2019-12-02 LAB — INTERPRETATION ECG - MUSE: NORMAL

## 2019-12-02 NOTE — TELEPHONE ENCOUNTER
Patient's daughter called to report that over the past few months patient has been evaluated in the ED multiple times for chest discomfort (relieved with GI cocktail). Patient's PMD saw patient today and advised to f/u with cardiology. Patient has worsening MR on ECHO (11/29/19). RN scheduled patient for OV on 12/17/19 with Dr. Tang. Patient's daughter will call if patient has any recurrent chest discomfort symptoms prior to apt.         3/29/18 OV Dr. Tang  ASSESSMENT:   1.  Batsheva Barkley is a delightful 84-year-old female status post lateral and inferolateral myocardial infarction in 06/2005 treated with angioplasty and stenting of the dominant circumflex artery.  She is left with an ischemic cardiomyopathy with ejection fraction of probably 40%.  There is no evidence of congestive heart failure.  Her Lexiscan nuclear stress test from 2017 demonstrated no ongoing ischemia.  We will continue to treat medically with losartan and metoprolol.   2.  Hypertension currently better controlled on her current medications.  Her BMP today includes sodium 140, potassium 4.4, BUN 23, creatinine 0.84 and glucose of 93.   3.  Hyperlipidemia.  This is followed through Dr. Perez's office and is well controlled as of 10/06/2017.      It is my pleasure to assist in the care of Batsheva Barkley.  I will see her again in 1 year or earlier on a p.r.n. basis.  All the patient's questions were answered to her satisfaction.  Her daughter, Linda, was in attendance today.      Nando Tang MD

## 2019-12-02 NOTE — LETTER
12/2/2019        RE: Batsheva Barkley  [facility]  [address]  [facility] [address]        [facility]  PRIMARY CARE PROVIDER AND CLINIC:  NICKOLAS Hallman CNP, [address] / [place] [address]  Chief Complaint   Patient presents with     Hospital F/U     [facility] Record Number:  [id number]  Place of Service where encounter took place:  [facility] [[id number]]    Batsheva Barkley  is a 86 year old  (11/6/1933), returned to the above facility from  [facility]. Hospital stay 11/29/19 - 11/30/19. .  Admitted to this facility for  rehab, medical management and nursing care.    HPI:    HPI information obtained from: facility chart records, facility staff, patient report, [facility] chart review and family/first contact dtr report.   Brief Summary of Hospital Course:     Chest pain. Had c.p. 11/29/19.  Given prn nitrostat. Sent to [facility] ED.  Given GI cocktail. C.p. subsided.  Had had 3 ED visit for c.p. in past 3 mos.  Has h/o MI CAD, HF    HF: wt. Stable. CXR neg for acute changes.  No increased LE edema.  Has a-fib. Cont. On asa.  Also taking losartan, metoprolol.  BPs, HRs gen. Stable. Sl bradycardia at times.  ECG in ED: sinus bradycardia. Echo: EF 45%, worsening mitral valve regurg.  No significant coronary ischemia.      GERD: large hiatal hernia.  Cont. On prilosec, carafate, prn gerilanta.       Updates on Status Since Skilled nursing Admission: no nausea, GERD. Po intake stable. Resp. Status stable. No further c.p.    CODE STATUS/ADVANCE DIRECTIVES DISCUSSION:   DNR / DNI  Patient's living condition: lives in an assisted living facility  ALLERGIES: Codeine and Lisinopril  PAST MEDICAL HISTORY:  has a past medical history of CAD (coronary artery disease) (6/20/05), Cardiomyopathy (H), CHF NYHA class III, chronic, systolic (H) (1/25/2018), Coronary atherosclerosis (6/20/2005), Edema (1/26/2015),  Generalized osteoarthrosis, Hypertension goal BP (blood pressure) < 140/90 (1/21/2015), Leg weakness, bilateral (2/13/2018), Mitral regurgitation, Mixed hyperlipidemia, Osteoporosis, Physical deconditioning (1/21/2015), and Total urinary incontinence (12/27/2017).  PAST SURGICAL HISTORY:   has a past surgical history that includes L bunion + hammer toes (1/04, 4/04); ovarian cyst surgery; Colonoscopy (3/05); L total knee replacement (10/2009 ); ENT surgery; Eye surgery; Heart Cath, Angioplasty (2005); and Esophagoscopy, gastroscopy, duodenoscopy (EGD), combined (N/A, 4/14/2016).  FAMILY HISTORY: family history includes Breast Cancer in her sister and sister; Cerebrovascular Disease in her mother; Heart Disease in her sister; Respiratory in her father.  SOCIAL HISTORY:   reports that she has quit smoking. She has never used smokeless tobacco. She reports that she does not drink alcohol or use drugs.    Post Discharge Medication Reconciliation Status: discharge medications reconciled, continue medications without change    Current Outpatient Medications   Medication Sig Dispense Refill     acetaminophen (TYLENOL) 500 MG tablet Take 500 mg by mouth daily as needed for mild pain       acetaminophen (TYLENOL) 500 MG tablet Take 1,000 mg by mouth 3 times daily        alum & mag hydroxide-simethicone (MYLANTA/MAALOX) 200-200-20 MG/5ML SUSP suspension Take 10 mLs by mouth 2 times daily as needed        aspirin (ASA) 81 MG chewable tablet Take 81 mg by mouth daily       Atorvastatin Calcium (LIPITOR PO) Take 20 mg by mouth At Bedtime        carboxymethylcellulose PF (REFRESH PLUS) 0.5 % ophthalmic solution Place 2 drops into both eyes every 4 hours as needed for dry eyes       carboxymethylcellulose PF (REFRESH PLUS) 0.5 % ophthalmic solution Place 2 drops into both eyes 2 times daily       diclofenac (VOLTAREN) 1 % topical gel Place 2 g onto the skin 2 times daily Apply to right shoulder       diclofenac (VOLTAREN) 1 %  topical gel Place 2 g onto the skin daily as needed for moderate pain Apply to right shoulder as needed for pain       loperamide (IMODIUM) 2 MG capsule Take 2 mg by mouth 4 times daily as needed for diarrhea        losartan (COZAAR) 50 MG tablet Take 1 tablet (50 mg) by mouth 2 times daily 180 tablet 3     Menthol ([employer] COUGH DROPS SUGAR FREE MT) Take 1 lozenge by mouth 4 times daily as needed       menthol-zinc oxide (CALMOSEPTINE) 0.44-20.6 % OINT ointment Apply 1 g topically 2 times daily       menthol-zinc oxide (CALMOSEPTINE) 0.44-20.6 % OINT ointment Apply topically 2 times daily as needed for skin protection       metoprolol (LOPRESSOR) 50 MG tablet TAKE ONE TABLET BY MOUTH TWICE DAILY 180 tablet 3     omeprazole (PRILOSEC) 20 MG DR capsule Take 1 capsule (20 mg) by mouth 2 times daily (before meals) 60 capsule 0     ONDANSETRON PO Take 4 mg by mouth every 8 hours as needed for nausea       order for DME Equipment being ordered: Walker Wheels () and Walker ()  Treatment Diagnosis: gait instability 1 each 0     order for DME Light weight wheelchair Body mass index is 19.08 kg/(m^2). 1 Device 0     order for DME ANTOINE stockings- below knee. 1 Package 0     polyethylene glycol (MIRALAX/GLYCOLAX) Packet Take 17 g by mouth daily as needed for constipation 7 packet      senna-docusate (SENOKOT-S;PERICOLACE) 8.6-50 MG per tablet Take 1 tablet by mouth 2 times daily as needed        sucralfate (CARAFATE) 1 GM/10ML suspension Take 10 mLs (1 g) by mouth 4 times daily (before meals and nightly) 1200 mL 0     traZODone (DESYREL) 50 MG tablet Take 50 mg by mouth At Bedtime       trolamine salicylate (ASPERCREME) 10 % external cream Apply topically 2 times daily Apply to affected areas right knee twice daily AND BID PRN         ROS:  No chest pain, shortness of breath, fevers, chills, headache, nausea, vomiting, dysuria or bowel abnormalities.  Appetite is  normal.  No pain except occ LEs, R knee pain sig. improved  s/p inj last week.    Vitals:  /60   Pulse 62   Resp 18   Wt 47.6 kg (105 lb)   SpO2 93%   BMI 18.02 kg/m     Exam:  GENERAL APPEARANCE:  Alert, in no distress, thin, cooperative  ENT:  Mouth and posterior oropharynx normal, moist mucous membranes, [place]  EYES:  EOM, conjunctivae, lids, pupils and irises normal, PERRL  NECK:  No adenopathy,masses or thyromegaly, no carotid bruit  RESP:  respiratory effort and palpation of chest normal, lungs clear to auscultation , no respiratory distress  CV:  Palpation and auscultation of heart done , regular rate and rhythm, no murmur, rub, or gallop, no edema  ABDOMEN:  normal bowel sounds, soft, nontender, no hepatosplenomegaly or other masses, no guarding or rebound  M/S:   muscle strength 5/5 all 4 ext., gen. LE weakness, bony deformities of hands  NEURO:   Cranial nerves 2-12 are normal tested and grossly at patient's baseline, alert, speech clear  PSYCH:  insight and judgement impaired, memory impaired , affect and mood normal    Lab/Diagnostic data:    Most Recent 3 CBC's:  Recent Labs   Lab Test 11/29/19  1055 09/24/19  1700 09/13/19  0659   WBC 4.6 5.0 4.1   HGB 8.5* 9.8* 9.9*   MCV 88 90 90    233 185     Most Recent 3 BMP's:  Recent Labs   Lab Test 11/29/19  1055 09/24/19  1700 09/13/19  0659    134 134   POTASSIUM 3.9 4.4 3.8   CHLORIDE 106 101 103   CO2 25 28 25   BUN 25 26 9   CR 0.79 0.67 0.54   ANIONGAP 6 5 6   REILLY 8.6 8.5 8.1*   * 91 93     Most Recent 3 Troponin's:  Recent Labs   Lab Test 11/29/19  1919 11/29/19  1451 11/29/19  1055   TROPI <0.015 <0.015 <0.015       ASSESSMENT/PLAN:  Chest pain, unspecified type  Currently resolved. No ACS paer last ED visit  1. Follow BPs, HRs  2. Monitor for further reports of c.p.  3. Spoke with dtr who will make f/u cardiology appt with Dr. Ketroser  4. Cont. DNR/DNI code status    Chronic systolic congestive heart failure (H)  Wt., resp, status stable. CXR neg for pulm congestion. Worsening  mitral valve regurg. Per echo  1. Cont. Metoprolol, losartan  2. Follow wt.s  3. For changes in resp. Status repeat CXR, consider short-tem diuretic for increased s/s  4. F/u with cardiology as above  5. hgb next week      Gastroesophageal reflux disease, esophagitis presence not specified  Recent c.p. large hiatal hernia. S/s improved with GI cocktail in ED  1. Cont. Bid prilosec  2. Cont. carafate  3. For further increased s/s, may sched. elvi-lanta doses  4. Monitor for nausea, cont. Prn zofran.        Electronically signed by:  NICKOLAS Hallman CNP                       Sincerely,        NICKOLAS Hallman CNP

## 2019-12-02 NOTE — PROGRESS NOTES
[place]  PRIMARY CARE PROVIDER AND CLINIC:  Tacos Bhakta, APRN CNP, [address] / [place] [address]  Chief Complaint   Patient presents with     Hospital F/U     [place] Medical Record Number:  [id number]  Place of Service where encounter took place:  [facility] [[id number]]    Batsheva Barkley  is a 86 year old  (11/6/1933), returned to the above facility from  [facility]. Hospital stay 11/29/19 - 11/30/19. .  Admitted to this facility for  rehab, medical management and nursing care.    HPI:    HPI information obtained from: facility chart records, facility staff, patient report, [facility] chart review and family/first contact dtr report.   Brief Summary of Hospital Course:     Chest pain. Had c.p. 11/29/19.  Given prn nitrostat. [facility]  Given GI cocktail. C.p. subsided.  Had had 3 ED visit for c.p. in past 3 mos.  Has h/o MI CAD, HF    HF: wt. Stable. CXR neg for acute changes.  No increased LE edema.  Has a-fib. Cont. On asa.  Also taking losartan, metoprolol.  BPs, HRs gen. Stable. Sl bradycardia at times.  ECG in ED: sinus bradycardia. Echo: EF 45%, worsening mitral valve regurg.  No significant coronary ischemia.      GERD: large hiatal hernia.  Cont. On prilosec, carafate, prn gerilanta.       Updates on Status Since Skilled nursing Admission: no nausea, GERD. Po intake stable. Resp. Status stable. No further c.p.    CODE STATUS/ADVANCE DIRECTIVES DISCUSSION:   DNR / DNI  Patient's living condition: lives in an assisted living facility  ALLERGIES: Codeine and Lisinopril  PAST MEDICAL HISTORY:  has a past medical history of CAD (coronary artery disease) (6/20/05), Cardiomyopathy (H), CHF NYHA class III, chronic, systolic (H) (1/25/2018), Coronary atherosclerosis (6/20/2005), Edema (1/26/2015), Generalized osteoarthrosis, Hypertension goal BP (blood pressure) < 140/90 (1/21/2015), Leg weakness, bilateral (2/13/2018),  Mitral regurgitation, Mixed hyperlipidemia, Osteoporosis, Physical deconditioning (1/21/2015), and Total urinary incontinence (12/27/2017).  PAST SURGICAL HISTORY:   has a past surgical history that includes L bunion + hammer toes (1/04, 4/04); ovarian cyst surgery; Colonoscopy (3/05); L total knee replacement (10/2009 ); ENT surgery; Eye surgery; Heart Cath, Angioplasty (2005); and Esophagoscopy, gastroscopy, duodenoscopy (EGD), combined (N/A, 4/14/2016).  FAMILY HISTORY: family history includes Breast Cancer in her sister and sister; Cerebrovascular Disease in her mother; Heart Disease in her sister; Respiratory in her father.  SOCIAL HISTORY:   reports that she has quit smoking. She has never used smokeless tobacco. She reports that she does not drink alcohol or use drugs.    Post Discharge Medication Reconciliation Status: discharge medications reconciled, continue medications without change    Current Outpatient Medications   Medication Sig Dispense Refill     acetaminophen (TYLENOL) 500 MG tablet Take 500 mg by mouth daily as needed for mild pain       acetaminophen (TYLENOL) 500 MG tablet Take 1,000 mg by mouth 3 times daily        alum & mag hydroxide-simethicone (MYLANTA/MAALOX) 200-200-20 MG/5ML SUSP suspension Take 10 mLs by mouth 2 times daily as needed        aspirin (ASA) 81 MG chewable tablet Take 81 mg by mouth daily       Atorvastatin Calcium (LIPITOR PO) Take 20 mg by mouth At Bedtime        carboxymethylcellulose PF (REFRESH PLUS) 0.5 % ophthalmic solution Place 2 drops into both eyes every 4 hours as needed for dry eyes       carboxymethylcellulose PF (REFRESH PLUS) 0.5 % ophthalmic solution Place 2 drops into both eyes 2 times daily       diclofenac (VOLTAREN) 1 % topical gel Place 2 g onto the skin 2 times daily Apply to right shoulder       diclofenac (VOLTAREN) 1 % topical gel Place 2 g onto the skin daily as needed for moderate pain Apply to right shoulder as needed for pain       loperamide  (IMODIUM) 2 MG capsule Take 2 mg by mouth 4 times daily as needed for diarrhea        losartan (COZAAR) 50 MG tablet Take 1 tablet (50 mg) by mouth 2 times daily 180 tablet 3     Menthol ([employer] COUGH DROPS SUGAR FREE MT) Take 1 lozenge by mouth 4 times daily as needed       menthol-zinc oxide (CALMOSEPTINE) 0.44-20.6 % OINT ointment Apply 1 g topically 2 times daily       menthol-zinc oxide (CALMOSEPTINE) 0.44-20.6 % OINT ointment Apply topically 2 times daily as needed for skin protection       metoprolol (LOPRESSOR) 50 MG tablet TAKE ONE TABLET BY MOUTH TWICE DAILY 180 tablet 3     omeprazole (PRILOSEC) 20 MG DR capsule Take 1 capsule (20 mg) by mouth 2 times daily (before meals) 60 capsule 0     ONDANSETRON PO Take 4 mg by mouth every 8 hours as needed for nausea       order for DME Equipment being ordered: Walker Wheels () and Walker ()  Treatment Diagnosis: gait instability 1 each 0     order for DME Light weight wheelchair Body mass index is 19.08 kg/(m^2). 1 Device 0     order for DME ANTOINE stockings- below knee. 1 Package 0     polyethylene glycol (MIRALAX/GLYCOLAX) Packet Take 17 g by mouth daily as needed for constipation 7 packet      senna-docusate (SENOKOT-S;PERICOLACE) 8.6-50 MG per tablet Take 1 tablet by mouth 2 times daily as needed        sucralfate (CARAFATE) 1 GM/10ML suspension Take 10 mLs (1 g) by mouth 4 times daily (before meals and nightly) 1200 mL 0     traZODone (DESYREL) 50 MG tablet Take 50 mg by mouth At Bedtime       trolamine salicylate (ASPERCREME) 10 % external cream Apply topically 2 times daily Apply to affected areas right knee twice daily AND BID PRN         ROS:  No chest pain, shortness of breath, fevers, chills, headache, nausea, vomiting, dysuria or bowel abnormalities.  Appetite is  normal.  No pain except occ LEs, R knee pain sig. improved s/p inj last week.    Vitals:  /60   Pulse 62   Resp 18   Wt 47.6 kg (105 lb)   SpO2 93%   BMI 18.02 kg/m     Exam:  GENERAL APPEARANCE:  Alert, in no distress, thin, cooperative  ENT:  Mouth and posterior oropharynx normal, moist mucous membranes, [place]  EYES:  EOM, conjunctivae, lids, pupils and irises normal, PERRL  NECK:  No adenopathy,masses or thyromegaly, no carotid bruit  RESP:  respiratory effort and palpation of chest normal, lungs clear to auscultation , no respiratory distress  CV:  Palpation and auscultation of heart done , regular rate and rhythm, no murmur, rub, or gallop, no edema  ABDOMEN:  normal bowel sounds, soft, nontender, no hepatosplenomegaly or other masses, no guarding or rebound  M/S:   muscle strength 5/5 all 4 ext., gen. LE weakness, bony deformities of hands  NEURO:   Cranial nerves 2-12 are normal tested and grossly at patient's baseline, alert, speech clear  PSYCH:  insight and judgement impaired, memory impaired , affect and mood normal    Lab/Diagnostic data:    Most Recent 3 CBC's:  Recent Labs   Lab Test 11/29/19  1055 09/24/19  1700 09/13/19  0659   WBC 4.6 5.0 4.1   HGB 8.5* 9.8* 9.9*   MCV 88 90 90    233 185     Most Recent 3 BMP's:  Recent Labs   Lab Test 11/29/19  1055 09/24/19  1700 09/13/19  0659    134 134   POTASSIUM 3.9 4.4 3.8   CHLORIDE 106 101 103   CO2 25 28 25   BUN 25 26 9   CR 0.79 0.67 0.54   ANIONGAP 6 5 6   REILLY 8.6 8.5 8.1*   * 91 93     Most Recent 3 Troponin's:  Recent Labs   Lab Test 11/29/19  1919 11/29/19  1451 11/29/19  1055   TROPI <0.015 <0.015 <0.015       ASSESSMENT/PLAN:  Chest pain, unspecified type  Currently resolved. No ACS paer last ED visit  1. Follow BPs, HRs  2. Monitor for further reports of c.p.  3. Spoke with dtr who will make f/u cardiology appt with Dr. Tang  4. Cont. DNR/DNI code status    Chronic systolic congestive heart failure (H)  Wt., resp, status stable. CXR neg for pulm congestion. Worsening mitral valve regurg. Per echo  1. Cont. Metoprolol, losartan  2. Follow wt.s  3. For changes in resp. Status repeat CXR,  consider short-tem diuretic for increased s/s  4. F/u with cardiology as above  5. hgb next week      Gastroesophageal reflux disease, esophagitis presence not specified  Recent c.p. large hiatal hernia. S/s improved with GI cocktail in ED  1. Cont. Bid prilosec  2. Cont. carafate  3. For further increased s/s, may sched. elvi-lanta doses  4. Monitor for nausea, cont. Prn zofran.        Electronically signed by:  NICKOLAS Hallman CNP

## 2019-12-05 ENCOUNTER — PATIENT OUTREACH (OUTPATIENT)
Dept: GERIATRIC MEDICINE | Facility: CLINIC | Age: 84
End: 2019-12-05

## 2019-12-05 NOTE — PROGRESS NOTES
are Medicare CM chart review due to Observation visit at [facility] on 11-29-19   Member was admitted for observation due to chest discharge back to [facility] on 11-30.   CC did not open to CM at this time.   Meagan Rodrigues MA [facility] Coordinator   [phone]

## 2019-12-06 ENCOUNTER — ASSISTED LIVING VISIT (OUTPATIENT)
Dept: GERIATRICS | Facility: CLINIC | Age: 84
End: 2019-12-06
Payer: COMMERCIAL

## 2019-12-06 VITALS — HEART RATE: 67 BPM | DIASTOLIC BLOOD PRESSURE: 67 MMHG | SYSTOLIC BLOOD PRESSURE: 134 MMHG | RESPIRATION RATE: 18 BRPM

## 2019-12-06 DIAGNOSIS — M25.561 CHRONIC PAIN OF RIGHT KNEE: ICD-10-CM

## 2019-12-06 DIAGNOSIS — H61.23 BILATERAL IMPACTED CERUMEN: Primary | ICD-10-CM

## 2019-12-06 DIAGNOSIS — G89.29 CHRONIC PAIN OF RIGHT KNEE: ICD-10-CM

## 2019-12-06 NOTE — LETTER
12/6/2019        RE: Batsheva Barkley  [facility]  [address]  [facility] [address]        [place] [facility]  [facility] Record Number:  [id number]  Place of Service where encounter took place:  [facility] [[id number]]  Chief Complaint   Patient presents with     Cerumen (impacted)     Knee Pain       HPI:    Batsheva Barkley  is a 86 year old (11/6/1933), who is being seen today for an episodic care visit.  HPI information obtained from: facility chart records, facility staff, patient report and [facility] chart review. Today's concern is: cerumen, R knee pain.  Has recurrent cerumen, wears hearing aids. Family reports ongoing decreased hearing acuity.  Resident reports itching in ears at times.  Reports some increased R knee pain yesterday, however pain resolved today.  R knee inj 11/27/19  Cont. On tylenol. voltaren gel.      Past Medical and Surgical History reviewed in Epic today.    MEDICATIONS:  Current Outpatient Medications   Medication Sig Dispense Refill     acetaminophen (TYLENOL) 500 MG tablet Take 500 mg by mouth daily as needed for mild pain       acetaminophen (TYLENOL) 500 MG tablet Take 1,000 mg by mouth 3 times daily        alum & mag hydroxide-simethicone (MYLANTA/MAALOX) 200-200-20 MG/5ML SUSP suspension Take 10 mLs by mouth 2 times daily as needed        aspirin (ASA) 81 MG chewable tablet Take 81 mg by mouth daily       Atorvastatin Calcium (LIPITOR PO) Take 20 mg by mouth At Bedtime        carboxymethylcellulose PF (REFRESH PLUS) 0.5 % ophthalmic solution Place 2 drops into both eyes every 4 hours as needed for dry eyes       carboxymethylcellulose PF (REFRESH PLUS) 0.5 % ophthalmic solution Place 2 drops into both eyes 2 times daily       diclofenac (VOLTAREN) 1 % topical gel Place 2 g onto the skin 2 times daily Apply to right shoulder       diclofenac (VOLTAREN) 1 % topical gel Place 2 g onto the skin daily as needed  for moderate pain Apply to right shoulder as needed for pain       loperamide (IMODIUM) 2 MG capsule Take 2 mg by mouth 4 times daily as needed for diarrhea        losartan (COZAAR) 50 MG tablet Take 1 tablet (50 mg) by mouth 2 times daily 180 tablet 3     Menthol ([employer] COUGH DROPS SUGAR FREE MT) Take 1 lozenge by mouth 4 times daily as needed       menthol-zinc oxide (CALMOSEPTINE) 0.44-20.6 % OINT ointment Apply 1 g topically 2 times daily       menthol-zinc oxide (CALMOSEPTINE) 0.44-20.6 % OINT ointment Apply topically 2 times daily as needed for skin protection       metoprolol (LOPRESSOR) 50 MG tablet TAKE ONE TABLET BY MOUTH TWICE DAILY 180 tablet 3     omeprazole (PRILOSEC) 20 MG DR capsule Take 1 capsule (20 mg) by mouth 2 times daily (before meals) 60 capsule 0     ONDANSETRON PO Take 4 mg by mouth every 8 hours as needed for nausea       order for DME Equipment being ordered: Walker Wheels () and Walker ()  Treatment Diagnosis: gait instability 1 each 0     order for DME Light weight wheelchair Body mass index is 19.08 kg/(m^2). 1 Device 0     order for DME ANTOINE stockings- below knee. 1 Package 0     polyethylene glycol (MIRALAX/GLYCOLAX) Packet Take 17 g by mouth daily as needed for constipation 7 packet      senna-docusate (SENOKOT-S;PERICOLACE) 8.6-50 MG per tablet Take 1 tablet by mouth 2 times daily as needed        sucralfate (CARAFATE) 1 GM/10ML suspension Take 10 mLs (1 g) by mouth 4 times daily (before meals and nightly) 1200 mL 0     traZODone (DESYREL) 50 MG tablet Take 50 mg by mouth At Bedtime       trolamine salicylate (ASPERCREME) 10 % external cream Apply topically 2 times daily Apply to affected areas right knee twice daily AND BID PRN           REVIEW OF SYSTEMS:  No chest pain, shortness of breath, fevers, chills, headache, nausea, vomiting, dysuria or bowel abnormalities.  Appetite is  fair.  No pain except occ knees.    Objective:  /67   Pulse 67   Resp 18    Exam:  GENERAL APPEARANCE:  Alert, in no distress, thin, cooperative  ENT:  Mouth and posterior oropharynx normal, moist mucous membranes, [place], small amount of cerumen bilat ear canals  EYES:  EOM, conjunctivae, lids, pupils and irises normal, PERRL  NECK:  No adenopathy,masses or thyromegaly, FROM  RESP:  respiratory effort and palpation of chest normal, lungs clear to auscultation , no respiratory distress  CV:  Palpation and auscultation of heart done , regular rate and rhythm, no murmur, rub, or gallop, no edema  ABDOMEN:  normal bowel sounds, soft, nontender, no hepatosplenomegaly or other masses, no guarding or rebound  M/S:   muscle strength 5/5 all 4 ext., no pain with PROM LEs  NEURO:   Cranial nerves 2-12 are normal tested and grossly at patient's baseline  PSYCH:  insight and judgement impaired, memory impaired , affect and mood normal    Labs:     Most Recent 3 CBC's:  Recent Labs   Lab Test 11/29/19  1055 09/24/19  1700 09/13/19  0659   WBC 4.6 5.0 4.1   HGB 8.5* 9.8* 9.9*   MCV 88 90 90    233 185     Most Recent 3 BMP's:  Recent Labs   Lab Test 11/29/19  1055 09/24/19  1700 09/13/19  0659    134 134   POTASSIUM 3.9 4.4 3.8   CHLORIDE 106 101 103   CO2 25 28 25   BUN 25 26 9   CR 0.79 0.67 0.54   ANIONGAP 6 5 6   REILLY 8.6 8.5 8.1*   * 91 93       ASSESSMENT/PLAN:  Impacted cerumen of bilat ear  Recurrent.  Ongoing hearing loss  1. Start debrox gtts to both ears x 5 days  2. Monitor for increased redness, pain of ear canals  3. If cerumen persists s/p gtts, irrigate    Chronic pain of right knee  Some increased R knee pain yesterday. Pain baseline today  1. Cont. Tylenol  2. Cont. voltaren gel  3. For further increased knee pain, may repeat knee inj over next 3-4 mos      Electronically signed by:  NICKOLAS Hallman CNP             Sincerely,        NICKOLAS Hallman CNP

## 2019-12-06 NOTE — PROGRESS NOTES
[place] [facility]  [place] Medical Record Number:  [id number]  Place of Service where encounter took place:  [facility] [[id number]]  Chief Complaint   Patient presents with     Cerumen (impacted)     Knee Pain       HPI:    Batsheva Barkley  is a 86 year old (11/6/1933), who is being seen today for an episodic care visit.  HPI information obtained from: facility chart records, facility staff, patient report and [facility] chart review. Today's concern is: cerumen, R knee pain.  Has recurrent cerumen, wears hearing aids. Family reports ongoing decreased hearing acuity.  Resident reports itching in ears at times.  Reports some increased R knee pain yesterday, however pain resolved today.  R knee inj 11/27/19  Cont. On tylenol. voltaren gel.      Past Medical and Surgical History reviewed in Epic today.    MEDICATIONS:  Current Outpatient Medications   Medication Sig Dispense Refill     acetaminophen (TYLENOL) 500 MG tablet Take 500 mg by mouth daily as needed for mild pain       acetaminophen (TYLENOL) 500 MG tablet Take 1,000 mg by mouth 3 times daily        alum & mag hydroxide-simethicone (MYLANTA/MAALOX) 200-200-20 MG/5ML SUSP suspension Take 10 mLs by mouth 2 times daily as needed        aspirin (ASA) 81 MG chewable tablet Take 81 mg by mouth daily       Atorvastatin Calcium (LIPITOR PO) Take 20 mg by mouth At Bedtime        carboxymethylcellulose PF (REFRESH PLUS) 0.5 % ophthalmic solution Place 2 drops into both eyes every 4 hours as needed for dry eyes       carboxymethylcellulose PF (REFRESH PLUS) 0.5 % ophthalmic solution Place 2 drops into both eyes 2 times daily       diclofenac (VOLTAREN) 1 % topical gel Place 2 g onto the skin 2 times daily Apply to right shoulder       diclofenac (VOLTAREN) 1 % topical gel Place 2 g onto the skin daily as needed for moderate pain Apply to right shoulder as needed for pain       loperamide (IMODIUM) 2 MG capsule Take 2 mg by  mouth 4 times daily as needed for diarrhea        losartan (COZAAR) 50 MG tablet Take 1 tablet (50 mg) by mouth 2 times daily 180 tablet 3     Menthol ([employer] COUGH DROPS SUGAR FREE MT) Take 1 lozenge by mouth 4 times daily as needed       menthol-zinc oxide (CALMOSEPTINE) 0.44-20.6 % OINT ointment Apply 1 g topically 2 times daily       menthol-zinc oxide (CALMOSEPTINE) 0.44-20.6 % OINT ointment Apply topically 2 times daily as needed for skin protection       metoprolol (LOPRESSOR) 50 MG tablet TAKE ONE TABLET BY MOUTH TWICE DAILY 180 tablet 3     omeprazole (PRILOSEC) 20 MG DR capsule Take 1 capsule (20 mg) by mouth 2 times daily (before meals) 60 capsule 0     ONDANSETRON PO Take 4 mg by mouth every 8 hours as needed for nausea       order for DME Equipment being ordered: Walker Wheels () and Walker ()  Treatment Diagnosis: gait instability 1 each 0     order for DME Light weight wheelchair Body mass index is 19.08 kg/(m^2). 1 Device 0     order for DME ANTOINE stockings- below knee. 1 Package 0     polyethylene glycol (MIRALAX/GLYCOLAX) Packet Take 17 g by mouth daily as needed for constipation 7 packet      senna-docusate (SENOKOT-S;PERICOLACE) 8.6-50 MG per tablet Take 1 tablet by mouth 2 times daily as needed        sucralfate (CARAFATE) 1 GM/10ML suspension Take 10 mLs (1 g) by mouth 4 times daily (before meals and nightly) 1200 mL 0     traZODone (DESYREL) 50 MG tablet Take 50 mg by mouth At Bedtime       trolamine salicylate (ASPERCREME) 10 % external cream Apply topically 2 times daily Apply to affected areas right knee twice daily AND BID PRN           REVIEW OF SYSTEMS:  No chest pain, shortness of breath, fevers, chills, headache, nausea, vomiting, dysuria or bowel abnormalities.  Appetite is  fair.  No pain except occ knees.    Objective:  /67   Pulse 67   Resp 18   Exam:  GENERAL APPEARANCE:  Alert, in no distress, thin, cooperative  ENT:  Mouth and posterior oropharynx normal, moist  mucous membranes, [place], small amount of cerumen bilat ear canals  EYES:  EOM, conjunctivae, lids, pupils and irises normal, PERRL  NECK:  No adenopathy,masses or thyromegaly, FROM  RESP:  respiratory effort and palpation of chest normal, lungs clear to auscultation , no respiratory distress  CV:  Palpation and auscultation of heart done , regular rate and rhythm, no murmur, rub, or gallop, no edema  ABDOMEN:  normal bowel sounds, soft, nontender, no hepatosplenomegaly or other masses, no guarding or rebound  M/S:   muscle strength 5/5 all 4 ext., no pain with PROM LEs  NEURO:   Cranial nerves 2-12 are normal tested and grossly at patient's baseline  PSYCH:  insight and judgement impaired, memory impaired , affect and mood normal    Labs:     Most Recent 3 CBC's:  Recent Labs   Lab Test 11/29/19  1055 09/24/19  1700 09/13/19  0659   WBC 4.6 5.0 4.1   HGB 8.5* 9.8* 9.9*   MCV 88 90 90    233 185     Most Recent 3 BMP's:  Recent Labs   Lab Test 11/29/19  1055 09/24/19  1700 09/13/19  0659    134 134   POTASSIUM 3.9 4.4 3.8   CHLORIDE 106 101 103   CO2 25 28 25   BUN 25 26 9   CR 0.79 0.67 0.54   ANIONGAP 6 5 6   REILLY 8.6 8.5 8.1*   * 91 93       ASSESSMENT/PLAN:  Impacted cerumen of bilat ear  Recurrent.  Ongoing hearing loss  1. Start debrox gtts to both ears x 5 days  2. Monitor for increased redness, pain of ear canals  3. If cerumen persists s/p gtts, irrigate    Chronic pain of right knee  Some increased R knee pain yesterday. Pain baseline today  1. Cont. Tylenol  2. Cont. voltaren gel  3. For further increased knee pain, may repeat knee inj over next 3-4 mos      Electronically signed by:  NICKOLAS Hallman CNP

## 2019-12-09 ENCOUNTER — RECORDS - HEALTHEAST (OUTPATIENT)
Dept: LAB | Facility: CLINIC | Age: 84
End: 2019-12-09

## 2019-12-10 ENCOUNTER — TRANSFERRED RECORDS (OUTPATIENT)
Dept: HEALTH INFORMATION MANAGEMENT | Facility: CLINIC | Age: 84
End: 2019-12-10

## 2019-12-10 LAB
HEMOGLOBIN: 8.8 G/DL (ref 12–16)
HGB BLD-MCNC: 8.8 G/DL (ref 12–16)

## 2019-12-17 ENCOUNTER — OFFICE VISIT (OUTPATIENT)
Dept: CARDIOLOGY | Facility: CLINIC | Age: 84
End: 2019-12-17
Payer: COMMERCIAL

## 2019-12-17 VITALS
HEIGHT: 61 IN | WEIGHT: 106.6 LBS | HEART RATE: 68 BPM | SYSTOLIC BLOOD PRESSURE: 124 MMHG | DIASTOLIC BLOOD PRESSURE: 60 MMHG | BODY MASS INDEX: 20.12 KG/M2

## 2019-12-17 DIAGNOSIS — Z95.5 STENTED CORONARY ARTERY: ICD-10-CM

## 2019-12-17 DIAGNOSIS — I25.10 CORONARY ARTERY DISEASE INVOLVING NATIVE CORONARY ARTERY OF NATIVE HEART WITHOUT ANGINA PECTORIS: ICD-10-CM

## 2019-12-17 DIAGNOSIS — R07.9 ACUTE CHEST PAIN: Primary | ICD-10-CM

## 2019-12-17 DIAGNOSIS — I10 HYPERTENSION GOAL BP (BLOOD PRESSURE) < 140/90: ICD-10-CM

## 2019-12-17 PROCEDURE — 99214 OFFICE O/P EST MOD 30 MIN: CPT | Performed by: INTERNAL MEDICINE

## 2019-12-17 ASSESSMENT — MIFFLIN-ST. JEOR: SCORE: 860.91

## 2019-12-17 NOTE — PROGRESS NOTES
HPI and Plan:   See dictation:[id number]    Orders Placed This Encounter   Procedures     Follow-Up with Cardiologist       No orders of the defined types were placed in this encounter.      There are no discontinued medications.      Encounter Diagnoses   Name Primary?     Acute chest pain Yes     Coronary artery disease involving native coronary artery of native heart without angina pectoris      Stented coronary artery      Hypertension goal BP (blood pressure) < 140/90        CURRENT MEDICATIONS:  Current Outpatient Medications   Medication Sig Dispense Refill     acetaminophen (TYLENOL) 500 MG tablet Take 500 mg by mouth daily as needed for mild pain       acetaminophen (TYLENOL) 500 MG tablet Take 1,000 mg by mouth 3 times daily        alum & mag hydroxide-simethicone (MYLANTA/MAALOX) 200-200-20 MG/5ML SUSP suspension Take 10 mLs by mouth 2 times daily as needed        aspirin (ASA) 81 MG chewable tablet Take 81 mg by mouth daily       Atorvastatin Calcium (LIPITOR PO) Take 20 mg by mouth At Bedtime        carboxymethylcellulose PF (REFRESH PLUS) 0.5 % ophthalmic solution Place 2 drops into both eyes every 4 hours as needed for dry eyes       carboxymethylcellulose PF (REFRESH PLUS) 0.5 % ophthalmic solution Place 2 drops into both eyes 2 times daily       diclofenac (VOLTAREN) 1 % topical gel Place 2 g onto the skin 2 times daily Apply to right shoulder       diclofenac (VOLTAREN) 1 % topical gel Place 2 g onto the skin daily as needed for moderate pain Apply to right shoulder as needed for pain       loperamide (IMODIUM) 2 MG capsule Take 2 mg by mouth 4 times daily as needed for diarrhea        losartan (COZAAR) 50 MG tablet Take 1 tablet (50 mg) by mouth 2 times daily 180 tablet 3     Menthol ([employer] COUGH DROPS SUGAR FREE MT) Take 1 lozenge by mouth 4 times daily as needed       menthol-zinc oxide (CALMOSEPTINE) 0.44-20.6 % OINT ointment Apply 1 g topically 2 times daily       menthol-zinc oxide (CALMOSEPTINE)  0.44-20.6 % OINT ointment Apply topically 2 times daily as needed for skin protection       metoprolol (LOPRESSOR) 50 MG tablet TAKE ONE TABLET BY MOUTH TWICE DAILY 180 tablet 3     omeprazole (PRILOSEC) 20 MG DR capsule Take 1 capsule (20 mg) by mouth 2 times daily (before meals) 60 capsule 0     ONDANSETRON PO Take 4 mg by mouth every 8 hours as needed for nausea       polyethylene glycol (MIRALAX/GLYCOLAX) Packet Take 17 g by mouth daily as needed for constipation 7 packet      senna-docusate (SENOKOT-S;PERICOLACE) 8.6-50 MG per tablet Take 1 tablet by mouth 2 times daily as needed        sucralfate (CARAFATE) 1 GM/10ML suspension Take 10 mLs (1 g) by mouth 4 times daily (before meals and nightly) 1200 mL 0     traZODone (DESYREL) 50 MG tablet Take 50 mg by mouth At Bedtime       trolamine salicylate (ASPERCREME) 10 % external cream Apply topically 2 times daily Apply to affected areas right knee twice daily AND BID PRN       order for DME Equipment being ordered: Walker Wheels () and Walker ()  Treatment Diagnosis: gait instability 1 each 0     order for DME Light weight wheelchair Body mass index is 19.08 kg/(m^2). 1 Device 0     order for DME ANTOINE stockings- below knee. 1 Package 0       ALLERGIES     Allergies   Allergen Reactions     Codeine      nausea, HA     Lisinopril Cough       PAST MEDICAL HISTORY:  Past Medical History:   Diagnosis Date     Aortic insufficiency      CAD (coronary artery disease) 06/20/2005    w/stent to dominant Cfx(2005)     Cardiomyopathy (H)      Chest discomfort      CHF NYHA class III, chronic, systolic (H) 1/25/2018     Coronary atherosclerosis 6/20/2005    circ vessel was stented;  had a 50% LAD lesion which was not treated Problem list name updated by automated process. Provider to review     Former smoker      Generalized osteoarthrosis     knees ;; L total kne 10/09     GERD with esophagitis      Hiatal hernia     Large     Hypertension goal BP (blood pressure) <  140/90 1/21/2015     Ischemic cardiomyopathy      Leg weakness, bilateral 2/13/2018     MI (myocardial infarction) (H) 06/2005     Mitral regurgitation     moderate     Mixed hyperlipidemia      Osteoporosis      Peripheral edema 01/26/2015     Physical deconditioning 1/21/2015     Total urinary incontinence 12/27/2017       PAST SURGICAL HISTORY:  Past Surgical History:   Procedure Laterality Date     COLONOSCOPY  3/05     ENT SURGERY       ESOPHAGOSCOPY, GASTROSCOPY, DUODENOSCOPY (EGD), COMBINED N/A 4/14/2016    Procedure: COMBINED ESOPHAGOSCOPY, GASTROSCOPY, DUODENOSCOPY (EGD), BIOPSY SINGLE OR MULTIPLE;  Surgeon: Jonathan Gramajo MD;  Location:  GI     EYE SURGERY       HEART CATH, ANGIOPLASTY  2005    RONI to left circ     L bunion + hammer toes  1/04, 4/04     L total knee replacement  10/2009      ovarian cyst surgery         FAMILY HISTORY:  Family History   Problem Relation Age of Onset     Cerebrovascular Disease Mother      Respiratory Father         heart     Breast Cancer Sister      Heart Disease Sister      Breast Cancer Sister        SOCIAL HISTORY:  Social History     Socioeconomic History     Marital status:      Spouse name: Gene     Number of children: 6     Years of education: None     Highest education level: None   Occupational History     None   Social Needs     Financial resource strain: None     Food insecurity:     Worry: None     Inability: None     Transportation needs:     Medical: None     Non-medical: None   Tobacco Use     Smoking status: Former Smoker     Smokeless tobacco: Never Used     Tobacco comment: only smoked for 3 years.   Substance and Sexual Activity     Alcohol use: No     Drug use: No     Sexual activity: Yes     Partners: Male   Lifestyle     Physical activity:     Days per week: None     Minutes per session: None     Stress: None   Relationships     Social connections:     Talks on phone: None     Gets together: None     Attends Denominational service: None      "Active member of club or organization: None     Attends meetings of clubs or organizations: None     Relationship status: None     Intimate partner violence:     Fear of current or ex partner: None     Emotionally abused: None     Physically abused: None     Forced sexual activity: None   Other Topics Concern     Parent/sibling w/ CABG, MI or angioplasty before 65F 55M? Not Asked      Service Not Asked     Blood Transfusions Not Asked     Caffeine Concern No     Comment: decaf coffee and tea      Occupational Exposure Not Asked     Hobby Hazards Not Asked     Sleep Concern Not Asked     Stress Concern Not Asked     Weight Concern Not Asked     Special Diet No     Back Care Not Asked     Exercise Yes     Comment: walk everyday      Bike Helmet Not Asked     Seat Belt Not Asked     Self-Exams Not Asked   Social History Narrative     None       Review of Systems:  Skin:  Positive for   hx basal cell  - no issues    Eyes:  Positive for glasses    ENT:  Positive for hearing loss    Respiratory:  Negative       Cardiovascular:    Positive for;fatigue    Gastroenterology: Negative      Genitourinary:  not assessed      Musculoskeletal:  Positive for arthritis;back pain;nocturnal cramping;joint pain;joint stiffness lower back ,  RA in hands ,  occas cassius horse at pm's ,  right knee pain   Neurologic:  Negative      Psychiatric:  Negative      Heme/Lymph/Imm:  Positive for easy bruising;allergies    Endocrine:  Negative        Physical Exam:  Vitals: /60   Pulse 68   Ht 1.549 m (5' 1\")   Wt 48.4 kg (106 lb 9.6 oz)   BMI 20.14 kg/m      Constitutional:  cooperative thin;frail      Skin:  warm and dry to the touch, no apparent skin lesions or masses noted          Head:  normocephalic, no masses or lesions        Eyes:  pupils equal and round;conjunctivae and lids unremarkable;sclera white;no xanthalasma;EOMS intact        Lymph:      ENT:  no pallor or cyanosis, dentition good        Neck:  carotid pulses " are full and equal bilaterally, JVP normal, no carotid bruit        Respiratory:  normal breath sounds, clear to auscultation, normal A-P diameter, normal symmetry, normal respiratory excursion, no use of accessory muscles         Cardiac: regular rhythm;normal S1 and S2;apical impulse not displaced   S4 no presence of murmur          pulses full and equal, no bruits auscultated                                   radial pulses 2+    GI:  abdomen soft, non-tender, BS normoactive, no mass, no HSM, no bruits        Extremities and Muscular Skeletal:  no edema;no deformities, clubbing, cyanosis, erythema observed arthritic deformities of UE       bilateral knee high compression stockings    Neurological:  no gross motor deficits;affect appropriate        Psych:  Alert and Oriented x 3        CC  No referring provider defined for this encounter.

## 2019-12-17 NOTE — LETTER
2019      Tacos Beasley NICKOLAS Bhakta CNP  [address] [address]      RE: Batsheva Barkley       Dear Colleague,    I had the pleasure of seeing Batsheva Barkley in the [facility].    Service Date: 2019      Tacos NICKOLAS Bhakta, CNP   [facility]    [address] [address]      RE: Batsheva Barkley    MRN: [id number]   : 1933      Dear Mr. Bhakta:       I had the pleasure of seeing your patient Batsheva Barkley at [facility] in [place] for evaluation of coronary artery disease and precordial chest discomfort.  This patient is a delightful, 86-year-old female with a history of an acute inferolateral wall myocardial infarction in 2005 while playing golf.  She collapsed, and coronary angiography demonstrated an occluded dominant circumflex artery, which was then stented.  Echocardiogram has shown moderate mitral regurgitation and an ejection fraction around 45%-50%.  In 2017, an echocardiogram was performed to evaluate peripheral edema and showed severe inferolateral hypokinesis and mild inferior and inferoseptal hypokinesis.  Ejection fraction was 40%.  There was mild to moderate mitral regurgitation and mild aortic insufficiency.  Her last nuclear stress test from 2017 showed a moderately large lateral, inferolateral and inferobasal infarct without significant associated ischemia.  Her ejection fraction at rest was 54%.  The patient notes intermittent chest discomfort that has caused her to come to the emergency room several times, the last on 2019.  GI cocktail has effectively treated her symptoms, and she has a known large hiatal hernia.  Echocardiogram on  showed an ejection fraction of 45% with moderately severe mitral regurgitation.  There was moderate to severe lateral wall hypokinesis.  There were severe inferolateral wall hypokinesis and mild inferior wall hypokinesis.  Right ventricular  size and function were normal.  The left atrium was moderately to severely dilated.  The mitral regurgitation had worsened compared to the previous study.  Left ventricular size was normal, both end systolic and end diastolic.  The patient denies shortness of breath or cough.  She denies exertional chest discomfort.  This occurs generally after a meal, but not at night in bed.      The patient does do some walking using a walker.  She has significant arthritis in her upper extremities and her right knee.      PHYSICAL EXAMINATION:  As listed below.      ASSESSMENT:   1.  Batsheva Barkley is a delightful, 86-year-old female status post lateral and inferolateral myocardial infarction in 06/2005 treated with angioplasty and stenting of the dominant circumflex artery.  She is left with an ischemic cardiomyopathy with an ejection fraction around 45%.  There is no evidence of congestive heart failure.  Her Lexiscan nuclear stress test from 2017 demonstrated no ongoing ischemia.  Given her chest discomfort, however, it seems reasonable to recheck a Lexiscan nuclear stress test to be sure we are not dealing with cardiac ischemia as a cause of her chest pain.  This will be performed in the next 1-2 weeks.  If this remains unchanged from previous, I would assume that her chest symptoms are due to her hiatal hernia.  I see no current cardiac medications causing heartburn or reflux.   2.  Hypertension, currently well controlled.  Her last BMP from 11/29/2019 is normal.   3.  Moderate anemia with hemoglobin 1 week ago of 8.8.  This is being followed by primary care.   4.  Hyperlipidemia.  This is followed through Mr. Bhakta's office.      It is my pleasure to assist in the care of Batsheva Barkley.  I will see her again in 1 year or earlier on a p.r.n. basis.  All her questions were answered to her satisfaction.  Her daughter, Dahlia, was in attendance today.      Sincerely,      Nando Tang MD, [facility]         NANDO TANG MD,  Located within [facility]             D: 2019   T: 2019   MT: mm      Name:     RENEE HIGGINS   MRN:      [id number]        Account:      [id number]   :      1933           Service Date: 2019      Document: [id number]         Outpatient Encounter Medications as of 2019   Medication Sig Dispense Refill     acetaminophen (TYLENOL) 500 MG tablet Take 500 mg by mouth daily as needed for mild pain       acetaminophen (TYLENOL) 500 MG tablet Take 1,000 mg by mouth 3 times daily        alum & mag hydroxide-simethicone (MYLANTA/MAALOX) 200-200-20 MG/5ML SUSP suspension Take 10 mLs by mouth 2 times daily as needed        aspirin (ASA) 81 MG chewable tablet Take 81 mg by mouth daily       Atorvastatin Calcium (LIPITOR PO) Take 20 mg by mouth At Bedtime        carboxymethylcellulose PF (REFRESH PLUS) 0.5 % ophthalmic solution Place 2 drops into both eyes every 4 hours as needed for dry eyes       carboxymethylcellulose PF (REFRESH PLUS) 0.5 % ophthalmic solution Place 2 drops into both eyes 2 times daily       diclofenac (VOLTAREN) 1 % topical gel Place 2 g onto the skin 2 times daily Apply to right shoulder       diclofenac (VOLTAREN) 1 % topical gel Place 2 g onto the skin daily as needed for moderate pain Apply to right shoulder as needed for pain       loperamide (IMODIUM) 2 MG capsule Take 2 mg by mouth 4 times daily as needed for diarrhea        losartan (COZAAR) 50 MG tablet Take 1 tablet (50 mg) by mouth 2 times daily 180 tablet 3     Menthol ([employer] COUGH DROPS SUGAR FREE MT) Take 1 lozenge by mouth 4 times daily as needed       menthol-zinc oxide (CALMOSEPTINE) 0.44-20.6 % OINT ointment Apply 1 g topically 2 times daily       menthol-zinc oxide (CALMOSEPTINE) 0.44-20.6 % OINT ointment Apply topically 2 times daily as needed for skin protection       metoprolol (LOPRESSOR) 50 MG tablet TAKE ONE TABLET BY MOUTH TWICE DAILY 180 tablet 3     omeprazole (PRILOSEC) 20 MG DR capsule Take 1 capsule (20 mg) by mouth 2  times daily (before meals) 60 capsule 0     ONDANSETRON PO Take 4 mg by mouth every 8 hours as needed for nausea       polyethylene glycol (MIRALAX/GLYCOLAX) Packet Take 17 g by mouth daily as needed for constipation 7 packet      senna-docusate (SENOKOT-S;PERICOLACE) 8.6-50 MG per tablet Take 1 tablet by mouth 2 times daily as needed        sucralfate (CARAFATE) 1 GM/10ML suspension Take 10 mLs (1 g) by mouth 4 times daily (before meals and nightly) 1200 mL 0     traZODone (DESYREL) 50 MG tablet Take 50 mg by mouth At Bedtime       trolamine salicylate (ASPERCREME) 10 % external cream Apply topically 2 times daily Apply to affected areas right knee twice daily AND BID PRN       order for DME Equipment being ordered: Walker Wheels () and Walker ()  Treatment Diagnosis: gait instability 1 each 0     order for DME Light weight wheelchair Body mass index is 19.08 kg/(m^2). 1 Device 0     order for DME ANTOINE stockings- below knee. 1 Package 0     No facility-administered encounter medications on file as of 12/17/2019.        Again, thank you for allowing me to participate in the care of your patient.      Sincerely,    Nando Tang MD     [facility]

## 2019-12-17 NOTE — LETTER
12/17/2019    Tacos Bhakta, APRN CNP  [address]  [facility] [address]    RE: Batsheva Barkley       Dear Colleague,    I had the pleasure of seeing Batsheva Barkley in the [facility].    HPI and Plan:   See dictation:[id number]    Orders Placed This Encounter   Procedures     Follow-Up with Cardiologist       No orders of the defined types were placed in this encounter.      There are no discontinued medications.      Encounter Diagnoses   Name Primary?     Acute chest pain Yes     Coronary artery disease involving native coronary artery of native heart without angina pectoris      Stented coronary artery      Hypertension goal BP (blood pressure) < 140/90        CURRENT MEDICATIONS:  Current Outpatient Medications   Medication Sig Dispense Refill     acetaminophen (TYLENOL) 500 MG tablet Take 500 mg by mouth daily as needed for mild pain       acetaminophen (TYLENOL) 500 MG tablet Take 1,000 mg by mouth 3 times daily        alum & mag hydroxide-simethicone (MYLANTA/MAALOX) 200-200-20 MG/5ML SUSP suspension Take 10 mLs by mouth 2 times daily as needed        aspirin (ASA) 81 MG chewable tablet Take 81 mg by mouth daily       Atorvastatin Calcium (LIPITOR PO) Take 20 mg by mouth At Bedtime        carboxymethylcellulose PF (REFRESH PLUS) 0.5 % ophthalmic solution Place 2 drops into both eyes every 4 hours as needed for dry eyes       carboxymethylcellulose PF (REFRESH PLUS) 0.5 % ophthalmic solution Place 2 drops into both eyes 2 times daily       diclofenac (VOLTAREN) 1 % topical gel Place 2 g onto the skin 2 times daily Apply to right shoulder       diclofenac (VOLTAREN) 1 % topical gel Place 2 g onto the skin daily as needed for moderate pain Apply to right shoulder as needed for pain       loperamide (IMODIUM) 2 MG capsule Take 2 mg by mouth 4 times daily as needed for diarrhea        losartan (COZAAR) 50 MG tablet Take 1 tablet (50 mg) by mouth 2 times daily 180 tablet 3      Menthol ([employer] COUGH DROPS SUGAR FREE MT) Take 1 lozenge by mouth 4 times daily as needed       menthol-zinc oxide (CALMOSEPTINE) 0.44-20.6 % OINT ointment Apply 1 g topically 2 times daily       menthol-zinc oxide (CALMOSEPTINE) 0.44-20.6 % OINT ointment Apply topically 2 times daily as needed for skin protection       metoprolol (LOPRESSOR) 50 MG tablet TAKE ONE TABLET BY MOUTH TWICE DAILY 180 tablet 3     omeprazole (PRILOSEC) 20 MG DR capsule Take 1 capsule (20 mg) by mouth 2 times daily (before meals) 60 capsule 0     ONDANSETRON PO Take 4 mg by mouth every 8 hours as needed for nausea       polyethylene glycol (MIRALAX/GLYCOLAX) Packet Take 17 g by mouth daily as needed for constipation 7 packet      senna-docusate (SENOKOT-S;PERICOLACE) 8.6-50 MG per tablet Take 1 tablet by mouth 2 times daily as needed        sucralfate (CARAFATE) 1 GM/10ML suspension Take 10 mLs (1 g) by mouth 4 times daily (before meals and nightly) 1200 mL 0     traZODone (DESYREL) 50 MG tablet Take 50 mg by mouth At Bedtime       trolamine salicylate (ASPERCREME) 10 % external cream Apply topically 2 times daily Apply to affected areas right knee twice daily AND BID PRN       order for DME Equipment being ordered: Walker Wheels () and Walker ()  Treatment Diagnosis: gait instability 1 each 0     order for DME Light weight wheelchair Body mass index is 19.08 kg/(m^2). 1 Device 0     order for DME ANTOINE stockings- below knee. 1 Package 0       ALLERGIES     Allergies   Allergen Reactions     Codeine      nausea, HA     Lisinopril Cough       PAST MEDICAL HISTORY:  Past Medical History:   Diagnosis Date     Aortic insufficiency      CAD (coronary artery disease) 06/20/2005    w/stent to dominant Cfx(2005)     Cardiomyopathy (H)      Chest discomfort      CHF NYHA class III, chronic, systolic (H) 1/25/2018     Coronary atherosclerosis 6/20/2005    circ vessel was stented;  had a 50% LAD lesion which was not treated Problem list name  updated by automated process. Provider to review     Former smoker      Generalized osteoarthrosis     knees ;; L total kne 10/09     GERD with esophagitis      Hiatal hernia     Large     Hypertension goal BP (blood pressure) < 140/90 1/21/2015     Ischemic cardiomyopathy      Leg weakness, bilateral 2/13/2018     MI (myocardial infarction) (H) 06/2005     Mitral regurgitation     moderate     Mixed hyperlipidemia      Osteoporosis      Peripheral edema 01/26/2015     Physical deconditioning 1/21/2015     Total urinary incontinence 12/27/2017       PAST SURGICAL HISTORY:  Past Surgical History:   Procedure Laterality Date     COLONOSCOPY  3/05     ENT SURGERY       ESOPHAGOSCOPY, GASTROSCOPY, DUODENOSCOPY (EGD), COMBINED N/A 4/14/2016    Procedure: COMBINED ESOPHAGOSCOPY, GASTROSCOPY, DUODENOSCOPY (EGD), BIOPSY SINGLE OR MULTIPLE;  Surgeon: Jonathan Gramajo MD;  Location:  GI     EYE SURGERY       HEART CATH, ANGIOPLASTY  2005    RONI to left circ     L bunion + hammer toes  1/04, 4/04     L total knee replacement  10/2009      ovarian cyst surgery         FAMILY HISTORY:  Family History   Problem Relation Age of Onset     Cerebrovascular Disease Mother      Respiratory Father         heart     Breast Cancer Sister      Heart Disease Sister      Breast Cancer Sister        SOCIAL HISTORY:  Social History     Socioeconomic History     Marital status:      Spouse name: Gene     Number of children: 6     Years of education: None     Highest education level: None   Occupational History     None   Social Needs     Financial resource strain: None     Food insecurity:     Worry: None     Inability: None     Transportation needs:     Medical: None     Non-medical: None   Tobacco Use     Smoking status: Former Smoker     Smokeless tobacco: Never Used     Tobacco comment: only smoked for 3 years.   Substance and Sexual Activity     Alcohol use: No     Drug use: No     Sexual activity: Yes     Partners: Male  "  Lifestyle     Physical activity:     Days per week: None     Minutes per session: None     Stress: None   Relationships     Social connections:     Talks on phone: None     Gets together: None     Attends Bahai service: None     Active member of club or organization: None     Attends meetings of clubs or organizations: None     Relationship status: None     Intimate partner violence:     Fear of current or ex partner: None     Emotionally abused: None     Physically abused: None     Forced sexual activity: None   Other Topics Concern     Parent/sibling w/ CABG, MI or angioplasty before 65F 55M? Not Asked      Service Not Asked     Blood Transfusions Not Asked     Caffeine Concern No     Comment: decaf coffee and tea      Occupational Exposure Not Asked     Hobby Hazards Not Asked     Sleep Concern Not Asked     Stress Concern Not Asked     Weight Concern Not Asked     Special Diet No     Back Care Not Asked     Exercise Yes     Comment: walk everyday      Bike Helmet Not Asked     Seat Belt Not Asked     Self-Exams Not Asked   Social History Narrative     None       Review of Systems:  Skin:  Positive for   hx basal cell  - no issues    Eyes:  Positive for glasses    ENT:  Positive for hearing loss    Respiratory:  Negative       Cardiovascular:    Positive for;fatigue    Gastroenterology: Negative      Genitourinary:  not assessed      Musculoskeletal:  Positive for arthritis;back pain;nocturnal cramping;joint pain;joint stiffness lower back ,  RA in hands ,  occas cassius horse at pm's ,  right knee pain   Neurologic:  Negative      Psychiatric:  Negative      Heme/Lymph/Imm:  Positive for easy bruising;allergies    Endocrine:  Negative        Physical Exam:  Vitals: /60   Pulse 68   Ht 1.549 m (5' 1\")   Wt 48.4 kg (106 lb 9.6 oz)   BMI 20.14 kg/m       Constitutional:  cooperative thin;frail      Skin:  warm and dry to the touch, no apparent skin lesions or masses noted          Head:  " normocephalic, no masses or lesions        Eyes:  pupils equal and round;conjunctivae and lids unremarkable;sclera white;no xanthalasma;EOMS intact        Lymph:      ENT:  no pallor or cyanosis, dentition good        Neck:  carotid pulses are full and equal bilaterally, JVP normal, no carotid bruit        Respiratory:  normal breath sounds, clear to auscultation, normal A-P diameter, normal symmetry, normal respiratory excursion, no use of accessory muscles         Cardiac: regular rhythm;normal S1 and S2;apical impulse not displaced   S4 no presence of murmur          pulses full and equal, no bruits auscultated                                   radial pulses 2+    GI:  abdomen soft, non-tender, BS normoactive, no mass, no HSM, no bruits        Extremities and Muscular Skeletal:  no edema;no deformities, clubbing, cyanosis, erythema observed arthritic deformities of UE       bilateral knee high compression stockings    Neurological:  no gross motor deficits;affect appropriate        Psych:  Alert and Oriented x 3        CC  No referring provider defined for this encounter.                Thank you for allowing me to participate in the care of your patient.      Sincerely,     Nando Tang MD     [facility]    cc:   No referring provider defined for this encounter.

## 2019-12-17 NOTE — PROGRESS NOTES
Service Date: 2019      NICKOLAS Jones, CNP   [facility]    [address] [address]      RE: Batsheva Barkley    MRN: [id number]   : 1933      Dear Mr. Bhakta:       I had the pleasure of seeing your patient Batsheva Barkley at [facility] in [place] for evaluation of coronary artery disease and precordial chest discomfort.  This patient is a delightful, 86-year-old female with a history of an acute inferolateral wall myocardial infarction in 2005 while playing golf.  She collapsed, and coronary angiography demonstrated an occluded dominant circumflex artery, which was then stented.  Echocardiogram has shown moderate mitral regurgitation and an ejection fraction around 45%-50%.  In 2017, an echocardiogram was performed to evaluate peripheral edema and showed severe inferolateral hypokinesis and mild inferior and inferoseptal hypokinesis.  Ejection fraction was 40%.  There was mild to moderate mitral regurgitation and mild aortic insufficiency.  Her last nuclear stress test from 2017 showed a moderately large lateral, inferolateral and inferobasal infarct without significant associated ischemia.  Her ejection fraction at rest was 54%.  The patient notes intermittent chest discomfort that has caused her to come to the emergency room several times, the last on 2019.  GI cocktail has effectively treated her symptoms, and she has a known large hiatal hernia.  Echocardiogram on  showed an ejection fraction of 45% with moderately severe mitral regurgitation.  There was moderate to severe lateral wall hypokinesis.  There were severe inferolateral wall hypokinesis and mild inferior wall hypokinesis.  Right ventricular size and function were normal.  The left atrium was moderately to severely dilated.  The mitral regurgitation had worsened compared to the previous study.  Left ventricular size was normal, both end systolic and end diastolic.   The patient denies shortness of breath or cough.  She denies exertional chest discomfort.  This occurs generally after a meal, but not at night in bed.      The patient does do some walking using a walker.  She has significant arthritis in her upper extremities and her right knee.      PHYSICAL EXAMINATION:  As listed below.      ASSESSMENT:   1.  Batsehva Higgins is a delightful, 86-year-old female status post lateral and inferolateral myocardial infarction in 2005 treated with angioplasty and stenting of the dominant circumflex artery.  She is left with an ischemic cardiomyopathy with an ejection fraction around 45%.  There is no evidence of congestive heart failure.  Her Lexiscan nuclear stress test from 2017 demonstrated no ongoing ischemia.  Given her chest discomfort, however, it seems reasonable to recheck a Lexiscan nuclear stress test to be sure we are not dealing with cardiac ischemia as a cause of her chest pain.  This will be performed in the next 1-2 weeks.  If this remains unchanged from previous, I would assume that her chest symptoms are due to her hiatal hernia.  I see no current cardiac medications causing heartburn or reflux.   2.  Hypertension, currently well controlled.  Her last BMP from 2019 is normal.   3.  Moderate anemia with hemoglobin 1 week ago of 8.8.  This is being followed by primary care.   4.  Hyperlipidemia.  This is followed through Mr. Bhakta's office.      It is my pleasure to assist in the care of Batsheva Higgins.  I will see her again in 1 year or earlier on a p.r.n. basis.  All her questions were answered to her satisfaction.  Her daughter, Dahlia, was in attendance today.      Sincerely,      Roseline Maki MD, FACC         ROSELINE MAKI MD, FACC             D: 2019   T: 2019   MT: jose rafael      Name:     BATSHEVA HIGGINS   MRN:      [id number]        Account:      [id number]   :      1933           Service Date: 2019      Document: [id number]

## 2019-12-30 ENCOUNTER — ASSISTED LIVING VISIT (OUTPATIENT)
Dept: GERIATRICS | Facility: CLINIC | Age: 84
End: 2019-12-30
Payer: COMMERCIAL

## 2019-12-30 VITALS
DIASTOLIC BLOOD PRESSURE: 68 MMHG | OXYGEN SATURATION: 92 % | RESPIRATION RATE: 18 BRPM | SYSTOLIC BLOOD PRESSURE: 127 MMHG | HEART RATE: 69 BPM

## 2019-12-30 DIAGNOSIS — I10 ESSENTIAL HYPERTENSION: ICD-10-CM

## 2019-12-30 DIAGNOSIS — M25.561 CHRONIC PAIN OF RIGHT KNEE: Primary | ICD-10-CM

## 2019-12-30 DIAGNOSIS — G89.29 CHRONIC PAIN OF RIGHT KNEE: Primary | ICD-10-CM

## 2019-12-30 DIAGNOSIS — K21.9 GASTROESOPHAGEAL REFLUX DISEASE, ESOPHAGITIS PRESENCE NOT SPECIFIED: ICD-10-CM

## 2019-12-30 NOTE — LETTER
12/30/2019        RE: Batsheva Barkley  [facility]  [address]  [facility] [address]        [facility]  [facility] Medical Record Number:  [id number]  Place of Service where encounter took place:  [facility] [[id number]]  Chief Complaint   Patient presents with     Knee Pain       HPI:    Batsheva Barkley  is a 86 year old (11/6/1933), who is being seen today for an episodic care visit.  HPI information obtained from: facility chart records, facility staff, patient report, [facility] chart review and family/first contact dtr report. Today's concern is: R knee pain, GERD, HTN. Has OA, chronic R knee pain. Gait slowed. Last R knee inj 11/27/19. Reports short lived benefits from last inj.  Cont. On tylenol. Has aspercreme to R knee. Amb. With walker.  For GERD cont. On prilosec, carafate prn galvilax, zodran.  No recent reports of c.p. or nausea. Po intake stable. For HTN cont on losartan, metoprolol. BPs, HRs stable. Cardiology visit 12/17/19. No med changes.  Did have offer of f/u lexiscan stress test.  Per dtr, resident did not want stress test.       Past Medical and Surgical History reviewed in Epic today.    MEDICATIONS:  Current Outpatient Medications   Medication Sig Dispense Refill     acetaminophen (TYLENOL) 500 MG tablet Take 500 mg by mouth daily as needed for mild pain       acetaminophen (TYLENOL) 500 MG tablet Take 1,000 mg by mouth 3 times daily        alum & mag hydroxide-simethicone (MYLANTA/MAALOX) 200-200-20 MG/5ML SUSP suspension Take 10 mLs by mouth 2 times daily as needed        aspirin (ASA) 81 MG chewable tablet Take 81 mg by mouth daily       Atorvastatin Calcium (LIPITOR PO) Take 20 mg by mouth At Bedtime        carboxymethylcellulose PF (REFRESH PLUS) 0.5 % ophthalmic solution Place 2 drops into both eyes every 4 hours as needed for dry eyes       carboxymethylcellulose PF (REFRESH PLUS) 0.5 % ophthalmic solution Place 2  drops into both eyes 2 times daily       diclofenac (VOLTAREN) 1 % topical gel Place 2 g onto the skin 2 times daily Apply to right shoulder       diclofenac (VOLTAREN) 1 % topical gel Place 2 g onto the skin daily as needed for moderate pain Apply to right shoulder as needed for pain       loperamide (IMODIUM) 2 MG capsule Take 2 mg by mouth 4 times daily as needed for diarrhea        losartan (COZAAR) 50 MG tablet Take 1 tablet (50 mg) by mouth 2 times daily 180 tablet 3     Menthol ([employer] COUGH DROPS SUGAR FREE MT) Take 1 lozenge by mouth 4 times daily as needed       menthol-zinc oxide (CALMOSEPTINE) 0.44-20.6 % OINT ointment Apply 1 g topically 2 times daily       menthol-zinc oxide (CALMOSEPTINE) 0.44-20.6 % OINT ointment Apply topically 2 times daily as needed for skin protection       metoprolol (LOPRESSOR) 50 MG tablet TAKE ONE TABLET BY MOUTH TWICE DAILY 180 tablet 3     omeprazole (PRILOSEC) 20 MG DR capsule Take 1 capsule (20 mg) by mouth 2 times daily (before meals) 60 capsule 0     ONDANSETRON PO Take 4 mg by mouth every 8 hours as needed for nausea       order for DME Equipment being ordered: Walker Wheels () and Walker ()  Treatment Diagnosis: gait instability 1 each 0     order for DME Light weight wheelchair Body mass index is 19.08 kg/(m^2). 1 Device 0     order for DME ANTOINE stockings- below knee. 1 Package 0     polyethylene glycol (MIRALAX/GLYCOLAX) Packet Take 17 g by mouth daily as needed for constipation 7 packet      senna-docusate (SENOKOT-S;PERICOLACE) 8.6-50 MG per tablet Take 1 tablet by mouth 2 times daily as needed        sucralfate (CARAFATE) 1 GM/10ML suspension Take 10 mLs (1 g) by mouth 4 times daily (before meals and nightly) 1200 mL 0     traZODone (DESYREL) 50 MG tablet Take 50 mg by mouth At Bedtime       trolamine salicylate (ASPERCREME) 10 % external cream Apply topically 2 times daily Apply to affected areas right knee twice daily AND BID PRN           REVIEW OF  SYSTEMS:  No chest pain, shortness of breath, fevers, chills, headache, nausea, vomiting, dysuria or bowel abnormalities.  Appetite is  fair.  No pain except R knee.    Objective:  /68   Pulse 69   Resp 18   SpO2 92%   Exam:  GENERAL APPEARANCE:  Alert, in no distress, cooperative  ENT:  Mouth and posterior oropharynx normal, moist mucous membranes, [place], missing L front tooth-having partial fitted tomorrow  EYES:  EOM, conjunctivae, lids, pupils and irises normal, PERRL  NECK:  No adenopathy,masses or thyromegaly, no carotid bruit  RESP:  respiratory effort and palpation of chest normal, lungs clear to auscultation , no respiratory distress  CV:  Palpation and auscultation of heart done , regular rate and rhythm, no murmur, rub, or gallop, no edema  ABDOMEN:  normal bowel sounds, soft, nontender, no hepatosplenomegaly or other masses, no guarding or rebound  M/S:   gait slowed, varus. muscle strength 5/5 all 4 ext., bony deformities of hands  NEURO:   Cranial nerves 2-12 are normal tested and grossly at patient's baseline, alert  PSYCH:  memory impaired , affect and mood normal    Labs:     Most Recent 3 CBC's:  Recent Labs   Lab Test 12/10/19 11/29/19  1055 09/24/19  1700 09/13/19  0659   WBC  --  4.6 5.0 4.1   HGB 8.8* 8.5* 9.8* 9.9*   MCV  --  88 90 90   PLT  --  263 233 185     Most Recent 3 BMP's:  Recent Labs   Lab Test 11/29/19  1055 09/24/19  1700 09/13/19  0659    134 134   POTASSIUM 3.9 4.4 3.8   CHLORIDE 106 101 103   CO2 25 28 25   BUN 25 26 9   CR 0.79 0.67 0.54   ANIONGAP 6 5 6   REILLY 8.6 8.5 8.1*   * 91 93     Most Recent Cholesterol Panel:  Recent Labs   Lab Test 12/04/18   CHOL 183   LDL 82   HDL 84   TRIG 84       ASSESSMENT/PLAN:  Chronic pain of right knee  Ongoing, decreased benefit from R knee inj compared with previous knee inj  1. Cont tylenol  2. Cont prn aspercreme  3. Start voltaren gel bid to R knee  4. Reassess over next 1-2 weeks, consider increased voltaren gel  freq. To tid    Gastroesophageal reflux disease, esophagitis presence not specified  Currently stable. Hiatal hernia  1. Cont. Prilosec, carafate  2. Cont. Prn gavilax, zofran  3. Monitor for reports of c.p., nausea  4. Follow po intake, wt.s    Essential hypertension  Currently stable  1. Cont. Losartan, metoprolol  2. Follow BPs, HRs  3. Monitor for reports of dizziness  4. FLP, AST, ALT next month      Electronically signed by:  NICKOLAS Hallman CNP             Sincerely,        NICKOLAS Hallman CNP

## 2019-12-30 NOTE — PROGRESS NOTES
[facility]  [place] Medical Record Number:  [id number]  Place of Service where encounter took place:  [facility] [[id number]]  Chief Complaint   Patient presents with     Knee Pain       HPI:    Batsheva Barkley  is a 86 year old (11/6/1933), who is being seen today for an episodic care visit.  HPI information obtained from: facility chart records, facility staff, patient report, [facility] chart review and family/first contact dtr report. Today's concern is: R knee pain, GERD, HTN. Has OA, chronic R knee pain. Gait slowed. Last R knee inj 11/27/19. Reports short lived benefits from last inj.  Cont. On tylenol. Has aspercreme to R knee. Amb. With walker.  For GERD cont. On prilosec, carafate prn galvilax, zodran.  No recent reports of c.p. or nausea. Po intake stable. For HTN cont on losartan, metoprolol. BPs, HRs stable. Cardiology visit 12/17/19. No med changes.  Did have offer of f/u lexiscan stress test.  Per dtr, resident did not want stress test.       Past Medical and Surgical History reviewed in Epic today.    MEDICATIONS:  Current Outpatient Medications   Medication Sig Dispense Refill     acetaminophen (TYLENOL) 500 MG tablet Take 500 mg by mouth daily as needed for mild pain       acetaminophen (TYLENOL) 500 MG tablet Take 1,000 mg by mouth 3 times daily        alum & mag hydroxide-simethicone (MYLANTA/MAALOX) 200-200-20 MG/5ML SUSP suspension Take 10 mLs by mouth 2 times daily as needed        aspirin (ASA) 81 MG chewable tablet Take 81 mg by mouth daily       Atorvastatin Calcium (LIPITOR PO) Take 20 mg by mouth At Bedtime        carboxymethylcellulose PF (REFRESH PLUS) 0.5 % ophthalmic solution Place 2 drops into both eyes every 4 hours as needed for dry eyes       carboxymethylcellulose PF (REFRESH PLUS) 0.5 % ophthalmic solution Place 2 drops into both eyes 2 times daily       diclofenac (VOLTAREN) 1 % topical gel Place 2 g onto the skin 2 times daily  Apply to right shoulder       diclofenac (VOLTAREN) 1 % topical gel Place 2 g onto the skin daily as needed for moderate pain Apply to right shoulder as needed for pain       loperamide (IMODIUM) 2 MG capsule Take 2 mg by mouth 4 times daily as needed for diarrhea        losartan (COZAAR) 50 MG tablet Take 1 tablet (50 mg) by mouth 2 times daily 180 tablet 3     Menthol ([employer] COUGH DROPS SUGAR FREE MT) Take 1 lozenge by mouth 4 times daily as needed       menthol-zinc oxide (CALMOSEPTINE) 0.44-20.6 % OINT ointment Apply 1 g topically 2 times daily       menthol-zinc oxide (CALMOSEPTINE) 0.44-20.6 % OINT ointment Apply topically 2 times daily as needed for skin protection       metoprolol (LOPRESSOR) 50 MG tablet TAKE ONE TABLET BY MOUTH TWICE DAILY 180 tablet 3     omeprazole (PRILOSEC) 20 MG DR capsule Take 1 capsule (20 mg) by mouth 2 times daily (before meals) 60 capsule 0     ONDANSETRON PO Take 4 mg by mouth every 8 hours as needed for nausea       order for DME Equipment being ordered: Walker Wheels () and Walker ()  Treatment Diagnosis: gait instability 1 each 0     order for DME Light weight wheelchair Body mass index is 19.08 kg/(m^2). 1 Device 0     order for DME ANTOINE stockings- below knee. 1 Package 0     polyethylene glycol (MIRALAX/GLYCOLAX) Packet Take 17 g by mouth daily as needed for constipation 7 packet      senna-docusate (SENOKOT-S;PERICOLACE) 8.6-50 MG per tablet Take 1 tablet by mouth 2 times daily as needed        sucralfate (CARAFATE) 1 GM/10ML suspension Take 10 mLs (1 g) by mouth 4 times daily (before meals and nightly) 1200 mL 0     traZODone (DESYREL) 50 MG tablet Take 50 mg by mouth At Bedtime       trolamine salicylate (ASPERCREME) 10 % external cream Apply topically 2 times daily Apply to affected areas right knee twice daily AND BID PRN           REVIEW OF SYSTEMS:  No chest pain, shortness of breath, fevers, chills, headache, nausea, vomiting, dysuria or bowel abnormalities.   Appetite is  fair.  No pain except R knee.    Objective:  /68   Pulse 69   Resp 18   SpO2 92%   Exam:  GENERAL APPEARANCE:  Alert, in no distress, cooperative  ENT:  Mouth and posterior oropharynx normal, moist mucous membranes, [place], missing L front tooth-having partial fitted tomorrow  EYES:  EOM, conjunctivae, lids, pupils and irises normal, PERRL  NECK:  No adenopathy,masses or thyromegaly, no carotid bruit  RESP:  respiratory effort and palpation of chest normal, lungs clear to auscultation , no respiratory distress  CV:  Palpation and auscultation of heart done , regular rate and rhythm, no murmur, rub, or gallop, no edema  ABDOMEN:  normal bowel sounds, soft, nontender, no hepatosplenomegaly or other masses, no guarding or rebound  M/S:   gait slowed, varus. muscle strength 5/5 all 4 ext., bony deformities of hands  NEURO:   Cranial nerves 2-12 are normal tested and grossly at patient's baseline, alert  PSYCH:  memory impaired , affect and mood normal    Labs:     Most Recent 3 CBC's:  Recent Labs   Lab Test 12/10/19 11/29/19  1055 09/24/19  1700 09/13/19  0659   WBC  --  4.6 5.0 4.1   HGB 8.8* 8.5* 9.8* 9.9*   MCV  --  88 90 90   PLT  --  263 233 185     Most Recent 3 BMP's:  Recent Labs   Lab Test 11/29/19  1055 09/24/19  1700 09/13/19  0659    134 134   POTASSIUM 3.9 4.4 3.8   CHLORIDE 106 101 103   CO2 25 28 25   BUN 25 26 9   CR 0.79 0.67 0.54   ANIONGAP 6 5 6   REILLY 8.6 8.5 8.1*   * 91 93     Most Recent Cholesterol Panel:  Recent Labs   Lab Test 12/04/18   CHOL 183   LDL 82   HDL 84   TRIG 84       ASSESSMENT/PLAN:  Chronic pain of right knee  Ongoing, decreased benefit from R knee inj compared with previous knee inj  1. Cont tylenol  2. Cont prn aspercreme  3. Start voltaren gel bid to R knee  4. Reassess over next 1-2 weeks, consider increased voltaren gel freq. To tid    Gastroesophageal reflux disease, esophagitis presence not specified  Currently stable. Hiatal hernia  1. Cont.  Prilosec, carafate  2. Cont. Prn gavilax, zofran  3. Monitor for reports of c.p., nausea  4. Follow po intake, wt.s    Essential hypertension  Currently stable  1. Cont. Losartan, metoprolol  2. Follow BPs, HRs  3. Monitor for reports of dizziness  4. FLP, AST, ALT next month      Electronically signed by:  NICKOLAS Hallman CNP

## 2020-01-06 ENCOUNTER — ASSISTED LIVING VISIT (OUTPATIENT)
Dept: GERIATRICS | Facility: CLINIC | Age: 85
End: 2020-01-06
Payer: COMMERCIAL

## 2020-01-06 VITALS
DIASTOLIC BLOOD PRESSURE: 64 MMHG | SYSTOLIC BLOOD PRESSURE: 131 MMHG | HEART RATE: 63 BPM | OXYGEN SATURATION: 93 % | RESPIRATION RATE: 18 BRPM

## 2020-01-06 DIAGNOSIS — K21.9 GASTROESOPHAGEAL REFLUX DISEASE, ESOPHAGITIS PRESENCE NOT SPECIFIED: ICD-10-CM

## 2020-01-06 DIAGNOSIS — M15.0 PRIMARY OSTEOARTHRITIS INVOLVING MULTIPLE JOINTS: ICD-10-CM

## 2020-01-06 DIAGNOSIS — H61.23 BILATERAL IMPACTED CERUMEN: Primary | ICD-10-CM

## 2020-01-06 PROCEDURE — 69210 REMOVE IMPACTED EAR WAX UNI: CPT | Performed by: NURSE PRACTITIONER

## 2020-01-06 NOTE — LETTER
1/6/2020        RE: Batsheva Barkley  [facility]  [address]  [facility] [address]        [facility]  [place] Medical Record Number:  [id number]  Place of Service where encounter took place:  [facility] [[id number]]  Chief Complaint   Patient presents with     Cerumen (impacted)     Knee Pain     Gastrophageal Reflux       HPI:    Batsheva Barkley  is a 86 year old (11/6/1933), who is being seen today for an episodic care visit.  HPI information obtained from: facility chart records, facility staff, patient report, [facility] chart review and family/first contact dtr report. Today's concern is: cerumen, knee pain, GERD.  Has recurrent cerumen bilat ear canals.  Has hearing loss bilat ears.  Has had repeat debrox gtts, cerumen removal.  Per dtr's request, asking for ears to be checked today.  For OA cont. On tylenol, aspercream, voltaren gel. S/p R knee inj with minimal effectiveness per resident. voltaren gel started to R knee  12/20/19.  Reports effective.  Reports pain stable except for prolonged periods of amb.  For GERD cont. On prilosec, carafate.  Has had recent ED visit due to c.p., tx for GERD.  Has large hiatal hernia. Po intake, wt. Has been stable.       Past Medical and Surgical History reviewed in Epic today.    MEDICATIONS:  Current Outpatient Medications   Medication Sig Dispense Refill     acetaminophen (TYLENOL) 500 MG tablet Take 500 mg by mouth daily as needed for mild pain       acetaminophen (TYLENOL) 500 MG tablet Take 1,000 mg by mouth 3 times daily        alum & mag hydroxide-simethicone (MYLANTA/MAALOX) 200-200-20 MG/5ML SUSP suspension Take 10 mLs by mouth 2 times daily as needed        aspirin (ASA) 81 MG chewable tablet Take 81 mg by mouth daily       Atorvastatin Calcium (LIPITOR PO) Take 20 mg by mouth At Bedtime        carboxymethylcellulose PF (REFRESH PLUS) 0.5 % ophthalmic solution Place 2 drops into both eyes  every 4 hours as needed for dry eyes       carboxymethylcellulose PF (REFRESH PLUS) 0.5 % ophthalmic solution Place 2 drops into both eyes 2 times daily       diclofenac (VOLTAREN) 1 % topical gel Place 2 g onto the skin 2 times daily Apply to right shoulder       diclofenac (VOLTAREN) 1 % topical gel Place 2 g onto the skin daily as needed for moderate pain Apply to right shoulder as needed for pain       loperamide (IMODIUM) 2 MG capsule Take 2 mg by mouth 4 times daily as needed for diarrhea        losartan (COZAAR) 50 MG tablet Take 1 tablet (50 mg) by mouth 2 times daily 180 tablet 3     Menthol ([employer] COUGH DROPS SUGAR FREE MT) Take 1 lozenge by mouth 4 times daily as needed       menthol-zinc oxide (CALMOSEPTINE) 0.44-20.6 % OINT ointment Apply 1 g topically 2 times daily       menthol-zinc oxide (CALMOSEPTINE) 0.44-20.6 % OINT ointment Apply topically 2 times daily as needed for skin protection       metoprolol (LOPRESSOR) 50 MG tablet TAKE ONE TABLET BY MOUTH TWICE DAILY 180 tablet 3     omeprazole (PRILOSEC) 20 MG DR capsule Take 1 capsule (20 mg) by mouth 2 times daily (before meals) 60 capsule 0     ONDANSETRON PO Take 4 mg by mouth every 8 hours as needed for nausea       order for DME Equipment being ordered: Walker Wheels () and Walker ()  Treatment Diagnosis: gait instability 1 each 0     order for DME Light weight wheelchair Body mass index is 19.08 kg/(m^2). 1 Device 0     order for DME ANTOINE stockings- below knee. 1 Package 0     polyethylene glycol (MIRALAX/GLYCOLAX) Packet Take 17 g by mouth daily as needed for constipation 7 packet      senna-docusate (SENOKOT-S;PERICOLACE) 8.6-50 MG per tablet Take 1 tablet by mouth 2 times daily as needed        sucralfate (CARAFATE) 1 GM/10ML suspension Take 10 mLs (1 g) by mouth 4 times daily (before meals and nightly) 1200 mL 0     traZODone (DESYREL) 50 MG tablet Take 50 mg by mouth At Bedtime       trolamine salicylate (ASPERCREME) 10 % external  cream Apply topically 3 times daily Apply to affected areas right knee twice daily AND BID PRN           REVIEW OF SYSTEMS:  No chest pain, shortness of breath, fevers, chills, headache, nausea, vomiting, dysuria or bowel abnormalities.  Appetite is  normal.  No pain except occ R knee , occ R shoulder.    Objective:  /64   Pulse 63   Resp 18   SpO2 93%   Exam:  GENERAL APPEARANCE:  Alert, in no distress, cooperative  ENT:  Mouth and posterior oropharynx normal, moist mucous membranes, [place], bilat ear canals free of cerumen s/p cerumen removal with curette  EYES:  EOM, conjunctivae, lids, pupils and irises normal, PERRL  NECK:  No adenopathy,masses or thyromegaly, no carotid bruit  RESP:  respiratory effort and palpation of chest normal, lungs clear to auscultation , no respiratory distress  CV:  Palpation and auscultation of heart done , regular rate and rhythm, no murmur, rub, or gallop, no edema  ABDOMEN:  normal bowel sounds, soft, nontender, no hepatosplenomegaly or other masses, no guarding or rebound  M/S:   Gait and station normal  bilat varus. muscle strength 5/5 all 4 ext., bony deformities of hands  NEURO:   Cranial nerves 2-12 are normal tested and grossly at patient's baseline, alert  PSYCH:  insight and judgement impaired, memory impaired , affect and mood normal    Labs:     Most Recent 3 CBC's:  Recent Labs   Lab Test 12/10/19 11/29/19  1055 09/24/19  1700 09/13/19  0659   WBC  --  4.6 5.0 4.1   HGB 8.8* 8.5* 9.8* 9.9*   MCV  --  88 90 90   PLT  --  263 233 185     Most Recent 3 BMP's:  Recent Labs   Lab Test 11/29/19  1055 09/24/19  1700 09/13/19  0659    134 134   POTASSIUM 3.9 4.4 3.8   CHLORIDE 106 101 103   CO2 25 28 25   BUN 25 26 9   CR 0.79 0.67 0.54   ANIONGAP 6 5 6   REILLY 8.6 8.5 8.1*   * 91 93       ASSESSMENT/PLAN:  Bilateral impacted cerumen  Recurrent. bilat hearing loss  - REMOVE IMPACTED CERUMEN  2. Reassess for cerumen over next couple mos  3. Debrox gtts prn  4.  Irrigation prn    Primary osteoarthritis involving multiple joints  R knee pain improved with voltaren gel  1. Cont. voltaren gel to R knee  2. Cont. tylenol  3. Cont. aspercreme to shoulders  4. Encourage act. Level as thu. Takes breaks when amb for longer distances  5. F/u R knee inj prn    Gastroesophageal reflux disease, esophagitis presence not specified  Currently stable. Large hiatal hernia  1. Cont. prilosec  2. Cont carafate  3. For nausea cont. Prn zofran  4. Follow PO intake, wt.s      Electronically signed by:  NICKOLAS Hallman CNP             Sincerely,        NICKOLAS Hallman CNP

## 2020-01-06 NOTE — PROGRESS NOTES
[facility]  [place] Medical Record Number:  [id number]  Place of Service where encounter took place:  [facility] [[id number]]  Chief Complaint   Patient presents with     Cerumen (impacted)     Knee Pain     Gastrophageal Reflux       HPI:    Batsheva Barkley  is a 86 year old (11/6/1933), who is being seen today for an episodic care visit.  HPI information obtained from: facility chart records, facility staff, patient report, [facility] chart review and family/first contact dtr report. Today's concern is: cerumen, knee pain, GERD.  Has recurrent cerumen bilat ear canals.  Has hearing loss bilat ears.  Has had repeat debrox gtts, cerumen removal.  Per dtr's request, asking for ears to be checked today.  For OA cont. On tylenol, aspercream, voltaren gel. S/p R knee inj with minimal effectiveness per resident. voltaren gel started to R knee  12/20/19.  Reports effective.  Reports pain stable except for prolonged periods of amb.  For GERD cont. On prilosec, carafate.  Has had recent ED visit due to c.p., tx for GERD.  Has large hiatal hernia. Po intake, wt. Has been stable.       Past Medical and Surgical History reviewed in Epic today.    MEDICATIONS:  Current Outpatient Medications   Medication Sig Dispense Refill     acetaminophen (TYLENOL) 500 MG tablet Take 500 mg by mouth daily as needed for mild pain       acetaminophen (TYLENOL) 500 MG tablet Take 1,000 mg by mouth 3 times daily        alum & mag hydroxide-simethicone (MYLANTA/MAALOX) 200-200-20 MG/5ML SUSP suspension Take 10 mLs by mouth 2 times daily as needed        aspirin (ASA) 81 MG chewable tablet Take 81 mg by mouth daily       Atorvastatin Calcium (LIPITOR PO) Take 20 mg by mouth At Bedtime        carboxymethylcellulose PF (REFRESH PLUS) 0.5 % ophthalmic solution Place 2 drops into both eyes every 4 hours as needed for dry eyes       carboxymethylcellulose PF (REFRESH PLUS) 0.5 % ophthalmic solution  Place 2 drops into both eyes 2 times daily       diclofenac (VOLTAREN) 1 % topical gel Place 2 g onto the skin 2 times daily Apply to right shoulder       diclofenac (VOLTAREN) 1 % topical gel Place 2 g onto the skin daily as needed for moderate pain Apply to right shoulder as needed for pain       loperamide (IMODIUM) 2 MG capsule Take 2 mg by mouth 4 times daily as needed for diarrhea        losartan (COZAAR) 50 MG tablet Take 1 tablet (50 mg) by mouth 2 times daily 180 tablet 3     Menthol ([employer] COUGH DROPS SUGAR FREE MT) Take 1 lozenge by mouth 4 times daily as needed       menthol-zinc oxide (CALMOSEPTINE) 0.44-20.6 % OINT ointment Apply 1 g topically 2 times daily       menthol-zinc oxide (CALMOSEPTINE) 0.44-20.6 % OINT ointment Apply topically 2 times daily as needed for skin protection       metoprolol (LOPRESSOR) 50 MG tablet TAKE ONE TABLET BY MOUTH TWICE DAILY 180 tablet 3     omeprazole (PRILOSEC) 20 MG DR capsule Take 1 capsule (20 mg) by mouth 2 times daily (before meals) 60 capsule 0     ONDANSETRON PO Take 4 mg by mouth every 8 hours as needed for nausea       order for DME Equipment being ordered: Walker Wheels () and Walker ()  Treatment Diagnosis: gait instability 1 each 0     order for DME Light weight wheelchair Body mass index is 19.08 kg/(m^2). 1 Device 0     order for DME ANTOINE stockings- below knee. 1 Package 0     polyethylene glycol (MIRALAX/GLYCOLAX) Packet Take 17 g by mouth daily as needed for constipation 7 packet      senna-docusate (SENOKOT-S;PERICOLACE) 8.6-50 MG per tablet Take 1 tablet by mouth 2 times daily as needed        sucralfate (CARAFATE) 1 GM/10ML suspension Take 10 mLs (1 g) by mouth 4 times daily (before meals and nightly) 1200 mL 0     traZODone (DESYREL) 50 MG tablet Take 50 mg by mouth At Bedtime       trolamine salicylate (ASPERCREME) 10 % external cream Apply topically 3 times daily Apply to affected areas right knee twice daily AND BID PRN           REVIEW  OF SYSTEMS:  No chest pain, shortness of breath, fevers, chills, headache, nausea, vomiting, dysuria or bowel abnormalities.  Appetite is  normal.  No pain except occ R knee , occ R shoulder.    Objective:  /64   Pulse 63   Resp 18   SpO2 93%   Exam:  GENERAL APPEARANCE:  Alert, in no distress, cooperative  ENT:  Mouth and posterior oropharynx normal, moist mucous membranes, [place], bilat ear canals free of cerumen s/p cerumen removal with curette  EYES:  EOM, conjunctivae, lids, pupils and irises normal, PERRL  NECK:  No adenopathy,masses or thyromegaly, no carotid bruit  RESP:  respiratory effort and palpation of chest normal, lungs clear to auscultation , no respiratory distress  CV:  Palpation and auscultation of heart done , regular rate and rhythm, no murmur, rub, or gallop, no edema  ABDOMEN:  normal bowel sounds, soft, nontender, no hepatosplenomegaly or other masses, no guarding or rebound  M/S:   Gait and station normal  bilat varus. muscle strength 5/5 all 4 ext., bony deformities of hands  NEURO:   Cranial nerves 2-12 are normal tested and grossly at patient's baseline, alert  PSYCH:  insight and judgement impaired, memory impaired , affect and mood normal    Labs:     Most Recent 3 CBC's:  Recent Labs   Lab Test 12/10/19 11/29/19  1055 09/24/19  1700 09/13/19  0659   WBC  --  4.6 5.0 4.1   HGB 8.8* 8.5* 9.8* 9.9*   MCV  --  88 90 90   PLT  --  263 233 185     Most Recent 3 BMP's:  Recent Labs   Lab Test 11/29/19  1055 09/24/19  1700 09/13/19  0659    134 134   POTASSIUM 3.9 4.4 3.8   CHLORIDE 106 101 103   CO2 25 28 25   BUN 25 26 9   CR 0.79 0.67 0.54   ANIONGAP 6 5 6   REILLY 8.6 8.5 8.1*   * 91 93       ASSESSMENT/PLAN:  Bilateral impacted cerumen  Recurrent. bilat hearing loss  - REMOVE IMPACTED CERUMEN  2. Reassess for cerumen over next couple mos  3. Debrox gtts prn  4. Irrigation prn    Primary osteoarthritis involving multiple joints  R knee pain improved with voltaren gel  1.  Cont. voltaren gel to R knee  2. Cont. tylenol  3. Cont. aspercreme to shoulders  4. Encourage act. Level as thu. Takes breaks when amb for longer distances  5. F/u R knee inj prn    Gastroesophageal reflux disease, esophagitis presence not specified  Currently stable. Large hiatal hernia  1. Cont. prilosec  2. Cont carafate  3. For nausea cont. Prn zofran  4. Follow PO intake, wt.s      Electronically signed by:  NICKOLAS Hallman CNP

## 2020-02-18 ENCOUNTER — ASSISTED LIVING VISIT (OUTPATIENT)
Dept: GERIATRICS | Facility: CLINIC | Age: 85
End: 2020-02-18
Payer: COMMERCIAL

## 2020-02-18 DIAGNOSIS — M15.0 PRIMARY OSTEOARTHRITIS INVOLVING MULTIPLE JOINTS: Primary | ICD-10-CM

## 2020-02-18 NOTE — PROGRESS NOTES
[facility]th[facility] Nursing home visit    Batsheva Barkley is a 86 year old female who resides at [facility]    Patient is seen today for Right knee cortisone injection, 2nd to RA, and pain, patient has had injections in thepast with pain relief, now 3 months since past injection,       Past Medical History:   Diagnosis Date     Aortic insufficiency      CAD (coronary artery disease) 06/20/2005    w/stent to dominant Cfx(2005)     Cardiomyopathy (H)      Chest discomfort      CHF NYHA class III, chronic, systolic (H) 1/25/2018     Coronary atherosclerosis 6/20/2005    circ vessel was stented;  had a 50% LAD lesion which was not treated Problem list name updated by automated process. Provider to review     Former smoker      Generalized osteoarthrosis     knees ;; L total kne 10/09     GERD with esophagitis      Hiatal hernia     Large     Hypertension goal BP (blood pressure) < 140/90 1/21/2015     Ischemic cardiomyopathy      Leg weakness, bilateral 2/13/2018     MI (myocardial infarction) (H) 06/2005     Mitral regurgitation     moderate     Mixed hyperlipidemia      Osteoporosis      Peripheral edema 01/26/2015     Physical deconditioning 1/21/2015     Total urinary incontinence 12/27/2017      Past Surgical History:   Procedure Laterality Date     COLONOSCOPY  3/05     ENT SURGERY       ESOPHAGOSCOPY, GASTROSCOPY, DUODENOSCOPY (EGD), COMBINED N/A 4/14/2016    Procedure: COMBINED ESOPHAGOSCOPY, GASTROSCOPY, DUODENOSCOPY (EGD), BIOPSY SINGLE OR MULTIPLE;  Surgeon: Jonathan Gramajo MD;  Location:  GI     EYE SURGERY       HEART CATH, ANGIOPLASTY  2005    RONI to left circ     L bunion + hammer toes  1/04, 4/04     L total knee replacement  10/2009      ovarian cyst surgery          Allergies   Allergen Reactions     Codeine      nausea, HA     Lisinopril Cough      There were no vitals taken for this visit.     Exam: Seated, allows PROM to right knee WNL< some noted crepitus, no noted swelling, pain on palpation to medial joint  space pain with W/B      X-rays show advanced DJD tri-compartmental arthritis;    ASSESSMENT / PLAN:  After consent from daughter, Right knee is prepped , injected with 1 ml depo medrol, 5 ml 1% lidocaine, into medial knee joint, tolerated well, no complaints, F/U PRN 3-4 months    [id number]          Lida OPA-C  [phone] Cell

## 2020-03-09 ENCOUNTER — ASSISTED LIVING VISIT (OUTPATIENT)
Dept: GERIATRICS | Facility: CLINIC | Age: 85
End: 2020-03-09
Payer: COMMERCIAL

## 2020-03-09 VITALS
DIASTOLIC BLOOD PRESSURE: 65 MMHG | SYSTOLIC BLOOD PRESSURE: 130 MMHG | RESPIRATION RATE: 18 BRPM | HEART RATE: 70 BPM | OXYGEN SATURATION: 93 %

## 2020-03-09 DIAGNOSIS — M25.561 CHRONIC PAIN OF RIGHT KNEE: ICD-10-CM

## 2020-03-09 DIAGNOSIS — G89.29 CHRONIC PAIN OF RIGHT KNEE: ICD-10-CM

## 2020-03-09 DIAGNOSIS — L30.9 DERMATITIS: Primary | ICD-10-CM

## 2020-03-09 NOTE — PROGRESS NOTES
[place] [facility]  [place] Medical Record Number:  [id number]  Place of Service where encounter took place:  [facility] [place] [facility] [[id number]]  Chief Complaint   Patient presents with     Derm Problem       HPI:    Batsheva Barkley  is a 86 year old (11/6/1933), who is being seen today for an episodic care visit.  HPI information obtained from: facility chart records, facility staff, patient report and [facility] chart review. Today's concern is: dermatitis, R knee pain.  Per staff has had some gen. LE pruritis with new red area L wrist.  No recent med changes. Had R knee pain due to OA.  Knee inj 2/28/20 per [facility]. Reports pain improved.  Cont. On tylenol, voltaren gel for knee pain. Gait steady with walker. No recent falls.     Past Medical and Surgical History reviewed in Epic today.    MEDICATIONS:    Current Outpatient Medications   Medication Sig Dispense Refill     acetaminophen (TYLENOL) 500 MG tablet Take 500 mg by mouth daily as needed for mild pain       acetaminophen (TYLENOL) 500 MG tablet Take 1,000 mg by mouth 3 times daily        alum & mag hydroxide-simethicone (MYLANTA/MAALOX) 200-200-20 MG/5ML SUSP suspension Take 10 mLs by mouth 2 times daily as needed        aspirin (ASA) 81 MG chewable tablet Take 81 mg by mouth daily       Atorvastatin Calcium (LIPITOR PO) Take 20 mg by mouth At Bedtime        carboxymethylcellulose PF (REFRESH PLUS) 0.5 % ophthalmic solution Place 2 drops into both eyes every 4 hours as needed for dry eyes       carboxymethylcellulose PF (REFRESH PLUS) 0.5 % ophthalmic solution Place 2 drops into both eyes 2 times daily       diclofenac (VOLTAREN) 1 % topical gel Place 2 g onto the skin 2 times daily Apply to right shoulder       diclofenac (VOLTAREN) 1 % topical gel Place 2 g onto the skin daily as needed for moderate pain Apply to right shoulder as needed for pain       loperamide (IMODIUM) 2 MG capsule Take 2 mg by mouth 4 times  daily as needed for diarrhea        losartan (COZAAR) 50 MG tablet Take 1 tablet (50 mg) by mouth 2 times daily 180 tablet 3     Menthol ([employer] COUGH DROPS SUGAR FREE MT) Take 1 lozenge by mouth 4 times daily as needed       menthol-zinc oxide (CALMOSEPTINE) 0.44-20.6 % OINT ointment Apply 1 g topically 2 times daily       menthol-zinc oxide (CALMOSEPTINE) 0.44-20.6 % OINT ointment Apply topically 2 times daily as needed for skin protection       metoprolol (LOPRESSOR) 50 MG tablet TAKE ONE TABLET BY MOUTH TWICE DAILY 180 tablet 3     omeprazole (PRILOSEC) 20 MG DR capsule Take 1 capsule (20 mg) by mouth 2 times daily (before meals) 60 capsule 0     ONDANSETRON PO Take 4 mg by mouth every 8 hours as needed for nausea       order for DME Equipment being ordered: Walker Wheels () and Walker ()  Treatment Diagnosis: gait instability 1 each 0     order for DME Light weight wheelchair Body mass index is 19.08 kg/(m^2). 1 Device 0     order for DME ANTOINE stockings- below knee. 1 Package 0     polyethylene glycol (MIRALAX/GLYCOLAX) Packet Take 17 g by mouth daily as needed for constipation 7 packet      senna-docusate (SENOKOT-S;PERICOLACE) 8.6-50 MG per tablet Take 1 tablet by mouth 2 times daily as needed        sucralfate (CARAFATE) 1 GM/10ML suspension Take 10 mLs (1 g) by mouth 4 times daily (before meals and nightly) 1200 mL 0     traZODone (DESYREL) 50 MG tablet Take 50 mg by mouth At Bedtime       trolamine salicylate (ASPERCREME) 10 % external cream Apply topically 3 times daily Apply to affected areas right knee twice daily AND BID PRN           REVIEW OF SYSTEMS:  No chest pain, shortness of breath, fevers, chills, headache, nausea, vomiting, dysuria or bowel abnormalities.  Appetite is  fair.  No pain except occ knees, shoulders.    Objective:  /65   Pulse 70   Resp 18   SpO2 93%   Exam:  GENERAL APPEARANCE:  Alert, in no distress, cooperative  ENT:  Mouth and posterior oropharynx normal, moist  mucous membranes, [place]  EYES:  EOM, conjunctivae, lids, pupils and irises normal, PERRL  NECK:  No adenopathy,masses or thyromegaly, no carotid bruit  RESP:  respiratory effort and palpation of chest normal, lungs clear to auscultation , no respiratory distress  CV:  Palpation and auscultation of heart done , regular rate and rhythm, no murmur, rub, or gallop, no edema  ABDOMEN:  normal bowel sounds, soft, nontender, no hepatosplenomegaly or other masses, no guarding or rebound  M/S:   gait steady with walker. bilat varus. no pain with PROM knees  SKIN:  small superficial scab L forearm 1cmx0.5 cm. faint pink of surrounding tissue  NEURO:   Cranial nerves 2-12 are normal tested and grossly at patient's baseline, speech clear  PSYCH:  insight and judgement impaired, memory impaired , affect and mood normal    Labs:     Most Recent 3 CBC's:  Recent Labs   Lab Test 12/10/19 11/29/19  1055 09/24/19  1700 09/13/19  0659   WBC  --  4.6 5.0 4.1   HGB 8.8* 8.5* 9.8* 9.9*   MCV  --  88 90 90   PLT  --  263 233 185     Most Recent 3 BMP's:  Recent Labs   Lab Test 11/29/19  1055 09/24/19  1700 09/13/19  0659    134 134   POTASSIUM 3.9 4.4 3.8   CHLORIDE 106 101 103   CO2 25 28 25   BUN 25 26 9   CR 0.79 0.67 0.54   ANIONGAP 6 5 6   REILLY 8.6 8.5 8.1*   * 91 93       ASSESSMENT/PLAN:  Dermatitis  UE pruritis. R UE scab, ongoing pruritis of site  1. Start HC  To site bid  2. Monitor for drainage, further red areas of skin  3. Instructed not to itch area-will wear watch on other wrist for now    Chronic pain of right knee  Improved s/p injection  1. Cont. Tylenol  2. Cont. voltaren gel  3. Monitor for changes in gait, LE weakness  4. Anticipate f/u knee inj in next 3 mos      Electronically signed by:  NICKOLAS Hallman CNP

## 2020-03-09 NOTE — LETTER
3/9/2020        RE: Batsheva Barkley  [facility]  [address]  [facility] [address]        [place] [facility]  [place] [facility] Record Number:  [id number]  Place of Service where encounter took place:  [facility] [[id number]]  Chief Complaint   Patient presents with     Derm Problem       HPI:    Batsheva Barkley  is a 86 year old (11/6/1933), who is being seen today for an episodic care visit.  HPI information obtained from: facility chart records, facility staff, patient report and [facility] chart review. Today's concern is: dermatitis, R knee pain.  Per staff has had some gen. LE pruritis with new red area L wrist.  No recent med changes. Had R knee pain due to OA.  Knee inj 2/28/20 per [facility]. Reports pain improved.  Cont. On tylenol, voltaren gel for knee pain. Gait steady with walker. No recent falls.     Past Medical and Surgical History reviewed in Epic today.    MEDICATIONS:    Current Outpatient Medications   Medication Sig Dispense Refill     acetaminophen (TYLENOL) 500 MG tablet Take 500 mg by mouth daily as needed for mild pain       acetaminophen (TYLENOL) 500 MG tablet Take 1,000 mg by mouth 3 times daily        alum & mag hydroxide-simethicone (MYLANTA/MAALOX) 200-200-20 MG/5ML SUSP suspension Take 10 mLs by mouth 2 times daily as needed        aspirin (ASA) 81 MG chewable tablet Take 81 mg by mouth daily       Atorvastatin Calcium (LIPITOR PO) Take 20 mg by mouth At Bedtime        carboxymethylcellulose PF (REFRESH PLUS) 0.5 % ophthalmic solution Place 2 drops into both eyes every 4 hours as needed for dry eyes       carboxymethylcellulose PF (REFRESH PLUS) 0.5 % ophthalmic solution Place 2 drops into both eyes 2 times daily       diclofenac (VOLTAREN) 1 % topical gel Place 2 g onto the skin 2 times daily Apply to right shoulder       diclofenac (VOLTAREN) 1 % topical gel Place 2 g onto the skin daily as needed for moderate  pain Apply to right shoulder as needed for pain       loperamide (IMODIUM) 2 MG capsule Take 2 mg by mouth 4 times daily as needed for diarrhea        losartan (COZAAR) 50 MG tablet Take 1 tablet (50 mg) by mouth 2 times daily 180 tablet 3     Menthol ([employer] COUGH DROPS SUGAR FREE MT) Take 1 lozenge by mouth 4 times daily as needed       menthol-zinc oxide (CALMOSEPTINE) 0.44-20.6 % OINT ointment Apply 1 g topically 2 times daily       menthol-zinc oxide (CALMOSEPTINE) 0.44-20.6 % OINT ointment Apply topically 2 times daily as needed for skin protection       metoprolol (LOPRESSOR) 50 MG tablet TAKE ONE TABLET BY MOUTH TWICE DAILY 180 tablet 3     omeprazole (PRILOSEC) 20 MG DR capsule Take 1 capsule (20 mg) by mouth 2 times daily (before meals) 60 capsule 0     ONDANSETRON PO Take 4 mg by mouth every 8 hours as needed for nausea       order for DME Equipment being ordered: Walker Wheels () and Walker ()  Treatment Diagnosis: gait instability 1 each 0     order for DME Light weight wheelchair Body mass index is 19.08 kg/(m^2). 1 Device 0     order for DME ANTOINE stockings- below knee. 1 Package 0     polyethylene glycol (MIRALAX/GLYCOLAX) Packet Take 17 g by mouth daily as needed for constipation 7 packet      senna-docusate (SENOKOT-S;PERICOLACE) 8.6-50 MG per tablet Take 1 tablet by mouth 2 times daily as needed        sucralfate (CARAFATE) 1 GM/10ML suspension Take 10 mLs (1 g) by mouth 4 times daily (before meals and nightly) 1200 mL 0     traZODone (DESYREL) 50 MG tablet Take 50 mg by mouth At Bedtime       trolamine salicylate (ASPERCREME) 10 % external cream Apply topically 3 times daily Apply to affected areas right knee twice daily AND BID PRN           REVIEW OF SYSTEMS:  No chest pain, shortness of breath, fevers, chills, headache, nausea, vomiting, dysuria or bowel abnormalities.  Appetite is  fair.  No pain except occ knees, shoulders.    Objective:  /65   Pulse 70   Resp 18   SpO2 93%    Exam:  GENERAL APPEARANCE:  Alert, in no distress, cooperative  ENT:  Mouth and posterior oropharynx normal, moist mucous membranes, [place]  EYES:  EOM, conjunctivae, lids, pupils and irises normal, PERRL  NECK:  No adenopathy,masses or thyromegaly, no carotid bruit  RESP:  respiratory effort and palpation of chest normal, lungs clear to auscultation , no respiratory distress  CV:  Palpation and auscultation of heart done , regular rate and rhythm, no murmur, rub, or gallop, no edema  ABDOMEN:  normal bowel sounds, soft, nontender, no hepatosplenomegaly or other masses, no guarding or rebound  M/S:   gait steady with walker. bilat varus. no pain with PROM knees  SKIN:  small superficial scab L forearm 1cmx0.5 cm. faint pink of surrounding tissue  NEURO:   Cranial nerves 2-12 are normal tested and grossly at patient's baseline, speech clear  PSYCH:  insight and judgement impaired, memory impaired , affect and mood normal    Labs:     Most Recent 3 CBC's:  Recent Labs   Lab Test 12/10/19 11/29/19  1055 09/24/19  1700 09/13/19  0659   WBC  --  4.6 5.0 4.1   HGB 8.8* 8.5* 9.8* 9.9*   MCV  --  88 90 90   PLT  --  263 233 185     Most Recent 3 BMP's:  Recent Labs   Lab Test 11/29/19  1055 09/24/19  1700 09/13/19  0659    134 134   POTASSIUM 3.9 4.4 3.8   CHLORIDE 106 101 103   CO2 25 28 25   BUN 25 26 9   CR 0.79 0.67 0.54   ANIONGAP 6 5 6   REILLY 8.6 8.5 8.1*   * 91 93       ASSESSMENT/PLAN:  Dermatitis  UE pruritis. R UE scab, ongoing pruritis of site  1. Start HC  To site bid  2. Monitor for drainage, further red areas of skin  3. Instructed not to itch area-will wear watch on other wrist for now    Chronic pain of right knee  Improved s/p injection  1. Cont. Tylenol  2. Cont. voltaren gel  3. Monitor for changes in gait, LE weakness  4. Anticipate f/u knee inj in next 3 mos      Electronically signed by:  NICKOLAS Hallman CNP             Sincerely,        NICKOLAS Hallman CNP

## 2020-03-13 ENCOUNTER — APPOINTMENT (OUTPATIENT)
Dept: GENERAL RADIOLOGY | Facility: CLINIC | Age: 85
End: 2020-03-13
Attending: EMERGENCY MEDICINE
Payer: COMMERCIAL

## 2020-03-13 ENCOUNTER — HOSPITAL ENCOUNTER (EMERGENCY)
Facility: CLINIC | Age: 85
Discharge: HOME | End: 2020-03-13
Attending: EMERGENCY MEDICINE | Admitting: EMERGENCY MEDICINE
Payer: COMMERCIAL

## 2020-03-13 VITALS
RESPIRATION RATE: 21 BRPM | TEMPERATURE: 98.4 F | DIASTOLIC BLOOD PRESSURE: 60 MMHG | OXYGEN SATURATION: 98 % | HEART RATE: 57 BPM | SYSTOLIC BLOOD PRESSURE: 131 MMHG

## 2020-03-13 DIAGNOSIS — R07.9 ACUTE CHEST PAIN: ICD-10-CM

## 2020-03-13 LAB
ANION GAP SERPL CALCULATED.3IONS-SCNC: 4 MMOL/L (ref 3–14)
BASOPHILS # BLD AUTO: 0 10E9/L (ref 0–0.2)
BASOPHILS NFR BLD AUTO: 0.4 %
BUN SERPL-MCNC: 26 MG/DL (ref 7–30)
CALCIUM SERPL-MCNC: 8.5 MG/DL (ref 8.5–10.1)
CHLORIDE SERPL-SCNC: 106 MMOL/L (ref 94–109)
CO2 SERPL-SCNC: 26 MMOL/L (ref 20–32)
CREAT SERPL-MCNC: 0.82 MG/DL (ref 0.52–1.04)
DIFFERENTIAL METHOD BLD: ABNORMAL
EOSINOPHIL # BLD AUTO: 0.1 10E9/L (ref 0–0.7)
EOSINOPHIL NFR BLD AUTO: 1.3 %
ERYTHROCYTE [DISTWIDTH] IN BLOOD BY AUTOMATED COUNT: 17.6 % (ref 10–15)
GFR SERPL CREATININE-BSD FRML MDRD: 65 ML/MIN/{1.73_M2}
GLUCOSE SERPL-MCNC: 87 MG/DL (ref 70–99)
HCT VFR BLD AUTO: 30 % (ref 35–47)
HGB BLD-MCNC: 8.8 G/DL (ref 11.7–15.7)
IMM GRANULOCYTES # BLD: 0 10E9/L (ref 0–0.4)
IMM GRANULOCYTES NFR BLD: 0.2 %
LYMPHOCYTES # BLD AUTO: 1.3 10E9/L (ref 0.8–5.3)
LYMPHOCYTES NFR BLD AUTO: 27.3 %
MCH RBC QN AUTO: 22.4 PG (ref 26.5–33)
MCHC RBC AUTO-ENTMCNC: 29.3 G/DL (ref 31.5–36.5)
MCV RBC AUTO: 77 FL (ref 78–100)
MONOCYTES # BLD AUTO: 0.8 10E9/L (ref 0–1.3)
MONOCYTES NFR BLD AUTO: 15.6 %
NEUTROPHILS # BLD AUTO: 2.7 10E9/L (ref 1.6–8.3)
NEUTROPHILS NFR BLD AUTO: 55.2 %
NRBC # BLD AUTO: 0 10*3/UL
NRBC BLD AUTO-RTO: 0 /100
PLATELET # BLD AUTO: 254 10E9/L (ref 150–450)
POTASSIUM SERPL-SCNC: 4.3 MMOL/L (ref 3.4–5.3)
RBC # BLD AUTO: 3.92 10E12/L (ref 3.8–5.2)
SODIUM SERPL-SCNC: 136 MMOL/L (ref 133–144)
TROPONIN I SERPL-MCNC: <0.015 UG/L (ref 0–0.04)
WBC # BLD AUTO: 4.8 10E9/L (ref 4–11)

## 2020-03-13 PROCEDURE — 93005 ELECTROCARDIOGRAM TRACING: CPT

## 2020-03-13 PROCEDURE — 84484 ASSAY OF TROPONIN QUANT: CPT | Performed by: EMERGENCY MEDICINE

## 2020-03-13 PROCEDURE — 99285 EMERGENCY DEPT VISIT HI MDM: CPT | Mod: 25

## 2020-03-13 PROCEDURE — 80048 BASIC METABOLIC PNL TOTAL CA: CPT | Performed by: EMERGENCY MEDICINE

## 2020-03-13 PROCEDURE — 85025 COMPLETE CBC W/AUTO DIFF WBC: CPT | Performed by: EMERGENCY MEDICINE

## 2020-03-13 PROCEDURE — 71046 X-RAY EXAM CHEST 2 VIEWS: CPT

## 2020-03-13 NOTE — ED TRIAGE NOTES
Pt arrives via EMS c/o sudden onset of 8/10 chest pain. Patient reports pain radiated to back. EMS gave 2 doses of nitroglycerin and 324mg aspirin. Pain states pain relief post meds. ABCs intact.

## 2020-03-13 NOTE — ED NOTES
Attempted to call [facility] to give brief update regarding care in the ER.  VM left to call if questions, no HIPPA information left in VM.

## 2020-03-13 NOTE — ED PROVIDER NOTES
History     Chief Complaint:  Chest Pain    The history is provided by the patient and a relative.      Batsheva Barkley is a 86 year old female, with history of MI, CAD, cardiomyopathy, CHF, coronary atherosclerosis, RA, mitral regurgitation, aortic insufficiency amongst others as noted below, not currently anticoagulated, who presents with her daughter via EMS from living facility for evaluation of acute onset of 8/10 chest pain that radiated into her back. Patient reports she initially thought it was gas, but only had oatmeal to eat. She states the pain kept worsening and radiating, reminiscent of her prior MI, thus EMS was called. She states pain persisted until EMS got there and patient was given two doses of nitroglycerin and 324 mg Aspirin with complete resolution of her pain. Due to pain, however, patient was prompted to present.     Here, patient reports she felt baseline yesterday and was moving baseline. She denies any current pain or recent illness. Of note, patient had a stress test scheduled after recommendation by her cardiologist, Dr. Tang, but this was not performed as patient did not want to do it. She last had an ECHO in 2019, as noted below.     ECHO from 11/29/19:  Interpretation Summary     There is moderately severe (3+) mitral regurgitation.  The visual ejection fraction is estimated at 45%.  There is moderate to severe lateral wall hypokinesis.  There is severe inferolateral wall hypokinesis.  There is mild inferior wall hypokinesis.  The right ventricle is normal in size and function.  The left atrium is moderate to severely dilated.  Compared to the prior study, mitral regurgitationm has worsened.    Allergies:  Codeine  Lisinopril     Medications:    Mylanta/maalox  Aspirin 81 mg  Lipitor  Imodium  Cozaar  Metoprolol  Prilosec  Miralax/glycolax  Senna-docusate  Carafate  Trazodone    Past Medical History:    Aortic insufficiency  CAD  Cardiomyopathy  Chest discomfort  CHF  Coronary  atherosclerosis  Generalized osteoarthrosis  GERD with esophagitis  Hiatal hernia  Hypertension  Ischemic cardiomyopathy  Leg weakness, bilateral  MI  Mitral regurgitation  Mixed hyperlipidemia  RA  Peripheral edema  Total urinary incontinence    Past Surgical History:    Colonoscopy  ENT surgery  EGD, combined  Eye surgery  Heart cath, angioplasty  Left bunion + hammer toes  Left total knee replacement  Ovarian cyst surgery    Family History:    Mother - cerebrovascular disease  Father - respiratory, heart  Sister(s) - breast cancer, heart disease    Social History:  The patient was accompanied to the ED by EMS and daughter.  Smoking Status: Former  Smokeless Tobacco: No  Alcohol Use: No  Drug Use: No   Marital Status:   [5]     Review of Systems   Cardiovascular: Positive for chest pain.   All other systems reviewed and are negative.      Physical Exam     Patient Vitals for the past 24 hrs:   BP Temp Temp src Pulse Resp SpO2   03/13/20 1233 (!) 142/70 98.4  F (36.9  C) Oral 56 18 99 %       Physical Exam       General: Patient is alert and interactive when I enter the room  Head:  The scalp, face, and head appear normal  Eyes:  The pupils are equal, round, and reactive to light    Conjunctivae and sclerae are normal  ENT:    External acoustic canals are normal    The oropharynx is normal without erythema.     Uvula is in the midline  Neck:  Normal range of motion  CV:  Regular rate. S1/S2. No murmurs.   Resp:  Lungs are clear without wheezes or rales. No distress  GI:  Abdomen is soft, no rigidity, guarding, or rebound    No distension. No tenderness to palpation in any quadrant.     MS:  Normal tone. Joints grossly normal without effusions.     No asymmetric leg swelling, calf or thigh tenderness.      Normal motor assessment of all extremities.  Skin:  No rash or lesions noted. Normal capillary refill noted  Neuro: Speech is normal and fluent. Face is symmetric.     Moving all extremities well.    Psych:  Awake. Alert.  Normal affect.  Appropriate interactions.  Lymph: No anterior cervical lymphadenopathy noted      Emergency Department Course     ECG:  Indication: Chest Pain  ECG taken at 1230, ECG read at 1238 by Dr. Bill MD  Sinus bradycardia  Minimal voltage criteria for LVH, may be normal variant  Inferior infarct, age undetermined  Abnormal ECG  Rate 57 bpm. RI interval 170. QRS duration 94. QT/QTc 446/434. P-R-T axes 18 -6 34.      Imaging:  Radiology findings were communicated with the patient who voiced understanding of the findings.    XR Chest:  IMPRESSION: PA and lateral views of the chest were obtained. Stable   cardiomediastinal silhouette. Large hiatal hernia. No suspicious focal   pulmonary opacities. No significant pleural effusion or pneumothorax.   Reading per radiology.     Laboratory:  Laboratory findings were communicated with the patient who voiced understanding of the findings.    CBC: HGB 8.8 (L) o/w WNL (WBC 4.8, )  BMP: AWNL (Creatinine 0.82)  Troponin (Collected 1242): <0.015      Emergency Department Course:  Past medical records, nursing notes, and vitals reviewed.    EKG obtained in the ED, see results above.     (1238)   I performed an exam of the patient as documented above. History obtained from patient and daughter.     IV was inserted and blood was drawn for laboratory testing, results above.    The patient was sent for a XR Chest while in the emergency department, results above.     (1432)   I rechecked the patient and discussed the results of her workup thus far. Discussed plan of care and patient will be discharged back to her facility.     Findings and plan explained to the Patient. Patient discharged home with instructions regarding supportive care, medications, and reasons to return. The importance of close follow-up was reviewed.     I personally reviewed the laboratory and imaging results with the Patient and answered all related questions prior to  discharge.     Impression & Plan     Medical Decision Making:  Batsheva Barkley is a 86 year old female who presents for evaluation of chest pain.  This pain has lasted for 10 min and resolved quickly within sublingual nitroglycerin..  Initial laboratory and imaging tests have come back normal.  There has been no progression of symptoms or other new concerning symptoms.       A broad differential was of course considered.  Exceedingly low risk for unstable angina at this point and will therefore not admit formally and administer heparin.    Has known coronary artery disease and I think the resolution with nitro is reassuring.  She also has no progressive pain, change in exercise tolerance recently nor continued symptoms after 10 minutes and I would not hospitalize her at this point.            Diagnosis:    ICD-10-CM    1. Acute chest pain  R07.9        Disposition:  Discharged to home.    Scribe Disclosure:  I, Amanda Jacob, am serving as a scribe at 12:35 PM on 3/13/2020 to document services personally performed by Lito Khan MD based on my observations and the provider's statements to me.   3/13/2020   [facility] EMERGENCY DEPARTMENT       Lito Khan MD  03/13/20 3565

## 2020-03-13 NOTE — ED NOTES
Bed: ED23  Expected date: 3/13/20  Expected time: 12:16 PM  Means of arrival: Ambulance  Comments:  SUHA [address] 86y PATO CP

## 2020-03-13 NOTE — ED AVS SNAPSHOT
[facility] Emergency Department  [address]  [facility] [address]  Phone:  [phone]  Fax:  [phone]                                    Batsheva Barkley   MRN: [id number]    Department:  [facility] Emergency Department   Date of Visit:  3/13/2020           After Visit Summary Signature Page    I have received my discharge instructions, and my questions have been answered. I have discussed any challenges I see with this plan with the nurse or doctor.    ..........................................................................................................................................  Patient/Patient Representative Signature      ..........................................................................................................................................  Patient Representative Print Name and Relationship to Patient    ..................................................               ................................................  Date                                   Time    ..........................................................................................................................................  Reviewed by Signature/Title    ...................................................              ..............................................  Date                                               Time          22EPIC Rev 08/18

## 2020-03-14 LAB — INTERPRETATION ECG - MUSE: NORMAL

## 2020-03-16 ENCOUNTER — ASSISTED LIVING VISIT (OUTPATIENT)
Dept: GERIATRICS | Facility: CLINIC | Age: 85
End: 2020-03-16
Payer: COMMERCIAL

## 2020-03-16 VITALS
DIASTOLIC BLOOD PRESSURE: 80 MMHG | HEART RATE: 62 BPM | RESPIRATION RATE: 18 BRPM | OXYGEN SATURATION: 94 % | SYSTOLIC BLOOD PRESSURE: 139 MMHG

## 2020-03-16 DIAGNOSIS — R07.9 CHEST PAIN, UNSPECIFIED TYPE: Primary | ICD-10-CM

## 2020-03-16 DIAGNOSIS — K21.9 GASTROESOPHAGEAL REFLUX DISEASE, ESOPHAGITIS PRESENCE NOT SPECIFIED: ICD-10-CM

## 2020-03-16 DIAGNOSIS — I10 ESSENTIAL HYPERTENSION: ICD-10-CM

## 2020-03-16 NOTE — LETTER
3/16/2020        RE: Batsheva Barkley  [facility]  [address]  [facility] [address]        [place] [facility]  PRIMARY CARE PROVIDER AND CLINIC:  NICKOLAS Hallman CNP, [address] / [place] [address]  Chief Complaint   Patient presents with     ER F/U     [place] Medical Record Number:  [id number]  Place of Service where encounter took place:  [facility] ([facility]) [[id number]]    Batsheva Barkley  is a 86 year old  (11/6/1933), returned to the above facility from  [facility]. Hospital stay 3/13/20 - 3/13/20. .  Admitted to this facility for  rehab, medical management and nursing care.    HPI:    HPI information obtained from: facility chart records, facility staff, patient report and [facility] chart review.   Brief Summary of Hospital Course:   3/13/20 c.p. radiating to back. EMS gave nitrostat x 2, asa. C.p. resolved. Seen in ED.  CXR neg, troponins neg.  ECG showed sinus bradycardia.  No further c.p.  BPs elevated during ED visit. Stable today.    HTN: stable. Elevated in ED.  Cont. On losartan, metoprolol    GERD.  Hiatal hernia. Cont. On carafate. Po intake stable. No recent reports of nausea      Updates on Status Since Skilled nursing Admission: act. Level baseline. Po intake stable    CODE STATUS/ADVANCE DIRECTIVES DISCUSSION:   DNR / DNI  Patient's living condition: lives in an assisted living facility  ALLERGIES: Codeine and Lisinopril  PAST MEDICAL HISTORY:  has a past medical history of Aortic insufficiency, CAD (coronary artery disease) (06/20/2005), Cardiomyopathy (H), Chest discomfort, CHF NYHA class III, chronic, systolic (H) (1/25/2018), Coronary atherosclerosis (6/20/2005), Former smoker, Generalized osteoarthrosis, GERD with esophagitis, Hiatal hernia, Hypertension goal BP (blood pressure) < 140/90 (1/21/2015), Ischemic cardiomyopathy, Leg weakness, bilateral (2/13/2018), MI (myocardial infarction) (H)  (06/2005), Mitral regurgitation, Mixed hyperlipidemia, Osteoporosis, Peripheral edema (01/26/2015), Physical deconditioning (1/21/2015), and Total urinary incontinence (12/27/2017).  PAST SURGICAL HISTORY:   has a past surgical history that includes L bunion + hammer toes (1/04, 4/04); ovarian cyst surgery; Colonoscopy (3/05); L total knee replacement (10/2009 ); ENT surgery; Eye surgery; Heart Cath, Angioplasty (2005); and Esophagoscopy, gastroscopy, duodenoscopy (EGD), combined (N/A, 4/14/2016).  FAMILY HISTORY: family history includes Breast Cancer in her sister and sister; Cerebrovascular Disease in her mother; Heart Disease in her sister; Respiratory in her father.  SOCIAL HISTORY:   reports that she has quit smoking. She has never used smokeless tobacco. She reports that she does not drink alcohol or use drugs.    Post Discharge Medication Reconciliation Status: discharge medications reconciled, continue medications without change    Current Outpatient Medications   Medication Sig Dispense Refill     acetaminophen (TYLENOL) 500 MG tablet Take 500 mg by mouth daily as needed for mild pain       acetaminophen (TYLENOL) 500 MG tablet Take 1,000 mg by mouth 3 times daily        alum & mag hydroxide-simethicone (MYLANTA/MAALOX) 200-200-20 MG/5ML SUSP suspension Take 10 mLs by mouth 2 times daily as needed        aspirin (ASA) 81 MG chewable tablet Take 81 mg by mouth daily       Atorvastatin Calcium (LIPITOR PO) Take 20 mg by mouth At Bedtime        carboxymethylcellulose PF (REFRESH PLUS) 0.5 % ophthalmic solution Place 2 drops into both eyes every 4 hours as needed for dry eyes       carboxymethylcellulose PF (REFRESH PLUS) 0.5 % ophthalmic solution Place 2 drops into both eyes 2 times daily       diclofenac (VOLTAREN) 1 % topical gel Place 2 g onto the skin 2 times daily Apply to right shoulder       diclofenac (VOLTAREN) 1 % topical gel Place 2 g onto the skin daily as needed for moderate pain Apply to right  shoulder as needed for pain       loperamide (IMODIUM) 2 MG capsule Take 2 mg by mouth 4 times daily as needed for diarrhea        losartan (COZAAR) 50 MG tablet Take 1 tablet (50 mg) by mouth 2 times daily 180 tablet 3     Menthol ([employer] COUGH DROPS SUGAR FREE MT) Take 1 lozenge by mouth 4 times daily as needed       menthol-zinc oxide (CALMOSEPTINE) 0.44-20.6 % OINT ointment Apply 1 g topically 2 times daily       menthol-zinc oxide (CALMOSEPTINE) 0.44-20.6 % OINT ointment Apply topically 2 times daily as needed for skin protection       metoprolol (LOPRESSOR) 50 MG tablet TAKE ONE TABLET BY MOUTH TWICE DAILY 180 tablet 3     omeprazole (PRILOSEC) 20 MG DR capsule Take 1 capsule (20 mg) by mouth 2 times daily (before meals) 60 capsule 0     ONDANSETRON PO Take 4 mg by mouth every 8 hours as needed for nausea       order for DME Equipment being ordered: Walker Wheels () and Walker ()  Treatment Diagnosis: gait instability 1 each 0     order for DME Light weight wheelchair Body mass index is 19.08 kg/(m^2). 1 Device 0     order for DME ANTOINE stockings- below knee. 1 Package 0     polyethylene glycol (MIRALAX/GLYCOLAX) Packet Take 17 g by mouth daily as needed for constipation 7 packet      senna-docusate (SENOKOT-S;PERICOLACE) 8.6-50 MG per tablet Take 1 tablet by mouth 2 times daily as needed        sucralfate (CARAFATE) 1 GM/10ML suspension Take 10 mLs (1 g) by mouth 4 times daily (before meals and nightly) 1200 mL 0     traZODone (DESYREL) 50 MG tablet Take 50 mg by mouth At Bedtime       trolamine salicylate (ASPERCREME) 10 % external cream Apply topically 3 times daily Apply to affected areas right knee twice daily AND BID PRN           ROS:  No chest pain, shortness of breath, fevers, chills, headache, nausea, vomiting, dysuria or bowel abnormalities.  Appetite is  normal.  No pain except occ R knee.    Vitals:  /80   Pulse 62   Resp 18   SpO2 94%   Exam:  GENERAL APPEARANCE:  Alert, in no  distress, cooperative  ENT:  Mouth and posterior oropharynx normal, moist mucous membranes, [place]  EYES:  EOM, conjunctivae, lids, pupils and irises normal, PERRL  NECK:  No adenopathy,masses or thyromegaly, no carotid bruit, FROM  RESP:  respiratory effort and palpation of chest normal, lungs clear to auscultation , no respiratory distress  CV:  Palpation and auscultation of heart done , regular rate and rhythm, no murmur, rub, or gallop, no edema  ABDOMEN:  normal bowel sounds, soft, nontender, no hepatosplenomegaly or other masses, no guarding or rebound  M/S:   muscle strength 5/5 all 4 ext., bilat varus  NEURO:   Cranial nerves 2-12 are normal tested and grossly at patient's baseline, speech clear  PSYCH:  memory impaired , affect and mood normal    Lab/Diagnostic data:    Most Recent 3 CBC's:  Recent Labs   Lab Test 03/13/20  1242 12/10/19 11/29/19  1055 09/24/19  1700   WBC 4.8  --  4.6 5.0   HGB 8.8* 8.8* 8.5* 9.8*   MCV 77*  --  88 90     --  263 233     Most Recent 3 BMP's:  Recent Labs   Lab Test 03/13/20  1242 11/29/19  1055 09/24/19  1700    137 134   POTASSIUM 4.3 3.9 4.4   CHLORIDE 106 106 101   CO2 26 25 28   BUN 26 25 26   CR 0.82 0.79 0.67   ANIONGAP 4 6 5   REILLY 8.5 8.6 8.5   GLC 87 107* 91     Most Recent 3 Troponin's:  Recent Labs   Lab Test 03/13/20  1242 11/29/19  1919 11/29/19  1451   TROPI <0.015 <0.015 <0.015       ASSESSMENT/PLAN:  Chest pain, unspecified type  Resolved  1. Cont. To monitor for reports of c.p.  2. Does not want stress test, may again discuss for further episodes of c.p.  3. Cbc, bmp in next 1-3 mos    Essential hypertension  Stable. BP today. Elevated during ED visit  1. Cont. Losartan, metoprolol  2. Follow BPs, HRs  3. For increased bradycardia, may decrease metoprolol dose  4. Encourage fluids    Gastroesophageal reflux disease, esophagitis presence not specified  Gen. Stable. Unclear if r/t recent c.p. large hiatal hernia  1. Cont. carafate  2. Cont.  Prilosec, gerilanta  3. Monitor for nausea-cont. Prn zofram  4. Follow wt.s       Electronically signed by:  NICKOLAS Hallman CNP                       Sincerely,        NICKOLAS Hallman CNP

## 2020-03-16 NOTE — PROGRESS NOTES
[place] [facility]  PRIMARY CARE PROVIDER AND CLINIC:  Tacos Bhakta, APRN CNP, [address] / [place] [address]  Chief Complaint   Patient presents with     ER F/U     [facility] Medical Record Number:  [id number]  Place of Service where encounter took place:  [facility] ([facility]) [[id number]]    Batsheva Barkley  is a 86 year old  (11/6/1933), returned to the above facility from  [facility]. Hospital stay 3/13/20 - 3/13/20. .  Admitted to this facility for  rehab, medical management and nursing care.    HPI:    HPI information obtained from: facility chart records, facility staff, patient report and [facility] chart review.   Brief Summary of Hospital Course:   3/13/20 c.p. radiating to back. EMS gave nitrostat x 2, asa. C.p. resolved. Seen in ED.  CXR neg, troponins neg.  ECG showed sinus bradycardia.  No further c.p.  BPs elevated during ED visit. Stable today.    HTN: stable. Elevated in ED.  Cont. On losartan, metoprolol    GERD.  Hiatal hernia. Cont. On carafate. Po intake stable. No recent reports of nausea      Updates on Status Since Skilled nursing Admission: act. Level baseline. Po intake stable    CODE STATUS/ADVANCE DIRECTIVES DISCUSSION:   DNR / DNI  Patient's living condition: lives in an assisted living facility  ALLERGIES: Codeine and Lisinopril  PAST MEDICAL HISTORY:  has a past medical history of Aortic insufficiency, CAD (coronary artery disease) (06/20/2005), Cardiomyopathy (H), Chest discomfort, CHF NYHA class III, chronic, systolic (H) (1/25/2018), Coronary atherosclerosis (6/20/2005), Former smoker, Generalized osteoarthrosis, GERD with esophagitis, Hiatal hernia, Hypertension goal BP (blood pressure) < 140/90 (1/21/2015), Ischemic cardiomyopathy, Leg weakness, bilateral (2/13/2018), MI (myocardial infarction) (H) (06/2005), Mitral regurgitation, Mixed hyperlipidemia, Osteoporosis, Peripheral edema (01/26/2015), Physical  deconditioning (1/21/2015), and Total urinary incontinence (12/27/2017).  PAST SURGICAL HISTORY:   has a past surgical history that includes L bunion + hammer toes (1/04, 4/04); ovarian cyst surgery; Colonoscopy (3/05); L total knee replacement (10/2009 ); ENT surgery; Eye surgery; Heart Cath, Angioplasty (2005); and Esophagoscopy, gastroscopy, duodenoscopy (EGD), combined (N/A, 4/14/2016).  FAMILY HISTORY: family history includes Breast Cancer in her sister and sister; Cerebrovascular Disease in her mother; Heart Disease in her sister; Respiratory in her father.  SOCIAL HISTORY:   reports that she has quit smoking. She has never used smokeless tobacco. She reports that she does not drink alcohol or use drugs.    Post Discharge Medication Reconciliation Status: discharge medications reconciled, continue medications without change    Current Outpatient Medications   Medication Sig Dispense Refill     acetaminophen (TYLENOL) 500 MG tablet Take 500 mg by mouth daily as needed for mild pain       acetaminophen (TYLENOL) 500 MG tablet Take 1,000 mg by mouth 3 times daily        alum & mag hydroxide-simethicone (MYLANTA/MAALOX) 200-200-20 MG/5ML SUSP suspension Take 10 mLs by mouth 2 times daily as needed        aspirin (ASA) 81 MG chewable tablet Take 81 mg by mouth daily       Atorvastatin Calcium (LIPITOR PO) Take 20 mg by mouth At Bedtime        carboxymethylcellulose PF (REFRESH PLUS) 0.5 % ophthalmic solution Place 2 drops into both eyes every 4 hours as needed for dry eyes       carboxymethylcellulose PF (REFRESH PLUS) 0.5 % ophthalmic solution Place 2 drops into both eyes 2 times daily       diclofenac (VOLTAREN) 1 % topical gel Place 2 g onto the skin 2 times daily Apply to right shoulder       diclofenac (VOLTAREN) 1 % topical gel Place 2 g onto the skin daily as needed for moderate pain Apply to right shoulder as needed for pain       loperamide (IMODIUM) 2 MG capsule Take 2 mg by mouth 4 times daily as needed  for diarrhea        losartan (COZAAR) 50 MG tablet Take 1 tablet (50 mg) by mouth 2 times daily 180 tablet 3     Menthol ([employer] COUGH DROPS SUGAR FREE MT) Take 1 lozenge by mouth 4 times daily as needed       menthol-zinc oxide (CALMOSEPTINE) 0.44-20.6 % OINT ointment Apply 1 g topically 2 times daily       menthol-zinc oxide (CALMOSEPTINE) 0.44-20.6 % OINT ointment Apply topically 2 times daily as needed for skin protection       metoprolol (LOPRESSOR) 50 MG tablet TAKE ONE TABLET BY MOUTH TWICE DAILY 180 tablet 3     omeprazole (PRILOSEC) 20 MG DR capsule Take 1 capsule (20 mg) by mouth 2 times daily (before meals) 60 capsule 0     ONDANSETRON PO Take 4 mg by mouth every 8 hours as needed for nausea       order for DME Equipment being ordered: Walker Wheels () and Walker ()  Treatment Diagnosis: gait instability 1 each 0     order for DME Light weight wheelchair Body mass index is 19.08 kg/(m^2). 1 Device 0     order for DME ANTOINE stockings- below knee. 1 Package 0     polyethylene glycol (MIRALAX/GLYCOLAX) Packet Take 17 g by mouth daily as needed for constipation 7 packet      senna-docusate (SENOKOT-S;PERICOLACE) 8.6-50 MG per tablet Take 1 tablet by mouth 2 times daily as needed        sucralfate (CARAFATE) 1 GM/10ML suspension Take 10 mLs (1 g) by mouth 4 times daily (before meals and nightly) 1200 mL 0     traZODone (DESYREL) 50 MG tablet Take 50 mg by mouth At Bedtime       trolamine salicylate (ASPERCREME) 10 % external cream Apply topically 3 times daily Apply to affected areas right knee twice daily AND BID PRN           ROS:  No chest pain, shortness of breath, fevers, chills, headache, nausea, vomiting, dysuria or bowel abnormalities.  Appetite is  normal.  No pain except occ R knee.    Vitals:  /80   Pulse 62   Resp 18   SpO2 94%   Exam:  GENERAL APPEARANCE:  Alert, in no distress, cooperative  ENT:  Mouth and posterior oropharynx normal, moist mucous membranes, [place]  EYES:  EOM,  conjunctivae, lids, pupils and irises normal, PERRL  NECK:  No adenopathy,masses or thyromegaly, no carotid bruit, FROM  RESP:  respiratory effort and palpation of chest normal, lungs clear to auscultation , no respiratory distress  CV:  Palpation and auscultation of heart done , regular rate and rhythm, no murmur, rub, or gallop, no edema  ABDOMEN:  normal bowel sounds, soft, nontender, no hepatosplenomegaly or other masses, no guarding or rebound  M/S:   muscle strength 5/5 all 4 ext., bilat varus  NEURO:   Cranial nerves 2-12 are normal tested and grossly at patient's baseline, speech clear  PSYCH:  memory impaired , affect and mood normal    Lab/Diagnostic data:    Most Recent 3 CBC's:  Recent Labs   Lab Test 03/13/20  1242 12/10/19 11/29/19  1055 09/24/19  1700   WBC 4.8  --  4.6 5.0   HGB 8.8* 8.8* 8.5* 9.8*   MCV 77*  --  88 90     --  263 233     Most Recent 3 BMP's:  Recent Labs   Lab Test 03/13/20  1242 11/29/19  1055 09/24/19  1700    137 134   POTASSIUM 4.3 3.9 4.4   CHLORIDE 106 106 101   CO2 26 25 28   BUN 26 25 26   CR 0.82 0.79 0.67   ANIONGAP 4 6 5   REILLY 8.5 8.6 8.5   GLC 87 107* 91     Most Recent 3 Troponin's:  Recent Labs   Lab Test 03/13/20  1242 11/29/19  1919 11/29/19  1451   TROPI <0.015 <0.015 <0.015       ASSESSMENT/PLAN:  Chest pain, unspecified type  Resolved  1. Cont. To monitor for reports of c.p.  2. Does not want stress test, may again discuss for further episodes of c.p.  3. Cbc, bmp in next 1-3 mos    Essential hypertension  Stable. BP today. Elevated during ED visit  1. Cont. Losartan, metoprolol  2. Follow BPs, HRs  3. For increased bradycardia, may decrease metoprolol dose  4. Encourage fluids    Gastroesophageal reflux disease, esophagitis presence not specified  Gen. Stable. Unclear if r/t recent c.p. large hiatal hernia  1. Cont. carafate  2. Cont. Prilosec, gerilanta  3. Monitor for nausea-cont. Prn zofram  4. Follow wt.s       Electronically signed by:  Tacos  NICKOLAS Betts CNP

## 2020-03-24 ENCOUNTER — AMBULATORY - HEALTHEAST (OUTPATIENT)
Dept: OTHER | Facility: CLINIC | Age: 85
End: 2020-03-24

## 2020-03-24 ENCOUNTER — DOCUMENTATION ONLY (OUTPATIENT)
Dept: OTHER | Facility: CLINIC | Age: 85
End: 2020-03-24

## 2020-05-05 ENCOUNTER — TELEPHONE (OUTPATIENT)
Dept: GERIATRICS | Facility: CLINIC | Age: 85
End: 2020-05-05

## 2020-05-05 NOTE — TELEPHONE ENCOUNTER
dtr asking about f/u knee inj.  [facility] not currently doing on-site knee inj. Due to covid.  Will add voltaren gel to L knee as well as R for c/o L knee pain.  Will sched. F/u knee inj. When able.

## 2020-06-23 ENCOUNTER — ASSISTED LIVING VISIT (OUTPATIENT)
Dept: GERIATRICS | Facility: CLINIC | Age: 85
End: 2020-06-23
Payer: COMMERCIAL

## 2020-06-23 DIAGNOSIS — M25.561 ACUTE PAIN OF RIGHT KNEE: Primary | ICD-10-CM

## 2020-06-23 NOTE — PROGRESS NOTES
Ortho Nursing home visit    Batsheva Barkley is a 86 year old female who resides at [facility]    Patient is seen today for RH arthritis, right knee, patient and family are requesting cortisone injection for pain relief, I have seen this patient 4 months ago for same; discussed with daughter prior to injection today.    Past Medical History:   Diagnosis Date     Aortic insufficiency      CAD (coronary artery disease) 06/20/2005    w/stent to dominant Cfx(2005)     Cardiomyopathy (H)      Chest discomfort      CHF NYHA class III, chronic, systolic (H) 1/25/2018     Coronary atherosclerosis 6/20/2005    circ vessel was stented;  had a 50% LAD lesion which was not treated Problem list name updated by automated process. Provider to review     Former smoker      Generalized osteoarthrosis     knees ;; L total kne 10/09     GERD with esophagitis      Hiatal hernia     Large     Hypertension goal BP (blood pressure) < 140/90 1/21/2015     Ischemic cardiomyopathy      Leg weakness, bilateral 2/13/2018     MI (myocardial infarction) (H) 06/2005     Mitral regurgitation     moderate     Mixed hyperlipidemia      Osteoporosis      Peripheral edema 01/26/2015     Physical deconditioning 1/21/2015     Total urinary incontinence 12/27/2017      Past Surgical History:   Procedure Laterality Date     COLONOSCOPY  3/05     ENT SURGERY       ESOPHAGOSCOPY, GASTROSCOPY, DUODENOSCOPY (EGD), COMBINED N/A 4/14/2016    Procedure: COMBINED ESOPHAGOSCOPY, GASTROSCOPY, DUODENOSCOPY (EGD), BIOPSY SINGLE OR MULTIPLE;  Surgeon: Jonathan Gramajo MD;  Location: [facility]     EYE SURGERY       HEART CATH, ANGIOPLASTY  2005    RONI to left circ     L bunion + hammer toes  1/04, 4/04     L total knee replacement  10/2009      ovarian cyst surgery          Allergies   Allergen Reactions     Codeine      nausea, HA     Lisinopril Cough      There were no vitals taken for this visit.     Exam: Seated right knee valgus deformity, no swelling, pain medial,  lateral, PF joint. No crepitus noted; no locking;      X-rays show advanced DJD    ASSESSMENT / PLAN:  After prep; right knee injected with 1 ml depo medrol, 4 ml 1% lidocaine into knee joint, tolerated well, no complaints,    F/U 4 months PRN;    [id number]          Lida OPA-C  [phone] Cell

## 2020-08-03 ENCOUNTER — VIRTUAL VISIT (OUTPATIENT)
Dept: GERIATRICS | Facility: CLINIC | Age: 85
End: 2020-08-03
Payer: COMMERCIAL

## 2020-08-03 ENCOUNTER — RECORDS - HEALTHEAST (OUTPATIENT)
Dept: LAB | Facility: CLINIC | Age: 85
End: 2020-08-03

## 2020-08-03 VITALS
DIASTOLIC BLOOD PRESSURE: 72 MMHG | HEART RATE: 80 BPM | RESPIRATION RATE: 16 BRPM | OXYGEN SATURATION: 95 % | TEMPERATURE: 98.7 F | SYSTOLIC BLOOD PRESSURE: 146 MMHG

## 2020-08-03 DIAGNOSIS — I10 ESSENTIAL HYPERTENSION: ICD-10-CM

## 2020-08-03 DIAGNOSIS — M62.81 GENERALIZED MUSCLE WEAKNESS: Primary | ICD-10-CM

## 2020-08-03 DIAGNOSIS — M15.0 PRIMARY OSTEOARTHRITIS INVOLVING MULTIPLE JOINTS: ICD-10-CM

## 2020-08-03 NOTE — PROGRESS NOTES
"[facility]   Batsheva Barkley is being evaluated via a billable video visit due to the restrictions of the Covid-19 pandemic.   The patient has been notified of following:  \"This video visit will be conducted via a call between you and your provider. We have found that certain health care needs can be provided without the need for an in-person physical exam.  This service lets us provide the care you need with a video conversation. If during the course of the call the provider feels a video visit is not appropriate, you will not be charged for this service.\"   The provider has received verbal consent for a Video Visit from the patient or first contact? Yes  Patient  or facility staff would like the video invitation sent by: Text to cell phone: [phone]  Video Start Time: 0854    [place] Medical Record Number:  [id number]  Place of Location at the time of visit: [facility]  Chief Complaint   Patient presents with     Video Visit     Fatigue     HPI:  Batsheva Barkley  is a 86 year old (11/6/1933), who is being seen today for a visit.  HPI information obtained from: facility chart records, facility staff, patient report and [facility] chart review. Today's concern is: weakness, OA, HTN.  S/p fall 7/31/20. Slid out of recliner to floor. No apparent inj. Sine this time has c/o increased LE weakness, unable to amb., fearful of falls.  Has ongoing knee pain, R>L.  S/p knee inj 6/23/20 which she reports was ineffective.  Cont. On tylenol, voltaren gel.  For HTN cont. On losartan, lopressor.  BPs, HRs gen. Stable. No recent reports of dizziness.       Past Medical and Surgical History reviewed in Epic today.  MEDICATIONS:    Current Outpatient Medications   Medication Sig Dispense Refill     acetaminophen (TYLENOL) 500 MG tablet Take 500 mg by mouth daily as needed for mild pain       acetaminophen (TYLENOL) 500 MG tablet Take 1,000 mg by mouth 3 times daily        alum & mag " hydroxide-simethicone (MYLANTA/MAALOX) 200-200-20 MG/5ML SUSP suspension Take 10 mLs by mouth 2 times daily as needed        aspirin (ASA) 81 MG chewable tablet Take 81 mg by mouth daily       Atorvastatin Calcium (LIPITOR PO) Take 20 mg by mouth At Bedtime        carboxymethylcellulose PF (REFRESH PLUS) 0.5 % ophthalmic solution Place 2 drops into both eyes every 4 hours as needed for dry eyes       carboxymethylcellulose PF (REFRESH PLUS) 0.5 % ophthalmic solution Place 2 drops into both eyes 2 times daily       diclofenac (VOLTAREN) 1 % topical gel Place 2 g onto the skin 2 times daily Apply to right shoulder       diclofenac (VOLTAREN) 1 % topical gel Place 2 g onto the skin daily as needed for moderate pain Apply to right shoulder as needed for pain       loperamide (IMODIUM) 2 MG capsule Take 2 mg by mouth 4 times daily as needed for diarrhea        losartan (COZAAR) 50 MG tablet Take 1 tablet (50 mg) by mouth 2 times daily 180 tablet 3     Menthol ([employer] COUGH DROPS SUGAR FREE MT) Take 1 lozenge by mouth 4 times daily as needed       menthol-zinc oxide (CALMOSEPTINE) 0.44-20.6 % OINT ointment Apply 1 g topically 2 times daily       menthol-zinc oxide (CALMOSEPTINE) 0.44-20.6 % OINT ointment Apply topically 2 times daily as needed for skin protection       metoprolol (LOPRESSOR) 50 MG tablet TAKE ONE TABLET BY MOUTH TWICE DAILY 180 tablet 3     omeprazole (PRILOSEC) 20 MG DR capsule Take 1 capsule (20 mg) by mouth 2 times daily (before meals) 60 capsule 0     ONDANSETRON PO Take 4 mg by mouth every 8 hours as needed for nausea       order for DME Equipment being ordered: Walker Wheels () and Walker ()  Treatment Diagnosis: gait instability 1 each 0     order for DME Light weight wheelchair Body mass index is 19.08 kg/(m^2). 1 Device 0     order for DME ANTOINE stockings- below knee. 1 Package 0     polyethylene glycol (MIRALAX/GLYCOLAX) Packet Take 17 g by mouth daily as needed for constipation 7 packet       senna-docusate (SENOKOT-S;PERICOLACE) 8.6-50 MG per tablet Take 1 tablet by mouth 2 times daily as needed        sucralfate (CARAFATE) 1 GM/10ML suspension Take 10 mLs (1 g) by mouth 4 times daily (before meals and nightly) 1200 mL 0     traZODone (DESYREL) 50 MG tablet Take 50 mg by mouth At Bedtime       trolamine salicylate (ASPERCREME) 10 % external cream Apply topically 3 times daily Apply to affected areas right knee twice daily AND BID PRN       REVIEW OF SYSTEMS: No chest pain, shortness of breath, fevers, chills, headache, nausea, vomiting, dysuria or bowel abnormalities.  Appetite is  normal.  No pain except knees.  Objective: BP (!) 146/72   Pulse 80   Temp 98.7  F (37.1  C)   Resp 16   SpO2 95%   Limited visit exam done given COVID-19 precautions.   GENERAL APPEARANCE:  Alert, in no distress, cooperative  ENT:  Mouth and posterior oropharynx normal, moist mucous membranes, [place]  EYES:  EOM normal, Conjunctiva and lids normal  NECK:  FROM  RESP:  lungs clear to auscultation , no respiratory distress  CV:  no edema, rate-normal  M/S:   muscle strength 5/5 UEs. gen. LE weakness. diff. with standing. unable to amb.  NEURO:   Cranial nerves 2-12 are normal tested and grossly at patient's baseline, speech clear  PSYCH:  memory impaired , affect and mood normal  Labs:     Most Recent 3 CBC's:  Recent Labs   Lab Test 03/13/20  1242 12/10/19 11/29/19  1055 09/24/19  1700   WBC 4.8  --  4.6 5.0   HGB 8.8* 8.8* 8.5* 9.8*   MCV 77*  --  88 90     --  263 233     Most Recent 3 BMP's:  Recent Labs   Lab Test 03/13/20  1242 11/29/19  1055 09/24/19  1700    137 134   POTASSIUM 4.3 3.9 4.4   CHLORIDE 106 106 101   CO2 26 25 28   BUN 26 25 26   CR 0.82 0.79 0.67   ANIONGAP 4 6 5   REILLY 8.5 8.6 8.5   GLC 87 107* 91       ASSESSMENT/PLAN:  Generalized muscle weakness  Increased. Currently not amb. S/p fall 7/31/20  - HOME CARE NURSING REFERRAL  2. Order PT  3. Cont. With lift chair in apt.  4. Encourage  standing, amb. As thu    Primary osteoarthritis involving multiple joints  Some ongoing knee pain. No apparent pain exac with recent fall  1. Cont. Tylenol  2. Cont. voltaren gel  3. Monitor for reports  Of further increased knee pain    Essential hypertension  BPs gen. Stable. Sl elevated today. No recent dizziness  1. Cont. Losartan, lopressor  2. Follow BPs, HRs  3. Encourage fluids  4. Cbc, bmp tomorrow      Electronically signed by:  NICKOLAS Hallman CNP     Video-Visit Details  Type of service:  Video Visit  Video End Time (time video stopped): 0905  Distant Location (provider location):  [facility]

## 2020-08-03 NOTE — LETTER
"    8/3/2020        RE: Batsheva Barkley  [facility]  [address]  [facility] [address]        [facility]   Batsheva Barkley is being evaluated via a billable video visit due to the restrictions of the Covid-19 pandemic.   The patient has been notified of following:  \"This video visit will be conducted via a call between you and your provider. We have found that certain health care needs can be provided without the need for an in-person physical exam.  This service lets us provide the care you need with a video conversation. If during the course of the call the provider feels a video visit is not appropriate, you will not be charged for this service.\"   The provider has received verbal consent for a Video Visit from the patient or first contact? Yes  Patient  or facility staff would like the video invitation sent by: Text to cell phone: [phone]  Video Start Time: 0854    [place] Medical Record Number:  [id number]  Place of Location at the time of visit: [facility]  Chief Complaint   Patient presents with     Video Visit     Fatigue     HPI:  Batsheva Barkley  is a 86 year old (11/6/1933), who is being seen today for a visit.  HPI information obtained from: facility chart records, facility staff, patient report and [facility] chart review. Today's concern is: weakness, OA, HTN.  S/p fall 7/31/20. Slid out of recliner to floor. No apparent inj. Sine this time has c/o increased LE weakness, unable to amb., fearful of falls.  Has ongoing knee pain, R>L.  S/p knee inj 6/23/20 which she reports was ineffective.  Cont. On tylenol, voltaren gel.  For HTN cont. On losartan, lopressor.  BPs, HRs gen. Stable. No recent reports of dizziness.       Past Medical and Surgical History reviewed in Epic today.  MEDICATIONS:    Current Outpatient Medications   Medication Sig Dispense Refill     acetaminophen (TYLENOL) 500 MG tablet Take 500 mg by mouth daily as needed " for mild pain       acetaminophen (TYLENOL) 500 MG tablet Take 1,000 mg by mouth 3 times daily        alum & mag hydroxide-simethicone (MYLANTA/MAALOX) 200-200-20 MG/5ML SUSP suspension Take 10 mLs by mouth 2 times daily as needed        aspirin (ASA) 81 MG chewable tablet Take 81 mg by mouth daily       Atorvastatin Calcium (LIPITOR PO) Take 20 mg by mouth At Bedtime        carboxymethylcellulose PF (REFRESH PLUS) 0.5 % ophthalmic solution Place 2 drops into both eyes every 4 hours as needed for dry eyes       carboxymethylcellulose PF (REFRESH PLUS) 0.5 % ophthalmic solution Place 2 drops into both eyes 2 times daily       diclofenac (VOLTAREN) 1 % topical gel Place 2 g onto the skin 2 times daily Apply to right shoulder       diclofenac (VOLTAREN) 1 % topical gel Place 2 g onto the skin daily as needed for moderate pain Apply to right shoulder as needed for pain       loperamide (IMODIUM) 2 MG capsule Take 2 mg by mouth 4 times daily as needed for diarrhea        losartan (COZAAR) 50 MG tablet Take 1 tablet (50 mg) by mouth 2 times daily 180 tablet 3     Menthol ([employer] COUGH DROPS SUGAR FREE MT) Take 1 lozenge by mouth 4 times daily as needed       menthol-zinc oxide (CALMOSEPTINE) 0.44-20.6 % OINT ointment Apply 1 g topically 2 times daily       menthol-zinc oxide (CALMOSEPTINE) 0.44-20.6 % OINT ointment Apply topically 2 times daily as needed for skin protection       metoprolol (LOPRESSOR) 50 MG tablet TAKE ONE TABLET BY MOUTH TWICE DAILY 180 tablet 3     omeprazole (PRILOSEC) 20 MG DR capsule Take 1 capsule (20 mg) by mouth 2 times daily (before meals) 60 capsule 0     ONDANSETRON PO Take 4 mg by mouth every 8 hours as needed for nausea       order for DME Equipment being ordered: Walker Wheels () and Walker ()  Treatment Diagnosis: gait instability 1 each 0     order for DME Light weight wheelchair Body mass index is 19.08 kg/(m^2). 1 Device 0     order for DME ANTOINE stockings- below knee. 1 Package 0      polyethylene glycol (MIRALAX/GLYCOLAX) Packet Take 17 g by mouth daily as needed for constipation 7 packet      senna-docusate (SENOKOT-S;PERICOLACE) 8.6-50 MG per tablet Take 1 tablet by mouth 2 times daily as needed        sucralfate (CARAFATE) 1 GM/10ML suspension Take 10 mLs (1 g) by mouth 4 times daily (before meals and nightly) 1200 mL 0     traZODone (DESYREL) 50 MG tablet Take 50 mg by mouth At Bedtime       trolamine salicylate (ASPERCREME) 10 % external cream Apply topically 3 times daily Apply to affected areas right knee twice daily AND BID PRN       REVIEW OF SYSTEMS: No chest pain, shortness of breath, fevers, chills, headache, nausea, vomiting, dysuria or bowel abnormalities.  Appetite is  normal.  No pain except knees.  Objective: BP (!) 146/72   Pulse 80   Temp 98.7  F (37.1  C)   Resp 16   SpO2 95%   Limited visit exam done given COVID-19 precautions.   GENERAL APPEARANCE:  Alert, in no distress, cooperative  ENT:  Mouth and posterior oropharynx normal, moist mucous membranes, [place]  EYES:  EOM normal, Conjunctiva and lids normal  NECK:  FROM  RESP:  lungs clear to auscultation , no respiratory distress  CV:  no edema, rate-normal  M/S:   muscle strength 5/5 UEs. gen. LE weakness. diff. with standing. unable to amb.  NEURO:   Cranial nerves 2-12 are normal tested and grossly at patient's baseline, speech clear  PSYCH:  memory impaired , affect and mood normal  Labs:     Most Recent 3 CBC's:  Recent Labs   Lab Test 03/13/20  1242 12/10/19 11/29/19  1055 09/24/19  1700   WBC 4.8  --  4.6 5.0   HGB 8.8* 8.8* 8.5* 9.8*   MCV 77*  --  88 90     --  263 233     Most Recent 3 BMP's:  Recent Labs   Lab Test 03/13/20  1242 11/29/19  1055 09/24/19  1700    137 134   POTASSIUM 4.3 3.9 4.4   CHLORIDE 106 106 101   CO2 26 25 28   BUN 26 25 26   CR 0.82 0.79 0.67   ANIONGAP 4 6 5   REILLY 8.5 8.6 8.5   GLC 87 107* 91       ASSESSMENT/PLAN:  Generalized muscle weakness  Increased. Currently not  amb. S/p fall 7/31/20  - HOME CARE NURSING REFERRAL  2. Order PT  3. Cont. With lift chair in apt.  4. Encourage standing, amb. As thu    Primary osteoarthritis involving multiple joints  Some ongoing knee pain. No apparent pain exac with recent fall  1. Cont. Tylenol  2. Cont. voltaren gel  3. Monitor for reports  Of further increased knee pain    Essential hypertension  BPs gen. Stable. Sl elevated today. No recent dizziness  1. Cont. Losartan, lopressor  2. Follow BPs, HRs  3. Encourage fluids  4. Cbc, bmp tomorrow      Electronically signed by:  NICKOLAS Hallman CNP     Video-Visit Details  Type of service:  Video Visit  Video End Time (time video stopped): 0905  Distant Location (provider location):  [facility]             Sincerely,        NICKOLAS Hallman CNP

## 2020-08-04 ENCOUNTER — TRANSFERRED RECORDS (OUTPATIENT)
Dept: HEALTH INFORMATION MANAGEMENT | Facility: CLINIC | Age: 85
End: 2020-08-04

## 2020-08-04 LAB
ANION GAP SERPL CALCULATED.3IONS-SCNC: 6 MMOL/L (ref 5–18)
ANION GAP SERPL CALCULATED.3IONS-SCNC: 6 MMOL/L (ref 5–18)
BASOPHILS # BLD AUTO: 0 THOU/UL (ref 0–0.2)
BASOPHILS NFR BLD AUTO: 0 % (ref 0–2)
BUN SERPL-MCNC: 19 MG/DL (ref 8–28)
BUN SERPL-MCNC: 19 MG/DL (ref 8–28)
CALCIUM SERPL-MCNC: 8.7 MG/DL (ref 8.5–10.5)
CALCIUM SERPL-MCNC: 8.7 MG/DL (ref 8.5–10.5)
CHLORIDE BLD-SCNC: 99 MMOL/L (ref 98–107)
CHLORIDE SERPLBLD-SCNC: 99 MMOL/L (ref 98–107)
CO2 SERPL-SCNC: 24 MMOL/L (ref 22–31)
CO2 SERPL-SCNC: 24 MMOL/L (ref 22–31)
CREAT SERPL-MCNC: 0.75 MG/DL (ref 0.6–1.1)
CREAT SERPL-MCNC: 0.75 MG/DL (ref 0.6–1.1)
DIFFERENTIAL: ABNORMAL
EOSINOPHIL # BLD AUTO: 0.1 THOU/UL (ref 0–0.4)
EOSINOPHIL NFR BLD AUTO: 3 % (ref 0–6)
ERYTHROCYTE [DISTWIDTH] IN BLOOD BY AUTOMATED COUNT: 18.1 % (ref 11–14.5)
ERYTHROCYTE [DISTWIDTH] IN BLOOD BY AUTOMATED COUNT: 18.1 % (ref 11–14.5)
GFR SERPL CREATININE-BSD FRML MDRD: >60 ML/MIN/1.73M2
GFR SERPL CREATININE-BSD FRML MDRD: >60 ML/MIN/1.73M2
GLUCOSE BLD-MCNC: 91 MG/DL (ref 70–125)
GLUCOSE SERPL-MCNC: 91 MG/DL (ref 70–125)
HCT VFR BLD AUTO: 26.9 % (ref 35–47)
HCT VFR BLD AUTO: 26.9 % (ref 35–47)
HEMOGLOBIN: 8.2 G/DL (ref 12–16)
HGB BLD-MCNC: 8.2 G/DL (ref 12–16)
LYMPHOCYTES # BLD AUTO: 1 THOU/UL (ref 0.8–4.4)
LYMPHOCYTES NFR BLD AUTO: 22 % (ref 20–40)
MCH RBC QN AUTO: 21.6 PG (ref 27–34)
MCH RBC QN AUTO: 21.6 PG (ref 27–34)
MCHC RBC AUTO-ENTMCNC: 30.5 G/DL (ref 32–36)
MCHC RBC AUTO-ENTMCNC: 30.5 G/DL (ref 32–36)
MCV RBC AUTO: 71 FL (ref 80–100)
MCV RBC AUTO: 71 FL (ref 80–100)
MONOCYTES # BLD AUTO: 0.7 THOU/UL (ref 0–0.9)
MONOCYTES NFR BLD AUTO: 15 % (ref 2–10)
NEUTROPHILS # BLD AUTO: 2.7 THOU/UL (ref 2–7.7)
NEUTROPHILS NFR BLD AUTO: 60 % (ref 50–70)
PLATELET # BLD AUTO: 269 THOU/UL (ref 140–440)
PLATELET # BLD AUTO: 269 THOU/UL (ref 140–440)
PMV BLD AUTO: 9 FL (ref 8.5–12.5)
POTASSIUM BLD-SCNC: 4.2 MMOL/L (ref 3.5–5)
POTASSIUM SERPL-SCNC: 4.2 MMOL/L (ref 3.5–5)
RBC # BLD AUTO: 3.79 MILL/UL (ref 3.8–5.4)
RBC # BLD AUTO: 3.79 MILL/UL (ref 3.8–5.4)
SODIUM SERPL-SCNC: 129 MMOL/L (ref 136–145)
SODIUM SERPL-SCNC: 129 MMOL/L (ref 136–145)
WBC # BLD AUTO: 4.6 THOU/UL (ref 4–11)
WBC: 4.6 THOU/UL (ref 4–11)

## 2020-08-05 ENCOUNTER — APPOINTMENT (OUTPATIENT)
Dept: MRI IMAGING | Facility: CLINIC | Age: 85
End: 2020-08-05
Attending: EMERGENCY MEDICINE
Payer: COMMERCIAL

## 2020-08-05 ENCOUNTER — HOSPITAL ENCOUNTER (EMERGENCY)
Facility: CLINIC | Age: 85
Discharge: HOME | End: 2020-08-05
Attending: EMERGENCY MEDICINE | Admitting: EMERGENCY MEDICINE
Payer: COMMERCIAL

## 2020-08-05 VITALS
HEART RATE: 62 BPM | TEMPERATURE: 98.3 F | OXYGEN SATURATION: 95 % | SYSTOLIC BLOOD PRESSURE: 108 MMHG | RESPIRATION RATE: 14 BRPM | DIASTOLIC BLOOD PRESSURE: 64 MMHG
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
## 2020-08-05 DIAGNOSIS — R29.898 BILATERAL LEG WEAKNESS: ICD-10-CM

## 2020-08-05 DIAGNOSIS — M51.369 DDD (DEGENERATIVE DISC DISEASE), LUMBAR: ICD-10-CM

## 2020-08-05 LAB
ALBUMIN SERPL-MCNC: 3.8 G/DL (ref 3.4–5)
ALBUMIN UR-MCNC: NEGATIVE MG/DL
ALP SERPL-CCNC: 101 U/L (ref 40–150)
ALT SERPL W P-5'-P-CCNC: 23 U/L (ref 0–50)
ANION GAP SERPL CALCULATED.3IONS-SCNC: 7 MMOL/L (ref 3–14)
APPEARANCE UR: CLEAR
AST SERPL W P-5'-P-CCNC: 18 U/L (ref 0–45)
BASOPHILS # BLD AUTO: 0 10E9/L (ref 0–0.2)
BASOPHILS NFR BLD AUTO: 0.4 %
BILIRUB SERPL-MCNC: 0.5 MG/DL (ref 0.2–1.3)
BILIRUB UR QL STRIP: NEGATIVE
BUN SERPL-MCNC: 23 MG/DL (ref 7–30)
CALCIUM SERPL-MCNC: 8.7 MG/DL (ref 8.5–10.1)
CHLORIDE SERPL-SCNC: 98 MMOL/L (ref 94–109)
CO2 SERPL-SCNC: 24 MMOL/L (ref 20–32)
COLOR UR AUTO: NORMAL
CREAT SERPL-MCNC: 0.76 MG/DL (ref 0.52–1.04)
DIFFERENTIAL METHOD BLD: ABNORMAL
EOSINOPHIL # BLD AUTO: 0.1 10E9/L (ref 0–0.7)
EOSINOPHIL NFR BLD AUTO: 1.2 %
ERYTHROCYTE [DISTWIDTH] IN BLOOD BY AUTOMATED COUNT: 18.4 % (ref 10–15)
GFR SERPL CREATININE-BSD FRML MDRD: 71 ML/MIN/{1.73_M2}
GLUCOSE SERPL-MCNC: 91 MG/DL (ref 70–99)
GLUCOSE UR STRIP-MCNC: NEGATIVE MG/DL
HCT VFR BLD AUTO: 34.4 % (ref 35–47)
HGB BLD-MCNC: 10.3 G/DL (ref 11.7–15.7)
HGB UR QL STRIP: NEGATIVE
IMM GRANULOCYTES # BLD: 0 10E9/L (ref 0–0.4)
IMM GRANULOCYTES NFR BLD: 0.4 %
KETONES UR STRIP-MCNC: NEGATIVE MG/DL
LEUKOCYTE ESTERASE UR QL STRIP: NEGATIVE
LYMPHOCYTES # BLD AUTO: 1 10E9/L (ref 0.8–5.3)
LYMPHOCYTES NFR BLD AUTO: 14 %
MCH RBC QN AUTO: 22 PG (ref 26.5–33)
MCHC RBC AUTO-ENTMCNC: 29.9 G/DL (ref 31.5–36.5)
MCV RBC AUTO: 73 FL (ref 78–100)
MONOCYTES # BLD AUTO: 0.8 10E9/L (ref 0–1.3)
MONOCYTES NFR BLD AUTO: 11.6 %
NEUTROPHILS # BLD AUTO: 5.3 10E9/L (ref 1.6–8.3)
NEUTROPHILS NFR BLD AUTO: 72.4 %
NITRATE UR QL: NEGATIVE
NRBC # BLD AUTO: 0 10*3/UL
NRBC BLD AUTO-RTO: 0 /100
PH UR STRIP: 6 PH (ref 5–7)
PLATELET # BLD AUTO: 323 10E9/L (ref 150–450)
POTASSIUM SERPL-SCNC: 4.4 MMOL/L (ref 3.4–5.3)
PROT SERPL-MCNC: 7.6 G/DL (ref 6.8–8.8)
RBC # BLD AUTO: 4.69 10E12/L (ref 3.8–5.2)
RBC #/AREA URNS AUTO: <1 /HPF (ref 0–2)
SODIUM SERPL-SCNC: 129 MMOL/L (ref 133–144)
SOURCE: NORMAL
SP GR UR STRIP: 1.01 (ref 1–1.03)
SQUAMOUS #/AREA URNS AUTO: 1 /HPF (ref 0–1)
TROPONIN I SERPL-MCNC: <0.015 UG/L (ref 0–0.04)
UROBILINOGEN UR STRIP-MCNC: NORMAL MG/DL (ref 0–2)
WBC # BLD AUTO: 7.3 10E9/L (ref 4–11)
WBC #/AREA URNS AUTO: <1 /HPF (ref 0–5)

## 2020-08-05 PROCEDURE — 85025 COMPLETE CBC W/AUTO DIFF WBC: CPT | Performed by: EMERGENCY MEDICINE

## 2020-08-05 PROCEDURE — 72148 MRI LUMBAR SPINE W/O DYE: CPT

## 2020-08-05 PROCEDURE — 80053 COMPREHEN METABOLIC PANEL: CPT | Performed by: EMERGENCY MEDICINE

## 2020-08-05 PROCEDURE — 25800030 ZZH RX IP 258 OP 636: Performed by: EMERGENCY MEDICINE

## 2020-08-05 PROCEDURE — 96360 HYDRATION IV INFUSION INIT: CPT

## 2020-08-05 PROCEDURE — 84484 ASSAY OF TROPONIN QUANT: CPT | Performed by: EMERGENCY MEDICINE

## 2020-08-05 PROCEDURE — 93005 ELECTROCARDIOGRAM TRACING: CPT

## 2020-08-05 PROCEDURE — 81001 URINALYSIS AUTO W/SCOPE: CPT | Performed by: EMERGENCY MEDICINE

## 2020-08-05 PROCEDURE — 99285 EMERGENCY DEPT VISIT HI MDM: CPT | Mod: 25

## 2020-08-05 RX ADMIN — SODIUM CHLORIDE 500 ML: 9 INJECTION, SOLUTION INTRAVENOUS at 17:35

## 2020-08-05 ASSESSMENT — ENCOUNTER SYMPTOMS
ABDOMINAL PAIN: 0
FEVER: 0
HEADACHES: 0
WEAKNESS: 1
BACK PAIN: 0
NUMBNESS: 0

## 2020-08-05 NOTE — ED NOTES
Bed: ED03  Expected date: 8/5/20  Expected time: 1:59 PM  Means of arrival: Ambulance  Comments:  [id number]

## 2020-08-05 NOTE — ED PROVIDER NOTES
History     Chief Complaint:  Extremity Weakness and Fall     HPI   Batsheva Barkley is a 86 year old female with history of CAD, CHF, RA amongst others as noted below who presents with bilateral lower extremity weakness and fall at her memory care unit.  Patient states that for several months, she has had bilateral lower extremity weakness.  Today, her RN reportedly wanted to see if patient could ambulate and when patient tried, her knees gave out and she had a controlled fall.  She did not hit her head or have loss of consciousness.  She cites no change in her lower extremity weakness over the last several months and denies any numbness/tingling.  She denies any trauma, fever, chest pain, abdominal pain, headache or other symptoms.  She denies any back pain or urine incontinence/bowel incontinence.    Per daughter, patient started PT today for bilateral lower extremity.  Patient otherwise has not had any complaints over the past few days. Daughter feels her chronic bilateral lower extremity weakness has gotten worse over the last week.    Allergies:  Codeine  Lisinopril     Medications:    Mylanta  Aspirin 81 mg  Lipitor  Voltaren   Imodium   Cozaar  Menthol  Calmoseptine  Lopressor   Prilosec  Ondansetron  Miralax  Senna-docusate   Carafate   Desyrel  Aspercreme     Past Medical History:    Emesis   Left leg swelling   Hematoma of left lower extremity   Lower extremity edema   Leg weakness, bilateral   Total urinary incontinence   Dysphagia   Stented coronary artery   Coronary artery disease   Edema   Fracture of rib of left side   Constipation    Hypertension   Pelvic fracture: Left  Impacted cerumen   Concussion   Aortic insufficiency   Cardiomyopathy    Chest discomfort   Coronary atherosclerosis   Former smoker   Generalized osteoarthrosis   GERD with esophagitis   Hiatal hernia   Leg weakness, bilateral   Myocardial infarction   Mitral regurgitation   Mixed hyperlipidemia   Osteoporosis   Peripheral  edema   Physical deconditioning   Total urinary incontinence     Past Surgical History:    Colonoscopy  Ent Surgery   Esophagoscopy  Eye surgery   Heart Cath. Angioplasty  L bunion + hammer toes  Total knee replacement  Ovarian cyst surgery    Family History:    Mother: cerebrovascular disease  Father: respiratory   Sister: breast cancer, heart disease    Social History:  The patient was accompanied to the ED by EMS.  Smoking Status: Former user  Smokeless Tobacco: Never Used  Alcohol Use: Negative   Drug Use: Negative  PCP: Tacos Bhakta  Marital Status:        Review of Systems   Constitutional: Negative for fever.   Cardiovascular: Negative for chest pain.   Gastrointestinal: Negative for abdominal pain.   Genitourinary:        No urine/bowel incontinence   Musculoskeletal: Negative for back pain.   Neurological: Positive for weakness. Negative for numbness and headaches.        No loss of consciousness  No tingling   All other systems reviewed and are negative.      Physical Exam     Patient Vitals for the past 24 hrs:   BP Temp Temp src Pulse Heart Rate Resp SpO2   08/05/20 2000 (!) 157/61 -- -- 65 61 24 --   08/05/20 1845 (!) 163/86 -- -- -- 66 22 96 %   08/05/20 1815 (!) 149/70 -- -- -- 67 17 92 %   08/05/20 1800 (!) 148/68 -- -- 62 64 15 (!) 88 %   08/05/20 1745 (!) 152/61 -- -- -- 62 21 94 %   08/05/20 1715 (!) 155/68 -- -- -- 65 27 93 %   08/05/20 1700 (!) 152/69 -- -- 64 65 11 95 %   08/05/20 1645 (!) 156/76 -- -- -- 66 16 95 %   08/05/20 1600 -- -- -- -- 64 17 --   08/05/20 1515 (!) 166/84 -- -- -- -- -- 100 %   08/05/20 1500 -- -- -- -- -- -- 100 %   08/05/20 1410 (!) 170/67 98.3  F (36.8  C) Oral -- 62 18 100 %     Physical Exam  General: Alert, no acute distress; nontoxic appearing  Neuro:  PERRL.  EOMI.  No focal deficits  HEENT:  Moist mucous membranes.  Conjunctiva normal.   CV:  RRR, no m/r/g, skin warm and well perfused  Pulm:  CTAB, no wheezes/ronchi/rales.  No acute distress,  breathing comfortably  GI:  Soft, nontender, nondistended.  No rebound or guarding.  Normal bowel sounds  MSK:  Moving all extremities.  No focal areas of edema, erythema, or tenderness  Skin:  WWP, no rashes, no lower extremity edema, skin color normal, no diaphoresis  Psych:  Well-appearing, normal affect, regular speech    Emergency Department Course     ECG:  ECG taken at 1519  Sinus rhythm   Minimal voltage criteria for LVH, may be normal variant  Inferior infarct (cited on or before 13-Mar-2020)  Abnormal ECG  When compared with ECG of 13-MAR-2020 12:30 no significant change was found.    Rate 66 bpm. NV interval 178 ms. QRS duration 104 ms. QT/QTc 416/436 ms. P-R-T axes 13 -5 73.    Imaging:  Radiology findings were communicated with the patient who voiced understanding of the findings.    Lumbar spine MRI w/o contrast  1.  No fracture or acute traumatic malalignment.  2.  Severe scoliotic curvature.  3.  Advanced multilevel degenerative disc disease and facet arthropathy.  4.  Mild central spinal stenosis at L3-L4.  5.  There is neural foraminal stenosis which at T11-T12 is moderate bilaterally, at T12-L1 is moderate on the right, at L1-L2 severe on the right, at L2-L3 is moderate to severe on the right and moderate on the left, at L3-L4 is mild on the right and   moderate on the left and at L5-S1 is moderate bilaterally.  Reading per radiology     Laboratory:  Laboratory findings were communicated with the patient who voiced understanding of the findings.    CBC: WBC 7.3, HGB 10.3,   CMP:  (L) o/w WNL (Creatinine 0.76)  Troponin (Collected 1523): <0.015  UA with micro: negative    Interventions:  1735 0.9%  mL IV     Emergency Department Course:  Nursing notes and vitals reviewed. I performed an exam of the patient as documented above.     1519 EKG obtained as noted above.     1523 IV was inserted and blood was drawn for laboratory testing, results above.    1544 I spoke with patient's  daughter, Raisa.      1913 The patient was sent for a lumbar spine MRI while in the emergency department, results above.     1929 I spoke with the hospitalist service from [facility] regarding patient's presentation, findings, and plan of care.    2011 I consulted with Ortho.     2022 The patient provided a urine sample here in the emergency department. This was sent for laboratory testing, findings above.    2116   I spoke with patient's daughterRaisa and discussed patient's workup, findings, and plan.     Prior to discharge, I personally reviewed the results with the patient and all related questions were answered. The patient verbalized understanding and is amenable to plan.     Findings and plan explained to the patient. Patient discharged home with instructions regarding supportive care, medications, and reasons to return. The importance of close follow-up was reviewed.     Impression & Plan    Medical Decision Making:  Batsheva Barkley is a 86 year old female who presents to the emergency department today for evaluation of bilateral lower extremity weakness causing her to fall at her nursing home.  Patient was being assisted by RN and was guided down to the floor so patient did not hit her head or have loss of consciousness.  She does have history of chronic lower extremity weakness and is working with PT.  Patient denies any numbness/tingling or specific pain except to her bilateral knees.  She denies any urinary or bowel incontinence.  Spoke with daughter who felt chronic lower extremity weakness has been worse over the last week and given this change, I did pursue an MRI which showed degenerative disc changes and multiple levels of foraminal stenoses, but no central spinal stenosis concerning for cauda equina.  UA is not concerning for urinary tract infection.  EKG obtained showed no acute signs of ischemia or infarct and troponin is within normal limits despite ongoing weakness for several weeks.  At this  time, I do not find any life-threatening or sinister etiology for patient's continued bilateral lower extremity weakness that appears to be chronic.  I suspect there is a component of deconditioning here and recommend that she continue with PT.  With reasonable clinical certainty, I feel she is safe to discharge home and patient and daughter are in agreement.  Follow-up with PCP is recommended they understand and agree.  Reasons to return to the ER were discussed and all questions were answered.    Diagnosis:    ICD-10-CM    1. Fall at nursing home, initial encounter  W19.XXXA     Y92.129    2. Bilateral leg weakness  R29.898    3. DDD (degenerative disc disease), lumbar  M51.36        Disposition:   The patient is discharged to home.    Discharge Medications:  New Prescriptions    No medications on file       Scribe Disclosure:  I, Alize Packer, am serving as a scribe at 2:19 PM on 8/5/2020 to document services personally performed by Esteban Walsh MD based on my observations and the provider's statements to me.         Esteban Walsh MD  08/05/20 [id number]

## 2020-08-05 NOTE — ED AVS SNAPSHOT
[facility]  [address]  [facility] [address]  Phone:  [phone]  Fax:  [phone]                                    Batsheva Barkley   MRN: [id number]    Department:  [facility] Emergency Department   Date of Visit:  8/5/2020           After Visit Summary Signature Page    I have received my discharge instructions, and my questions have been answered. I have discussed any challenges I see with this plan with the nurse or doctor.    ..........................................................................................................................................  Patient/Patient Representative Signature      ..........................................................................................................................................  Patient Representative Print Name and Relationship to Patient    ..................................................               ................................................  Date                                   Time    ..........................................................................................................................................  Reviewed by Signature/Title    ...................................................              ..............................................  Date                                               Time          22EPIC Rev 08/18

## 2020-08-05 NOTE — ED TRIAGE NOTES
"Pt has bilateral leg weakness and had a \"controlled fall\" today.  No injury from the fall.  Pt is memory care resident.  "

## 2020-08-05 NOTE — ED NOTES
Bed: ED11  Expected date: 8/5/20  Expected time: 2:32 PM  Means of arrival:   Comments:  PRIYANK pt from 3

## 2020-08-06 LAB — INTERPRETATION ECG - MUSE: NORMAL

## 2020-08-06 NOTE — ED NOTES
Cath UA and pt tolerated it well. Pt states that she's very hungry. Offered her a cold turkey sandwich and she declined.

## 2020-08-07 ENCOUNTER — NURSING HOME VISIT (OUTPATIENT)
Dept: GERIATRICS | Facility: CLINIC | Age: 85
End: 2020-08-07
Payer: COMMERCIAL

## 2020-08-07 VITALS
DIASTOLIC BLOOD PRESSURE: 69 MMHG | SYSTOLIC BLOOD PRESSURE: 134 MMHG | WEIGHT: 95.6 LBS | HEIGHT: 60 IN | HEART RATE: 70 BPM | OXYGEN SATURATION: 94 % | RESPIRATION RATE: 18 BRPM | BODY MASS INDEX: 18.77 KG/M2 | TEMPERATURE: 98 F

## 2020-08-07 DIAGNOSIS — G89.29 OTHER CHRONIC PAIN: ICD-10-CM

## 2020-08-07 DIAGNOSIS — R60.0 BILATERAL LEG EDEMA: ICD-10-CM

## 2020-08-07 DIAGNOSIS — M62.81 GENERALIZED MUSCLE WEAKNESS: ICD-10-CM

## 2020-08-07 DIAGNOSIS — I50.42 CHRONIC COMBINED SYSTOLIC AND DIASTOLIC CONGESTIVE HEART FAILURE (H): ICD-10-CM

## 2020-08-07 DIAGNOSIS — G89.29 CHRONIC PAIN OF RIGHT KNEE: ICD-10-CM

## 2020-08-07 DIAGNOSIS — R53.81 DEBILITY: ICD-10-CM

## 2020-08-07 DIAGNOSIS — R41.89 COGNITIVE IMPAIRMENT: ICD-10-CM

## 2020-08-07 DIAGNOSIS — I10 ESSENTIAL HYPERTENSION: ICD-10-CM

## 2020-08-07 DIAGNOSIS — M25.561 CHRONIC PAIN OF RIGHT KNEE: ICD-10-CM

## 2020-08-07 DIAGNOSIS — I25.10 CORONARY ARTERY DISEASE INVOLVING NATIVE HEART WITHOUT ANGINA PECTORIS, UNSPECIFIED VESSEL OR LESION TYPE: ICD-10-CM
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
## 2020-08-07 PROCEDURE — 99309 SBSQ NF CARE MODERATE MDM 30: CPT | Performed by: NURSE PRACTITIONER

## 2020-08-07 RX ORDER — TROLAMINE SALICYLATE 10 G/100G
CREAM TOPICAL SEE ADMIN INSTRUCTIONS
Start: 2020-08-07 | End: 2020-08-17

## 2020-08-07 RX ORDER — ACETAMINOPHEN 500 MG
1000 TABLET ORAL DAILY PRN
Start: 2020-08-07 | End: 2020-09-08

## 2020-08-07 ASSESSMENT — MIFFLIN-ST. JEOR: SCORE: 795.14

## 2020-08-07 NOTE — LETTER
"    8/7/2020        RE: Batsheva Barkley  [facility]  [address]  [facility] [id number]        [facility]  PRIMARY CARE PROVIDER AND CLINIC:  NICKOLAS Hallman CNP, [address] / [place] [address]  Chief Complaint   Patient presents with     [facility]     [place] Medical Record Number:  [id number]  Place of Service where encounter took place:  [facility] ([facility]) [[id number]]    Batsheva Barkley  is a 86 year old  (11/6/1933), admitted to the above facility from [facility]..  Admitted to this facility for  rehab, medical management and nursing care.    HPI:    HPI information obtained from: facility chart records, facility staff, patient report and [facility] chart review.     Brief Summary of Hospital Course: Mrs. Barkley is a 85 y/o female with PMH of cognitive impairment, HTN, CAD w/stents, CHF, LE edema, RA, chronic pain and chronic Right knee pain and after attempting PT/OT at her facility had a controlled fall and significant LE weakness.  Presented to hospital where they did mild workup and back MRI.  No s/s of infectious etiology and Lumbar MRI noted severe scoliotic curvature, advanced DJD at multilevel, various degrees of stenosis at various levels, but not thought to be cause of weakness.  Weakness and fall attributed to overall deconditioning, thus she has transitioned to the TCU here for ongoing cares and PT/OT.      Updates on Status Since Skilled nursing Admission:  In meeting Mrs. Barkley today for admission, she has a clear degree of cognitive/memory impairment, her [place] is also a barrier.  She reports her legs/kness get \"Shaky\" when she try's to stand or walk, feels very weak.  She endorses vague leg and knee pain, but chronic/unchanged.  She has no other complaints.      CODE STATUS/ADVANCE DIRECTIVES DISCUSSION:   DNR / DNI  Patient's living condition: lives in an assisted living facility     ALLERGIES: Codeine " and Lisinopril  PAST MEDICAL HISTORY:  has a past medical history of Aortic insufficiency, CAD (coronary artery disease) (06/20/2005), Cardiomyopathy (H), Chest discomfort, CHF NYHA class III, chronic, systolic (H) (1/25/2018), Coronary atherosclerosis (6/20/2005), Former smoker, Generalized osteoarthrosis, GERD with esophagitis, Hiatal hernia, Hypertension goal BP (blood pressure) < 140/90 (1/21/2015), Ischemic cardiomyopathy, Leg weakness, bilateral (2/13/2018), MI (myocardial infarction) (H) (06/2005), Mitral regurgitation, Mixed hyperlipidemia, Osteoporosis, Peripheral edema (01/26/2015), Physical deconditioning (1/21/2015), and Total urinary incontinence (12/27/2017).  PAST SURGICAL HISTORY:   has a past surgical history that includes L bunion + hammer toes (1/04, 4/04); ovarian cyst surgery; Colonoscopy (3/05); L total knee replacement (10/2009 ); ENT surgery; Eye surgery; Heart Cath, Angioplasty (2005); and Esophagoscopy, gastroscopy, duodenoscopy (EGD), combined (N/A, 4/14/2016).  FAMILY HISTORY: family history includes Breast Cancer in her sister and sister; Cerebrovascular Disease in her mother; Heart Disease in her sister; Respiratory in her father.  SOCIAL HISTORY:   reports that she has quit smoking. She has never used smokeless tobacco. She reports that she does not drink alcohol or use drugs.    Post Discharge Medication Reconciliation Status: discharge medications reconciled and changed, per note/orders    Current Outpatient Medications   Medication Sig Dispense Refill     acetaminophen (TYLENOL) 500 MG tablet Take 2 tablets (1,000 mg) by mouth daily as needed for mild pain       trolamine salicylate (ASPERCREME) 10 % external cream Apply topically See Admin Instructions Apply to affected areas right knee twice daily AND BID PRN       acetaminophen (TYLENOL) 500 MG tablet Take 1,000 mg by mouth 3 times daily        aspirin (ASA) 81 MG chewable tablet Take 81 mg by mouth daily       Atorvastatin  Calcium (LIPITOR PO) Take 20 mg by mouth At Bedtime        carboxymethylcellulose PF (REFRESH PLUS) 0.5 % ophthalmic solution Place 2 drops into both eyes every 4 hours as needed for dry eyes       carboxymethylcellulose PF (REFRESH PLUS) 0.5 % ophthalmic solution Place 2 drops into both eyes 2 times daily       diclofenac (VOLTAREN) 1 % topical gel Place 2 g onto the skin 2 times daily Apply to right shoulder       diclofenac (VOLTAREN) 1 % topical gel Place 2 g onto the skin daily as needed for moderate pain Apply to right shoulder as needed for pain       loperamide (IMODIUM) 2 MG capsule Take 2 mg by mouth 4 times daily as needed for diarrhea        losartan (COZAAR) 50 MG tablet Take 1 tablet (50 mg) by mouth 2 times daily 180 tablet 3     menthol-zinc oxide (CALMOSEPTINE) 0.44-20.6 % OINT ointment Apply 1 g topically 2 times daily       menthol-zinc oxide (CALMOSEPTINE) 0.44-20.6 % OINT ointment Apply topically 2 times daily as needed for skin protection       metoprolol (LOPRESSOR) 50 MG tablet TAKE ONE TABLET BY MOUTH TWICE DAILY 180 tablet 3     omeprazole (PRILOSEC) 20 MG DR capsule Take 1 capsule (20 mg) by mouth 2 times daily (before meals) 60 capsule 0     ONDANSETRON PO Take 4 mg by mouth every 8 hours as needed for nausea       order for DME Equipment being ordered: Walker Wheels () and Walker ()  Treatment Diagnosis: gait instability 1 each 0     order for DME Light weight wheelchair Body mass index is 19.08 kg/(m^2). 1 Device 0     order for DME ANTOINE stockings- below knee. 1 Package 0     senna-docusate (SENOKOT-S;PERICOLACE) 8.6-50 MG per tablet Take 1 tablet by mouth 2 times daily as needed        sucralfate (CARAFATE) 1 GM/10ML suspension Take 10 mLs (1 g) by mouth 4 times daily (before meals and nightly) 1200 mL 0     traZODone (DESYREL) 50 MG tablet Take 50 mg by mouth At Bedtime       ROS:  Limited secondary to cognitive impairment but today pt reports 7 point ROS done including,  light headedness/dizziness, fever/chills, pain, Resp, CV, GI, and  and is negative other than noted in HPI.      Vitals:  /69   Pulse 70   Temp 98  F (36.7  C)   Resp 18   Ht 1.524 m (5')   Wt 43.4 kg (95 lb 9.6 oz)   SpO2 94%   BMI 18.67 kg/m       Exam:  EXAM LIMITED DUE TO [facility] social distancing recommendations:  GENERAL APPEARANCE:  Elderly thin female sitting up in chair.  NAD, non-toxic.  RESP:   Regular relaxed breathing effort.  No cough.   EXTREMITIES:  No lower extremity edema, no calf tenderness.   PSYCH: Alert to self, not place, date, situation.  Clear degree of cognitive/memory impairment.  [place].     Lab/Diagnostic data:  I have personally reviewed labs, which are in facility or EMR chart.     ASSESSMENT/PLAN:  Fall, subsequent encounter  Generalized muscle weakness  Debility  Debility and fall attributed to overall deconditioning.  Lumbar MRI done, notes Adv DJD and multi levels of stenosis, but she has no back pain.    Does have vague c/o leg and knee pain but chronic.   -PT/OT to eval and treat.     Essential hypertension  Chronic combined systolic and diastolic congestive heart failure (H)  Coronary artery disease involving native heart without angina pectoris, unspecified vessel or lesion type  Bilateral leg edema  H/o LE edema, but none on exam today.   Review of VS's show VSS and within acceptable range.   No current s/s of clinical CHF.   -No changes, continue to clinically monitor.     Other chronic pain  Chronic pain of right knee  EMR notes ongoing h/o chronic knee pain, RA and general pain.   Is on a number of ointments.   -Continues Acetaminophen as is.   -Continues Trolamine and Diclofenac as is.     Cognitive impairment  Noted advanced cognitive/memory impairment.   -No changes, continue to clinically monitor.     Orders written by provider at facility  -Noted low Na in hospital, will repeat on Monday.     Electronically signed by:  NICKOLAS Wills CNP                      Sincerely,        NICKOLAS Wills CNP

## 2020-08-07 NOTE — PROGRESS NOTES
"[facility]  PRIMARY CARE PROVIDER AND CLINIC:  Tacos Bhakta, APRN CNP, [address] / [place] [address]  Chief Complaint   Patient presents with     [facility] Care     [place] Medical Record Number:  [id number]  Place of Service where encounter took place:  [facility] ([facility]) [[id number]]    Batsheva Barkley  is a 86 year old  (11/6/1933), admitted to the above facility from [facility]..  Admitted to this facility for  rehab, medical management and nursing care.    HPI:    HPI information obtained from: facility chart records, facility staff, patient report and [facility] chart review.     Brief Summary of Hospital Course: Mrs. Barkley is a 87 y/o female with PMH of cognitive impairment, HTN, CAD w/stents, CHF, LE edema, RA, chronic pain and chronic Right knee pain and after attempting PT/OT at her facility had a controlled fall and significant LE weakness.  Presented to hospital where they did mild workup and back MRI.  No s/s of infectious etiology and Lumbar MRI noted severe scoliotic curvature, advanced DJD at multilevel, various degrees of stenosis at various levels, but not thought to be cause of weakness.  Weakness and fall attributed to overall deconditioning, thus she has transitioned to the TCU here for ongoing cares and PT/OT.      Updates on Status Since Skilled nursing Admission:  In meeting Mrs. Barkley today for admission, she has a clear degree of cognitive/memory impairment, her [place] is also a barrier.  She reports her legs/kness get \"Shaky\" when she try's to stand or walk, feels very weak.  She endorses vague leg and knee pain, but chronic/unchanged.  She has no other complaints.      CODE STATUS/ADVANCE DIRECTIVES DISCUSSION:   DNR / DNI  Patient's living condition: lives in an assisted living facility     ALLERGIES: Codeine and Lisinopril  PAST MEDICAL HISTORY:  has a past medical history of Aortic insufficiency, CAD (coronary artery " disease) (06/20/2005), Cardiomyopathy (H), Chest discomfort, CHF NYHA class III, chronic, systolic (H) (1/25/2018), Coronary atherosclerosis (6/20/2005), Former smoker, Generalized osteoarthrosis, GERD with esophagitis, Hiatal hernia, Hypertension goal BP (blood pressure) < 140/90 (1/21/2015), Ischemic cardiomyopathy, Leg weakness, bilateral (2/13/2018), MI (myocardial infarction) (H) (06/2005), Mitral regurgitation, Mixed hyperlipidemia, Osteoporosis, Peripheral edema (01/26/2015), Physical deconditioning (1/21/2015), and Total urinary incontinence (12/27/2017).  PAST SURGICAL HISTORY:   has a past surgical history that includes L bunion + hammer toes (1/04, 4/04); ovarian cyst surgery; Colonoscopy (3/05); L total knee replacement (10/2009 ); ENT surgery; Eye surgery; Heart Cath, Angioplasty (2005); and Esophagoscopy, gastroscopy, duodenoscopy (EGD), combined (N/A, 4/14/2016).  FAMILY HISTORY: family history includes Breast Cancer in her sister and sister; Cerebrovascular Disease in her mother; Heart Disease in her sister; Respiratory in her father.  SOCIAL HISTORY:   reports that she has quit smoking. She has never used smokeless tobacco. She reports that she does not drink alcohol or use drugs.    Post Discharge Medication Reconciliation Status: discharge medications reconciled and changed, per note/orders    Current Outpatient Medications   Medication Sig Dispense Refill     acetaminophen (TYLENOL) 500 MG tablet Take 2 tablets (1,000 mg) by mouth daily as needed for mild pain       trolamine salicylate (ASPERCREME) 10 % external cream Apply topically See Admin Instructions Apply to affected areas right knee twice daily AND BID PRN       acetaminophen (TYLENOL) 500 MG tablet Take 1,000 mg by mouth 3 times daily        aspirin (ASA) 81 MG chewable tablet Take 81 mg by mouth daily       Atorvastatin Calcium (LIPITOR PO) Take 20 mg by mouth At Bedtime        carboxymethylcellulose PF (REFRESH PLUS) 0.5 % ophthalmic  solution Place 2 drops into both eyes every 4 hours as needed for dry eyes       carboxymethylcellulose PF (REFRESH PLUS) 0.5 % ophthalmic solution Place 2 drops into both eyes 2 times daily       diclofenac (VOLTAREN) 1 % topical gel Place 2 g onto the skin 2 times daily Apply to right shoulder       diclofenac (VOLTAREN) 1 % topical gel Place 2 g onto the skin daily as needed for moderate pain Apply to right shoulder as needed for pain       loperamide (IMODIUM) 2 MG capsule Take 2 mg by mouth 4 times daily as needed for diarrhea        losartan (COZAAR) 50 MG tablet Take 1 tablet (50 mg) by mouth 2 times daily 180 tablet 3     menthol-zinc oxide (CALMOSEPTINE) 0.44-20.6 % OINT ointment Apply 1 g topically 2 times daily       menthol-zinc oxide (CALMOSEPTINE) 0.44-20.6 % OINT ointment Apply topically 2 times daily as needed for skin protection       metoprolol (LOPRESSOR) 50 MG tablet TAKE ONE TABLET BY MOUTH TWICE DAILY 180 tablet 3     omeprazole (PRILOSEC) 20 MG DR capsule Take 1 capsule (20 mg) by mouth 2 times daily (before meals) 60 capsule 0     ONDANSETRON PO Take 4 mg by mouth every 8 hours as needed for nausea       order for DME Equipment being ordered: Walker Wheels () and Walker ()  Treatment Diagnosis: gait instability 1 each 0     order for DME Light weight wheelchair Body mass index is 19.08 kg/(m^2). 1 Device 0     order for DME ANTOINE stockings- below knee. 1 Package 0     senna-docusate (SENOKOT-S;PERICOLACE) 8.6-50 MG per tablet Take 1 tablet by mouth 2 times daily as needed        sucralfate (CARAFATE) 1 GM/10ML suspension Take 10 mLs (1 g) by mouth 4 times daily (before meals and nightly) 1200 mL 0     traZODone (DESYREL) 50 MG tablet Take 50 mg by mouth At Bedtime       ROS:  Limited secondary to cognitive impairment but today pt reports 7 point ROS done including, light headedness/dizziness, fever/chills, pain, Resp, CV, GI, and  and is negative other than noted in HPI.       Vitals:  /69   Pulse 70   Temp 98  F (36.7  C)   Resp 18   Ht 1.524 m (5')   Wt 43.4 kg (95 lb 9.6 oz)   SpO2 94%   BMI 18.67 kg/m       Exam:  EXAM LIMITED DUE TO [facility] social distancing recommendations:  GENERAL APPEARANCE:  Elderly thin female sitting up in chair.  NAD, non-toxic.  RESP:   Regular relaxed breathing effort.  No cough.   EXTREMITIES:  No lower extremity edema, no calf tenderness.   PSYCH: Alert to self, not place, date, situation.  Clear degree of cognitive/memory impairment.  [place]     Lab/Diagnostic data:  I have personally reviewed labs, which are in facility or EMR chart.     ASSESSMENT/PLAN:  Fall, subsequent encounter  Generalized muscle weakness  Debility  Debility and fall attributed to overall deconditioning.  Lumbar MRI done, notes Adv DJD and multi levels of stenosis, but she has no back pain.    Does have vague c/o leg and knee pain but chronic.   -PT/OT to eval and treat.     Essential hypertension  Chronic combined systolic and diastolic congestive heart failure (H)  Coronary artery disease involving native heart without angina pectoris, unspecified vessel or lesion type  Bilateral leg edema  H/o LE edema, but none on exam today.   Review of VS's show VSS and within acceptable range.   No current s/s of clinical CHF.   -No changes, continue to clinically monitor.     Other chronic pain  Chronic pain of right knee  EMR notes ongoing h/o chronic knee pain, RA and general pain.   Is on a number of ointments.   -Continues Acetaminophen as is.   -Continues Trolamine and Diclofenac as is.     Cognitive impairment  Noted advanced cognitive/memory impairment.   -No changes, continue to clinically monitor.     Orders written by provider at facility  -Noted low Na in hospital, will repeat on Monday.     Electronically signed by:  NICKOLAS Wills CNP

## 2020-08-09 ENCOUNTER — RECORDS - HEALTHEAST (OUTPATIENT)
Dept: LAB | Facility: CLINIC | Age: 85
End: 2020-08-09

## 2020-08-10 LAB
ANION GAP SERPL CALCULATED.3IONS-SCNC: 8 MMOL/L (ref 5–18)
BUN SERPL-MCNC: 20 MG/DL (ref 8–28)
CALCIUM SERPL-MCNC: 8.8 MG/DL (ref 8.5–10.5)
CHLORIDE BLD-SCNC: 99 MMOL/L (ref 98–107)
CO2 SERPL-SCNC: 24 MMOL/L (ref 22–31)
CREAT SERPL-MCNC: 0.69 MG/DL (ref 0.6–1.1)
GFR SERPL CREATININE-BSD FRML MDRD: >60 ML/MIN/1.73M2
GLUCOSE BLD-MCNC: 85 MG/DL (ref 70–125)
POTASSIUM BLD-SCNC: 4.3 MMOL/L (ref 3.5–5)
SODIUM SERPL-SCNC: 131 MMOL/L (ref 136–145)

## 2020-08-12 ENCOUNTER — NURSING HOME VISIT (OUTPATIENT)
Dept: GERIATRICS | Facility: CLINIC | Age: 85
End: 2020-08-12
Payer: COMMERCIAL

## 2020-08-12 VITALS
DIASTOLIC BLOOD PRESSURE: 67 MMHG | HEIGHT: 60 IN | HEART RATE: 75 BPM | RESPIRATION RATE: 17 BRPM | OXYGEN SATURATION: 95 % | SYSTOLIC BLOOD PRESSURE: 124 MMHG | TEMPERATURE: 97.8 F | BODY MASS INDEX: 21.48 KG/M2 | WEIGHT: 109.4 LBS

## 2020-08-12 DIAGNOSIS — R54 FRAILTY: ICD-10-CM

## 2020-08-12 DIAGNOSIS — D64.9 ANEMIA, UNSPECIFIED TYPE: ICD-10-CM

## 2020-08-12 DIAGNOSIS — F03.90 DEMENTIA WITHOUT BEHAVIORAL DISTURBANCE, UNSPECIFIED DEMENTIA TYPE: Primary | ICD-10-CM

## 2020-08-12 DIAGNOSIS — I10 BENIGN ESSENTIAL HYPERTENSION: ICD-10-CM

## 2020-08-12 DIAGNOSIS — I25.10 CORONARY ARTERY DISEASE INVOLVING NATIVE CORONARY ARTERY OF NATIVE HEART WITHOUT ANGINA PECTORIS: ICD-10-CM

## 2020-08-12 DIAGNOSIS — R29.6 RECURRENT FALLS: ICD-10-CM

## 2020-08-12 DIAGNOSIS — K44.9 HIATAL HERNIA: ICD-10-CM

## 2020-08-12 DIAGNOSIS — E87.1 HYPONATREMIA: ICD-10-CM

## 2020-08-12 DIAGNOSIS — M25.50 MULTIPLE JOINT PAIN: ICD-10-CM

## 2020-08-12 PROCEDURE — 99305 1ST NF CARE MODERATE MDM 35: CPT | Performed by: INTERNAL MEDICINE

## 2020-08-12 PROCEDURE — 99207 ZZC CDG-MDM COMPONENT: MEETS MODERATE - UP CODED: CPT | Performed by: INTERNAL MEDICINE

## 2020-08-12 ASSESSMENT — MIFFLIN-ST. JEOR: SCORE: 857.74

## 2020-08-12 NOTE — Clinical Note
William, would you consider adding follow up CBC and iron studies/ferritin to her already scheduled BMP lab on Monday? See my note for details. Thanks!

## 2020-08-12 NOTE — LETTER
8/12/2020        RE: Batsheva Barkley  [facility]  [address]  [facility] [address]        [place] [facility]  PHYSICIAN NOTE    PRIMARY CARE PROVIDER AND CLINIC:  Tacos Bhakta, NICKOLAS CNP, [address] / [place] [address]    Chief Complaint   Patient presents with     Hospital F/U     [place] Medical Record Number:  [id number]  Place of Service where encounter took place:  [facility] ([facility]) [[id number]]    Batsheva Barkley is a 86 year old (11/6/1933), admitted to the above facility from  [facility] ER 8/5/20. Admitted to this facility for  rehab, medical management and nursing care.     HPI:    HPI information obtained from: facility chart records, facility staff, patient report and [facility] chart review.     Brief summary of hospital course: Batsheva Barkley has h/o dementia, CAD, large hiatal hernia, anemia and generalized arthritis who lives in [facility] and becoming more frail including having fear of falling. She went into ED after legs/knees gave out while attempting to stand and had a non-injurious fall. MRI spine showed significant scoliosis, DDD and spinal + foraminal stenosis but nothing that appeared acute and to account for her fall. Other eval including labs and EKG unremarkable for acute findings. Transitioned from [facility] next door to TCU here at [facility].     Updates on status since skilled nursing admission: No acute events since her arrival to TCU. There is a care conference tomorrow to discuss disposition. Per , she has a good amount of support in the care suites next door. Family involved. No nursing concerns today. I spoke with Batsheva in her room and she was cheerful and had just finished a good amount of her lunch. She reports she did have a mild headache and bilateral knee pain resolved with the Tylenol today. She does admit to generalized weakness and seems appreciative of therapy though can't give me  details. She denies any specific concerns or questions for me today.    CODE STATUS/ADVANCE DIRECTIVES DISCUSSION:   DNR / DNI  Patient's living condition: [facility]    ALLERGIES: Codeine and Lisinopril    Past Medical History:   Diagnosis Date     Aortic insufficiency      CAD (coronary artery disease) 06/20/2005    w/stent to dominant Cfx(2005)     Cardiomyopathy (H)      Chest discomfort      CHF NYHA class III, chronic, systolic (H) 1/25/2018     Coronary atherosclerosis 6/20/2005    circ vessel was stented;  had a 50% LAD lesion which was not treated Problem list name updated by automated process. Provider to review     Former smoker      Generalized osteoarthrosis     knees ;; L total kne 10/09     GERD with esophagitis      Hiatal hernia     Large     Hypertension goal BP (blood pressure) < 140/90 1/21/2015     Ischemic cardiomyopathy      Leg weakness, bilateral 2/13/2018     MI (myocardial infarction) (H) 06/2005     Mitral regurgitation     moderate     Mixed hyperlipidemia      Osteoporosis      Peripheral edema 01/26/2015     Physical deconditioning 1/21/2015     Total urinary incontinence 12/27/2017      Past Surgical History:   Procedure Laterality Date     COLONOSCOPY  3/05     ENT SURGERY       ESOPHAGOSCOPY, GASTROSCOPY, DUODENOSCOPY (EGD), COMBINED N/A 4/14/2016    Procedure: COMBINED ESOPHAGOSCOPY, GASTROSCOPY, DUODENOSCOPY (EGD), BIOPSY SINGLE OR MULTIPLE;  Surgeon: Jonathan Gramajo MD;  Location:  GI     EYE SURGERY       HEART CATH, ANGIOPLASTY  2005    RONI to left circ     L bunion + hammer toes  1/04, 4/04     L total knee replacement  10/2009      ovarian cyst surgery       Family History   Problem Relation Age of Onset     Cerebrovascular Disease Mother      Respiratory Father         heart     Breast Cancer Sister      Heart Disease Sister      Breast Cancer Sister      Social History     Tobacco Use     Smoking status: Former Smoker     Smokeless tobacco:  Never Used     Tobacco comment: only smoked for 3 years.   Substance Use Topics     Alcohol use: No     Drug use: No        Post-discharge medication reconciliation status: Reviewed and updated in [facility] according to facility MAR    Current Outpatient Medications   Medication Sig Dispense Refill     acetaminophen (TYLENOL) 500 MG tablet Take 2 tablets (1,000 mg) by mouth daily as needed for mild pain       acetaminophen (TYLENOL) 500 MG tablet Take 1,000 mg by mouth 3 times daily        aspirin (ASA) 81 MG chewable tablet Take 81 mg by mouth daily       Atorvastatin Calcium (LIPITOR PO) Take 20 mg by mouth At Bedtime        carboxymethylcellulose PF (REFRESH PLUS) 0.5 % ophthalmic solution Place 2 drops into both eyes every 4 hours as needed for dry eyes       carboxymethylcellulose PF (REFRESH PLUS) 0.5 % ophthalmic solution Place 2 drops into both eyes 2 times daily       diclofenac (VOLTAREN) 1 % topical gel Place 2 g onto the skin 2 times daily        diclofenac (VOLTAREN) 1 % topical gel Place 2 g onto the skin daily as needed for moderate pain        loperamide (IMODIUM) 2 MG capsule Take 2 mg by mouth 4 times daily as needed for diarrhea        losartan (COZAAR) 50 MG tablet Take 1 tablet (50 mg) by mouth 2 times daily 180 tablet 3     menthol-zinc oxide (CALMOSEPTINE) 0.44-20.6 % OINT ointment Apply 1 g topically 2 times daily       menthol-zinc oxide (CALMOSEPTINE) 0.44-20.6 % OINT ointment Apply topically 2 times daily as needed for skin protection       metoprolol (LOPRESSOR) 50 MG tablet TAKE ONE TABLET BY MOUTH TWICE DAILY 180 tablet 3     omeprazole (PRILOSEC) 20 MG DR capsule Take 1 capsule (20 mg) by mouth 2 times daily (before meals) 60 capsule 0     ONDANSETRON PO Take 4 mg by mouth every 8 hours as needed for nausea       order for DME Equipment being ordered: Walker Wheels () and Walker ()  Treatment Diagnosis: gait instability 1 each 0     order for DME Light weight wheelchair Body mass  index is 19.08 kg/(m^2). 1 Device 0     senna-docusate (SENOKOT-S;PERICOLACE) 8.6-50 MG per tablet Take 1 tablet by mouth 2 times daily as needed        sucralfate (CARAFATE) 1 GM tablet Take 1 g by mouth 4 times daily Before meals and at bedtime       traZODone (DESYREL) 50 MG tablet Take 50 mg by mouth At Bedtime       trolamine salicylate (ASPERCREME) 10 % external cream Apply topically See Admin Instructions Apply to affected areas right knee twice daily AND BID PRN         ROS:  Limited secondary to cognitive impairment but today pt reports as above in HPI    Exam:  /67   Pulse 75   Temp 97.8  F (36.6  C)   Resp 17   Ht 1.524 m (5')   Wt 49.6 kg (109 lb 6.4 oz)   SpO2 95%   BMI 21.37 kg/m    Alert, pleasant, NAD, sitting up in chair, frail lady appears stated age  No scleral icterus  Moist oral mucosa  Breathing comfortably on RA, no cough  No edema  Skin not overtly pale  Pleasant and cooperative, vague historian, mood cheerful  Decreased ROM at left shoulder (?old RTC injury)  Normal speech, no tremor, no facial droop    Lab/Diagnostic data:  Hemoglobin   Date Value Ref Range Status   08/05/2020 10.3 (L) 11.7 - 15.7 g/dL Final     BMP on 8/10/20 within normal limits other than slightly low Na at 131 (recheck next week ordered)    ASSESSMENT/PLAN:  Dementia without behavioral disturbance, unspecified dementia type (H)  Has assistance at [facility]  Occupational therapy eval and treat; care conference planned tomorrow    Frailty  Recurrent falls  Multiple joint pain  Has frailty and fear of falling which often leads to more falls  Has OA and getting knee steroid injections by ISIDRO Costello; h/o left TKA  Medications as noted above for pain; today she reports effective  Does have lift chair in her apartment per notes    Hyponatremia  Mild at 131; eating well today  Recheck Na as scheduled next week    Benign essential hypertension  Coronary artery disease involving native coronary artery  of native heart without angina pectoris  Vitals acceptable without hypotension on current meds  H/o remote CAD with stenting in 2005 and on cardioprotective meds  This most recent fall thought to be unrelated to her CAD though note she did f/u with Dr. Tang in Dec 2019 and had option to do f/u Lexiscan at that time given some reports of Batsheva's chest discomfort thought to most likely be d/t hiatal hernia and she did not have this test done yet to date, however, no reports of chest discomfort and the history suggests frailty as cause of her fall    Hiatal hernia with h/o GERD  Anemia, unspecified type  Is on BID PPI and Carafate QID  Hgb in 8-10 range in the past year  Now MCV is falling (into 70s) concerning for iron deficiency  Would check iron labs with f/u CBC and go from there  Would discuss as outpatient with family if not done already goals of care as she could have slow GI bleed/malignancy (last colonoscopy within normal limits in 2005; EGD from 2016 showed esophagitis and moderate HH).  CT abdomen/pelvis from Sept 2019 only showed tiny pulmonary nodules and recent CXR unremarkable.         Electronically signed by:  Criselda Flores DO        Sincerely,        Criselda Flores DO

## 2020-08-13 RX ORDER — SUCRALFATE 1 G/1
1 TABLET ORAL 4 TIMES DAILY
COMMUNITY

## 2020-08-13 NOTE — PROGRESS NOTES
[place] [facility]  PHYSICIAN NOTE    PRIMARY CARE PROVIDER AND CLINIC:  Tacos Bhakta, APRN CNP, [address] / [place] [address]    Chief Complaint   Patient presents with     Hospital F/U     [place] Medical Record Number:  [id number]  Place of Service where encounter took place:  [facility] ([facility]) [[id number]]    Batsheva Barkley is a 86 year old (11/6/1933), admitted to the above facility from  [facility] ER 8/5/20. Admitted to this facility for  rehab, medical management and nursing care.     HPI:    HPI information obtained from: facility chart records, facility staff, patient report and [facility] chart review.     Brief summary of hospital course: Batsheva Barkley has h/o dementia, CAD, large hiatal hernia, anemia and generalized arthritis who lives in [facility] and becoming more frail including having fear of falling. She went into ED after legs/knees gave out while attempting to stand and had a non-injurious fall. MRI spine showed significant scoliosis, DDD and spinal + foraminal stenosis but nothing that appeared acute and to account for her fall. Other eval including labs and EKG unremarkable for acute findings. [facility]ed [facility] door to TCU here at [facility].     Updates on status since skilled nursing admission: No acute events since her arrival to TCU. There is a care conference tomorrow to discuss disposition. Per , she has a good amount of support in the care suites next door. Family involved. No nursing concerns today. I spoke with Batsheva in her room and she was cheerful and had just finished a good amount of her lunch. She reports she did have a mild headache and bilateral knee pain resolved with the Tylenol today. She does admit to generalized weakness and seems appreciative of therapy though can't give me details. She denies any specific concerns or questions for me today.    CODE STATUS/ADVANCE DIRECTIVES DISCUSSION:    DNR / DNI  Patient's living condition: [facility]    ALLERGIES: Codeine and Lisinopril    Past Medical History:   Diagnosis Date     Aortic insufficiency      CAD (coronary artery disease) 06/20/2005    w/stent to dominant Cfx(2005)     Cardiomyopathy (H)      Chest discomfort      CHF NYHA class III, chronic, systolic (H) 1/25/2018     Coronary atherosclerosis 6/20/2005    circ vessel was stented;  had a 50% LAD lesion which was not treated Problem list name updated by automated process. Provider to review     Former smoker      Generalized osteoarthrosis     knees ;; L total kne 10/09     GERD with esophagitis      Hiatal hernia     Large     Hypertension goal BP (blood pressure) < 140/90 1/21/2015     Ischemic cardiomyopathy      Leg weakness, bilateral 2/13/2018     MI (myocardial infarction) (H) 06/2005     Mitral regurgitation     moderate     Mixed hyperlipidemia      Osteoporosis      Peripheral edema 01/26/2015     Physical deconditioning 1/21/2015     Total urinary incontinence 12/27/2017      Past Surgical History:   Procedure Laterality Date     COLONOSCOPY  3/05     ENT SURGERY       ESOPHAGOSCOPY, GASTROSCOPY, DUODENOSCOPY (EGD), COMBINED N/A 4/14/2016    Procedure: COMBINED ESOPHAGOSCOPY, GASTROSCOPY, DUODENOSCOPY (EGD), BIOPSY SINGLE OR MULTIPLE;  Surgeon: Jonathan rGamajo MD;  Location:  GI     EYE SURGERY       HEART CATH, ANGIOPLASTY  2005    RONI to left circ     L bunion + hammer toes  1/04, 4/04     L total knee replacement  10/2009      ovarian cyst surgery       Family History   Problem Relation Age of Onset     Cerebrovascular Disease Mother      Respiratory Father         heart     Breast Cancer Sister      Heart Disease Sister      Breast Cancer Sister      Social History     Tobacco Use     Smoking status: Former Smoker     Smokeless tobacco: Never Used     Tobacco comment: only smoked for 3 years.   Substance Use Topics     Alcohol use: No     Drug use: No         Post-discharge medication reconciliation status: Reviewed and updated in [facility] according to facility MAR    Current Outpatient Medications   Medication Sig Dispense Refill     acetaminophen (TYLENOL) 500 MG tablet Take 2 tablets (1,000 mg) by mouth daily as needed for mild pain       acetaminophen (TYLENOL) 500 MG tablet Take 1,000 mg by mouth 3 times daily        aspirin (ASA) 81 MG chewable tablet Take 81 mg by mouth daily       Atorvastatin Calcium (LIPITOR PO) Take 20 mg by mouth At Bedtime        carboxymethylcellulose PF (REFRESH PLUS) 0.5 % ophthalmic solution Place 2 drops into both eyes every 4 hours as needed for dry eyes       carboxymethylcellulose PF (REFRESH PLUS) 0.5 % ophthalmic solution Place 2 drops into both eyes 2 times daily       diclofenac (VOLTAREN) 1 % topical gel Place 2 g onto the skin 2 times daily        diclofenac (VOLTAREN) 1 % topical gel Place 2 g onto the skin daily as needed for moderate pain        loperamide (IMODIUM) 2 MG capsule Take 2 mg by mouth 4 times daily as needed for diarrhea        losartan (COZAAR) 50 MG tablet Take 1 tablet (50 mg) by mouth 2 times daily 180 tablet 3     menthol-zinc oxide (CALMOSEPTINE) 0.44-20.6 % OINT ointment Apply 1 g topically 2 times daily       menthol-zinc oxide (CALMOSEPTINE) 0.44-20.6 % OINT ointment Apply topically 2 times daily as needed for skin protection       metoprolol (LOPRESSOR) 50 MG tablet TAKE ONE TABLET BY MOUTH TWICE DAILY 180 tablet 3     omeprazole (PRILOSEC) 20 MG DR capsule Take 1 capsule (20 mg) by mouth 2 times daily (before meals) 60 capsule 0     ONDANSETRON PO Take 4 mg by mouth every 8 hours as needed for nausea       order for DME Equipment being ordered: Walker Wheels () and Walker ()  Treatment Diagnosis: gait instability 1 each 0     order for DME Light weight wheelchair Body mass index is 19.08 kg/(m^2). 1 Device 0     senna-docusate (SENOKOT-S;PERICOLACE) 8.6-50 MG per tablet Take 1 tablet by  mouth 2 times daily as needed        sucralfate (CARAFATE) 1 GM tablet Take 1 g by mouth 4 times daily Before meals and at bedtime       traZODone (DESYREL) 50 MG tablet Take 50 mg by mouth At Bedtime       trolamine salicylate (ASPERCREME) 10 % external cream Apply topically See Admin Instructions Apply to affected areas right knee twice daily AND BID PRN         ROS:  Limited secondary to cognitive impairment but today pt reports as above in HPI    Exam:  /67   Pulse 75   Temp 97.8  F (36.6  C)   Resp 17   Ht 1.524 m (5')   Wt 49.6 kg (109 lb 6.4 oz)   SpO2 95%   BMI 21.37 kg/m    Alert, pleasant, NAD, sitting up in chair, frail lady appears stated age  No scleral icterus  Moist oral mucosa  Breathing comfortably on RA, no cough  No edema  Skin not overtly pale  Pleasant and cooperative, vague historian, mood cheerful  Decreased ROM at left shoulder (?old RTC injury)  Normal speech, no tremor, no facial droop    Lab/Diagnostic data:  Hemoglobin   Date Value Ref Range Status   08/05/2020 10.3 (L) 11.7 - 15.7 g/dL Final     BMP on 8/10/20 within normal limits other than slightly low Na at 131 (recheck next week ordered)    ASSESSMENT/PLAN:  Dementia without behavioral disturbance, unspecified dementia type (H)  [facility] assistance at [facility]  Occupational therapy eval and treat; care conference planned tomorrow    Frailty  Recurrent falls  Multiple joint pain  Has frailty and fear of falling which often leads to more falls  Has OA and getting knee steroid injections by ISIDRO Costello; h/o left TKA  Medications as noted above for pain; today she reports effective  Does have lift chair in her apartment per notes    Hyponatremia  Mild at 131; eating well today  Recheck Na as scheduled next week    Benign essential hypertension  Coronary artery disease involving native coronary artery of native heart without angina pectoris  Vitals acceptable without hypotension on current meds  H/o remote CAD with  stenting in 2005 and on cardioprotective meds  This most recent fall thought to be unrelated to her CAD though note she did f/u with Dr. Tang in Dec 2019 and had option to do f/u Lexiscan at that time given some reports of Batsheva's chest discomfort thought to most likely be d/t hiatal hernia and she did not have this test done yet to date, however, no reports of chest discomfort and the history suggests frailty as cause of her fall    Hiatal hernia with h/o GERD  Anemia, unspecified type  Is on BID PPI and Carafate QID  Hgb in 8-10 range in the past year  Now MCV is falling (into 70s) concerning for iron deficiency  Would check iron labs with f/u CBC and go from there  Would discuss as outpatient with family if not done already goals of care as she could have slow GI bleed/malignancy (last colonoscopy within normal limits in 2005; EGD from 2016 showed esophagitis and moderate HH).  CT abdomen/pelvis from Sept 2019 only showed tiny pulmonary nodules and recent CXR unremarkable.         Electronically signed by:  Criselda Flores DO

## 2020-08-16 ENCOUNTER — RECORDS - HEALTHEAST (OUTPATIENT)
Dept: LAB | Facility: CLINIC | Age: 85
End: 2020-08-16

## 2020-08-17 ENCOUNTER — NURSING HOME VISIT (OUTPATIENT)
Dept: GERIATRICS | Facility: CLINIC | Age: 85
End: 2020-08-17
Payer: COMMERCIAL

## 2020-08-17 VITALS
DIASTOLIC BLOOD PRESSURE: 62 MMHG | WEIGHT: 109.4 LBS | HEIGHT: 60 IN | BODY MASS INDEX: 21.48 KG/M2 | RESPIRATION RATE: 18 BRPM | TEMPERATURE: 97.7 F | SYSTOLIC BLOOD PRESSURE: 137 MMHG | HEART RATE: 70 BPM | OXYGEN SATURATION: 98 %

## 2020-08-17 DIAGNOSIS — I10 ESSENTIAL HYPERTENSION: Primary | ICD-10-CM

## 2020-08-17 DIAGNOSIS — D50.9 IRON DEFICIENCY ANEMIA, UNSPECIFIED IRON DEFICIENCY ANEMIA TYPE: ICD-10-CM

## 2020-08-17 DIAGNOSIS — I50.42 CHRONIC COMBINED SYSTOLIC AND DIASTOLIC CONGESTIVE HEART FAILURE (H): ICD-10-CM

## 2020-08-17 DIAGNOSIS — E87.1 HYPONATREMIA: ICD-10-CM

## 2020-08-17 DIAGNOSIS — G89.29 OTHER CHRONIC PAIN: ICD-10-CM

## 2020-08-17 DIAGNOSIS — G89.29 CHRONIC PAIN OF RIGHT KNEE: ICD-10-CM

## 2020-08-17 DIAGNOSIS — R53.81 DEBILITY: ICD-10-CM

## 2020-08-17 DIAGNOSIS — M15.0 PRIMARY OSTEOARTHRITIS INVOLVING MULTIPLE JOINTS: ICD-10-CM

## 2020-08-17 DIAGNOSIS — R41.89 COGNITIVE IMPAIRMENT: ICD-10-CM

## 2020-08-17 DIAGNOSIS — M25.561 CHRONIC PAIN OF RIGHT KNEE: ICD-10-CM

## 2020-08-17 LAB
ANION GAP SERPL CALCULATED.3IONS-SCNC: 9 MMOL/L (ref 5–18)
BUN SERPL-MCNC: 25 MG/DL (ref 8–28)
CALCIUM SERPL-MCNC: 8.9 MG/DL (ref 8.5–10.5)
CHLORIDE BLD-SCNC: 98 MMOL/L (ref 98–107)
CO2 SERPL-SCNC: 24 MMOL/L (ref 22–31)
CREAT SERPL-MCNC: 0.71 MG/DL (ref 0.6–1.1)
ERYTHROCYTE [DISTWIDTH] IN BLOOD BY AUTOMATED COUNT: 19.1 % (ref 11–14.5)
GFR SERPL CREATININE-BSD FRML MDRD: >60 ML/MIN/1.73M2
GLUCOSE BLD-MCNC: 83 MG/DL (ref 70–125)
HCT VFR BLD AUTO: 31.5 % (ref 35–47)
HGB BLD-MCNC: 9.3 G/DL (ref 12–16)
IRON SATN MFR SERPL: 4 % (ref 20–50)
IRON SERPL-MCNC: 15 UG/DL (ref 42–175)
MCH RBC QN AUTO: 21.2 PG (ref 27–34)
MCHC RBC AUTO-ENTMCNC: 29.5 G/DL (ref 32–36)
MCV RBC AUTO: 72 FL (ref 80–100)
PLATELET # BLD AUTO: 317 THOU/UL (ref 140–440)
PMV BLD AUTO: 9.5 FL (ref 8.5–12.5)
POTASSIUM BLD-SCNC: 4.1 MMOL/L (ref 3.5–5)
RBC # BLD AUTO: 4.38 MILL/UL (ref 3.8–5.4)
SODIUM SERPL-SCNC: 131 MMOL/L (ref 136–145)
TIBC SERPL-MCNC: 403 UG/DL (ref 313–563)
TRANSFERRIN SERPL-MCNC: 322 MG/DL (ref 212–360)
WBC: 5.3 THOU/UL (ref 4–11)

## 2020-08-17 PROCEDURE — 99309 SBSQ NF CARE MODERATE MDM 30: CPT | Performed by: NURSE PRACTITIONER

## 2020-08-17 RX ORDER — BACILLUS COAGULANS 1B CELL
1 CAPSULE ORAL DAILY
Start: 2020-08-17 | End: 2020-09-17

## 2020-08-17 RX ORDER — TROLAMINE SALICYLATE 10 G/100G
CREAM TOPICAL SEE ADMIN INSTRUCTIONS
Start: 2020-08-17 | End: 2020-09-29

## 2020-08-17 ASSESSMENT — MIFFLIN-ST. JEOR: SCORE: 857.74

## 2020-08-17 NOTE — LETTER
"    8/17/2020        RE: Batsheva Barkley  [place]  [address]  [facility] [address]        [place] [facility]  [place] [facility] Record Number:  [id number]  Place of Service where encounter took place:  [facility] ([facility]) [[id number]]  Chief Complaint   Patient presents with     Nursing Home Acute       HPI:    Batsheva Barkley  is a 86 year old (11/6/1933), who is being seen today for an episodic care visit.  HPI information obtained from: facility chart records, facility staff, patient report and [facility] chart review.     Past Medical and Surgical History reviewed in [facility] today.    Met with Mrs. Barkley today for follow up.  Mrs. Barkley has ongoing cognitive impairment but continues to endorse vague chronic knee and shoulder pain, does not rate.  She continues to endorse she feels weak, also not new.  She has no other complaints.  Does mention she is \"Bored\" here.     MEDICATIONS:  Current Outpatient Medications   Medication Sig Dispense Refill     diclofenac (VOLTAREN) 1 % topical gel Place 2 g onto the skin 2 times daily Bilateral knees.       ferrous fumarate 65 mg, [place]. FE,-Vitamin C 125 mg (VITRON-C)  MG TABS tablet Take 1 tablet by mouth daily       trolamine salicylate (ASPERCREME) 10 % external cream Apply topically See Admin Instructions Apply to affected areas shoulders and neck twice daily AND BID PRN       acetaminophen (TYLENOL) 500 MG tablet Take 2 tablets (1,000 mg) by mouth daily as needed for mild pain       acetaminophen (TYLENOL) 500 MG tablet Take 1,000 mg by mouth 3 times daily        aspirin (ASA) 81 MG chewable tablet Take 81 mg by mouth daily       Atorvastatin Calcium (LIPITOR PO) Take 20 mg by mouth At Bedtime        carboxymethylcellulose PF (REFRESH PLUS) 0.5 % ophthalmic solution Place 2 drops into both eyes every 4 hours as needed for dry eyes       carboxymethylcellulose PF (REFRESH PLUS) 0.5 % ophthalmic solution Place 2 " drops into both eyes 2 times daily       diclofenac (VOLTAREN) 1 % topical gel Place 2 g onto the skin daily as needed for moderate pain        loperamide (IMODIUM) 2 MG capsule Take 2 mg by mouth 4 times daily as needed for diarrhea        losartan (COZAAR) 50 MG tablet Take 1 tablet (50 mg) by mouth 2 times daily 180 tablet 3     menthol-zinc oxide (CALMOSEPTINE) 0.44-20.6 % OINT ointment Apply 1 g topically 2 times daily       menthol-zinc oxide (CALMOSEPTINE) 0.44-20.6 % OINT ointment Apply topically 2 times daily as needed for skin protection       metoprolol (LOPRESSOR) 50 MG tablet TAKE ONE TABLET BY MOUTH TWICE DAILY 180 tablet 3     omeprazole (PRILOSEC) 20 MG DR capsule Take 1 capsule (20 mg) by mouth 2 times daily (before meals) 60 capsule 0     ONDANSETRON PO Take 4 mg by mouth every 8 hours as needed for nausea       order for DME Equipment being ordered: Walker Wheels () and Walker ()  Treatment Diagnosis: gait instability 1 each 0     order for DME Light weight wheelchair Body mass index is 19.08 kg/(m^2). 1 Device 0     senna-docusate (SENOKOT-S;PERICOLACE) 8.6-50 MG per tablet Take 1 tablet by mouth 2 times daily as needed        sucralfate (CARAFATE) 1 GM tablet Take 1 g by mouth 4 times daily Before meals and at bedtime       traZODone (DESYREL) 50 MG tablet Take 50 mg by mouth At Bedtime       REVIEW OF SYSTEMS:  Limited secondary to cognitive impairment but today pt reports 7 point ROS done including, light headedness/dizziness, fever/chills, pain, Resp, CV, GI, and  and is negative other than noted in HPI.     Objective:  /62   Pulse 70   Temp 97.7  F (36.5  C)   Resp 18   Ht 1.524 m (5')   Wt 49.6 kg (109 lb 6.4 oz)   SpO2 98%   BMI 21.37 kg/m       Exam:  EXAM LIMITED DUE TO [facility] social distancing recommendations:  GENERAL APPEARANCE:  Elderly thin female sitting up in chair.  NAD, non-toxic.  RESP:   Regular relaxed breathing effort.  No cough.   EXTREMITIES:  No  lower extremity edema, no calf tenderness.   PSYCH: Alert to self, not place, date, situation.  Clear degree of cognitive/memory impairment.  [place].  Unchanged/stable.     Labs:   I have personally reviewed labs, which are in facility or EMR chart.     ASSESSMENT/PLAN:  Essential hypertension  Chronic combined systolic and diastolic congestive heart failure (H)  Review of VS's show VSS and within acceptable range.   No current s/s of clinical CHF.   Appears euvolemic.   -No changes, continue to clinically monitor.     Hyponatremia  Ongoing hyponatremia, has had to some degree in past.  Was on diuretics in past, has h/o CHF.  Currently euvolemic, weights stable.  Thus euvolemic hyponatremia with unclear etiology, is no long on diuretics.    -Will start 1.5 L fluid restriction.   -Will get BNP with next set of labs and increase weight checks.     Iron deficiency anemia, unspecified iron deficiency anemia type  Ongoing anemia, Fe is low, TIBC low end of normal.   -Starting Ferrous fumarate 65 mg, [place]. FE,-Vitamin C 125 mg (VITRON-C)  MG TABS tablet; Take 1 tablet by mouth daily.  -Likely move to q48 after 1 month.   -Continue to clinically monitor.     Primary osteoarthritis involving multiple joints  Chronic pain of right knee  Other chronic pain  Chronic / unchanged.  Cognitive impairment hinders her telling us if she thinks the Diclofenac or Trolamine helps more.   -Continues both Diclofenac and Trolamine gel's.   -Continues TID and prn Acetaminophen for max of 4k/day.     Cognitive impairment  Mod-advanced cognitive/memory impairment, stable/unchanged.   -No changes, continue to clinically monitor.     Debility  -Continues with PT/OT.    Orders written by provider at facility  -As noted above.   -Lab recheck 8/24.     Electronically signed by:  NICKOLAS Wills CNP       Sincerely,        NICKOLAS Wills CNP

## 2020-08-17 NOTE — PROGRESS NOTES
"[place] [facility]  [place] Medical Record Number:  [id number]  Place of Service where encounter took place:  [facility] ([facility]) [[id number]]  Chief Complaint   Patient presents with     Nursing Home Acute       HPI:    Batsheva Barkley  is a 86 year old (11/6/1933), who is being seen today for an episodic care visit.  HPI information obtained from: facility chart records, facility staff, patient report and [facility] chart review.     Past Medical and Surgical History reviewed in [facility] today.    Met with Mrs. Barkley today for follow up.  Mrs. Barkley has ongoing cognitive impairment but continues to endorse vague chronic knee and shoulder pain, does not rate.  She continues to endorse she feels weak, also not new.  She has no other complaints.  Does mention she is \"Bored\" here.     MEDICATIONS:  Current Outpatient Medications   Medication Sig Dispense Refill     diclofenac (VOLTAREN) 1 % topical gel Place 2 g onto the skin 2 times daily Bilateral knees.       ferrous fumarate 65 mg, [place]. FE,-Vitamin C 125 mg (VITRON-C)  MG TABS tablet Take 1 tablet by mouth daily       trolamine salicylate (ASPERCREME) 10 % external cream Apply topically See Admin Instructions Apply to affected areas shoulders and neck twice daily AND BID PRN       acetaminophen (TYLENOL) 500 MG tablet Take 2 tablets (1,000 mg) by mouth daily as needed for mild pain       acetaminophen (TYLENOL) 500 MG tablet Take 1,000 mg by mouth 3 times daily        aspirin (ASA) 81 MG chewable tablet Take 81 mg by mouth daily       Atorvastatin Calcium (LIPITOR PO) Take 20 mg by mouth At Bedtime        carboxymethylcellulose PF (REFRESH PLUS) 0.5 % ophthalmic solution Place 2 drops into both eyes every 4 hours as needed for dry eyes       carboxymethylcellulose PF (REFRESH PLUS) 0.5 % ophthalmic solution Place 2 drops into both eyes 2 times daily       diclofenac (VOLTAREN) 1 % topical gel Place 2 g onto the skin daily as needed " for moderate pain        loperamide (IMODIUM) 2 MG capsule Take 2 mg by mouth 4 times daily as needed for diarrhea        losartan (COZAAR) 50 MG tablet Take 1 tablet (50 mg) by mouth 2 times daily 180 tablet 3     menthol-zinc oxide (CALMOSEPTINE) 0.44-20.6 % OINT ointment Apply 1 g topically 2 times daily       menthol-zinc oxide (CALMOSEPTINE) 0.44-20.6 % OINT ointment Apply topically 2 times daily as needed for skin protection       metoprolol (LOPRESSOR) 50 MG tablet TAKE ONE TABLET BY MOUTH TWICE DAILY 180 tablet 3     omeprazole (PRILOSEC) 20 MG DR capsule Take 1 capsule (20 mg) by mouth 2 times daily (before meals) 60 capsule 0     ONDANSETRON PO Take 4 mg by mouth every 8 hours as needed for nausea       order for DME Equipment being ordered: Walker Wheels () and Walker ()  Treatment Diagnosis: gait instability 1 each 0     order for DME Light weight wheelchair Body mass index is 19.08 kg/(m^2). 1 Device 0     senna-docusate (SENOKOT-S;PERICOLACE) 8.6-50 MG per tablet Take 1 tablet by mouth 2 times daily as needed        sucralfate (CARAFATE) 1 GM tablet Take 1 g by mouth 4 times daily Before meals and at bedtime       traZODone (DESYREL) 50 MG tablet Take 50 mg by mouth At Bedtime       REVIEW OF SYSTEMS:  Limited secondary to cognitive impairment but today pt reports 7 point ROS done including, light headedness/dizziness, fever/chills, pain, Resp, CV, GI, and  and is negative other than noted in HPI.     Objective:  /62   Pulse 70   Temp 97.7  F (36.5  C)   Resp 18   Ht 1.524 m (5')   Wt 49.6 kg (109 lb 6.4 oz)   SpO2 98%   BMI 21.37 kg/m       Exam:  EXAM LIMITED DUE TO [facility] social distancing recommendations:  GENERAL APPEARANCE:  Elderly thin female sitting up in chair.  NAD, non-toxic.  RESP:   Regular relaxed breathing effort.  No cough.   EXTREMITIES:  No lower extremity edema, no calf tenderness.   PSYCH: Alert to self, not place, date, situation.  Clear degree of  cognitive/memory impairment.  [place].  Unchanged/stable.     Labs:   I have personally reviewed labs, which are in facility or EMR chart.     ASSESSMENT/PLAN:  Essential hypertension  Chronic combined systolic and diastolic congestive heart failure (H)  Review of VS's show VSS and within acceptable range.   No current s/s of clinical CHF.   Appears euvolemic.   -No changes, continue to clinically monitor.     Hyponatremia  Ongoing hyponatremia, has had to some degree in past.  Was on diuretics in past, has h/o CHF.  Currently euvolemic, weights stable.  Thus euvolemic hyponatremia with unclear etiology, is no long on diuretics.    -Will start 1.5 L fluid restriction.   -Will get BNP with next set of labs and increase weight checks.     Iron deficiency anemia, unspecified iron deficiency anemia type  Ongoing anemia, Fe is low, TIBC low end of normal.   -Starting Ferrous fumarate 65 mg, [place]. FE,-Vitamin C 125 mg (VITRON-C)  MG TABS tablet; Take 1 tablet by mouth daily.  -Likely move to q48 after 1 month.   -Continue to clinically monitor.     Primary osteoarthritis involving multiple joints  Chronic pain of right knee  Other chronic pain  Chronic / unchanged.  Cognitive impairment hinders her telling us if she thinks the Diclofenac or Trolamine helps more.   -Continues both Diclofenac and Trolamine gel's.   -Continues TID and prn Acetaminophen for max of 4k/day.     Cognitive impairment  Mod-advanced cognitive/memory impairment, stable/unchanged.   -No changes, continue to clinically monitor.     Debility  -Continues with PT/OT.    Orders written by provider at facility  -As noted above.   -Lab recheck 8/24.     Electronically signed by:  NICKOLAS Wills CNP

## 2020-08-23 ENCOUNTER — RECORDS - HEALTHEAST (OUTPATIENT)
Dept: LAB | Facility: CLINIC | Age: 85
End: 2020-08-23

## 2020-08-24 ENCOUNTER — NURSING HOME VISIT (OUTPATIENT)
Dept: GERIATRICS | Facility: CLINIC | Age: 85
End: 2020-08-24
Payer: COMMERCIAL

## 2020-08-24 VITALS
BODY MASS INDEX: 20.97 KG/M2 | OXYGEN SATURATION: 94 % | SYSTOLIC BLOOD PRESSURE: 98 MMHG | DIASTOLIC BLOOD PRESSURE: 64 MMHG | HEIGHT: 60 IN | TEMPERATURE: 98.2 F | RESPIRATION RATE: 16 BRPM | WEIGHT: 106.8 LBS | HEART RATE: 65 BPM

## 2020-08-24 DIAGNOSIS — I10 ESSENTIAL HYPERTENSION: ICD-10-CM

## 2020-08-24 DIAGNOSIS — M15.0 PRIMARY OSTEOARTHRITIS INVOLVING MULTIPLE JOINTS: ICD-10-CM

## 2020-08-24 DIAGNOSIS — K08.89 TOOTH PAIN: Primary | ICD-10-CM

## 2020-08-24 DIAGNOSIS — K08.9 POOR DENTITION: ICD-10-CM

## 2020-08-24 DIAGNOSIS — R41.89 COGNITIVE IMPAIRMENT: ICD-10-CM

## 2020-08-24 DIAGNOSIS — M25.561 CHRONIC PAIN OF RIGHT KNEE: ICD-10-CM

## 2020-08-24 DIAGNOSIS — G89.29 OTHER CHRONIC PAIN: ICD-10-CM

## 2020-08-24 DIAGNOSIS — D50.9 IRON DEFICIENCY ANEMIA, UNSPECIFIED IRON DEFICIENCY ANEMIA TYPE: ICD-10-CM

## 2020-08-24 DIAGNOSIS — G89.29 CHRONIC PAIN OF RIGHT KNEE: ICD-10-CM

## 2020-08-24 DIAGNOSIS — I50.42 CHRONIC COMBINED SYSTOLIC AND DIASTOLIC CONGESTIVE HEART FAILURE (H): ICD-10-CM

## 2020-08-24 DIAGNOSIS — E87.1 HYPONATREMIA: ICD-10-CM

## 2020-08-24 DIAGNOSIS — R53.81 DEBILITY: ICD-10-CM

## 2020-08-24 LAB
ANION GAP SERPL CALCULATED.3IONS-SCNC: 8 MMOL/L (ref 5–18)
BNP SERPL-MCNC: 105 PG/ML (ref 0–167)
BUN SERPL-MCNC: 19 MG/DL (ref 8–28)
CALCIUM SERPL-MCNC: 8.9 MG/DL (ref 8.5–10.5)
CHLORIDE BLD-SCNC: 99 MMOL/L (ref 98–107)
CO2 SERPL-SCNC: 24 MMOL/L (ref 22–31)
CREAT SERPL-MCNC: 0.66 MG/DL (ref 0.6–1.1)
ERYTHROCYTE [DISTWIDTH] IN BLOOD BY AUTOMATED COUNT: 21.5 % (ref 11–14.5)
GFR SERPL CREATININE-BSD FRML MDRD: >60 ML/MIN/1.73M2
GLUCOSE BLD-MCNC: 72 MG/DL (ref 70–125)
HCT VFR BLD AUTO: 28.6 % (ref 35–47)
HGB BLD-MCNC: 8.6 G/DL (ref 12–16)
MCH RBC QN AUTO: 22.2 PG (ref 27–34)
MCHC RBC AUTO-ENTMCNC: 30.1 G/DL (ref 32–36)
MCV RBC AUTO: 74 FL (ref 80–100)
PLATELET # BLD AUTO: 284 THOU/UL (ref 140–440)
PMV BLD AUTO: 9.4 FL (ref 8.5–12.5)
POTASSIUM BLD-SCNC: 4.2 MMOL/L (ref 3.5–5)
RBC # BLD AUTO: 3.87 MILL/UL (ref 3.8–5.4)
SODIUM SERPL-SCNC: 131 MMOL/L (ref 136–145)
WBC: 5.5 THOU/UL (ref 4–11)

## 2020-08-24 PROCEDURE — 99309 SBSQ NF CARE MODERATE MDM 30: CPT | Performed by: NURSE PRACTITIONER

## 2020-08-24 ASSESSMENT — MIFFLIN-ST. JEOR: SCORE: 845.94

## 2020-08-24 NOTE — PROGRESS NOTES
[place] [facility]  [place] Medical Record Number:  [id number]  Place of Service where encounter took place:  [facility] ([facility]) [[id number]]  Chief Complaint   Patient presents with     Nursing Home Acute     HPI:    Batsheva Barkley  is a 86 year old (11/6/1933), who is being seen today for an episodic care visit.  HPI information obtained from: facility chart records, facility staff, patient report and [facility] chart review.     Past Medical and Surgical History reviewed in [facility] today.    Met with Mrs. Barkley today for follow up.  Mrs. Barkley has mod-advanced cognitive/memory impairment but has started endorsing some Left low mouth pain.  Started a few days ago.  She has poor dentition.  We started Benzocaine, but she is not sure if it helps or not.  She endorses her chronic knee and shoulder pain.  Otherwise no complaints.      MEDICATIONS:  Current Outpatient Medications   Medication Sig Dispense Refill     benzocaine (ORAJEL MAXIMUM STRENGTH) 20 % GEL gel Take by mouth 4 times daily       acetaminophen (TYLENOL) 500 MG tablet Take 2 tablets (1,000 mg) by mouth daily as needed for mild pain       acetaminophen (TYLENOL) 500 MG tablet Take 1,000 mg by mouth 3 times daily        aspirin (ASA) 81 MG chewable tablet Take 81 mg by mouth daily       Atorvastatin Calcium (LIPITOR PO) Take 20 mg by mouth At Bedtime        carboxymethylcellulose PF (REFRESH PLUS) 0.5 % ophthalmic solution Place 2 drops into both eyes every 4 hours as needed for dry eyes       carboxymethylcellulose PF (REFRESH PLUS) 0.5 % ophthalmic solution Place 2 drops into both eyes 2 times daily       diclofenac (VOLTAREN) 1 % topical gel Place 2 g onto the skin 2 times daily Bilateral knees.       diclofenac (VOLTAREN) 1 % topical gel Place 2 g onto the skin daily as needed for moderate pain        ferrous fumarate 65 mg, [place]. FE,-Vitamin C 125 mg (VITRON-C)  MG TABS tablet Take 1 tablet by mouth daily        loperamide (IMODIUM) 2 MG capsule Take 2 mg by mouth 4 times daily as needed for diarrhea        losartan (COZAAR) 50 MG tablet Take 1 tablet (50 mg) by mouth 2 times daily 180 tablet 3     menthol-zinc oxide (CALMOSEPTINE) 0.44-20.6 % OINT ointment Apply 1 g topically 2 times daily       menthol-zinc oxide (CALMOSEPTINE) 0.44-20.6 % OINT ointment Apply topically 2 times daily as needed for skin protection       metoprolol (LOPRESSOR) 50 MG tablet TAKE ONE TABLET BY MOUTH TWICE DAILY 180 tablet 3     omeprazole (PRILOSEC) 20 MG DR capsule Take 1 capsule (20 mg) by mouth 2 times daily (before meals) 60 capsule 0     ONDANSETRON PO Take 4 mg by mouth every 8 hours as needed for nausea       order for DME Equipment being ordered: Walker Wheels () and Walker ()  Treatment Diagnosis: gait instability 1 each 0     order for DME Light weight wheelchair Body mass index is 19.08 kg/(m^2). 1 Device 0     senna-docusate (SENOKOT-S;PERICOLACE) 8.6-50 MG per tablet Take 1 tablet by mouth 2 times daily as needed        sucralfate (CARAFATE) 1 GM tablet Take 1 g by mouth 4 times daily Before meals and at bedtime       traZODone (DESYREL) 50 MG tablet Take 50 mg by mouth At Bedtime       trolamine salicylate (ASPERCREME) 10 % external cream Apply topically See Admin Instructions Apply to affected areas shoulders and neck twice daily AND BID PRN       REVIEW OF SYSTEMS:  Limited secondary to cognitive impairment but today pt reports 7 point ROS done including, light headedness/dizziness, fever/chills, pain, Resp, CV, GI, and  and is negative other than noted in HPI.     Objective:  BP 98/64   Pulse 65   Temp 98.2  F (36.8  C)   Resp 16   Ht 1.524 m (5')   Wt 48.4 kg (106 lb 12.8 oz)   SpO2 94%   BMI 20.86 kg/m       Exam:  EXAM LIMITED DUE TO [facility] social distancing recommendations:  GENERAL APPEARANCE:  Elderly thin female sitting up in chair.  NAD, non-toxic.  RESP:   Regular relaxed breathing effort.  No cough.    EXTREMITIES:  No lower extremity edema, no calf tenderness.   PSYCH: Alert to self, not place, date, situation.  Clear degree of cognitive/memory impairment.  [place].  Unchanged/stable.   ENT: There is poor dentition overall.  In the Left lower area the gum line is lower than expected and there is a hole and dark area just under one of the teeth, tender to palpation.  Hole is on the outside tooth area, bridge rests on the inside of the tooth area, does not touch where hole is at.     Labs:   Pending.     ASSESSMENT/PLAN:  Tooth pain  Poor dentition  Started to have tooth pain several days ago.  We started QID Benzocaine, but she is not sure if it helps or not.  On exam today there is a large hold with dark matter on her Left low tooth area, poor dentition.  Appears to be decay.  She does have a lower bridge, but it does not touch/press on this area, thus do not feel it's friction from the bridge.  Afebrile.   -Continue's QID Benzocaine for now.   -Family contacted by RN manager, recommending dental appt ASAP.   --If she develops fever or systemic infection s/s, start Amoxacillin x10 days.     -Do note mild weight loss, thus will change diet to mechanical soft, but will also have nutrition eval and treat.     Essential hypertension  Chronic combined systolic and diastolic congestive heart failure (H)  Review of VS's show VSS and within acceptable range.   No current s/s of clinical CHF.   -No changes, continue to clinically monitor.     Hyponatremia  Has ongoing hyponatremia, has been on 1.5L fluid restriction, but also concern for mild weight loss and lower PO intake.   -Continues on 1.5L fluid restriction for now.   -Lab pending from today, will follow.     Iron deficiency anemia, unspecified iron deficiency anemia type  Ongoing LUCILLE, started Vitron C q daily x 30 days then q48h after.   No overt s/s of bleeding.   -Hgb check for today pending, will follow.     Other chronic pain  Chronic pain of right knee  Primary  osteoarthritis involving multiple joints  Ongoing chronic join pain, unchanged in nature.   -Continues TID and PRN Acetaminophen.   -Continues Trolamine, Diclofenac gel's.     Cognitive impairment  Ongoing mod-advanced cognitive/memory impairment, stable/unchanged.   -No changes, continue to clinically monitor.     Debility  -Continues with PT/OT.     Orders written by provider at facility  -As noted above.     Electronically signed by:  NICKOLAS Wills CNP

## 2020-08-24 NOTE — LETTER
8/24/2020        RE: Batsheva Barkley  [place]  [address]  [facility] [address]        [facility]  [facility] Record Number:  [id number]  Place of Service where encounter took place:  [facility] ([facility] [[id number]]  Chief Complaint   Patient presents with     Nursing Home Acute     HPI:    Batsheva Barkley  is a 86 year old (11/6/1933), who is being seen today for an episodic care visit.  HPI information obtained from: facility chart records, facility staff, patient report and [facility] chart review.     Past Medical and Surgical History reviewed in [facility] today.    Met with Mrs. Barkley today for follow up.  Mrs. Barkley has mod-advanced cognitive/memory impairment but has started endorsing some Left low mouth pain.  Started a few days ago.  She has poor dentition.  We started Benzocaine, but she is not sure if it helps or not.  She endorses her chronic knee and shoulder pain.  Otherwise no complaints.      MEDICATIONS:  Current Outpatient Medications   Medication Sig Dispense Refill     benzocaine (ORAJEL MAXIMUM STRENGTH) 20 % GEL gel Take by mouth 4 times daily       acetaminophen (TYLENOL) 500 MG tablet Take 2 tablets (1,000 mg) by mouth daily as needed for mild pain       acetaminophen (TYLENOL) 500 MG tablet Take 1,000 mg by mouth 3 times daily        aspirin (ASA) 81 MG chewable tablet Take 81 mg by mouth daily       Atorvastatin Calcium (LIPITOR PO) Take 20 mg by mouth At Bedtime        carboxymethylcellulose PF (REFRESH PLUS) 0.5 % ophthalmic solution Place 2 drops into both eyes every 4 hours as needed for dry eyes       carboxymethylcellulose PF (REFRESH PLUS) 0.5 % ophthalmic solution Place 2 drops into both eyes 2 times daily       diclofenac (VOLTAREN) 1 % topical gel Place 2 g onto the skin 2 times daily Bilateral knees.       diclofenac (VOLTAREN) 1 % topical gel Place 2 g onto the skin daily as needed for moderate pain        ferrous  fumarate 65 mg, [place]. FE,-Vitamin C 125 mg (VITRON-C)  MG TABS tablet Take 1 tablet by mouth daily       loperamide (IMODIUM) 2 MG capsule Take 2 mg by mouth 4 times daily as needed for diarrhea        losartan (COZAAR) 50 MG tablet Take 1 tablet (50 mg) by mouth 2 times daily 180 tablet 3     menthol-zinc oxide (CALMOSEPTINE) 0.44-20.6 % OINT ointment Apply 1 g topically 2 times daily       menthol-zinc oxide (CALMOSEPTINE) 0.44-20.6 % OINT ointment Apply topically 2 times daily as needed for skin protection       metoprolol (LOPRESSOR) 50 MG tablet TAKE ONE TABLET BY MOUTH TWICE DAILY 180 tablet 3     omeprazole (PRILOSEC) 20 MG DR capsule Take 1 capsule (20 mg) by mouth 2 times daily (before meals) 60 capsule 0     ONDANSETRON PO Take 4 mg by mouth every 8 hours as needed for nausea       order for DME Equipment being ordered: Walker Wheels () and Walker ()  Treatment Diagnosis: gait instability 1 each 0     order for DME Light weight wheelchair Body mass index is 19.08 kg/(m^2). 1 Device 0     senna-docusate (SENOKOT-S;PERICOLACE) 8.6-50 MG per tablet Take 1 tablet by mouth 2 times daily as needed        sucralfate (CARAFATE) 1 GM tablet Take 1 g by mouth 4 times daily Before meals and at bedtime       traZODone (DESYREL) 50 MG tablet Take 50 mg by mouth At Bedtime       trolamine salicylate (ASPERCREME) 10 % external cream Apply topically See Admin Instructions Apply to affected areas shoulders and neck twice daily AND BID PRN       REVIEW OF SYSTEMS:  Limited secondary to cognitive impairment but today pt reports 7 point ROS done including, light headedness/dizziness, fever/chills, pain, Resp, CV, GI, and  and is negative other than noted in HPI.     Objective:  BP 98/64   Pulse 65   Temp 98.2  F (36.8  C)   Resp 16   Ht 1.524 m (5')   Wt 48.4 kg (106 lb 12.8 oz)   SpO2 94%   BMI 20.86 kg/m       Exam:  EXAM LIMITED DUE TO [facility] social distancing recommendations:  GENERAL APPEARANCE:   Elderly thin female sitting up in chair.  NAD, non-toxic.  RESP:   Regular relaxed breathing effort.  No cough.   EXTREMITIES:  No lower extremity edema, no calf tenderness.   PSYCH: Alert to self, not place, date, situation.  Clear degree of cognitive/memory impairment.  [place].  Unchanged/stable.   ENT: There is poor dentition overall.  In the Left lower area the gum line is lower than expected and there is a hole and dark area just under one of the teeth, tender to palpation.  Hole is on the outside tooth area, bridge rests on the inside of the tooth area, does not touch where hole is at.     Labs:   Pending.     ASSESSMENT/PLAN:  Tooth pain  Poor dentition  Started to have tooth pain several days ago.  We started QID Benzocaine, but she is not sure if it helps or not.  On exam today there is a large hold with dark matter on her Left low tooth area, poor dentition.  Appears to be decay.  She does have a lower bridge, but it does not touch/press on this area, thus do not feel it's friction from the bridge.  Afebrile.   -Continue's QID Benzocaine for now.   -Family contacted by RN manager, recommending dental appt ASAP.   --If she develops fever or systemic infection s/s, start Amoxacillin x10 days.     -Do note mild weight loss, thus will change diet to mechanical soft, but will also have nutrition eval and treat.     Essential hypertension  Chronic combined systolic and diastolic congestive heart failure (H)  Review of VS's show VSS and within acceptable range.   No current s/s of clinical CHF.   -No changes, continue to clinically monitor.     Hyponatremia  Has ongoing hyponatremia, has been on 1.5L fluid restriction, but also concern for mild weight loss and lower PO intake.   -Continues on 1.5L fluid restriction for now.   -Lab pending from today, will follow.     Iron deficiency anemia, unspecified iron deficiency anemia type  Ongoing LUCILLE, started Vitron C q daily x 30 days then q48h after.   No overt s/s of  bleeding.   -Hgb check for today pending, will follow.     Other chronic pain  Chronic pain of right knee  Primary osteoarthritis involving multiple joints  Ongoing chronic join pain, unchanged in nature.   -Continues TID and PRN Acetaminophen.   -Continues Trolamine, Diclofenac gel's.     Cognitive impairment  Ongoing mod-advanced cognitive/memory impairment, stable/unchanged.   -No changes, continue to clinically monitor.     Debility  -Continues with PT/OT.     Orders written by provider at facility  -As noted above.     Electronically signed by:  NICKOLAS Wills CNP           Sincerely,        NICKOLAS Wills CNP

## 2020-08-31 ENCOUNTER — RECORDS - HEALTHEAST (OUTPATIENT)
Dept: LAB | Facility: CLINIC | Age: 85
End: 2020-08-31

## 2020-09-01 LAB
ANION GAP SERPL CALCULATED.3IONS-SCNC: 8 MMOL/L (ref 5–18)
BUN SERPL-MCNC: 19 MG/DL (ref 8–28)
CALCIUM SERPL-MCNC: 8.8 MG/DL (ref 8.5–10.5)
CHLORIDE BLD-SCNC: 99 MMOL/L (ref 98–107)
CO2 SERPL-SCNC: 24 MMOL/L (ref 22–31)
CREAT SERPL-MCNC: 0.64 MG/DL (ref 0.6–1.1)
ERYTHROCYTE [DISTWIDTH] IN BLOOD BY AUTOMATED COUNT: 25.7 % (ref 11–14.5)
GFR SERPL CREATININE-BSD FRML MDRD: >60 ML/MIN/1.73M2
GLUCOSE BLD-MCNC: 69 MG/DL (ref 70–125)
HCT VFR BLD AUTO: 29.6 % (ref 35–47)
HGB BLD-MCNC: 9.1 G/DL (ref 12–16)
MCH RBC QN AUTO: 23.6 PG (ref 27–34)
MCHC RBC AUTO-ENTMCNC: 30.7 G/DL (ref 32–36)
MCV RBC AUTO: 77 FL (ref 80–100)
PLATELET # BLD AUTO: 249 THOU/UL (ref 140–440)
PMV BLD AUTO: 9.7 FL (ref 8.5–12.5)
POTASSIUM BLD-SCNC: 4.2 MMOL/L (ref 3.5–5)
RBC # BLD AUTO: 3.85 MILL/UL (ref 3.8–5.4)
SODIUM SERPL-SCNC: 131 MMOL/L (ref 136–145)
WBC: 4.5 THOU/UL (ref 4–11)

## 2020-09-03 ENCOUNTER — NURSING HOME VISIT (OUTPATIENT)
Dept: GERIATRICS | Facility: CLINIC | Age: 85
End: 2020-09-03
Payer: COMMERCIAL

## 2020-09-03 VITALS
HEART RATE: 73 BPM | RESPIRATION RATE: 18 BRPM | DIASTOLIC BLOOD PRESSURE: 70 MMHG | TEMPERATURE: 97.3 F | HEIGHT: 60 IN | OXYGEN SATURATION: 94 % | WEIGHT: 110 LBS | SYSTOLIC BLOOD PRESSURE: 135 MMHG | BODY MASS INDEX: 21.6 KG/M2

## 2020-09-03 DIAGNOSIS — I10 ESSENTIAL HYPERTENSION: ICD-10-CM

## 2020-09-03 DIAGNOSIS — G89.29 OTHER CHRONIC PAIN: ICD-10-CM

## 2020-09-03 DIAGNOSIS — R53.81 DEBILITY: ICD-10-CM

## 2020-09-03 DIAGNOSIS — K08.89 TOOTH PAIN: Primary | ICD-10-CM

## 2020-09-03 DIAGNOSIS — D50.9 IRON DEFICIENCY ANEMIA, UNSPECIFIED IRON DEFICIENCY ANEMIA TYPE: ICD-10-CM

## 2020-09-03 DIAGNOSIS — K08.9 POOR DENTITION: ICD-10-CM

## 2020-09-03 DIAGNOSIS — M15.0 PRIMARY OSTEOARTHRITIS INVOLVING MULTIPLE JOINTS: ICD-10-CM

## 2020-09-03 DIAGNOSIS — E87.1 HYPONATREMIA: ICD-10-CM

## 2020-09-03 DIAGNOSIS — G89.29 CHRONIC PAIN OF RIGHT KNEE: ICD-10-CM

## 2020-09-03 DIAGNOSIS — M25.561 CHRONIC PAIN OF RIGHT KNEE: ICD-10-CM

## 2020-09-03 DIAGNOSIS — R41.89 COGNITIVE IMPAIRMENT: ICD-10-CM

## 2020-09-03 DIAGNOSIS — I50.42 CHRONIC COMBINED SYSTOLIC AND DIASTOLIC CONGESTIVE HEART FAILURE (H): ICD-10-CM

## 2020-09-03 PROCEDURE — 99309 SBSQ NF CARE MODERATE MDM 30: CPT | Performed by: NURSE PRACTITIONER

## 2020-09-03 ASSESSMENT — MIFFLIN-ST. JEOR: SCORE: 860.46

## 2020-09-03 NOTE — LETTER
9/3/2020        RE: Batsheva Barkley  [place]  [address]  [facility] [address]        [place] [facility]  [facility] Record Number:  [id number]  Place of Service where encounter took place:  [facility] [[id number]]  Chief Complaint   Patient presents with     Nursing Home Acute       HPI:    Batsheva Barkley  is a 86 year old (11/6/1933), who is being seen today for an episodic care visit.  HPI information obtained from: facility chart records, facility staff, patient report and [facility] chart review.     Past Medical and Surgical History reviewed in [facility] today.    Met with Mrs. Barkley this morning for follow up.  Mrs. Barkley has advanced cognitive/memory impairment.  Does not remember having significant tooth pain a few days ago, does not remember going to the dentist and having work a few days ago.  She endorses her chronic aches/pain with Right knee being the worse, unchanged.  She otherwise has no complaints.  Denies any tooth pain today.      MEDICATIONS:  Current Outpatient Medications   Medication Sig Dispense Refill     acetaminophen (TYLENOL) 500 MG tablet Take 2 tablets (1,000 mg) by mouth daily as needed for mild pain       acetaminophen (TYLENOL) 500 MG tablet Take 1,000 mg by mouth 3 times daily        aspirin (ASA) 81 MG chewable tablet Take 81 mg by mouth daily       Atorvastatin Calcium (LIPITOR PO) Take 20 mg by mouth At Bedtime        benzocaine (ORAJEL MAXIMUM STRENGTH) 20 % GEL gel Take by mouth 4 times daily       carboxymethylcellulose PF (REFRESH PLUS) 0.5 % ophthalmic solution Place 2 drops into both eyes every 4 hours as needed for dry eyes       carboxymethylcellulose PF (REFRESH PLUS) 0.5 % ophthalmic solution Place 2 drops into both eyes 2 times daily       diclofenac (VOLTAREN) 1 % topical gel Place 2 g onto the skin 2 times daily Bilateral knees.       diclofenac (VOLTAREN) 1 % topical gel Place 2 g onto the skin  daily as needed for moderate pain        ferrous fumarate 65 mg, [place]. FE,-Vitamin C 125 mg (VITRON-C)  MG TABS tablet Take 1 tablet by mouth daily       loperamide (IMODIUM) 2 MG capsule Take 2 mg by mouth 4 times daily as needed for diarrhea        losartan (COZAAR) 50 MG tablet Take 1 tablet (50 mg) by mouth 2 times daily 180 tablet 3     menthol-zinc oxide (CALMOSEPTINE) 0.44-20.6 % OINT ointment Apply 1 g topically 2 times daily       menthol-zinc oxide (CALMOSEPTINE) 0.44-20.6 % OINT ointment Apply topically 2 times daily as needed for skin protection       metoprolol (LOPRESSOR) 50 MG tablet TAKE ONE TABLET BY MOUTH TWICE DAILY 180 tablet 3     omeprazole (PRILOSEC) 20 MG DR capsule Take 1 capsule (20 mg) by mouth 2 times daily (before meals) 60 capsule 0     ONDANSETRON PO Take 4 mg by mouth every 8 hours as needed for nausea       order for DME Equipment being ordered: Walker Wheels () and Walker ()  Treatment Diagnosis: gait instability 1 each 0     order for DME Light weight wheelchair Body mass index is 19.08 kg/(m^2). 1 Device 0     senna-docusate (SENOKOT-S;PERICOLACE) 8.6-50 MG per tablet Take 1 tablet by mouth 2 times daily as needed        sucralfate (CARAFATE) 1 GM tablet Take 1 g by mouth 4 times daily Before meals and at bedtime       traZODone (DESYREL) 50 MG tablet Take 50 mg by mouth At Bedtime       trolamine salicylate (ASPERCREME) 10 % external cream Apply topically See Admin Instructions Apply to affected areas shoulders and neck twice daily AND BID PRN       REVIEW OF SYSTEMS:  Unobtainable secondary to cognitive impairment 7 point ROS done including, light headedness/dizziness, fever/chills, pain, Resp, CV, GI, and  and is negative other than noted in HPI.     Objective:  /70   Pulse 73   Temp 97.3  F (36.3  C)   Resp 18   Ht 1.524 m (5')   Wt 49.9 kg (110 lb)   SpO2 94%   BMI 21.48 kg/m       Exam:  EXAM LIMITED DUE TO [facility] social distancing  recommendations:  GENERAL APPEARANCE:  Elderly thin female sitting up in chair.  NAD, non-toxic.  RESP:   Regular relaxed breathing effort.  No cough.   EXTREMITIES:  No lower extremity edema, no calf tenderness.   PSYCH: Alert to self, not place, date, situation.  Clear degree of cognitive/memory impairment.  [place].  Unchanged/stable.   ENT: There is poor dentition overall.  There is a new gold crown in the Left lower tooth area.  No pain on exam.      Labs:   I have personally reviewed labs, which are in facility or EMR chart.     ASSESSMENT/PLAN:  Tooth pain  Poor dentition  Went to dentist has a new gold crown, no pain on exam today.   -No changes, continue to clinically monitor.   -Will keep PRN Benzocaine on profile for now.     Hyponatremia  Ongoing for several weeks, started in hospital.  Euvolemic hyponatremia, does not appear to be due to excess fluid intake, thus query some degree of SIADH.  We did start some diluted Gatoraide which she is tolerating well, but Na unchanged at 131 still.    -Continue diluted Gatoraide TID with meals.   -Continues 1500 ml fluid restriction.   -Recheck planned 8 Sept.     Chronic combined systolic and diastolic congestive heart failure (H)  Essential hypertension  No current s/s of clinical CHF.  Did get a BNP some days ago, was low at 105.    No current s/s of clinical CHF.   -No changes, continue to clinically monitor.     Iron deficiency anemia, unspecified iron deficiency anemia type  Ongoing LUCILLE anemia.   -Continues on BID Omeprazole.   -Continues on q daily Vitron C for now, will go to q48h in a couple weeks.     Other chronic pain  Primary osteoarthritis involving multiple joints  Chronic pain of right knee  Chronic, stable/unchanged, tolerable.   -Continues TID Acetaminophen.   -Continues Trolamine and Diclofenac gels scheduled and PRN.     Cognitive impairment  -Cognitive/memory impairment is advanced, unchanged.  No behavior issues.   -SW continues to follow, unclear  if she can return to prior Memory care.     Debility  -Continues with PT/OT.     Orders written by provider at facility  -Labs as noted above.     Electronically signed by:  NICKOLAS Wills CNP           Sincerely,        NICKOLAS Wills CNP

## 2020-09-03 NOTE — PROGRESS NOTES
[place] [facility]  [place] [facility] Record Number:  [id number]  Place of Service where encounter took place:  [facility] ([facility] [[id number]]  Chief Complaint   Patient presents with     Nursing Home Acute       HPI:    Batsheva Barkley  is a 86 year old (11/6/1933), who is being seen today for an episodic care visit.  HPI information obtained from: facility chart records, facility staff, patient report and [facility] chart review.     Past Medical and Surgical History reviewed in [facility] today.    Met with Mrs. Barkley this morning for follow up.  Mrs. Barkley has advanced cognitive/memory impairment.  Does not remember having significant tooth pain a few days ago, does not remember going to the dentist and having work a few days ago.  She endorses her chronic aches/pain with Right knee being the worse, unchanged.  She otherwise has no complaints.  Denies any tooth pain today.      MEDICATIONS:  Current Outpatient Medications   Medication Sig Dispense Refill     acetaminophen (TYLENOL) 500 MG tablet Take 2 tablets (1,000 mg) by mouth daily as needed for mild pain       acetaminophen (TYLENOL) 500 MG tablet Take 1,000 mg by mouth 3 times daily        aspirin (ASA) 81 MG chewable tablet Take 81 mg by mouth daily       Atorvastatin Calcium (LIPITOR PO) Take 20 mg by mouth At Bedtime        benzocaine (ORAJEL MAXIMUM STRENGTH) 20 % GEL gel Take by mouth 4 times daily       carboxymethylcellulose PF (REFRESH PLUS) 0.5 % ophthalmic solution Place 2 drops into both eyes every 4 hours as needed for dry eyes       carboxymethylcellulose PF (REFRESH PLUS) 0.5 % ophthalmic solution Place 2 drops into both eyes 2 times daily       diclofenac (VOLTAREN) 1 % topical gel Place 2 g onto the skin 2 times daily Bilateral knees.       diclofenac (VOLTAREN) 1 % topical gel Place 2 g onto the skin daily as needed for moderate pain        ferrous fumarate 65 mg, [place]. FE,-Vitamin C 125 mg (VITRON-C)  MG TABS  tablet Take 1 tablet by mouth daily       loperamide (IMODIUM) 2 MG capsule Take 2 mg by mouth 4 times daily as needed for diarrhea        losartan (COZAAR) 50 MG tablet Take 1 tablet (50 mg) by mouth 2 times daily 180 tablet 3     menthol-zinc oxide (CALMOSEPTINE) 0.44-20.6 % OINT ointment Apply 1 g topically 2 times daily       menthol-zinc oxide (CALMOSEPTINE) 0.44-20.6 % OINT ointment Apply topically 2 times daily as needed for skin protection       metoprolol (LOPRESSOR) 50 MG tablet TAKE ONE TABLET BY MOUTH TWICE DAILY 180 tablet 3     omeprazole (PRILOSEC) 20 MG DR capsule Take 1 capsule (20 mg) by mouth 2 times daily (before meals) 60 capsule 0     ONDANSETRON PO Take 4 mg by mouth every 8 hours as needed for nausea       order for DME Equipment being ordered: Walker Wheels () and Walker ()  Treatment Diagnosis: gait instability 1 each 0     order for DME Light weight wheelchair Body mass index is 19.08 kg/(m^2). 1 Device 0     senna-docusate (SENOKOT-S;PERICOLACE) 8.6-50 MG per tablet Take 1 tablet by mouth 2 times daily as needed        sucralfate (CARAFATE) 1 GM tablet Take 1 g by mouth 4 times daily Before meals and at bedtime       traZODone (DESYREL) 50 MG tablet Take 50 mg by mouth At Bedtime       trolamine salicylate (ASPERCREME) 10 % external cream Apply topically See Admin Instructions Apply to affected areas shoulders and neck twice daily AND BID PRN       REVIEW OF SYSTEMS:  Unobtainable secondary to cognitive impairment 7 point ROS done including, light headedness/dizziness, fever/chills, pain, Resp, CV, GI, and  and is negative other than noted in HPI.     Objective:  /70   Pulse 73   Temp 97.3  F (36.3  C)   Resp 18   Ht 1.524 m (5')   Wt 49.9 kg (110 lb)   SpO2 94%   BMI 21.48 kg/m       Exam:  EXAM LIMITED DUE TO [facility] social distancing recommendations:  GENERAL APPEARANCE:  Elderly thin female sitting up in chair.  NAD, non-toxic.  RESP:   Regular relaxed breathing  effort.  No cough.   EXTREMITIES:  No lower extremity edema, no calf tenderness.   PSYCH: Alert to self, not place, date, situation.  Clear degree of cognitive/memory impairment.  [place].  Unchanged/stable.   ENT: There is poor dentition overall.  There is a new gold crown in the Left lower tooth area.  No pain on exam.      Labs:   I have personally reviewed labs, which are in facility or EMR chart.     ASSESSMENT/PLAN:  Tooth pain  Poor dentition  Went to dentist has a new gold crown, no pain on exam today.   -No changes, continue to clinically monitor.   -Will keep PRN Benzocaine on profile for now.     Hyponatremia  Ongoing for several weeks, started in hospital.  Euvolemic hyponatremia, does not appear to be due to excess fluid intake, thus query some degree of SIADH.  We did start some diluted Gatoraide which she is tolerating well, but Na unchanged at 131 still.    -Continue diluted Gatoraide TID with meals.   -Continues 1500 ml fluid restriction.   -Recheck planned 8 Sept.     Chronic combined systolic and diastolic congestive heart failure (H)  Essential hypertension  No current s/s of clinical CHF.  Did get a BNP some days ago, was low at 105.    No current s/s of clinical CHF.   -No changes, continue to clinically monitor.     Iron deficiency anemia, unspecified iron deficiency anemia type  Ongoing LUCILLE anemia.   -Continues on BID Omeprazole.   -Continues on q daily Vitron C for now, will go to q48h in a couple weeks.     Other chronic pain  Primary osteoarthritis involving multiple joints  Chronic pain of right knee  Chronic, stable/unchanged, tolerable.   -Continues TID Acetaminophen.   -Continues Trolamine and Diclofenac gels scheduled and PRN.     Cognitive impairment  -Cognitive/memory impairment is advanced, unchanged.  No behavior issues.   -SW continues to follow, unclear if she can return to prior Memory care.     Debility  -Continues with PT/OT.     Orders written by provider at facility  -Labs as  noted above.     Electronically signed by:  NICKOLAS Wills CNP

## 2020-09-07 ENCOUNTER — RECORDS - HEALTHEAST (OUTPATIENT)
Dept: LAB | Facility: CLINIC | Age: 85
End: 2020-09-07

## 2020-09-08 ENCOUNTER — NURSING HOME VISIT (OUTPATIENT)
Dept: GERIATRICS | Facility: CLINIC | Age: 85
End: 2020-09-08
Payer: COMMERCIAL

## 2020-09-08 VITALS
WEIGHT: 115 LBS | HEIGHT: 60 IN | HEART RATE: 58 BPM | TEMPERATURE: 97.3 F | BODY MASS INDEX: 22.58 KG/M2 | SYSTOLIC BLOOD PRESSURE: 156 MMHG | OXYGEN SATURATION: 94 % | RESPIRATION RATE: 18 BRPM | DIASTOLIC BLOOD PRESSURE: 68 MMHG

## 2020-09-08 DIAGNOSIS — D50.9 IRON DEFICIENCY ANEMIA, UNSPECIFIED IRON DEFICIENCY ANEMIA TYPE: ICD-10-CM

## 2020-09-08 DIAGNOSIS — F03.90 DEMENTIA WITHOUT BEHAVIORAL DISTURBANCE, UNSPECIFIED DEMENTIA TYPE: ICD-10-CM

## 2020-09-08 DIAGNOSIS — R54 FRAILTY: ICD-10-CM

## 2020-09-08 DIAGNOSIS — M15.0 PRIMARY OSTEOARTHRITIS INVOLVING MULTIPLE JOINTS: ICD-10-CM

## 2020-09-08 DIAGNOSIS — R05.9 COUGH: Primary | ICD-10-CM

## 2020-09-08 DIAGNOSIS — E87.1 HYPONATREMIA: ICD-10-CM

## 2020-09-08 LAB
ANION GAP SERPL CALCULATED.3IONS-SCNC: 9 MMOL/L (ref 5–18)
BUN SERPL-MCNC: 22 MG/DL (ref 8–28)
CALCIUM SERPL-MCNC: 8.7 MG/DL (ref 8.5–10.5)
CHLORIDE BLD-SCNC: 94 MMOL/L (ref 98–107)
CO2 SERPL-SCNC: 22 MMOL/L (ref 22–31)
CREAT SERPL-MCNC: 0.63 MG/DL (ref 0.6–1.1)
GFR SERPL CREATININE-BSD FRML MDRD: >60 ML/MIN/1.73M2
GLUCOSE BLD-MCNC: 94 MG/DL (ref 70–125)
POTASSIUM BLD-SCNC: 3.8 MMOL/L (ref 3.5–5)
SODIUM SERPL-SCNC: 125 MMOL/L (ref 136–145)

## 2020-09-08 PROCEDURE — 99309 SBSQ NF CARE MODERATE MDM 30: CPT | Performed by: INTERNAL MEDICINE

## 2020-09-08 ASSESSMENT — MIFFLIN-ST. JEOR: SCORE: 883.14

## 2020-09-08 NOTE — LETTER
9/8/2020        RE: Batsheva Barkley  [facility]  [address]  [facility] [address]        [facility]  PHYSICIAN NOTE    Chief Complaint   Patient presents with     Nursing Home Acute       HPI:    Batsheva Barkley is a 86 year old  (11/6/1933), who is being seen today for an episodic TCU visit at [facility] . She has h/o dementia, CAD, large hiatal hernia, anemia and generalized arthritis who lives in [facility] and has been becoming more frail including having fear of falling. She went into ED after legs/knees gave out while attempting to stand and had a non-injurious fall. She transitioned to [facility] where she is undergoing therapies. She continues to have c/o some chronic bilateral knee pain. Also had significant dental pain needing recent visit to dentist for crown placement. We've been following her hyponatremia; on fluid restriction and added electrolyte drink with pending labs for today. She was also started on iron here for LUCILLE without signs/sx of active blood loss.    Batsheva is seen in her room today just after lunch. She was sitting up in her wheelchair without her neck/head supported and it was extended backwards and she had some intermittent coughing when I entered the room. I helped her put a pillow behind her back which gave some support to her neck and she was very appreciative and said that felt good. However, continued to have dry cough spells during our visit. I asked her about this though noted history limited d/t her dementia. She thinks she has been coughing all day and that its d/t dry air. Doesn't feel short of breath at all. Her weight is up a tad, afebrile but is on scheduled Tylenol and she admits to feeling chilled. No pains other than her knees. I spoke with therapy and RN today and neither have seen her cough at all today.    ALLERGIES: Codeine and Lisinopril    Past Medical History:   Diagnosis Date     Aortic  insufficiency      CAD (coronary artery disease) 06/20/2005    w/stent to dominant Cfx(2005)     Cardiomyopathy (H)      Chest discomfort      CHF NYHA class III, chronic, systolic (H) 1/25/2018     Coronary atherosclerosis 6/20/2005    circ vessel was stented;  had a 50% LAD lesion which was not treated Problem list name updated by automated process. Provider to review     Former smoker      Generalized osteoarthrosis     knees ;; L total kne 10/09     GERD with esophagitis      Hiatal hernia     Large     Hypertension goal BP (blood pressure) < 140/90 1/21/2015     Ischemic cardiomyopathy      Leg weakness, bilateral 2/13/2018     MI (myocardial infarction) (H) 06/2005     Mitral regurgitation     moderate     Mixed hyperlipidemia      Osteoporosis      Peripheral edema 01/26/2015     Physical deconditioning 1/21/2015     Total urinary incontinence 12/27/2017      CODE STATUS: DNR/I    MEDICATIONS: Reviewed and updated in Epic according to facility MAR  Current Outpatient Medications   Medication Sig Dispense Refill     acetaminophen (TYLENOL) 500 MG tablet Take 1,000 mg by mouth 3 times daily With additional 1000 mg once daily PRN pain       aspirin (ASA) 81 MG chewable tablet Take 81 mg by mouth daily       Atorvastatin Calcium (LIPITOR PO) Take 20 mg by mouth At Bedtime        benzocaine (ORAJEL MAXIMUM STRENGTH) 20 % GEL gel Take by mouth 4 times daily       carboxymethylcellulose PF (REFRESH PLUS) 0.5 % ophthalmic solution Place 2 drops into both eyes 2 times daily And also 2 drops every 4 hours PRN dry eyes       diclofenac (VOLTAREN) 1 % topical gel Place 2 g onto the skin 2 times daily To bilateral knees; also apply additional once daily PRN pain       ferrous fumarate 65 mg, [place]. FE,-Vitamin C 125 mg (VITRON-C)  MG TABS tablet Take 1 tablet by mouth daily       loperamide (IMODIUM) 2 MG capsule Take 2 mg by mouth 4 times daily as needed for diarrhea        losartan (COZAAR) 50 MG tablet Take 1  tablet (50 mg) by mouth 2 times daily 180 tablet 3     menthol-zinc oxide (CALMOSEPTINE) 0.44-20.6 % OINT ointment Apply topically 2 times daily And also BID PRN for skin protection       metoprolol (LOPRESSOR) 50 MG tablet TAKE ONE TABLET BY MOUTH TWICE DAILY 180 tablet 3     omeprazole (PRILOSEC) 20 MG DR capsule Take 1 capsule (20 mg) by mouth 2 times daily (before meals) 60 capsule 0     ONDANSETRON PO Take 4 mg by mouth every 8 hours as needed for nausea       order for DME Equipment being ordered: Walker Wheels () and Walker ()  Treatment Diagnosis: gait instability 1 each 0     order for DME Light weight wheelchair Body mass index is 19.08 kg/(m^2). 1 Device 0     senna-docusate (SENOKOT-S;PERICOLACE) 8.6-50 MG per tablet Take 1 tablet by mouth 2 times daily as needed        sucralfate (CARAFATE) 1 GM tablet Take 1 g by mouth 4 times daily Before meals and at bedtime       traZODone (DESYREL) 50 MG tablet Take 50 mg by mouth At Bedtime       trolamine salicylate (ASPERCREME) 10 % external cream Apply topically See Admin Instructions Apply to affected areas shoulders and neck twice daily AND BID PRN         ROS:  Limited secondary to cognitive impairment but today pt reports as above in HPI    Exam:  BP (!) 156/68   Pulse 58   Temp 97.3  F (36.3  C)   Resp 18   Ht 1.524 m (5')   Wt 52.2 kg (115 lb)   SpO2 94%   BMI 22.46 kg/m    Alert, sitting up in wheelchair casually dressed  No scleral icterus  Hard of hearing  Slightly dry oral mucosa  Initially she was leaning her head backwards when I entered the room d/t lack of neck support and coughing intermittently; felt much better with pillow support to her neck  Breathing non-labored, noted periodic dry cough throughout my visit, R lung clear and LLL mild inspiratory crackles noted to auscultation  Extremities warm and well perfused, no edema  Repetitive, noted memory loss  No tremor, normal speech  Affect calm and pleasant and appreciative of  "visit    Lab/Diagnostic Data:    Recheck BMP ordered for today is in process for mild asymptomatic hyponatremia  COVID-19 test ordered today too    ASSESSMENT/PLAN:  Cough  New cough today that only I noticed and could very well be r/t head/neck position when I came to her room and/or her reports of dryness in the air vs other  Does have GERD and is on PPI and Carafate as well  Question COVID-19 as she did go out for dental appointment lately and subjective report of chills (but it did take a dramatic decline in temperature outside the past 24 hours as well and admittedly cool in the building)  Also question aspiration pneumonia given this was occurring after lunch  I spoke with her RN and therapist who worked with her today noting no coughing spells and her history is limited d/t dementia  She remains stable and afebrile (but on scheduled Tylenol TID) currently  PLAN: Ordered COVID-19 testing (no other known staff/residents currently positive in entire facility), vitals qShift x 48 hours, update primary NP if any on-going cough & close monitoring. Holding off on CXR at the moment (because if she is +COVID-19 would treat symptomatically and want to minimize vendors coming into facility); LLL crackles could be d/t atelectasis vs other. If clinically other folks see/hear her cough and/or change in other symptoms would re-evaluate clinically and consider CXR and/or antibiotic therapy as appropriate at that time.     Hyponatremia  Hyponatremia noted intermittently in the past and also during recent hospital stay  Less likely a new hypothyroidism or r/t cortisol  Labs returned late this evening; Hyponatremia worse with Na 125 and Chloride also low at 94 though other electrolytes within normal limits and BUN/Cr stable 22/0.63  Clinically would suspect she is on the dry side given how she reported feeling \"dry\" and her fluid restriction and she likely needs reminders from staff to help push fluids/drink  Will collaborate " with care team and primary NP re: orders and f/u labs    Dementia without behavioral disturbance, unspecified dementia type (H)  Noted comorbidity affecting her care plan; no behaviors    Primary osteoarthritis involving multiple joints  Frailty  Continue physical therapy and occupational therapy and therapies for arthritis (especially affecting her knees)  Therapy limited though d/t her dementia  H/o knee steroid injections by ISIDRO Costello; h/o left TKA    Iron deficiency anemia, unspecified iron deficiency anemia type  H/o hiatal hernia with GERD  Is on BID PPI and Carafate QID  Hgb in 8-10 range in the past year  Now MCV is falling (into 70s) concerning for iron deficiency and has been started on VitronC  No signs/sx of active blood loss; Hgb last week stable at 9.1 and other cell lines within normal limits   Would discuss as outpatient with family if not done already goals of care as she could have slow GI bleed/malignancy (last colonoscopy within normal limits in 2005; EGD from 2016 showed esophagitis and moderate HH).  CT abdomen/pelvis from Sept 2019 only showed tiny pulmonary nodules and recent CXR unremarkable.        Electronically signed by:  Criselda Flores DO        Sincerely,        Criselda Flores DO

## 2020-09-08 NOTE — PROGRESS NOTES
[facility]  PHYSICIAN NOTE    Chief Complaint   Patient presents with     Nursing Home Acute       HPI:    Batsheva Barkley is a 86 year old  (11/6/1933), who is being seen today for an episodic TCU visit at [facility] . She has h/o dementia, CAD, large hiatal hernia, anemia and generalized arthritis who lives in [facility] and has been becoming more frail including having fear of falling. She went into ED after legs/knees gave out while attempting to stand and had a non-injurious fall. She transitioned to [facility]CU where she is undergoing therapies. She continues to have c/o some chronic bilateral knee pain. Also had significant dental pain needing recent visit to dentist for crown placement. We've been following her hyponatremia; on fluid restriction and added electrolyte drink with pending labs for today. She was also started on iron here for LUCILLE without signs/sx of active blood loss.    Batsheva is seen in her room today just after lunch. She was sitting up in her wheelchair without her neck/head supported and it was extended backwards and she had some intermittent coughing when I entered the room. I helped her put a pillow behind her back which gave some support to her neck and she was very appreciative and said that felt good. However, continued to have dry cough spells during our visit. I asked her about this though noted history limited d/t her dementia. She thinks she has been coughing all day and that its d/t dry air. Doesn't feel short of breath at all. Her weight is up a tad, afebrile but is on scheduled Tylenol and she admits to feeling chilled. No pains other than her knees. I spoke with therapy and RN today and neither have seen her cough at all today.    ALLERGIES: Codeine and Lisinopril    Past Medical History:   Diagnosis Date     Aortic insufficiency      CAD (coronary artery disease) 06/20/2005    w/stent to dominant Cfx(2005)     Cardiomyopathy (H)      Chest  discomfort      CHF NYHA class III, chronic, systolic (H) 1/25/2018     Coronary atherosclerosis 6/20/2005    circ vessel was stented;  had a 50% LAD lesion which was not treated Problem list name updated by automated process. Provider to review     Former smoker      Generalized osteoarthrosis     knees ;; L total kne 10/09     GERD with esophagitis      Hiatal hernia     Large     Hypertension goal BP (blood pressure) < 140/90 1/21/2015     Ischemic cardiomyopathy      Leg weakness, bilateral 2/13/2018     MI (myocardial infarction) (H) 06/2005     Mitral regurgitation     moderate     Mixed hyperlipidemia      Osteoporosis      Peripheral edema 01/26/2015     Physical deconditioning 1/21/2015     Total urinary incontinence 12/27/2017      CODE STATUS: DNR/I    MEDICATIONS: Reviewed and updated in Epic according to facility MAR  Current Outpatient Medications   Medication Sig Dispense Refill     acetaminophen (TYLENOL) 500 MG tablet Take 1,000 mg by mouth 3 times daily With additional 1000 mg once daily PRN pain       aspirin (ASA) 81 MG chewable tablet Take 81 mg by mouth daily       Atorvastatin Calcium (LIPITOR PO) Take 20 mg by mouth At Bedtime        benzocaine (ORAJEL MAXIMUM STRENGTH) 20 % GEL gel Take by mouth 4 times daily       carboxymethylcellulose PF (REFRESH PLUS) 0.5 % ophthalmic solution Place 2 drops into both eyes 2 times daily And also 2 drops every 4 hours PRN dry eyes       diclofenac (VOLTAREN) 1 % topical gel Place 2 g onto the skin 2 times daily To bilateral knees; also apply additional once daily PRN pain       ferrous fumarate 65 mg, [place]. FE,-Vitamin C 125 mg (VITRON-C)  MG TABS tablet Take 1 tablet by mouth daily       loperamide (IMODIUM) 2 MG capsule Take 2 mg by mouth 4 times daily as needed for diarrhea        losartan (COZAAR) 50 MG tablet Take 1 tablet (50 mg) by mouth 2 times daily 180 tablet 3     menthol-zinc oxide (CALMOSEPTINE) 0.44-20.6 % OINT ointment Apply topically  2 times daily And also BID PRN for skin protection       metoprolol (LOPRESSOR) 50 MG tablet TAKE ONE TABLET BY MOUTH TWICE DAILY 180 tablet 3     omeprazole (PRILOSEC) 20 MG DR capsule Take 1 capsule (20 mg) by mouth 2 times daily (before meals) 60 capsule 0     ONDANSETRON PO Take 4 mg by mouth every 8 hours as needed for nausea       order for DME Equipment being ordered: Walker Wheels () and Walker ()  Treatment Diagnosis: gait instability 1 each 0     order for DME Light weight wheelchair Body mass index is 19.08 kg/(m^2). 1 Device 0     senna-docusate (SENOKOT-S;PERICOLACE) 8.6-50 MG per tablet Take 1 tablet by mouth 2 times daily as needed        sucralfate (CARAFATE) 1 GM tablet Take 1 g by mouth 4 times daily Before meals and at bedtime       traZODone (DESYREL) 50 MG tablet Take 50 mg by mouth At Bedtime       trolamine salicylate (ASPERCREME) 10 % external cream Apply topically See Admin Instructions Apply to affected areas shoulders and neck twice daily AND BID PRN         ROS:  Limited secondary to cognitive impairment but today pt reports as above in HPI    Exam:  BP (!) 156/68   Pulse 58   Temp 97.3  F (36.3  C)   Resp 18   Ht 1.524 m (5')   Wt 52.2 kg (115 lb)   SpO2 94%   BMI 22.46 kg/m    Alert, sitting up in wheelchair casually dressed  No scleral icterus  Hard of hearing  Slightly dry oral mucosa  Initially she was leaning her head backwards when I entered the room d/t lack of neck support and coughing intermittently; felt much better with pillow support to her neck  Breathing non-labored, noted periodic dry cough throughout my visit, R lung clear and LLL mild inspiratory crackles noted to auscultation  Extremities warm and well perfused, no edema  Repetitive, noted memory loss  No tremor, normal speech  Affect calm and pleasant and appreciative of visit    Lab/Diagnostic Data:    Recheck BMP ordered for today is in process for mild asymptomatic hyponatremia  COVID-19 test  "ordered today too    ASSESSMENT/PLAN:  Cough  New cough today that only I noticed and could very well be r/t head/neck position when I came to her room and/or her reports of dryness in the air vs other  Does have GERD and is on PPI and Carafate as well  Question COVID-19 as she did go out for dental appointment lately and subjective report of chills (but it did take a dramatic decline in temperature outside the past 24 hours as well and admittedly cool in the building)  Also question aspiration pneumonia given this was occurring after lunch  I spoke with her RN and therapist who worked with her today noting no coughing spells and her history is limited d/t dementia  She remains stable and afebrile (but on scheduled Tylenol TID) currently  PLAN: Ordered COVID-19 testing (no other known staff/residents currently positive in entire facility), vitals qShift x 48 hours, update primary NP if any on-going cough & close monitoring. Holding off on CXR at the moment (because if she is +COVID-19 would treat symptomatically and want to minimize vendors coming into facility); LLL crackles could be d/t atelectasis vs other. If clinically other folks see/hear her cough and/or change in other symptoms would re-evaluate clinically and consider CXR and/or antibiotic therapy as appropriate at that time.     Hyponatremia  Hyponatremia noted intermittently in the past and also during recent hospital stay  Less likely a new hypothyroidism or r/t cortisol  Labs returned late this evening; Hyponatremia worse with Na 125 and Chloride also low at 94 though other electrolytes within normal limits and BUN/Cr stable 22/0.63  Clinically would suspect she is on the dry side given how she reported feeling \"dry\" and her fluid restriction and she likely needs reminders from staff to help push fluids/drink  Will collaborate with care team and primary NP re: orders and f/u labs    Dementia without behavioral disturbance, unspecified dementia type " (H)  Noted comorbidity affecting her care plan; no behaviors    Primary osteoarthritis involving multiple joints  Frailty  Continue physical therapy and occupational therapy and therapies for arthritis (especially affecting her knees)  Therapy limited though d/t her dementia  H/o knee steroid injections by ISIDRO Costello; h/o left TKA    Iron deficiency anemia, unspecified iron deficiency anemia type  H/o hiatal hernia with GERD  Is on BID PPI and Carafate QID  Hgb in 8-10 range in the past year  Now MCV is falling (into 70s) concerning for iron deficiency and has been started on VitronC  No signs/sx of active blood loss; Hgb last week stable at 9.1 and other cell lines within normal limits   Would discuss as outpatient with family if not done already goals of care as she could have slow GI bleed/malignancy (last colonoscopy within normal limits in 2005; EGD from 2016 showed esophagitis and moderate HH).  CT abdomen/pelvis from Sept 2019 only showed tiny pulmonary nodules and recent CXR unremarkable.        Electronically signed by:  Criselda Flores DO

## 2020-09-08 NOTE — Clinical Note
William, I think she is on the dry side re: worsening hyponatremia. Can you place new orders for her tomorrow please? Maybe discontinue fluid restriction and have staff push the Gatorade and recheck labs Thurs or Fri? If Friday, I can follow up since I know you are off - just let me know. Thanks!

## 2020-09-09 ENCOUNTER — NURSING HOME VISIT (OUTPATIENT)
Dept: GERIATRICS | Facility: CLINIC | Age: 85
End: 2020-09-09
Payer: COMMERCIAL

## 2020-09-09 VITALS
HEIGHT: 60 IN | DIASTOLIC BLOOD PRESSURE: 75 MMHG | RESPIRATION RATE: 16 BRPM | WEIGHT: 115 LBS | TEMPERATURE: 96.6 F | OXYGEN SATURATION: 93 % | SYSTOLIC BLOOD PRESSURE: 134 MMHG | BODY MASS INDEX: 22.58 KG/M2 | HEART RATE: 60 BPM

## 2020-09-09 DIAGNOSIS — R09.89 BIBASILAR CRACKLES: ICD-10-CM

## 2020-09-09 DIAGNOSIS — R05.9 COUGH: Primary | ICD-10-CM

## 2020-09-09 DIAGNOSIS — I50.42 CHRONIC COMBINED SYSTOLIC AND DIASTOLIC CONGESTIVE HEART FAILURE (H): ICD-10-CM

## 2020-09-09 DIAGNOSIS — E87.1 HYPONATREMIA: ICD-10-CM

## 2020-09-09 PROCEDURE — 99309 SBSQ NF CARE MODERATE MDM 30: CPT | Performed by: NURSE PRACTITIONER

## 2020-09-09 ASSESSMENT — MIFFLIN-ST. JEOR: SCORE: 883.14

## 2020-09-09 NOTE — LETTER
9/9/2020        RE: Batsheva Barkley  [place]  [address]  [facility] [address]        [facility]  [place] Medical Record Number:  [id number]  Place of Service where encounter took place:  [facility] ([facility]) [[id number]]  Chief Complaint   Patient presents with     Nursing Home Acute       HPI:    Batsheva Barkley  is a 86 year old (11/6/1933), who is being seen today for an episodic care visit.  HPI information obtained from: facility chart records, facility staff, patient report and [facility] chart review.     Past Medical and Surgical History reviewed in [facility] today.    Had f/u with Mrs. Barkley today due to cough noted yesterday.  Mrs. Barkley has a mod-advanced degree of cognitive impairment.  She was sitting up in  today, reports she feels well.  She did note chronic pain issues, but unchanged/stable.  She reports she had a coughing spell this morning, a little bit of clear phlegm but does not think she had any more today.  She denies any SOB/QUEZADA, no light headedness/dizziness.      MEDICATIONS:  Current Outpatient Medications   Medication Sig Dispense Refill     acetaminophen (TYLENOL) 500 MG tablet Take 1,000 mg by mouth 3 times daily With additional 1000 mg once daily PRN pain       aspirin (ASA) 81 MG chewable tablet Take 81 mg by mouth daily       Atorvastatin Calcium (LIPITOR PO) Take 20 mg by mouth At Bedtime        benzocaine (ORAJEL MAXIMUM STRENGTH) 20 % GEL gel Take by mouth 4 times daily       carboxymethylcellulose PF (REFRESH PLUS) 0.5 % ophthalmic solution Place 2 drops into both eyes 2 times daily And also 2 drops every 4 hours PRN dry eyes       diclofenac (VOLTAREN) 1 % topical gel Place 2 g onto the skin 2 times daily To bilateral knees; also apply additional once daily PRN pain       ferrous fumarate 65 mg, [place]. FE,-Vitamin C 125 mg (VITRON-C)  MG TABS tablet Take 1 tablet by mouth daily       loperamide (IMODIUM) 2 MG  capsule Take 2 mg by mouth 4 times daily as needed for diarrhea        losartan (COZAAR) 50 MG tablet Take 1 tablet (50 mg) by mouth 2 times daily 180 tablet 3     menthol-zinc oxide (CALMOSEPTINE) 0.44-20.6 % OINT ointment Apply topically 2 times daily And also BID PRN for skin protection       metoprolol (LOPRESSOR) 50 MG tablet TAKE ONE TABLET BY MOUTH TWICE DAILY 180 tablet 3     omeprazole (PRILOSEC) 20 MG DR capsule Take 1 capsule (20 mg) by mouth 2 times daily (before meals) 60 capsule 0     ONDANSETRON PO Take 4 mg by mouth every 8 hours as needed for nausea       order for DME Equipment being ordered: Walker Wheels () and Walker ()  Treatment Diagnosis: gait instability 1 each 0     order for DME Light weight wheelchair Body mass index is 19.08 kg/(m^2). 1 Device 0     senna-docusate (SENOKOT-S;PERICOLACE) 8.6-50 MG per tablet Take 1 tablet by mouth 2 times daily as needed        sucralfate (CARAFATE) 1 GM tablet Take 1 g by mouth 4 times daily Before meals and at bedtime       traZODone (DESYREL) 50 MG tablet Take 50 mg by mouth At Bedtime       trolamine salicylate (ASPERCREME) 10 % external cream Apply topically See Admin Instructions Apply to affected areas shoulders and neck twice daily AND BID PRN       REVIEW OF SYSTEMS:  Limited secondary to cognitive impairment but today pt reports 7 point ROS done including, light headedness/dizziness, fever/chills, pain, Resp, CV, GI, and  and is negative other than noted in HPI.      Objective:  /75   Pulse 60   Temp 96.6  F (35.9  C)   Resp 16   Ht 1.524 m (5')   Wt 52.2 kg (115 lb)   SpO2 93%   BMI 22.46 kg/m       Exam:  EXAM LIMITED DUE TO [facility] social distancing recommendations:  GENERAL APPEARANCE:  Elderly thin female sitting up in chair.  NAD, non-toxic.  RESP:  Mild crackles and diminished in bilateral lower lobes.  Regular relaxed breathing effort.  No cough.   EXTREMITIES:  No lower extremity edema, no calf tenderness.   PSYCH:  Alert to self, not place, date, situation.  Clear degree of cognitive/memory impairment.  [place].  Unchanged/stable.     Labs:   I have personally reviewed labs, which are in facility or EMR chart.     ASSESSMENT/PLAN:  Cough  Bibasilar crackles  Hyponatremia  Chronic combined systolic and diastolic congestive heart failure (H)  Mrs. Barkley was seen by my colleague Dr. Flores yesterday, whom noted a new cough and crackles in lower lobes.  We also have been following her ongoing hyponatremia closely.   On review today, she is sitting up in WC, no SOB/QUEZADA, afebrile, no cough on exam today.  Still no LE edema, does have mild bilateral lower lobe crackles.  We do note weight is up.  Na is down to 125 yesterday.     Thus in regard to her hyponatremia, etiology unclear.  We have been noting euvolemic, but Dr. Flores reports she querys if she is more hypovolemic, noting increase in BUN.  But with weight up and crackles, one could argue hypervolemic but either way would be mild.    -Plan:  -DC'd prior fluid restriction.   -We continue 1/2 Gatorade/water drinks, increase to 360 TID and increase overall PO fluids to 1.8L/day, to encourage hydration.   --Will recheck BMP, BNP, on 11 Sept.   -Will also get urine sodium and osmolarity to help give more data.     In regards to her cough, this is rather subjective, afebrile, no SOB/QUEZADA, but we do note crackles above.  Is non-toxic, no complaints.   -COVID test pending.   -If any calls on declining, get above labs along with CBC and procalcitonin and get AP/lateral CXR.    -Will continue to monitor.     Orders written by provider at facility  -As noted above.     Electronically signed by:  NICKOLAS Wills CNP         Sincerely,        NICKOLAS Wills CNP

## 2020-09-09 NOTE — PROGRESS NOTES
[facility]  [place] Medical Record Number:  [id number]  Place of Service where encounter took place:  [facility] ([facility]) [[id number]]  Chief Complaint   Patient presents with     Nursing Home Acute       HPI:    Batsheva Barkley  is a 86 year old (11/6/1933), who is being seen today for an episodic care visit.  HPI information obtained from: facility chart records, facility staff, patient report and [facility] chart review.     Past Medical and Surgical History reviewed in [facility] today.    Had f/u with Mrs. Barkley today due to cough noted yesterday.  Mrs. Barkley has a mod-advanced degree of cognitive impairment.  She was sitting up in  today, reports she feels well.  She did note chronic pain issues, but unchanged/stable.  She reports she had a coughing spell this morning, a little bit of clear phlegm but does not think she had any more today.  She denies any SOB/QUEZADA, no light headedness/dizziness.      MEDICATIONS:  Current Outpatient Medications   Medication Sig Dispense Refill     acetaminophen (TYLENOL) 500 MG tablet Take 1,000 mg by mouth 3 times daily With additional 1000 mg once daily PRN pain       aspirin (ASA) 81 MG chewable tablet Take 81 mg by mouth daily       Atorvastatin Calcium (LIPITOR PO) Take 20 mg by mouth At Bedtime        benzocaine (ORAJEL MAXIMUM STRENGTH) 20 % GEL gel Take by mouth 4 times daily       carboxymethylcellulose PF (REFRESH PLUS) 0.5 % ophthalmic solution Place 2 drops into both eyes 2 times daily And also 2 drops every 4 hours PRN dry eyes       diclofenac (VOLTAREN) 1 % topical gel Place 2 g onto the skin 2 times daily To bilateral knees; also apply additional once daily PRN pain       ferrous fumarate 65 mg, [place]. FE,-Vitamin C 125 mg (VITRON-C)  MG TABS tablet Take 1 tablet by mouth daily       loperamide (IMODIUM) 2 MG capsule Take 2 mg by mouth 4 times daily as needed for diarrhea        losartan (COZAAR) 50 MG tablet Take 1 tablet  (50 mg) by mouth 2 times daily 180 tablet 3     menthol-zinc oxide (CALMOSEPTINE) 0.44-20.6 % OINT ointment Apply topically 2 times daily And also BID PRN for skin protection       metoprolol (LOPRESSOR) 50 MG tablet TAKE ONE TABLET BY MOUTH TWICE DAILY 180 tablet 3     omeprazole (PRILOSEC) 20 MG DR capsule Take 1 capsule (20 mg) by mouth 2 times daily (before meals) 60 capsule 0     ONDANSETRON PO Take 4 mg by mouth every 8 hours as needed for nausea       order for DME Equipment being ordered: Walker Wheels () and Walker ()  Treatment Diagnosis: gait instability 1 each 0     order for DME Light weight wheelchair Body mass index is 19.08 kg/(m^2). 1 Device 0     senna-docusate (SENOKOT-S;PERICOLACE) 8.6-50 MG per tablet Take 1 tablet by mouth 2 times daily as needed        sucralfate (CARAFATE) 1 GM tablet Take 1 g by mouth 4 times daily Before meals and at bedtime       traZODone (DESYREL) 50 MG tablet Take 50 mg by mouth At Bedtime       trolamine salicylate (ASPERCREME) 10 % external cream Apply topically See Admin Instructions Apply to affected areas shoulders and neck twice daily AND BID PRN       REVIEW OF SYSTEMS:  Limited secondary to cognitive impairment but today pt reports 7 point ROS done including, light headedness/dizziness, fever/chills, pain, Resp, CV, GI, and  and is negative other than noted in HPI.      Objective:  /75   Pulse 60   Temp 96.6  F (35.9  C)   Resp 16   Ht 1.524 m (5')   Wt 52.2 kg (115 lb)   SpO2 93%   BMI 22.46 kg/m       Exam:  EXAM LIMITED DUE TO [facility] social distancing recommendations:  GENERAL APPEARANCE:  Elderly thin female sitting up in chair.  NAD, non-toxic.  RESP:  Mild crackles and diminished in bilateral lower lobes.  Regular relaxed breathing effort.  No cough.   EXTREMITIES:  No lower extremity edema, no calf tenderness.   PSYCH: Alert to self, not place, date, situation.  Clear degree of cognitive/memory impairment.   [place].  Unchanged/stable.     Labs:   I have personally reviewed labs, which are in facility or EMR chart.     ASSESSMENT/PLAN:  Cough  Bibasilar crackles  Hyponatremia  Chronic combined systolic and diastolic congestive heart failure (H)  Mrs. Barkely was seen by my colleague Dr. Flores yesterday, whom noted a new cough and crackles in lower lobes.  We also have been following her ongoing hyponatremia closely.   On review today, she is sitting up in WC, no SOB/QUEZADA, afebrile, no cough on exam today.  Still no LE edema, does have mild bilateral lower lobe crackles.  We do note weight is up.  Na is down to 125 yesterday.     Thus in regard to her hyponatremia, etiology unclear.  We have been noting euvolemic, but Dr. Flores reports she querys if she is more hypovolemic, noting increase in BUN.  But with weight up and crackles, one could argue hypervolemic but either way would be mild.    -Plan:  -DC'd prior fluid restriction.   -We continue 1/2 Gatorade/water drinks, increase to 360 TID and increase overall PO fluids to 1.8L/day, to encourage hydration.   --Will recheck BMP, BNP, on 11 Sept.   -Will also get urine sodium and osmolarity to help give more data.     In regards to her cough, this is rather subjective, afebrile, no SOB/QUEZADA, but we do note crackles above.  Is non-toxic, no complaints.   -COVID test pending.   -If any calls on declining, get above labs along with CBC and procalcitonin and get AP/lateral CXR.    -Will continue to monitor.     Orders written by provider at facility  -As noted above.     Electronically signed by:  NICKOLAS Wills CNP

## 2020-09-10 ENCOUNTER — RECORDS - HEALTHEAST (OUTPATIENT)
Dept: LAB | Facility: CLINIC | Age: 85
End: 2020-09-10

## 2020-09-11 ENCOUNTER — TELEPHONE (OUTPATIENT)
Dept: GERIATRICS | Facility: CLINIC | Age: 85
End: 2020-09-11

## 2020-09-11 LAB
ANION GAP SERPL CALCULATED.3IONS-SCNC: 9 MMOL/L (ref 5–18)
BNP SERPL-MCNC: 109 PG/ML (ref 0–167)
BUN SERPL-MCNC: 20 MG/DL (ref 8–28)
CALCIUM SERPL-MCNC: 8.9 MG/DL (ref 8.5–10.5)
CHLORIDE BLD-SCNC: 100 MMOL/L (ref 98–107)
CO2 SERPL-SCNC: 24 MMOL/L (ref 22–31)
CREAT SERPL-MCNC: 0.63 MG/DL (ref 0.6–1.1)
GFR SERPL CREATININE-BSD FRML MDRD: >60 ML/MIN/1.73M2
GLUCOSE BLD-MCNC: 74 MG/DL (ref 70–125)
POTASSIUM BLD-SCNC: 4.6 MMOL/L (ref 3.5–5)
SODIUM SERPL-SCNC: 133 MMOL/L (ref 136–145)

## 2020-09-11 NOTE — TELEPHONE ENCOUNTER
Checked in on Batsheva today (see prior notes re: cough and labs). Na+ improved from 125 to 133 with otherwise normal BMP and also normal BNP. COVID-19 testing in process and RN will call lab to check on status. RN knows Batsheva well and said Batsheva is doing a better job of drinking fluids (H2O, Gatorate and Nutritional supplement) with encouragement and has not heard a cough all day long. Feels Batsheva is very stable at this time. Our team will f/u with Batsheva next week and likely repeat labs as needed clinically.    Criselda Flores, DO

## 2020-09-17 ENCOUNTER — NURSING HOME VISIT (OUTPATIENT)
Dept: GERIATRICS | Facility: CLINIC | Age: 85
End: 2020-09-17
Payer: COMMERCIAL

## 2020-09-17 VITALS
HEART RATE: 66 BPM | SYSTOLIC BLOOD PRESSURE: 121 MMHG | BODY MASS INDEX: 19.74 KG/M2 | DIASTOLIC BLOOD PRESSURE: 74 MMHG | HEIGHT: 64 IN | RESPIRATION RATE: 16 BRPM | WEIGHT: 115.6 LBS | OXYGEN SATURATION: 92 % | TEMPERATURE: 97.5 F

## 2020-09-17 DIAGNOSIS — M15.0 PRIMARY OSTEOARTHRITIS INVOLVING MULTIPLE JOINTS: ICD-10-CM

## 2020-09-17 DIAGNOSIS — G89.29 OTHER CHRONIC PAIN: ICD-10-CM

## 2020-09-17 DIAGNOSIS — I10 ESSENTIAL HYPERTENSION: Primary | ICD-10-CM

## 2020-09-17 DIAGNOSIS — E87.1 HYPONATREMIA: ICD-10-CM

## 2020-09-17 DIAGNOSIS — I50.42 CHRONIC COMBINED SYSTOLIC AND DIASTOLIC CONGESTIVE HEART FAILURE (H): ICD-10-CM

## 2020-09-17 DIAGNOSIS — M25.561 CHRONIC PAIN OF RIGHT KNEE: ICD-10-CM

## 2020-09-17 DIAGNOSIS — D64.9 ANEMIA, UNSPECIFIED TYPE: ICD-10-CM

## 2020-09-17 DIAGNOSIS — R54 FRAILTY: ICD-10-CM

## 2020-09-17 DIAGNOSIS — K08.89 TOOTH PAIN: ICD-10-CM

## 2020-09-17 DIAGNOSIS — D50.9 IRON DEFICIENCY ANEMIA, UNSPECIFIED IRON DEFICIENCY ANEMIA TYPE: ICD-10-CM

## 2020-09-17 DIAGNOSIS — R53.81 DEBILITY: ICD-10-CM

## 2020-09-17 DIAGNOSIS — G89.29 CHRONIC PAIN OF RIGHT KNEE: ICD-10-CM

## 2020-09-17 DIAGNOSIS — F03.90 DEMENTIA WITHOUT BEHAVIORAL DISTURBANCE, UNSPECIFIED DEMENTIA TYPE: ICD-10-CM

## 2020-09-17 PROBLEM — H90.3 BILATERAL SENSORINEURAL HEARING LOSS: Status: ACTIVE | Noted: 2018-03-02

## 2020-09-17 PROCEDURE — 99309 SBSQ NF CARE MODERATE MDM 30: CPT | Performed by: NURSE PRACTITIONER

## 2020-09-17 RX ORDER — BACILLUS COAGULANS 1B CELL
1 CAPSULE ORAL
Start: 2020-09-17

## 2020-09-17 ASSESSMENT — MIFFLIN-ST. JEOR: SCORE: 949.36

## 2020-09-17 NOTE — PROGRESS NOTES
[facility]  [place] Medical Record Number: [id number]  Place of Service where encounter took place: [facility] ([facility]) [[id number]]  Chief Complaint   Patient presents with     RECHECK       HPI:    Batsheva Barkley is a 86 year old (11/6/1933), who is being seen today for an episodic care visit. HPI information obtained from: facility chart records, facility staff, patient report and [facility] chart review.     Past Medical and Surgical History reviewed in [facility] today.    Met with Mrs. Barkley today for follow up.  As before Mrs. Barkley had advanced cognitive/memory impairment, but reports her usual chronic Left shoulder and other chronic pains, but tolerable and unchanged.  She has no other complaints, besides being bored.     MEDICATIONS:  Current Outpatient Medications   Medication Sig Dispense Refill     benzocaine (ORAJEL MAXIMUM STRENGTH) 20 % GEL gel Take by mouth 4 times daily as needed for moderate pain       ferrous fumarate 65 mg, [place]. FE,-Vitamin C 125 mg (VITRON-C)  MG TABS tablet Take 1 tablet by mouth every 48 hours       acetaminophen (TYLENOL) 500 MG tablet Take 1,000 mg by mouth 3 times daily With additional 1000 mg once daily PRN pain       aspirin (ASA) 81 MG chewable tablet Take 81 mg by mouth daily       Atorvastatin Calcium (LIPITOR PO) Take 20 mg by mouth At Bedtime        carboxymethylcellulose PF (REFRESH PLUS) 0.5 % ophthalmic solution Place 2 drops into both eyes 2 times daily And also 2 drops every 4 hours PRN dry eyes       diclofenac (VOLTAREN) 1 % topical gel Place 2 g onto the skin 2 times daily To bilateral knees; also apply additional once daily PRN pain       loperamide (IMODIUM) 2 MG capsule Take 2 mg by mouth 4 times daily as needed for diarrhea        losartan (COZAAR) 50 MG tablet Take 1 tablet (50 mg) by mouth 2 times daily 180 tablet 3     menthol-zinc oxide (CALMOSEPTINE) 0.44-20.6 % OINT ointment Apply topically 2 times daily And  "also BID PRN for skin protection       metoprolol (LOPRESSOR) 50 MG tablet TAKE ONE TABLET BY MOUTH TWICE DAILY 180 tablet 3     omeprazole (PRILOSEC) 20 MG DR capsule Take 1 capsule (20 mg) by mouth 2 times daily (before meals) 60 capsule 0     ONDANSETRON PO Take 4 mg by mouth every 8 hours as needed for nausea       order for DME Equipment being ordered: Walker Wheels () and Walker ()  Treatment Diagnosis: gait instability 1 each 0     order for DME Light weight wheelchair Body mass index is 19.08 kg/(m^2). 1 Device 0     senna-docusate (SENOKOT-S;PERICOLACE) 8.6-50 MG per tablet Take 1 tablet by mouth 2 times daily as needed        sucralfate (CARAFATE) 1 GM tablet Take 1 g by mouth 4 times daily Before meals and at bedtime       traZODone (DESYREL) 50 MG tablet Take 50 mg by mouth At Bedtime       trolamine salicylate (ASPERCREME) 10 % external cream Apply topically See Admin Instructions Apply to affected areas shoulders and neck twice daily AND BID PRN       REVIEW OF SYSTEMS:  Limited secondary to cognitive impairment but today pt reports 7 point ROS done including, light headedness/dizziness, fever/chills, pain, Resp, CV, GI, and  and is negative other than noted in HPI.     Objective:  /74   Pulse 66   Temp 97.5  F (36.4  C)   Resp 16   Ht 1.626 m (5' 4\")   Wt 52.4 kg (115 lb 9.6 oz)   SpO2 92%   BMI 19.84 kg/m       Exam:  EXAM LIMITED DUE TO [facility] social distancing recommendations:  GENERAL APPEARANCE:  Elderly thin female sitting up in chair.  NAD, non-toxic.  RESP:  Diminished in bilateral lower lobes today, no crackles.  Regular relaxed breathing effort.  No cough.   EXTREMITIES:  No lower extremity edema, no calf tenderness.   PSYCH: Alert to self, not place, date, situation.  Clear degree of cognitive/memory impairment.  [place].  Unchanged/stable.     Labs:   I have personally reviewed labs, which are in facility or EMR chart.     ASSESSMENT/PLAN:  Essential hypertension  Chronic " combined systolic and diastolic congestive heart failure (H)  Hyponatremia  Review of VS's show VSS and within acceptable range.   No current s/s of clinical CHF.   Continues to have mild hyponatremia, improved to 133 this week.  PO fluid intake improved, using 1/2 water/Gatoraid mix.   No clinical s/s of CHF, BNP stable at 109, thus euvolemic hyponatremia, suspect some degree of SIADH as etiology as PO intake has been low.   -Continue to encourage / use Gatoraid mix.   -Recheck 21 Sept.   --No other changes, continue to clinically monitor.     Anemia, unspecified type  Not checked recently, no overt s/s of bleeding.   -Lowered Vitron C down to q48h now after 1 month of q daily.   -Recheck Hgb 9/21.     Primary osteoarthritis involving multiple joints  Chronic pain of right knee  Other chronic pain  Frailty  Continues to have daily aches/pains, all tolerable/mild and unchanged.   -No changes, continue to clinically monitor.     Dementia without behavioral disturbance, unspecified dementia type (H)  Dementia is mod-advanced, stable/unchanged.    -No changes, continue to clinically monitor.     Debility  -PT/OT report progress is slow and minimal, at times needs EZ stand or paul for commode/transfers.  Will need higher level of care than standard [facility] memory care.   -Continues with PT/OT.   -SW is aware.     Orders written by provider at facility  -As noted above.     Electronically signed by:  NICKOLAS Wills CNP

## 2020-09-20 ENCOUNTER — RECORDS - HEALTHEAST (OUTPATIENT)
Dept: LAB | Facility: CLINIC | Age: 85
End: 2020-09-20

## 2020-09-21 LAB
ANION GAP SERPL CALCULATED.3IONS-SCNC: 8 MMOL/L (ref 5–18)
BUN SERPL-MCNC: 18 MG/DL (ref 8–28)
CALCIUM SERPL-MCNC: 9 MG/DL (ref 8.5–10.5)
CHLORIDE BLD-SCNC: 100 MMOL/L (ref 98–107)
CO2 SERPL-SCNC: 24 MMOL/L (ref 22–31)
CREAT SERPL-MCNC: 0.63 MG/DL (ref 0.6–1.1)
ERYTHROCYTE [DISTWIDTH] IN BLOOD BY AUTOMATED COUNT: ABNORMAL %
GFR SERPL CREATININE-BSD FRML MDRD: >60 ML/MIN/1.73M2
GLUCOSE BLD-MCNC: 85 MG/DL (ref 70–125)
HCT VFR BLD AUTO: 32.5 % (ref 35–47)
HGB BLD-MCNC: 10.3 G/DL (ref 12–16)
MCH RBC QN AUTO: 25.6 PG (ref 27–34)
MCHC RBC AUTO-ENTMCNC: 31.7 G/DL (ref 32–36)
MCV RBC AUTO: 81 FL (ref 80–100)
PLATELET # BLD AUTO: 219 THOU/UL (ref 140–440)
PMV BLD AUTO: 9.9 FL (ref 8.5–12.5)
POTASSIUM BLD-SCNC: 4.2 MMOL/L (ref 3.5–5)
RBC # BLD AUTO: 4.03 MILL/UL (ref 3.8–5.4)
SODIUM SERPL-SCNC: 132 MMOL/L (ref 136–145)
WBC: 5.2 THOU/UL (ref 4–11)

## 2020-09-22 ENCOUNTER — NURSING HOME VISIT (OUTPATIENT)
Dept: GERIATRICS | Facility: CLINIC | Age: 85
End: 2020-09-22
Payer: COMMERCIAL

## 2020-09-22 VITALS
BODY MASS INDEX: 19.74 KG/M2 | SYSTOLIC BLOOD PRESSURE: 107 MMHG | RESPIRATION RATE: 16 BRPM | TEMPERATURE: 97.7 F | OXYGEN SATURATION: 93 % | DIASTOLIC BLOOD PRESSURE: 52 MMHG | WEIGHT: 115.6 LBS | HEART RATE: 49 BPM | HEIGHT: 64 IN

## 2020-09-22 DIAGNOSIS — E87.1 HYPONATREMIA: ICD-10-CM

## 2020-09-22 DIAGNOSIS — R54 FRAILTY: ICD-10-CM

## 2020-09-22 DIAGNOSIS — R53.81 DEBILITY: ICD-10-CM

## 2020-09-22 DIAGNOSIS — G89.29 CHRONIC PAIN OF RIGHT KNEE: ICD-10-CM

## 2020-09-22 DIAGNOSIS — F03.90 DEMENTIA WITHOUT BEHAVIORAL DISTURBANCE, UNSPECIFIED DEMENTIA TYPE: ICD-10-CM

## 2020-09-22 DIAGNOSIS — M15.0 PRIMARY OSTEOARTHRITIS INVOLVING MULTIPLE JOINTS: ICD-10-CM

## 2020-09-22 DIAGNOSIS — G89.29 OTHER CHRONIC PAIN: ICD-10-CM

## 2020-09-22 DIAGNOSIS — M25.561 CHRONIC PAIN OF RIGHT KNEE: ICD-10-CM

## 2020-09-22 DIAGNOSIS — I50.42 CHRONIC COMBINED SYSTOLIC AND DIASTOLIC CONGESTIVE HEART FAILURE (H): ICD-10-CM

## 2020-09-22 DIAGNOSIS — I10 ESSENTIAL HYPERTENSION: Primary | ICD-10-CM

## 2020-09-22 PROCEDURE — 99309 SBSQ NF CARE MODERATE MDM 30: CPT | Performed by: NURSE PRACTITIONER

## 2020-09-22 ASSESSMENT — MIFFLIN-ST. JEOR: SCORE: 949.36

## 2020-09-22 NOTE — LETTER
9/22/2020        RE: Batsheva Barkley  [facility]  [address] [address] [address]        [facility]  [place] Medical Record Number:  [id number]  Place of Service where encounter took place:  [facility] ([facility]) [[id number]]  Chief Complaint   Patient presents with     Nursing Home Acute       HPI:    Batsheva Barkley  is a 86 year old (11/6/1933), who is being seen today for an episodic care visit.  HPI information obtained from: facility chart records, facility staff, patient report and [facility] chart review.     Past Medical and Surgical History reviewed in [facility] today.    Met with Mrs. Barkley today for follow up.  Mrs. Barkley is like before, advanced cognitive/memory impairment, but endorses her ongoing chronic joint pain's, today Left knee is worse.  Reports the pain cream and Acetaminophen help.  Besides chronic pain, which is tolerable, she has no complaints.      MEDICATIONS:  Current Outpatient Medications   Medication Sig Dispense Refill     acetaminophen (TYLENOL) 500 MG tablet Take 1,000 mg by mouth 3 times daily With additional 1000 mg once daily PRN pain       aspirin (ASA) 81 MG chewable tablet Take 81 mg by mouth daily       Atorvastatin Calcium (LIPITOR PO) Take 20 mg by mouth At Bedtime        benzocaine (ORAJEL MAXIMUM STRENGTH) 20 % GEL gel Take by mouth 4 times daily as needed for moderate pain       carboxymethylcellulose PF (REFRESH PLUS) 0.5 % ophthalmic solution Place 2 drops into both eyes 2 times daily And also 2 drops every 4 hours PRN dry eyes       diclofenac (VOLTAREN) 1 % topical gel Place 2 g onto the skin 2 times daily To bilateral knees; also apply additional once daily PRN pain       ferrous fumarate 65 mg, [place]. FE,-Vitamin C 125 mg (VITRON-C)  MG TABS tablet Take 1 tablet by mouth every 48 hours       loperamide (IMODIUM) 2 MG capsule Take 2 mg by mouth 4 times daily as needed for diarrhea        losartan  "(COZAAR) 50 MG tablet Take 1 tablet (50 mg) by mouth 2 times daily 180 tablet 3     menthol-zinc oxide (CALMOSEPTINE) 0.44-20.6 % OINT ointment Apply topically 2 times daily And also BID PRN for skin protection       metoprolol (LOPRESSOR) 50 MG tablet TAKE ONE TABLET BY MOUTH TWICE DAILY 180 tablet 3     omeprazole (PRILOSEC) 20 MG DR capsule Take 1 capsule (20 mg) by mouth 2 times daily (before meals) 60 capsule 0     ONDANSETRON PO Take 4 mg by mouth every 8 hours as needed for nausea       order for DME Equipment being ordered: Walker Wheels () and Walker ()  Treatment Diagnosis: gait instability 1 each 0     order for DME Light weight wheelchair Body mass index is 19.08 kg/(m^2). 1 Device 0     senna-docusate (SENOKOT-S;PERICOLACE) 8.6-50 MG per tablet Take 1 tablet by mouth 2 times daily as needed        sucralfate (CARAFATE) 1 GM tablet Take 1 g by mouth 4 times daily Before meals and at bedtime       traZODone (DESYREL) 50 MG tablet Take 50 mg by mouth At Bedtime       trolamine salicylate (ASPERCREME) 10 % external cream Apply topically See Admin Instructions Apply to affected areas shoulders and neck twice daily AND BID PRN       REVIEW OF SYSTEMS:  Limited secondary to cognitive impairment but today pt reports 7 point ROS done including, light headedness/dizziness, fever/chills, pain, Resp, CV, GI, and  and is negative other than noted in HPI.     Objective:  /52   Pulse (!) 49   Temp 97.7  F (36.5  C)   Resp 16   Ht 1.626 m (5' 4\")   Wt 52.4 kg (115 lb 9.6 oz)   SpO2 93%   BMI 19.84 kg/m       Exam:  EXAM LIMITED DUE TO [facility] social distancing recommendations:  GENERAL APPEARANCE:  Elderly thin female sitting up in chair.  NAD, non-toxic.  RESP:  Diminished in bilateral lower lobes R>L, no crackles.  Regular relaxed breathing effort.  No cough.   EXTREMITIES:  No lower extremity edema, no calf tenderness.   PSYCH: Alert to self, not place, date, situation.  Clear degree of " cognitive/memory impairment.  [place].  Unchanged/stable.     Labs:   I have personally reviewed labs, which are in facility or EMR chart.     ASSESSMENT/PLAN:  Essential hypertension  Chronic combined systolic and diastolic congestive heart failure (H)  Review of VS's show VSS and within acceptable range.   No current s/s of clinical CHF.   -No changes, continue to clinically monitor.     Hyponatremia, suspect SIADH  Na continues to be low, 132 yesterday 9/21, but better than before, is euvolemic.  Suspect SIADH, has been drinking more, is still drinking some 1/2 water/Gatoraide TID.    -No changes, continue to clinically monitor.     Primary osteoarthritis involving multiple joints  Chronic pain of right knee  Other chronic pain  The location/joint that hurts the most moves around some but pain always present, but mild/tolerable, no distress.   -Continues Diclofenac and Trolamine.   -Continues TID and PRN Acetaminophen.     Dementia without behavioral disturbance, unspecified dementia type (H)  Frailty  Debility  Dementia is advanced, she came from Memory care, but per PT/OT she still needs paul lift at times for transfers, other times heavy assist.  Her current Memory care can not take/use paul lifts, thus unclear if she can return or not.    -PT/OT and SW aware.   -Continues with PT/OT.     Orders written by provider at facility  -NNO.     Electronically signed by:  NICKOLAS Wills CNP         Sincerely,        NICKOLAS Wills CNP

## 2020-09-22 NOTE — PROGRESS NOTES
[facility]  [place] [facility] Record Number:  [id number]  Place of Service where encounter took place:  [facility] ([facility]) [[id number]]  Chief Complaint   Patient presents with     Nursing Home Acute       HPI:    Batsheva Barkley  is a 86 year old (11/6/1933), who is being seen today for an episodic care visit.  HPI information obtained from: facility chart records, facility staff, patient report and [facility] chart review.     Past Medical and Surgical History reviewed in [facility] today.    Met with Mrs. Barkley today for follow up.  Mrs. Barkley is like before, advanced cognitive/memory impairment, but endorses her ongoing chronic joint pain's, today Left knee is worse.  Reports the pain cream and Acetaminophen help.  Besides chronic pain, which is tolerable, she has no complaints.      MEDICATIONS:  Current Outpatient Medications   Medication Sig Dispense Refill     acetaminophen (TYLENOL) 500 MG tablet Take 1,000 mg by mouth 3 times daily With additional 1000 mg once daily PRN pain       aspirin (ASA) 81 MG chewable tablet Take 81 mg by mouth daily       Atorvastatin Calcium (LIPITOR PO) Take 20 mg by mouth At Bedtime        benzocaine (ORAJEL MAXIMUM STRENGTH) 20 % GEL gel Take by mouth 4 times daily as needed for moderate pain       carboxymethylcellulose PF (REFRESH PLUS) 0.5 % ophthalmic solution Place 2 drops into both eyes 2 times daily And also 2 drops every 4 hours PRN dry eyes       diclofenac (VOLTAREN) 1 % topical gel Place 2 g onto the skin 2 times daily To bilateral knees; also apply additional once daily PRN pain       ferrous fumarate 65 mg, [place]. FE,-Vitamin C 125 mg (VITRON-C)  MG TABS tablet Take 1 tablet by mouth every 48 hours       loperamide (IMODIUM) 2 MG capsule Take 2 mg by mouth 4 times daily as needed for diarrhea        losartan (COZAAR) 50 MG tablet Take 1 tablet (50 mg) by mouth 2 times daily 180 tablet 3     menthol-zinc oxide (CALMOSEPTINE)  "0.44-20.6 % OINT ointment Apply topically 2 times daily And also BID PRN for skin protection       metoprolol (LOPRESSOR) 50 MG tablet TAKE ONE TABLET BY MOUTH TWICE DAILY 180 tablet 3     omeprazole (PRILOSEC) 20 MG DR capsule Take 1 capsule (20 mg) by mouth 2 times daily (before meals) 60 capsule 0     ONDANSETRON PO Take 4 mg by mouth every 8 hours as needed for nausea       order for DME Equipment being ordered: Walker Wheels () and Walker ()  Treatment Diagnosis: gait instability 1 each 0     order for DME Light weight wheelchair Body mass index is 19.08 kg/(m^2). 1 Device 0     senna-docusate (SENOKOT-S;PERICOLACE) 8.6-50 MG per tablet Take 1 tablet by mouth 2 times daily as needed        sucralfate (CARAFATE) 1 GM tablet Take 1 g by mouth 4 times daily Before meals and at bedtime       traZODone (DESYREL) 50 MG tablet Take 50 mg by mouth At Bedtime       trolamine salicylate (ASPERCREME) 10 % external cream Apply topically See Admin Instructions Apply to affected areas shoulders and neck twice daily AND BID PRN       REVIEW OF SYSTEMS:  Limited secondary to cognitive impairment but today pt reports 7 point ROS done including, light headedness/dizziness, fever/chills, pain, Resp, CV, GI, and  and is negative other than noted in HPI.     Objective:  /52   Pulse (!) 49   Temp 97.7  F (36.5  C)   Resp 16   Ht 1.626 m (5' 4\")   Wt 52.4 kg (115 lb 9.6 oz)   SpO2 93%   BMI 19.84 kg/m       Exam:  EXAM LIMITED DUE TO [facility] social distancing recommendations:  GENERAL APPEARANCE:  Elderly thin female sitting up in chair.  NAD, non-toxic.  RESP:  Diminished in bilateral lower lobes R>L, no crackles.  Regular relaxed breathing effort.  No cough.   EXTREMITIES:  No lower extremity edema, no calf tenderness.   PSYCH: Alert to self, not place, date, situation.  Clear degree of cognitive/memory impairment.  [place].  Unchanged/stable.     Labs:   I have personally reviewed labs, which are in facility or " EMR chart.     ASSESSMENT/PLAN:  Essential hypertension  Chronic combined systolic and diastolic congestive heart failure (H)  Review of VS's show VSS and within acceptable range.   No current s/s of clinical CHF.   -No changes, continue to clinically monitor.     Hyponatremia, suspect SIADH  Na continues to be low, 132 yesterday 9/21, but better than before, is euvolemic.  Suspect SIADH, has been drinking more, is still drinking some 1/2 water/Gatoraide TID.    -No changes, continue to clinically monitor.     Primary osteoarthritis involving multiple joints  Chronic pain of right knee  Other chronic pain  The location/joint that hurts the most moves around some but pain always present, but mild/tolerable, no distress.   -Continues Diclofenac and Trolamine.   -Continues TID and PRN Acetaminophen.     Dementia without behavioral disturbance, unspecified dementia type (H)  Frailty  Debility  Dementia is advanced, she came from Memory care, but per PT/OT she still needs paul lift at times for transfers, other times heavy assist.  Her current Memory care can not take/use paul lifts, thus unclear if she can return or not.    -PT/OT and SW aware.   -Continues with PT/OT.     Orders written by provider at facility  -NNO.     Electronically signed by:  NICKOLAS Wills CNP

## 2020-09-29 ENCOUNTER — DISCHARGE SUMMARY NURSING HOME (OUTPATIENT)
Dept: GERIATRICS | Facility: CLINIC | Age: 85
End: 2020-09-29
Payer: COMMERCIAL

## 2020-09-29 VITALS
WEIGHT: 114.6 LBS | RESPIRATION RATE: 20 BRPM | BODY MASS INDEX: 22.5 KG/M2 | OXYGEN SATURATION: 93 % | HEART RATE: 76 BPM | SYSTOLIC BLOOD PRESSURE: 143 MMHG | TEMPERATURE: 96.4 F | DIASTOLIC BLOOD PRESSURE: 76 MMHG | HEIGHT: 60 IN

## 2020-09-29 DIAGNOSIS — M62.81 GENERALIZED MUSCLE WEAKNESS: ICD-10-CM

## 2020-09-29 DIAGNOSIS — R52 ACUTE PAIN: ICD-10-CM

## 2020-09-29 DIAGNOSIS — I10 ESSENTIAL HYPERTENSION: ICD-10-CM

## 2020-09-29 DIAGNOSIS — D64.9 ANEMIA, UNSPECIFIED TYPE: ICD-10-CM

## 2020-09-29 DIAGNOSIS — I25.10 CORONARY ARTERY DISEASE INVOLVING NATIVE HEART WITHOUT ANGINA PECTORIS, UNSPECIFIED VESSEL OR LESION TYPE: ICD-10-CM

## 2020-09-29 DIAGNOSIS — E87.1 HYPONATREMIA: ICD-10-CM

## 2020-09-29 DIAGNOSIS — R53.81 DEBILITY: ICD-10-CM

## 2020-09-29 DIAGNOSIS — R07.9 ACUTE CHEST PAIN: Primary | ICD-10-CM

## 2020-09-29 DIAGNOSIS — R54 FRAILTY: ICD-10-CM

## 2020-09-29 DIAGNOSIS — G89.29 OTHER CHRONIC PAIN: ICD-10-CM

## 2020-09-29 DIAGNOSIS — R53.81 PHYSICAL DECONDITIONING: ICD-10-CM
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
## 2020-09-29 DIAGNOSIS — I50.42 CHRONIC COMBINED SYSTOLIC AND DIASTOLIC CONGESTIVE HEART FAILURE (H): ICD-10-CM

## 2020-09-29 DIAGNOSIS — M25.50 CHRONIC JOINT PAIN: ICD-10-CM

## 2020-09-29 DIAGNOSIS — R60.0 BILATERAL LEG EDEMA: ICD-10-CM

## 2020-09-29 DIAGNOSIS — M15.0 PRIMARY OSTEOARTHRITIS INVOLVING MULTIPLE JOINTS: ICD-10-CM

## 2020-09-29 DIAGNOSIS — F03.90 DEMENTIA WITHOUT BEHAVIORAL DISTURBANCE, UNSPECIFIED DEMENTIA TYPE: ICD-10-CM

## 2020-09-29 DIAGNOSIS — G89.29 CHRONIC JOINT PAIN: ICD-10-CM

## 2020-09-29 DIAGNOSIS — K08.89 TOOTH PAIN: ICD-10-CM

## 2020-09-29 PROCEDURE — 99316 NF DSCHRG MGMT 30 MIN+: CPT | Performed by: NURSE PRACTITIONER

## 2020-09-29 RX ORDER — TROLAMINE SALICYLATE 10 G/100G
CREAM TOPICAL SEE ADMIN INSTRUCTIONS
Start: 2020-09-29

## 2020-09-29 RX ORDER — TRAMADOL HYDROCHLORIDE 50 MG/1
25 TABLET ORAL EVERY 6 HOURS PRN
Qty: 30 TABLET | Refills: 0 | Status: SHIPPED | OUTPATIENT
Start: 2020-09-29

## 2020-09-29 ASSESSMENT — MIFFLIN-ST. JEOR: SCORE: 881.32
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
## 2020-10-03 ENCOUNTER — RECORDS - HEALTHEAST (OUTPATIENT)
Dept: LAB | Facility: CLINIC | Age: 85
End: 2020-10-03

## 2020-10-05 LAB
ANION GAP SERPL CALCULATED.3IONS-SCNC: 7 MMOL/L (ref 5–18)
BUN SERPL-MCNC: 21 MG/DL (ref 8–28)
CALCIUM SERPL-MCNC: 9 MG/DL (ref 8.5–10.5)
CHLORIDE BLD-SCNC: 100 MMOL/L (ref 98–107)
CO2 SERPL-SCNC: 26 MMOL/L (ref 22–31)
CREAT SERPL-MCNC: 0.71 MG/DL (ref 0.6–1.1)
GFR SERPL CREATININE-BSD FRML MDRD: >60 ML/MIN/1.73M2
GLUCOSE BLD-MCNC: 77 MG/DL (ref 70–125)
POTASSIUM BLD-SCNC: 4.2 MMOL/L (ref 3.5–5)
SODIUM SERPL-SCNC: 133 MMOL/L (ref 136–145)

## 2020-10-09 ENCOUNTER — RECORDS - HEALTHEAST (OUTPATIENT)
Dept: LAB | Facility: CLINIC | Age: 85
End: 2020-10-09

## 2020-10-12 LAB
ANION GAP SERPL CALCULATED.3IONS-SCNC: 11 MMOL/L (ref 5–18)
BUN SERPL-MCNC: 22 MG/DL (ref 8–28)
CALCIUM SERPL-MCNC: 9.2 MG/DL (ref 8.5–10.5)
CHLORIDE BLD-SCNC: 99 MMOL/L (ref 98–107)
CO2 SERPL-SCNC: 22 MMOL/L (ref 22–31)
CREAT SERPL-MCNC: 0.73 MG/DL (ref 0.6–1.1)
GFR SERPL CREATININE-BSD FRML MDRD: >60 ML/MIN/1.73M2
GLUCOSE BLD-MCNC: 98 MG/DL (ref 70–125)
POTASSIUM BLD-SCNC: 3.9 MMOL/L (ref 3.5–5)
SODIUM SERPL-SCNC: 132 MMOL/L (ref 136–145)

## 2021-02-04 ENCOUNTER — PATIENT OUTREACH (OUTPATIENT)
Dept: GERIATRIC MEDICINE | Facility: CLINIC | Age: 86
End: 2021-02-04

## 2021-02-04 NOTE — PROGRESS NOTES
[employer] Medicare Disenrollment    Member was disenrolled from [employer] effective: 1-31-21  Reason for disenrollment: Move to [facility]   Meagan BINGHAM   [facility] Care Coordinator   [phone] - work cell phone   [phone] - work fax

## 2021-02-17 ENCOUNTER — RECORDS - HEALTHEAST (OUTPATIENT)
Dept: LAB | Facility: CLINIC | Age: 86
End: 2021-02-17

## 2021-02-18 LAB
ANION GAP SERPL CALCULATED.3IONS-SCNC: 7 MMOL/L (ref 5–18)
BUN SERPL-MCNC: 20 MG/DL (ref 8–28)
CALCIUM SERPL-MCNC: 8.8 MG/DL (ref 8.5–10.5)
CHLORIDE BLD-SCNC: 106 MMOL/L (ref 98–107)
CO2 SERPL-SCNC: 26 MMOL/L (ref 22–31)
CREAT SERPL-MCNC: 0.64 MG/DL (ref 0.6–1.1)
ERYTHROCYTE [DISTWIDTH] IN BLOOD BY AUTOMATED COUNT: 14.3 % (ref 11–14.5)
GFR SERPL CREATININE-BSD FRML MDRD: >60 ML/MIN/1.73M2
GLUCOSE BLD-MCNC: 81 MG/DL (ref 70–125)
HCT VFR BLD AUTO: 38.1 % (ref 35–47)
HGB BLD-MCNC: 12.3 G/DL (ref 12–16)
MCH RBC QN AUTO: 30.4 PG (ref 27–34)
MCHC RBC AUTO-ENTMCNC: 32.3 G/DL (ref 32–36)
MCV RBC AUTO: 94 FL (ref 80–100)
PLATELET # BLD AUTO: 198 THOU/UL (ref 140–440)
PMV BLD AUTO: 10.2 FL (ref 8.5–12.5)
POTASSIUM BLD-SCNC: 4 MMOL/L (ref 3.5–5)
RBC # BLD AUTO: 4.04 MILL/UL (ref 3.8–5.4)
SODIUM SERPL-SCNC: 139 MMOL/L (ref 136–145)
WBC: 4.9 THOU/UL (ref 4–11)

## 2021-06-23 ENCOUNTER — RECORDS - HEALTHEAST (OUTPATIENT)
Dept: LAB | Facility: CLINIC | Age: 86
End: 2021-06-23

## 2021-06-24 LAB — MAGNESIUM SERPL-MCNC: 2.1 MG/DL (ref 1.8–2.6)

## 2021-08-18 ENCOUNTER — LAB REQUISITION (OUTPATIENT)
Dept: LAB | Facility: CLINIC | Age: 86
End: 2021-08-18
Payer: COMMERCIAL

## 2021-08-18 DIAGNOSIS — I10 ESSENTIAL (PRIMARY) HYPERTENSION: ICD-10-CM

## 2021-08-18 DIAGNOSIS — D64.9 ANEMIA, UNSPECIFIED: ICD-10-CM

## 2021-08-19 LAB
ANION GAP SERPL CALCULATED.3IONS-SCNC: 7 MMOL/L (ref 5–18)
BUN SERPL-MCNC: 23 MG/DL (ref 8–28)
CALCIUM SERPL-MCNC: 8.7 MG/DL (ref 8.5–10.5)
CHLORIDE BLD-SCNC: 104 MMOL/L (ref 98–107)
CO2 SERPL-SCNC: 25 MMOL/L (ref 22–31)
CREAT SERPL-MCNC: 0.72 MG/DL (ref 0.6–1.1)
ERYTHROCYTE [DISTWIDTH] IN BLOOD BY AUTOMATED COUNT: 12.8 % (ref 10–15)
GFR SERPL CREATININE-BSD FRML MDRD: 76 ML/MIN/1.73M2
GLUCOSE BLD-MCNC: 77 MG/DL (ref 70–125)
HCT VFR BLD AUTO: 35.5 % (ref 35–47)
HGB BLD-MCNC: 11.4 G/DL (ref 11.7–15.7)
MCH RBC QN AUTO: 31.1 PG (ref 26.5–33)
MCHC RBC AUTO-ENTMCNC: 32.1 G/DL (ref 31.5–36.5)
MCV RBC AUTO: 97 FL (ref 78–100)
PLATELET # BLD AUTO: 192 10E3/UL (ref 150–450)
POTASSIUM BLD-SCNC: 4.1 MMOL/L (ref 3.5–5)
RBC # BLD AUTO: 3.66 10E6/UL (ref 3.8–5.2)
SODIUM SERPL-SCNC: 136 MMOL/L (ref 136–145)
WBC # BLD AUTO: 4.5 10E3/UL (ref 4–11)

## 2021-08-19 PROCEDURE — 85027 COMPLETE CBC AUTOMATED: CPT | Mod: ORL | Performed by: NURSE PRACTITIONER

## 2021-08-19 PROCEDURE — 36415 COLL VENOUS BLD VENIPUNCTURE: CPT | Mod: ORL | Performed by: NURSE PRACTITIONER

## 2021-08-19 PROCEDURE — P9603 ONE-WAY ALLOW PRORATED MILES: HCPCS | Mod: ORL | Performed by: NURSE PRACTITIONER

## 2021-08-19 PROCEDURE — 80048 BASIC METABOLIC PNL TOTAL CA: CPT | Mod: ORL | Performed by: NURSE PRACTITIONER

## 2021-09-30 ENCOUNTER — LAB REQUISITION (OUTPATIENT)
Dept: LAB | Facility: CLINIC | Age: 86
End: 2021-09-30
Payer: COMMERCIAL

## 2021-09-30 DIAGNOSIS — R35.0 FREQUENCY OF MICTURITION: ICD-10-CM

## 2021-09-30 DIAGNOSIS — R30.0 DYSURIA: ICD-10-CM

## 2021-09-30 DIAGNOSIS — N39.41 URGE INCONTINENCE: ICD-10-CM

## 2021-09-30 LAB
ALBUMIN UR-MCNC: 100 MG/DL
AMORPH CRY #/AREA URNS HPF: ABNORMAL /HPF
APPEARANCE UR: ABNORMAL
BACTERIA #/AREA URNS HPF: ABNORMAL /HPF
BILIRUB UR QL STRIP: NEGATIVE
COLOR UR AUTO: YELLOW
GLUCOSE UR STRIP-MCNC: NEGATIVE MG/DL
HGB UR QL STRIP: ABNORMAL
KETONES UR STRIP-MCNC: NEGATIVE MG/DL
LEUKOCYTE ESTERASE UR QL STRIP: ABNORMAL
NITRATE UR QL: NEGATIVE
PH UR STRIP: 5.5 [PH] (ref 5–7)
RBC URINE: 60 /HPF
SP GR UR STRIP: 1.01 (ref 1–1.03)
SQUAMOUS EPITHELIAL: 3 /HPF
UROBILINOGEN UR STRIP-MCNC: <2 MG/DL
WBC CLUMPS #/AREA URNS HPF: PRESENT /HPF
WBC URINE: >182 /HPF

## 2021-09-30 PROCEDURE — 81001 URINALYSIS AUTO W/SCOPE: CPT | Mod: ORL | Performed by: NURSE PRACTITIONER

## 2021-09-30 PROCEDURE — 87086 URINE CULTURE/COLONY COUNT: CPT | Mod: ORL | Performed by: NURSE PRACTITIONER

## 2021-10-01 LAB — BACTERIA UR CULT: NO GROWTH

## 2021-10-26 ENCOUNTER — LAB REQUISITION (OUTPATIENT)
Dept: LAB | Facility: CLINIC | Age: 86
End: 2021-10-26
Payer: COMMERCIAL

## 2021-10-26 DIAGNOSIS — R10.9 UNSPECIFIED ABDOMINAL PAIN: ICD-10-CM

## 2021-10-26 DIAGNOSIS — R31.9 HEMATURIA, UNSPECIFIED: ICD-10-CM

## 2021-10-26 DIAGNOSIS — R30.0 DYSURIA: ICD-10-CM

## 2021-10-26 LAB
ALBUMIN UR-MCNC: 200 MG/DL
AMORPH CRY #/AREA URNS HPF: ABNORMAL /HPF
APPEARANCE UR: ABNORMAL
BACTERIA #/AREA URNS HPF: ABNORMAL /HPF
BILIRUB UR QL STRIP: NEGATIVE
COLOR UR AUTO: ABNORMAL
GLUCOSE UR STRIP-MCNC: NEGATIVE MG/DL
HGB UR QL STRIP: ABNORMAL
KETONES UR STRIP-MCNC: NEGATIVE MG/DL
LEUKOCYTE ESTERASE UR QL STRIP: ABNORMAL
NITRATE UR QL: NEGATIVE
PH UR STRIP: 6 [PH] (ref 5–7)
RBC URINE: >182 /HPF
SP GR UR STRIP: 1.01 (ref 1–1.03)
SQUAMOUS EPITHELIAL: 5 /HPF
UROBILINOGEN UR STRIP-MCNC: <2 MG/DL
WBC CLUMPS #/AREA URNS HPF: PRESENT /HPF
WBC URINE: >182 /HPF

## 2021-10-26 PROCEDURE — 81001 URINALYSIS AUTO W/SCOPE: CPT | Mod: ORL | Performed by: NURSE PRACTITIONER

## 2021-10-26 PROCEDURE — 87086 URINE CULTURE/COLONY COUNT: CPT | Mod: ORL | Performed by: NURSE PRACTITIONER

## 2021-10-27 LAB — BACTERIA UR CULT: NORMAL

## 2022-02-17 PROBLEM — Z76.89 HEALTH CARE HOME: Status: RESOLVED | Noted: 2018-06-12 | Resolved: 2019-10-16

## 2022-08-19 NOTE — PROGRESS NOTES
SUBJECTIVE:                                                    Batsheva Barkley is a 83 year old female who presents to clinic today for the following health issues:  Patient complains of congestion with sore throat, nasal congestion and sinus pain. Some mucopurulant discharge but no upper dental pain and no sustained fever. Duration less than ten days.  Has cough and gerd and history of airborn allergy or asthma.   No chest pain, diaphoresis, or sob.  moderatelyproductive cough.  TMs dull  Not *red, sinus tenderness none  lungs clear, s1s2,soft abd,no distress, cyanosis or stridor.  A:URI with harsh cough led to left anterior rib pain  P:fluids, antipyretics,rest and ice as directed.  Recheck PRN worsening or non-improvement over 5 days.  Discussed signs of systemic or constutional illness.  Chest and rib xray shows no fracture.     (S29.8XXA) Blunt trauma of rib, initial encounter  (primary encounter diagnosis)  Comment:   Plan: XR Ribs & Chest Left G/E 3 Views          URI: expectant managment        Joint Pain     Onset:2/13/17    Description:   Location: left rib  Character: Sharp    Intensity: moderate, severe    Progression of Symptoms: worse    Accompanying Signs & Symptoms:  Other symptoms: none   History:   Previous similar pain: no       Precipitating factors:   Trauma or overuse: no     Alleviating factors:  Improved by: nothing       Therapies Tried and outcome: none       
ADL retraining/balance training/bed mobility training/strengthening/transfer training

## 2024-01-18 NOTE — LETTER
9/17/2020        RE: Batsheva Barkley  [facility]  [address] [address] [address]        [place] [facility]  [place] [facility] Record Number: [id number]  Place of Service where encounter took place: [facility] [[id number]]  Chief Complaint   Patient presents with     RECHECK       HPI:    Batsheva Barkley is a 86 year old (11/6/1933), who is being seen today for an episodic care visit. HPI information obtained from: facility chart records, facility staff, patient report and [facility] chart review.     Past Medical and Surgical History reviewed in [facility] today.    Met with Mrs. Barkley today for follow up.  As before Mrs. Barkley had advanced cognitive/memory impairment, but reports her usual chronic Left shoulder and other chronic pains, but tolerable and unchanged.  She has no other complaints, besides being bored.     MEDICATIONS:  Current Outpatient Medications   Medication Sig Dispense Refill     benzocaine (ORAJEL MAXIMUM STRENGTH) 20 % GEL gel Take by mouth 4 times daily as needed for moderate pain       ferrous fumarate 65 mg, [place]. FE,-Vitamin C 125 mg (VITRON-C)  MG TABS tablet Take 1 tablet by mouth every 48 hours       acetaminophen (TYLENOL) 500 MG tablet Take 1,000 mg by mouth 3 times daily With additional 1000 mg once daily PRN pain       aspirin (ASA) 81 MG chewable tablet Take 81 mg by mouth daily       Atorvastatin Calcium (LIPITOR PO) Take 20 mg by mouth At Bedtime        carboxymethylcellulose PF (REFRESH PLUS) 0.5 % ophthalmic solution Place 2 drops into both eyes 2 times daily And also 2 drops every 4 hours PRN dry eyes       diclofenac (VOLTAREN) 1 % topical gel Place 2 g onto the skin 2 times daily To bilateral knees; also apply additional once daily PRN pain       loperamide (IMODIUM) 2 MG capsule Take 2 mg by mouth 4 times daily as needed for diarrhea        losartan (COZAAR) 50 MG tablet Take 1 tablet (50 mg) by mouth 2 times  "daily 180 tablet 3     menthol-zinc oxide (CALMOSEPTINE) 0.44-20.6 % OINT ointment Apply topically 2 times daily And also BID PRN for skin protection       metoprolol (LOPRESSOR) 50 MG tablet TAKE ONE TABLET BY MOUTH TWICE DAILY 180 tablet 3     omeprazole (PRILOSEC) 20 MG DR capsule Take 1 capsule (20 mg) by mouth 2 times daily (before meals) 60 capsule 0     ONDANSETRON PO Take 4 mg by mouth every 8 hours as needed for nausea       order for DME Equipment being ordered: Walker Wheels () and Walker ()  Treatment Diagnosis: gait instability 1 each 0     order for DME Light weight wheelchair Body mass index is 19.08 kg/(m^2). 1 Device 0     senna-docusate (SENOKOT-S;PERICOLACE) 8.6-50 MG per tablet Take 1 tablet by mouth 2 times daily as needed        sucralfate (CARAFATE) 1 GM tablet Take 1 g by mouth 4 times daily Before meals and at bedtime       traZODone (DESYREL) 50 MG tablet Take 50 mg by mouth At Bedtime       trolamine salicylate (ASPERCREME) 10 % external cream Apply topically See Admin Instructions Apply to affected areas shoulders and neck twice daily AND BID PRN       REVIEW OF SYSTEMS:  Limited secondary to cognitive impairment but today pt reports 7 point ROS done including, light headedness/dizziness, fever/chills, pain, Resp, CV, GI, and  and is negative other than noted in HPI.     Objective:  /74   Pulse 66   Temp 97.5  F (36.4  C)   Resp 16   Ht 1.626 m (5' 4\")   Wt 52.4 kg (115 lb 9.6 oz)   SpO2 92%   BMI 19.84 kg/m       Exam:  EXAM LIMITED DUE TO [facility] social distancing recommendations:  GENERAL APPEARANCE:  Elderly thin female sitting up in chair.  NAD, non-toxic.  RESP:  Diminished in bilateral lower lobes today, no crackles.  Regular relaxed breathing effort.  No cough.   EXTREMITIES:  No lower extremity edema, no calf tenderness.   PSYCH: Alert to self, not place, date, situation.  Clear degree of cognitive/memory impairment.  [place].  Unchanged/stable.     Labs:   I " have personally reviewed labs, which are in facility or EMR chart.     ASSESSMENT/PLAN:  Essential hypertension  Chronic combined systolic and diastolic congestive heart failure (H)  Hyponatremia  Review of VS's show VSS and within acceptable range.   No current s/s of clinical CHF.   Continues to have mild hyponatremia, improved to 133 this week.  PO fluid intake improved, using 1/2 water/Gatoraid mix.   No clinical s/s of CHF, BNP stable at 109, thus euvolemic hyponatremia, suspect some degree of SIADH as etiology as PO intake has been low.   -Continue to encourage / use Gatoraid mix.   -Recheck 21 Sept.   --No other changes, continue to clinically monitor.     Anemia, unspecified type  Not checked recently, no overt s/s of bleeding.   -Lowered Vitron C down to q48h now after 1 month of q daily.   -Recheck Hgb 9/21.     Primary osteoarthritis involving multiple joints  Chronic pain of right knee  Other chronic pain  Frailty  Continues to have daily aches/pains, all tolerable/mild and unchanged.   -No changes, continue to clinically monitor.     Dementia without behavioral disturbance, unspecified dementia type (H)  Dementia is mod-advanced, stable/unchanged.    -No changes, continue to clinically monitor.     Debility  -PT/OT report progress is slow and minimal, at times needs EZ stand or paul for commode/transfers.  Will need higher level of care than standard [facility] memory care.   -Continues with PT/OT.   -SW is aware.     Orders written by provider at facility  -As noted above.     Electronically signed by:  NICKOLAS Wills CNP       Sincerely,        NICKOLAS Wills CNP     0
